# Patient Record
Sex: FEMALE | Race: OTHER | URBAN - METROPOLITAN AREA
[De-identification: names, ages, dates, MRNs, and addresses within clinical notes are randomized per-mention and may not be internally consistent; named-entity substitution may affect disease eponyms.]

---

## 2020-07-15 ENCOUNTER — APPOINTMENT (RX ONLY)
Dept: URBAN - METROPOLITAN AREA CLINIC 22 | Facility: CLINIC | Age: 45
Setting detail: DERMATOLOGY
End: 2020-07-15

## 2020-07-15 DIAGNOSIS — Z71.89 OTHER SPECIFIED COUNSELING: ICD-10-CM

## 2020-07-15 DIAGNOSIS — D22 MELANOCYTIC NEVI: ICD-10-CM

## 2020-07-15 DIAGNOSIS — D17 BENIGN LIPOMATOUS NEOPLASM: ICD-10-CM

## 2020-07-15 PROBLEM — D17.23 BENIGN LIPOMATOUS NEOPLASM OF SKIN AND SUBCUTANEOUS TISSUE OF RIGHT LEG: Status: ACTIVE | Noted: 2020-07-15

## 2020-07-15 PROBLEM — D17.24 BENIGN LIPOMATOUS NEOPLASM OF SKIN AND SUBCUTANEOUS TISSUE OF LEFT LEG: Status: ACTIVE | Noted: 2020-07-15

## 2020-07-15 PROBLEM — D22.39 MELANOCYTIC NEVI OF OTHER PARTS OF FACE: Status: ACTIVE | Noted: 2020-07-15

## 2020-07-15 PROBLEM — D17.22 BENIGN LIPOMATOUS NEOPLASM OF SKIN AND SUBCUTANEOUS TISSUE OF LEFT ARM: Status: ACTIVE | Noted: 2020-07-15

## 2020-07-15 PROBLEM — D17.21 BENIGN LIPOMATOUS NEOPLASM OF SKIN AND SUBCUTANEOUS TISSUE OF RIGHT ARM: Status: ACTIVE | Noted: 2020-07-15

## 2020-07-15 PROCEDURE — ? REFERRAL CORRESPONDENCE

## 2020-07-15 PROCEDURE — 99202 OFFICE O/P NEW SF 15 MIN: CPT

## 2020-07-15 PROCEDURE — ? COUNSELING

## 2020-07-15 ASSESSMENT — LOCATION SIMPLE DESCRIPTION DERM
LOCATION SIMPLE: LEFT FOREARM
LOCATION SIMPLE: LEFT CHEEK
LOCATION SIMPLE: RIGHT FOREARM
LOCATION SIMPLE: RIGHT PRETIBIAL REGION
LOCATION SIMPLE: LEFT PRETIBIAL REGION

## 2020-07-15 ASSESSMENT — LOCATION DETAILED DESCRIPTION DERM
LOCATION DETAILED: RIGHT VENTRAL DISTAL FOREARM
LOCATION DETAILED: LEFT VENTRAL DISTAL FOREARM
LOCATION DETAILED: RIGHT PROXIMAL PRETIBIAL REGION
LOCATION DETAILED: LEFT PROXIMAL PRETIBIAL REGION
LOCATION DETAILED: LEFT INFERIOR MEDIAL MALAR CHEEK

## 2020-07-15 ASSESSMENT — LOCATION ZONE DERM
LOCATION ZONE: ARM
LOCATION ZONE: FACE
LOCATION ZONE: LEG

## 2021-05-06 ENCOUNTER — HOSPITAL ENCOUNTER (EMERGENCY)
Dept: HOSPITAL 8 - ED | Age: 46
Discharge: HOME | End: 2021-05-06
Payer: COMMERCIAL

## 2021-05-06 VITALS — WEIGHT: 184.31 LBS | BODY MASS INDEX: 27.93 KG/M2 | HEIGHT: 68 IN

## 2021-05-06 VITALS — SYSTOLIC BLOOD PRESSURE: 136 MMHG | DIASTOLIC BLOOD PRESSURE: 79 MMHG

## 2021-05-06 DIAGNOSIS — R10.84: Primary | ICD-10-CM

## 2021-05-06 DIAGNOSIS — R11.0: ICD-10-CM

## 2021-05-06 LAB
<PLATELET ESTIMATE>: ADEQUATE
<PLT MORPHOLOGY>: (no result)
ALBUMIN SERPL-MCNC: 3.9 G/DL (ref 3.4–5)
ALP SERPL-CCNC: 51 U/L (ref 45–117)
ALT SERPL-CCNC: 32 U/L (ref 12–78)
ANION GAP SERPL CALC-SCNC: 8 MMOL/L (ref 5–15)
ANISOCYTOSIS BLD QL SMEAR: (no result)
BASOPHILS # BLD AUTO: 0 X10^3/UL (ref 0–0.1)
BASOPHILS NFR BLD AUTO: 0 % (ref 0–1)
BILIRUB SERPL-MCNC: 0.8 MG/DL (ref 0.2–1)
CALCIUM SERPL-MCNC: 9.3 MG/DL (ref 8.5–10.1)
CHLORIDE SERPL-SCNC: 104 MMOL/L (ref 98–107)
CREAT SERPL-MCNC: 0.62 MG/DL (ref 0.55–1.02)
EOSINOPHIL # BLD AUTO: 0.1 X10^3/UL (ref 0–0.4)
EOSINOPHIL NFR BLD AUTO: 1 % (ref 1–7)
ERYTHROCYTE [DISTWIDTH] IN BLOOD BY AUTOMATED COUNT: 17.2 % (ref 9.6–15.2)
HYPOCHROMIA BLD QL SMEAR: (no result)
LYMPHOCYTES # BLD AUTO: 1.6 X10^3/UL (ref 1–3.4)
LYMPHOCYTES NFR BLD AUTO: 15 % (ref 22–44)
MCH RBC QN AUTO: 20.4 PG (ref 27–34.8)
MCHC RBC AUTO-ENTMCNC: 30.6 G/DL (ref 32.4–35.8)
MD: (no result)
MICROCYTES BLD QL SMEAR: (no result)
MICROSCOPIC: (no result)
MONOCYTES # BLD AUTO: 0.5 X10^3/UL (ref 0.2–0.8)
MONOCYTES NFR BLD AUTO: 4 % (ref 2–9)
NEUTROPHILS # BLD AUTO: 8.8 X10^3/UL (ref 1.8–6.8)
NEUTROPHILS NFR BLD AUTO: 80 % (ref 42–75)
OVALOCYTES BLD QL SMEAR: (no result)
PLATELET # BLD AUTO: 162 X10^3/UL (ref 130–400)
PMV BLD AUTO: 7.3 FL (ref 7.4–10.4)
POLYCHROMASIA BLD QL SMEAR: (no result)
PROT SERPL-MCNC: 8.1 G/DL (ref 6.4–8.2)
RBC # BLD AUTO: 4.69 X10^6/UL (ref 3.82–5.3)

## 2021-05-06 PROCEDURE — 96374 THER/PROPH/DIAG INJ IV PUSH: CPT

## 2021-05-06 PROCEDURE — 83690 ASSAY OF LIPASE: CPT

## 2021-05-06 PROCEDURE — 80053 COMPREHEN METABOLIC PANEL: CPT

## 2021-05-06 PROCEDURE — 96375 TX/PRO/DX INJ NEW DRUG ADDON: CPT

## 2021-05-06 PROCEDURE — 36415 COLL VENOUS BLD VENIPUNCTURE: CPT

## 2021-05-06 PROCEDURE — 74177 CT ABD & PELVIS W/CONTRAST: CPT

## 2021-05-06 PROCEDURE — 84703 CHORIONIC GONADOTROPIN ASSAY: CPT

## 2021-05-06 PROCEDURE — 99285 EMERGENCY DEPT VISIT HI MDM: CPT

## 2021-05-06 PROCEDURE — 85025 COMPLETE CBC W/AUTO DIFF WBC: CPT

## 2021-05-06 PROCEDURE — 81003 URINALYSIS AUTO W/O SCOPE: CPT

## 2021-05-06 PROCEDURE — 96376 TX/PRO/DX INJ SAME DRUG ADON: CPT

## 2021-05-06 RX ADMIN — MORPHINE SULFATE PRN MG: 4 INJECTION INTRAVENOUS at 18:37

## 2021-05-06 RX ADMIN — MORPHINE SULFATE PRN MG: 4 INJECTION INTRAVENOUS at 16:45

## 2021-05-06 NOTE — NUR
PT SEEN AT , HAD CT SCHEDULED OUTPT AND WAS WAITING IN RADIOLOGY WHEN PAIN 
BECAME UNBEARABLE AND DECIDED TO CHECK INTO ER.  PT CRYING IN PAIN.  PAIN ONSET 
MONDAY MORNING AND WORSENING TODAY.  PAIN PERIUMBILICAL, CRAMPING AND SHARP. NO 
N/V/D OR OTHER SX.  SCAR TISSUE AROUND UMBILICUS, NOT DIFFERENT PER PT BUT 
PAINFUL WITH PALPATION.  PT LYING IN POSITION OF COMFORT, FLAT.  IV PLACED, 
LABS DRAWN WITH START.

BP CUFF, PULSE OX IN PLACE.  CALL LIGHT WITHIN REACH, WARM BLANKET PROVIDED.

## 2021-06-12 ENCOUNTER — HOSPITAL ENCOUNTER (EMERGENCY)
Dept: HOSPITAL 8 - ED | Age: 46
Discharge: HOME | End: 2021-06-12
Payer: COMMERCIAL

## 2021-06-12 VITALS — HEIGHT: 69 IN | WEIGHT: 182.1 LBS | BODY MASS INDEX: 26.97 KG/M2

## 2021-06-12 VITALS — SYSTOLIC BLOOD PRESSURE: 133 MMHG | DIASTOLIC BLOOD PRESSURE: 80 MMHG

## 2021-06-12 DIAGNOSIS — R94.31: ICD-10-CM

## 2021-06-12 DIAGNOSIS — R10.13: Primary | ICD-10-CM

## 2021-06-12 DIAGNOSIS — Z90.89: ICD-10-CM

## 2021-06-12 LAB
<PLATELET ESTIMATE>: (no result)
<PLT MORPHOLOGY>: (no result)
ALBUMIN SERPL-MCNC: 3.1 G/DL (ref 3.4–5)
ALP SERPL-CCNC: 72 U/L (ref 45–117)
ALT SERPL-CCNC: 46 U/L (ref 12–78)
ANION GAP SERPL CALC-SCNC: 8 MMOL/L (ref 5–15)
ANISOCYTOSIS BLD QL SMEAR: (no result)
BAND#(MANUAL): 0.08 X10^3/UL
BASOPHILS NFR BLD MANUAL: 1 % (ref 0–1)
BASOS#(MANUAL): 0.08 X10^3/UL (ref 0–0.1)
BILIRUB SERPL-MCNC: 1.1 MG/DL (ref 0.2–1)
BURR CELLS BLD QL SMEAR: (no result)
CALCIUM SERPL-MCNC: 8.8 MG/DL (ref 8.5–10.1)
CHLORIDE SERPL-SCNC: 101 MMOL/L (ref 98–107)
CREAT SERPL-MCNC: 0.75 MG/DL (ref 0.55–1.02)
EOS#(MANUAL): 0.25 X10^3/UL (ref 0–0.4)
EOS% (MANUAL): 3 % (ref 1–7)
ERYTHROCYTE [DISTWIDTH] IN BLOOD BY AUTOMATED COUNT: 17.3 % (ref 9.6–15.2)
HYPOCHROMIA BLD QL SMEAR: (no result)
LYMPH#(MANUAL): 1.25 X10^3/UL (ref 1–3.4)
LYMPHS% (MANUAL): 15 % (ref 22–44)
MCH RBC QN AUTO: 20 PG (ref 27–34.8)
MCHC RBC AUTO-ENTMCNC: 30.9 G/DL (ref 32.4–35.8)
MICROCYTES BLD QL SMEAR: (no result)
MONOS#(MANUAL): 0.25 X10^3/UL (ref 0.3–2.7)
MONOS% (MANUAL): 3 % (ref 2–9)
NEUTS BAND NFR BLD: 1 % (ref 0–7)
OVALOCYTES BLD QL SMEAR: (no result)
PLATELET # BLD AUTO: 111 X10^3/UL (ref 130–400)
PMV BLD AUTO: 8.3 FL (ref 7.4–10.4)
POLYCHROMASIA BLD QL SMEAR: (no result)
PROT SERPL-MCNC: 7.9 G/DL (ref 6.4–8.2)
RBC # BLD AUTO: 4.35 X10^6/UL (ref 3.82–5.3)
SEG#(MANUAL): 6.39 X10^3/UL (ref 1.8–6.8)
SEGS% (MANUAL): 77 % (ref 42–75)

## 2021-06-12 PROCEDURE — 84703 CHORIONIC GONADOTROPIN ASSAY: CPT

## 2021-06-12 PROCEDURE — 99285 EMERGENCY DEPT VISIT HI MDM: CPT

## 2021-06-12 PROCEDURE — 74018 RADEX ABDOMEN 1 VIEW: CPT

## 2021-06-12 PROCEDURE — 36415 COLL VENOUS BLD VENIPUNCTURE: CPT

## 2021-06-12 PROCEDURE — 80053 COMPREHEN METABOLIC PANEL: CPT

## 2021-06-12 PROCEDURE — 76700 US EXAM ABDOM COMPLETE: CPT

## 2021-06-12 PROCEDURE — 83690 ASSAY OF LIPASE: CPT

## 2021-06-12 PROCEDURE — 93005 ELECTROCARDIOGRAM TRACING: CPT

## 2021-06-12 PROCEDURE — 85025 COMPLETE CBC W/AUTO DIFF WBC: CPT

## 2021-06-12 NOTE — NUR
Pt found back in room without acute changes noted per pt. Orders reviewed. 
Still waiting for [facility] records to arrive for PA/MD review.

## 2021-06-12 NOTE — NUR
Report received from merritt Muller RN and care assumed. Pt currently out of 
dept to radiology. Mother remains at bedside waiting for pt return.

## 2021-06-12 NOTE — NUR
12 Lead EKG completed with not ST changes present and SR noted. Brought to MD 
for review and signature and placed on pt chart with PA.

## 2021-06-14 ENCOUNTER — APPOINTMENT (OUTPATIENT)
Dept: RADIOLOGY | Facility: MEDICAL CENTER | Age: 46
DRG: 814 | End: 2021-06-14
Attending: EMERGENCY MEDICINE
Payer: COMMERCIAL

## 2021-06-14 ENCOUNTER — HOSPITAL ENCOUNTER (INPATIENT)
Facility: MEDICAL CENTER | Age: 46
LOS: 16 days | DRG: 814 | End: 2021-07-02
Attending: EMERGENCY MEDICINE | Admitting: STUDENT IN AN ORGANIZED HEALTH CARE EDUCATION/TRAINING PROGRAM
Payer: COMMERCIAL

## 2021-06-14 ENCOUNTER — APPOINTMENT (OUTPATIENT)
Dept: RADIOLOGY | Facility: MEDICAL CENTER | Age: 46
DRG: 814 | End: 2021-06-14
Attending: STUDENT IN AN ORGANIZED HEALTH CARE EDUCATION/TRAINING PROGRAM
Payer: COMMERCIAL

## 2021-06-14 DIAGNOSIS — R10.9 ABDOMINAL PAIN, UNSPECIFIED ABDOMINAL LOCATION: ICD-10-CM

## 2021-06-14 DIAGNOSIS — D68.61 ANTIPHOSPHOLIPID ANTIBODY SYNDROME (HCC): ICD-10-CM

## 2021-06-14 DIAGNOSIS — E87.1 HYPONATREMIA: ICD-10-CM

## 2021-06-14 DIAGNOSIS — D68.61 CATASTROPHIC ANTIPHOSPHOLIPID SYNDROME (HCC): ICD-10-CM

## 2021-06-14 DIAGNOSIS — N12 PYELONEPHRITIS: ICD-10-CM

## 2021-06-14 PROBLEM — N88.9 LESION OF CERVIX: Status: ACTIVE | Noted: 2021-06-14

## 2021-06-14 PROBLEM — N28.9 KIDNEY LESION, NATIVE, RIGHT: Status: ACTIVE | Noted: 2021-06-14

## 2021-06-14 PROBLEM — A04.8 H. PYLORI INFECTION: Status: ACTIVE | Noted: 2021-06-14

## 2021-06-14 PROBLEM — D72.829 LEUKOCYTOSIS: Status: ACTIVE | Noted: 2021-06-14

## 2021-06-14 LAB
ALBUMIN SERPL BCP-MCNC: 4 G/DL (ref 3.2–4.9)
ALBUMIN/GLOB SERPL: 0.9 G/DL
ALP SERPL-CCNC: 73 U/L (ref 30–99)
ALT SERPL-CCNC: 33 U/L (ref 2–50)
AMORPH CRY #/AREA URNS HPF: PRESENT /HPF
ANION GAP SERPL CALC-SCNC: 11 MMOL/L (ref 7–16)
ANISOCYTOSIS BLD QL SMEAR: ABNORMAL
APPEARANCE UR: ABNORMAL
AST SERPL-CCNC: 27 U/L (ref 12–45)
B-HCG SERPL-ACNC: <1 MIU/ML (ref 0–5)
BACTERIA #/AREA URNS HPF: NEGATIVE /HPF
BASOPHILS # BLD AUTO: 0.3 % (ref 0–1.8)
BASOPHILS # BLD: 0.04 K/UL (ref 0–0.12)
BILIRUB SERPL-MCNC: 1.6 MG/DL (ref 0.1–1.5)
BILIRUB UR QL STRIP.AUTO: ABNORMAL
BUN SERPL-MCNC: 9 MG/DL (ref 8–22)
CALCIUM SERPL-MCNC: 9.4 MG/DL (ref 8.5–10.5)
CHLORIDE SERPL-SCNC: 89 MMOL/L (ref 96–112)
CO2 SERPL-SCNC: 24 MMOL/L (ref 20–33)
COLOR UR: ABNORMAL
COMMENT 1642: NORMAL
CREAT SERPL-MCNC: 0.59 MG/DL (ref 0.5–1.4)
EOSINOPHIL # BLD AUTO: 0.03 K/UL (ref 0–0.51)
EOSINOPHIL NFR BLD: 0.2 % (ref 0–6.9)
EPI CELLS #/AREA URNS HPF: NORMAL /HPF
ERYTHROCYTE [DISTWIDTH] IN BLOOD BY AUTOMATED COUNT: 40.6 FL (ref 35.9–50)
GLOBULIN SER CALC-MCNC: 4.5 G/DL (ref 1.9–3.5)
GLUCOSE SERPL-MCNC: 111 MG/DL (ref 65–99)
GLUCOSE UR STRIP.AUTO-MCNC: NEGATIVE MG/DL
HCT VFR BLD AUTO: 30.7 % (ref 37–47)
HGB BLD-MCNC: 9 G/DL (ref 12–16)
HYALINE CASTS #/AREA URNS LPF: NORMAL /LPF
HYPOCHROMIA BLD QL SMEAR: ABNORMAL
IMM GRANULOCYTES # BLD AUTO: 0.11 K/UL (ref 0–0.11)
IMM GRANULOCYTES NFR BLD AUTO: 0.8 % (ref 0–0.9)
KETONES UR STRIP.AUTO-MCNC: ABNORMAL MG/DL
LACTATE BLD-SCNC: 1.3 MMOL/L (ref 0.5–2)
LEUKOCYTE ESTERASE UR QL STRIP.AUTO: NEGATIVE
LIPASE SERPL-CCNC: 13 U/L (ref 11–82)
LYMPHOCYTES # BLD AUTO: 1.07 K/UL (ref 1–4.8)
LYMPHOCYTES NFR BLD: 7.8 % (ref 22–41)
MCH RBC QN AUTO: 20.1 PG (ref 27–33)
MCHC RBC AUTO-ENTMCNC: 29.3 G/DL (ref 33.6–35)
MCV RBC AUTO: 68.5 FL (ref 81.4–97.8)
MICRO URNS: ABNORMAL
MICROCYTES BLD QL SMEAR: ABNORMAL
MONOCYTES # BLD AUTO: 0.93 K/UL (ref 0–0.85)
MONOCYTES NFR BLD AUTO: 6.8 % (ref 0–13.4)
MORPHOLOGY BLD-IMP: NORMAL
NEUTROPHILS # BLD AUTO: 11.52 K/UL (ref 2–7.15)
NEUTROPHILS NFR BLD: 84.1 % (ref 44–72)
NITRITE UR QL STRIP.AUTO: POSITIVE
NRBC # BLD AUTO: 0 K/UL
NRBC BLD-RTO: 0 /100 WBC
OVALOCYTES BLD QL SMEAR: NORMAL
PH UR STRIP.AUTO: 5.5 [PH] (ref 5–8)
PLATELET # BLD AUTO: 153 K/UL (ref 164–446)
PLATELET BLD QL SMEAR: NORMAL
PMV BLD AUTO: 9.7 FL (ref 9–12.9)
POLYCHROMASIA BLD QL SMEAR: NORMAL
POTASSIUM SERPL-SCNC: 3.9 MMOL/L (ref 3.6–5.5)
PROT SERPL-MCNC: 8.5 G/DL (ref 6–8.2)
PROT UR QL STRIP: 300 MG/DL
RBC # BLD AUTO: 4.48 M/UL (ref 4.2–5.4)
RBC # URNS HPF: NORMAL /HPF
RBC BLD AUTO: PRESENT
RBC UR QL AUTO: ABNORMAL
SODIUM SERPL-SCNC: 124 MMOL/L (ref 135–145)
SP GR UR STRIP.AUTO: 1.03
UROBILINOGEN UR STRIP.AUTO-MCNC: 0.2 MG/DL
WBC # BLD AUTO: 13.7 K/UL (ref 4.8–10.8)
WBC #/AREA URNS HPF: NORMAL /HPF

## 2021-06-14 PROCEDURE — 700117 HCHG RX CONTRAST REV CODE 255: Performed by: EMERGENCY MEDICINE

## 2021-06-14 PROCEDURE — 700105 HCHG RX REV CODE 258: Performed by: STUDENT IN AN ORGANIZED HEALTH CARE EDUCATION/TRAINING PROGRAM

## 2021-06-14 PROCEDURE — 96375 TX/PRO/DX INJ NEW DRUG ADDON: CPT

## 2021-06-14 PROCEDURE — 83605 ASSAY OF LACTIC ACID: CPT

## 2021-06-14 PROCEDURE — 700105 HCHG RX REV CODE 258: Performed by: EMERGENCY MEDICINE

## 2021-06-14 PROCEDURE — 700102 HCHG RX REV CODE 250 W/ 637 OVERRIDE(OP): Performed by: STUDENT IN AN ORGANIZED HEALTH CARE EDUCATION/TRAINING PROGRAM

## 2021-06-14 PROCEDURE — 76705 ECHO EXAM OF ABDOMEN: CPT

## 2021-06-14 PROCEDURE — 700111 HCHG RX REV CODE 636 W/ 250 OVERRIDE (IP): Performed by: STUDENT IN AN ORGANIZED HEALTH CARE EDUCATION/TRAINING PROGRAM

## 2021-06-14 PROCEDURE — 74177 CT ABD & PELVIS W/CONTRAST: CPT

## 2021-06-14 PROCEDURE — G0378 HOSPITAL OBSERVATION PER HR: HCPCS

## 2021-06-14 PROCEDURE — 84702 CHORIONIC GONADOTROPIN TEST: CPT

## 2021-06-14 PROCEDURE — 99285 EMERGENCY DEPT VISIT HI MDM: CPT

## 2021-06-14 PROCEDURE — 87040 BLOOD CULTURE FOR BACTERIA: CPT

## 2021-06-14 PROCEDURE — 36415 COLL VENOUS BLD VENIPUNCTURE: CPT

## 2021-06-14 PROCEDURE — 76856 US EXAM PELVIC COMPLETE: CPT

## 2021-06-14 PROCEDURE — A9270 NON-COVERED ITEM OR SERVICE: HCPCS | Performed by: STUDENT IN AN ORGANIZED HEALTH CARE EDUCATION/TRAINING PROGRAM

## 2021-06-14 PROCEDURE — 81001 URINALYSIS AUTO W/SCOPE: CPT

## 2021-06-14 PROCEDURE — 80053 COMPREHEN METABOLIC PANEL: CPT

## 2021-06-14 PROCEDURE — U0003 INFECTIOUS AGENT DETECTION BY NUCLEIC ACID (DNA OR RNA); SEVERE ACUTE RESPIRATORY SYNDROME CORONAVIRUS 2 (SARS-COV-2) (CORONAVIRUS DISEASE [COVID-19]), AMPLIFIED PROBE TECHNIQUE, MAKING USE OF HIGH THROUGHPUT TECHNOLOGIES AS DESCRIBED BY CMS-2020-01-R: HCPCS

## 2021-06-14 PROCEDURE — 700111 HCHG RX REV CODE 636 W/ 250 OVERRIDE (IP): Performed by: EMERGENCY MEDICINE

## 2021-06-14 PROCEDURE — 99220 PR INITIAL OBSERVATION CARE,LEVL III: CPT | Performed by: STUDENT IN AN ORGANIZED HEALTH CARE EDUCATION/TRAINING PROGRAM

## 2021-06-14 PROCEDURE — 83690 ASSAY OF LIPASE: CPT

## 2021-06-14 PROCEDURE — U0005 INFEC AGEN DETEC AMPLI PROBE: HCPCS

## 2021-06-14 PROCEDURE — 85025 COMPLETE CBC W/AUTO DIFF WBC: CPT

## 2021-06-14 PROCEDURE — 87086 URINE CULTURE/COLONY COUNT: CPT

## 2021-06-14 PROCEDURE — C9113 INJ PANTOPRAZOLE SODIUM, VIA: HCPCS | Performed by: EMERGENCY MEDICINE

## 2021-06-14 PROCEDURE — 96376 TX/PRO/DX INJ SAME DRUG ADON: CPT

## 2021-06-14 PROCEDURE — 96374 THER/PROPH/DIAG INJ IV PUSH: CPT

## 2021-06-14 RX ORDER — ACETAMINOPHEN 325 MG/1
650 TABLET ORAL EVERY 6 HOURS PRN
Status: DISCONTINUED | OUTPATIENT
Start: 2021-06-14 | End: 2021-06-24

## 2021-06-14 RX ORDER — OMEPRAZOLE 20 MG/1
20 CAPSULE, DELAYED RELEASE ORAL DAILY
Status: ON HOLD | COMMUNITY
End: 2021-07-02

## 2021-06-14 RX ORDER — MORPHINE SULFATE 4 MG/ML
4 INJECTION, SOLUTION INTRAMUSCULAR; INTRAVENOUS ONCE
Status: COMPLETED | OUTPATIENT
Start: 2021-06-14 | End: 2021-06-14

## 2021-06-14 RX ORDER — AMOXICILLIN 250 MG
2 CAPSULE ORAL 2 TIMES DAILY
Status: DISCONTINUED | OUTPATIENT
Start: 2021-06-14 | End: 2021-07-02 | Stop reason: HOSPADM

## 2021-06-14 RX ORDER — PROMETHAZINE HYDROCHLORIDE 25 MG/1
12.5-25 TABLET ORAL EVERY 4 HOURS PRN
Status: DISCONTINUED | OUTPATIENT
Start: 2021-06-14 | End: 2021-07-02 | Stop reason: HOSPADM

## 2021-06-14 RX ORDER — SODIUM CHLORIDE 9 MG/ML
1000 INJECTION, SOLUTION INTRAVENOUS ONCE
Status: COMPLETED | OUTPATIENT
Start: 2021-06-14 | End: 2021-06-14

## 2021-06-14 RX ORDER — AMOXICILLIN 500 MG/1
500 CAPSULE ORAL 2 TIMES DAILY
Status: ON HOLD | COMMUNITY
End: 2021-07-02

## 2021-06-14 RX ORDER — CLONIDINE HYDROCHLORIDE 0.1 MG/1
0.1 TABLET ORAL EVERY 6 HOURS PRN
Status: DISCONTINUED | OUTPATIENT
Start: 2021-06-14 | End: 2021-07-02 | Stop reason: HOSPADM

## 2021-06-14 RX ORDER — MORPHINE SULFATE 4 MG/ML
2 INJECTION, SOLUTION INTRAMUSCULAR; INTRAVENOUS
Status: DISCONTINUED | OUTPATIENT
Start: 2021-06-14 | End: 2021-06-15

## 2021-06-14 RX ORDER — CLINDAMYCIN HYDROCHLORIDE 150 MG/1
150 CAPSULE ORAL 2 TIMES DAILY
Status: ON HOLD | COMMUNITY
End: 2021-07-02

## 2021-06-14 RX ORDER — ONDANSETRON 2 MG/ML
4 INJECTION INTRAMUSCULAR; INTRAVENOUS ONCE
Status: COMPLETED | OUTPATIENT
Start: 2021-06-14 | End: 2021-06-14

## 2021-06-14 RX ORDER — POLYETHYLENE GLYCOL 3350 17 G/17G
1 POWDER, FOR SOLUTION ORAL
Status: DISCONTINUED | OUTPATIENT
Start: 2021-06-14 | End: 2021-07-02 | Stop reason: HOSPADM

## 2021-06-14 RX ORDER — ONDANSETRON 2 MG/ML
4 INJECTION INTRAMUSCULAR; INTRAVENOUS EVERY 4 HOURS PRN
Status: DISCONTINUED | OUTPATIENT
Start: 2021-06-14 | End: 2021-07-02 | Stop reason: HOSPADM

## 2021-06-14 RX ORDER — ENALAPRILAT 1.25 MG/ML
1.25 INJECTION INTRAVENOUS EVERY 6 HOURS PRN
Status: DISCONTINUED | OUTPATIENT
Start: 2021-06-14 | End: 2021-07-02 | Stop reason: HOSPADM

## 2021-06-14 RX ORDER — ONDANSETRON 4 MG/1
4 TABLET, ORALLY DISINTEGRATING ORAL EVERY 4 HOURS PRN
Status: DISCONTINUED | OUTPATIENT
Start: 2021-06-14 | End: 2021-07-02 | Stop reason: HOSPADM

## 2021-06-14 RX ORDER — BISACODYL 10 MG
10 SUPPOSITORY, RECTAL RECTAL
Status: DISCONTINUED | OUTPATIENT
Start: 2021-06-14 | End: 2021-07-02 | Stop reason: HOSPADM

## 2021-06-14 RX ORDER — PROCHLORPERAZINE EDISYLATE 5 MG/ML
5-10 INJECTION INTRAMUSCULAR; INTRAVENOUS EVERY 4 HOURS PRN
Status: DISCONTINUED | OUTPATIENT
Start: 2021-06-14 | End: 2021-07-02 | Stop reason: HOSPADM

## 2021-06-14 RX ORDER — PROMETHAZINE HYDROCHLORIDE 25 MG/1
12.5-25 SUPPOSITORY RECTAL EVERY 4 HOURS PRN
Status: DISCONTINUED | OUTPATIENT
Start: 2021-06-14 | End: 2021-07-02 | Stop reason: HOSPADM

## 2021-06-14 RX ORDER — OMEPRAZOLE 20 MG/1
20 CAPSULE, DELAYED RELEASE ORAL DAILY
Status: DISCONTINUED | OUTPATIENT
Start: 2021-06-15 | End: 2021-06-15

## 2021-06-14 RX ORDER — PANTOPRAZOLE SODIUM 40 MG/10ML
40 INJECTION, POWDER, LYOPHILIZED, FOR SOLUTION INTRAVENOUS ONCE
Status: COMPLETED | OUTPATIENT
Start: 2021-06-14 | End: 2021-06-14

## 2021-06-14 RX ORDER — SUCRALFATE 1 G/1
1 TABLET ORAL
Status: DISCONTINUED | OUTPATIENT
Start: 2021-06-14 | End: 2021-06-18

## 2021-06-14 RX ORDER — SUCRALFATE 1 G/1
1 TABLET ORAL
Status: ON HOLD | COMMUNITY
End: 2021-07-02

## 2021-06-14 RX ORDER — SODIUM CHLORIDE 9 MG/ML
INJECTION, SOLUTION INTRAVENOUS CONTINUOUS
Status: DISCONTINUED | OUTPATIENT
Start: 2021-06-14 | End: 2021-06-18

## 2021-06-14 RX ORDER — LABETALOL HYDROCHLORIDE 5 MG/ML
10 INJECTION, SOLUTION INTRAVENOUS EVERY 4 HOURS PRN
Status: DISCONTINUED | OUTPATIENT
Start: 2021-06-14 | End: 2021-07-02 | Stop reason: HOSPADM

## 2021-06-14 RX ADMIN — SODIUM CHLORIDE: 9 INJECTION, SOLUTION INTRAVENOUS at 23:56

## 2021-06-14 RX ADMIN — PANTOPRAZOLE SODIUM 40 MG: 40 INJECTION, POWDER, LYOPHILIZED, FOR SOLUTION INTRAVENOUS at 14:58

## 2021-06-14 RX ADMIN — MORPHINE SULFATE 4 MG: 4 INJECTION INTRAVENOUS at 17:15

## 2021-06-14 RX ADMIN — MORPHINE SULFATE 4 MG: 4 INJECTION INTRAVENOUS at 19:31

## 2021-06-14 RX ADMIN — MORPHINE SULFATE 4 MG: 4 INJECTION INTRAVENOUS at 14:01

## 2021-06-14 RX ADMIN — CEFTRIAXONE SODIUM 2 G: 10 INJECTION, POWDER, FOR SOLUTION INTRAVENOUS at 14:18

## 2021-06-14 RX ADMIN — SUCRALFATE 1 G: 1 TABLET ORAL at 23:55

## 2021-06-14 RX ADMIN — IOHEXOL 100 ML: 350 INJECTION, SOLUTION INTRAVENOUS at 16:15

## 2021-06-14 RX ADMIN — MORPHINE SULFATE 2 MG: 4 INJECTION INTRAVENOUS at 23:55

## 2021-06-14 RX ADMIN — ONDANSETRON 4 MG: 2 INJECTION INTRAMUSCULAR; INTRAVENOUS at 22:16

## 2021-06-14 RX ADMIN — ONDANSETRON 4 MG: 2 INJECTION INTRAMUSCULAR; INTRAVENOUS at 14:01

## 2021-06-14 RX ADMIN — SODIUM CHLORIDE 1000 ML: 9 INJECTION, SOLUTION INTRAVENOUS at 16:15

## 2021-06-14 ASSESSMENT — LIFESTYLE VARIABLES
DOES PATIENT WANT TO STOP DRINKING: NO
DO YOU DRINK ALCOHOL: NO

## 2021-06-14 ASSESSMENT — ENCOUNTER SYMPTOMS
VOMITING: 0
CONSTIPATION: 1
BLOOD IN STOOL: 0
NAUSEA: 1
ABDOMINAL PAIN: 1

## 2021-06-14 ASSESSMENT — FIBROSIS 4 INDEX: FIB4 SCORE: 1.38

## 2021-06-14 ASSESSMENT — PAIN DESCRIPTION - PAIN TYPE: TYPE: ACUTE PAIN

## 2021-06-14 NOTE — ED TRIAGE NOTES
.  Chief Complaint   Patient presents with   • Abdominal Pain     Hx of H. pylori   • Flank Pain     left sided, since thursday      Pt ambulate to triage with above complaints. Pt reports she stopped taking medication for H. Pylori Friday due to increasing pain. Pt notes she has not urinated or defecated in 2-3 days, Pt family notes Pt appears pale. Pt presents very uncomfortable in triage, restless.    Charge RN notified of Pt condition.

## 2021-06-14 NOTE — ED PROVIDER NOTES
ED Provider Note    Scribed for Sharmin Paul M.D. by Amanuel Pratt. 6/14/2021  1:25 PM    Primary care provider: None noted  Means of arrival: Walk In  History obtained from: Patient  History limited by: None    CHIEF COMPLAINT  Chief Complaint   Patient presents with   • Abdominal Pain     Hx of H. pylori   • Flank Pain     left sided, since thursday       [facility]  Jaclyn Olvera is a 46 y.o. female with a recent diagnosis of of H. Pylori who presents to the ED for evaluation of left sided abdominal pain onset 4 days. She also complains of associated left sided flank pain (4 days). She has not had a bowel movement or urinated for 4 days, but she has been able to keep fluids down. She was tested for H. Pylori at [facility] because she was having epigastric pain and acid reflux 3 weeks ago. She notes she was having increased pain during H. Pylori treatment, so she stopped her treatment 4 days ago.  Initially patient said she has not urinated in 4 days, but then she says she can get little bits of urine out.  She is able to drink small sips of fluid otherwise has not been eating or drinking much.  She denies any vomiting, melena, fever, cough, yellowing of her skin, or hemoptysis.  No dysuria.  No hematuria.  No diarrhea.  She feels like her abdomen is distended.  The patient is followed by [facility]. She has tried diet modifications but it has not alleviated her symptoms. She denies any history of Diabetes mellitus or pancreatitis.     I verified that the patient was wearing a mask and I was wearing appropriate PPE every time I entered the room. The patient's mask was on the patient at all times during my encounter except for a brief view of the oropharynx.    REVIEW OF SYSTEMS  Pertinent positives include abdominal pain, flank pain, decreased urination and bowel movements.. Pertinent negatives include no vomiting, melena, fever, cough, yellowing of her skin, or hemoptysis.    See HPI for further details. All  other systems are negative.    PAST MEDICAL HISTORY  Past Medical History:   Diagnosis Date   • PE (pulmonary embolism)        FAMILY HISTORY  History reviewed. No pertinent family history.    SOCIAL HISTORY  Social History     Socioeconomic History   • Marital status:      Spouse name: Not on file   • Number of children: Not on file   • Years of education: Not on file   • Highest education level: Not on file   Occupational History   • Not on file   Tobacco Use   • Smoking status: Current Every Day Smoker   • Tobacco comment: 1 pk per week   Substance and Sexual Activity   • Alcohol use: Yes     Comment: occasionally   • Drug use: Yes     Types: Inhaled     Comment: marijuana   • Sexual activity: Not on file   Other Topics Concern   • Not on file   Social History Narrative   • Not on file     Social Determinants of Health     Financial Resource Strain:    • Difficulty of Paying Living Expenses:    Food Insecurity:    • Worried About Running Out of Food in the Last Year:    • Ran Out of Food in the Last Year:    Transportation Needs:    • Lack of Transportation (Medical):    • Lack of Transportation (Non-Medical):    Physical Activity:    • Days of Exercise per Week:    • Minutes of Exercise per Session:    Stress:    • Feeling of Stress :    Social Connections:    • Frequency of Communication with Friends and Family:    • Frequency of Social Gatherings with Friends and Family:    • Attends [employer] Services:    • Active Member of Clubs or Organizations:    • Attends Club or Organization Meetings:    • Marital Status:    Intimate Partner Violence:    • Fear of Current or Ex-Partner:    • Emotionally Abused:    • Physically Abused:    • Sexually Abused:        SURGICAL HISTORY  History reviewed. No pertinent surgical history.    CURRENT MEDICATIONS  Current Outpatient Medications   Medication Instructions   • amoxicillin (AMOXIL) 500 mg, Oral, 3 TIMES DAILY   • furosemide (LASIX) 20 mg, Oral, DAILY   •  "hydrocodone-acetaminophen (VICODIN) 5-500 MG TABS 1-2 Tablets, Oral, EVERY 4 HOURS PRN   • IBUPROFEN by Does not apply route. Prn last taken 1400 today   • omeprazole (PRILOSEC) 20 mg, Oral, DAILY   • potassium chloride SA (K-DUR) 20 MEQ TBCR 20 mEq, Oral, DAILY   • sucralfate (CARAFATE) 1 g, Oral, 4 TIMES DAILY - BEFORE MEALS , NIGHTLY       ALLERGIES  No Known Allergies    PHYSICAL EXAM  VITAL SIGNS: /84   Pulse 85   Temp 36.3 °C (97.3 °F) (Temporal)   Resp (!) 47   Ht 1.753 m (5' 9\")   Wt 81.2 kg (179 lb 0.2 oz)   SpO2 95%   BMI 26.44 kg/m²      Constitutional: Well developed, well nourished; Moderate distress; Ill appearing.  HENT: Normocephalic, atraumatic; Bilateral external ears normal; oropharyngeal examination deferred due to COVID-19 outbreak and lack of oropharyngeal complaint.  Eyes: PERRL, EOMI, Conjunctiva normal. No discharge.   Neck:  Supple, nontender midline; No stridor; No nuchal rigidity.   Lymphatic: No cervical lymphadenopathy noted.   Cardiovascular: Regular rate and rhythm without murmurs, rubs, or gallop.   Thorax & Lungs: No respiratory distress, breath sounds clear to auscultation bilaterally without wheezing, rales or rhonchi. Nontender chest wall. No crepitus or subcutaneous air  Abdomen: Tender diffusely with percussion maximally in the right upper quadrant and mid epigastrium. Tender to palpation in the upper abdomen mostly in the right upper quadrant and mid epigastrium. Soft, bowel sounds normal. No obvious masses; No pulsatile masses; no rebound, guarding, or peritoneal signs.   Pelvic exam: I was unable to visualize the cervix.  Cervix can be barely palpated with the tip of my finger.  It is extremely posterior.  Unable to visualize whether or not there are any masses.  Skin: slightly jaundiced skin color; warm and dry without rash or petechia.  Multiple nodules diffusely over skin.  Patient says she has had these nodules for many years.  Back: Nontender, No CVA " tenderness.   Extremities: Distal radial, dorsalis pedis, posterior tibial pulses are equal bilaterally; No edema; Nontender calves or saphenous, No cyanosis, No clubbing.   Musculoskeletal: Good range of motion in all major joints. No tenderness to palpation or major deformities noted.   Neurologic: Alert & oriented x 4, clear speech    LABS/RADIOLOGY/PROCEDURES  Results for orders placed or performed during the hospital encounter of 06/14/21   CBC WITH DIFFERENTIAL   Result Value Ref Range    WBC 13.7 (H) 4.8 - 10.8 K/uL    RBC 4.48 4.20 - 5.40 M/uL    Hemoglobin 9.0 (L) 12.0 - 16.0 g/dL    Hematocrit 30.7 (L) 37.0 - 47.0 %    MCV 68.5 (L) 81.4 - 97.8 fL    MCH 20.1 (L) 27.0 - 33.0 pg    MCHC 29.3 (L) 33.6 - 35.0 g/dL    RDW 40.6 35.9 - 50.0 fL    Platelet Count 153 (L) 164 - 446 K/uL    MPV 9.7 9.0 - 12.9 fL    Neutrophils-Polys 84.10 (H) 44.00 - 72.00 %    Lymphocytes 7.80 (L) 22.00 - 41.00 %    Monocytes 6.80 0.00 - 13.40 %    Eosinophils 0.20 0.00 - 6.90 %    Basophils 0.30 0.00 - 1.80 %    Immature Granulocytes 0.80 0.00 - 0.90 %    Nucleated RBC 0.00 /100 WBC    Neutrophils (Absolute) 11.52 (H) 2.00 - 7.15 K/uL    Lymphs (Absolute) 1.07 1.00 - 4.80 K/uL    Monos (Absolute) 0.93 (H) 0.00 - 0.85 K/uL    Eos (Absolute) 0.03 0.00 - 0.51 K/uL    Baso (Absolute) 0.04 0.00 - 0.12 K/uL    Immature Granulocytes (abs) 0.11 0.00 - 0.11 K/uL    NRBC (Absolute) 0.00 K/uL    Hypochromia 1+     Anisocytosis 1+     Microcytosis 3+ (A)    COMP METABOLIC PANEL   Result Value Ref Range    Sodium 124 (L) 135 - 145 mmol/L    Potassium 3.9 3.6 - 5.5 mmol/L    Chloride 89 (L) 96 - 112 mmol/L    Co2 24 20 - 33 mmol/L    Anion Gap 11.0 7.0 - 16.0    Glucose 111 (H) 65 - 99 mg/dL    Bun 9 8 - 22 mg/dL    Creatinine 0.59 0.50 - 1.40 mg/dL    Calcium 9.4 8.5 - 10.5 mg/dL    AST(SGOT) 27 12 - 45 U/L    ALT(SGPT) 33 2 - 50 U/L    Alkaline Phosphatase 73 30 - 99 U/L    Total Bilirubin 1.6 (H) 0.1 - 1.5 mg/dL    Albumin 4.0 3.2 - 4.9 g/dL     Total Protein 8.5 (H) 6.0 - 8.2 g/dL    Globulin 4.5 (H) 1.9 - 3.5 g/dL    A-G Ratio 0.9 g/dL   LIPASE   Result Value Ref Range    Lipase 13 11 - 82 U/L   URINALYSIS    Specimen: Urine, Clean Catch   Result Value Ref Range    Color Orange (A)     Character Turbid (A)     Specific Gravity 1.028 <1.035    Ph 5.5 5.0 - 8.0    Glucose Negative Negative mg/dL    Ketones Trace (A) Negative mg/dL    Protein 300 (A) Negative mg/dL    Bilirubin Moderate (A) Negative    Urobilinogen, Urine 0.2 Negative    Nitrite Positive (A) Negative    Leukocyte Esterase Negative Negative    Occult Blood Small (A) Negative    Micro Urine Req Microscopic    ESTIMATED GFR   Result Value Ref Range    GFR If African American >60 >60 mL/min/1.73 m 2    GFR If Non African American >60 >60 mL/min/1.73 m 2   URINE MICROSCOPIC (W/UA)   Result Value Ref Range    WBC 0-2 /hpf    RBC 0-2 /hpf    Bacteria Negative None /hpf    Epithelial Cells Rare /hpf    Amorphous Crystal Present /hpf    Hyaline Cast 0-2 /lpf   PERIPHERAL SMEAR REVIEW   Result Value Ref Range    Peripheral Smear Review see below    PLATELET ESTIMATE   Result Value Ref Range    Plt Estimation Decreased    MORPHOLOGY   Result Value Ref Range    RBC Morphology Present     Polychromia 2+     Ovalocytes 1+    DIFFERENTIAL COMMENT   Result Value Ref Range    Comments-Diff see below    LACTIC ACID   Result Value Ref Range    Lactic Acid 1.3 0.5 - 2.0 mmol/L   BETA-HCG QUANTITATIVE SERUM   Result Value Ref Range    Bhcg <1.0 0.0 - 5.0 mIU/mL   SARS-CoV-2 PCR (24 hour In-House): Collect NP swab in [facility]    Specimen: Nasopharyngeal; Respirate   Result Value Ref Range    SARS-CoV-2 Source NP Swab      All labs reviewed by me.    US-RUQ   Final Result      1.  No evidence of gallstones or biliary ductal dilatation.   2.  1.9 cm cystic lesion in the right kidney. Mural nodularity is not excluded and further evaluation with renal protocol MRI is recommended.         CT-ABDOMEN-PELVIS WITH   Final  Result      1.  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   2.  Hypodensity in the cervix could be artifactual or could indicate underlying cervical mass. Suggest correlation with physical exam and gynecological history.   3.  19 mm hypodense RIGHT renal lesion, likely but not definitely a cyst. Suggest further assessment with ultrasound when clinically appropriate.               US-PELVIC TRANSVAGINAL ONLY    (Results Pending)     The radiologist's interpretations of all radiological studies have been reviewed by me.          COURSE & MEDICAL DECISION MAKING  Pertinent Labs & Imaging studies reviewed. (See chart for details)    1:25 PM - Patient seen and examined at bedside. Discussed plan of care. Patient agrees to the plan of care. The patient will be medicated with Morphine 4 mg injection, Zofran 4 mg injection, Protonix 40 mg injection, and Rocephin 2 g syringe. Ordered for labs and imaging to evaluate her symptoms.       1900:   Discussed with Dr. Mo and she will kindly evaluate patient for hospitalization.      Patient presents to the ER complaining of left-sided abdominal pain and left-sided flank pain which began 4 days ago.  She is currently being treated for H. pylori, although she stopped taking her medications 4 days ago when her pain got worse.  She has been experiencing midepigastric abdominal pain for over 3 weeks.  She was diagnosed with H. pylori via blood test at [facility].  She was placed on the triple therapy.  She says that the medications seem to make her worse.  The pain then moved from the midepigastrium into the left upper quadrant and then down into the left abdomen.  Also moved in the left flank.  She has had decreased appetite.  She can take small bits of fluid, but is otherwise not eating.  She says she was not urinating or defecating in the last 4 days, although she admits she will get  out a little bit of urine.  No dysuria.  No hematuria.  No cloudy or foul-smelling urine.  However, urine here today reveals protein and signs of infection.  She was treated with 2 g of Rocephin.  Blood culture and urine culture pending.  Lactic acid levels normal.  She has not had a fever.  She is quite tender throughout the abdomen with percussion and palpation.  CT scan was ordered as I was concerned about perforated ulcer, pancreatitis, obstruction, perforation, or any other dangerous intra-abdominal pathology.  CT scan is negative for any acute pathology.  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  Additionally, there is a questionable cervical mass.  I did a pelvic examination but was unable to visualize the cervix.  I can barely feel the cervix.  It is extremely posterior.  I spoke with the hospitalist about this.  She will likely order a pelvic ultrasound as an inpatient.  Additionally, patient underwent right upper quadrant ultrasound.  Right upper quadrant ultrasound is negative for any gallbladder pathology.  She was found to have a renal mass/cyst.  This will need to be further evaluated with MRI scan.  Patient has multiple nodules all over her body which are subcutaneous.  She says she has had these for many years.  Perhaps she has some sort of undiagnosed neurofibromatosis or some other sort of nodular systemic disease to explain all the incidental findings on the CT scan.  Nonetheless, patient will need to be hospitalized.  She is dehydrated.  Her sodium is low at 124.  She is still having quite a bit of pain.  She will need to be treated with IV antibiotics for what I think is a pyelonephritis.  Urine culture is pending.  I spoke with Dr. Mo, hospitalist on-call, and she will kindly evaluate the patient hospitalization.    FINAL IMPRESSION  1. Pyelonephritis Acute   2. Abdominal pain, unspecified abdominal location Acute   3. Hyponatremia Acute        This dictation  has been created using voice recognition software. The accuracy of the dictation is limited by the abilities of the software. I expect there may be some errors of grammar and possibly content. I made every attempt to manually correct the errors within my dictation. However, errors related to voice recognition software may still exist and should be interpreted within the appropriate context.     IAmanuel (Aiden), am scribing for, and in the presence of, Sharmin Paul M.D..    Electronically signed by: Amanuel Pratt (Aiden), 6/14/2021    Sharmin RAMIREZ M.D. personally performed the services described in this documentation, as scribed by Amanuel Pratt in my presence, and it is both accurate and complete. C.    The note accurately reflects work and decisions made by me.  Sharmin Paul M.D.  6/14/2021  7:10 PM

## 2021-06-14 NOTE — ED NOTES
Pt ambs to restroom for sample, urine red and cloudy will send to lab.  Pt appears uncomfortable.  Changing into gown

## 2021-06-15 PROBLEM — D21.9 FIBROIDS: Status: ACTIVE | Noted: 2021-06-15

## 2021-06-15 LAB
ANION GAP SERPL CALC-SCNC: 13 MMOL/L (ref 7–16)
BASOPHILS # BLD AUTO: 0.3 % (ref 0–1.8)
BASOPHILS # BLD: 0.05 K/UL (ref 0–0.12)
BUN SERPL-MCNC: 7 MG/DL (ref 8–22)
CALCIUM SERPL-MCNC: 8.5 MG/DL (ref 8.5–10.5)
CHLORIDE SERPL-SCNC: 93 MMOL/L (ref 96–112)
CO2 SERPL-SCNC: 24 MMOL/L (ref 20–33)
CREAT SERPL-MCNC: 0.62 MG/DL (ref 0.5–1.4)
EOSINOPHIL # BLD AUTO: 0 K/UL (ref 0–0.51)
EOSINOPHIL NFR BLD: 0 % (ref 0–6.9)
ERYTHROCYTE [DISTWIDTH] IN BLOOD BY AUTOMATED COUNT: 41.4 FL (ref 35.9–50)
GLUCOSE SERPL-MCNC: 116 MG/DL (ref 65–99)
HCT VFR BLD AUTO: 29.6 % (ref 37–47)
HGB BLD-MCNC: 8.5 G/DL (ref 12–16)
IMM GRANULOCYTES # BLD AUTO: 0.12 K/UL (ref 0–0.11)
IMM GRANULOCYTES NFR BLD AUTO: 0.8 % (ref 0–0.9)
LYMPHOCYTES # BLD AUTO: 0.81 K/UL (ref 1–4.8)
LYMPHOCYTES NFR BLD: 5.4 % (ref 22–41)
MCH RBC QN AUTO: 20 PG (ref 27–33)
MCHC RBC AUTO-ENTMCNC: 28.7 G/DL (ref 33.6–35)
MCV RBC AUTO: 69.6 FL (ref 81.4–97.8)
MONOCYTES # BLD AUTO: 0.98 K/UL (ref 0–0.85)
MONOCYTES NFR BLD AUTO: 6.5 % (ref 0–13.4)
NEUTROPHILS # BLD AUTO: 13.09 K/UL (ref 2–7.15)
NEUTROPHILS NFR BLD: 87 % (ref 44–72)
NRBC # BLD AUTO: 0 K/UL
NRBC BLD-RTO: 0 /100 WBC
PLATELET # BLD AUTO: 148 K/UL (ref 164–446)
PMV BLD AUTO: 10.4 FL (ref 9–12.9)
POTASSIUM SERPL-SCNC: 4.1 MMOL/L (ref 3.6–5.5)
RBC # BLD AUTO: 4.25 M/UL (ref 4.2–5.4)
SARS-COV-2 RNA RESP QL NAA+PROBE: NOTDETECTED
SODIUM SERPL-SCNC: 130 MMOL/L (ref 135–145)
SPECIMEN SOURCE: NORMAL
WBC # BLD AUTO: 15.1 K/UL (ref 4.8–10.8)

## 2021-06-15 PROCEDURE — 700101 HCHG RX REV CODE 250: Performed by: STUDENT IN AN ORGANIZED HEALTH CARE EDUCATION/TRAINING PROGRAM

## 2021-06-15 PROCEDURE — C9113 INJ PANTOPRAZOLE SODIUM, VIA: HCPCS | Performed by: STUDENT IN AN ORGANIZED HEALTH CARE EDUCATION/TRAINING PROGRAM

## 2021-06-15 PROCEDURE — 36415 COLL VENOUS BLD VENIPUNCTURE: CPT

## 2021-06-15 PROCEDURE — 96375 TX/PRO/DX INJ NEW DRUG ADDON: CPT

## 2021-06-15 PROCEDURE — 700102 HCHG RX REV CODE 250 W/ 637 OVERRIDE(OP): Performed by: STUDENT IN AN ORGANIZED HEALTH CARE EDUCATION/TRAINING PROGRAM

## 2021-06-15 PROCEDURE — 96366 THER/PROPH/DIAG IV INF ADDON: CPT

## 2021-06-15 PROCEDURE — 96367 TX/PROPH/DG ADDL SEQ IV INF: CPT

## 2021-06-15 PROCEDURE — 96376 TX/PRO/DX INJ SAME DRUG ADON: CPT

## 2021-06-15 PROCEDURE — 700111 HCHG RX REV CODE 636 W/ 250 OVERRIDE (IP): Performed by: STUDENT IN AN ORGANIZED HEALTH CARE EDUCATION/TRAINING PROGRAM

## 2021-06-15 PROCEDURE — A9270 NON-COVERED ITEM OR SERVICE: HCPCS | Performed by: STUDENT IN AN ORGANIZED HEALTH CARE EDUCATION/TRAINING PROGRAM

## 2021-06-15 PROCEDURE — 80048 BASIC METABOLIC PNL TOTAL CA: CPT

## 2021-06-15 PROCEDURE — 700105 HCHG RX REV CODE 258: Performed by: STUDENT IN AN ORGANIZED HEALTH CARE EDUCATION/TRAINING PROGRAM

## 2021-06-15 PROCEDURE — 99225 PR SUBSEQUENT OBSERVATION CARE,LEVEL II: CPT | Performed by: STUDENT IN AN ORGANIZED HEALTH CARE EDUCATION/TRAINING PROGRAM

## 2021-06-15 PROCEDURE — 96365 THER/PROPH/DIAG IV INF INIT: CPT

## 2021-06-15 PROCEDURE — 85025 COMPLETE CBC W/AUTO DIFF WBC: CPT

## 2021-06-15 PROCEDURE — G0378 HOSPITAL OBSERVATION PER HR: HCPCS

## 2021-06-15 RX ORDER — MORPHINE SULFATE 4 MG/ML
1 INJECTION, SOLUTION INTRAMUSCULAR; INTRAVENOUS
Status: DISCONTINUED | OUTPATIENT
Start: 2021-06-15 | End: 2021-06-18

## 2021-06-15 RX ORDER — PANTOPRAZOLE SODIUM 40 MG/10ML
40 INJECTION, POWDER, LYOPHILIZED, FOR SOLUTION INTRAVENOUS 2 TIMES DAILY
Status: DISCONTINUED | OUTPATIENT
Start: 2021-06-15 | End: 2021-06-18

## 2021-06-15 RX ORDER — CLINDAMYCIN PHOSPHATE 300 MG/50ML
300 INJECTION, SOLUTION INTRAVENOUS DAILY
Status: DISCONTINUED | OUTPATIENT
Start: 2021-06-15 | End: 2021-06-15

## 2021-06-15 RX ORDER — AZITHROMYCIN 500 MG/5ML
500 INJECTION, POWDER, LYOPHILIZED, FOR SOLUTION INTRAVENOUS EVERY 24 HOURS
Status: DISCONTINUED | OUTPATIENT
Start: 2021-06-15 | End: 2021-06-18

## 2021-06-15 RX ADMIN — MORPHINE SULFATE 2 MG: 4 INJECTION INTRAVENOUS at 08:16

## 2021-06-15 RX ADMIN — MORPHINE SULFATE 1 MG: 4 INJECTION INTRAVENOUS at 16:54

## 2021-06-15 RX ADMIN — MORPHINE SULFATE 2 MG: 4 INJECTION INTRAVENOUS at 12:00

## 2021-06-15 RX ADMIN — ONDANSETRON 4 MG: 2 INJECTION INTRAMUSCULAR; INTRAVENOUS at 16:53

## 2021-06-15 RX ADMIN — ONDANSETRON 4 MG: 2 INJECTION INTRAMUSCULAR; INTRAVENOUS at 11:24

## 2021-06-15 RX ADMIN — PANTOPRAZOLE SODIUM 40 MG: 40 INJECTION, POWDER, FOR SOLUTION INTRAVENOUS at 20:08

## 2021-06-15 RX ADMIN — METRONIDAZOLE 500 MG: 500 INJECTION, SOLUTION INTRAVENOUS at 20:11

## 2021-06-15 RX ADMIN — BISACODYL 10 MG: 10 SUPPOSITORY RECTAL at 12:01

## 2021-06-15 RX ADMIN — AZITHROMYCIN MONOHYDRATE 500 MG: 500 INJECTION, POWDER, LYOPHILIZED, FOR SOLUTION INTRAVENOUS at 12:58

## 2021-06-15 RX ADMIN — MORPHINE SULFATE 2 MG: 4 INJECTION INTRAVENOUS at 05:00

## 2021-06-15 RX ADMIN — PANTOPRAZOLE SODIUM 40 MG: 40 INJECTION, POWDER, FOR SOLUTION INTRAVENOUS at 11:24

## 2021-06-15 RX ADMIN — METRONIDAZOLE 500 MG: 500 INJECTION, SOLUTION INTRAVENOUS at 16:36

## 2021-06-15 ASSESSMENT — COGNITIVE AND FUNCTIONAL STATUS - GENERAL
DAILY ACTIVITIY SCORE: 24
SUGGESTED CMS G CODE MODIFIER DAILY ACTIVITY: CH
SUGGESTED CMS G CODE MODIFIER MOBILITY: CH
MOBILITY SCORE: 24
SUGGESTED CMS G CODE MODIFIER DAILY ACTIVITY: CH
DAILY ACTIVITIY SCORE: 24

## 2021-06-15 ASSESSMENT — LIFESTYLE VARIABLES
EVER FELT BAD OR GUILTY ABOUT YOUR DRINKING: NO
ALCOHOL_USE: NO
EVER HAD A DRINK FIRST THING IN THE MORNING TO STEADY YOUR NERVES TO GET RID OF A HANGOVER: NO
TOTAL SCORE: 0
HOW MANY TIMES IN THE PAST YEAR HAVE YOU HAD 5 OR MORE DRINKS IN A DAY: 0
CONSUMPTION TOTAL: NEGATIVE
ON A TYPICAL DAY WHEN YOU DRINK ALCOHOL HOW MANY DRINKS DO YOU HAVE: 0
TOTAL SCORE: 0
HAVE YOU EVER FELT YOU SHOULD CUT DOWN ON YOUR DRINKING: NO
TOTAL SCORE: 0
AVERAGE NUMBER OF DAYS PER WEEK YOU HAVE A DRINK CONTAINING ALCOHOL: 0
HAVE PEOPLE ANNOYED YOU BY CRITICIZING YOUR DRINKING: NO

## 2021-06-15 ASSESSMENT — ENCOUNTER SYMPTOMS
CONSTITUTIONAL NEGATIVE: 1
NAUSEA: 1
CARDIOVASCULAR NEGATIVE: 1
ABDOMINAL PAIN: 1
RESPIRATORY NEGATIVE: 1
FLANK PAIN: 1
NEUROLOGICAL NEGATIVE: 1

## 2021-06-15 ASSESSMENT — PAIN DESCRIPTION - PAIN TYPE
TYPE: ACUTE PAIN

## 2021-06-15 ASSESSMENT — PATIENT HEALTH QUESTIONNAIRE - PHQ9
1. LITTLE INTEREST OR PLEASURE IN DOING THINGS: NOT AT ALL
SUM OF ALL RESPONSES TO PHQ9 QUESTIONS 1 AND 2: 0
2. FEELING DOWN, DEPRESSED, IRRITABLE, OR HOPELESS: NOT AT ALL

## 2021-06-15 NOTE — ED NOTES
Med rec complete per pt. Per pt, was started on amoxicillin and clinda approximately 2 weeks ago- pt stopped taking bot on 6/10/21.

## 2021-06-15 NOTE — ASSESSMENT & PLAN NOTE
Noted on CT abd and RUQ u/s  Renal protocol MRI is advised to further evaluation, which could be done as outpatient.

## 2021-06-15 NOTE — PROGRESS NOTES
Received report from CONCEPCION Vera and assumed care of pt. Pt A&OX4. Pt on RA.  Pt sleeping in bed at this time, no signs of distress. Pt stating she will not like anything PO at this time as it exacerbates her abdominal pain. POC discussed. Bed locked and in lowest position. Bed alarm on, call light within reach & hourly rounding in place

## 2021-06-15 NOTE — ED NOTES
Patient trialed off oxygen. Patient noted to have oxygen saturation 85% on room air. Patient placed on 2 L nasal cannula. Oxygen saturation 96% at this time.   SURGEON:  Umair Roper MD    ASSISTANT:  Lb MEYER    PRE-OPERATIVE DIAGNOSIS:  Right distal radius fracture    POST-OPERATIVE DIAGNOSIS:  Right distal radius fracture    PROCEDURE:  Open reduction internal fixation of right distal radius fracture with a 3 hole volar locked plate, [employer]    ANESTHESIA:  Gen. COMPLICATIONS:  None    ESTIMATED BLOOD LOSS:  5 ml    SPECIMENS SENT:  None    INDICATIONS:      The patient is a 67-year-old female who fell sustaining a right displaced distal radius fracture. She presented late. She's now about 3 weeks off since the fracture occurred. There was severe apex volar angulation about 30Â°. Options for treatment were discussed with the patient including the possibility of volar plating. Details of how the procedure is done along with risks and benefits were discussed and consent was obtained. PROCEDURE:    The patient was seen in the preoperative hold area and the surgical site was marked. The consent was signed. All questions were answered. The patient was then brought to the operating room and placed on the table in the supine position. The patient was given a general anesthetic. Prophylactic dose of IV antibiotics was administered. SCDs (sequential compression devices) were placed. The splint was then removed and a tourniquet was placed high on the arm and then the arm was prepped  from the elbow to the fingertips and draped in the usual sterile fashion. A timeout was called. Incision was marked on the skin at the radial border of the FCR (flexor carpi radialis) tendon for a length of about 7-8 cm starting at the volar wrist crease and extending proximally. The arm was elevated and the tourniquet was inflated. The incision was then carried into the subcutaneous tissue just through the skin and dissection was carried out with 3.5 magnification loupes.  The dissection just radial to the FCR and ulnar to the neurovascular bundle was performed and the fascia was incised. We then deepened the dissection down to the pronator quadratus which was then elevated off the volar surface of the distal radius starting at its radial edge and the fracture was identified. We used self-retaining and hand-held retractors by the assistant to for proper visualization during the procedure. The fracture edges were cleared of soft tissue in order to identify fracture configuration and number fragments. The fracture was a be able to be manipulated by the surgeon to improve position. The distal fragments moved as a unit. Fracture was intra-articular seen on x-rays but was primarily 2 main fragments when it came to the reduction. The fracture was manually reduced by the surgeon and checked with C-arm. We then chose a 3 hole plate which was a locking type plate and this was placed on the volar surface of the radius at the proper position. While holding the fracture reduced and the plate in the proper position a screw was placed in nonlocking fashion through the slotted hole in the plate to secure the plate to the bone in order to check the position of the plate. We used fluoroscopy to check the position of the plate and once in the proper position, locking screws placed in the shaft of the plate. The distal fragments were then reduced by manipulation dorsally as well as direct manipulation of the fragment with instruments. We needed to put a nonlocking screw to draw the plate down onto the bone and improve the reduction. The rest of the screws were placed in locking fashion. This nonlocking screw was changed out once all the other screws were in for another locking screw. Once this was properly positioned and well maintaining the reduction against the plate distally, 2 screws were placed in the distal portion of the plate in locking fashion and then the position of the plate reduction and screws were checked with C-arm.  After confirming good position of the plate and screws as well as the fracture,the additional holes of the plate were filled with locking screws distally as well as the remaining hole in the shaft of the plate was filled with another locking screw. We then checked the overall alignment and reduction of the fracture with C-arm and the wrist was taken through a range of motion with full unrestricted range of motion. Final fluoroscopic views were obtained. The wound was then irrigated and the tourniquet was released and hemostasis obtained. Attempt was made to repair the pronator quadratus if it would allow sutures to bring the muscle back over the bone with Vicryl. The subcutaneous tissue was closed with fine undyed Vicryl suture and the skin was closed with a running barbed suture with Steri-Strips. A sterile dressing was then applied with a volar splint and the patient was awakened and transferred to recovery having tolerated the procedure well and there were no complications. Given the number of tasks that had to be performed at once including proper positioning, retraction, suctioning, holding the fracture reduced, holding the hardware in place, drilling and placing the screws, the use of an assistant is mandatory or the procedure could not be performed.

## 2021-06-15 NOTE — ASSESSMENT & PLAN NOTE
Reported recently diagnosed with H.Pylori infection and was undergoing treatments, however due to increased abdominal pain, she stopped treatments 4 days ago  Treatment continued inpatient with IV antibiotics.    S/p EGD by GI with no significant findings.  Biopsy collected.  Daily PPI.  IV antibiotics discontinued

## 2021-06-15 NOTE — PROGRESS NOTES
[facility] Medicine Daily Progress Note    Date of Service  6/15/2021    Chief Complaint  46 y.o. female admitted 6/14/2021 with diffuse abdominal pain.    Interval Problem Update  Patient seen and examined at bedside this morning.  No acute events overnight.     Patient with complains of significant nausea abdominal discomfort this morning.  Unable to tolerate any p.o. intake.   Recently diagnosed with H. pylori infection, will start patient on IV antibiotics and PPI.  Continue with pain medications and slowly advance diet as she tolerates.    Consultants/Specialty  none    Code Status  Full Code    Disposition  none    Review of Systems  Review of Systems   Constitutional: Negative.    Respiratory: Negative.    Cardiovascular: Negative.    Gastrointestinal: Positive for abdominal pain and nausea.   Genitourinary: Positive for flank pain.   Neurological: Negative.    All other systems reviewed and are negative.       Physical Exam  Temp:  [36.3 °C (97.3 °F)-36.9 °C (98.4 °F)] 36.3 °C (97.3 °F)  Pulse:  [89-98] 89  Resp:  [16-46] 18  BP: (136-175)/(73-90) 143/80  SpO2:  [85 %-99 %] 97 %    Physical Exam  Constitutional:       Appearance: Normal appearance.   HENT:      Head: Normocephalic.      Nose: Nose normal.   Eyes:      Conjunctiva/sclera: Conjunctivae normal.   Cardiovascular:      Rate and Rhythm: Normal rate and regular rhythm.   Pulmonary:      Effort: Pulmonary effort is normal.      Breath sounds: Normal breath sounds.   Abdominal:      General: Bowel sounds are normal.      Comments: Diffuse abdominal pain mostly localized to the epigastric and right lower quadrant.   Skin:     General: Skin is warm.   Neurological:      General: No focal deficit present.      Mental Status: She is alert.   Psychiatric:         Mood and Affect: Mood normal.         Fluids    Intake/Output Summary (Last 24 hours) at 6/15/2021 1405  Last data filed at 6/15/2021 0815  Gross per 24 hour   Intake 831.67 ml   Output --   Net  831.67 ml       Laboratory  Recent Labs     06/14/21  1225 06/15/21  0437   WBC 13.7* 15.1*   RBC 4.48 4.25   HEMOGLOBIN 9.0* 8.5*   HEMATOCRIT 30.7* 29.6*   MCV 68.5* 69.6*   MCH 20.1* 20.0*   MCHC 29.3* 28.7*   RDW 40.6 41.4   PLATELETCT 153* 148*   MPV 9.7 10.4     Recent Labs     06/14/21  1225 06/15/21  0437   SODIUM 124* 130*   POTASSIUM 3.9 4.1   CHLORIDE 89* 93*   CO2 24 24   GLUCOSE 111* 116*   BUN 9 7*   CREATININE 0.59 0.62   CALCIUM 9.4 8.5                   Imaging  US-PELVIC COMPLETE (TRANSABDOMINAL/TRANSVAGINAL) (COMBO)   Final Result         1.  Heterogeneous appearance of the uterus with large heterogeneous uterine mass, appearance most compatible with uterine fibroid.   2.  Heterogeneous lesion in the left ovary, could represent hemorrhagic cyst or endometrioma, otherwise indeterminate, recommend follow-up sonogram in 6 weeks for repeat characterization and evaluation of stability.   3.  Thickened endometrial stripe, likely proliferative changes, consider endometrial pathology with additional follow-up as clinically appropriate.   4.  Nabothian cyst      US-RUQ   Final Result      1.  No evidence of gallstones or biliary ductal dilatation.   2.  1.9 cm cystic lesion in the right kidney. Mural nodularity is not excluded and further evaluation with renal protocol MRI is recommended.         CT-ABDOMEN-PELVIS WITH   Final Result      1.  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   2.  Hypodensity in the cervix could be artifactual or could indicate underlying cervical mass. Suggest correlation with physical exam and gynecological history.   3.  19 mm hypodense RIGHT renal lesion, likely but not definitely a cyst. Suggest further assessment with ultrasound when clinically appropriate.                    Assessment/Plan  * AP (abdominal pain)  Assessment & Plan  Unclear etiology, ?dyspepsia vs. Gastroenteritis ?  Vs.Constipations induced  CT abd personally reviewed: changes in the retroperitoneal fat suspicious for retroperitoneal fibrosis, less likely lymphoma or metastatic disease. Hypodensity in the cervix could be artifactual or could indicate underlying cervical mass. 19 mm hypodense RIGHT renal lesion, likely but not definitely a cyst.   Lipase and lactic acid normal  IVF  Clear liquid and advance diet as tolerate  Bowel regimens  Carafate and PPI    H. pylori infection  Assessment & Plan  Reported recently diagnosed with H.Pylori infection and was undergoing treatments, however due to increased abdominal pain, she stopped treatments 4 days ago  Treatment continued inpatient with IV antibiotics.  Need outpatient GI/PCP follow up to make sure H.Pylori has been successfully treated in one month    Fibroids  Assessment & Plan  Transvaginal ultrasound-  IMPRESSION:     1.  Heterogeneous appearance of the uterus with large heterogeneous uterine mass, appearance most compatible with uterine fibroid.  2.  Heterogeneous lesion in the left ovary, could represent hemorrhagic cyst or endometrioma, otherwise indeterminate, recommend follow-up sonogram in 6 weeks for repeat characterization and evaluation of stability.  3.  Thickened endometrial stripe, likely proliferative changes, consider endometrial pathology with additional follow-up as clinically appropriate.  4.  Nabothian cyst    We will follow-up outpatient with gynecology.    Leukocytosis  Assessment & Plan  Reactive vs. Infectious  UA 0-2 wbc, neg leukocytes esterase and pos Nitrate, however she denies dysuria  Check blood culture, urine culture  Will hold on abx at this point and continue monitoring clinically    Lesion of cervix  Assessment & Plan  See on abd CT: Hypodensity in the cervix could be artifactual or could indicate underlying cervical mass  Cervix was unable to be visualized by pelvic exam at ED  Transvagainal ultrasound ordered    Kidney lesion, native,  right  Assessment & Plan  Noted on CT abd and RUQ u/s  Renal protocol MRI is advised to further evaluation, which could be done as outpatient.    Hyponatremia  Assessment & Plan  Likely sec to hypovolemic hyponatremia  IVF  Cont to monitor       VTE prophylaxis: lovenox

## 2021-06-15 NOTE — ED NOTES
Report received from Ashley JAVIER. Assumed care of patient. Pt assessed, AAO x 4 . Patient's concerns addressed. Patient aware of POC. Fall precautions in place.  Pt repositioned and comfortable.  This RN masked and in appropriate PPE during encounter. Medicated patient per MAR for 10/10 abdominal pain. Patient denies further needs. Call light within reach.

## 2021-06-15 NOTE — PROGRESS NOTES
4 Eyes Skin Assessment Completed by CONCEPCION Gardner and CONCEPCION Aldana.    Head WDL  Ears WDL  Nose WDL  Mouth WDL  Neck WDL  Breast/Chest WDL  Shoulder Blades WDL  Spine WDL  (R) Arm/Elbow/Hand Bumps and scattered bruising  (L) Arm/Elbow/Hand Bumps and scattered bruising  Abdomen WDL  Groin WDL  Scrotum/Coccyx/Buttocks WDL  (R) Leg Scattered bruising  (L) Leg Scattered bruising  (R) Heel/Foot/Toe WDL  (L) Heel/Foot/Toe WDL    Devices In Places PIV      Interventions In Place Pt turns self and makes significant changes in position    Possible Skin Injury N/A    Pictures Uploaded Into Epic N/A  Wound Consult Placed No  RN Wound Prevention Protocol Ordered No

## 2021-06-15 NOTE — ASSESSMENT & PLAN NOTE
Noted on repeat CT scan on 6/18/2021.  Also with findings of bilateral adrenal hemorrhage.  Ideally, should be anticoagulated but compounded by adrenal bleed.  General surgery has been consulted for further evaluation.  Continue with pain medications for abdominal pain.  Slowly advance diet.  2D echo with no finding of SVC thrombus is noted.     Will need RADHIKA once stable

## 2021-06-15 NOTE — H&P
Hospital Medicine History & Physical Note    Date of Service  6/14/2021    Primary Care Physician  Pcp Pt States None    Consultants  none    Code Status  Full Code    Chief Complaint  Chief Complaint   Patient presents with   • Abdominal Pain     Hx of H. pylori   • Flank Pain     left sided, since thursday       History of Presenting Illness  46 y.o. female with past medical history of H.pylori infection, who presented 6/14/2021 with left sided abdominal pain onset 4 days. She was recently diagnosed with H.Pylori infection at [facility] and she has been taking triple therapy for this, however she notes she was having increased abdominal pain during the treatment, so she stopped the treatments 4 days ago. She reports she has very minimal oral intake due to abdominal pain. Endorses nausea and constipations for 4 days, but denies vomiting, fever, chills, chest pain, sob, melena, hematochezia.     Here labs remarkable for Na 124, Cl 89, wbc 13.7. US RUQ shows No evidence of gallstones or biliary ductal dilatation. 1.9 cm cystic lesion in the right kidney. Mural nodularity is not excluded and further evaluation with renal protocol MRI is recommended.     CT abd shows changes in the retroperitoneal fat suspicious for retroperitoneal fibrosis, less likely lymphoma or metastatic disease. Hypodensity in the cervix could be artifactual or could indicate underlying cervical mass. Suggest correlation with physical exam and gynecological history. 19 mm hypodense RIGHT renal lesion, likely but not definitely a cyst.   Therefore, patient is admitted for further evaluations and managements.    Review of Systems  Review of Systems   Gastrointestinal: Positive for abdominal pain, constipation and nausea. Negative for blood in stool, melena and vomiting.   Genitourinary: Negative for dysuria, hematuria and urgency.   All other systems reviewed and are negative.      Past Medical History   has a past medical  history of PE (pulmonary embolism).    Surgical History   has no past surgical history on file.     Family History  Family history reviewed and not pertinent    Social History   reports that she has been smoking. She does not have any smokeless tobacco history on file. She reports current alcohol use. She reports current drug use. Drug: Inhaled.    Allergies  No Known Allergies    Medications  Prior to Admission Medications   Prescriptions Last Dose Informant Patient Reported? Taking?   amoxicillin (AMOXIL) 500 MG Cap stopped at 6/10/21  Yes Yes   Sig: Take 500 mg by mouth 2 times a day.   clindamycin (CLEOCIN) 150 MG Cap stopped at 6/10/21  Yes Yes   Sig: Take 150 mg by mouth 2 times a day.   furosemide (LASIX) 20 MG TABS Not Taking at Unknown time  No No   Sig: Take 1 Tab by mouth every day.   Patient not taking: Reported on 6/14/2021   hydrocodone-acetaminophen (VICODIN) 5-500 MG TABS Not Taking at Unknown time  No No   Sig: Take 1-2 Tabs by mouth every four hours as needed for 20 doses.   Patient not taking: Reported on 6/14/2021   omeprazole (PRILOSEC) 20 MG delayed-release capsule 6/10/2021 at Unknown time  Yes Yes   Sig: Take 20 mg by mouth every day.   potassium chloride SA (K-DUR) 20 MEQ TBCR Not Taking at Unknown time  No No   Sig: Take 1 Tab by mouth every day.   Patient not taking: Reported on 6/14/2021   sucralfate (CARAFATE) 1 GM Tab 6/10/2021 at Unknown time  Yes Yes   Sig: Take 1 g by mouth 4 Times a Day,Before Meals and at Bedtime.      Facility-Administered Medications: None       Physical Exam  Temp:  [36.3 °C (97.3 °F)] 36.3 °C (97.3 °F)  Pulse:  [] 91  Resp:  [18-47] 20  BP: (136-169)/() 153/73  SpO2:  [94 %-99 %] 98 %    Physical Exam  Vitals and nursing note reviewed.   Constitutional:       General: She is not in acute distress.     Appearance: Normal appearance. She is ill-appearing.   HENT:      Head: Normocephalic and atraumatic.      Nose: Nose normal.      Mouth/Throat:       Mouth: Mucous membranes are dry.      Pharynx: Oropharynx is clear.   Eyes:      Extraocular Movements: Extraocular movements intact.      Conjunctiva/sclera: Conjunctivae normal.      Pupils: Pupils are equal, round, and reactive to light.   Cardiovascular:      Rate and Rhythm: Normal rate and regular rhythm.      Pulses: Normal pulses.      Heart sounds: Normal heart sounds.   Pulmonary:      Effort: Pulmonary effort is normal. No respiratory distress.      Breath sounds: Normal breath sounds. No wheezing.   Abdominal:      General: Abdomen is flat. Bowel sounds are normal.      Palpations: Abdomen is soft.      Tenderness: There is abdominal tenderness (diffuse).      Comments: Old surgical scar noted on the middle of abdomen area   Genitourinary:     Comments: cervical was unable to be visible seen by pelvic exam   Musculoskeletal:         General: No swelling or tenderness. Normal range of motion.      Cervical back: Normal range of motion and neck supple.   Skin:     General: Skin is warm and dry.      Capillary Refill: Capillary refill takes 2 to 3 seconds.      Comments: Multiple subcutaneous lumps noted on bilateral extremities   Neurological:      General: No focal deficit present.      Mental Status: She is alert and oriented to person, place, and time. Mental status is at baseline.   Psychiatric:         Mood and Affect: Mood normal.         Behavior: Behavior normal.         Laboratory:  Recent Labs     06/14/21  1225   WBC 13.7*   RBC 4.48   HEMOGLOBIN 9.0*   HEMATOCRIT 30.7*   MCV 68.5*   MCH 20.1*   MCHC 29.3*   RDW 40.6   PLATELETCT 153*   MPV 9.7     Recent Labs     06/14/21  1225   SODIUM 124*   POTASSIUM 3.9   CHLORIDE 89*   CO2 24   GLUCOSE 111*   BUN 9   CREATININE 0.59   CALCIUM 9.4     Recent Labs     06/14/21  1225   ALTSGPT 33   ASTSGOT 27   ALKPHOSPHAT 73   TBILIRUBIN 1.6*   LIPASE 13   GLUCOSE 111*         No results for input(s): NTPROBNP in the last 72 hours.      No results for  input(s): TROPONINT in the last 72 hours.    Imaging:  US-RUQ   Final Result      1.  No evidence of gallstones or biliary ductal dilatation.   2.  1.9 cm cystic lesion in the right kidney. Mural nodularity is not excluded and further evaluation with renal protocol MRI is recommended.         CT-ABDOMEN-PELVIS WITH   Final Result      1.  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   2.  Hypodensity in the cervix could be artifactual or could indicate underlying cervical mass. Suggest correlation with physical exam and gynecological history.   3.  19 mm hypodense RIGHT renal lesion, likely but not definitely a cyst. Suggest further assessment with ultrasound when clinically appropriate.                     Assessment/Plan:  I anticipate this patient is appropriate for observation status at this time.    * AP (abdominal pain)  Assessment & Plan  Unclear etiology, ?dyspepsia vs. Gastroenteritis ? Vs. Constipations induced  CT abd personally reviewed: changes in the retroperitoneal fat suspicious for retroperitoneal fibrosis, less likely lymphoma or metastatic disease. Hypodensity in the cervix could be artifactual or could indicate underlying cervical mass. 19 mm hypodense RIGHT renal lesion, likely but not definitely a cyst.   Lipase and lactic acid normal  IVF  Clear liquid and advance diet as tolerate  Bowel regimens  Carafate and PPI      Hyponatremia  Assessment & Plan  Likely sec to hypovolemic hyponatremia  IVF  Cont to monitor    H. pylori infection  Assessment & Plan  Reported recently diagnosed with H.Pylori infection and was undergoing treatments, however due to increased abdominal pain, she stopped treatments 4 days ago  Will hold treatments at this point due to abdominal pain  Need outpatient GI/PCP follow up to make sure H.Pylori has been successfully treated in one month    Leukocytosis  Assessment & Plan  Reactive vs.  Infectious  UA 0-2 wbc, neg leukocytes esterase and pos Nitrate, however she denies dysuria  Check blood culture, urine culture  Will hold on abx at this point and continue monitoring clinically    Lesion of cervix  Assessment & Plan  See on abd CT: Hypodensity in the cervix could be artifactual or could indicate underlying cervical mass  Cervix was unable to be visualized by pelvic exam at ED  Transvagainal ultrasound ordered    Kidney lesion, native, right  Assessment & Plan  Noted on CT abd and RUQ u/s  Renal protocol MRI is advised to further evaluation, which could be done as outpatient.

## 2021-06-15 NOTE — CARE PLAN
The patient is Stable - Low risk of patient condition declining or worsening    Shift Goals  Clinical Goals: Pts pain will remain managed     Progress made toward(s) clinical / shift goals: Pt rounded on hourly.  Pain medication administered in a timely manner.     Patient is not progressing towards the following goals:  Problem: Pain - Standard  Goal: Alleviation of pain or a reduction in pain to the patient’s comfort goal  6/15/2021 0234 by Kendra Whitley, R.N.  Outcome: Not Progressing  Pt c/o persistent abdominal pain with ingestion.  Medicated per MAR however pt requesting more pain medication prior to next available dose.       Problem: Gastrointestinal Irritability  Goal: Nausea and vomiting will be absent or improve  Outcome: Not Progressing   Pt gets pain with any PO intake.  Pt now states she will not likely anything PO.

## 2021-06-15 NOTE — ASSESSMENT & PLAN NOTE
Transvaginal ultrasound-  IMPRESSION:     1.  Heterogeneous appearance of the uterus with large heterogeneous uterine mass, appearance most compatible with uterine fibroid.  2.  Heterogeneous lesion in the left ovary, could represent hemorrhagic cyst or endometrioma, otherwise indeterminate, recommend follow-up sonogram in 6 weeks for repeat characterization and evaluation of stability.  3.  Thickened endometrial stripe, likely proliferative changes, consider endometrial pathology with additional follow-up as clinically appropriate.  4.  Nabothian cyst    will follow-up outpatient with gynecology.

## 2021-06-15 NOTE — PROGRESS NOTES
Assumed care of patient this shift. Patient is alert and oriented x 4.  Able to make her needs known. Complained of severe 9/10 pain this shift, medicated with PRN; see MAR and notified MD.  Up independently in room and to bathroom. Fall prevention tactics in place, bed locked and in lowest position. Plan of care discussed with patient and  at bedside. Call light is within reach.

## 2021-06-15 NOTE — ASSESSMENT & PLAN NOTE
See on abd CT: Hypodensity in the cervix could be artifactual or could indicate underlying cervical mass  Cervix was unable to be visualized by pelvic exam at ED  Transvagainal ultrasound:  Large uterine fibroid noted.  Outpatient follow-up recommended.

## 2021-06-16 ENCOUNTER — APPOINTMENT (OUTPATIENT)
Dept: RADIOLOGY | Facility: MEDICAL CENTER | Age: 46
DRG: 814 | End: 2021-06-16
Attending: STUDENT IN AN ORGANIZED HEALTH CARE EDUCATION/TRAINING PROGRAM
Payer: COMMERCIAL

## 2021-06-16 LAB
ALBUMIN SERPL BCP-MCNC: 3.2 G/DL (ref 3.2–4.9)
ALBUMIN/GLOB SERPL: 0.8 G/DL
ALP SERPL-CCNC: 68 U/L (ref 30–99)
ALT SERPL-CCNC: 29 U/L (ref 2–50)
ANION GAP SERPL CALC-SCNC: 10 MMOL/L (ref 7–16)
AST SERPL-CCNC: 21 U/L (ref 12–45)
BACTERIA UR CULT: NORMAL
BILIRUB SERPL-MCNC: 2 MG/DL (ref 0.1–1.5)
BUN SERPL-MCNC: 8 MG/DL (ref 8–22)
CALCIUM SERPL-MCNC: 8.7 MG/DL (ref 8.5–10.5)
CHLORIDE SERPL-SCNC: 92 MMOL/L (ref 96–112)
CO2 SERPL-SCNC: 23 MMOL/L (ref 20–33)
CREAT SERPL-MCNC: 0.56 MG/DL (ref 0.5–1.4)
ERYTHROCYTE [DISTWIDTH] IN BLOOD BY AUTOMATED COUNT: 42.1 FL (ref 35.9–50)
GLOBULIN SER CALC-MCNC: 4.1 G/DL (ref 1.9–3.5)
GLUCOSE SERPL-MCNC: 117 MG/DL (ref 65–99)
HCT VFR BLD AUTO: 27.8 % (ref 37–47)
HGB BLD-MCNC: 7.7 G/DL (ref 12–16)
MAGNESIUM SERPL-MCNC: 2.2 MG/DL (ref 1.5–2.5)
MCH RBC QN AUTO: 19.3 PG (ref 27–33)
MCHC RBC AUTO-ENTMCNC: 27.7 G/DL (ref 33.6–35)
MCV RBC AUTO: 69.7 FL (ref 81.4–97.8)
MORPHOLOGY BLD-IMP: NORMAL
PLATELET # BLD AUTO: 170 K/UL (ref 164–446)
PMV BLD AUTO: 10.4 FL (ref 9–12.9)
POTASSIUM SERPL-SCNC: 3.8 MMOL/L (ref 3.6–5.5)
PROT SERPL-MCNC: 7.3 G/DL (ref 6–8.2)
RBC # BLD AUTO: 3.99 M/UL (ref 4.2–5.4)
SIGNIFICANT IND 70042: NORMAL
SITE SITE: NORMAL
SODIUM SERPL-SCNC: 125 MMOL/L (ref 135–145)
SOURCE SOURCE: NORMAL
WBC # BLD AUTO: 13.2 K/UL (ref 4.8–10.8)

## 2021-06-16 PROCEDURE — 96376 TX/PRO/DX INJ SAME DRUG ADON: CPT

## 2021-06-16 PROCEDURE — 700102 HCHG RX REV CODE 250 W/ 637 OVERRIDE(OP): Performed by: STUDENT IN AN ORGANIZED HEALTH CARE EDUCATION/TRAINING PROGRAM

## 2021-06-16 PROCEDURE — 700111 HCHG RX REV CODE 636 W/ 250 OVERRIDE (IP): Performed by: STUDENT IN AN ORGANIZED HEALTH CARE EDUCATION/TRAINING PROGRAM

## 2021-06-16 PROCEDURE — 74018 RADEX ABDOMEN 1 VIEW: CPT

## 2021-06-16 PROCEDURE — 700105 HCHG RX REV CODE 258: Performed by: STUDENT IN AN ORGANIZED HEALTH CARE EDUCATION/TRAINING PROGRAM

## 2021-06-16 PROCEDURE — 700101 HCHG RX REV CODE 250: Performed by: STUDENT IN AN ORGANIZED HEALTH CARE EDUCATION/TRAINING PROGRAM

## 2021-06-16 PROCEDURE — 36415 COLL VENOUS BLD VENIPUNCTURE: CPT

## 2021-06-16 PROCEDURE — 83735 ASSAY OF MAGNESIUM: CPT

## 2021-06-16 PROCEDURE — A9270 NON-COVERED ITEM OR SERVICE: HCPCS | Performed by: STUDENT IN AN ORGANIZED HEALTH CARE EDUCATION/TRAINING PROGRAM

## 2021-06-16 PROCEDURE — 770006 HCHG ROOM/CARE - MED/SURG/GYN SEMI*

## 2021-06-16 PROCEDURE — 700101 HCHG RX REV CODE 250: Performed by: INTERNAL MEDICINE

## 2021-06-16 PROCEDURE — 96372 THER/PROPH/DIAG INJ SC/IM: CPT

## 2021-06-16 PROCEDURE — C9113 INJ PANTOPRAZOLE SODIUM, VIA: HCPCS | Performed by: STUDENT IN AN ORGANIZED HEALTH CARE EDUCATION/TRAINING PROGRAM

## 2021-06-16 PROCEDURE — 85027 COMPLETE CBC AUTOMATED: CPT

## 2021-06-16 PROCEDURE — 96366 THER/PROPH/DIAG IV INF ADDON: CPT

## 2021-06-16 PROCEDURE — 80053 COMPREHEN METABOLIC PANEL: CPT

## 2021-06-16 PROCEDURE — 99232 SBSQ HOSP IP/OBS MODERATE 35: CPT | Performed by: STUDENT IN AN ORGANIZED HEALTH CARE EDUCATION/TRAINING PROGRAM

## 2021-06-16 RX ORDER — LIDOCAINE 50 MG/G
1 PATCH TOPICAL EVERY 24 HOURS
Status: DISCONTINUED | OUTPATIENT
Start: 2021-06-16 | End: 2021-07-02 | Stop reason: HOSPADM

## 2021-06-16 RX ADMIN — PANTOPRAZOLE SODIUM 40 MG: 40 INJECTION, POWDER, FOR SOLUTION INTRAVENOUS at 17:37

## 2021-06-16 RX ADMIN — MORPHINE SULFATE 1 MG: 4 INJECTION INTRAVENOUS at 01:23

## 2021-06-16 RX ADMIN — PANTOPRAZOLE SODIUM 40 MG: 40 INJECTION, POWDER, FOR SOLUTION INTRAVENOUS at 05:18

## 2021-06-16 RX ADMIN — METRONIDAZOLE 500 MG: 500 INJECTION, SOLUTION INTRAVENOUS at 13:55

## 2021-06-16 RX ADMIN — AZITHROMYCIN MONOHYDRATE 500 MG: 500 INJECTION, POWDER, LYOPHILIZED, FOR SOLUTION INTRAVENOUS at 05:24

## 2021-06-16 RX ADMIN — SODIUM CHLORIDE: 9 INJECTION, SOLUTION INTRAVENOUS at 05:22

## 2021-06-16 RX ADMIN — LIDOCAINE 1 PATCH: 50 PATCH TOPICAL at 21:59

## 2021-06-16 RX ADMIN — METRONIDAZOLE 500 MG: 500 INJECTION, SOLUTION INTRAVENOUS at 07:01

## 2021-06-16 RX ADMIN — ONDANSETRON 4 MG: 2 INJECTION INTRAMUSCULAR; INTRAVENOUS at 13:55

## 2021-06-16 RX ADMIN — MORPHINE SULFATE 1 MG: 4 INJECTION INTRAVENOUS at 21:14

## 2021-06-16 RX ADMIN — METRONIDAZOLE 500 MG: 500 INJECTION, SOLUTION INTRAVENOUS at 21:14

## 2021-06-16 RX ADMIN — ENOXAPARIN SODIUM 40 MG: 40 INJECTION SUBCUTANEOUS at 05:30

## 2021-06-16 RX ADMIN — MORPHINE SULFATE 1 MG: 4 INJECTION INTRAVENOUS at 09:07

## 2021-06-16 RX ADMIN — SUCRALFATE 1 G: 1 TABLET ORAL at 10:32

## 2021-06-16 RX ADMIN — MORPHINE SULFATE 1 MG: 4 INJECTION INTRAVENOUS at 04:42

## 2021-06-16 RX ADMIN — MORPHINE SULFATE 1 MG: 4 INJECTION INTRAVENOUS at 12:25

## 2021-06-16 ASSESSMENT — PAIN DESCRIPTION - PAIN TYPE
TYPE: ACUTE PAIN

## 2021-06-16 ASSESSMENT — ENCOUNTER SYMPTOMS
CARDIOVASCULAR NEGATIVE: 1
NEUROLOGICAL NEGATIVE: 1
RESPIRATORY NEGATIVE: 1
FLANK PAIN: 1
CONSTITUTIONAL NEGATIVE: 1
NAUSEA: 1
ABDOMINAL PAIN: 1

## 2021-06-16 NOTE — CARE PLAN
The patient is Stable - Low risk of patient condition declining or worsening    Shift Goals  Clinical Goals: Patient will have decrease in pain     Progress made toward(s) clinical / shift goals:   Patient complaining of severe abdominal pain this shift. Medicated with PRN pain medication as ordered per MAR and offered non-pharmacological pain interventions including massage, heat and cold application. Patient noted some relief from pain with interventions.        Problem: Pain - Standard  Goal: Alleviation of pain or a reduction in pain to the patient’s comfort goal  Outcome: Progressing     Problem: Knowledge Deficit - Standard  Goal: Patient and family/care givers will demonstrate understanding of plan of care, disease process/condition, diagnostic tests and medications  Outcome: Progressing     Problem: Gastrointestinal Irritability  Goal: Nausea and vomiting will be absent or improve  Outcome: Progressing

## 2021-06-16 NOTE — CARE PLAN
The patient is Stable - Low risk of patient condition declining or worsening    Shift Goals  Clinical Goals: Pt abdominal pain will be relived with nonpharamcologic methods     Progress made toward(s) clinical / shift goals:  Heat pack applied.  Pt educated on repositioning to expel gas.      Patient is not progressing towards the following goals:    Problem: Gastrointestinal Irritability  Goal: Nausea and vomiting will be absent or improve  Outcome: Not Progressing   Pt still unable to tolerate PO intake. Medicated per MAR PRN.    Problem: Bowel Elimination  Goal: Establish and maintain regular bowel function  Outcome: Not Progressing   Pt unable to have bowel movement since PTA.  Pt educated narcotics associated with constipation.

## 2021-06-16 NOTE — PROGRESS NOTES
[facility] Medicine Daily Progress Note    Date of Service  6/16/2021    Chief Complaint  46 y.o. female admitted 6/14/2021 with diffuse abdominal pain.    Interval Problem Update  Patient seen and examined at bedside this morning.  No acute events overnight.     Patient still complains of diffuse abdominal pain but tolerating clear liquid diet on examination this morning.  States her last bowel movement was about 5 days ago. Complains of feeling of bloating and cramping abdominal pain. KUB ordered this morning.  Continue IV antibiotics for recent H. pylori infection.     Consultants/Specialty  none    Code Status  Full Code    Disposition  none    Review of Systems  Review of Systems   Constitutional: Negative.    Respiratory: Negative.    Cardiovascular: Negative.    Gastrointestinal: Positive for abdominal pain and nausea.   Genitourinary: Positive for flank pain.   Neurological: Negative.    All other systems reviewed and are negative.       Physical Exam  Temp:  [36.2 °C (97.2 °F)-36.9 °C (98.5 °F)] 36.7 °C (98.1 °F)  Pulse:  [85-99] 99  Resp:  [16-20] 18  BP: (143-150)/(86-91) 150/91  SpO2:  [93 %-96 %] 93 %    Physical Exam  Constitutional:       Appearance: Normal appearance.   HENT:      Head: Normocephalic.      Nose: Nose normal.   Eyes:      Conjunctiva/sclera: Conjunctivae normal.   Cardiovascular:      Rate and Rhythm: Normal rate and regular rhythm.   Pulmonary:      Effort: Pulmonary effort is normal.      Breath sounds: Normal breath sounds.   Abdominal:      General: Bowel sounds are normal.      Comments: Diffuse abdominal pain mostly localized to the epigastric and right lower quadrant.   Skin:     General: Skin is warm.   Neurological:      General: No focal deficit present.      Mental Status: She is alert.   Psychiatric:         Mood and Affect: Mood normal.         Fluids    Intake/Output Summary (Last 24 hours) at 6/16/2021 1248  Last data filed at 6/15/2021 2028  Gross per 24 hour   Intake  1225.92 ml   Output --   Net 1225.92 ml       Laboratory  Recent Labs     06/14/21  1225 06/15/21  0437 06/16/21  0659   WBC 13.7* 15.1* 13.2*   RBC 4.48 4.25 3.99*   HEMOGLOBIN 9.0* 8.5* 7.7*   HEMATOCRIT 30.7* 29.6* 27.8*   MCV 68.5* 69.6* 69.7*   MCH 20.1* 20.0* 19.3*   MCHC 29.3* 28.7* 27.7*   RDW 40.6 41.4 42.1   PLATELETCT 153* 148* 170   MPV 9.7 10.4 10.4     Recent Labs     06/14/21  1225 06/15/21  0437 06/16/21  0659   SODIUM 124* 130* 125*   POTASSIUM 3.9 4.1 3.8   CHLORIDE 89* 93* 92*   CO2 24 24 23   GLUCOSE 111* 116* 117*   BUN 9 7* 8   CREATININE 0.59 0.62 0.56   CALCIUM 9.4 8.5 8.7                   Imaging  US-PELVIC COMPLETE (TRANSABDOMINAL/TRANSVAGINAL) (COMBO)   Final Result         1.  Heterogeneous appearance of the uterus with large heterogeneous uterine mass, appearance most compatible with uterine fibroid.   2.  Heterogeneous lesion in the left ovary, could represent hemorrhagic cyst or endometrioma, otherwise indeterminate, recommend follow-up sonogram in 6 weeks for repeat characterization and evaluation of stability.   3.  Thickened endometrial stripe, likely proliferative changes, consider endometrial pathology with additional follow-up as clinically appropriate.   4.  Nabothian cyst      US-RUQ   Final Result      1.  No evidence of gallstones or biliary ductal dilatation.   2.  1.9 cm cystic lesion in the right kidney. Mural nodularity is not excluded and further evaluation with renal protocol MRI is recommended.         CT-ABDOMEN-PELVIS WITH   Final Result      1.  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   2.  Hypodensity in the cervix could be artifactual or could indicate underlying cervical mass. Suggest correlation with physical exam and gynecological history.   3.  19 mm hypodense RIGHT renal lesion, likely but not definitely a cyst. Suggest further assessment with ultrasound when clinically  appropriate.               [id number] VIEW    (Results Pending)        Assessment/Plan  * AP (abdominal pain)  Assessment & Plan  Unclear etiology, ?dyspepsia vs. Gastroenteritis ? Vs.Constipations induced  CT abd personally reviewed: changes in the retroperitoneal fat suspicious for retroperitoneal fibrosis, less likely lymphoma or metastatic disease. Hypodensity in the cervix could be artifactual or could indicate underlying cervical mass. 19 mm hypodense RIGHT renal lesion, likely but not definitely a cyst.   Lipase and lactic acid normal  IVF  Clear liquid and advance diet as tolerate  Bowel regimens  Carafate and PPI    H. pylori infection  Assessment & Plan  Reported recently diagnosed with H.Pylori infection and was undergoing treatments, however due to increased abdominal pain, she stopped treatments 4 days ago  Treatment continued inpatient with IV antibiotics.  Need outpatient GI/PCP follow up to make sure H.Pylori has been successfully treated in one month    Fibroids  Assessment & Plan  Transvaginal ultrasound-  IMPRESSION:     1.  Heterogeneous appearance of the uterus with large heterogeneous uterine mass, appearance most compatible with uterine fibroid.  2.  Heterogeneous lesion in the left ovary, could represent hemorrhagic cyst or endometrioma, otherwise indeterminate, recommend follow-up sonogram in 6 weeks for repeat characterization and evaluation of stability.  3.  Thickened endometrial stripe, likely proliferative changes, consider endometrial pathology with additional follow-up as clinically appropriate.  4.  Nabothian cyst    We will follow-up outpatient with gynecology.    Leukocytosis  Assessment & Plan  Reactive vs. Infectious  UA 0-2 wbc, neg leukocytes esterase and pos Nitrate, however she denies dysuria  Monitor CBC.    Improving on IV antibiotics.    Lesion of cervix  Assessment & Plan  See on abd CT: Hypodensity in the cervix could be artifactual or could indicate underlying  cervical mass  Cervix was unable to be visualized by pelvic exam at ED  Transvagainal ultrasound ordered    Kidney lesion, native, right  Assessment & Plan  Noted on CT abd and RUQ u/s  Renal protocol MRI is advised to further evaluation, which could be done as outpatient.    Hyponatremia  Assessment & Plan  Likely sec to hypovolemic hyponatremia  IVF  Cont to monitor       VTE prophylaxis: lovenox

## 2021-06-16 NOTE — PROGRESS NOTES
Assessment/description of ears? Intact   Which preventative measures are in place for the ears?  N/A    Assessment/description of elbows? Intact  Which preventative measures are in place for the elbows?  N/A    Assessment/description of sacrum? Intact  Which preventative measures are in place for the sacrum?  N/A    Assessment/description of heels? Intact   Which preventative measures are in place for the heels?  N/A    Which devices are in place? PIV   Description of skin under devices: Intact   Which preventative measures are in place under devices?  Dressing changes as needed     Other: Scattered bruising to BLE.   Patient is able to turn and reposition independently in bed, encouraged weight shifting.

## 2021-06-16 NOTE — PROGRESS NOTES
Received report from CONCEPCION Martin and assumed care of pt. Pt A&OX4. Pt on RA.  Pt c/o gas pains in her abdomen however declines pain meds at this time. Pt stating she will not like anything PO at this time as it exacerbates her abdominal pain however pt was able to eat soup today. POC discussed. Bed locked and in lowest position. Bed alarm on, call light within reach & hourly rounding in place  Assessment/description of ears? Intact, pink, blanching   Which preventative measures are in place for the ears? N/A     Assessment/description of elbows? Intact, pink, blanching   Which preventative measures are in place for the elbows? Pt makes significant and frequent changes in position     Assessment/description of sacrum? Intact, pink, blanching   Which preventative measures are in place for the sacrum? Pt makes significant and frequent changes in position     Assessment/description of heels? Intact, pink, blanching  Which preventative measures are in place for the heels? Pt makes significant and frequent changes in position     Which devices are in place? PIV   Description of skin under devices: Intact   Which preventative measures are in place under devices?  Dressing changes as needed      Other: Scattered bruising to BLE, bumps noted on BUE.   Pt makes significant and frequent changes in position, ambulates ad rubén

## 2021-06-16 NOTE — PROGRESS NOTES
Assumed care of patient this shift. Patient is alert and oriented x 4.  Able to make her needs known. Complained of severe 10/10 pain to abdomen this shift, medicated with PRN; see MAR and notified MD.  Up independently in room and to bathroom. Fall prevention tactics in place, bed locked and in lowest position. Plan of care discussed with patient and family at bedside. Call light is within reach.

## 2021-06-16 NOTE — CARE PLAN
The patient is Stable - Low risk of patient condition declining or worsening    Shift Goals  Clinical Goals: Patient's pain will be controlled     Progress made toward(s) clinical / shift goals: Patient complained of abdomen pain.  Medicated with PRN IV pain medication per MAR. Patient noted minimal relief with interventions.     Problem: Pain - Standard  Goal: Alleviation of pain or a reduction in pain to the patient’s comfort goal  Outcome: Progressing     Problem: Knowledge Deficit - Standard  Goal: Patient and family/care givers will demonstrate understanding of plan of care, disease process/condition, diagnostic tests and medications  Outcome: Progressing     Problem: Gastrointestinal Irritability  Goal: Nausea and vomiting will be absent or improve  Outcome: Progressing

## 2021-06-17 ENCOUNTER — ANESTHESIA (OUTPATIENT)
Dept: SURGERY | Facility: MEDICAL CENTER | Age: 46
DRG: 814 | End: 2021-06-17
Payer: COMMERCIAL

## 2021-06-17 ENCOUNTER — ANESTHESIA EVENT (OUTPATIENT)
Dept: SURGERY | Facility: MEDICAL CENTER | Age: 46
DRG: 814 | End: 2021-06-17
Payer: COMMERCIAL

## 2021-06-17 PROBLEM — E80.6 HYPERBILIRUBINEMIA: Status: ACTIVE | Noted: 2021-06-17

## 2021-06-17 LAB
ALBUMIN SERPL BCP-MCNC: 2.9 G/DL (ref 3.2–4.9)
ALBUMIN/GLOB SERPL: 0.7 G/DL
ALP SERPL-CCNC: 100 U/L (ref 30–99)
ALT SERPL-CCNC: 30 U/L (ref 2–50)
ANION GAP SERPL CALC-SCNC: 13 MMOL/L (ref 7–16)
AST SERPL-CCNC: 39 U/L (ref 12–45)
BILIRUB SERPL-MCNC: 3.2 MG/DL (ref 0.1–1.5)
BUN SERPL-MCNC: 7 MG/DL (ref 8–22)
CALCIUM SERPL-MCNC: 8.6 MG/DL (ref 8.5–10.5)
CHLORIDE SERPL-SCNC: 92 MMOL/L (ref 96–112)
CO2 SERPL-SCNC: 20 MMOL/L (ref 20–33)
CREAT SERPL-MCNC: 0.5 MG/DL (ref 0.5–1.4)
ERYTHROCYTE [DISTWIDTH] IN BLOOD BY AUTOMATED COUNT: 40.9 FL (ref 35.9–50)
GLOBULIN SER CALC-MCNC: 4.2 G/DL (ref 1.9–3.5)
GLUCOSE SERPL-MCNC: 112 MG/DL (ref 65–99)
HCG SERPL QL: NEGATIVE
HCT VFR BLD AUTO: 27.6 % (ref 37–47)
HGB BLD-MCNC: 7.8 G/DL (ref 12–16)
LACTATE BLD-SCNC: 1.2 MMOL/L (ref 0.5–2)
LIPASE SERPL-CCNC: 17 U/L (ref 11–82)
MCH RBC QN AUTO: 19.3 PG (ref 27–33)
MCHC RBC AUTO-ENTMCNC: 28.3 G/DL (ref 33.6–35)
MCV RBC AUTO: 68.1 FL (ref 81.4–97.8)
PATHOLOGY CONSULT NOTE: NORMAL
PLATELET # BLD AUTO: 173 K/UL (ref 164–446)
PMV BLD AUTO: 10.2 FL (ref 9–12.9)
POTASSIUM SERPL-SCNC: 3.7 MMOL/L (ref 3.6–5.5)
PROT SERPL-MCNC: 7.1 G/DL (ref 6–8.2)
RBC # BLD AUTO: 4.05 M/UL (ref 4.2–5.4)
SODIUM SERPL-SCNC: 125 MMOL/L (ref 135–145)
WBC # BLD AUTO: 15 K/UL (ref 4.8–10.8)

## 2021-06-17 PROCEDURE — A9270 NON-COVERED ITEM OR SERVICE: HCPCS | Performed by: STUDENT IN AN ORGANIZED HEALTH CARE EDUCATION/TRAINING PROGRAM

## 2021-06-17 PROCEDURE — 160002 HCHG RECOVERY MINUTES (STAT): Performed by: INTERNAL MEDICINE

## 2021-06-17 PROCEDURE — 700111 HCHG RX REV CODE 636 W/ 250 OVERRIDE (IP): Performed by: ANESTHESIOLOGY

## 2021-06-17 PROCEDURE — 700101 HCHG RX REV CODE 250: Performed by: INTERNAL MEDICINE

## 2021-06-17 PROCEDURE — 80053 COMPREHEN METABOLIC PANEL: CPT

## 2021-06-17 PROCEDURE — 99232 SBSQ HOSP IP/OBS MODERATE 35: CPT | Performed by: STUDENT IN AN ORGANIZED HEALTH CARE EDUCATION/TRAINING PROGRAM

## 2021-06-17 PROCEDURE — C9113 INJ PANTOPRAZOLE SODIUM, VIA: HCPCS | Performed by: STUDENT IN AN ORGANIZED HEALTH CARE EDUCATION/TRAINING PROGRAM

## 2021-06-17 PROCEDURE — 88312 SPECIAL STAINS GROUP 1: CPT

## 2021-06-17 PROCEDURE — 88305 TISSUE EXAM BY PATHOLOGIST: CPT

## 2021-06-17 PROCEDURE — 160009 HCHG ANES TIME/MIN: Performed by: INTERNAL MEDICINE

## 2021-06-17 PROCEDURE — 36415 COLL VENOUS BLD VENIPUNCTURE: CPT

## 2021-06-17 PROCEDURE — 83690 ASSAY OF LIPASE: CPT

## 2021-06-17 PROCEDURE — 0DB98ZX EXCISION OF DUODENUM, VIA NATURAL OR ARTIFICIAL OPENING ENDOSCOPIC, DIAGNOSTIC: ICD-10-PCS | Performed by: INTERNAL MEDICINE

## 2021-06-17 PROCEDURE — 160203 HCHG ENDO MINUTES - 1ST 30 MINS LEVEL 4: Performed by: INTERNAL MEDICINE

## 2021-06-17 PROCEDURE — 700111 HCHG RX REV CODE 636 W/ 250 OVERRIDE (IP): Performed by: STUDENT IN AN ORGANIZED HEALTH CARE EDUCATION/TRAINING PROGRAM

## 2021-06-17 PROCEDURE — 0DB78ZX EXCISION OF STOMACH, PYLORUS, VIA NATURAL OR ARTIFICIAL OPENING ENDOSCOPIC, DIAGNOSTIC: ICD-10-PCS | Performed by: INTERNAL MEDICINE

## 2021-06-17 PROCEDURE — 84703 CHORIONIC GONADOTROPIN ASSAY: CPT

## 2021-06-17 PROCEDURE — 160035 HCHG PACU - 1ST 60 MINS PHASE I: Performed by: INTERNAL MEDICINE

## 2021-06-17 PROCEDURE — 700102 HCHG RX REV CODE 250 W/ 637 OVERRIDE(OP): Performed by: STUDENT IN AN ORGANIZED HEALTH CARE EDUCATION/TRAINING PROGRAM

## 2021-06-17 PROCEDURE — 160048 HCHG OR STATISTICAL LEVEL 1-5: Performed by: INTERNAL MEDICINE

## 2021-06-17 PROCEDURE — 700105 HCHG RX REV CODE 258: Performed by: STUDENT IN AN ORGANIZED HEALTH CARE EDUCATION/TRAINING PROGRAM

## 2021-06-17 PROCEDURE — 700105 HCHG RX REV CODE 258: Performed by: ANESTHESIOLOGY

## 2021-06-17 PROCEDURE — 85027 COMPLETE CBC AUTOMATED: CPT

## 2021-06-17 PROCEDURE — 83605 ASSAY OF LACTIC ACID: CPT

## 2021-06-17 PROCEDURE — 700101 HCHG RX REV CODE 250: Performed by: STUDENT IN AN ORGANIZED HEALTH CARE EDUCATION/TRAINING PROGRAM

## 2021-06-17 PROCEDURE — 770006 HCHG ROOM/CARE - MED/SURG/GYN SEMI*

## 2021-06-17 PROCEDURE — 160036 HCHG PACU - EA ADDL 30 MINS PHASE I: Performed by: INTERNAL MEDICINE

## 2021-06-17 RX ORDER — SODIUM CHLORIDE, SODIUM LACTATE, POTASSIUM CHLORIDE, CALCIUM CHLORIDE 600; 310; 30; 20 MG/100ML; MG/100ML; MG/100ML; MG/100ML
INJECTION, SOLUTION INTRAVENOUS
Status: DISCONTINUED | OUTPATIENT
Start: 2021-06-17 | End: 2021-06-17 | Stop reason: SURG

## 2021-06-17 RX ORDER — ONDANSETRON 2 MG/ML
4 INJECTION INTRAMUSCULAR; INTRAVENOUS
Status: DISCONTINUED | OUTPATIENT
Start: 2021-06-17 | End: 2021-06-17 | Stop reason: HOSPADM

## 2021-06-17 RX ORDER — SODIUM CHLORIDE, SODIUM LACTATE, POTASSIUM CHLORIDE, CALCIUM CHLORIDE 600; 310; 30; 20 MG/100ML; MG/100ML; MG/100ML; MG/100ML
INJECTION, SOLUTION INTRAVENOUS CONTINUOUS
Status: DISCONTINUED | OUTPATIENT
Start: 2021-06-17 | End: 2021-06-17 | Stop reason: HOSPADM

## 2021-06-17 RX ADMIN — LIDOCAINE 1 PATCH: 50 PATCH TOPICAL at 23:43

## 2021-06-17 RX ADMIN — METRONIDAZOLE 500 MG: 500 INJECTION, SOLUTION INTRAVENOUS at 22:30

## 2021-06-17 RX ADMIN — METRONIDAZOLE 500 MG: 500 INJECTION, SOLUTION INTRAVENOUS at 15:58

## 2021-06-17 RX ADMIN — ENOXAPARIN SODIUM 40 MG: 40 INJECTION SUBCUTANEOUS at 05:02

## 2021-06-17 RX ADMIN — FENTANYL CITRATE 50 MCG: 50 INJECTION, SOLUTION INTRAMUSCULAR; INTRAVENOUS at 14:14

## 2021-06-17 RX ADMIN — FENTANYL CITRATE 50 MCG: 50 INJECTION, SOLUTION INTRAMUSCULAR; INTRAVENOUS at 14:08

## 2021-06-17 RX ADMIN — MORPHINE SULFATE 1 MG: 4 INJECTION INTRAVENOUS at 10:34

## 2021-06-17 RX ADMIN — PANTOPRAZOLE SODIUM 40 MG: 40 INJECTION, POWDER, FOR SOLUTION INTRAVENOUS at 18:17

## 2021-06-17 RX ADMIN — MIDAZOLAM 1 MG: 1 INJECTION INTRAMUSCULAR; INTRAVENOUS at 14:08

## 2021-06-17 RX ADMIN — METRONIDAZOLE 500 MG: 500 INJECTION, SOLUTION INTRAVENOUS at 04:57

## 2021-06-17 RX ADMIN — AZITHROMYCIN MONOHYDRATE 500 MG: 500 INJECTION, POWDER, LYOPHILIZED, FOR SOLUTION INTRAVENOUS at 06:22

## 2021-06-17 RX ADMIN — PANTOPRAZOLE SODIUM 40 MG: 40 INJECTION, POWDER, FOR SOLUTION INTRAVENOUS at 05:02

## 2021-06-17 RX ADMIN — SODIUM CHLORIDE: 9 INJECTION, SOLUTION INTRAVENOUS at 16:04

## 2021-06-17 RX ADMIN — MORPHINE SULFATE 1 MG: 4 INJECTION INTRAVENOUS at 21:39

## 2021-06-17 RX ADMIN — SODIUM CHLORIDE, POTASSIUM CHLORIDE, SODIUM LACTATE AND CALCIUM CHLORIDE: 600; 310; 30; 20 INJECTION, SOLUTION INTRAVENOUS at 14:03

## 2021-06-17 RX ADMIN — MORPHINE SULFATE 1 MG: 4 INJECTION INTRAVENOUS at 15:58

## 2021-06-17 ASSESSMENT — ENCOUNTER SYMPTOMS
NEUROLOGICAL NEGATIVE: 1
CONSTITUTIONAL NEGATIVE: 1
BLURRED VISION: 0
PALPITATIONS: 0
NAUSEA: 1
COUGH: 0
SORE THROAT: 0
RESPIRATORY NEGATIVE: 1
ABDOMINAL PAIN: 1
HEADACHES: 0
DOUBLE VISION: 0
WEAKNESS: 1
WEIGHT LOSS: 1
FLANK PAIN: 1
SHORTNESS OF BREATH: 0
CARDIOVASCULAR NEGATIVE: 1
CONSTIPATION: 1
MUSCULOSKELETAL NEGATIVE: 1
BLOOD IN STOOL: 0
DIZZINESS: 0
PSYCHIATRIC NEGATIVE: 1

## 2021-06-17 ASSESSMENT — PAIN DESCRIPTION - PAIN TYPE
TYPE: ACUTE PAIN
TYPE: SURGICAL PAIN
TYPE: ACUTE PAIN
TYPE: SURGICAL PAIN
TYPE: SURGICAL PAIN

## 2021-06-17 ASSESSMENT — PAIN SCALES - GENERAL: PAIN_LEVEL: 0

## 2021-06-17 NOTE — PROGRESS NOTES
Received report from CONCEPCION Martin and assumed care of pt. Pt A&OX4. Pt on RA.  Pt complains of pain pain, medicated per MAR. Pt stating she will not like anything PO at this time as it exacerbates her abdominal pain however pt was able to eat soup today. POC discussed. Pt states she is unable to get uncomfortable.  Bed locked and in lowest position. Bed alarm on, call light within reach & hourly rounding in place  Assessment/description of ears? Intact, pink, blanching   Which preventative measures are in place for the ears? N/A     Assessment/description of elbows? Intact, pink, blanching   Which preventative measures are in place for the elbows? Pt makes significant and frequent changes in position     Assessment/description of sacrum? Intact, pink, blanching   Which preventative measures are in place for the sacrum? Pt makes significant and frequent changes in position     Assessment/description of heels? Intact, pink, blanching  Which preventative measures are in place for the heels? Pt makes significant and frequent changes in position     Which devices are in place? PIV   Description of skin under devices: Intact   Which preventative measures are in place under devices?  Dressing changes as needed      Other: Scattered bruising to BLE, bumps noted on BUE.   Pt makes significant and frequent changes in position, ambulates ad rubén

## 2021-06-17 NOTE — ANESTHESIA POSTPROCEDURE EVALUATION
Patient: Jaclyn Olvera    Procedure Summary     Date: 06/17/21 Room / Location: [facility] ROOM 26 / SURGERY SAME DAY [facility]    Anesthesia Start: 1403 Anesthesia Stop: 1423    Procedures:       GASTROSCOPY (N/A Esophagus)      GASTROSCOPY, WITH BIOPSY (N/A Esophagus) Diagnosis: (ABDOMINAL PAIN)    Surgeons: Elfego Lopez M.D. Responsible Provider: Juvenal Elmore M.D.    Anesthesia Type: general ASA Status: 3          Final Anesthesia Type: general  Last vitals  BP   Blood Pressure: 159/94    Temp   (!) 38.5 °C (101.3 °F)    Pulse   (!) 104   Resp   17    SpO2   91 %      Anesthesia Post Evaluation    Patient location during evaluation: PACU  Patient participation: complete - patient participated  Level of consciousness: awake and alert  Pain score: 0    Airway patency: patent  Anesthetic complications: no  Cardiovascular status: hemodynamically stable  Respiratory status: acceptable  Hydration status: euvolemic    PONV: none          No complications documented.

## 2021-06-17 NOTE — PROGRESS NOTES
Assessment/description of ears? Intact/blanching  Which preventative measures are in place for the ears? n/a    Assessment/description of elbows? Intact/blanching  Which preventative measures are in place for the elbows? Encourage repositioning, pillows for support/positioning    Assessment/description of sacrum? Intact/blanching  Which preventative measures are in place for the sacrum? Encourage repositioning, pillows for support/positioning    Assessment/description of heels? Intact/blanching  Which preventative measures are in place for the heels? Encourage repositioning, pillows for support/positioning    Which devices are in place? PIV  Description of skin under devices: CDI   Which preventative measures are in place under devices? Dressing changes per protocol/PRN

## 2021-06-17 NOTE — ANESTHESIA TIME REPORT
Anesthesia Start and Stop Event Times     Date Time Event    6/17/2021 1400 Ready for Procedure     1403 Anesthesia Start     1423 Anesthesia Stop        Responsible Staff  06/17/21    Name Role Begin End    Juvenal Elmore M.D. Anesth 1403 1423        Preop Diagnosis (Free Text):  Pre-op Diagnosis     abdominal pain,hx H Pylori        Preop Diagnosis (Codes):    Post op Diagnosis  Abdominal pain      Premium Reason  Non-Premium    Comments:

## 2021-06-17 NOTE — PROGRESS NOTES
[facility] Medicine Daily Progress Note    Date of Service  6/17/2021    Chief Complaint  46 y.o. female admitted 6/14/2021 with diffuse abdominal pain.    Interval Problem Update  Patient seen and examined at bedside this morning.  No acute events overnight.     No improvement in abdominal pain/discomfort.   GI consulted this morning with plans for an EGD later today.   Continue IV antibiotics for recent H. pylori infection.     Consultants/Specialty  GI    Code Status  Full Code    Disposition  none    Review of Systems  Review of Systems   Constitutional: Negative.    Respiratory: Negative.    Cardiovascular: Negative.    Gastrointestinal: Positive for abdominal pain and nausea.   Genitourinary: Positive for flank pain.   Neurological: Negative.    All other systems reviewed and are negative.       Physical Exam  Temp:  [36.9 °C (98.4 °F)-37.1 °C (98.8 °F)] 36.9 °C (98.4 °F)  Pulse:  [] 107  Resp:  [16-18] 17  BP: (137-169)/(87-95) 137/89  SpO2:  [91 %-96 %] 96 %    Physical Exam  Constitutional:       Appearance: Normal appearance.   HENT:      Head: Normocephalic.      Nose: Nose normal.   Eyes:      Conjunctiva/sclera: Conjunctivae normal.   Cardiovascular:      Rate and Rhythm: Normal rate and regular rhythm.   Pulmonary:      Effort: Pulmonary effort is normal.      Breath sounds: Normal breath sounds.   Abdominal:      General: Bowel sounds are normal.      Comments: Diffuse abdominal pain mostly localized to the epigastric and right lower quadrant.   Skin:     General: Skin is warm.   Neurological:      General: No focal deficit present.      Mental Status: She is alert.   Psychiatric:         Mood and Affect: Mood normal.         Fluids    Intake/Output Summary (Last 24 hours) at 6/17/2021 1141  Last data filed at 6/17/2021 1000  Gross per 24 hour   Intake 240 ml   Output --   Net 240 ml       Laboratory  Recent Labs     06/15/21  0437 06/16/21  0659 06/17/21  0343   WBC 15.1* 13.2* 15.0*   RBC 4.25  3.99* 4.05*   HEMOGLOBIN 8.5* 7.7* 7.8*   HEMATOCRIT 29.6* 27.8* 27.6*   MCV 69.6* 69.7* 68.1*   MCH 20.0* 19.3* 19.3*   MCHC 28.7* 27.7* 28.3*   RDW 41.4 42.1 40.9   PLATELETCT 148* 170 173   MPV 10.4 10.4 10.2     Recent Labs     06/15/21  0437 06/16/21  0659 06/17/21  0343   SODIUM 130* 125* 125*   POTASSIUM 4.1 3.8 3.7   CHLORIDE 93* 92* 92*   CO2 24 23 20   GLUCOSE 116* 117* 112*   BUN 7* 8 7*   CREATININE 0.62 0.56 0.50   CALCIUM 8.5 8.7 8.6                   Imaging  [id number] VIEW   Final Result         No specific finding to suggest small bowel obstruction.      US-PELVIC COMPLETE (TRANSABDOMINAL/TRANSVAGINAL) (COMBO)   Final Result         1.  Heterogeneous appearance of the uterus with large heterogeneous uterine mass, appearance most compatible with uterine fibroid.   2.  Heterogeneous lesion in the left ovary, could represent hemorrhagic cyst or endometrioma, otherwise indeterminate, recommend follow-up sonogram in 6 weeks for repeat characterization and evaluation of stability.   3.  Thickened endometrial stripe, likely proliferative changes, consider endometrial pathology with additional follow-up as clinically appropriate.   4.  Nabothian cyst      US-RUQ   Final Result      1.  No evidence of gallstones or biliary ductal dilatation.   2.  1.9 cm cystic lesion in the right kidney. Mural nodularity is not excluded and further evaluation with renal protocol MRI is recommended.         CT-ABDOMEN-PELVIS WITH   Final Result      1.  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   2.  Hypodensity in the cervix could be artifactual or could indicate underlying cervical mass. Suggest correlation with physical exam and gynecological history.   3.  19 mm hypodense RIGHT renal lesion, likely but not definitely a cyst. Suggest further assessment with ultrasound when clinically appropriate.                    Assessment/Plan  * AP  (abdominal pain)  Assessment & Plan  Unclear etiology, ?dyspepsia vs. Gastroenteritis ? Vs.Constipations induced  CT abd personally reviewed: changes in the retroperitoneal fat suspicious for retroperitoneal fibrosis, less likely lymphoma or metastatic disease. Hypodensity in the cervix could be artifactual or could indicate underlying cervical mass. 19 mm hypodense RIGHT renal lesion, likely but not definitely a cyst.   Lipase and lactic acid normal  IVF  Clear liquid and advance diet as tolerate  Bowel regimens  Carafate and PPI    H. pylori infection  Assessment & Plan  Reported recently diagnosed with H.Pylori infection and was undergoing treatments, however due to increased abdominal pain, she stopped treatments 4 days ago  Treatment continued inpatient with IV antibiotics.    GI consulted with plans for EGD.  Pain control.  IV PPI.      Hyperbilirubinemia  Assessment & Plan  Bilirubin with elevation to 3.2.  Unclear etiology, could be secondary to dehydration.  We will trend CMP daily.  GI following for plans for EGD this admission.    Fibroids  Assessment & Plan  Transvaginal ultrasound-  IMPRESSION:     1.  Heterogeneous appearance of the uterus with large heterogeneous uterine mass, appearance most compatible with uterine fibroid.  2.  Heterogeneous lesion in the left ovary, could represent hemorrhagic cyst or endometrioma, otherwise indeterminate, recommend follow-up sonogram in 6 weeks for repeat characterization and evaluation of stability.  3.  Thickened endometrial stripe, likely proliferative changes, consider endometrial pathology with additional follow-up as clinically appropriate.  4.  Nabothian cyst    We will follow-up outpatient with gynecology.    Leukocytosis  Assessment & Plan  Reactive vs. Infectious  UA 0-2 wbc, neg leukocytes esterase and pos Nitrate, however she denies dysuria  Monitor CBC.    Improving on IV antibiotics.    Lesion of cervix  Assessment & Plan  See on abd CT: Hypodensity  in the cervix could be artifactual or could indicate underlying cervical mass  Cervix was unable to be visualized by pelvic exam at ED  Transvagainal ultrasound ordered    Kidney lesion, native, right  Assessment & Plan  Noted on CT abd and RUQ u/s  Renal protocol MRI is advised to further evaluation, which could be done as outpatient.    Hyponatremia  Assessment & Plan  Likely sec to hypovolemic hyponatremia  IVF  Cont to monitor       VTE prophylaxis: lovenox

## 2021-06-17 NOTE — PROCEDURES
DATE OF PROCEDURE:  06/17/2021     PROCEDURE PERFORMED:  Esophagogastroduodenoscopy with biopsy.     INSTRUMENT UTILIZED:  Olympus flexible forward-viewing gastroscope.     INDICATIONS:  The patient with abdominal discomfort and history of H. pylori   per report.     CONSENT:  Full RBA discussion held prior to the procedure and signed witnessed   consent form placed on the chart.     SEDATION/ANESTHESIA:  Provided by Juvenal Elmore MD     TOLERANCE:  Excellent.     PATHOLOGY SPECIMENS:  A.  Duodenum.  B.  Gastric.     PROCEDURAL DETAIL:  After adequate sedation, the Olympus flexible   forward-viewing gastroscope was advanced per the oral route into the   esophagus.  The esophageal mucosa was carefully inspected and was unremarkable   with the gastroesophageal junction located at 39 cm from the incisors.  The   instrument was passed into the stomach where air was insufflated and the   gastric mucosa inspected including a retroflexed view of the gastric cardia.    The gastric mucosa appeared entirely normal.  The instrument was passed   through the patent pyloric channel into the duodenum.  The first and second   portions of the duodenum were unremarkable.  Biopsies were obtained from the   1st and 2nd portions of the duodenum to evaluate for duodenitis or celiac   sprue.  The instrument was pulled back into the stomach.  Biopsies were taken   from the antrum and fundus to rule out Helicobacter pylori in this patient   with a reported history of Helicobacter pylori infection.     No complications.     IMPRESSIONS:  1.  Abdominal pain -- no source found today.  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  I would wonder if this   might be responsible for her abdominal and flank pain.  2.  Helicobacter pylori infection, per patient -- biopsies obtained for   histological analysis today.     PLAN AND RECOMMENDATIONS:  1.  Observe for any adverse events from this procedure.  2.   Await pathology results.  3.  Acid suppressive therapy with histamine receptor blocker or proton pump   inhibitor.        ______________________________  MD JEAN SHI/CATHRYN    DD:  06/17/2021 14:23  DT:  06/17/2021 14:59    Job#:  [id number]    CC:Piotr Gunter DO(User)  Juvenal Elmore MD(User)

## 2021-06-17 NOTE — OR NURSING
1446 - assumed care from CONCEPCION Sanford. Pt awake, denies pain or nausea. On 2L O2 via [place].    1500 - telephone report to CONCEPCION Guzman. Pt ready to return to inpatient room. Transport requested.    1527 - pt off the floor to return to room with transport [facility]. 1L O2 via [facility] with full O2 tank on [facility]. Chart taken. Danny Audrey given telephone updates.

## 2021-06-17 NOTE — PROGRESS NOTES
Pt axo x4 at time of assessment and denied any pain.  Pt on RA no s/s of distress.  Pt receiving NS @ 83ml/hr and is tolerating well.  Pt refusing any PO medications but is compliant with IV fluids and IV antibiotics.Pt fatigued and requesting to rest.  Frequent rounding in place.

## 2021-06-17 NOTE — CONSULTS
"Gastroenterology Consult Note:    MARILEE Ramos  Date & Time note created:    6/17/2021   10:27 AM     Referring MD:  Dr. Piotr Gunter    Patient ID:  Name:             Jaclyn Rhodes     YOB: 1975  Age:                 46 y.o.  female   MRN:               [id number]                                                             Reason for Consult:      1. Abdominal pain  2. Hx H. pylori    History of Present Illness:    This is a 45 yo female recently diagnosed with H. Pylori infection, who was admitted to the hospital 6/14/2021 with abdominal pain x4 days.  Apparently, she has been experiencing abdominal pain since April 2021.  Pain exacerbated after eating-but to some degree the pain is \"always there.\"  She was seen at [facility] a couple months ago, diagnosed with H. pylori on a blood test.  She was started on antibiotics which she could not take due to abdominal pain.  She stopped taking the antibiotics 4 days prior to admission.  Nausea without vomiting.  No bowel movement for several days however, patient has not been eating.  Denies fevers, chills, bloody diarrhea, melena.    Labs: WBC 15.0.  Hemoglobin 7.8.( Baseline: 9.0 on 6/14/21). Hematocrit 27.6.  Platelet count 173.  Sodium 125.  Potassium 3.7.  Glucose 112.  BUN 7.  Creatinine 0.5.  Total bilirubin 3.2. (Baseline: 1.6 on 6/14/2021).  AST 39.  ALT 30.  Alkaline phosphatase 100.  Lipase 17.      CT scan abdomen/pelvis 6/14/ 21:  Changes in the retroperitoneal fat suspicious for retroperitoneal fibrosis, less likely lymphoma or metastatic disease.  2.  Hypodensity in the cervix could be artifactual or could indicate underlying cervical mass. Suggest correlation with physical exam and gynecological history.  3.  19 mm hypodense RIGHT renal lesion, likely but not definitely a cyst. Suggest further assessment with ultrasound when clinically appropriate.      Abdominal ultrasound:    1.  No evidence " of gallstones or biliary ductal dilatation.  2.  1.9 cm cystic lesion in the right kidney. Mural nodularity is not excluded and further evaluation with renal protocol MRI is recommended.       Review of Systems:      Review of Systems   Constitutional: Positive for malaise/fatigue and weight loss.   HENT: Negative for nosebleeds and sore throat.    Eyes: Negative for blurred vision and double vision.   Respiratory: Negative for cough and shortness of breath.    Cardiovascular: Negative for chest pain and palpitations.   Gastrointestinal: Positive for abdominal pain, constipation and nausea. Negative for blood in stool and melena.   Genitourinary: Negative.    Musculoskeletal: Negative.    Skin: Negative for itching and rash.   Neurological: Positive for weakness. Negative for dizziness and headaches.   Psychiatric/Behavioral: Negative.              Physical Exam:  Vitals/ General Appearance:   Weight/BMI: Body mass index is 26.24 kg/m².    Vitals:    06/16/21 2034 06/16/21 2109 06/17/21 0336 06/17/21 0730   BP: 151/92  159/95 137/89   Pulse: 100  (!) 102 (!) 107   Resp: 16 18 18 16   Temp: 36.9 °C (98.5 °F)  37.1 °C (98.8 °F) 36.9 °C (98.4 °F)   TempSrc: Temporal  Temporal Temporal   SpO2: 91%  92% 96%   Weight:       Height:         Oxygen Therapy:  Pulse Oximetry: 96 %, O2 (LPM): 0, O2 Delivery Device: None - Room Air    Physical Exam  Constitutional:       Appearance: She is ill-appearing.   HENT:      Head: Normocephalic and atraumatic.      Nose: Nose normal.      Mouth/Throat:      Mouth: Mucous membranes are moist.   Eyes:      Extraocular Movements: Extraocular movements intact.      Pupils: Pupils are equal, round, and reactive to light.   Cardiovascular:      Rate and Rhythm: Normal rate and regular rhythm.   Pulmonary:      Effort: Pulmonary effort is normal.      Breath sounds: Normal breath sounds.   Abdominal:      General: Abdomen is flat. Bowel sounds are normal.      Palpations: Abdomen is soft.       Tenderness: There is abdominal tenderness.   Musculoskeletal:         General: Normal range of motion.      Cervical back: Normal range of motion and neck supple.   Skin:     General: Skin is warm and dry.   Neurological:      General: No focal deficit present.      Mental Status: She is alert and oriented to person, place, and time.         Past Medical History:   Past Medical History:   Diagnosis Date   • PE (pulmonary embolism)        Past Surgical History:  History reviewed. No pertinent surgical history.    Hospital Medications:    Current Facility-Administered Medications:   •  lidocaine (LIDODERM) 5 % 1 Patch, 1 Patch, Transdermal, Q24HR, Gareth Castro M.D., 1 Patch at 06/16/21 2159  •  pantoprazole (PROTONIX) injection 40 mg, 40 mg, Intravenous, BID, Fortune Aig-Ojeanor, D.O., 40 mg at 06/17/21 0502  •  metroNIDAZOLE (FLAGYL) IVPB 500 mg, 500 mg, Intravenous, Q8HRS, Fortune Aig-Ojeanor, D.O., Stopped at 06/17/21 0557  •  azithromycin (ZITHROMAX) 500 mg in D5W 250 mL IVPB premix, 500 mg, Intravenous, Q24HRS, Fortune Aig-Ojeanor, D.O., Stopped at 06/17/21 0722  •  morphine (pf) 4 mg/mL injection 1 mg, 1 mg, Intravenous, Q3HRS PRN, Fortune Aig-Ojeanor, D.O., 1 mg at 06/16/21 2114  •  sucralfate (CARAFATE) tablet 1 g, 1 g, Oral, 4X/DAY ACHSJamar M.D., 1 g at 06/16/21 1032  •  senna-docusate (PERICOLACE or SENOKOT S) 8.6-50 MG per tablet 2 tablet, 2 tablet, Oral, BID **AND** polyethylene glycol/lytes (MIRALAX) PACKET 1 Packet, 1 Packet, Oral, QDAY PRN **AND** magnesium hydroxide (MILK OF MAGNESIA) suspension 30 mL, 30 mL, Oral, QDAY PRN **AND** bisacodyl (DULCOLAX) suppository 10 mg, 10 mg, Rectal, QDAY PRN, Jamar Mo M.D., 10 mg at 06/15/21 1201  •  NS infusion, , Intravenous, Continuous, Piotr Gunter D.O., Last Rate: 83 mL/hr at 06/16/21 0522, New Bag at 06/16/21 0522  •  acetaminophen (Tylenol) tablet 650 mg, 650 mg, Oral, Q6HRS PRN, Jamar Mo M.D.  •  cloNIDine (CATAPRES) tablet  0.1 mg, 0.1 mg, Oral, Q6HRS PRN, Jamar Mo M.D.  •  enalaprilat (VASOTEC) injection 1.25 mg, 1.25 mg, Intravenous, Q6HRS PRN, Jamar Mo M.D.  •  labetalol (NORMODYNE/TRANDATE) injection 10 mg, 10 mg, Intravenous, Q4HRS PRN, Jamar Mo M.D.  •  ondansetron (ZOFRAN) syringe/vial injection 4 mg, 4 mg, Intravenous, Q4HRS PRN, Jamar Mo M.D., 4 mg at 06/16/21 1355  •  ondansetron (ZOFRAN ODT) dispertab 4 mg, 4 mg, Oral, Q4HRS PRN, Jamar Mo M.D.  •  promethazine (PHENERGAN) tablet 12.5-25 mg, 12.5-25 mg, Oral, Q4HRS PRN, Jamar Mo M.D.  •  promethazine (PHENERGAN) suppository 12.5-25 mg, 12.5-25 mg, Rectal, Q4HRS PRN, Jamar Mo M.D.  •  prochlorperazine (COMPAZINE) injection 5-10 mg, 5-10 mg, Intravenous, Q4HRS PRN, Jamar Mo M.D.    Current Outpatient Medications:  Current Facility-Administered Medications   Medication Dose Route Frequency Provider Last Rate Last Admin   • lidocaine (LIDODERM) 5 % 1 Patch  1 Patch Transdermal Q24HR Gareth Castro M.D.   1 Patch at 06/16/21 2159   • pantoprazole (PROTONIX) injection 40 mg  40 mg Intravenous BID Fortune Hanhg-Tovaanor, D.O.   40 mg at 06/17/21 0502   • metroNIDAZOLE (FLAGYL) IVPB 500 mg  500 mg Intravenous Q8HRS Fortune Aig-Ojeanor, D.O.   Stopped at 06/17/21 0557   • azithromycin (ZITHROMAX) 500 mg in D5W 250 mL IVPB premix  500 mg Intravenous Q24HRS Fortune Aig-Ojeanor, D.O.   Stopped at 06/17/21 0722   • morphine (pf) 4 mg/mL injection 1 mg  1 mg Intravenous Q3HRS PRN Piotr Gunter D.O.   1 mg at 06/16/21 2114   • sucralfate (CARAFATE) tablet 1 g  1 g Oral 4X/DAY SHANTEL Mo M.D.   1 g at 06/16/21 1032   • senna-docusate (PERICOLACE or SENOKOT S) 8.6-50 MG per tablet 2 tablet  2 tablet Oral BID Jamar Mo M.D.        And   • polyethylene glycol/lytes (MIRALAX) PACKET 1 Packet  1 Packet Oral QDAY PRCORNELIA Mo M.D.        And   • magnesium hydroxide (MILK OF MAGNESIA) suspension 30 mL  30 mL Oral QDAY PRN Jamar Mo,  M.D.        And   • bisacodyl (DULCOLAX) suppository 10 mg  10 mg Rectal QDAY CRISTAL Mo M.D.   10 mg at 06/15/21 1201   • NS infusion   Intravenous Continuous Fortune HanhgALEXA Hodges.O. 83 mL/hr at 06/16/21 0522 New Bag at 06/16/21 0522   • acetaminophen (Tylenol) tablet 650 mg  650 mg Oral Q6HRS CRISTAL Mo M.D.       • cloNIDine (CATAPRES) tablet 0.1 mg  0.1 mg Oral Q6HRS CRISTAL Mo M.D.       • enalaprilat (VASOTEC) injection 1.25 mg  1.25 mg Intravenous Q6HRS CRISTAL Mo M.D.       • labetalol (NORMODYNE/TRANDATE) injection 10 mg  10 mg Intravenous Q4HRS CRISTAL Mo M.D.       • ondansetron (ZOFRAN) syringe/vial injection 4 mg  4 mg Intravenous Q4HRS CRISTAL Mo M.D.   4 mg at 06/16/21 1355   • ondansetron (ZOFRAN ODT) dispertab 4 mg  4 mg Oral Q4HRS CRISTAL Mo M.D.       • promethazine (PHENERGAN) tablet 12.5-25 mg  12.5-25 mg Oral Q4HRS CRISTAL Mo M.D.       • promethazine (PHENERGAN) suppository 12.5-25 mg  12.5-25 mg Rectal Q4HRS CRISTAL Mo M.D.       • prochlorperazine (COMPAZINE) injection 5-10 mg  5-10 mg Intravenous Q4HRS CRISTAL Mo M.D.           Medication Allergy:  No Known Allergies    Family History:  History reviewed. No pertinent family history.    Social History:  Social History     Socioeconomic History   • Marital status:      Spouse name: Not on file   • Number of children: Not on file   • Years of education: Not on file   • Highest education level: Not on file   Occupational History   • Not on file   Tobacco Use   • Smoking status: Current Every Day Smoker   • Tobacco comment: 1 pk per week   Substance and Sexual Activity   • Alcohol use: Yes     Comment: occasionally   • Drug use: Yes     Types: Inhaled     Comment: marijuana   • Sexual activity: Not on file   Other Topics Concern   • Not on file   Social History Narrative   • Not on file     Social Determinants of Health     Financial Resource Strain:    • Difficulty  of Paying Living Expenses:    Food Insecurity:    • Worried About Running Out of Food in the Last Year:    • Ran Out of Food in the Last Year:    Transportation Needs:    • Lack of Transportation (Medical):    • Lack of Transportation (Non-Medical):    Physical Activity:    • Days of Exercise per Week:    • Minutes of Exercise per Session:    Stress:    • Feeling of Stress :    Social Connections:    • Frequency of Communication with Friends and Family:    • Frequency of Social Gatherings with Friends and Family:    • Attends [employer] Services:    • Active Member of Clubs or Organizations:    • Attends Club or Organization Meetings:    • Marital Status:    Intimate Partner Violence:    • Fear of Current or Ex-Partner:    • Emotionally Abused:    • Physically Abused:    • Sexually Abused:          MDM (Data Review):     Records reviewed and summarized in current documentation    Lab Data Review:  Recent Results (from the past 24 hour(s))   Comp Metabolic Panel    Collection Time: 06/17/21  3:43 AM   Result Value Ref Range    Sodium 125 (L) 135 - 145 mmol/L    Potassium 3.7 3.6 - 5.5 mmol/L    Chloride 92 (L) 96 - 112 mmol/L    Co2 20 20 - 33 mmol/L    Anion Gap 13.0 7.0 - 16.0    Glucose 112 (H) 65 - 99 mg/dL    Bun 7 (L) 8 - 22 mg/dL    Creatinine 0.50 0.50 - 1.40 mg/dL    Calcium 8.6 8.5 - 10.5 mg/dL    AST(SGOT) 39 12 - 45 U/L    ALT(SGPT) 30 2 - 50 U/L    Alkaline Phosphatase 100 (H) 30 - 99 U/L    Total Bilirubin 3.2 (H) 0.1 - 1.5 mg/dL    Albumin 2.9 (L) 3.2 - 4.9 g/dL    Total Protein 7.1 6.0 - 8.2 g/dL    Globulin 4.2 (H) 1.9 - 3.5 g/dL    A-G Ratio 0.7 g/dL   LIPASE    Collection Time: 06/17/21  3:43 AM   Result Value Ref Range    Lipase 17 11 - 82 U/L   CBC WITHOUT DIFFERENTIAL    Collection Time: 06/17/21  3:43 AM   Result Value Ref Range    WBC 15.0 (H) 4.8 - 10.8 K/uL    RBC 4.05 (L) 4.20 - 5.40 M/uL    Hemoglobin 7.8 (L) 12.0 - 16.0 g/dL    Hematocrit 27.6 (L) 37.0 - 47.0 %    MCV 68.1 (L) 81.4 - 97.8  fL    MCH 19.3 (L) 27.0 - 33.0 pg    MCHC 28.3 (L) 33.6 - 35.0 g/dL    RDW 40.9 35.9 - 50.0 fL    Platelet Count 173 164 - 446 K/uL    MPV 10.2 9.0 - 12.9 fL   LACTIC ACID    Collection Time: 06/17/21  3:43 AM   Result Value Ref Range    Lactic Acid 1.2 0.5 - 2.0 mmol/L   ESTIMATED GFR    Collection Time: 06/17/21  3:43 AM   Result Value Ref Range    GFR If African American >60 >60 mL/min/1.73 m 2    GFR If Non African American >60 >60 mL/min/1.73 m 2       Imaging/Procedures Review:      [id number] VIEW   Final Result         No specific finding to suggest small bowel obstruction.      US-PELVIC COMPLETE (TRANSABDOMINAL/TRANSVAGINAL) (COMBO)   Final Result         1.  Heterogeneous appearance of the uterus with large heterogeneous uterine mass, appearance most compatible with uterine fibroid.   2.  Heterogeneous lesion in the left ovary, could represent hemorrhagic cyst or endometrioma, otherwise indeterminate, recommend follow-up sonogram in 6 weeks for repeat characterization and evaluation of stability.   3.  Thickened endometrial stripe, likely proliferative changes, consider endometrial pathology with additional follow-up as clinically appropriate.   4.  Nabothian cyst      US-RUQ   Final Result      1.  No evidence of gallstones or biliary ductal dilatation.   2.  1.9 cm cystic lesion in the right kidney. Mural nodularity is not excluded and further evaluation with renal protocol MRI is recommended.         CT-ABDOMEN-PELVIS WITH   Final Result      1.  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   2.  Hypodensity in the cervix could be artifactual or could indicate underlying cervical mass. Suggest correlation with physical exam and gynecological history.   3.  19 mm hypodense RIGHT renal lesion, likely but not definitely a cyst. Suggest further assessment with ultrasound when clinically appropriate.                    MDM  "(Assessment and Plan):     Patient Active Problem List    Diagnosis Date Noted   • Fibroids 06/15/2021   • AP (abdominal pain) 06/14/2021   • H. pylori infection 06/14/2021   • Hyponatremia 06/14/2021   • Kidney lesion, native, right 06/14/2021   • Lesion of cervix 06/14/2021   • Leukocytosis 06/14/2021     This is a 46-year-old female, who began to experience severe postprandial abdominal pain (moves around) starting in April 2021.  At first, pain with coming go however, over the past month, complains of severe pain throughout the day.  Recently diagnosed with H. pylori of the blood test.  She was started on antibiotics however, unable to complete the antibiotics due to severe abdominal pain.  Anorexia with weight loss.  Complaints of constipation however, did have a moderate size nonbloody stool this morning.  No fevers, chills, vomiting, hematochezia, melena.  Current hemoglobin 7.8.( Baseline: 9.0 on 6/14/21).  Sodium: 125.  LFTs-total bilirubin 3.2. (Baseline: 1.6 on 6/14/2021).  AST 39.  ALT 30.  Alkaline phosphatase 100.  Lipase 17.    ASSESSMENT:  1. Abdominal pain: Usually occurs after eating.  Complaints that the abdominal pain \"moves around.\"  Describes it as a knife, stabbing sensation.  2.  Anorexia-patient has not been wanting to eat due to abdominal pain  3.  H. pylori-recently tested positive on blood test.  Unable to complete antibiotics due to abdominal pain  4.  Anemia-hemoglobin trending down.  5.  Elevated total bilirubin-current bilirubin 3.2.  Alkaline phosphatase 100.  On admission-LFTs were normal.     PLAN:  1.  Risk versus benefits of EGD was discussed with patient.  Risk include heart and lung event secondary to anesthesia.  Other risk include perforation, bleeding, infection.  Questions were answered.  2.  Continue Protonix 40 mg IV twice daily  3.  Continue sucralfate 1 g tablet 4 times daily  4.  Recommend MiraLAX 1 packet daily as needed for constipation  5.  Fractionate Total " bilirubin..if LFT's continue to rise recommend MRCP  6. NPO now    Thank your for the opportunity to assist in the care of your patient.  Please call for any questions or concerns.    JOLIE Ramos.

## 2021-06-17 NOTE — OR NURSING
1421- patient arrived from OR.  Patient verified, attached to monitor and alarms verified.  Lethargic but oriented.  Declines pain, n/v.      1430- Dr Briseno bedside.  Updated patientt of following information- 1) no significant findings/reasons why current issues going on.  2) biopsies sent to pathology.  3) may resume previous diet.   Patient nodding yes to understanding.  No other needs.     1446- patient declines n/v, pain.  Declines water.  Handoff report given to CONCEPCION Real

## 2021-06-17 NOTE — CARE PLAN
The patient is Stable - Low risk of patient condition declining or worsening    Shift Goals  Clinical Goals: Pt will be able to tolerate PO meds    Progress made toward(s) clinical / shift goals:  Medicated prior to admin with pain and nausea meds PRN. Pt continues to refuse PO sucralafate but does consume clear liquids     Patient is not progressing towards the following goals:  Problem: Bowel Elimination  Goal: Establish and maintain regular bowel function  Outcome: Not Progressing  Per pt only small BM today

## 2021-06-17 NOTE — ASSESSMENT & PLAN NOTE
Unclear etiology, could be from hemolysis from antiphospholipid syndrome  We will trend CMP daily.  MRCP showed no biliary obstruction  Surgery signed off

## 2021-06-17 NOTE — OR SURGEON
Immediate Post OP Note    PreOp Diagnosis: abdominal pain, H pylori       PostOp Diagnosis: abominal pain, H pylori       Procedure(s):  GASTROSCOPY - Wound Class: Clean Contaminated  GASTROSCOPY, WITH BIOPSY - Wound Class: Clean Contaminated    Surgeon(s):  Elfego Lopez M.D.    Anesthesiologist/Type of Anesthesia:  Anesthesiologist: Juvenal Elmore M.D./General    Surgical Staff:  Endoscopy Technician: Pool Mcpherson; Danna Polanco  Endoscopy Nurse: DANNY Mancilla    Specimens removed if any:  ID Type Source Tests Collected by Time Destination   A : BIOPSY Tissue Duodenum PATHOLOGY SPECIMEN Elfego Lopez M.D. 6/17/2021  2:13 PM    B : BIOPSY R/O H PYLORI Tissue Gastric PATHOLOGY SPECIMEN Elfego Lopez M.D. 6/17/2021  2:13 PM        Estimated Blood Loss: < 5 cc    Findings: essentially normal examination of the upper GI tract.  Biopsies obtained from duodenum to rule out Celiac Sprue/duodenitis, and gastric mucosa to rule out active H pylori.    Complications: no immediate.         6/17/2021 2:16 PM Elfego Lopez M.D.

## 2021-06-17 NOTE — ANESTHESIA PREPROCEDURE EVALUATION
Relevant Problems      (positive) Kidney lesion, native, right      Other   (positive) AP (abdominal pain)   hyponatremia  anemia    Physical Exam    Airway   Mallampati: II  TM distance: >3 FB  Neck ROM: full       Cardiovascular - normal exam  Rhythm: regular  Rate: normal  (-) murmur     Dental - normal exam           Pulmonary - normal exam  Breath sounds clear to auscultation     Abdominal    Neurological - normal exam                 Anesthesia Plan    ASA 3 (anemia, hyponatremia)   ASA physical status 3 criteria: other (comment)    Plan - general       Airway plan will be natural airway          Induction: intravenous      Pertinent diagnostic labs and testing reviewed    Informed Consent:    Anesthetic plan and risks discussed with patient.

## 2021-06-18 ENCOUNTER — HOSPITAL ENCOUNTER (OUTPATIENT)
Dept: RADIOLOGY | Facility: MEDICAL CENTER | Age: 46
End: 2021-06-18
Attending: STUDENT IN AN ORGANIZED HEALTH CARE EDUCATION/TRAINING PROGRAM
Payer: COMMERCIAL

## 2021-06-18 PROBLEM — R59.0 RETROPERITONEAL LYMPHADENOPATHY: Status: ACTIVE | Noted: 2021-06-18

## 2021-06-18 PROBLEM — E27.49: Status: ACTIVE | Noted: 2021-06-18

## 2021-06-18 PROBLEM — I82.210 THROMBOSIS OF SUPERIOR VENA CAVA (HCC): Status: ACTIVE | Noted: 2021-06-14

## 2021-06-18 PROBLEM — N93.9 VAGINAL BLEEDING: Status: ACTIVE | Noted: 2021-06-18

## 2021-06-18 LAB
ALBUMIN SERPL BCP-MCNC: 2.6 G/DL (ref 3.2–4.9)
ALBUMIN/GLOB SERPL: 0.7 G/DL
ALP SERPL-CCNC: 100 U/L (ref 30–99)
ALT SERPL-CCNC: 42 U/L (ref 2–50)
ANION GAP SERPL CALC-SCNC: 10 MMOL/L (ref 7–16)
AST SERPL-CCNC: 65 U/L (ref 12–45)
BILIRUB SERPL-MCNC: 2.8 MG/DL (ref 0.1–1.5)
BUN SERPL-MCNC: 9 MG/DL (ref 8–22)
CALCIUM SERPL-MCNC: 8.4 MG/DL (ref 8.5–10.5)
CHLORIDE SERPL-SCNC: 93 MMOL/L (ref 96–112)
CO2 SERPL-SCNC: 21 MMOL/L (ref 20–33)
CREAT SERPL-MCNC: 0.46 MG/DL (ref 0.5–1.4)
CRP SERPL HS-MCNC: 27.58 MG/DL (ref 0–0.75)
ERYTHROCYTE [DISTWIDTH] IN BLOOD BY AUTOMATED COUNT: 43.2 FL (ref 35.9–50)
ERYTHROCYTE [SEDIMENTATION RATE] IN BLOOD BY WESTERGREN METHOD: >140 MM/HOUR (ref 0–25)
FIBRINOGEN PPP-MCNC: 754 MG/DL (ref 215–460)
GLOBULIN SER CALC-MCNC: 4 G/DL (ref 1.9–3.5)
GLUCOSE SERPL-MCNC: 89 MG/DL (ref 65–99)
HCT VFR BLD AUTO: 27 % (ref 37–47)
HGB BLD-MCNC: 7.5 G/DL (ref 12–16)
MAGNESIUM SERPL-MCNC: 2.1 MG/DL (ref 1.5–2.5)
MCH RBC QN AUTO: 19.4 PG (ref 27–33)
MCHC RBC AUTO-ENTMCNC: 27.8 G/DL (ref 33.6–35)
MCV RBC AUTO: 69.8 FL (ref 81.4–97.8)
PLATELET # BLD AUTO: 151 K/UL (ref 164–446)
PMV BLD AUTO: 11.2 FL (ref 9–12.9)
POTASSIUM SERPL-SCNC: 3.6 MMOL/L (ref 3.6–5.5)
PROT SERPL-MCNC: 6.6 G/DL (ref 6–8.2)
RBC # BLD AUTO: 3.87 M/UL (ref 4.2–5.4)
SODIUM SERPL-SCNC: 124 MMOL/L (ref 135–145)
WBC # BLD AUTO: 12.7 K/UL (ref 4.8–10.8)

## 2021-06-18 PROCEDURE — 86140 C-REACTIVE PROTEIN: CPT

## 2021-06-18 PROCEDURE — 700101 HCHG RX REV CODE 250: Performed by: STUDENT IN AN ORGANIZED HEALTH CARE EDUCATION/TRAINING PROGRAM

## 2021-06-18 PROCEDURE — 700105 HCHG RX REV CODE 258: Performed by: STUDENT IN AN ORGANIZED HEALTH CARE EDUCATION/TRAINING PROGRAM

## 2021-06-18 PROCEDURE — 80053 COMPREHEN METABOLIC PANEL: CPT

## 2021-06-18 PROCEDURE — 74177 CT ABD & PELVIS W/CONTRAST: CPT

## 2021-06-18 PROCEDURE — C9113 INJ PANTOPRAZOLE SODIUM, VIA: HCPCS | Performed by: STUDENT IN AN ORGANIZED HEALTH CARE EDUCATION/TRAINING PROGRAM

## 2021-06-18 PROCEDURE — 85384 FIBRINOGEN ACTIVITY: CPT

## 2021-06-18 PROCEDURE — 770006 HCHG ROOM/CARE - MED/SURG/GYN SEMI*

## 2021-06-18 PROCEDURE — 99233 SBSQ HOSP IP/OBS HIGH 50: CPT | Performed by: STUDENT IN AN ORGANIZED HEALTH CARE EDUCATION/TRAINING PROGRAM

## 2021-06-18 PROCEDURE — 700101 HCHG RX REV CODE 250: Performed by: INTERNAL MEDICINE

## 2021-06-18 PROCEDURE — 700117 HCHG RX CONTRAST REV CODE 255: Performed by: STUDENT IN AN ORGANIZED HEALTH CARE EDUCATION/TRAINING PROGRAM

## 2021-06-18 PROCEDURE — 85652 RBC SED RATE AUTOMATED: CPT

## 2021-06-18 PROCEDURE — A9270 NON-COVERED ITEM OR SERVICE: HCPCS | Performed by: STUDENT IN AN ORGANIZED HEALTH CARE EDUCATION/TRAINING PROGRAM

## 2021-06-18 PROCEDURE — 85027 COMPLETE CBC AUTOMATED: CPT

## 2021-06-18 PROCEDURE — 700111 HCHG RX REV CODE 636 W/ 250 OVERRIDE (IP): Performed by: STUDENT IN AN ORGANIZED HEALTH CARE EDUCATION/TRAINING PROGRAM

## 2021-06-18 PROCEDURE — 36415 COLL VENOUS BLD VENIPUNCTURE: CPT

## 2021-06-18 PROCEDURE — 83735 ASSAY OF MAGNESIUM: CPT

## 2021-06-18 PROCEDURE — 99233 SBSQ HOSP IP/OBS HIGH 50: CPT | Performed by: SURGERY

## 2021-06-18 PROCEDURE — 700102 HCHG RX REV CODE 250 W/ 637 OVERRIDE(OP): Performed by: STUDENT IN AN ORGANIZED HEALTH CARE EDUCATION/TRAINING PROGRAM

## 2021-06-18 RX ORDER — SODIUM CHLORIDE, SODIUM LACTATE, POTASSIUM CHLORIDE, CALCIUM CHLORIDE 600; 310; 30; 20 MG/100ML; MG/100ML; MG/100ML; MG/100ML
INJECTION, SOLUTION INTRAVENOUS CONTINUOUS
Status: DISCONTINUED | OUTPATIENT
Start: 2021-06-18 | End: 2021-06-20

## 2021-06-18 RX ORDER — HYDROCODONE BITARTRATE AND ACETAMINOPHEN 5; 325 MG/1; MG/1
1 TABLET ORAL EVERY 6 HOURS PRN
Status: DISCONTINUED | OUTPATIENT
Start: 2021-06-18 | End: 2021-06-22

## 2021-06-18 RX ORDER — OMEPRAZOLE 20 MG/1
20 CAPSULE, DELAYED RELEASE ORAL DAILY
Status: DISCONTINUED | OUTPATIENT
Start: 2021-06-19 | End: 2021-06-22

## 2021-06-18 RX ORDER — HYDROCODONE BITARTRATE AND ACETAMINOPHEN 5; 325 MG/1; MG/1
1-2 TABLET ORAL EVERY 6 HOURS PRN
Status: DISCONTINUED | OUTPATIENT
Start: 2021-06-18 | End: 2021-06-18

## 2021-06-18 RX ORDER — MORPHINE SULFATE 4 MG/ML
1 INJECTION, SOLUTION INTRAMUSCULAR; INTRAVENOUS
Status: DISCONTINUED | OUTPATIENT
Start: 2021-06-18 | End: 2021-06-19

## 2021-06-18 RX ORDER — OMEPRAZOLE 20 MG/1
20 CAPSULE, DELAYED RELEASE ORAL DAILY
Status: DISCONTINUED | OUTPATIENT
Start: 2021-06-18 | End: 2021-06-18

## 2021-06-18 RX ADMIN — SODIUM CHLORIDE: 9 INJECTION, SOLUTION INTRAVENOUS at 11:23

## 2021-06-18 RX ADMIN — SODIUM CHLORIDE, POTASSIUM CHLORIDE, SODIUM LACTATE AND CALCIUM CHLORIDE: 600; 310; 30; 20 INJECTION, SOLUTION INTRAVENOUS at 12:49

## 2021-06-18 RX ADMIN — LIDOCAINE 1 PATCH: 50 PATCH TOPICAL at 21:09

## 2021-06-18 RX ADMIN — HYDROCODONE BITARTRATE AND ACETAMINOPHEN 1 TABLET: 5; 325 TABLET ORAL at 19:33

## 2021-06-18 RX ADMIN — METRONIDAZOLE 500 MG: 500 INJECTION, SOLUTION INTRAVENOUS at 06:19

## 2021-06-18 RX ADMIN — MORPHINE SULFATE 1 MG: 4 INJECTION INTRAVENOUS at 22:04

## 2021-06-18 RX ADMIN — MORPHINE SULFATE 1 MG: 4 INJECTION INTRAVENOUS at 11:19

## 2021-06-18 RX ADMIN — IOHEXOL 100 ML: 350 INJECTION, SOLUTION INTRAVENOUS at 08:58

## 2021-06-18 RX ADMIN — AZITHROMYCIN MONOHYDRATE 500 MG: 500 INJECTION, POWDER, LYOPHILIZED, FOR SOLUTION INTRAVENOUS at 04:53

## 2021-06-18 RX ADMIN — PANTOPRAZOLE SODIUM 40 MG: 40 INJECTION, POWDER, FOR SOLUTION INTRAVENOUS at 04:55

## 2021-06-18 ASSESSMENT — ENCOUNTER SYMPTOMS
NAUSEA: 1
PALPITATIONS: 0
MUSCULOSKELETAL NEGATIVE: 1
RESPIRATORY NEGATIVE: 1
BLURRED VISION: 0
WEAKNESS: 1
CARDIOVASCULAR NEGATIVE: 1
DOUBLE VISION: 0
COUGH: 0
PSYCHIATRIC NEGATIVE: 1
DIZZINESS: 0
SHORTNESS OF BREATH: 0
WEIGHT LOSS: 1
CONSTIPATION: 1
HEADACHES: 0
BLOOD IN STOOL: 0
ABDOMINAL PAIN: 1
CONSTITUTIONAL NEGATIVE: 1
NEUROLOGICAL NEGATIVE: 1
FLANK PAIN: 1
SORE THROAT: 0

## 2021-06-18 ASSESSMENT — COGNITIVE AND FUNCTIONAL STATUS - GENERAL
MOBILITY SCORE: 18
WALKING IN HOSPITAL ROOM: A LITTLE
HELP NEEDED FOR BATHING: A LITTLE
TOILETING: A LITTLE
CLIMB 3 TO 5 STEPS WITH RAILING: A LITTLE
DRESSING REGULAR UPPER BODY CLOTHING: A LITTLE
TURNING FROM BACK TO SIDE WHILE IN FLAT BAD: A LITTLE
DAILY ACTIVITIY SCORE: 20
SUGGESTED CMS G CODE MODIFIER DAILY ACTIVITY: CJ
DRESSING REGULAR LOWER BODY CLOTHING: A LITTLE
SUGGESTED CMS G CODE MODIFIER MOBILITY: CK
MOVING FROM LYING ON BACK TO SITTING ON SIDE OF FLAT BED: A LITTLE
STANDING UP FROM CHAIR USING ARMS: A LITTLE
MOVING TO AND FROM BED TO CHAIR: A LITTLE

## 2021-06-18 ASSESSMENT — PAIN DESCRIPTION - PAIN TYPE
TYPE: ACUTE PAIN

## 2021-06-18 NOTE — ASSESSMENT & PLAN NOTE
Unclear if thrombus present  Patient does have prior history of DVT in 2010  ECHO without apparent thrombus  No surgical intervention necessary at this point.

## 2021-06-18 NOTE — PROGRESS NOTES
Pt. Received in bed asleep but wakes up on verbal command. Orientedx4. Pt. Is s/p Gastroscopy today. Educated about fall risks and prec. Tolerated clear liquid diet. PIV changed for today. Pt. Still with complaints of abdominal pain, medicated per MAR. Call light in reach. Bed frame alarm on. Needs attended.      1 RN Skin Assessment completed.   Patient's risk of skin breakdown: Minimal    Devices in place and skin assessed under:   [] Pulse Ox   [] Villegas  []SCD's   [] Cortrak  []Central Line   [x] PIV  []BMS    [] Condom Cath         []Oxymask     [] Bipap    [x] Nasal Canula     [] N/A   [] Other(specify):     Right Ear  [x] WDL                or           [] Skin issue present (please provide assessment/description below)    Left Ear  [x] WDL                or           [] Skin issue present (please provide assessment/description below)    Right Elbow:  [x] WDL               or           [] Skin issue present (please provide assessment/description below)    Left Elbow:   [x] WDL                or           [] Skin issue present (please provide assessment/description below)    Coccyx/Buttocks:  [x] WDL                or           [] Skin issue present (please provide assessment/description below)    Right Heel/Foot/Toes:  [x] WDL                or           [] Skin issue present (please provide assessment/description below)    Left Heel/Foot/Toes:   [x] WDL              or           [] Skin issue present (please provide assessment/description below)      **Describe any other skin issues in areas not already listed from above list:   [x] N/A    Interventions In Place: N/A  Routine skin check on PIV site.    **If any new or digressing skin issues are found, fill out the selection below.    Possible Skin Injury No  Pictures Uploaded Into Epic: N/A  Wound Consult Placed: N/A  RN Wound Prevention Protocol Ordered: No    What new preventative interventions did you add? N/A

## 2021-06-18 NOTE — ASSESSMENT & PLAN NOTE
Noted on repeat CT scan on 6/18/2021 and MRCP  Also with findings of SVC thrombosis.  General surgery, signed off  Closely monitor blood count.  Close monitor vital signs and any other complications including adrenal insufficiency or crisis.

## 2021-06-18 NOTE — CARE PLAN
The patient is Stable - Low risk of patient condition declining or worsening    Shift Goals  Clinical Goals: Pt. pain will be controlled    Progress made toward(s) clinical / shift goals:  Pt. Was able to sleep last night comfortably. No complaints of pain throughout the night.     Patient is not progressing towards the following goals:

## 2021-06-18 NOTE — CARE PLAN
The patient is Stable - Low risk of patient condition declining or worsening    Shift Goals  Clinical Goals: patient will have adequate pain management     Progress made toward(s) clinical / shift goals: patient endorsing severe abdominal pain, finding relief with reposition and heat. Patient re-educated on use of medications prn, declined need for them at this time.     Patient is not progressing towards the following goals:

## 2021-06-18 NOTE — PROGRESS NOTES
Hospital Medicine Daily Progress Note    Date of Service  6/18/2021    Chief Complaint  46 y.o. female admitted 6/14/2021 with diffuse abdominal pain.    Interval Problem Update  Patient seen and examined at bedside this morning.  No acute events overnight.     S/p EGD yesterday with no significant findings. Biopsy collected.  IV antibiotics and PPI discontinued.  Repeat CT A/P this morning with significant findings including new SVC thrombosis, bilateral adrenal hemorrhage and retroperitoneal fat stranding/lymphadenopathy.   Spoke with vascular surgeon-, recommended a 2D echo to further evaluate SVC thrombosis, also recommended a consult to general surgery for a bilateral adrenal bleeding.  General surgeon-, has been consulted.  At this point, her prognosis remains very guarded, ideally, SVC thrombosis should be treated with anticoagulation but in setting of bilateral adrenal hemorrhage, this becomes a bit more complicated. We will follow up with general surgery for any other recommendations.  She also had some complaints of vaginal bleeding this morning, though very minimal, states she is currently not menstruating.  We will closely monitor vital signs for now and especially watch out for any symptoms of adrenal insufficiency or crisis.    Consultants/Specialty  GI    Code Status  Full Code    Disposition  none    Review of Systems  Review of Systems   Constitutional: Negative.    Respiratory: Negative.    Cardiovascular: Negative.    Gastrointestinal: Positive for abdominal pain and nausea.   Genitourinary: Positive for flank pain.   Neurological: Negative.    All other systems reviewed and are negative.       Physical Exam  Temp:  [36.8 °C (98.3 °F)-38.5 °C (101.3 °F)] 37.6 °C (99.7 °F)  Pulse:  [101-110] 108  Resp:  [14-20] 16  BP: (117-159)/(80-99) 117/82  SpO2:  [91 %-97 %] 93 %    Physical Exam  Constitutional:       Appearance: Normal appearance.   HENT:      Head: Normocephalic.       Nose: Nose normal.   Eyes:      Conjunctiva/sclera: Conjunctivae normal.   Cardiovascular:      Rate and Rhythm: Normal rate and regular rhythm.   Pulmonary:      Effort: Pulmonary effort is normal.      Breath sounds: Normal breath sounds.   Abdominal:      General: Bowel sounds are normal.      Comments: Diffuse abdominal pain mostly localized to the epigastric and right lower quadrant.   Skin:     General: Skin is warm.   Neurological:      General: No focal deficit present.      Mental Status: She is alert.   Psychiatric:         Mood and Affect: Mood normal.         Fluids    Intake/Output Summary (Last 24 hours) at 6/18/2021 1342  Last data filed at 6/17/2021 1423  Gross per 24 hour   Intake 300 ml   Output 1 ml   Net 299 ml       Laboratory  Recent Labs     06/16/21  0659 06/17/21  0343 06/18/21  0731   WBC 13.2* 15.0* 12.7*   RBC 3.99* 4.05* 3.87*   HEMOGLOBIN 7.7* 7.8* 7.5*   HEMATOCRIT 27.8* 27.6* 27.0*   MCV 69.7* 68.1* 69.8*   MCH 19.3* 19.3* 19.4*   MCHC 27.7* 28.3* 27.8*   RDW 42.1 40.9 43.2   PLATELETCT 170 173 151*   MPV 10.4 10.2 11.2     Recent Labs     06/16/21  0659 06/17/21  0343 06/18/21  0731   SODIUM 125* 125* 124*   POTASSIUM 3.8 3.7 3.6   CHLORIDE 92* 92* 93*   CO2 23 20 21   GLUCOSE 117* 112* 89   BUN 8 7* 9   CREATININE 0.56 0.50 0.46*   CALCIUM 8.7 8.6 8.4*                   Imaging  CT-ABDOMEN-PELVIS WITH   Final Result      1.  New right adrenal mass likely represents hemorrhage. Thickening of the left adrenal gland is concerning for developing hemorrhage as well.      2.  Low attenuation filling defect in the superior vena cava is consistent with thrombus. Echocardiogram may be helpful for further evaluation.      3.  Prominent retroperitoneal lymph nodes with nonspecific retroperitoneal inflammatory stranding as reported on the prior CT.      4.  Small bilateral pleural effusions, right greater than left. Adjacent airspace disease likely atelectasis.            Comment: Results  discussed with Dr. Gunter at 9:32 AM      [id number] VIEW   Final Result         No specific finding to suggest small bowel obstruction.      US-PELVIC COMPLETE (TRANSABDOMINAL/TRANSVAGINAL) (COMBO)   Final Result         1.  Heterogeneous appearance of the uterus with large heterogeneous uterine mass, appearance most compatible with uterine fibroid.   2.  Heterogeneous lesion in the left ovary, could represent hemorrhagic cyst or endometrioma, otherwise indeterminate, recommend follow-up sonogram in 6 weeks for repeat characterization and evaluation of stability.   3.  Thickened endometrial stripe, likely proliferative changes, consider endometrial pathology with additional follow-up as clinically appropriate.   4.  Nabothian cyst      US-RUQ   Final Result      1.  No evidence of gallstones or biliary ductal dilatation.   2.  1.9 cm cystic lesion in the right kidney. Mural nodularity is not excluded and further evaluation with renal protocol MRI is recommended.         CT-ABDOMEN-PELVIS WITH   Final Result      1.  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   2.  Hypodensity in the cervix could be artifactual or could indicate underlying cervical mass. Suggest correlation with physical exam and gynecological history.   3.  19 mm hypodense RIGHT renal lesion, likely but not definitely a cyst. Suggest further assessment with ultrasound when clinically appropriate.               EC-ECHOCARDIOGRAM COMPLETE W/O CONT    (Results Pending)        Assessment/Plan  * Thrombosis of superior vena cava (HCC)  Assessment & Plan  Noted on repeat CT scan on 6/18/2021.  Also with findings of bilateral adrenal hemorrhage.  Ideally, should be anticoagulated but compounded by adrenal bleed.  General surgery has been consulted for further evaluation.  Continue with pain medications for abdominal pain.  Slowly advance diet.  2D echo ordered for further  evaluation.    Retroperitoneal lymphadenopathy  Assessment & Plan  Noted on repeat CT 6/18/2021.  Also with findings of bilateral adrenal hemorrhage and SVC thrombosis.      Hemorrhage of both adrenal glands (HCC)  Assessment & Plan  Noted on repeat CT scan on 6/18/2021.  Also with findings of SVC thrombosis.  General surgery consulted for further evaluation.  Closely monitor blood count.  Close monitor vital signs and any other complications including adrenal insufficiency or crisis.    Hyperbilirubinemia  Assessment & Plan  Unclear etiology, could be secondary to dehydration.  We will trend CMP daily.  If it continues to rise, will further evaluate with an MRCP.    Fibroids  Assessment & Plan  Transvaginal ultrasound-  IMPRESSION:     1.  Heterogeneous appearance of the uterus with large heterogeneous uterine mass, appearance most compatible with uterine fibroid.  2.  Heterogeneous lesion in the left ovary, could represent hemorrhagic cyst or endometrioma, otherwise indeterminate, recommend follow-up sonogram in 6 weeks for repeat characterization and evaluation of stability.  3.  Thickened endometrial stripe, likely proliferative changes, consider endometrial pathology with additional follow-up as clinically appropriate.  4.  Nabothian cyst    We will follow-up outpatient with gynecology.    Leukocytosis  Assessment & Plan  Reactive vs. Infectious  UA 0-2 wbc, neg leukocytes esterase and pos Nitrate, however she denies dysuria  Monitor CBC.    Lesion of cervix  Assessment & Plan  See on abd CT: Hypodensity in the cervix could be artifactual or could indicate underlying cervical mass  Cervix was unable to be visualized by pelvic exam at ED  Transvagainal ultrasound:  Large uterine fibroid noted.  Outpatient follow-up recommended.    Kidney lesion, native, right  Assessment & Plan  Noted on CT abd and RUQ u/s  Renal protocol MRI is advised to further evaluation, which could be done as  outpatient.    Hyponatremia  Assessment & Plan  Likely sec to hypovolemic hyponatremia  IVF  Cont to monitor    Vaginal bleeding  Assessment & Plan  Minimum.  We will closely monitor.  Denies being in her menstrual cycle.    H. pylori infection  Assessment & Plan  Reported recently diagnosed with H.Pylori infection and was undergoing treatments, however due to increased abdominal pain, she stopped treatments 4 days ago  Treatment continued inpatient with IV antibiotics.    S/p EGD by GI with no significant findings.  Biopsy collected.  Daily PPI.  IV antibiotics discontinued         VTE prophylaxis: lovenox

## 2021-06-18 NOTE — ASSESSMENT & PLAN NOTE
Should be self limited, no clear extravasation in CT  OK for DVT prophylaxis if clinically neessary  [facility]

## 2021-06-18 NOTE — PROGRESS NOTES
Patient received at start of shift. patient AxOx4. Patient tachycardic, Aig-Ojeanor DO notified and no intervention at this time. All other vss. Patient with complaints of 6-8/10 pain to abdomen, refusing medication at this time, pain relieved with heat and repositioning. Fall and safety precautions in place, patient out of bed one assist with steady gait.     Patient Na 124,  Hanhg-Tovaanor DO aware, no intervention.     Patient with vaginal bleeding, Aig-Tovaanor DO aware. DO to monitor, no interventions at this time.    Frequent rounding in place, all needs met at this time, no s/s of discomfort or distress. Supportive family at bedside. Will continue to monitor

## 2021-06-18 NOTE — ASSESSMENT & PLAN NOTE
Noted on repeat CT 6/18/2021.  Also with findings of bilateral adrenal hemorrhage and SVC thrombosis.

## 2021-06-18 NOTE — PROGRESS NOTES
"Gastroenterology Consult Note:    MARILEE Ramos  Date & Time note created:    6/18/2021   11:11 AM     Referring MD:  Dr. Piotr Gunetr    Patient ID:  Name:             Jaclyn Rhodes     YOB: 1975  Age:                 46 y.o.  female   MRN:               [id number]                                                             Reason for Consult:      1. Abdominal pain  2. Hx H. pylori    History of Present Illness:    This is a 45 yo female recently diagnosed with H. Pylori infection, who was admitted to the hospital 6/14/2021 with abdominal pain x4 days.  Apparently, she has been experiencing abdominal pain since April 2021.  Pain exacerbated after eating-but to some degree the pain is \"always there.\"  She was seen at [facility] a couple months ago, diagnosed with H. pylori on a blood test.  She was started on antibiotics which she could not take due to abdominal pain.  She stopped taking the antibiotics 4 days prior to admission.  Nausea without vomiting.  No bowel movement for several days however, patient has not been eating.  Denies fevers, chills, bloody diarrhea, melena.    Labs: WBC 15.0.  Hemoglobin 7.8.( Baseline: 9.0 on 6/14/21). Hematocrit 27.6.  Platelet count 173.  Sodium 125.  Potassium 3.7.  Glucose 112.  BUN 7.  Creatinine 0.5.  Total bilirubin 3.2. (Baseline: 1.6 on 6/14/2021).  AST 39.  ALT 30.  Alkaline phosphatase 100.  Lipase 17.      CT scan abdomen/pelvis 6/14/ 21:  Changes in the retroperitoneal fat suspicious for retroperitoneal fibrosis, less likely lymphoma or metastatic disease.  2.  Hypodensity in the cervix could be artifactual or could indicate underlying cervical mass. Suggest correlation with physical exam and gynecological history.  3.  19 mm hypodense RIGHT renal lesion, likely but not definitely a cyst. Suggest further assessment with ultrasound when clinically appropriate.      Abdominal ultrasound:    1.  No evidence " of gallstones or biliary ductal dilatation.  2.  1.9 cm cystic lesion in the right kidney. Mural nodularity is not excluded and further evaluation with renal protocol MRI is recommended.        Interval history:     6/18/21: Continues to complain of generalized abdominal pain. Appetite poor. EGD performed by Dr. Lopez 6/17/21: unremarkable. Biopsies pending.     CT scan abdomen/pelvis:   1. New right adrenal mass likely represents hemorrhage. Thickening of the left adrenal gland is concerning for developing hemorrhage as well.     2.  Low attenuation filling defect in the superior vena cava is consistent with thrombus. Echocardiogram may be helpful for further evaluation.     3.  Prominent retroperitoneal lymph nodes with nonspecific retroperitoneal inflammatory stranding as reported on the prior CT.         4.  Small bilateral pleural effusions, right greater than left. Adjacent airspace     disease likely atelectasis.    Review of Systems:      Review of Systems   Constitutional: Positive for malaise/fatigue and weight loss.   HENT: Negative for nosebleeds and sore throat.    Eyes: Negative for blurred vision and double vision.   Respiratory: Negative for cough and shortness of breath.    Cardiovascular: Negative for chest pain and palpitations.   Gastrointestinal: Positive for abdominal pain, constipation and nausea. Negative for blood in stool and melena.   Genitourinary: Negative.    Musculoskeletal: Negative.    Skin: Negative for itching and rash.   Neurological: Positive for weakness. Negative for dizziness and headaches.   Psychiatric/Behavioral: Negative.              Physical Exam:  Vitals/ General Appearance:   Weight/BMI: Body mass index is 26.24 kg/m².    Vitals:    06/17/21 2139 06/17/21 2339 06/18/21 0225 06/18/21 0808   BP:   139/82 117/82   Pulse:   (!) 110 (!) 108   Resp: 18 17 18 16   Temp:   37.2 °C (99 °F) 37.6 °C (99.7 °F)   TempSrc:   Temporal Temporal   SpO2:   93%    Weight:        Height:         Oxygen Therapy:  Pulse Oximetry: 93 %, O2 (LPM): 1, O2 Delivery Device: None - Room Air    Physical Exam  Constitutional:       Appearance: She is ill-appearing.   HENT:      Head: Normocephalic and atraumatic.      Nose: Nose normal.      Mouth/Throat:      Mouth: Mucous membranes are moist.   Eyes:      Extraocular Movements: Extraocular movements intact.      Pupils: Pupils are equal, round, and reactive to light.   Cardiovascular:      Rate and Rhythm: Normal rate and regular rhythm.   Pulmonary:      Effort: Pulmonary effort is normal.      Breath sounds: Normal breath sounds.   Abdominal:      General: Abdomen is flat. Bowel sounds are normal.      Palpations: Abdomen is soft.      Tenderness: There is abdominal tenderness.   Musculoskeletal:         General: Normal range of motion.      Cervical back: Normal range of motion and neck supple.   Skin:     General: Skin is warm and dry.   Neurological:      General: No focal deficit present.      Mental Status: She is alert and oriented to person, place, and time.         Past Medical History:   Past Medical History:   Diagnosis Date   • PE (pulmonary embolism)        Past Surgical History:  Past Surgical History:   Procedure Laterality Date   • PB UPPER GI ENDOSCOPY,DIAGNOSIS N/A 6/17/2021    Procedure: GASTROSCOPY;  Surgeon: Elfego Lopez M.D.;  Location: SURGERY SAME DAY [facility];  Service: Gastroenterology   • PB UPPER GI ENDOSCOPY,BIOPSY N/A 6/17/2021    Procedure: GASTROSCOPY, WITH BIOPSY;  Surgeon: Elfego Lopez M.D.;  Location: SURGERY SAME DAY [facility];  Service: Gastroenterology       [facility] Medications:    Current Facility-Administered Medications:   •  lidocaine (LIDODERM) 5 % 1 Patch, 1 Patch, Transdermal, Q24HR, Gareth Castro M.D., 1 Patch at 06/17/21 7222  •  pantoprazole (PROTONIX) injection 40 mg, 40 mg, Intravenous, BID, Fortbrenda Gunter D.OKacie, 40 mg at 06/18/21 2667  •  morphine (pf) 4 mg/mL injection 1 mg,  1 mg, Intravenous, Q3HRS PRN, Piotr Gunter, D.O., 1 mg at 06/17/21 2139  •  sucralfate (CARAFATE) tablet 1 g, 1 g, Oral, 4X/DAY ACHS, Jamar Mo M.D., 1 g at 06/16/21 1032  •  senna-docusate (PERICOLACE or SENOKOT S) 8.6-50 MG per tablet 2 tablet, 2 tablet, Oral, BID **AND** polyethylene glycol/lytes (MIRALAX) PACKET 1 Packet, 1 Packet, Oral, QDAY PRN **AND** magnesium hydroxide (MILK OF MAGNESIA) suspension 30 mL, 30 mL, Oral, QDAY PRN **AND** bisacodyl (DULCOLAX) suppository 10 mg, 10 mg, Rectal, QDAY PRN, Jamar Mo M.D., 10 mg at 06/15/21 1201  •  NS infusion, , Intravenous, Continuous, Piotr Durham-Tom, D.O., Last Rate: 83 mL/hr at 06/17/21 1604, New Bag at 06/17/21 1604  •  acetaminophen (Tylenol) tablet 650 mg, 650 mg, Oral, Q6HRS PRN, Jamar Mo M.D.  •  cloNIDine (CATAPRES) tablet 0.1 mg, 0.1 mg, Oral, Q6HRS PRN, Jamar Mo M.D.  •  enalaprilat (VASOTEC) injection 1.25 mg, 1.25 mg, Intravenous, Q6HRS PRN, Jamar Mo M.D.  •  labetalol (NORMODYNE/TRANDATE) injection 10 mg, 10 mg, Intravenous, Q4HRS PRN, Jamar Mo M.D.  •  ondansetron (ZOFRAN) syringe/vial injection 4 mg, 4 mg, Intravenous, Q4HRS PRN, Jamar Mo M.D., 4 mg at 06/16/21 1355  •  ondansetron (ZOFRAN ODT) dispertab 4 mg, 4 mg, Oral, Q4HRS PRN, Jamar Mo M.D.  •  promethazine (PHENERGAN) tablet 12.5-25 mg, 12.5-25 mg, Oral, Q4HRS PRN, Jamar Mo M.D.  •  promethazine (PHENERGAN) suppository 12.5-25 mg, 12.5-25 mg, Rectal, Q4HRS PRN, Jamar Mo M.D.  •  prochlorperazine (COMPAZINE) injection 5-10 mg, 5-10 mg, Intravenous, Q4HRS PRN, Jamar Mo M.D.    Current Outpatient Medications:  Current Facility-Administered Medications   Medication Dose Route Frequency Provider Last Rate Last Admin   • lidocaine (LIDODERM) 5 % 1 Patch  1 Patch Transdermal Q24HR Gareth Castro M.D.   1 Patch at 06/17/21 3474   • pantoprazole (PROTONIX) injection 40 mg  40 mg Intravenous BID Piotr Gunter D.O.   40 mg at  06/18/21 0455   • morphine (pf) 4 mg/mL injection 1 mg  1 mg Intravenous Q3HRS PRN Piotr Gunter, D.O.   1 mg at 06/17/21 2139   • sucralfate (CARAFATE) tablet 1 g  1 g Oral 4X/DAY ACHS Jamar Mo M.D.   1 g at 06/16/21 1032   • senna-docusate (PERICOLACE or SENOKOT S) 8.6-50 MG per tablet 2 tablet  2 tablet Oral BID Jamar Mo M.D.        And   • polyethylene glycol/lytes (MIRALAX) PACKET 1 Packet  1 Packet Oral QDAY PRN Jamar Mo M.D.        And   • magnesium hydroxide (MILK OF MAGNESIA) suspension 30 mL  30 mL Oral QDAY PRCORNELIA Mo M.D.        And   • bisacodyl (DULCOLAX) suppository 10 mg  10 mg Rectal QDAY PRCORNELIA Mo M.D.   10 mg at 06/15/21 1201   • NS infusion   Intravenous Continuous Piotr Durham-Tom, D.O. 83 mL/hr at 06/17/21 1604 New Bag at 06/17/21 1604   • acetaminophen (Tylenol) tablet 650 mg  650 mg Oral Q6HRS PRCORNELIA Mo M.D.       • cloNIDine (CATAPRES) tablet 0.1 mg  0.1 mg Oral Q6HRS PRCORNELIA Mo M.D.       • enalaprilat (VASOTEC) injection 1.25 mg  1.25 mg Intravenous Q6HRS CRISTAL Mo M.D.       • labetalol (NORMODYNE/TRANDATE) injection 10 mg  10 mg Intravenous Q4HRS PRCORNELIA Mo M.D.       • ondansetron (ZOFRAN) syringe/vial injection 4 mg  4 mg Intravenous Q4HRS CRISTAL Mo M.D.   4 mg at 06/16/21 1355   • ondansetron (ZOFRAN ODT) dispertab 4 mg  4 mg Oral Q4HRS PRCORNELIA Mo M.D.       • promethazine (PHENERGAN) tablet 12.5-25 mg  12.5-25 mg Oral Q4HRS PRCORNELIA Mo M.D.       • promethazine (PHENERGAN) suppository 12.5-25 mg  12.5-25 mg Rectal Q4HRS CRISTAL Mo M.D.       • prochlorperazine (COMPAZINE) injection 5-10 mg  5-10 mg Intravenous Q4HRS CRISTAL Mo M.D.           Medication Allergy:  No Known Allergies    Family History:  History reviewed. No pertinent family history.    Social History:  Social History     Socioeconomic History   • Marital status:      Spouse name: Not on file   • Number of  children: Not on file   • Years of education: Not on file   • Highest education level: Not on file   Occupational History   • Not on file   Tobacco Use   • Smoking status: Current Every Day Smoker   • Tobacco comment: 1 pk per week   Substance and Sexual Activity   • Alcohol use: Yes     Comment: occasionally   • Drug use: Yes     Types: Inhaled     Comment: marijuana   • Sexual activity: Not on file   Other Topics Concern   • Not on file   Social History Narrative   • Not on file     Social Determinants of Health     Financial Resource Strain:    • Difficulty of Paying Living Expenses:    Food Insecurity:    • Worried About Running Out of Food in the Last Year:    • Ran Out of Food in the Last Year:    Transportation Needs:    • Lack of Transportation (Medical):    • Lack of Transportation (Non-Medical):    Physical Activity:    • Days of Exercise per Week:    • Minutes of Exercise per Session:    Stress:    • Feeling of Stress :    Social Connections:    • Frequency of Communication with Friends and Family:    • Frequency of Social Gatherings with Friends and Family:    • Attends [employer] Services:    • Active Member of Clubs or Organizations:    • Attends Club or Organization Meetings:    • Marital Status:    Intimate Partner Violence:    • Fear of Current or Ex-Partner:    • Emotionally Abused:    • Physically Abused:    • Sexually Abused:          MDM (Data Review):     Records reviewed and summarized in current documentation    Lab Data Review:  Recent Results (from the past 24 hour(s))   HCG QUAL SERUM    Collection Time: 06/17/21 12:31 PM   Result Value Ref Range    Beta-Hcg Qualitative Serum Negative Negative   Histology Request    Collection Time: 06/17/21  4:09 PM   Result Value Ref Range    Pathology Request Sent to Histo    Comp Metabolic Panel    Collection Time: 06/18/21  7:31 AM   Result Value Ref Range    Total Bilirubin 2.8 (H) 0.1 - 1.5 mg/dL    Sodium 124 (L) 135 - 145 mmol/L    Potassium 3.6  3.6 - 5.5 mmol/L    Chloride 93 (L) 96 - 112 mmol/L    Co2 21 20 - 33 mmol/L    Anion Gap 10.0 7.0 - 16.0    Glucose 89 65 - 99 mg/dL    Bun 9 8 - 22 mg/dL    Creatinine 0.46 (L) 0.50 - 1.40 mg/dL    Calcium 8.4 (L) 8.5 - 10.5 mg/dL    AST(SGOT) 65 (H) 12 - 45 U/L    ALT(SGPT) 42 2 - 50 U/L    Alkaline Phosphatase 100 (H) 30 - 99 U/L    Albumin 2.6 (L) 3.2 - 4.9 g/dL    Total Protein 6.6 6.0 - 8.2 g/dL    Globulin 4.0 (H) 1.9 - 3.5 g/dL    A-G Ratio 0.7 g/dL   CBC WITHOUT DIFFERENTIAL    Collection Time: 06/18/21  7:31 AM   Result Value Ref Range    WBC 12.7 (H) 4.8 - 10.8 K/uL    RBC 3.87 (L) 4.20 - 5.40 M/uL    Hemoglobin 7.5 (L) 12.0 - 16.0 g/dL    Hematocrit 27.0 (L) 37.0 - 47.0 %    MCV 69.8 (L) 81.4 - 97.8 fL    MCH 19.4 (L) 27.0 - 33.0 pg    MCHC 27.8 (L) 33.6 - 35.0 g/dL    RDW 43.2 35.9 - 50.0 fL    Platelet Count 151 (L) 164 - 446 K/uL    MPV 11.2 9.0 - 12.9 fL   MAGNESIUM    Collection Time: 06/18/21  7:31 AM   Result Value Ref Range    Magnesium 2.1 1.5 - 2.5 mg/dL   CRP QUANTITIVE (NON-CARDIAC)    Collection Time: 06/18/21  7:31 AM   Result Value Ref Range    Stat C-Reactive Protein 27.58 (H) 0.00 - 0.75 mg/dL   ESTIMATED GFR    Collection Time: 06/18/21  7:31 AM   Result Value Ref Range    GFR If African American >60 >60 mL/min/1.73 m 2    GFR If Non African American >60 >60 mL/min/1.73 m 2       Imaging/Procedures Review:      CT-ABDOMEN-PELVIS WITH   Final Result      1.  New right adrenal mass likely represents hemorrhage. Thickening of the left adrenal gland is concerning for developing hemorrhage as well.      2.  Low attenuation filling defect in the superior vena cava is consistent with thrombus. Echocardiogram may be helpful for further evaluation.      3.  Prominent retroperitoneal lymph nodes with nonspecific retroperitoneal inflammatory stranding as reported on the prior CT.      4.  Small bilateral pleural effusions, right greater than left. Adjacent airspace disease likely atelectasis.             Comment: Results discussed with Dr. Gunter at 9:32 AM      [id number] VIEW   Final Result         No specific finding to suggest small bowel obstruction.      US-PELVIC COMPLETE (TRANSABDOMINAL/TRANSVAGINAL) (COMBO)   Final Result         1.  Heterogeneous appearance of the uterus with large heterogeneous uterine mass, appearance most compatible with uterine fibroid.   2.  Heterogeneous lesion in the left ovary, could represent hemorrhagic cyst or endometrioma, otherwise indeterminate, recommend follow-up sonogram in 6 weeks for repeat characterization and evaluation of stability.   3.  Thickened endometrial stripe, likely proliferative changes, consider endometrial pathology with additional follow-up as clinically appropriate.   4.  Nabothian cyst      US-RUQ   Final Result      1.  No evidence of gallstones or biliary ductal dilatation.   2.  1.9 cm cystic lesion in the right kidney. Mural nodularity is not excluded and further evaluation with renal protocol MRI is recommended.         CT-ABDOMEN-PELVIS WITH   Final Result      1.  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   2.  Hypodensity in the cervix could be artifactual or could indicate underlying cervical mass. Suggest correlation with physical exam and gynecological history.   3.  19 mm hypodense RIGHT renal lesion, likely but not definitely a cyst. Suggest further assessment with ultrasound when clinically appropriate.               EC-ECHOCARDIOGRAM COMPLETE W/O CONT    (Results Pending)        MDM (Assessment and Plan):     Patient Active Problem List    Diagnosis Date Noted   • Hyperbilirubinemia 06/17/2021   • Fibroids 06/15/2021   • AP (abdominal pain) 06/14/2021   • H. pylori infection 06/14/2021   • Hyponatremia 06/14/2021   • Kidney lesion, native, right 06/14/2021   • Lesion of cervix 06/14/2021   • Leukocytosis 06/14/2021     This is a 46-year-old  "female, who began to experience severe postprandial abdominal pain (moves around) starting in April 2021.  At first, pain with coming go however, over the past month, complains of severe pain throughout the day.  Recently diagnosed with H. pylori of the blood test.  She was started on antibiotics however, unable to complete the antibiotics due to severe abdominal pain.  Anorexia with weight loss.  Complaints of constipation however, did have a moderate size nonbloody stool this morning.  No fevers, chills, vomiting, hematochezia, melena.  Current hemoglobin 7.8.( Baseline: 9.0 on 6/14/21).  Sodium: 125.  LFTs-total bilirubin 3.2. (Baseline: 1.6 on 6/14/2021).  AST 39.  ALT 30.  Alkaline phosphatase 100.  Lipase 17.    ASSESSMENT:  1. Abdominal pain: Usually occurs after eating.  Complaints that the abdominal pain \"moves around.\"  Describes it as a knife, stabbing sensation.  2.  Anorexia-patient has not been wanting to eat due to abdominal pain  3.  H. pylori-recently tested positive on blood test.  Unable to complete antibiotics due to abdominal pain  4.  Anemia-hemoglobin trending down.  5.  Elevated total bilirubin-current bilirubin 3.2.  Alkaline phosphatase 100.  On admission-LFTs were normal.     PLAN:  1.  EGD unremarkable. Biopsies pending.  2.  Stop Protonix 40 mg IV twice daily  3.  Stop sucralfate 1 g tablet 4 times daily  4.  Start Omeprazole 20mg 1 p.o. daily  5.  Advance diet as tolerated    GI WILL SIGN OFF PLEASE CALL IF ANY QUESTIONS OR CONCERNS    Thank your for the opportunity to assist in the care of your patient.  Please call for any questions or concerns.    JOLIE Ramos.        "

## 2021-06-18 NOTE — CONSULTS
DATE OF CONSULTATION:  6/18/2021     REFERRING PHYSICIAN:   CORTNEY Saldaña     CONSULTING PHYSICIAN:  Mackenzie Cavanaugh M.D.     REASON FOR CONSULTATION:  Adrenal hemorrhage/svc thrombus/abdominal pain.   I have been asked by  to see the patient in surgical consultation for evaluation of abdominal pain, adrenal hemorrhage and SVC thrombus.    HISTORY OF PRESENT ILLNESS: The patient is a 46 year-old  woman who is admitted to the hospital with a several- month history of generalized and diffuse mild to moderate abdominal pain. The pain is associated with nausea and malaise. The patient denies any recent or intercurrent illness. 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 The patient denies any previous surgery for obstructive symptoms..     PAST MEDICAL HISTORY:  has a past medical history of PE (pulmonary embolism).    PAST SURGICAL HISTORY:  has a past surgical history that includes upper gi endoscopy,diagnosis (N/A, 6/17/2021) and upper gi endoscopy,biopsy (N/A, 6/17/2021).     ALLERGIES: No Known Allergies     CURRENT MEDICATIONS:   Home Medications     Reviewed by Benson Gongora (Pharmacy Tech) on 06/14/21 at 1922  Med List Status: Complete   Medication Last Dose Status   amoxicillin (AMOXIL) 500 MG Cap stopped Active   clindamycin (CLEOCIN) 150 MG Cap stopped Active   furosemide (LASIX) 20 MG TABS Not Taking Active   hydrocodone-acetaminophen (VICODIN) 5-500 MG TABS Not Taking Active   omeprazole (PRILOSEC) 20 MG delayed-release capsule 6/10/2021 Active   potassium chloride SA (K-DUR) 20 MEQ TBCR Not Taking Active   sucralfate (CARAFATE) 1 GM Tab 6/10/2021 Active                FAMILY HISTORY: History reviewed. No pertinent family history.    SOCIAL HISTORY:   Social History     Tobacco Use   • Smoking status: Current Every Day Smoker   • Tobacco comment: 1 pk per week   Substance and Sexual Activity   • Alcohol use: Yes     Comment: occasionally   • Drug use: Yes  "    Types: Inhaled     Comment: marijuana   • Sexual activity: Not on file       REVIEW OF SYSTEMS: Comprehensive review of systems is negative with the exception of the aforementioned HPI, PMH, and PSH bullets in accordance with CMS guidelines.     PHYSICAL EXAMINATION:   General Appearance: The patient is a pleasant  woman in no critical distress.  VITAL SIGNS: /82   Pulse (!) 108   Temp 37.6 °C (99.7 °F) (Temporal)   Resp 16   Ht 1.753 m (5' 9\")   Wt 80.6 kg (177 lb 11.1 oz)   SpO2 93%   HEAD AND NECK: Demonstrates symmetric, reactive pupils. Extraocular muscles   are intact. Nares and oropharynx are clear.   NECK: Supple. No adenopathy.  CHEST:    Inspection: Unlabored respirations, no intercostal retractions, paradoxical motion, or accessory muscle use.   Palpation:  The chest is nontender.    Auscultation: normal.  CARDIOVASCULAR:   Inspection: The skin is warm.  Auscultation: Regular rate and rhythm.   Peripheral Pulses: Normal.    ABDOMEN:   Inspection: Abdominal inspection reveals prior surgical scarring across abdomen.   Palpation: Palpation is remarkable for mild tenderness in the generalized region. No abdominal wall hernias.  EXTREMITIES:   Examination of the upper and lower extremities demonstrates No cyanosis, edema, or clubbing of the nails.  NEUROLOGIC:   Neurologic examination reveals no focal deficits noted, mental status intact.  PSYCHIATRIC:   The patient does not appear depressed or anxious, short and long term memory appears intact.    LABORATORY VALUES:   Recent Labs     06/16/21  0659 06/17/21  0343 06/18/21  0731   WBC 13.2* 15.0* 12.7*   RBC 3.99* 4.05* 3.87*   HEMOGLOBIN 7.7* 7.8* 7.5*   HEMATOCRIT 27.8* 27.6* 27.0*   MCV 69.7* 68.1* 69.8*   MCH 19.3* 19.3* 19.4*   MCHC 27.7* 28.3* 27.8*   RDW 42.1 40.9 43.2   PLATELETCT 170 173 151*   MPV 10.4 10.2 11.2     Recent Labs     06/16/21  0659 06/17/21  0343 06/18/21  0731   SODIUM 125* 125* 124*   POTASSIUM 3.8 3.7 3.6   CHLORIDE " 92* 92* 93*   CO2 23 20 21   GLUCOSE 117* 112* 89   BUN 8 7* 9   CREATININE 0.56 0.50 0.46*   CALCIUM 8.7 8.6 8.4*     Recent Labs     06/16/21  0659 06/17/21  0343 06/18/21  0731   ASTSGOT 21 39 65*   ALTSGPT 29 30 42   TBILIRUBIN 2.0* 3.2* 2.8*   ALKPHOSPHAT 68 100* 100*   GLOBULIN 4.1* 4.2* 4.0*            IMAGING:   CT-ABDOMEN-PELVIS WITH   Final Result      1.  New right adrenal mass likely represents hemorrhage. Thickening of the left adrenal gland is concerning for developing hemorrhage as well.      2.  Low attenuation filling defect in the superior vena cava is consistent with thrombus. Echocardiogram may be helpful for further evaluation.      3.  Prominent retroperitoneal lymph nodes with nonspecific retroperitoneal inflammatory stranding as reported on the prior CT.      4.  Small bilateral pleural effusions, right greater than left. Adjacent airspace disease likely atelectasis.            Comment: Results discussed with Dr. Gunter at 9:32 AM      [id number] VIEW   Final Result         No specific finding to suggest small bowel obstruction.      US-PELVIC COMPLETE (TRANSABDOMINAL/TRANSVAGINAL) (COMBO)   Final Result         1.  Heterogeneous appearance of the uterus with large heterogeneous uterine mass, appearance most compatible with uterine fibroid.   2.  Heterogeneous lesion in the left ovary, could represent hemorrhagic cyst or endometrioma, otherwise indeterminate, recommend follow-up sonogram in 6 weeks for repeat characterization and evaluation of stability.   3.  Thickened endometrial stripe, likely proliferative changes, consider endometrial pathology with additional follow-up as clinically appropriate.   4.  Nabothian cyst      US-RUQ   Final Result      1.  No evidence of gallstones or biliary ductal dilatation.   2.  1.9 cm cystic lesion in the right kidney. Mural nodularity is not excluded and further evaluation with renal protocol MRI is recommended.         CT-ABDOMEN-PELVIS WITH    Final Result      1.  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   2.  Hypodensity in the cervix could be artifactual or could indicate underlying cervical mass. Suggest correlation with physical exam and gynecological history.   3.  19 mm hypodense RIGHT renal lesion, likely but not definitely a cyst. Suggest further assessment with ultrasound when clinically appropriate.               EC-ECHOCARDIOGRAM COMPLETE W/O CONT    (Results Pending)       IMPRESSION AND PLAN:  * Thrombosis of superior vena cava (HCC)  Assessment & Plan  Unclear if thrombus present, echo pending for further evaluation.  Patient does have prior history of DVT as well as vague abdominal pain. Would hold anticoagulation for now with question of adrenal hemorrhage.  No surgical intervention necessary at this point.         Retroperitoneal lymphadenopathy  Assessment & Plan  Unknown source      Hemorrhage of both adrenal glands (HCC)  Assessment & Plan  Hold anticoagulation and trend Hgb.  Should be self limited, no clear extrav        ACS NSQIP Surgical Risk Calculator       ____________________________________     Mackenzie Cavanaugh M.D.    DD: 6/18/2021  11:33 AM

## 2021-06-18 NOTE — CARE PLAN
The patient is Stable - Low risk of patient condition declining or worsening    Shift Goals  Clinical Goals: pt will not experience pain with antibiotics    Progress made toward(s) clinical / shift goals:  pt given PRN pain meds with antibiotics, tolerated well

## 2021-06-18 NOTE — PROGRESS NOTES
Assessment/description of ears? Pink and blanching, intact  Which preventative measures are in place for the ears? n/a    Assessment/description of elbows? Pink and blanching, intact  Which preventative measures are in place for the elbows? N/a. Patient changes position frequently and independently, refusing mepilex      Assessment/description of sacrum? Pink and blanching, intact  Which preventative measures are in place for the sacrum? N/a. Patient changes position frequently and independently, refusing mepilex      Assessment/description of heels? Pink and blanching, intact. Dry.   Which preventative measures are in place for the heels? N/a. Patient changes position frequently and independently, refusing mepilex      Which devices are in place? PIV  Description of skin under devices: pink and blanching, intact  Which preventative measures are in place under devices? n/a    Other: patient changing positions in bed independently

## 2021-06-19 PROBLEM — D64.9 NORMOCYTIC ANEMIA: Status: ACTIVE | Noted: 2021-06-19

## 2021-06-19 PROBLEM — D69.6 THROMBOCYTOPENIA (HCC): Status: ACTIVE | Noted: 2021-06-19

## 2021-06-19 PROBLEM — D50.9 MICROCYTIC ANEMIA: Status: ACTIVE | Noted: 2021-06-19

## 2021-06-19 LAB
ABO + RH BLD: NORMAL
ABO GROUP BLD: NORMAL
ALBUMIN SERPL BCP-MCNC: 2.6 G/DL (ref 3.2–4.9)
ALBUMIN/GLOB SERPL: 0.7 G/DL
ALP SERPL-CCNC: 100 U/L (ref 30–99)
ALT SERPL-CCNC: 39 U/L (ref 2–50)
ANION GAP SERPL CALC-SCNC: 10 MMOL/L (ref 7–16)
AST SERPL-CCNC: 53 U/L (ref 12–45)
BACTERIA BLD CULT: NORMAL
BACTERIA BLD CULT: NORMAL
BARCODED ABORH UBTYP: 6200
BARCODED PRD CODE UBPRD: NORMAL
BARCODED UNIT NUM UBUNT: NORMAL
BILIRUB SERPL-MCNC: 1.1 MG/DL (ref 0.1–1.5)
BLD GP AB SCN SERPL QL: NORMAL
BUN SERPL-MCNC: 10 MG/DL (ref 8–22)
CALCIUM SERPL-MCNC: 8.5 MG/DL (ref 8.5–10.5)
CHLORIDE SERPL-SCNC: 98 MMOL/L (ref 96–112)
CO2 SERPL-SCNC: 25 MMOL/L (ref 20–33)
COMPONENT R 8504R: NORMAL
CREAT SERPL-MCNC: 0.58 MG/DL (ref 0.5–1.4)
ERYTHROCYTE [DISTWIDTH] IN BLOOD BY AUTOMATED COUNT: 44.4 FL (ref 35.9–50)
GLOBULIN SER CALC-MCNC: 3.7 G/DL (ref 1.9–3.5)
GLUCOSE SERPL-MCNC: 98 MG/DL (ref 65–99)
HCT VFR BLD AUTO: 23.7 % (ref 37–47)
HGB BLD-MCNC: 6.6 G/DL (ref 12–16)
INR PPP: 1.33 (ref 0.87–1.13)
MAGNESIUM SERPL-MCNC: 2.2 MG/DL (ref 1.5–2.5)
MCH RBC QN AUTO: 19.5 PG (ref 27–33)
MCHC RBC AUTO-ENTMCNC: 27.8 G/DL (ref 33.6–35)
MCV RBC AUTO: 70.1 FL (ref 81.4–97.8)
PLATELET # BLD AUTO: 154 K/UL (ref 164–446)
PMV BLD AUTO: 10.8 FL (ref 9–12.9)
POTASSIUM SERPL-SCNC: 3.4 MMOL/L (ref 3.6–5.5)
PRODUCT TYPE UPROD: NORMAL
PROT SERPL-MCNC: 6.3 G/DL (ref 6–8.2)
PROTHROMBIN TIME: 16.1 SEC (ref 12–14.6)
RBC # BLD AUTO: 3.38 M/UL (ref 4.2–5.4)
RH BLD: NORMAL
SIGNIFICANT IND 70042: NORMAL
SIGNIFICANT IND 70042: NORMAL
SITE SITE: NORMAL
SITE SITE: NORMAL
SODIUM SERPL-SCNC: 133 MMOL/L (ref 135–145)
SOURCE SOURCE: NORMAL
SOURCE SOURCE: NORMAL
T4 FREE SERPL-MCNC: 1.25 NG/DL (ref 0.93–1.7)
TSH SERPL DL<=0.005 MIU/L-ACNC: 8.53 UIU/ML (ref 0.38–5.33)
UNIT STATUS USTAT: NORMAL
WBC # BLD AUTO: 8.8 K/UL (ref 4.8–10.8)

## 2021-06-19 PROCEDURE — 83735 ASSAY OF MAGNESIUM: CPT

## 2021-06-19 PROCEDURE — 700102 HCHG RX REV CODE 250 W/ 637 OVERRIDE(OP): Performed by: NURSE PRACTITIONER

## 2021-06-19 PROCEDURE — 770006 HCHG ROOM/CARE - MED/SURG/GYN SEMI*

## 2021-06-19 PROCEDURE — 85027 COMPLETE CBC AUTOMATED: CPT

## 2021-06-19 PROCEDURE — 36415 COLL VENOUS BLD VENIPUNCTURE: CPT

## 2021-06-19 PROCEDURE — 84443 ASSAY THYROID STIM HORMONE: CPT

## 2021-06-19 PROCEDURE — 86850 RBC ANTIBODY SCREEN: CPT

## 2021-06-19 PROCEDURE — 700102 HCHG RX REV CODE 250 W/ 637 OVERRIDE(OP): Performed by: STUDENT IN AN ORGANIZED HEALTH CARE EDUCATION/TRAINING PROGRAM

## 2021-06-19 PROCEDURE — 84439 ASSAY OF FREE THYROXINE: CPT

## 2021-06-19 PROCEDURE — 80053 COMPREHEN METABOLIC PANEL: CPT

## 2021-06-19 PROCEDURE — 36430 TRANSFUSION BLD/BLD COMPNT: CPT

## 2021-06-19 PROCEDURE — 86923 COMPATIBILITY TEST ELECTRIC: CPT

## 2021-06-19 PROCEDURE — 700105 HCHG RX REV CODE 258: Performed by: STUDENT IN AN ORGANIZED HEALTH CARE EDUCATION/TRAINING PROGRAM

## 2021-06-19 PROCEDURE — P9016 RBC LEUKOCYTES REDUCED: HCPCS

## 2021-06-19 PROCEDURE — A9270 NON-COVERED ITEM OR SERVICE: HCPCS | Performed by: NURSE PRACTITIONER

## 2021-06-19 PROCEDURE — A9270 NON-COVERED ITEM OR SERVICE: HCPCS | Performed by: STUDENT IN AN ORGANIZED HEALTH CARE EDUCATION/TRAINING PROGRAM

## 2021-06-19 PROCEDURE — 700111 HCHG RX REV CODE 636 W/ 250 OVERRIDE (IP): Performed by: STUDENT IN AN ORGANIZED HEALTH CARE EDUCATION/TRAINING PROGRAM

## 2021-06-19 PROCEDURE — 86901 BLOOD TYPING SEROLOGIC RH(D): CPT

## 2021-06-19 PROCEDURE — 85610 PROTHROMBIN TIME: CPT

## 2021-06-19 PROCEDURE — 99233 SBSQ HOSP IP/OBS HIGH 50: CPT | Performed by: STUDENT IN AN ORGANIZED HEALTH CARE EDUCATION/TRAINING PROGRAM

## 2021-06-19 PROCEDURE — 86900 BLOOD TYPING SEROLOGIC ABO: CPT

## 2021-06-19 PROCEDURE — 30233N1 TRANSFUSION OF NONAUTOLOGOUS RED BLOOD CELLS INTO PERIPHERAL VEIN, PERCUTANEOUS APPROACH: ICD-10-PCS | Performed by: STUDENT IN AN ORGANIZED HEALTH CARE EDUCATION/TRAINING PROGRAM

## 2021-06-19 RX ORDER — OXYCODONE HYDROCHLORIDE 5 MG/1
5 TABLET ORAL EVERY 4 HOURS PRN
Status: DISCONTINUED | OUTPATIENT
Start: 2021-06-19 | End: 2021-06-22

## 2021-06-19 RX ORDER — SODIUM CHLORIDE 9 MG/ML
INJECTION, SOLUTION INTRAVENOUS CONTINUOUS
Status: ACTIVE | OUTPATIENT
Start: 2021-06-19 | End: 2021-06-19

## 2021-06-19 RX ORDER — POTASSIUM CHLORIDE 20 MEQ/1
40 TABLET, EXTENDED RELEASE ORAL ONCE
Status: COMPLETED | OUTPATIENT
Start: 2021-06-19 | End: 2021-06-19

## 2021-06-19 RX ADMIN — POTASSIUM CHLORIDE 40 MEQ: 1500 TABLET, EXTENDED RELEASE ORAL at 08:11

## 2021-06-19 RX ADMIN — SODIUM CHLORIDE, POTASSIUM CHLORIDE, SODIUM LACTATE AND CALCIUM CHLORIDE: 600; 310; 30; 20 INJECTION, SOLUTION INTRAVENOUS at 16:49

## 2021-06-19 RX ADMIN — OMEPRAZOLE 20 MG: 20 CAPSULE, DELAYED RELEASE ORAL at 05:25

## 2021-06-19 RX ADMIN — HYDROCODONE BITARTRATE AND ACETAMINOPHEN 1 TABLET: 5; 325 TABLET ORAL at 18:52

## 2021-06-19 RX ADMIN — HYDROCODONE BITARTRATE AND ACETAMINOPHEN 1 TABLET: 5; 325 TABLET ORAL at 03:34

## 2021-06-19 RX ADMIN — MORPHINE SULFATE 1 MG: 4 INJECTION INTRAVENOUS at 01:00

## 2021-06-19 RX ADMIN — MORPHINE SULFATE 1 MG: 4 INJECTION INTRAVENOUS at 16:55

## 2021-06-19 RX ADMIN — HYDROCODONE BITARTRATE AND ACETAMINOPHEN 1 TABLET: 5; 325 TABLET ORAL at 10:06

## 2021-06-19 RX ADMIN — SODIUM CHLORIDE: 9 INJECTION, SOLUTION INTRAVENOUS at 10:45

## 2021-06-19 RX ADMIN — SODIUM CHLORIDE, POTASSIUM CHLORIDE, SODIUM LACTATE AND CALCIUM CHLORIDE: 600; 310; 30; 20 INJECTION, SOLUTION INTRAVENOUS at 05:25

## 2021-06-19 ASSESSMENT — PAIN DESCRIPTION - PAIN TYPE
TYPE: ACUTE PAIN

## 2021-06-19 ASSESSMENT — ENCOUNTER SYMPTOMS
ABDOMINAL PAIN: 1
CONSTITUTIONAL NEGATIVE: 1
FLANK PAIN: 1
NAUSEA: 1
NEUROLOGICAL NEGATIVE: 1
CARDIOVASCULAR NEGATIVE: 1
RESPIRATORY NEGATIVE: 1

## 2021-06-19 NOTE — PROGRESS NOTES
"    DATE: 6/19/2021    Hospital Day 5 consult for abdominal pain and adrenal hemorrhage.    Interval Events:  Abdomen feels better this morning, still feels generally bloated.  Passing flatus and having BMs. Hgb down to 6.6.     PHYSICAL EXAMINATION:  Constitutional:     Vital Signs: /85   Pulse 91   Temp 36.3 °C (97.4 °F) (Temporal)   Resp 16   Ht 1.753 m (5' 9\")   Wt 80.6 kg (177 lb 11.1 oz)   SpO2 95%    General Appearance: The patient is a pleasant  woman in no critical distress.  HEENT:    The pupils are equal, round, and reactive to light bilaterally. The extraocular muscles are intact bilaterally. The nares and oropharynx are clear. The cervical spine is supple and non tender.  Respiratory:   Inspection: Unlabored respirations, no intercostal retractions, paradoxical motion, or accessory muscle use.   Palpation:  The chest is nontender.    Auscultation: normal.  Cardiovascular:   Inspection: The skin is warm and dry.  Auscultation: Regular rate and rhythm.   Peripheral Pulses: Normal.   Abdomen:   Inspection: Abdominal inspection reveals no abrasions, contusions, lacerations or penetrating wounds.   Palpation: Palpation is remarkable for no significant tenderness, guarding, or peritoneal findings.  Musculoskeletal:   Examination of the upper and lower extremities reveals normal extremities.  Skin:    Examination of the skin reveals no significant abrasions, contusion, lacerations, or other soft tissue injury.  Neurologic:    Neurologic examination reveals no focal deficits noted, mental status intact.  Psychiatric:   The patient does not appear depressed or anxious.    Laboratory Values:   Recent Labs     06/17/21  0343 06/18/21  0731 06/19/21  0557   WBC 15.0* 12.7* 8.8   RBC 4.05* 3.87* 3.38*   HEMOGLOBIN 7.8* 7.5* 6.6*   HEMATOCRIT 27.6* 27.0* 23.7*   MCV 68.1* 69.8* 70.1*   MCH 19.3* 19.4* 19.5*   MCHC 28.3* 27.8* 27.8*   RDW 40.9 43.2 44.4   PLATELETCT 173 151* 154*   MPV 10.2 11.2 10.8 "     Recent Labs     06/17/21  0343 06/18/21  0731 06/19/21  0557   SODIUM 125* 124* 133*   POTASSIUM 3.7 3.6 3.4*   CHLORIDE 92* 93* 98   CO2 20 21 25   GLUCOSE 112* 89 98   BUN 7* 9 10   CREATININE 0.50 0.46* 0.58   CALCIUM 8.6 8.4* 8.5     Recent Labs     06/17/21  0343 06/18/21  0731 06/19/21  0557   ASTSGOT 39 65* 53*   ALTSGPT 30 42 39   TBILIRUBIN 3.2* 2.8* 1.1   ALKPHOSPHAT 100* 100* 100*   GLOBULIN 4.2* 4.0* 3.7*   INR  --   --  1.33*     Recent Labs     06/19/21  0557   INR 1.33*        Imaging:   CT-ABDOMEN-PELVIS WITH   Final Result      1.  New right adrenal mass likely represents hemorrhage. Thickening of the left adrenal gland is concerning for developing hemorrhage as well.      2.  Low attenuation filling defect in the superior vena cava is consistent with thrombus. Echocardiogram may be helpful for further evaluation.      3.  Prominent retroperitoneal lymph nodes with nonspecific retroperitoneal inflammatory stranding as reported on the prior CT.      4.  Small bilateral pleural effusions, right greater than left. Adjacent airspace disease likely atelectasis.            Comment: Results discussed with Dr. Gunter at 9:32 AM      [id number] VIEW   Final Result         No specific finding to suggest small bowel obstruction.      US-PELVIC COMPLETE (TRANSABDOMINAL/TRANSVAGINAL) (COMBO)   Final Result         1.  Heterogeneous appearance of the uterus with large heterogeneous uterine mass, appearance most compatible with uterine fibroid.   2.  Heterogeneous lesion in the left ovary, could represent hemorrhagic cyst or endometrioma, otherwise indeterminate, recommend follow-up sonogram in 6 weeks for repeat characterization and evaluation of stability.   3.  Thickened endometrial stripe, likely proliferative changes, consider endometrial pathology with additional follow-up as clinically appropriate.   4.  Nabothian cyst      US-RUQ   Final Result      1.  No evidence of gallstones or biliary  ductal dilatation.   2.  1.9 cm cystic lesion in the right kidney. Mural nodularity is not excluded and further evaluation with renal protocol MRI is recommended.         CT-ABDOMEN-PELVIS WITH   Final Result      1.  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   2.  Hypodensity in the cervix could be artifactual or could indicate underlying cervical mass. Suggest correlation with physical exam and gynecological history.   3.  19 mm hypodense RIGHT renal lesion, likely but not definitely a cyst. Suggest further assessment with ultrasound when clinically appropriate.               EC-ECHOCARDIOGRAM COMPLETE W/O CONT    (Results Pending)       ASSESSMENT AND PLAN:     * Thrombosis of superior vena cava (HCC)  Assessment & Plan  Unclear if thrombus present, echo pending for further evaluation.  Patient does have prior history of DVT as well as vague abdominal pain.   Hold anticoagulation for now with question of adrenal hemorrhage and drop in Hgb.  No surgical intervention necessary at this point.         Retroperitoneal lymphadenopathy  Assessment & Plan  Unknown source      Hemorrhage of both adrenal glands (HCC)  Assessment & Plan  Hold anticoagulation and trend Hgb.  Should be self limited, no clear extrav  6/19- Hgb is 6.6, rec transfusion one unit.  Cont to hold anticoag      DISPOSITION: Continue nonoperative management and close clinical observation.       ____________________________________     Mackenzie Cavanaugh M.D.    DD: 6/19/2021  8:47 AM

## 2021-06-19 NOTE — PROGRESS NOTES
Ears: intact  Which preventative measures are in place for the ears?  n/a    Elbows: intact  Which preventative measures are in place for the elbows?  n/a    Sacrum: intact  Which preventative measures are in place for the sacrum?  n/a    Heels: intact  Which preventative measures are in place for the heels?  n/a    Which devices are in place? PIV  Description of skin under devices: intact  Which preventative measures are in place under devices? n/a  Other:

## 2021-06-19 NOTE — CARE PLAN
The patient is Stable - Low risk of patient condition declining or worsening    Shift Goals comfort  Clinical Goals: Pt will be able to rest and sleep comfortably    Progress made toward(s) clinical / shift goals:      Patient is not progressing towards the following goals:

## 2021-06-19 NOTE — PROGRESS NOTES
[facility] Medicine Daily Progress Note    Date of Service  6/19/2021    Chief Complaint  46 y.o. female admitted 6/14/2021 with diffuse abdominal pain.    Interval Problem Update  Patient seen and examined at bedside this morning.  No acute events overnight.     Patient with significant improvement in abdominal pain thus far.  Tolerating liquid diet.  We will try and advance today.  Hemoglobin down to 6.6 this morning, 1 unit transfused.  2D echo still pending to rule out SVC thrombosis.    Consultants/Specialty  GI    Code Status  Full Code    Disposition  none    Review of Systems  Review of Systems   Constitutional: Negative.    Respiratory: Negative.    Cardiovascular: Negative.    Gastrointestinal: Positive for abdominal pain and nausea.   Genitourinary: Positive for flank pain.   Neurological: Negative.    All other systems reviewed and are negative.       Physical Exam  Temp:  [36.3 °C (97.4 °F)-37.6 °C (99.7 °F)] 36.3 °C (97.4 °F)  Pulse:  [] 91  Resp:  [15-19] 16  BP: ()/(59-85) 101/85  SpO2:  [90 %-95 %] 95 %    Physical Exam  Constitutional:       Appearance: Normal appearance.   HENT:      Head: Normocephalic.      Nose: Nose normal.   Eyes:      Conjunctiva/sclera: Conjunctivae normal.   Cardiovascular:      Rate and Rhythm: Normal rate and regular rhythm.   Pulmonary:      Effort: Pulmonary effort is normal.      Breath sounds: Normal breath sounds.   Abdominal:      General: Bowel sounds are normal.      Comments: Diffuse abdominal pain mostly localized to the epigastric and right lower quadrant.   Skin:     General: Skin is warm.   Neurological:      General: No focal deficit present.      Mental Status: She is alert.   Psychiatric:         Mood and Affect: Mood normal.         Fluids    Intake/Output Summary (Last 24 hours) at 6/19/2021 1021  Last data filed at 6/19/2021 1016  Gross per 24 hour   Intake 300 ml   Output --   Net 300 ml       Laboratory  Recent Labs     06/17/21  0567  06/18/21  0731 06/19/21  0557   WBC 15.0* 12.7* 8.8   RBC 4.05* 3.87* 3.38*   HEMOGLOBIN 7.8* 7.5* 6.6*   HEMATOCRIT 27.6* 27.0* 23.7*   MCV 68.1* 69.8* 70.1*   MCH 19.3* 19.4* 19.5*   MCHC 28.3* 27.8* 27.8*   RDW 40.9 43.2 44.4   PLATELETCT 173 151* 154*   MPV 10.2 11.2 10.8     Recent Labs     06/17/21  0343 06/18/21  0731 06/19/21  0557   SODIUM 125* 124* 133*   POTASSIUM 3.7 3.6 3.4*   CHLORIDE 92* 93* 98   CO2 20 21 25   GLUCOSE 112* 89 98   BUN 7* 9 10   CREATININE 0.50 0.46* 0.58   CALCIUM 8.6 8.4* 8.5     Recent Labs     06/19/21  0557   INR 1.33*               Imaging  CT-ABDOMEN-PELVIS WITH   Final Result      1.  New right adrenal mass likely represents hemorrhage. Thickening of the left adrenal gland is concerning for developing hemorrhage as well.      2.  Low attenuation filling defect in the superior vena cava is consistent with thrombus. Echocardiogram may be helpful for further evaluation.      3.  Prominent retroperitoneal lymph nodes with nonspecific retroperitoneal inflammatory stranding as reported on the prior CT.      4.  Small bilateral pleural effusions, right greater than left. Adjacent airspace disease likely atelectasis.            Comment: Results discussed with Dr. Gunter at 9:32 AM      [id number] VIEW   Final Result         No specific finding to suggest small bowel obstruction.      US-PELVIC COMPLETE (TRANSABDOMINAL/TRANSVAGINAL) (COMBO)   Final Result         1.  Heterogeneous appearance of the uterus with large heterogeneous uterine mass, appearance most compatible with uterine fibroid.   2.  Heterogeneous lesion in the left ovary, could represent hemorrhagic cyst or endometrioma, otherwise indeterminate, recommend follow-up sonogram in 6 weeks for repeat characterization and evaluation of stability.   3.  Thickened endometrial stripe, likely proliferative changes, consider endometrial pathology with additional follow-up as clinically appropriate.   4.  Nabothian cyst       US-RUQ   Final Result      1.  No evidence of gallstones or biliary ductal dilatation.   2.  1.9 cm cystic lesion in the right kidney. Mural nodularity is not excluded and further evaluation with renal protocol MRI is recommended.         CT-ABDOMEN-PELVIS WITH   Final Result      1.  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   2.  Hypodensity in the cervix could be artifactual or could indicate underlying cervical mass. Suggest correlation with physical exam and gynecological history.   3.  19 mm hypodense RIGHT renal lesion, likely but not definitely a cyst. Suggest further assessment with ultrasound when clinically appropriate.               EC-ECHOCARDIOGRAM COMPLETE W/O CONT    (Results Pending)        Assessment/Plan  * Thrombosis of superior vena cava (HCC)  Assessment & Plan  Noted on repeat CT scan on 6/18/2021.  Also with findings of bilateral adrenal hemorrhage.  Ideally, should be anticoagulated but compounded by adrenal bleed.  General surgery has been consulted for further evaluation.  Continue with pain medications for abdominal pain.  Slowly advance diet.  2D echo ordered for further evaluation.    Retroperitoneal lymphadenopathy  Assessment & Plan  Noted on repeat CT 6/18/2021.  Also with findings of bilateral adrenal hemorrhage and SVC thrombosis.      Hemorrhage of both adrenal glands (HCC)  Assessment & Plan  Noted on repeat CT scan on 6/18/2021.  Also with findings of SVC thrombosis.  General surgery consulted for further evaluation.  Closely monitor blood count.  Close monitor vital signs and any other complications including adrenal insufficiency or crisis.    Thrombocytopenia (HCC)  Assessment & Plan  We will closely monitor platelet counts for now.    Microcytic anemia  Assessment & Plan  We will check iron levels.  Closely monitor and transfuse when less than 7.    Hyperbilirubinemia  Assessment & Plan  Unclear  etiology, could be secondary to dehydration.  We will trend CMP daily.  If it continues to rise, will further evaluate with an MRCP.    Fibroids  Assessment & Plan  Transvaginal ultrasound-  IMPRESSION:     1.  Heterogeneous appearance of the uterus with large heterogeneous uterine mass, appearance most compatible with uterine fibroid.  2.  Heterogeneous lesion in the left ovary, could represent hemorrhagic cyst or endometrioma, otherwise indeterminate, recommend follow-up sonogram in 6 weeks for repeat characterization and evaluation of stability.  3.  Thickened endometrial stripe, likely proliferative changes, consider endometrial pathology with additional follow-up as clinically appropriate.  4.  Nabothian cyst    We will follow-up outpatient with gynecology.    Leukocytosis  Assessment & Plan  Reactive vs. Infectious  UA 0-2 wbc, neg leukocytes esterase and pos Nitrate, however she denies dysuria  Monitor CBC.    Lesion of cervix  Assessment & Plan  See on abd CT: Hypodensity in the cervix could be artifactual or could indicate underlying cervical mass  Cervix was unable to be visualized by pelvic exam at ED  Transvagainal ultrasound:  Large uterine fibroid noted.  Outpatient follow-up recommended.    Kidney lesion, native, right  Assessment & Plan  Noted on CT abd and RUQ u/s  Renal protocol MRI is advised to further evaluation, which could be done as outpatient.    Hyponatremia  Assessment & Plan  Likely sec to hypovolemic hyponatremia  IVF  Cont to monitor    Vaginal bleeding  Assessment & Plan  Minimum.  We will closely monitor.  menstrual cycle??    H. pylori infection  Assessment & Plan  Reported recently diagnosed with H.Pylori infection and was undergoing treatments, however due to increased abdominal pain, she stopped treatments 4 days ago  Treatment continued inpatient with IV antibiotics.    S/p EGD by GI with no significant findings.  Biopsy collected.  Daily PPI.  IV antibiotics discontinued          VTE prophylaxis: lovenox

## 2021-06-19 NOTE — CARE PLAN
The patient is Stable - Low risk of patient condition declining or worsening    Shift Goals  Clinical Goals: patient will have adequate pain control    Progress made toward(s) clinical / shift goals: patient with good relief on prescribed oral regimen      Patient is not progressing towards the following goals:

## 2021-06-19 NOTE — ASSESSMENT & PLAN NOTE
Iron levels 32, but with normal ferritin.  Closely monitor and transfuse when less than 7  Continues to have mild vaginal and rectal bleeding  S/p EGD, continue protonix

## 2021-06-19 NOTE — CONSULTS
VASCULAR SURGERY CONSULTATION              Seen and examined.  Please see dictated note, #[id number].          Recommend:  1) Transthoracic echo to further evaluate questionable SVC thrombosis.  If no clear answer, then RADHIKA.  2) General surgery consultation for chronic abdominal pain and adrenal mass / hemorrhage.    If SVC thrombosis is confirmed, will need to be anticoagulated once cleared by General surgery.    Discussed with Dr. Gunter.    Will sign off.  Please call for questions / concerns.

## 2021-06-19 NOTE — ASSESSMENT & PLAN NOTE
Decreased to 60  Patient positive for antiphospholipid syndrome  Heme/onc consulted  -Continue Solu-Medrol 1 g every 12 hours

## 2021-06-19 NOTE — PROGRESS NOTES
Assessment/description of ears? Pink and blanching, intact  Which preventative measures are in place for the ears? Patient encouraged to remove glasses when not in use     Assessment/description of elbows? Pink and blanching, intact  Which preventative measures are in place for the elbows? Patient moving freely inbed, pillows used for support    Assessment/description of sacrum? Pink and blanching, intact  Which preventative measures are in place for the sacrum? Patient freely moving in bed, patient t/p independently refusing staff assistance    Assessment/description of heels? Pink and blanching, intact, dry  Which preventative measures are in place for the heels? mepilex applied for protection, heels floated on pillow prn per patient, patient moving freely in bed     Which devices are in place? PIV, glasses  Description of skin under devices: Pink and blanching, intact  Which preventative measures are in place under devices? Patient encouraged to remove glasses when not in use      Other: patient with skin intact, out of bed frequently

## 2021-06-19 NOTE — PROGRESS NOTES
Patient received at start of shift. Patient AxOx4, vss. Patient with complaints of 6/10 pain, relieved with oral prescribed regimen. Fall and safety precautions in place, patient ambulating steadily with stand by assist. Frequent rounding in place, all needs met at this time, no s/s of discomfort or distress.     Patient with active vaginal bleeding, DO Aig-Ojeanor DO aware and following/.     hgb 6.6 this morning. Aig-Ojeanor DO notified orders placed for blood transfusion. This RN read  Patient informed consent and answered all questions. Patient agreeable and signed consent, placed in chart. Transfusion started with charge RN Chica present. Consent observed by both RNs, 2 person sign off complete. Infusion started per policy, patient denying any/all transfusion reaction. Close monitoring in place, this RN at bedside per policy. Will continue to monitor

## 2021-06-19 NOTE — CONSULTS
"  VASCULAR SURGERY CONSULTATION        DATE OF SERVICE:  06/18/2021     REFERRING PHYSICIAN:  Piotr Gunter DO     REASON FOR CONSULTATION:  Superior mesenteric vein thrombosis.     HISTORY OF PRESENT ILLNESS:  This is a 46-year-old female who was admitted   with long history of generalized and diffuse abdominal pain.  She had a   CT scan done today, which was read as questionable superior vena cava   thrombosis and a right adrenal mass/hemorrhage.  I was consulted by Dr. Gunter for \"superior mesenteric vein thrombosis\".     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  The patient also had a recent endoscopy, which   was unremarkable.     ALLERGIES:  NKDA.     CURRENT MEDICATIONS:  Reviewed.     SOCIAL HISTORY:  Significant for tobacco use.  The patient denies alcohol   abuse.  She smokes weed occasionally.     FAMILY HISTORY:  Negative for GI malignancy.     REVIEW OF SYSTEMS:  Significant for abdominal pain.     PHYSICAL EXAMINATION:  VITAL SIGNS:  Afebrile, vital signs stable.  GENERAL:  The patient is a well-developed, well-nourished female, sleeping,   but easily arousable.  HEENT: No scleral edema or facial edema.  LUNGS:  Clear to auscultation.  CARDIOVASCULAR:  Regular rate and rhythm.  ABDOMEN:  Soft, nondistended.  Mild tenderness.  There is a well-healed scar.  UPPER EXTREMITIES:  No edema.  LOWER EXTREMITIES:  No cyanosis, clubbing, or edema.  NEUROLOGIC:  Nonfocal.     LABORATORY DATA:  Reviewed.     DIAGNOSTIC DATA:  CT images were also reviewed.     ASSESSMENT:  1.  Questionable superior vena cava thrombosis.  2.  Right adrenal mass/hemorrhage.  3.  Chronic diffuse abdominal pain.     PLAN:  At this point, I would recommend a transthoracic echocardiogram be   obtained to further evaluate the questionable superior vena cava thrombosis.    If the transthoracic echo does not yield a clear answer, then the patient   would need to " undergo a transesophageal echocardiogram.  I also recommend   general surgery be consulted for chronic abdominal pain and adrenal   mass/hemorrhage.  If the superior vena cava thrombosis is confirmed, the   patient would need to be anticoagulated once she is cleared by general surgery   service.     The case was discussed with the hospitalist.  I informed the hospitalist that   there is no superior mesenteric vein thrombosis and the questionable   thrombosis is in the superior vena cava.  Recommendations were also relayed to   the hospitalist as well.     Vascular surgery will sign off.  Please call for questions or concerns.     Thank you for the consultation.        ______________________________  MD LETA Navarrete/LUKE/CATIA    DD:  06/18/2021 17:17  DT:  06/18/2021 18:16    Job#:  [id number]

## 2021-06-20 ENCOUNTER — APPOINTMENT (OUTPATIENT)
Dept: CARDIOLOGY | Facility: MEDICAL CENTER | Age: 46
DRG: 814 | End: 2021-06-20
Attending: STUDENT IN AN ORGANIZED HEALTH CARE EDUCATION/TRAINING PROGRAM
Payer: COMMERCIAL

## 2021-06-20 LAB
ALBUMIN SERPL BCP-MCNC: 2.7 G/DL (ref 3.2–4.9)
ALBUMIN/GLOB SERPL: 0.7 G/DL
ALP SERPL-CCNC: 113 U/L (ref 30–99)
ALT SERPL-CCNC: 34 U/L (ref 2–50)
ANION GAP SERPL CALC-SCNC: 9 MMOL/L (ref 7–16)
AST SERPL-CCNC: 33 U/L (ref 12–45)
BILIRUB SERPL-MCNC: 0.9 MG/DL (ref 0.1–1.5)
BUN SERPL-MCNC: 8 MG/DL (ref 8–22)
CALCIUM SERPL-MCNC: 8.8 MG/DL (ref 8.5–10.5)
CHLORIDE SERPL-SCNC: 99 MMOL/L (ref 96–112)
CO2 SERPL-SCNC: 25 MMOL/L (ref 20–33)
CREAT SERPL-MCNC: 0.6 MG/DL (ref 0.5–1.4)
ERYTHROCYTE [DISTWIDTH] IN BLOOD BY AUTOMATED COUNT: 48.7 FL (ref 35.9–50)
FERRITIN SERPL-MCNC: 153 NG/ML (ref 10–291)
GLOBULIN SER CALC-MCNC: 4 G/DL (ref 1.9–3.5)
GLUCOSE SERPL-MCNC: 93 MG/DL (ref 65–99)
HCT VFR BLD AUTO: 27.2 % (ref 37–47)
HGB BLD-MCNC: 7.8 G/DL (ref 12–16)
IRON SATN MFR SERPL: 16 % (ref 15–55)
IRON SERPL-MCNC: 32 UG/DL (ref 40–170)
LV EJECT FRACT  99904: 65
LV EJECT FRACT MOD 2C 99903: 53.53
LV EJECT FRACT MOD 4C 99902: 64.48
LV EJECT FRACT MOD BP 99901: 59.89
MAGNESIUM SERPL-MCNC: 2 MG/DL (ref 1.5–2.5)
MCH RBC QN AUTO: 20.9 PG (ref 27–33)
MCHC RBC AUTO-ENTMCNC: 28.7 G/DL (ref 33.6–35)
MCV RBC AUTO: 72.9 FL (ref 81.4–97.8)
PLATELET # BLD AUTO: 159 K/UL (ref 164–446)
PMV BLD AUTO: 11.4 FL (ref 9–12.9)
POTASSIUM SERPL-SCNC: 3.7 MMOL/L (ref 3.6–5.5)
PROT SERPL-MCNC: 6.7 G/DL (ref 6–8.2)
RBC # BLD AUTO: 3.73 M/UL (ref 4.2–5.4)
RHEUMATOID FACT SER IA-ACNC: <10 IU/ML (ref 0–14)
SODIUM SERPL-SCNC: 133 MMOL/L (ref 135–145)
TIBC SERPL-MCNC: 194 UG/DL (ref 250–450)
UIBC SERPL-MCNC: 162 UG/DL (ref 110–370)
WBC # BLD AUTO: 7.5 K/UL (ref 4.8–10.8)

## 2021-06-20 PROCEDURE — 85027 COMPLETE CBC AUTOMATED: CPT

## 2021-06-20 PROCEDURE — 99232 SBSQ HOSP IP/OBS MODERATE 35: CPT | Performed by: STUDENT IN AN ORGANIZED HEALTH CARE EDUCATION/TRAINING PROGRAM

## 2021-06-20 PROCEDURE — 85732 THROMBOPLASTIN TIME PARTIAL: CPT

## 2021-06-20 PROCEDURE — 36415 COLL VENOUS BLD VENIPUNCTURE: CPT

## 2021-06-20 PROCEDURE — 86200 CCP ANTIBODY: CPT

## 2021-06-20 PROCEDURE — 93306 TTE W/DOPPLER COMPLETE: CPT | Mod: 26 | Performed by: INTERNAL MEDICINE

## 2021-06-20 PROCEDURE — 93306 TTE W/DOPPLER COMPLETE: CPT

## 2021-06-20 PROCEDURE — 85730 THROMBOPLASTIN TIME PARTIAL: CPT

## 2021-06-20 PROCEDURE — A9270 NON-COVERED ITEM OR SERVICE: HCPCS | Performed by: STUDENT IN AN ORGANIZED HEALTH CARE EDUCATION/TRAINING PROGRAM

## 2021-06-20 PROCEDURE — 83540 ASSAY OF IRON: CPT

## 2021-06-20 PROCEDURE — 770006 HCHG ROOM/CARE - MED/SURG/GYN SEMI*

## 2021-06-20 PROCEDURE — 85670 THROMBIN TIME PLASMA: CPT

## 2021-06-20 PROCEDURE — 83735 ASSAY OF MAGNESIUM: CPT

## 2021-06-20 PROCEDURE — 700102 HCHG RX REV CODE 250 W/ 637 OVERRIDE(OP): Performed by: STUDENT IN AN ORGANIZED HEALTH CARE EDUCATION/TRAINING PROGRAM

## 2021-06-20 PROCEDURE — 700102 HCHG RX REV CODE 250 W/ 637 OVERRIDE(OP): Performed by: NURSE PRACTITIONER

## 2021-06-20 PROCEDURE — 85520 HEPARIN ASSAY: CPT

## 2021-06-20 PROCEDURE — 700101 HCHG RX REV CODE 250: Performed by: INTERNAL MEDICINE

## 2021-06-20 PROCEDURE — 85613 RUSSELL VIPER VENOM DILUTED: CPT

## 2021-06-20 PROCEDURE — 86235 NUCLEAR ANTIGEN ANTIBODY: CPT | Mod: 91

## 2021-06-20 PROCEDURE — 83550 IRON BINDING TEST: CPT

## 2021-06-20 PROCEDURE — 85610 PROTHROMBIN TIME: CPT

## 2021-06-20 PROCEDURE — 82728 ASSAY OF FERRITIN: CPT

## 2021-06-20 PROCEDURE — 86038 ANTINUCLEAR ANTIBODIES: CPT

## 2021-06-20 PROCEDURE — 86147 CARDIOLIPIN ANTIBODY EA IG: CPT | Mod: 91

## 2021-06-20 PROCEDURE — 700111 HCHG RX REV CODE 636 W/ 250 OVERRIDE (IP): Performed by: STUDENT IN AN ORGANIZED HEALTH CARE EDUCATION/TRAINING PROGRAM

## 2021-06-20 PROCEDURE — 86431 RHEUMATOID FACTOR QUANT: CPT

## 2021-06-20 PROCEDURE — A9270 NON-COVERED ITEM OR SERVICE: HCPCS | Performed by: NURSE PRACTITIONER

## 2021-06-20 PROCEDURE — 86215 DEOXYRIBONUCLEASE ANTIBODY: CPT

## 2021-06-20 PROCEDURE — 80053 COMPREHEN METABOLIC PANEL: CPT

## 2021-06-20 RX ORDER — MORPHINE SULFATE 4 MG/ML
1 INJECTION, SOLUTION INTRAMUSCULAR; INTRAVENOUS EVERY 4 HOURS PRN
Status: DISCONTINUED | OUTPATIENT
Start: 2021-06-20 | End: 2021-06-21

## 2021-06-20 RX ADMIN — OXYCODONE 5 MG: 5 TABLET ORAL at 08:24

## 2021-06-20 RX ADMIN — LIDOCAINE 1 PATCH: 50 PATCH TOPICAL at 21:35

## 2021-06-20 RX ADMIN — HYDROCODONE BITARTRATE AND ACETAMINOPHEN 1 TABLET: 5; 325 TABLET ORAL at 01:03

## 2021-06-20 RX ADMIN — MORPHINE SULFATE 1 MG: 4 INJECTION INTRAVENOUS at 17:47

## 2021-06-20 RX ADMIN — OXYCODONE 5 MG: 5 TABLET ORAL at 15:21

## 2021-06-20 RX ADMIN — HYDROCODONE BITARTRATE AND ACETAMINOPHEN 1 TABLET: 5; 325 TABLET ORAL at 19:44

## 2021-06-20 RX ADMIN — ACETAMINOPHEN 650 MG: 325 TABLET, FILM COATED ORAL at 10:07

## 2021-06-20 RX ADMIN — OMEPRAZOLE 20 MG: 20 CAPSULE, DELAYED RELEASE ORAL at 04:12

## 2021-06-20 RX ADMIN — MORPHINE SULFATE 1 MG: 4 INJECTION INTRAVENOUS at 21:55

## 2021-06-20 RX ADMIN — HYDROCODONE BITARTRATE AND ACETAMINOPHEN 1 TABLET: 5; 325 TABLET ORAL at 12:57

## 2021-06-20 ASSESSMENT — ENCOUNTER SYMPTOMS
FLANK PAIN: 1
NAUSEA: 1
CONSTITUTIONAL NEGATIVE: 1
NEUROLOGICAL NEGATIVE: 1
CARDIOVASCULAR NEGATIVE: 1
RESPIRATORY NEGATIVE: 1
ABDOMINAL PAIN: 1

## 2021-06-20 ASSESSMENT — PAIN DESCRIPTION - PAIN TYPE
TYPE: ACUTE PAIN

## 2021-06-20 NOTE — PROGRESS NOTES
Assessment/description of ears? Intact   Which preventative measures are in place for the ears?  N/A     Assessment/description of elbows? Intact  Which preventative measures are in place for the elbows?  N/A     Assessment/description of sacrum? Intact  Which preventative measures are in place for the sacrum?  Patient is able to turn and reposition independently in bed, encouraged weight shifting.      Assessment/description of heels? Intact   Which preventative measures are in place for the heels?  N/A     Which devices are in place? PIV   Description of skin under devices: Intact   Which preventative measures are in place under devices?  Dressing changes     Other: Scattered bruising to BLE.

## 2021-06-20 NOTE — PROGRESS NOTES
Pt is resting in bed. C/o abdominal pain, medicated per MAR. Denied n/v. SCD's on. Call light and belongings within reach. Pt calls appropriately with call light, ambulates steady and independently.

## 2021-06-20 NOTE — PROGRESS NOTES
Patient with complaints of 8/10 abdominal pain, medicated per DO order and sleeping upon reassessment. This RN rounded on patient who was complaining of 5/10 pain to mid chest. Patient denied SOB/ difficulty breathing. Vsleandro Gunter DO notified, no cardiac intervention at this time, EKG to be competed if pain persists. Per DO, okay for this RN to give hydrocodone per order.

## 2021-06-20 NOTE — PROGRESS NOTES
Assumed care of patient this shift. Patient is alert and oriented x 4.  Able to make her needs known. Complained of pain this shift, medicated with PRN; see MAR.   Up independently in room and to bathroom and ambulated in hallways.  Fall prevention tactics in place, bed locked and in lowest position. Plan of care discussed with patient and family at bedside. Call light is within reach.

## 2021-06-20 NOTE — PROGRESS NOTES
2D echo pending.  No other changes.  Appropriate response to transfusion.  Awaiting echo, trending Hgb.   Mackenzie Cavanaugh M.D.

## 2021-06-20 NOTE — CARE PLAN
The patient is Watcher - Medium risk of patient condition declining or worsening    Shift Goals  Clinical Goals: Pain will be managed    Progress made toward(s) clinical / shift goals:  PRN pain assessments. Medicating as needed with pain medications, pt states relief with Norco.     Patient is not progressing towards the following goals:

## 2021-06-20 NOTE — CARE PLAN
The patient is Stable - Low risk of patient condition declining or worsening    Shift Goals  Clinical Goals: Patient's pain will be controlled     Progress made toward(s) clinical / shift goals: Patient complained of pain this shift. Medicated with PRN pain medications per MAR and educated patient on non-pharmacological interventions. Patient noted some relief with interventions.     Problem: Pain - Standard  Goal: Alleviation of pain or a reduction in pain to the patient’s comfort goal  Outcome: Progressing     Problem: Knowledge Deficit - Standard  Goal: Patient and family/care givers will demonstrate understanding of plan of care, disease process/condition, diagnostic tests and medications  Outcome: Progressing     Problem: Gastrointestinal Irritability  Goal: Nausea and vomiting will be absent or improve  Outcome: Progressing     Problem: Bowel Elimination  Goal: Establish and maintain regular bowel function  Outcome: Progressing

## 2021-06-20 NOTE — PROGRESS NOTES
[facility] Medicine Daily Progress Note    Date of Service  6/20/2021    Chief Complaint  46 y.o. female admitted 6/14/2021 with diffuse abdominal pain.    Interval Problem Update  Patient seen and examined at bedside this morning.  No acute events overnight.     Hemoglobin up to 7.8 after 1 unit of blood transfusion.  Vaginal bleeding improving.  Patient still with complaint of diffuse abdominal pain, able to tolerate p.o. diet thus far.  2D echo still pending.  Autoimmune labs also pending.    Consultants/Specialty  GI    Code Status  Full Code    Disposition  none    Review of Systems  Review of Systems   Constitutional: Negative.    Respiratory: Negative.    Cardiovascular: Negative.    Gastrointestinal: Positive for abdominal pain and nausea.   Genitourinary: Positive for flank pain.   Neurological: Negative.    All other systems reviewed and are negative.       Physical Exam  Temp:  [36.2 °C (97.1 °F)-36.9 °C (98.5 °F)] 36.3 °C (97.4 °F)  Pulse:  [87-99] 87  Resp:  [16-18] 18  BP: (105-147)/(71-93) 133/90  SpO2:  [92 %-95 %] 95 %    Physical Exam  Constitutional:       Appearance: Normal appearance.   HENT:      Head: Normocephalic.      Nose: Nose normal.   Eyes:      Conjunctiva/sclera: Conjunctivae normal.   Cardiovascular:      Rate and Rhythm: Normal rate and regular rhythm.   Pulmonary:      Effort: Pulmonary effort is normal.      Breath sounds: Normal breath sounds.   Abdominal:      General: Bowel sounds are normal.      Comments: Diffuse abdominal pain mostly localized to the epigastric and right lower quadrant.   Skin:     General: Skin is warm.   Neurological:      General: No focal deficit present.      Mental Status: She is alert.   Psychiatric:         Mood and Affect: Mood normal.         Fluids    Intake/Output Summary (Last 24 hours) at 6/20/2021 0928  Last data filed at 6/19/2021 1353  Gross per 24 hour   Intake 693.33 ml   Output --   Net 693.33 ml       Laboratory  Recent Labs      06/18/21  0731 06/19/21  0557 06/20/21  0634   WBC 12.7* 8.8 7.5   RBC 3.87* 3.38* 3.73*   HEMOGLOBIN 7.5* 6.6* 7.8*   HEMATOCRIT 27.0* 23.7* 27.2*   MCV 69.8* 70.1* 72.9*   MCH 19.4* 19.5* 20.9*   MCHC 27.8* 27.8* 28.7*   RDW 43.2 44.4 48.7   PLATELETCT 151* 154* 159*   MPV 11.2 10.8 11.4     Recent Labs     06/18/21  0731 06/19/21  0557 06/20/21  0634   SODIUM 124* 133* 133*   POTASSIUM 3.6 3.4* 3.7   CHLORIDE 93* 98 99   CO2 21 25 25   GLUCOSE 89 98 93   BUN 9 10 8   CREATININE 0.46* 0.58 0.60   CALCIUM 8.4* 8.5 8.8     Recent Labs     06/19/21  0557   INR 1.33*               Imaging  CT-ABDOMEN-PELVIS WITH   Final Result      1.  New right adrenal mass likely represents hemorrhage. Thickening of the left adrenal gland is concerning for developing hemorrhage as well.      2.  Low attenuation filling defect in the superior vena cava is consistent with thrombus. Echocardiogram may be helpful for further evaluation.      3.  Prominent retroperitoneal lymph nodes with nonspecific retroperitoneal inflammatory stranding as reported on the prior CT.      4.  Small bilateral pleural effusions, right greater than left. Adjacent airspace disease likely atelectasis.            Comment: Results discussed with Dr. Gunter at 9:32 AM      [id number] VIEW   Final Result         No specific finding to suggest small bowel obstruction.      US-PELVIC COMPLETE (TRANSABDOMINAL/TRANSVAGINAL) (COMBO)   Final Result         1.  Heterogeneous appearance of the uterus with large heterogeneous uterine mass, appearance most compatible with uterine fibroid.   2.  Heterogeneous lesion in the left ovary, could represent hemorrhagic cyst or endometrioma, otherwise indeterminate, recommend follow-up sonogram in 6 weeks for repeat characterization and evaluation of stability.   3.  Thickened endometrial stripe, likely proliferative changes, consider endometrial pathology with additional follow-up as clinically appropriate.   4.  Nabothian  cyst      US-RUQ   Final Result      1.  No evidence of gallstones or biliary ductal dilatation.   2.  1.9 cm cystic lesion in the right kidney. Mural nodularity is not excluded and further evaluation with renal protocol MRI is recommended.         CT-ABDOMEN-PELVIS WITH   Final Result      1.  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   2.  Hypodensity in the cervix could be artifactual or could indicate underlying cervical mass. Suggest correlation with physical exam and gynecological history.   3.  19 mm hypodense RIGHT renal lesion, likely but not definitely a cyst. Suggest further assessment with ultrasound when clinically appropriate.               EC-ECHOCARDIOGRAM COMPLETE W/O CONT    (Results Pending)        Assessment/Plan  * Thrombosis of superior vena cava (HCC)  Assessment & Plan  Noted on repeat CT scan on 6/18/2021.  Also with findings of bilateral adrenal hemorrhage.  Ideally, should be anticoagulated but compounded by adrenal bleed.  General surgery has been consulted for further evaluation.  Continue with pain medications for abdominal pain.  Slowly advance diet.  2D echo ordered for further evaluation.    Retroperitoneal lymphadenopathy  Assessment & Plan  Noted on repeat CT 6/18/2021.  Also with findings of bilateral adrenal hemorrhage and SVC thrombosis.      Hemorrhage of both adrenal glands (HCC)  Assessment & Plan  Noted on repeat CT scan on 6/18/2021.  Also with findings of SVC thrombosis.  General surgery consulted for further evaluation.  Closely monitor blood count.  Close monitor vital signs and any other complications including adrenal insufficiency or crisis.    Thrombocytopenia (HCC)  Assessment & Plan  We will closely monitor platelet counts for now.    Microcytic anemia  Assessment & Plan  Iron levels 32, but with normal ferritin.  Closely monitor and transfuse when less than  7.    Hyperbilirubinemia  Assessment & Plan  Unclear etiology, could be secondary to dehydration.  We will trend CMP daily.  If it continues to rise, will further evaluate with an MRCP.    Fibroids  Assessment & Plan  Transvaginal ultrasound-  IMPRESSION:     1.  Heterogeneous appearance of the uterus with large heterogeneous uterine mass, appearance most compatible with uterine fibroid.  2.  Heterogeneous lesion in the left ovary, could represent hemorrhagic cyst or endometrioma, otherwise indeterminate, recommend follow-up sonogram in 6 weeks for repeat characterization and evaluation of stability.  3.  Thickened endometrial stripe, likely proliferative changes, consider endometrial pathology with additional follow-up as clinically appropriate.  4.  Nabothian cyst    We will follow-up outpatient with gynecology.    Leukocytosis  Assessment & Plan  Reactive vs. Infectious  UA 0-2 wbc, neg leukocytes esterase and pos Nitrate, however she denies dysuria  Monitor CBC.    Lesion of cervix  Assessment & Plan  See on abd CT: Hypodensity in the cervix could be artifactual or could indicate underlying cervical mass  Cervix was unable to be visualized by pelvic exam at ED  Transvagainal ultrasound:  Large uterine fibroid noted.  Outpatient follow-up recommended.    Kidney lesion, native, right  Assessment & Plan  Noted on CT abd and RUQ u/s  Renal protocol MRI is advised to further evaluation, which could be done as outpatient.    Hyponatremia  Assessment & Plan  Likely sec to hypovolemic hyponatremia  IVF  Cont to monitor    Resolved.  IV fluids discontinued.    Vaginal bleeding  Assessment & Plan  Minimum.  We will closely monitor.  menstrual cycle??    Improving    H. pylori infection  Assessment & Plan  Reported recently diagnosed with H.Pylori infection and was undergoing treatments, however due to increased abdominal pain, she stopped treatments 4 days ago  Treatment continued inpatient with IV antibiotics.    S/p EGD  by GI with no significant findings.  Biopsy collected.  Daily PPI.  IV antibiotics discontinued       VTE prophylaxis: SCDs

## 2021-06-21 ENCOUNTER — APPOINTMENT (OUTPATIENT)
Dept: RADIOLOGY | Facility: MEDICAL CENTER | Age: 46
DRG: 814 | End: 2021-06-21
Attending: STUDENT IN AN ORGANIZED HEALTH CARE EDUCATION/TRAINING PROGRAM
Payer: COMMERCIAL

## 2021-06-21 PROBLEM — R79.82 ELEVATED C-REACTIVE PROTEIN (CRP): Status: ACTIVE | Noted: 2021-06-21

## 2021-06-21 PROBLEM — A41.9 SEPSIS (HCC): Status: ACTIVE | Noted: 2021-06-21

## 2021-06-21 LAB
ANION GAP SERPL CALC-SCNC: 11 MMOL/L (ref 7–16)
BUN SERPL-MCNC: 7 MG/DL (ref 8–22)
CALCIUM SERPL-MCNC: 9.2 MG/DL (ref 8.5–10.5)
CHLORIDE SERPL-SCNC: 94 MMOL/L (ref 96–112)
CO2 SERPL-SCNC: 23 MMOL/L (ref 20–33)
CREAT SERPL-MCNC: 0.47 MG/DL (ref 0.5–1.4)
ERYTHROCYTE [DISTWIDTH] IN BLOOD BY AUTOMATED COUNT: 49.5 FL (ref 35.9–50)
GLUCOSE SERPL-MCNC: 103 MG/DL (ref 65–99)
HAV IGM SERPL QL IA: NORMAL
HBV CORE IGM SER QL: NORMAL
HBV SURFACE AG SER QL: NORMAL
HCT VFR BLD AUTO: 33 % (ref 37–47)
HCV AB SER QL: NORMAL
HGB BLD-MCNC: 9.8 G/DL (ref 12–16)
LACTATE BLD-SCNC: 1.1 MMOL/L (ref 0.5–2)
MCH RBC QN AUTO: 21.3 PG (ref 27–33)
MCHC RBC AUTO-ENTMCNC: 29.7 G/DL (ref 33.6–35)
MCV RBC AUTO: 71.7 FL (ref 81.4–97.8)
PLATELET # BLD AUTO: 135 K/UL (ref 164–446)
POTASSIUM SERPL-SCNC: 4.2 MMOL/L (ref 3.6–5.5)
RBC # BLD AUTO: 4.6 M/UL (ref 4.2–5.4)
SODIUM SERPL-SCNC: 128 MMOL/L (ref 135–145)
WBC # BLD AUTO: 14.1 K/UL (ref 4.8–10.8)

## 2021-06-21 PROCEDURE — 80048 BASIC METABOLIC PNL TOTAL CA: CPT

## 2021-06-21 PROCEDURE — 770006 HCHG ROOM/CARE - MED/SURG/GYN SEMI*

## 2021-06-21 PROCEDURE — 700111 HCHG RX REV CODE 636 W/ 250 OVERRIDE (IP): Performed by: STUDENT IN AN ORGANIZED HEALTH CARE EDUCATION/TRAINING PROGRAM

## 2021-06-21 PROCEDURE — 700105 HCHG RX REV CODE 258: Performed by: STUDENT IN AN ORGANIZED HEALTH CARE EDUCATION/TRAINING PROGRAM

## 2021-06-21 PROCEDURE — 80074 ACUTE HEPATITIS PANEL: CPT

## 2021-06-21 PROCEDURE — 99233 SBSQ HOSP IP/OBS HIGH 50: CPT | Performed by: STUDENT IN AN ORGANIZED HEALTH CARE EDUCATION/TRAINING PROGRAM

## 2021-06-21 PROCEDURE — A9270 NON-COVERED ITEM OR SERVICE: HCPCS | Performed by: NURSE PRACTITIONER

## 2021-06-21 PROCEDURE — 86255 FLUORESCENT ANTIBODY SCREEN: CPT

## 2021-06-21 PROCEDURE — 85027 COMPLETE CBC AUTOMATED: CPT

## 2021-06-21 PROCEDURE — 700102 HCHG RX REV CODE 250 W/ 637 OVERRIDE(OP): Performed by: STUDENT IN AN ORGANIZED HEALTH CARE EDUCATION/TRAINING PROGRAM

## 2021-06-21 PROCEDURE — 83605 ASSAY OF LACTIC ACID: CPT

## 2021-06-21 PROCEDURE — 36415 COLL VENOUS BLD VENIPUNCTURE: CPT

## 2021-06-21 PROCEDURE — A9270 NON-COVERED ITEM OR SERVICE: HCPCS | Performed by: STUDENT IN AN ORGANIZED HEALTH CARE EDUCATION/TRAINING PROGRAM

## 2021-06-21 PROCEDURE — 87389 HIV-1 AG W/HIV-1&-2 AB AG IA: CPT

## 2021-06-21 PROCEDURE — 71045 X-RAY EXAM CHEST 1 VIEW: CPT

## 2021-06-21 PROCEDURE — 700102 HCHG RX REV CODE 250 W/ 637 OVERRIDE(OP): Performed by: NURSE PRACTITIONER

## 2021-06-21 PROCEDURE — 87040 BLOOD CULTURE FOR BACTERIA: CPT

## 2021-06-21 PROCEDURE — 700111 HCHG RX REV CODE 636 W/ 250 OVERRIDE (IP)

## 2021-06-21 RX ORDER — SODIUM CHLORIDE, SODIUM LACTATE, POTASSIUM CHLORIDE, AND CALCIUM CHLORIDE .6; .31; .03; .02 G/100ML; G/100ML; G/100ML; G/100ML
30 INJECTION, SOLUTION INTRAVENOUS ONCE
Status: COMPLETED | OUTPATIENT
Start: 2021-06-21 | End: 2021-06-21

## 2021-06-21 RX ORDER — SODIUM CHLORIDE, SODIUM LACTATE, POTASSIUM CHLORIDE, CALCIUM CHLORIDE 600; 310; 30; 20 MG/100ML; MG/100ML; MG/100ML; MG/100ML
INJECTION, SOLUTION INTRAVENOUS CONTINUOUS
Status: ACTIVE | OUTPATIENT
Start: 2021-06-21 | End: 2021-06-22

## 2021-06-21 RX ORDER — METRONIDAZOLE 500 MG/1
500 TABLET ORAL EVERY 8 HOURS
Status: DISCONTINUED | OUTPATIENT
Start: 2021-06-21 | End: 2021-06-26

## 2021-06-21 RX ORDER — HYDROMORPHONE HYDROCHLORIDE 1 MG/ML
1 INJECTION, SOLUTION INTRAMUSCULAR; INTRAVENOUS; SUBCUTANEOUS EVERY 4 HOURS PRN
Status: DISCONTINUED | OUTPATIENT
Start: 2021-06-21 | End: 2021-07-01

## 2021-06-21 RX ADMIN — CEFTRIAXONE 2 G: 10 INJECTION, POWDER, FOR SOLUTION INTRAVENOUS at 16:31

## 2021-06-21 RX ADMIN — METRONIDAZOLE 500 MG: 500 TABLET ORAL at 16:31

## 2021-06-21 RX ADMIN — HYDROCODONE BITARTRATE AND ACETAMINOPHEN 1 TABLET: 5; 325 TABLET ORAL at 16:39

## 2021-06-21 RX ADMIN — HYDROCODONE BITARTRATE AND ACETAMINOPHEN 1 TABLET: 5; 325 TABLET ORAL at 02:21

## 2021-06-21 RX ADMIN — SODIUM CHLORIDE, POTASSIUM CHLORIDE, SODIUM LACTATE AND CALCIUM CHLORIDE: 600; 310; 30; 20 INJECTION, SOLUTION INTRAVENOUS at 16:27

## 2021-06-21 RX ADMIN — SODIUM CHLORIDE, POTASSIUM CHLORIDE, SODIUM LACTATE AND CALCIUM CHLORIDE 1000 ML: 600; 310; 30; 20 INJECTION, SOLUTION INTRAVENOUS at 12:32

## 2021-06-21 RX ADMIN — HYDROMORPHONE HYDROCHLORIDE 1 MG: 1 INJECTION, SOLUTION INTRAMUSCULAR; INTRAVENOUS; SUBCUTANEOUS at 09:56

## 2021-06-21 RX ADMIN — OXYCODONE 5 MG: 5 TABLET ORAL at 00:18

## 2021-06-21 RX ADMIN — METRONIDAZOLE 500 MG: 500 TABLET ORAL at 20:46

## 2021-06-21 RX ADMIN — SODIUM CHLORIDE, POTASSIUM CHLORIDE, SODIUM LACTATE AND CALCIUM CHLORIDE: 600; 310; 30; 20 INJECTION, SOLUTION INTRAVENOUS at 10:03

## 2021-06-21 RX ADMIN — OMEPRAZOLE 20 MG: 20 CAPSULE, DELAYED RELEASE ORAL at 04:21

## 2021-06-21 RX ADMIN — OXYCODONE 5 MG: 5 TABLET ORAL at 12:39

## 2021-06-21 RX ADMIN — HYDROMORPHONE HYDROCHLORIDE 1 MG: 1 INJECTION, SOLUTION INTRAMUSCULAR; INTRAVENOUS; SUBCUTANEOUS at 18:45

## 2021-06-21 RX ADMIN — MORPHINE SULFATE 1 MG: 4 INJECTION INTRAVENOUS at 04:21

## 2021-06-21 RX ADMIN — HYDROMORPHONE HYDROCHLORIDE 1 MG: 1 INJECTION, SOLUTION INTRAMUSCULAR; INTRAVENOUS; SUBCUTANEOUS at 14:19

## 2021-06-21 RX ADMIN — ACETAMINOPHEN 650 MG: 325 TABLET, FILM COATED ORAL at 13:16

## 2021-06-21 RX ADMIN — HYDROMORPHONE HYDROCHLORIDE 1 MG: 1 INJECTION, SOLUTION INTRAMUSCULAR; INTRAVENOUS; SUBCUTANEOUS at 22:47

## 2021-06-21 ASSESSMENT — PAIN DESCRIPTION - PAIN TYPE
TYPE: ACUTE PAIN

## 2021-06-21 ASSESSMENT — ENCOUNTER SYMPTOMS
RESPIRATORY NEGATIVE: 1
CONSTITUTIONAL NEGATIVE: 1
NAUSEA: 1
NEUROLOGICAL NEGATIVE: 1
ABDOMINAL PAIN: 1
FLANK PAIN: 1
CARDIOVASCULAR NEGATIVE: 1

## 2021-06-21 NOTE — ASSESSMENT & PLAN NOTE
Elevated inflammatory markers including sed rate and CRP.  Positive antiphospholipid antibody  CUONG negative

## 2021-06-21 NOTE — PROGRESS NOTES
[facility] Medicine Daily Progress Note    Date of Service  6/21/2021    Chief Complaint  46 y.o. female admitted 6/14/2021 with diffuse abdominal pain.    Interval Problem Update  Patient seen and examined at bedside this morning.  No acute events overnight.     Patient continues to complain of significant abdominal pain. Unable to tolerate p.o. diet yesterday.  IV fluids restarted.  Vital signs late this morning concerning for sepsis. Sepsis protocol initiated.    We will closely monitor for now as patient has a high risk for deterioration including ICU admission.    Consultants/Specialty  GI  General surgery    Code Status  Full Code    Disposition  none    Review of Systems  Review of Systems   Constitutional: Negative.    Respiratory: Negative.    Cardiovascular: Negative.    Gastrointestinal: Positive for abdominal pain and nausea.   Genitourinary: Positive for flank pain.   Neurological: Negative.    All other systems reviewed and are negative.       Physical Exam  Temp:  [36.1 °C (97 °F)-38.4 °C (101.1 °F)] 37.3 °C (99.2 °F)  Pulse:  [] 110  Resp:  [15-20] 16  BP: (137-172)/() 172/108  SpO2:  [91 %-100 %] 95 %    Physical Exam  Constitutional:       Appearance: Normal appearance.   HENT:      Head: Normocephalic.      Nose: Nose normal.   Eyes:      Conjunctiva/sclera: Conjunctivae normal.   Cardiovascular:      Rate and Rhythm: Normal rate and regular rhythm.   Pulmonary:      Effort: Pulmonary effort is normal.      Breath sounds: Normal breath sounds.   Abdominal:      General: Bowel sounds are normal.      Comments: Diffuse abdominal pain mostly localized to the epigastric and right lower quadrant.   Skin:     General: Skin is warm.   Neurological:      General: No focal deficit present.      Mental Status: She is alert.   Psychiatric:         Mood and Affect: Mood normal.         Fluids    Intake/Output Summary (Last 24 hours) at 6/21/2021 1306  Last data filed at 6/20/2021 2130  Gross per 24  hour   Intake --   Output 1 ml   Net -1 ml       Laboratory  Recent Labs     06/19/21  0557 06/20/21  0634 06/21/21  0711   WBC 8.8 7.5 14.1*   RBC 3.38* 3.73* 4.60   HEMOGLOBIN 6.6* 7.8* 9.8*   HEMATOCRIT 23.7* 27.2* 33.0*   MCV 70.1* 72.9* 71.7*   MCH 19.5* 20.9* 21.3*   MCHC 27.8* 28.7* 29.7*   RDW 44.4 48.7 49.5   PLATELETCT 154* 159* 135*   MPV 10.8 11.4  --      Recent Labs     06/19/21  0557 06/20/21  0634 06/21/21  0711   SODIUM 133* 133* 128*   POTASSIUM 3.4* 3.7 4.2   CHLORIDE 98 99 94*   CO2 25 25 23   GLUCOSE 98 93 103*   BUN 10 8 7*   CREATININE 0.58 0.60 0.47*   CALCIUM 8.5 8.8 9.2     Recent Labs     06/19/21  0557   INR 1.33*               Imaging  DX-CHEST-LIMITED (1 VIEW)   Final Result         No acute cardiopulmonary abnormalities are identified.      EC-ECHOCARDIOGRAM COMPLETE W/O CONT   Final Result      CT-ABDOMEN-PELVIS WITH   Final Result      1.  New right adrenal mass likely represents hemorrhage. Thickening of the left adrenal gland is concerning for developing hemorrhage as well.      2.  Low attenuation filling defect in the superior vena cava is consistent with thrombus. Echocardiogram may be helpful for further evaluation.      3.  Prominent retroperitoneal lymph nodes with nonspecific retroperitoneal inflammatory stranding as reported on the prior CT.      4.  Small bilateral pleural effusions, right greater than left. Adjacent airspace disease likely atelectasis.            Comment: Results discussed with Dr. Gunter at 9:32 AM      [id number] VIEW   Final Result         No specific finding to suggest small bowel obstruction.      US-PELVIC COMPLETE (TRANSABDOMINAL/TRANSVAGINAL) (COMBO)   Final Result         1.  Heterogeneous appearance of the uterus with large heterogeneous uterine mass, appearance most compatible with uterine fibroid.   2.  Heterogeneous lesion in the left ovary, could represent hemorrhagic cyst or endometrioma, otherwise indeterminate, recommend follow-up  sonogram in 6 weeks for repeat characterization and evaluation of stability.   3.  Thickened endometrial stripe, likely proliferative changes, consider endometrial pathology with additional follow-up as clinically appropriate.   4.  Nabothian cyst      US-RUQ   Final Result      1.  No evidence of gallstones or biliary ductal dilatation.   2.  1.9 cm cystic lesion in the right kidney. Mural nodularity is not excluded and further evaluation with renal protocol MRI is recommended.         CT-ABDOMEN-PELVIS WITH   Final Result      1.  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   2.  Hypodensity in the cervix could be artifactual or could indicate underlying cervical mass. Suggest correlation with physical exam and gynecological history.   3.  19 mm hypodense RIGHT renal lesion, likely but not definitely a cyst. Suggest further assessment with ultrasound when clinically appropriate.                    Assessment/Plan  * Thrombosis of superior vena cava (HCC)  Assessment & Plan  Noted on repeat CT scan on 6/18/2021.  Also with findings of bilateral adrenal hemorrhage.  Ideally, should be anticoagulated but compounded by adrenal bleed.  General surgery has been consulted for further evaluation.  Continue with pain medications for abdominal pain.  Slowly advance diet.  2D echo with no finding of SVC thrombus is noted. May need repeat imaging or RADHIKA.    Sepsis (HCC)  Assessment & Plan  This is Sepsis Not present on admission  SIRS criteria identified on my evaluation include: Fever, with temperature greater than 101 deg F, Tachycardia, with heart rate greater than 90 BPM and Leukocytosis, with WBC greater than 12,000  Source is likely intra-abdominal  Sepsis protocol initiated  Fluid resuscitation ordered per protocol  IV antibiotics as appropriate for source of sepsis  While organ dysfunction may be noted elsewhere in this problem list or in the  chart, degree of organ dysfunction does not meet CMS criteria for severe sepsis    Sepsis protocol initiated.  30 cc/kg of IV fluids administered.  IV antibiotics-Rocephin and Flagyl started.  Blood cultures and lactic acid ordered.  Closely monitor vital signs.    This patient is critically ill with a life threatening illness as noted above requiring my direct presence, involvement and maximal preparedness. I have personally spend 20 minutes providing critical care to this patient. Time spend on critical care excludes time spend on procedures.     Retroperitoneal lymphadenopathy  Assessment & Plan  Noted on repeat CT 6/18/2021.  Also with findings of bilateral adrenal hemorrhage and SVC thrombosis.    Hemorrhage of both adrenal glands (HCC)  Assessment & Plan  Noted on repeat CT scan on 6/18/2021.  Also with findings of SVC thrombosis.  General surgery consulted for further evaluation.  Closely monitor blood count.  Close monitor vital signs and any other complications including adrenal insufficiency or crisis.    Elevated C-reactive protein (CRP)  Assessment & Plan  Elevated inflammatory markers including sed rate and CRP.  Autoimmune and vasculitic work-up ordered.    Thrombocytopenia (HCC)  Assessment & Plan  We will closely monitor platelet counts for now.    Microcytic anemia  Assessment & Plan  Iron levels 32, but with normal ferritin.  Closely monitor and transfuse when less than 7.    Hyperbilirubinemia  Assessment & Plan  Unclear etiology, could be secondary to dehydration.  We will trend CMP daily.  If it continues to rise, will further evaluate with an MRCP.    Fibroids  Assessment & Plan  Transvaginal ultrasound-  IMPRESSION:     1.  Heterogeneous appearance of the uterus with large heterogeneous uterine mass, appearance most compatible with uterine fibroid.  2.  Heterogeneous lesion in the left ovary, could represent hemorrhagic cyst or endometrioma, otherwise indeterminate, recommend follow-up  sonogram in 6 weeks for repeat characterization and evaluation of stability.  3.  Thickened endometrial stripe, likely proliferative changes, consider endometrial pathology with additional follow-up as clinically appropriate.  4.  Nabothian cyst    We will follow-up outpatient with gynecology.    Leukocytosis  Assessment & Plan  Reactive vs. Infectious  UA 0-2 wbc, neg leukocytes esterase and pos Nitrate, however she denies dysuria  Monitor CBC.    Lesion of cervix  Assessment & Plan  See on abd CT: Hypodensity in the cervix could be artifactual or could indicate underlying cervical mass  Cervix was unable to be visualized by pelvic exam at ED  Transvagainal ultrasound:  Large uterine fibroid noted.  Outpatient follow-up recommended.    Kidney lesion, native, right  Assessment & Plan  Noted on CT abd and RUQ u/s  Renal protocol MRI is advised to further evaluation, which could be done as outpatient.    Hyponatremia  Assessment & Plan  Likely sec to hypovolemic hyponatremia  IVF  Cont to monitor.    Vaginal bleeding  Assessment & Plan  Minimum.  We will closely monitor.  menstrual cycle??    Improving    H. pylori infection  Assessment & Plan  Reported recently diagnosed with H.Pylori infection and was undergoing treatments, however due to increased abdominal pain, she stopped treatments 4 days ago  Treatment continued inpatient with IV antibiotics.    S/p EGD by GI with no significant findings.  Biopsy collected.  Daily PPI.  IV antibiotics discontinued       VTE prophylaxis: SCDs

## 2021-06-21 NOTE — CARE PLAN
The patient is Watcher - Medium risk of patient condition declining or worsening    Shift Goals  Clinical Goals: Pain will be managed    Progress made toward(s) clinical / shift goals: PRN pain assessments. Medicating as needed with PRN medications.    Patient is not progressing towards the following goals:

## 2021-06-21 NOTE — PROGRESS NOTES
Assumed pt care at shift change.  Pt alert/oriented 4, resting in bed.  Pt is on RA, with no signs of distress or discomfort.  Discussed POC with pt; answered questions.   Safety precautions in place, bed locked and in lowest position, call light and personal belongings within reach.  No further needs at this time.

## 2021-06-21 NOTE — PROGRESS NOTES
"    DATE: 6/21/2021    HD 8: Adrenal Hemorrhage, possible SVC thrombus    Interval Events:  Having more intense abdominal pain a few hours ago when I examined her  Fever of 102 and WBC up to 15, but lactic acid normal at 1.1  Hemoglobin stable  Tachycardic.  Sepsis work up underway    Re-examined just now and pain/exam significantly better after receiving pain medication  Discussed with Dr. Saldaña  She has had multiple ultrasounds and CTs since admission.    Uncertain source of ongoing abdominal pain  Consider repeating the CT if she continues to worsen clinically  No clear indication for surgery at this time    PHYSICAL EXAMINATION:  Constitutional:     Vital Signs: BP (!) 170/110   Pulse (!) 110   Temp 37.6 °C (99.7 °F) (Oral)   Resp (!) 24   Ht 1.753 m (5' 9\")   Wt 80.6 kg (177 lb 11.1 oz)   SpO2 95%   GENERAL:  No acute distress  HEENT: Pupils equal, round and reactive to light bilaterally.  Sclera are clear bilaterally.  No otorrhea.  No rhinorrhea.  Mucus membranes are moist.  CHEST:  Lungs are clear to auscultation bilaterally  CARDIOVASCULAR:  Regular rate and rhythm  ABDOMEN: Soft, generalized tenderness, more so along the right side?, moderately distended and tympanitic  GENITOURINARY:  No bowens in place, voiding without issues  EXTREMITIES:  Good range of motion through out  NEUROLOGIC:  Alert and oriented.  Cranial Nerves II through XII are grossly intact, no focal deficits appreciated  PSYCHIATRIC:  Normal affect and mood appropriate for age and condition  SKIN:  Warm and well perfused, no rashes    Laboratory Values:   Recent Labs     06/19/21  0557 06/20/21  0634 06/21/21  0711   WBC 8.8 7.5 14.1*   RBC 3.38* 3.73* 4.60   HEMOGLOBIN 6.6* 7.8* 9.8*   HEMATOCRIT 23.7* 27.2* 33.0*   MCV 70.1* 72.9* 71.7*   MCH 19.5* 20.9* 21.3*   MCHC 27.8* 28.7* 29.7*   RDW 44.4 48.7 49.5   PLATELETCT 154* 159* 135*   MPV 10.8 11.4  --      Recent Labs     06/19/21  0557 06/20/21  0634 06/21/21  0711   SODIUM " 133* 133* 128*   POTASSIUM 3.4* 3.7 4.2   CHLORIDE 98 99 94*   CO2 25 25 23   GLUCOSE 98 93 103*   BUN 10 8 7*   CREATININE 0.58 0.60 0.47*   CALCIUM 8.5 8.8 9.2     Recent Labs     06/19/21  0557 06/20/21  0634   ASTSGOT 53* 33   ALTSGPT 39 34   TBILIRUBIN 1.1 0.9   ALKPHOSPHAT 100* 113*   GLOBULIN 3.7* 4.0*   INR 1.33*  --      Recent Labs     06/19/21  0557   INR 1.33*        Imaging:   DX-CHEST-LIMITED (1 VIEW)   Final Result         No acute cardiopulmonary abnormalities are identified.      EC-ECHOCARDIOGRAM COMPLETE W/O CONT   Final Result      CT-ABDOMEN-PELVIS WITH   Final Result      1.  New right adrenal mass likely represents hemorrhage. Thickening of the left adrenal gland is concerning for developing hemorrhage as well.      2.  Low attenuation filling defect in the superior vena cava is consistent with thrombus. Echocardiogram may be helpful for further evaluation.      3.  Prominent retroperitoneal lymph nodes with nonspecific retroperitoneal inflammatory stranding as reported on the prior CT.      4.  Small bilateral pleural effusions, right greater than left. Adjacent airspace disease likely atelectasis.            Comment: Results discussed with Dr. Gunter at 9:32 AM      [id number] VIEW   Final Result         No specific finding to suggest small bowel obstruction.      US-PELVIC COMPLETE (TRANSABDOMINAL/TRANSVAGINAL) (COMBO)   Final Result         1.  Heterogeneous appearance of the uterus with large heterogeneous uterine mass, appearance most compatible with uterine fibroid.   2.  Heterogeneous lesion in the left ovary, could represent hemorrhagic cyst or endometrioma, otherwise indeterminate, recommend follow-up sonogram in 6 weeks for repeat characterization and evaluation of stability.   3.  Thickened endometrial stripe, likely proliferative changes, consider endometrial pathology with additional follow-up as clinically appropriate.   4.  Nabothian cyst      US-RUQ   Final Result       1.  No evidence of gallstones or biliary ductal dilatation.   2.  1.9 cm cystic lesion in the right kidney. Mural nodularity is not excluded and further evaluation with renal protocol MRI is recommended.         CT-ABDOMEN-PELVIS WITH   Final Result      1.  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   2.  Hypodensity in the cervix could be artifactual or could indicate underlying cervical mass. Suggest correlation with physical exam and gynecological history.   3.  19 mm hypodense RIGHT renal lesion, likely but not definitely a cyst. Suggest further assessment with ultrasound when clinically appropriate.                   ASSESSMENT AND PLAN:     * Thrombosis of superior vena cava (HCC)  Assessment & Plan  Unclear if thrombus present  Patient does have prior history of DVT in 2010  ECHO without apparent thrombus  No surgical intervention necessary at this point.         Hemorrhage of both adrenal glands (HCC)  Assessment & Plan  Hold anticoagulation and trend Hgb.  Should be self limited, no clear extravasation in CT  [facility]    Retroperitoneal lymphadenopathy  Assessment & Plan  Unknown source        Garrett An MD, FACS

## 2021-06-21 NOTE — PROGRESS NOTES
Late Entry     1735:  Entered patient room to find patient sitting on edge of bed crying due to pain. Notified MD regarding pain management as patient has had everything PRN available for pain and does not have anything currently available.     1745:   New orders received for PRN Morphine for breakthrough pain.

## 2021-06-21 NOTE — ASSESSMENT & PLAN NOTE
This is Sepsis Not present on admission  SIRS criteria identified on my evaluation include: Fever, with temperature greater than 101 deg F, Tachycardia, with heart rate greater than 90 BPM and Leukocytosis, with WBC greater than 12,000  Source is likely intra-abdominal  Sepsis protocol initiated  Fluid resuscitation ordered per protocol  IV antibiotics as appropriate for source of sepsis  While organ dysfunction may be noted elsewhere in this problem list or in the chart, degree of organ dysfunction does not meet CMS criteria for severe sepsis    Sepsis protocol initiated.  30 cc/kg of IV fluids administered.  Continue cefepime, Flagyl, and vancomycin  Blood cultures negative to date  UA negative  MRSA nares pending  Closely monitor vital signs.

## 2021-06-21 NOTE — PROGRESS NOTES
"Pt is resting in bed. Lethargic. C/o abdominal and back pain, medicating as needed per MAR. Per pt, \"I was doing fine then the pain came on around 1800\". Denied n/v. VSS. Call light and belongings within reach. Pt calls appropriately with call light, standby assist to bedside commode.  "

## 2021-06-22 ENCOUNTER — APPOINTMENT (OUTPATIENT)
Dept: RADIOLOGY | Facility: MEDICAL CENTER | Age: 46
DRG: 814 | End: 2021-06-22
Attending: INTERNAL MEDICINE
Payer: COMMERCIAL

## 2021-06-22 PROBLEM — R79.89 ELEVATED LIVER FUNCTION TESTS: Status: ACTIVE | Noted: 2021-06-22

## 2021-06-22 LAB
ALBUMIN SERPL BCP-MCNC: 2.5 G/DL (ref 3.2–4.9)
ALBUMIN/GLOB SERPL: 0.6 G/DL
ALP SERPL-CCNC: 187 U/L (ref 30–99)
ALT SERPL-CCNC: 76 U/L (ref 2–50)
ANION GAP SERPL CALC-SCNC: 11 MMOL/L (ref 7–16)
ANISOCYTOSIS BLD QL SMEAR: ABNORMAL
APPEARANCE UR: CLEAR
APTT 1H NP PPP: 57.3 SEC (ref 24.7–36)
APTT 1H P INC POOL PPP: 29.5 SEC (ref 24.7–36)
APTT 1H P INC PPP: 89.5 SEC (ref 24.7–36)
APTT HEX PL PPP: POSITIVE S
APTT IMM NP PPP: 60.2 SEC (ref 24.7–36)
APTT POOL PPP: 33.5 SEC (ref 24.7–36)
APTT PPP: 89.4 SEC (ref 24.7–36)
APTT PPP: 89.4 SEC (ref 24.7–36)
AST SERPL-CCNC: 131 U/L (ref 12–45)
BACTERIA #/AREA URNS HPF: NEGATIVE /HPF
BASOPHILS # BLD AUTO: 0.2 % (ref 0–1.8)
BASOPHILS # BLD AUTO: 0.4 % (ref 0–1.8)
BASOPHILS # BLD: 0.03 K/UL (ref 0–0.12)
BASOPHILS # BLD: 0.09 K/UL (ref 0–0.12)
BILIRUB CONJ SERPL-MCNC: 3.4 MG/DL (ref 0.1–0.5)
BILIRUB SERPL-MCNC: 7.1 MG/DL (ref 0.1–1.5)
BILIRUB UR QL STRIP.AUTO: ABNORMAL
BUN SERPL-MCNC: 6 MG/DL (ref 8–22)
CALCIUM SERPL-MCNC: 8.9 MG/DL (ref 8.5–10.5)
CARDIOLIPIN IGA SER IA-ACNC: 21 APL (ref 0–11)
CARDIOLIPIN IGG SER IA-ACNC: 146 GPL (ref 0–14)
CARDIOLIPIN IGM SER IA-ACNC: 11 MPL (ref 0–12)
CCP IGG SERPL-ACNC: 8 UNITS (ref 0–19)
CFT BLD TEG: 10 MIN (ref 5–10)
CHLORIDE SERPL-SCNC: 91 MMOL/L (ref 96–112)
CLOT ANGLE BLD TEG: 59.7 DEGREES (ref 53–72)
CLOT LYSIS 30M P MA LENFR BLD TEG: 0 % (ref 0–8)
CO2 SERPL-SCNC: 23 MMOL/L (ref 20–33)
COLOR UR: ABNORMAL
CORTIS SERPL-MCNC: 17.3 UG/DL (ref 0–23)
CREAT SERPL-MCNC: 0.44 MG/DL (ref 0.5–1.4)
CT.EXTRINSIC BLD ROTEM: 2.1 MIN (ref 1–3)
DAT C3D-SP REAG RBC QL: NORMAL
DAT IGG-SP REAG RBC QL: NORMAL
DRVVT MIX 37 DRVMX37: 70.2 SEC (ref 28–48)
DRVVT MIX IMMEDIATE DRVMXI: 73.7 SEC (ref 28–48)
EOSINOPHIL # BLD AUTO: 0.08 K/UL (ref 0–0.51)
EOSINOPHIL # BLD AUTO: 0.17 K/UL (ref 0–0.51)
EOSINOPHIL NFR BLD: 0.4 % (ref 0–6.9)
EOSINOPHIL NFR BLD: 0.9 % (ref 0–6.9)
EPI CELLS #/AREA URNS HPF: NEGATIVE /HPF
ERYTHROCYTE [DISTWIDTH] IN BLOOD BY AUTOMATED COUNT: 51.8 FL (ref 35.9–50)
ERYTHROCYTE [DISTWIDTH] IN BLOOD BY AUTOMATED COUNT: 54.7 FL (ref 35.9–50)
ERYTHROCYTE [DISTWIDTH] IN BLOOD BY AUTOMATED COUNT: 55.2 FL (ref 35.9–50)
FIBRINOGEN PPP-MCNC: 777 MG/DL (ref 215–460)
FOLATE SERPL-MCNC: 7.8 NG/ML
GLOBULIN SER CALC-MCNC: 4 G/DL (ref 1.9–3.5)
GLUCOSE SERPL-MCNC: 100 MG/DL (ref 65–99)
GLUCOSE UR STRIP.AUTO-MCNC: NEGATIVE MG/DL
HCT VFR BLD AUTO: 27.2 % (ref 37–47)
HCT VFR BLD AUTO: 27.3 % (ref 37–47)
HCT VFR BLD AUTO: 29.4 % (ref 37–47)
HGB BLD-MCNC: 7.8 G/DL (ref 12–16)
HGB BLD-MCNC: 8.2 G/DL (ref 12–16)
HGB BLD-MCNC: 8.4 G/DL (ref 12–16)
HIV 1+2 AB+HIV1 P24 AG SERPL QL IA: NORMAL
HYALINE CASTS #/AREA URNS LPF: ABNORMAL /LPF
IMM GRANULOCYTES # BLD AUTO: 0.28 K/UL (ref 0–0.11)
IMM GRANULOCYTES # BLD AUTO: 0.33 K/UL (ref 0–0.11)
IMM GRANULOCYTES NFR BLD AUTO: 1.3 % (ref 0–0.9)
IMM GRANULOCYTES NFR BLD AUTO: 1.8 % (ref 0–0.9)
INR PPP: 1.11 (ref 0.87–1.13)
INR PPP: 1.63 (ref 0.87–1.13)
KETONES UR STRIP.AUTO-MCNC: NEGATIVE MG/DL
LA PPP-IMP: POSITIVE
LA PPP-IMP: PRESENT
LDH SERPL L TO P-CCNC: 439 U/L (ref 107–266)
LEUKOCYTE ESTERASE UR QL STRIP.AUTO: NEGATIVE
LG PLATELETS BLD QL SMEAR: NORMAL
LYMPHOCYTES # BLD AUTO: 1.12 K/UL (ref 1–4.8)
LYMPHOCYTES # BLD AUTO: 1.31 K/UL (ref 1–4.8)
LYMPHOCYTES NFR BLD: 5.1 % (ref 22–41)
LYMPHOCYTES NFR BLD: 6.9 % (ref 22–41)
MAGNESIUM SERPL-MCNC: 1.7 MG/DL (ref 1.5–2.5)
MCF BLD TEG: 64.7 MM (ref 50–70)
MCH RBC QN AUTO: 20.8 PG (ref 27–33)
MCH RBC QN AUTO: 20.9 PG (ref 27–33)
MCH RBC QN AUTO: 21.4 PG (ref 27–33)
MCHC RBC AUTO-ENTMCNC: 28.6 G/DL (ref 33.6–35)
MCHC RBC AUTO-ENTMCNC: 28.7 G/DL (ref 33.6–35)
MCHC RBC AUTO-ENTMCNC: 30 G/DL (ref 33.6–35)
MCV RBC AUTO: 71.1 FL (ref 81.4–97.8)
MCV RBC AUTO: 72.7 FL (ref 81.4–97.8)
MCV RBC AUTO: 73 FL (ref 81.4–97.8)
MICRO URNS: ABNORMAL
MONOCYTES # BLD AUTO: 0.97 K/UL (ref 0–0.85)
MONOCYTES # BLD AUTO: 1.14 K/UL (ref 0–0.85)
MONOCYTES NFR BLD AUTO: 5.1 % (ref 0–13.4)
MONOCYTES NFR BLD AUTO: 5.2 % (ref 0–13.4)
MORPHOLOGY BLD-IMP: NORMAL
NEUTROPHILS # BLD AUTO: 16.04 K/UL (ref 2–7.15)
NEUTROPHILS NFR BLD: 85.1 % (ref 44–72)
NEUTROPHILS NFR BLD: 87.6 % (ref 44–72)
NITRITE UR QL STRIP.AUTO: NEGATIVE
NRBC # BLD AUTO: 0 K/UL
NRBC # BLD AUTO: 0.02 K/UL
NRBC BLD-RTO: 0 /100 WBC
NRBC BLD-RTO: 0.1 /100 WBC
NUCLEAR IGG SER QL IA: NORMAL
PH UR STRIP.AUTO: 6 [PH] (ref 5–8)
PLATELET # BLD AUTO: 64 K/UL (ref 164–446)
PLATELET # BLD AUTO: 64 K/UL (ref 164–446)
PLATELET # BLD AUTO: 87 K/UL (ref 164–446)
PLATELET BLD QL SMEAR: NORMAL
POTASSIUM SERPL-SCNC: 4.4 MMOL/L (ref 3.6–5.5)
PROT SERPL-MCNC: 6.5 G/DL (ref 6–8.2)
PROT UR QL STRIP: NEGATIVE MG/DL
PROTHROMBIN TIME: 14 SEC (ref 12–14.6)
PROTHROMBIN TIME: 18.9 SEC (ref 12–14.6)
RBC # BLD AUTO: 3.74 M/UL (ref 4.2–5.4)
RBC # BLD AUTO: 3.84 M/UL (ref 4.2–5.4)
RBC # BLD AUTO: 4.03 M/UL (ref 4.2–5.4)
RBC # URNS HPF: ABNORMAL /HPF
RBC BLD AUTO: PRESENT
RBC UR QL AUTO: ABNORMAL
SCREEN DRVVT: 107.2 SEC (ref 28–48)
SODIUM SERPL-SCNC: 125 MMOL/L (ref 135–145)
SP GR UR STRIP.AUTO: 1.01
STREP DNASE B TITR SER: <86 U/ML (ref 0–260)
TEG ALGORITHM TGALG: NORMAL
THROMBIN TIME: 16.7 SEC
UFH PPP CHRO-ACNC: <0.1 U/ML
UROBILINOGEN UR STRIP.AUTO-MCNC: 0.2 MG/DL
VIT B12 SERPL-MCNC: 863 PG/ML (ref 211–911)
WBC # BLD AUTO: 18.9 K/UL (ref 4.8–10.8)
WBC # BLD AUTO: 19.7 K/UL (ref 4.8–10.8)
WBC # BLD AUTO: 21.8 K/UL (ref 4.8–10.8)
WBC #/AREA URNS HPF: ABNORMAL /HPF

## 2021-06-22 PROCEDURE — 81001 URINALYSIS AUTO W/SCOPE: CPT

## 2021-06-22 PROCEDURE — 85347 COAGULATION TIME ACTIVATED: CPT

## 2021-06-22 PROCEDURE — 700105 HCHG RX REV CODE 258: Performed by: INTERNAL MEDICINE

## 2021-06-22 PROCEDURE — 76775 US EXAM ABDO BACK WALL LIM: CPT

## 2021-06-22 PROCEDURE — 86146 BETA-2 GLYCOPROTEIN ANTIBODY: CPT

## 2021-06-22 PROCEDURE — 770006 HCHG ROOM/CARE - MED/SURG/GYN SEMI*

## 2021-06-22 PROCEDURE — 36415 COLL VENOUS BLD VENIPUNCTURE: CPT

## 2021-06-22 PROCEDURE — 83615 LACTATE (LD) (LDH) ENZYME: CPT

## 2021-06-22 PROCEDURE — A9270 NON-COVERED ITEM OR SERVICE: HCPCS | Performed by: STUDENT IN AN ORGANIZED HEALTH CARE EDUCATION/TRAINING PROGRAM

## 2021-06-22 PROCEDURE — 700111 HCHG RX REV CODE 636 W/ 250 OVERRIDE (IP): Performed by: STUDENT IN AN ORGANIZED HEALTH CARE EDUCATION/TRAINING PROGRAM

## 2021-06-22 PROCEDURE — 700111 HCHG RX REV CODE 636 W/ 250 OVERRIDE (IP): Performed by: INTERNAL MEDICINE

## 2021-06-22 PROCEDURE — 82607 VITAMIN B-12: CPT

## 2021-06-22 PROCEDURE — A9270 NON-COVERED ITEM OR SERVICE: HCPCS | Performed by: NURSE PRACTITIONER

## 2021-06-22 PROCEDURE — 86022 PLATELET ANTIBODIES: CPT

## 2021-06-22 PROCEDURE — 700102 HCHG RX REV CODE 250 W/ 637 OVERRIDE(OP): Performed by: INTERNAL MEDICINE

## 2021-06-22 PROCEDURE — 80500 HCHG CLINICAL PATH CONSULT-LIMITED: CPT

## 2021-06-22 PROCEDURE — 85025 COMPLETE CBC W/AUTO DIFF WBC: CPT

## 2021-06-22 PROCEDURE — 85384 FIBRINOGEN ACTIVITY: CPT

## 2021-06-22 PROCEDURE — 82248 BILIRUBIN DIRECT: CPT

## 2021-06-22 PROCEDURE — 700102 HCHG RX REV CODE 250 W/ 637 OVERRIDE(OP): Performed by: STUDENT IN AN ORGANIZED HEALTH CARE EDUCATION/TRAINING PROGRAM

## 2021-06-22 PROCEDURE — 83735 ASSAY OF MAGNESIUM: CPT

## 2021-06-22 PROCEDURE — A9270 NON-COVERED ITEM OR SERVICE: HCPCS | Performed by: INTERNAL MEDICINE

## 2021-06-22 PROCEDURE — 74181 MRI ABDOMEN W/O CONTRAST: CPT

## 2021-06-22 PROCEDURE — 82232 ASSAY OF BETA-2 PROTEIN: CPT

## 2021-06-22 PROCEDURE — 99233 SBSQ HOSP IP/OBS HIGH 50: CPT | Performed by: INTERNAL MEDICINE

## 2021-06-22 PROCEDURE — 700102 HCHG RX REV CODE 250 W/ 637 OVERRIDE(OP): Performed by: NURSE PRACTITIONER

## 2021-06-22 PROCEDURE — 700101 HCHG RX REV CODE 250: Performed by: INTERNAL MEDICINE

## 2021-06-22 PROCEDURE — 86880 COOMBS TEST DIRECT: CPT

## 2021-06-22 PROCEDURE — 700101 HCHG RX REV CODE 250: Performed by: STUDENT IN AN ORGANIZED HEALTH CARE EDUCATION/TRAINING PROGRAM

## 2021-06-22 PROCEDURE — 85576 BLOOD PLATELET AGGREGATION: CPT

## 2021-06-22 PROCEDURE — 700105 HCHG RX REV CODE 258: Performed by: STUDENT IN AN ORGANIZED HEALTH CARE EDUCATION/TRAINING PROGRAM

## 2021-06-22 PROCEDURE — 80053 COMPREHEN METABOLIC PANEL: CPT

## 2021-06-22 PROCEDURE — 82746 ASSAY OF FOLIC ACID SERUM: CPT

## 2021-06-22 PROCEDURE — 83010 ASSAY OF HAPTOGLOBIN QUANT: CPT

## 2021-06-22 PROCEDURE — 85027 COMPLETE CBC AUTOMATED: CPT

## 2021-06-22 PROCEDURE — 85610 PROTHROMBIN TIME: CPT

## 2021-06-22 PROCEDURE — 82533 TOTAL CORTISOL: CPT

## 2021-06-22 RX ORDER — METHYLPREDNISOLONE SODIUM SUCCINATE 125 MG/2ML
125 INJECTION, POWDER, LYOPHILIZED, FOR SOLUTION INTRAMUSCULAR; INTRAVENOUS EVERY 8 HOURS
Status: DISCONTINUED | OUTPATIENT
Start: 2021-06-22 | End: 2021-06-22

## 2021-06-22 RX ORDER — OXYCODONE HYDROCHLORIDE 5 MG/1
5 TABLET ORAL EVERY 4 HOURS PRN
Status: DISCONTINUED | OUTPATIENT
Start: 2021-06-22 | End: 2021-07-02 | Stop reason: HOSPADM

## 2021-06-22 RX ORDER — OXYCODONE HYDROCHLORIDE 10 MG/1
10 TABLET ORAL EVERY 4 HOURS PRN
Status: DISCONTINUED | OUTPATIENT
Start: 2021-06-22 | End: 2021-07-01

## 2021-06-22 RX ORDER — SODIUM CHLORIDE 9 MG/ML
INJECTION, SOLUTION INTRAVENOUS CONTINUOUS
Status: DISCONTINUED | OUTPATIENT
Start: 2021-06-22 | End: 2021-06-26

## 2021-06-22 RX ORDER — BACLOFEN 10 MG/1
10 TABLET ORAL 3 TIMES DAILY PRN
Status: DISCONTINUED | OUTPATIENT
Start: 2021-06-22 | End: 2021-07-02 | Stop reason: HOSPADM

## 2021-06-22 RX ORDER — OMEPRAZOLE 20 MG/1
20 CAPSULE, DELAYED RELEASE ORAL 2 TIMES DAILY
Status: DISCONTINUED | OUTPATIENT
Start: 2021-06-22 | End: 2021-06-28

## 2021-06-22 RX ADMIN — METRONIDAZOLE 500 MG: 500 TABLET ORAL at 22:37

## 2021-06-22 RX ADMIN — IMMUNE GLOBULIN INFUSION (HUMAN) 65 G: 100 INJECTION, SOLUTION INTRAVENOUS; SUBCUTANEOUS at 20:33

## 2021-06-22 RX ADMIN — BACLOFEN 10 MG: 10 TABLET ORAL at 12:39

## 2021-06-22 RX ADMIN — CEFTRIAXONE 2 G: 10 INJECTION, POWDER, FOR SOLUTION INTRAVENOUS at 04:55

## 2021-06-22 RX ADMIN — HYDROMORPHONE HYDROCHLORIDE 1 MG: 1 INJECTION, SOLUTION INTRAMUSCULAR; INTRAVENOUS; SUBCUTANEOUS at 14:27

## 2021-06-22 RX ADMIN — HYDROMORPHONE HYDROCHLORIDE 1 MG: 1 INJECTION, SOLUTION INTRAMUSCULAR; INTRAVENOUS; SUBCUTANEOUS at 09:06

## 2021-06-22 RX ADMIN — LIDOCAINE 1 PATCH: 50 PATCH TOPICAL at 22:37

## 2021-06-22 RX ADMIN — HYDROMORPHONE HYDROCHLORIDE 1 MG: 1 INJECTION, SOLUTION INTRAMUSCULAR; INTRAVENOUS; SUBCUTANEOUS at 18:38

## 2021-06-22 RX ADMIN — OMEPRAZOLE 20 MG: 20 CAPSULE, DELAYED RELEASE ORAL at 04:55

## 2021-06-22 RX ADMIN — OXYCODONE HYDROCHLORIDE 10 MG: 10 TABLET ORAL at 20:10

## 2021-06-22 RX ADMIN — HYDROCODONE BITARTRATE AND ACETAMINOPHEN 1 TABLET: 5; 325 TABLET ORAL at 08:37

## 2021-06-22 RX ADMIN — HYDROMORPHONE HYDROCHLORIDE 1 MG: 1 INJECTION, SOLUTION INTRAMUSCULAR; INTRAVENOUS; SUBCUTANEOUS at 04:54

## 2021-06-22 RX ADMIN — METRONIDAZOLE 500 MG: 500 TABLET ORAL at 14:27

## 2021-06-22 RX ADMIN — CEFEPIME 2 G: 2 INJECTION, POWDER, FOR SOLUTION INTRAVENOUS at 17:13

## 2021-06-22 RX ADMIN — SODIUM CHLORIDE, POTASSIUM CHLORIDE, SODIUM LACTATE AND CALCIUM CHLORIDE: 600; 310; 30; 20 INJECTION, SOLUTION INTRAVENOUS at 04:41

## 2021-06-22 RX ADMIN — OMEPRAZOLE 20 MG: 20 CAPSULE, DELAYED RELEASE ORAL at 17:11

## 2021-06-22 RX ADMIN — HYDROMORPHONE HYDROCHLORIDE 1 MG: 1 INJECTION, SOLUTION INTRAMUSCULAR; INTRAVENOUS; SUBCUTANEOUS at 22:34

## 2021-06-22 RX ADMIN — SODIUM CHLORIDE: 9 INJECTION, SOLUTION INTRAVENOUS at 16:54

## 2021-06-22 RX ADMIN — OXYCODONE 5 MG: 5 TABLET ORAL at 12:39

## 2021-06-22 RX ADMIN — OXYCODONE 5 MG: 5 TABLET ORAL at 17:10

## 2021-06-22 RX ADMIN — ACETAMINOPHEN 650 MG: 325 TABLET, FILM COATED ORAL at 14:55

## 2021-06-22 RX ADMIN — METRONIDAZOLE 500 MG: 500 TABLET ORAL at 04:55

## 2021-06-22 RX ADMIN — HYDROCODONE BITARTRATE AND ACETAMINOPHEN 1 TABLET: 5; 325 TABLET ORAL at 02:13

## 2021-06-22 ASSESSMENT — PAIN DESCRIPTION - PAIN TYPE
TYPE: ACUTE PAIN

## 2021-06-22 ASSESSMENT — ENCOUNTER SYMPTOMS
CARDIOVASCULAR NEGATIVE: 1
MYALGIAS: 1
ABDOMINAL PAIN: 1
CHILLS: 0
HEADACHES: 0
NAUSEA: 1
FEVER: 0
RESPIRATORY NEGATIVE: 1
NEUROLOGICAL NEGATIVE: 1
FLANK PAIN: 1
DIZZINESS: 0
SHORTNESS OF BREATH: 0

## 2021-06-22 NOTE — ASSESSMENT & PLAN NOTE
Admit ultrasound and serial CT without apparent gallbladder or biliary pathology  Ongoing abdominal pain  6/21 Fevers and leukocytosis.  Hepatitis panel negative.  6/22 Sharp increase in ALP and Tbili.  Rec MRCP  [facility]

## 2021-06-22 NOTE — CARE PLAN
The patient is Watcher - Medium risk of patient condition declining or worsening    Shift Goals  Clinical Goals: Pain management  Patient Goals: Pain management    Progress made toward(s) clinical / shift goals:  Patient assessed for pain regularly and medicated per MAR accordingly. Patient is resting comfortably in bed at this time.     Patient is not progressing towards the following goals: N/A.    Problem: Pain - Standard  Goal: Alleviation of pain or a reduction in pain to the patient’s comfort goal  Outcome: Not Progressing  Flowsheets (Taken 6/21/2021 4713)  Pain Rating Scale (NPRS): 10  Note: Patient assessed for pain regularly and medicated PRN per MAR. Alternating Dilaudid Q4 and Norco Q6 to manage patient's pain.      Problem: Respiratory  Goal: Patient will achieve/maintain optimum respiratory ventilation and gas exchange  Outcome: Progressing  Flowsheets (Taken 6/21/2021 1828 by Myah Thomas, C.N.A.)  O2 Delivery Device: Silicone Nasal Cannula  Note: Patient is currently on 1 liter of oxygen and is satting in mid to high 90s. Patient's breathing is within normal limits. Patient does have some dyspnea upon exertion but is able to easily recover.

## 2021-06-22 NOTE — PROGRESS NOTES
Received bedside report and accepted care of patient. Patient currently resting in bed in no visible or stated signs of distress. Bed alarm in place, controls on and bed in locked and lowest position. Call light and personal possessions within reach. Patient educated about use of call light and verbalized understanding.     Assessment/description of ears?  Bilateral pink, intact, and blanching  Which preventative measures are in place for the ears? Silicone oxygen tubing with grey padding    Assessment/description of elbows?  Bilateral pink, intact, and blanching  Which preventative measures are in place for the elbows? Pillows for support/repositioning, pt turns independent, pressure redistribution mattress    Assessment/description of sacrum? Intact, pink and blanching  Which preventative measures are in place for the sacrum? Pillows for support/repositioning, pt turns independent, pressure redistribution mattress      Assessment/description of heels?  Bilateral pink, intact, and blanching    Which preventative measures are in place for the heels? Pillows for support/repositioning, pt turns independent, pressure redistribution mattress      Which devices are in place? PIV, NC   Description of skin under devices: CDI  Which preventative measures are in place under devices?  Silicone oxygen tubing with grey padding    Other:  Scattered bruising bilateral upper extremities

## 2021-06-22 NOTE — PROGRESS NOTES
Received report from [facility] and assumed care of patient.   Pt is resting in bed, A&Ox4, on 1L oxygen via nasal cannula, VSS.   Assessment completed, POC discussed.   Reports pain in abdomen; no pain medication due at this time, pt agrees to rest.  Scheduled Flagyl given per MAR.   Continuous IVF LR running @ 83 ml/hr.   Bed is in lowest, locked position, call bell and belongings are in reach.   Hourly rounding and safety precautions in place.   No further needs at this time.

## 2021-06-22 NOTE — ASSESSMENT & PLAN NOTE
MRCP showed no biliary obstruction  Hepatitis panel negative  Possibly due to ischemic hepatopathy

## 2021-06-22 NOTE — PROGRESS NOTES
Assessment/description of ears? Intact, pink, blanching.   Which preventative measures are in place for the ears?   Grey foam padding on oxygen tubing.     Assessment/description of elbows? Intact, pink, blanching.   Which preventative measures are in place for the elbows?  Patient ambulates with assistance, patient turns self in bed, pillows for support.     Assessment/description of sacrum? Intact, pink, blanching.   Which preventative measures are in place for the sacrum?  Patient ambulates with assistance, patient turns self in bed, pillows for support.     Assessment/description of heels? Intact, pink, blanching.   Which preventative measures are in place for the heels?  Patient ambulates with assistance, patient turns self in bed, pillows for support.     Which devices are in place? Nasal cannula, PIV.   Description of skin under devices: Clean, dry, intact.   Which preventative measures are in place under devices?  Q shift assessment, PIV dressing changes per protocol and PRN, grey foam padding.

## 2021-06-22 NOTE — PROGRESS NOTES
"    DATE: 6/22/2021    HD 9: Adrenal Hemorrhage, possible SVC thrombus, Abdominal pain    Interval Events:  No fevers overnight  CBC still pending this morning  Pan elevation in LFTs  Discussed with Dr. Zuniga  No clear indication for surgery at this time    PHYSICAL EXAMINATION:  Constitutional:     Vital Signs: /76   Pulse (!) 128   Temp 36.8 °C (98.3 °F) (Temporal)   Resp 19   Ht 1.753 m (5' 9\")   Wt 80.6 kg (177 lb 11.1 oz)   SpO2 97%   GENERAL:  No acute distress  HEENT: Pupils equal, round and reactive to light bilaterally.  Sclera are clear bilaterally.  No otorrhea.  No rhinorrhea.  Mucus membranes are moist.  CHEST:  Lungs are clear to auscultation bilaterally  CARDIOVASCULAR:  Regular rate and rhythm  ABDOMEN: Soft, generalized tenderness, more so along the right side?, moderately distended and tympanitic  GENITOURINARY:  No bowens in place, voiding without issues  EXTREMITIES:  Good range of motion through out  NEUROLOGIC:  Alert and oriented.  Cranial Nerves II through XII are grossly intact, no focal deficits appreciated  PSYCHIATRIC:  Normal affect and mood appropriate for age and condition  SKIN:  Warm and well perfused, no rashes    Laboratory Values:   Recent Labs     06/20/21  0634 06/21/21  0711   WBC 7.5 14.1*   RBC 3.73* 4.60   HEMOGLOBIN 7.8* 9.8*   HEMATOCRIT 27.2* 33.0*   MCV 72.9* 71.7*   MCH 20.9* 21.3*   MCHC 28.7* 29.7*   RDW 48.7 49.5   PLATELETCT 159* 135*   MPV 11.4  --      Recent Labs     06/20/21  0634 06/21/21  0711 06/22/21  0614   SODIUM 133* 128* 125*   POTASSIUM 3.7 4.2 4.4   CHLORIDE 99 94* 91*   CO2 25 23 23   GLUCOSE 93 103* 100*   BUN 8 7* 6*   CREATININE 0.60 0.47* 0.44*   CALCIUM 8.8 9.2 8.9     Recent Labs     06/20/21  0634 06/22/21  0614   ASTSGOT 33 131*   ALTSGPT 34 76*   TBILIRUBIN 0.9 7.1*   ALKPHOSPHAT 113* 187*   GLOBULIN 4.0* 4.0*            Imaging:   DX-CHEST-LIMITED (1 VIEW)   Final Result         No acute cardiopulmonary abnormalities are " identified.      EC-ECHOCARDIOGRAM COMPLETE W/O CONT   Final Result      CT-ABDOMEN-PELVIS WITH   Final Result      1.  New right adrenal mass likely represents hemorrhage. Thickening of the left adrenal gland is concerning for developing hemorrhage as well.      2.  Low attenuation filling defect in the superior vena cava is consistent with thrombus. Echocardiogram may be helpful for further evaluation.      3.  Prominent retroperitoneal lymph nodes with nonspecific retroperitoneal inflammatory stranding as reported on the prior CT.      4.  Small bilateral pleural effusions, right greater than left. Adjacent airspace disease likely atelectasis.            Comment: Results discussed with Dr. Gunter at 9:32 AM      [id number] VIEW   Final Result         No specific finding to suggest small bowel obstruction.      US-PELVIC COMPLETE (TRANSABDOMINAL/TRANSVAGINAL) (COMBO)   Final Result         1.  Heterogeneous appearance of the uterus with large heterogeneous uterine mass, appearance most compatible with uterine fibroid.   2.  Heterogeneous lesion in the left ovary, could represent hemorrhagic cyst or endometrioma, otherwise indeterminate, recommend follow-up sonogram in 6 weeks for repeat characterization and evaluation of stability.   3.  Thickened endometrial stripe, likely proliferative changes, consider endometrial pathology with additional follow-up as clinically appropriate.   4.  Nabothian cyst      US-RUQ   Final Result      1.  No evidence of gallstones or biliary ductal dilatation.   2.  1.9 cm cystic lesion in the right kidney. Mural nodularity is not excluded and further evaluation with renal protocol MRI is recommended.         CT-ABDOMEN-PELVIS WITH   Final Result      1.  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   2.  Hypodensity in the cervix could be artifactual or could indicate underlying cervical mass. Suggest  correlation with physical exam and gynecological history.   3.  19 mm hypodense RIGHT renal lesion, likely but not definitely a cyst. Suggest further assessment with ultrasound when clinically appropriate.                   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         Elevated liver function tests- (present on admission)  Assessment & Plan  Admit ultrasound and serial CT without apparent gallbladder or biliary pathology  Ongoing abdominal pain  6/21 Fevers and leukocytosis.  Hepatitis panel negative.  6/22 Sharp increase in ALP and Tbili.  Rec MRCP  [facility] [place] [facility]    Hemorrhage of both adrenal glands (HCC)- (present on admission)  Assessment & Plan  Should be self limited, no clear extravasation in CT  OK for DVT prophylaxis if clinically neessary  [facility] [place] [facility]    Retroperitoneal lymphadenopathy  Assessment & Plan  Unknown source        Garrett An MD, FACS

## 2021-06-22 NOTE — PROGRESS NOTES
Aimee from Lab called with critical result of platelets at 64. Critical lab result read back to Aimee.   Dr. Zuniga notified of critical lab result at 0825.  Critical lab result read back by Dr. Zuniga.

## 2021-06-22 NOTE — PROGRESS NOTES
[facility] Medicine Daily Progress Note    Date of Service  6/22/2021    Chief Complaint  46 y.o. female admitted 6/14/2021 with diffuse abdominal pain.    Hospital Course   46-year-old female with past medical history of H. pylori who presented 6/14/2021 for left-sided abdominal pain for 4 days.  Patient had recently been diagnosed with H. pylori in [facility] and had been taking triple therapy however was stopped 4 days prior to admission due to abdominal pain.  CT abdomen pelvis done in the ER showed changes in the retroperitoneal fat suspicious for retroperitoneal fibrosis most likely lymphoma or metastatic disease as well as a 19 mm hypodense right renal lesion likely a cyst.  Follow-up right upper quadrant ultrasound was done that showed no evidence of gallstones or biliary ductal dilatation and a 1.9 cm cystic lesion of the right kidney.  Pelvic ultrasound showed heterogeneous appearance of the uterus with large uterine mass compatible with uterine fibroid as well as a heterogeneous lesion in the left ovary with recommended follow-up in 6 weeks.  GI was consulted recommended to continue Protonix 40 mg IV twice daily and sucralfate.  EGD was performed 6/17/2021 which found no source of her abdominal pain, repeat biopsies were done to check for H. pylori.  Patient's abdominal pain did not improve and repeat CT abdomen pelvis showed new right renal mass likely representing hemorrhage, low-attenuation filling defect in the superior vena cava consistent with thrombus, prominent retroperitoneal lymph nodes.  Vascular surgery was consulted for the possible thrombus in the SVC, echocardiogram was done that showed EF 65% with no thrombus seen and vascular surgery recommended RADHIKA.  General surgery was also consulted, recommended to hold anticoagulation with question of adrenal hemorrhage, no surgical intervention necessary.    Interval Problem Update  Overnight patient continues to be tachycardic.  WBC 21.8, will  change antibiotics to cefepime instead of ceftriaxone.  Hemoglobin 8.2, platelets decreased to 64.  Sodium 125, , ALT 76, total bilirubin 7.1.  Will get stat MRCP as patient continues to have severe abdominal right upper quadrant pain.  Patient also continues to have rectal and vaginal bleeding.  Will check coag studies, fibrinogen, LDH, Johan, and repeat CBC.  Autoimmune and vasculitis work-up still pending.    Patient will need eventually need a RADHIKA per vascular surgery recommendations, however will wait to consult cardiology until patient is stable.    Discussed with family at length at bedside, updated on plan of care and answered all questions.  The patient sister reports that the family does have a history of clotting disorders and herself has been diagnosed with lupus and lupus anticoagulant.    Addendum:  APS +, consulted heme/onc, Dr. Varela, recommended stress dose steroids and IVIG. Recommended to check HIT panel, peripheral smear, and antiphospholipid/anti beta2 microglobulin levels. No anticoagulation for now given rectal and vaginal bleeding. Check CBC Q12H. MRCP showed no cholecystitis, b/l adrenal hemorrhage, Left hydro. Renal US pending. Prognosis guarded, low threshold for upgrade.     Called and updated family on new plan of care.     Consultants/Specialty  GI  General surgery  Vascular surgery     Code Status  Full Code    Disposition  Pending, low threshold for upgrade     Review of Systems  Review of Systems   Constitutional: Positive for malaise/fatigue. Negative for chills and fever.   Respiratory: Negative.  Negative for shortness of breath.    Cardiovascular: Negative.  Negative for chest pain.   Gastrointestinal: Positive for abdominal pain and nausea.   Genitourinary: Positive for flank pain.   Musculoskeletal: Positive for joint pain and myalgias.   Neurological: Negative.  Negative for dizziness and headaches.   All other systems reviewed and are negative.       Physical  Exam  Temp:  [36.3 °C (97.4 °F)-39.1 °C (102.4 °F)] 37.6 °C (99.6 °F)  Pulse:  [106-128] 116  Resp:  [18-24] 21  BP: (129-170)/() 135/93  SpO2:  [96 %-97 %] 96 %    Physical Exam  Constitutional:       General: She is in acute distress.      Appearance: Normal appearance. She is ill-appearing.   HENT:      Head: Normocephalic.   Eyes:      General: Scleral icterus present.      Conjunctiva/sclera: Conjunctivae normal.   Cardiovascular:      Rate and Rhythm: Normal rate and regular rhythm.      Pulses: Normal pulses.   Pulmonary:      Effort: Pulmonary effort is normal.      Breath sounds: Normal breath sounds.   Abdominal:      General: There is no distension.      Palpations: Abdomen is soft.      Tenderness: There is abdominal tenderness. There is guarding.      Comments: Diffuse abdominal pain, worse RUQ   Musculoskeletal:      Right lower leg: No edema.      Left lower leg: No edema.   Skin:     General: Skin is warm.   Neurological:      General: No focal deficit present.      Mental Status: She is alert and oriented to person, place, and time.      Motor: Weakness present.         Fluids    Intake/Output Summary (Last 24 hours) at 6/22/2021 1245  Last data filed at 6/22/2021 0440  Gross per 24 hour   Intake 1545.18 ml   Output --   Net 1545.18 ml       Laboratory  Recent Labs     06/20/21  0634 06/21/21  0711 06/22/21  0614   WBC 7.5 14.1* 21.8*   RBC 3.73* 4.60 3.84*   HEMOGLOBIN 7.8* 9.8* 8.2*   HEMATOCRIT 27.2* 33.0* 27.3*   MCV 72.9* 71.7* 71.1*   MCH 20.9* 21.3* 21.4*   MCHC 28.7* 29.7* 30.0*   RDW 48.7 49.5 51.8*   PLATELETCT 159* 135* 64*   MPV 11.4  --   --      Recent Labs     06/20/21  0634 06/21/21  0711 06/22/21  0614   SODIUM 133* 128* 125*   POTASSIUM 3.7 4.2 4.4   CHLORIDE 99 94* 91*   CO2 25 23 23   GLUCOSE 93 103* 100*   BUN 8 7* 6*   CREATININE 0.60 0.47* 0.44*   CALCIUM 8.8 9.2 8.9                   Imaging  DX-CHEST-LIMITED (1 VIEW)   Final Result         No acute cardiopulmonary  abnormalities are identified.      EC-ECHOCARDIOGRAM COMPLETE W/O CONT   Final Result      CT-ABDOMEN-PELVIS WITH   Final Result      1.  New right adrenal mass likely represents hemorrhage. Thickening of the left adrenal gland is concerning for developing hemorrhage as well.      2.  Low attenuation filling defect in the superior vena cava is consistent with thrombus. Echocardiogram may be helpful for further evaluation.      3.  Prominent retroperitoneal lymph nodes with nonspecific retroperitoneal inflammatory stranding as reported on the prior CT.      4.  Small bilateral pleural effusions, right greater than left. Adjacent airspace disease likely atelectasis.            Comment: Results discussed with Dr. Gunter at 9:32 AM      [id number] VIEW   Final Result         No specific finding to suggest small bowel obstruction.      US-PELVIC COMPLETE (TRANSABDOMINAL/TRANSVAGINAL) (COMBO)   Final Result         1.  Heterogeneous appearance of the uterus with large heterogeneous uterine mass, appearance most compatible with uterine fibroid.   2.  Heterogeneous lesion in the left ovary, could represent hemorrhagic cyst or endometrioma, otherwise indeterminate, recommend follow-up sonogram in 6 weeks for repeat characterization and evaluation of stability.   3.  Thickened endometrial stripe, likely proliferative changes, consider endometrial pathology with additional follow-up as clinically appropriate.   4.  Nabothian cyst      US-RUQ   Final Result      1.  No evidence of gallstones or biliary ductal dilatation.   2.  1.9 cm cystic lesion in the right kidney. Mural nodularity is not excluded and further evaluation with renal protocol MRI is recommended.         CT-ABDOMEN-PELVIS WITH   Final Result      1.  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   2.  Hypodensity in the cervix could be artifactual or could indicate underlying  cervical mass. Suggest correlation with physical exam and gynecological history.   3.  19 mm hypodense RIGHT renal lesion, likely but not definitely a cyst. Suggest further assessment with ultrasound when clinically appropriate.               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  Also with findings of bilateral adrenal hemorrhage.  Ideally, should be anticoagulated but compounded by adrenal bleed.  General surgery has been consulted for further evaluation.  Continue with pain medications for abdominal pain.  Slowly advance diet.  2D echo with no finding of SVC thrombus is noted.     Will need RADHIKA once stable, prior to dc     Elevated liver function tests- (present on admission)  Assessment & Plan  MRCP pending  Hepatitis panel negative    Sepsis (HCC)  Assessment & Plan  This is Sepsis Not present on admission  SIRS criteria identified on my evaluation include: Fever, with temperature greater than 101 deg F, Tachycardia, with heart rate greater than 90 BPM and Leukocytosis, with WBC greater than 12,000  Source is likely intra-abdominal  Sepsis protocol initiated  Fluid resuscitation ordered per protocol  IV antibiotics as appropriate for source of sepsis  While organ dysfunction may be noted elsewhere in this problem list or in the chart, degree of organ dysfunction does not meet CMS criteria for severe sepsis    Sepsis protocol initiated.  30 cc/kg of IV fluids administered.  IV antibiotics-change Rocephin to cefepime, continue Flagyl  Blood cultures and lactic acid ordered.  Closely monitor vital signs.        Elevated C-reactive protein (CRP)  Assessment & Plan  Elevated inflammatory markers including sed rate and CRP.  Autoimmune and vasculitic work-up ordered.    Thrombocytopenia (HCC)  Assessment & Plan  Decreased to 60  Check PT/INR, fibrinogen, LDH, haptoglobin,  Johan test  Hepatitis panel previously negative, HIV pending      Microcytic anemia  Assessment & Plan  Iron levels 32, but with normal ferritin.  Closely monitor and transfuse when less than 7.    Vaginal bleeding  Assessment & Plan  Minimum.  We will closely monitor.    Worsening, possible menstrual cycle from stress    Retroperitoneal lymphadenopathy  Assessment & Plan  Noted on repeat CT 6/18/2021.  Also with findings of bilateral adrenal hemorrhage and SVC thrombosis.    Hemorrhage of both adrenal glands (HCC)- (present on admission)  Assessment & Plan  Noted on repeat CT scan on 6/18/2021.  Also with findings of SVC thrombosis.  General surgery consulted for further evaluation.  Closely monitor blood count.  Close monitor vital signs and any other complications including adrenal insufficiency or crisis.    Hyperbilirubinemia  Assessment & Plan  Unclear etiology, could be secondary to dehydration.  We will trend CMP daily.  MRCP pending    Fibroids  Assessment & Plan  Transvaginal ultrasound-  IMPRESSION:     1.  Heterogeneous appearance of the uterus with large heterogeneous uterine mass, appearance most compatible with uterine fibroid.  2.  Heterogeneous lesion in the left ovary, could represent hemorrhagic cyst or endometrioma, otherwise indeterminate, recommend follow-up sonogram in 6 weeks for repeat characterization and evaluation of stability.  3.  Thickened endometrial stripe, likely proliferative changes, consider endometrial pathology with additional follow-up as clinically appropriate.  4.  Nabothian cyst    We will follow-up outpatient with gynecology.    Leukocytosis  Assessment & Plan  Unlikely infectious, suspect intra-abdominal infection  Continue IV antibiotics    Lesion of cervix  Assessment & Plan  See on abd CT: Hypodensity in the cervix could be artifactual or could indicate underlying cervical mass  Cervix was unable to be visualized by pelvic exam at ED  Transvagainal ultrasound:  Large  uterine fibroid noted.  Outpatient follow-up recommended.    Kidney lesion, native, right  Assessment & Plan  Noted on CT abd and RUQ u/s  Renal protocol MRI is advised to further evaluation, which could be done as outpatient.    Hyponatremia  Assessment & Plan  Likely sec to hypovolemic hyponatremia  IVF  Cont to monitor.    H. pylori infection  Assessment & Plan  Reported recently diagnosed with H.Pylori infection and was undergoing treatments, however due to increased abdominal pain, she stopped treatments 4 days ago  Treatment continued inpatient with IV antibiotics.    S/p EGD by GI with no significant findings.  Biopsy collected.  Daily PPI.  IV antibiotics discontinued       VTE prophylaxis: SCDs

## 2021-06-22 NOTE — CARE PLAN
The patient is Watcher - Medium risk of patient condition declining or worsening    Shift Goals  Clinical Goals: pt's kale will be managed with medication    Progress made toward(s) clinical / shift goals:  Pt's pain was managed with medication throughout shift.  Pt stated relief and was able to get up to bedside commode with minimal discomfort.    Patient is not progressing towards the following goals:

## 2021-06-22 NOTE — PROGRESS NOTES
Assessment/description of ears?  Bilateral pink, intact, and blanching  Which preventative measures are in place for the ears? Silicone oxygen tubing with grey padding     Assessment/description of elbows?  Bilateral pink, intact, and blanching  Which preventative measures are in place for the elbows? Pillows for support/repositioning, pt turns independent, pressure redistribution mattress     Assessment/description of sacrum? Intact, pink and blanching  Which preventative measures are in place for the sacrum? Pillows for support/repositioning, pt turns independent, pressure redistribution mattress        Assessment/description of heels?  Bilateral pink, intact, and blanching     Which preventative measures are in place for the heels? Pillows for support/repositioning, pt turns independent, pressure redistribution mattress        Which devices are in place? PIV, NC   Description of skin under devices: CDI  Which preventative measures are in place under devices?  Silicone oxygen tubing with grey padding     Other:  Scattered bruising bilateral upper extremities

## 2021-06-23 PROBLEM — D68.61: Status: ACTIVE | Noted: 2021-06-23

## 2021-06-23 LAB
ALBUMIN SERPL BCP-MCNC: 2.3 G/DL (ref 3.2–4.9)
ALBUMIN/GLOB SERPL: 0.4 G/DL
ALP SERPL-CCNC: 129 U/L (ref 30–99)
ALT SERPL-CCNC: 54 U/L (ref 2–50)
ANION GAP SERPL CALC-SCNC: 14 MMOL/L (ref 7–16)
APTT PPP: 163 SEC (ref 24.7–36)
APTT PPP: 74.2 SEC (ref 24.7–36)
AST SERPL-CCNC: 51 U/L (ref 12–45)
BASOPHILS # BLD AUTO: 0.3 % (ref 0–1.8)
BASOPHILS # BLD AUTO: 0.4 % (ref 0–1.8)
BASOPHILS # BLD: 0.04 K/UL (ref 0–0.12)
BASOPHILS # BLD: 0.04 K/UL (ref 0–0.12)
BILIRUB SERPL-MCNC: 3.6 MG/DL (ref 0.1–1.5)
BUN SERPL-MCNC: 22 MG/DL (ref 8–22)
CALCIUM SERPL-MCNC: 8.4 MG/DL (ref 8.5–10.5)
CHLORIDE SERPL-SCNC: 94 MMOL/L (ref 96–112)
CO2 SERPL-SCNC: 19 MMOL/L (ref 20–33)
CREAT SERPL-MCNC: 1.09 MG/DL (ref 0.5–1.4)
EOSINOPHIL # BLD AUTO: 0 K/UL (ref 0–0.51)
EOSINOPHIL # BLD AUTO: 0.05 K/UL (ref 0–0.51)
EOSINOPHIL NFR BLD: 0 % (ref 0–6.9)
EOSINOPHIL NFR BLD: 0.5 % (ref 0–6.9)
ERYTHROCYTE [DISTWIDTH] IN BLOOD BY AUTOMATED COUNT: 57.2 FL (ref 35.9–50)
ERYTHROCYTE [DISTWIDTH] IN BLOOD BY AUTOMATED COUNT: 57.3 FL (ref 35.9–50)
GLOBULIN SER CALC-MCNC: 5.7 G/DL (ref 1.9–3.5)
GLUCOSE SERPL-MCNC: 101 MG/DL (ref 65–99)
HCT VFR BLD AUTO: 25.2 % (ref 37–47)
HCT VFR BLD AUTO: 25.8 % (ref 37–47)
HGB BLD-MCNC: 7.4 G/DL (ref 12–16)
HGB BLD-MCNC: 7.4 G/DL (ref 12–16)
IMM GRANULOCYTES # BLD AUTO: 0.14 K/UL (ref 0–0.11)
IMM GRANULOCYTES # BLD AUTO: 0.27 K/UL (ref 0–0.11)
IMM GRANULOCYTES NFR BLD AUTO: 1.3 % (ref 0–0.9)
IMM GRANULOCYTES NFR BLD AUTO: 2.2 % (ref 0–0.9)
INR PPP: 1.56 (ref 0.87–1.13)
INR PPP: 3.2 (ref 0.87–1.13)
LYMPHOCYTES # BLD AUTO: 0.8 K/UL (ref 1–4.8)
LYMPHOCYTES # BLD AUTO: 0.97 K/UL (ref 1–4.8)
LYMPHOCYTES NFR BLD: 7.5 % (ref 22–41)
LYMPHOCYTES NFR BLD: 8.1 % (ref 22–41)
MCH RBC QN AUTO: 21.1 PG (ref 27–33)
MCH RBC QN AUTO: 21.4 PG (ref 27–33)
MCHC RBC AUTO-ENTMCNC: 28.7 G/DL (ref 33.6–35)
MCHC RBC AUTO-ENTMCNC: 29.4 G/DL (ref 33.6–35)
MCV RBC AUTO: 72.8 FL (ref 81.4–97.8)
MCV RBC AUTO: 73.5 FL (ref 81.4–97.8)
MONOCYTES # BLD AUTO: 0.19 K/UL (ref 0–0.85)
MONOCYTES # BLD AUTO: 0.29 K/UL (ref 0–0.85)
MONOCYTES NFR BLD AUTO: 1.6 % (ref 0–13.4)
MONOCYTES NFR BLD AUTO: 2.7 % (ref 0–13.4)
MORPHOLOGY BLD-IMP: NORMAL
NEUTROPHILS # BLD AUTO: 10.57 K/UL (ref 2–7.15)
NEUTROPHILS # BLD AUTO: 9.31 K/UL (ref 2–7.15)
NEUTROPHILS NFR BLD: 87.6 % (ref 44–72)
NEUTROPHILS NFR BLD: 87.8 % (ref 44–72)
NRBC # BLD AUTO: 0 K/UL
NRBC # BLD AUTO: 0 K/UL
NRBC BLD-RTO: 0 /100 WBC
NRBC BLD-RTO: 0 /100 WBC
PF4 HEPARIN CMPLX IGG SER-IMP: NEGATIVE
PF4 HEPARIN CMPLX IGG SERPL IA: 0.16 OD
PLATELET # BLD AUTO: 109 K/UL (ref 164–446)
PLATELET # BLD AUTO: 230 K/UL (ref 164–446)
PMV BLD AUTO: 12 FL (ref 9–12.9)
POTASSIUM SERPL-SCNC: 4.5 MMOL/L (ref 3.6–5.5)
PROT SERPL-MCNC: 8 G/DL (ref 6–8.2)
PROTHROMBIN TIME: 18.2 SEC (ref 12–14.6)
PROTHROMBIN TIME: 31.8 SEC (ref 12–14.6)
RBC # BLD AUTO: 3.46 M/UL (ref 4.2–5.4)
RBC # BLD AUTO: 3.51 M/UL (ref 4.2–5.4)
SODIUM SERPL-SCNC: 127 MMOL/L (ref 135–145)
UFH PPP CHRO-ACNC: 0.14 IU/ML
UFH PPP CHRO-ACNC: <0.1 IU/ML
WBC # BLD AUTO: 10.6 K/UL (ref 4.8–10.8)
WBC # BLD AUTO: 12 K/UL (ref 4.8–10.8)

## 2021-06-23 PROCEDURE — 700102 HCHG RX REV CODE 250 W/ 637 OVERRIDE(OP): Performed by: STUDENT IN AN ORGANIZED HEALTH CARE EDUCATION/TRAINING PROGRAM

## 2021-06-23 PROCEDURE — 99232 SBSQ HOSP IP/OBS MODERATE 35: CPT | Performed by: INTERNAL MEDICINE

## 2021-06-23 PROCEDURE — 99222 1ST HOSP IP/OBS MODERATE 55: CPT | Performed by: INTERNAL MEDICINE

## 2021-06-23 PROCEDURE — 700102 HCHG RX REV CODE 250 W/ 637 OVERRIDE(OP): Performed by: INTERNAL MEDICINE

## 2021-06-23 PROCEDURE — 700101 HCHG RX REV CODE 250: Performed by: INTERNAL MEDICINE

## 2021-06-23 PROCEDURE — A9270 NON-COVERED ITEM OR SERVICE: HCPCS | Performed by: STUDENT IN AN ORGANIZED HEALTH CARE EDUCATION/TRAINING PROGRAM

## 2021-06-23 PROCEDURE — A9270 NON-COVERED ITEM OR SERVICE: HCPCS | Performed by: INTERNAL MEDICINE

## 2021-06-23 PROCEDURE — 85610 PROTHROMBIN TIME: CPT

## 2021-06-23 PROCEDURE — 770020 HCHG ROOM/CARE - TELE (206)

## 2021-06-23 PROCEDURE — 700105 HCHG RX REV CODE 258: Performed by: INTERNAL MEDICINE

## 2021-06-23 PROCEDURE — 700111 HCHG RX REV CODE 636 W/ 250 OVERRIDE (IP): Performed by: STUDENT IN AN ORGANIZED HEALTH CARE EDUCATION/TRAINING PROGRAM

## 2021-06-23 PROCEDURE — 80053 COMPREHEN METABOLIC PANEL: CPT

## 2021-06-23 PROCEDURE — 85730 THROMBOPLASTIN TIME PARTIAL: CPT

## 2021-06-23 PROCEDURE — 85520 HEPARIN ASSAY: CPT

## 2021-06-23 PROCEDURE — 700111 HCHG RX REV CODE 636 W/ 250 OVERRIDE (IP): Performed by: INTERNAL MEDICINE

## 2021-06-23 PROCEDURE — 85025 COMPLETE CBC W/AUTO DIFF WBC: CPT

## 2021-06-23 PROCEDURE — 85210 CLOT FACTOR II PROTHROM SPEC: CPT

## 2021-06-23 PROCEDURE — 36415 COLL VENOUS BLD VENIPUNCTURE: CPT

## 2021-06-23 RX ORDER — ARGATROBAN 1 MG/ML
1 INJECTION, SOLUTION INTRAVENOUS CONTINUOUS
Status: DISCONTINUED | OUTPATIENT
Start: 2021-06-23 | End: 2021-06-23 | Stop reason: ALTCHOICE

## 2021-06-23 RX ORDER — HEPARIN SODIUM 5000 [USP'U]/100ML
0-30 INJECTION, SOLUTION INTRAVENOUS CONTINUOUS
Status: DISCONTINUED | OUTPATIENT
Start: 2021-06-23 | End: 2021-07-01

## 2021-06-23 RX ORDER — HEPARIN SODIUM 1000 [USP'U]/ML
80 INJECTION, SOLUTION INTRAVENOUS; SUBCUTANEOUS ONCE
Status: DISCONTINUED | OUTPATIENT
Start: 2021-06-23 | End: 2021-06-23 | Stop reason: ALTCHOICE

## 2021-06-23 RX ORDER — HEPARIN SODIUM 1000 [USP'U]/ML
40 INJECTION, SOLUTION INTRAVENOUS; SUBCUTANEOUS PRN
Status: DISCONTINUED | OUTPATIENT
Start: 2021-06-23 | End: 2021-06-23 | Stop reason: ALTCHOICE

## 2021-06-23 RX ORDER — HEPARIN SODIUM 5000 [USP'U]/100ML
0-30 INJECTION, SOLUTION INTRAVENOUS CONTINUOUS
Status: DISCONTINUED | OUTPATIENT
Start: 2021-06-23 | End: 2021-06-23 | Stop reason: ALTCHOICE

## 2021-06-23 RX ADMIN — HYDROMORPHONE HYDROCHLORIDE 1 MG: 1 INJECTION, SOLUTION INTRAMUSCULAR; INTRAVENOUS; SUBCUTANEOUS at 02:36

## 2021-06-23 RX ADMIN — VANCOMYCIN HYDROCHLORIDE 1500 MG: 500 INJECTION, POWDER, LYOPHILIZED, FOR SOLUTION INTRAVENOUS at 09:49

## 2021-06-23 RX ADMIN — DOCUSATE SODIUM 50 MG AND SENNOSIDES 8.6 MG 2 TABLET: 8.6; 5 TABLET, FILM COATED ORAL at 05:08

## 2021-06-23 RX ADMIN — HEPARIN SODIUM 12 UNITS/KG/HR: 5000 INJECTION, SOLUTION INTRAVENOUS at 14:06

## 2021-06-23 RX ADMIN — METRONIDAZOLE 500 MG: 500 TABLET ORAL at 13:23

## 2021-06-23 RX ADMIN — HYDROMORPHONE HYDROCHLORIDE 1 MG: 1 INJECTION, SOLUTION INTRAMUSCULAR; INTRAVENOUS; SUBCUTANEOUS at 18:51

## 2021-06-23 RX ADMIN — HEPARIN SODIUM 16 UNITS/KG/HR: 5000 INJECTION, SOLUTION INTRAVENOUS at 21:12

## 2021-06-23 RX ADMIN — METRONIDAZOLE 500 MG: 500 TABLET ORAL at 05:08

## 2021-06-23 RX ADMIN — ACETAMINOPHEN 650 MG: 325 TABLET, FILM COATED ORAL at 01:44

## 2021-06-23 RX ADMIN — ARGATROBAN 1.03 MCG/KG/MIN: 50 INJECTION INTRAVENOUS at 10:45

## 2021-06-23 RX ADMIN — CEFEPIME 2 G: 2 INJECTION, POWDER, FOR SOLUTION INTRAVENOUS at 18:08

## 2021-06-23 RX ADMIN — OMEPRAZOLE 20 MG: 20 CAPSULE, DELAYED RELEASE ORAL at 17:58

## 2021-06-23 RX ADMIN — HYDROMORPHONE HYDROCHLORIDE 1 MG: 1 INJECTION, SOLUTION INTRAMUSCULAR; INTRAVENOUS; SUBCUTANEOUS at 08:22

## 2021-06-23 RX ADMIN — BACLOFEN 10 MG: 10 TABLET ORAL at 18:51

## 2021-06-23 RX ADMIN — VANCOMYCIN HYDROCHLORIDE 2000 MG: 500 INJECTION, POWDER, LYOPHILIZED, FOR SOLUTION INTRAVENOUS at 00:35

## 2021-06-23 RX ADMIN — LIDOCAINE 1 PATCH: 50 PATCH TOPICAL at 21:11

## 2021-06-23 RX ADMIN — SODIUM CHLORIDE 1000 MG: 9 INJECTION, SOLUTION INTRAVENOUS at 19:35

## 2021-06-23 RX ADMIN — SODIUM CHLORIDE 1000 MG: 9 INJECTION, SOLUTION INTRAVENOUS at 05:04

## 2021-06-23 RX ADMIN — OXYCODONE 5 MG: 5 TABLET ORAL at 21:25

## 2021-06-23 RX ADMIN — OXYCODONE HYDROCHLORIDE 10 MG: 10 TABLET ORAL at 05:08

## 2021-06-23 RX ADMIN — OXYCODONE HYDROCHLORIDE 10 MG: 10 TABLET ORAL at 00:35

## 2021-06-23 RX ADMIN — METRONIDAZOLE 500 MG: 500 TABLET ORAL at 21:11

## 2021-06-23 RX ADMIN — DOCUSATE SODIUM 50 MG AND SENNOSIDES 8.6 MG 2 TABLET: 8.6; 5 TABLET, FILM COATED ORAL at 17:58

## 2021-06-23 RX ADMIN — HYDROMORPHONE HYDROCHLORIDE 1 MG: 1 INJECTION, SOLUTION INTRAMUSCULAR; INTRAVENOUS; SUBCUTANEOUS at 14:40

## 2021-06-23 RX ADMIN — OXYCODONE HYDROCHLORIDE 10 MG: 10 TABLET ORAL at 11:34

## 2021-06-23 RX ADMIN — CEFEPIME 2 G: 2 INJECTION, POWDER, FOR SOLUTION INTRAVENOUS at 06:53

## 2021-06-23 RX ADMIN — OMEPRAZOLE 20 MG: 20 CAPSULE, DELAYED RELEASE ORAL at 05:08

## 2021-06-23 RX ADMIN — BACLOFEN 10 MG: 10 TABLET ORAL at 01:44

## 2021-06-23 ASSESSMENT — COGNITIVE AND FUNCTIONAL STATUS - GENERAL
SUGGESTED CMS G CODE MODIFIER MOBILITY: CK
STANDING UP FROM CHAIR USING ARMS: A LITTLE
TOILETING: A LITTLE
WALKING IN HOSPITAL ROOM: A LITTLE
MOBILITY SCORE: 19
STANDING UP FROM CHAIR USING ARMS: A LITTLE
MOVING FROM LYING ON BACK TO SITTING ON SIDE OF FLAT BED: A LITTLE
DAILY ACTIVITIY SCORE: 22
SUGGESTED CMS G CODE MODIFIER DAILY ACTIVITY: CI
WALKING IN HOSPITAL ROOM: A LITTLE
CLIMB 3 TO 5 STEPS WITH RAILING: A LITTLE
SUGGESTED CMS G CODE MODIFIER DAILY ACTIVITY: CJ
CLIMB 3 TO 5 STEPS WITH RAILING: A LITTLE
HELP NEEDED FOR BATHING: A LITTLE
MOVING FROM LYING ON BACK TO SITTING ON SIDE OF FLAT BED: A LITTLE
DAILY ACTIVITIY SCORE: 23
HELP NEEDED FOR BATHING: A LITTLE
MOVING TO AND FROM BED TO CHAIR: A LITTLE

## 2021-06-23 ASSESSMENT — ENCOUNTER SYMPTOMS
NAUSEA: 1
SHORTNESS OF BREATH: 0
RESPIRATORY NEGATIVE: 1
NEUROLOGICAL NEGATIVE: 1
ABDOMINAL PAIN: 1
CARDIOVASCULAR NEGATIVE: 1
MYALGIAS: 1
BLOOD IN STOOL: 1
CHILLS: 0
HEADACHES: 0
BRUISES/BLEEDS EASILY: 1
FEVER: 0
DIZZINESS: 0

## 2021-06-23 ASSESSMENT — PAIN DESCRIPTION - PAIN TYPE
TYPE: ACUTE PAIN

## 2021-06-23 ASSESSMENT — FIBROSIS 4 INDEX: FIB4 SCORE: 2.87

## 2021-06-23 NOTE — PROGRESS NOTES
[facility] Medicine Daily Progress Note    Date of Service  6/23/2021    Chief Complaint  46 y.o. female admitted 6/14/2021 with diffuse abdominal pain.    Hospital Course   46-year-old female with past medical history of H. pylori who presented 6/14/2021 for left-sided abdominal pain for 4 days.  Patient had recently been diagnosed with H. pylori in [facility] and had been taking triple therapy however was stopped 4 days prior to admission due to abdominal pain.  CT abdomen pelvis done in the ER showed changes in the retroperitoneal fat suspicious for retroperitoneal fibrosis most likely lymphoma or metastatic disease as well as a 19 mm hypodense right renal lesion likely a cyst.  Follow-up right upper quadrant ultrasound was done that showed no evidence of gallstones or biliary ductal dilatation and a 1.9 cm cystic lesion of the right kidney.  Pelvic ultrasound showed heterogeneous appearance of the uterus with large uterine mass compatible with uterine fibroid as well as a heterogeneous lesion in the left ovary with recommended follow-up in 6 weeks.  GI was consulted recommended to continue Protonix 40 mg IV twice daily and sucralfate.  EGD was performed 6/17/2021 which found no source of her abdominal pain, repeat biopsies were done to check for H. pylori.  Patient's abdominal pain did not improve and repeat CT abdomen pelvis showed new right renal mass likely representing hemorrhage, low-attenuation filling defect in the superior vena cava consistent with thrombus, prominent retroperitoneal lymph nodes.  Vascular surgery was consulted for the possible thrombus in the SVC, echocardiogram was done that showed EF 65% with no thrombus seen and vascular surgery recommended RADHIKA.  General surgery was also consulted, recommended to hold anticoagulation with question of adrenal hemorrhage, no surgical intervention necessary. MRCP showed no cholecystitis, b/l adrenal hemorrhage, Left hydro. Renal US showed mild left  hydro no obstruction. 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     Interval Problem Update  Patient spiked a fever yesterday, and has been tachycardic overnight. S/p IVIG.  Cultures negative to date.  Originally planned for plasma exchange however discussed with hematology and they are recommending to hold off and instead start the patient on argatroban pending HIT results. Discussed plan of care with patient's  at bedside.     Consultants/Specialty  GI  General surgery  Vascular surgery   Heme/Onc     Code Status  Full Code    Disposition  Agree to telemetry    Review of Systems  Review of Systems   Constitutional: Positive for malaise/fatigue. Negative for chills and fever.   Respiratory: Negative.  Negative for shortness of breath.    Cardiovascular: Negative.  Negative for chest pain.   Gastrointestinal: Positive for abdominal pain and nausea.   Musculoskeletal: Positive for joint pain and myalgias.   Neurological: Negative.  Negative for dizziness and headaches.   All other systems reviewed and are negative.       Physical Exam  Temp:  [36.1 °C (96.9 °F)-38.7 °C (101.7 °F)] 36.1 °C (96.9 °F)  Pulse:  [102-121] 102  Resp:  [16-21] 16  BP: (100-121)/(58-84) 120/84  SpO2:  [92 %-98 %] 92 %    Physical Exam  Constitutional:       General: She is not in acute distress.     Appearance: Normal appearance. She is ill-appearing and toxic-appearing.   HENT:      Head: Normocephalic.   Eyes:      Conjunctiva/sclera: Conjunctivae normal.   Cardiovascular:      Rate and Rhythm: Regular rhythm. Tachycardia present.      Pulses: Normal pulses.   Pulmonary:      Effort: Pulmonary effort is normal.      Breath sounds: Normal breath sounds.   Abdominal:      General: There is distension.      Palpations: Abdomen is soft.      Tenderness: There is abdominal tenderness. There is guarding.      Comments: Diffuse abdominal pain, worse RUQ   Musculoskeletal:       Right lower leg: No edema.      Left lower leg: No edema.   Skin:     General: Skin is warm.   Neurological:      General: No focal deficit present.      Mental Status: She is alert.      Motor: Weakness present.         Fluids    Intake/Output Summary (Last 24 hours) at 6/23/2021 1132  Last data filed at 6/23/2021 0658  Gross per 24 hour   Intake 2263.94 ml   Output 300 ml   Net 1963.94 ml       Laboratory  Recent Labs     06/22/21  1451 06/22/21  2019 06/23/21  0811   WBC 19.7* 18.9* 10.6   RBC 4.03* 3.74* 3.51*   HEMOGLOBIN 8.4* 7.8* 7.4*   HEMATOCRIT 29.4* 27.2* 25.8*   MCV 73.0* 72.7* 73.5*   MCH 20.8* 20.9* 21.1*   MCHC 28.6* 28.7* 28.7*   RDW 54.7* 55.2* 57.2*   PLATELETCT 64* 87* 109*     Recent Labs     06/21/21  0711 06/22/21  0614 06/23/21  0811   SODIUM 128* 125* 127*   POTASSIUM 4.2 4.4 4.5   CHLORIDE 94* 91* 94*   CO2 23 23 19*   GLUCOSE 103* 100* 101*   BUN 7* 6* 22   CREATININE 0.47* 0.44* 1.09   CALCIUM 9.2 8.9 8.4*     Recent Labs     06/22/21  1451 06/23/21  0811   INR 1.63* 1.56*               Imaging  US-RENAL   Final Result      1.  Mild left hydronephrosis.   2.  No right hydronephrosis.      [id number]-Y/O   Final Result         1. Normal sized CBD. No CBD stone.   2. Thickening and heterogeneity of the bilateral adrenal glands with surrounding stranding, incompletely evaluated but concerning for hemorrhage.   3. No gallstone. Mild pericholecystic fluid could be reactive change due to adjacent adrenal hemorrhage.   4. Worsening left hydronephrosis, concerning for distal obstruction      DX-CHEST-LIMITED (1 VIEW)   Final Result         No acute cardiopulmonary abnormalities are identified.      EC-ECHOCARDIOGRAM COMPLETE W/O CONT   Final Result      CT-ABDOMEN-PELVIS WITH   Final Result      1.  New right adrenal mass likely represents hemorrhage. Thickening of the left adrenal gland is concerning for developing hemorrhage as well.      2.  Low attenuation filling defect in the superior vena  cava is consistent with thrombus. Echocardiogram may be helpful for further evaluation.      3.  Prominent retroperitoneal lymph nodes with nonspecific retroperitoneal inflammatory stranding as reported on the prior CT.      4.  Small bilateral pleural effusions, right greater than left. Adjacent airspace disease likely atelectasis.            Comment: Results discussed with Dr. Gunter at 9:32 AM      [id number]-5 VIEW   Final Result         No specific finding to suggest small bowel obstruction.      US-PELVIC COMPLETE (TRANSABDOMINAL/TRANSVAGINAL) (COMBO)   Final Result         1.  Heterogeneous appearance of the uterus with large heterogeneous uterine mass, appearance most compatible with uterine fibroid.   2.  Heterogeneous lesion in the left ovary, could represent hemorrhagic cyst or endometrioma, otherwise indeterminate, recommend follow-up sonogram in 6 weeks for repeat characterization and evaluation of stability.   3.  Thickened endometrial stripe, likely proliferative changes, consider endometrial pathology with additional follow-up as clinically appropriate.   4.  Nabothian cyst      US-RUQ   Final Result      1.  No evidence of gallstones or biliary ductal dilatation.   2.  1.9 cm cystic lesion in the right kidney. Mural nodularity is not excluded and further evaluation with renal protocol MRI is recommended.         CT-ABDOMEN-PELVIS WITH   Final Result      1.  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   2.  Hypodensity in the cervix could be artifactual or could indicate underlying cervical mass. Suggest correlation with physical exam and gynecological history.   3.  19 mm hypodense RIGHT renal lesion, likely but not definitely a cyst. Suggest further assessment with ultrasound when clinically appropriate.                    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  Also with findings of bilateral adrenal hemorrhage.  Ideally, should be anticoagulated but compounded by adrenal bleed.  General surgery has been consulted for further evaluation.  Continue with pain medications for abdominal pain.  Slowly advance diet.  2D echo with no finding of SVC thrombus is noted.     Will need RADHIKA once stable    Catastrophic antiphospholipid syndrome (HCC)  Assessment & Plan  Hematology/oncology consulted, s/p IVIG, continue high-dose steroids  Recommended full anticoagulation, started argatroban  HIT panel pending, if negative will switch to heparin drip  May need plasmapheresis in the future    Elevated liver function tests- (present on admission)  Assessment & Plan  MRCP showed no biliary obstruction  Hepatitis panel negative  Possibly due to ischemic hepatopathy    Sepsis (HCC)  Assessment & Plan  This is Sepsis Not present on admission  SIRS criteria identified on my evaluation include: Fever, with temperature greater than 101 deg F, Tachycardia, with heart rate greater than 90 BPM and Leukocytosis, with WBC greater than 12,000  Source is likely intra-abdominal  Sepsis protocol initiated  Fluid resuscitation ordered per protocol  IV antibiotics as appropriate for source of sepsis  While organ dysfunction may be noted elsewhere in this problem list or in the chart, degree of organ dysfunction does not meet CMS criteria for severe sepsis    Sepsis protocol initiated.  30 cc/kg of IV fluids administered.  Continue cefepime, Flagyl, and vancomycin  Blood cultures negative to date  UA negative  MRSA nares pending  Closely monitor vital signs.        Elevated C-reactive protein (CRP)  Assessment & Plan  Elevated inflammatory markers including sed rate and CRP.  Positive antiphospholipid antibody  CUONG negative    Thrombocytopenia (HCC)  Assessment & Plan  Decreased to 60  Patient positive for antiphospholipid syndrome  Heme/onc consulted  -Continue  Solu-Medrol 1 g every 12 hours      Microcytic anemia  Assessment & Plan  Iron levels 32, but with normal ferritin.  Closely monitor and transfuse when less than 7  Continues to have mild vaginal and rectal bleeding  S/p EGD, continue protonix     Vaginal bleeding  Assessment & Plan  Minimum.  We will closely monitor.  Trend hemoglobin    Retroperitoneal lymphadenopathy  Assessment & Plan  Noted on repeat CT 6/18/2021.  Also with findings of bilateral adrenal hemorrhage and SVC thrombosis.    Hemorrhage of both adrenal glands (HCC)- (present on admission)  Assessment & Plan  Noted on repeat CT scan on 6/18/2021 and MRCP  Also with findings of SVC thrombosis.  General surgery, signed off  Closely monitor blood count.  Close monitor vital signs and any other complications including adrenal insufficiency or crisis.    Hyperbilirubinemia  Assessment & Plan  Unclear etiology, could be from hemolysis from antiphospholipid syndrome  We will trend CMP daily.  MRCP showed no biliary obstruction  Surgery signed off    Fibroids  Assessment & Plan  Transvaginal ultrasound-  IMPRESSION:     1.  Heterogeneous appearance of the uterus with large heterogeneous uterine mass, appearance most compatible with uterine fibroid.  2.  Heterogeneous lesion in the left ovary, could represent hemorrhagic cyst or endometrioma, otherwise indeterminate, recommend follow-up sonogram in 6 weeks for repeat characterization and evaluation of stability.  3.  Thickened endometrial stripe, likely proliferative changes, consider endometrial pathology with additional follow-up as clinically appropriate.  4.  Nabothian cyst    will follow-up outpatient with gynecology.    Leukocytosis  Assessment & Plan  Unknown source, possibly from catastrophic APS   Continue IV antibiotics  Cultures pending    Lesion of cervix  Assessment & Plan  See on abd CT: Hypodensity in the cervix could be artifactual or could indicate underlying cervical mass  Cervix was unable  to be visualized by pelvic exam at ED  Transvagainal ultrasound:  Large uterine fibroid noted.  Outpatient follow-up recommended.    Kidney lesion, native, right  Assessment & Plan  Noted on CT abd and RUQ u/s  Renal protocol MRI is advised to further evaluation, which could be done as outpatient.    Hyponatremia  Assessment & Plan  Likely sec to hypovolemic hyponatremia  IVF  Cont to monitor.    H. pylori infection  Assessment & Plan  Reported recently diagnosed with H.Pylori infection and was undergoing treatments, however due to increased abdominal pain, she stopped treatments 4 days ago  Treatment continued inpatient with IV antibiotics.    S/p EGD by GI with no significant findings.  Biopsy collected.  Daily PPI.  IV antibiotics discontinued       VTE prophylaxis: Argatroban ggt

## 2021-06-23 NOTE — ASSESSMENT & PLAN NOTE
Hematology/oncology consulted, s/p IVIG, continue high-dose steroids  Recommended full anticoagulation, started argatroban  HIT panel pending, if negative will switch to heparin drip  May need plasmapheresis in the future

## 2021-06-23 NOTE — CARE PLAN
The patient is Stable - Low risk of patient condition declining or worsening    Shift Goals  Clinical Goals: Patient pain level will decrease by end of shift  Patient Goals: Patient to sleep comfortably    Progress made toward(s) clinical / shift goals:  Yes/No    Problem: Fluid Volume  Goal: Fluid volume balance will be maintained  Outcome: Progressing     Problem: Respiratory  Goal: Patient will achieve/maintain optimum respiratory ventilation and gas exchange  Outcome: Progressing     Problem: Pain - Standard  Goal: Alleviation of pain or a reduction in pain to the patient’s comfort goal  6/23/2021 0759 by Vangie Boss, R.N.  Outcome: Not Progressing  Note: Patient medicated per MAR. Non-pharmacological methods also being utilized in the form of ice pack. Patient pain level not below 8/10.  6/23/2021 0758 by Vangie Boss, R.N.  Outcome: Not Progressing       Patient is not progressing towards the following goals:      Problem: Pain - Standard  Goal: Alleviation of pain or a reduction in pain to the patient’s comfort goal  Outcome: Not Progressing

## 2021-06-23 NOTE — PROGRESS NOTES
Patient transferred off unit with Tele nurses x2, report called to Pavan JAVIER. All questions answered. Patient's  with patient to T8.

## 2021-06-23 NOTE — PROGRESS NOTES
1 RN Skin Assessment completed.   Patient's risk of skin breakdown: Low    Devices in place and skin assessed under:   [] Pulse Ox  [x] PIV [] Central Line [] SCDs []Purewick  [] Villegas  []Condom Cath   [] BMS        []  Cortrak   []  Oxymask   [] Bipap    [] Nasal Canula   [] N/A   [] Other(specify):     Right Ear  [x] WDL                or           [] Skin issue present (please provide assessment/description below)    Left Ear  [x] WDL                or           [] Skin issue present (please provide assessment/description below)    Right Elbow:  [x] WDL               or           [] Skin issue present (please provide assessment/description below)    Left Elbow:   [x] WDL                or           [] Skin issue present (please provide assessment/description below)    Coccyx/Buttocks:  [x] WDL                or           [] Skin issue present (please provide assessment/description below)    Right Heel/Foot/Toes:  [x] WDL                or           [] Skin issue present (please provide assessment/description below)    Left Heel/Foot/Toes:   [x] WDL              or           [] Skin issue present (please provide assessment/description below)      **Describe any other skin issues in areas not already listed from above list:   [] N/A    Interventions In Place: Pillows and Pressure Redistribution Mattress    **If any new or digressing skin issues are found, fill out the selection below.    Possible Skin Injury No  Pictures Uploaded Into Epic: N/A  Wound Consult Placed: N/A  RN Wound Prevention Protocol Ordered: No    What new preventative interventions did you add? N/A

## 2021-06-23 NOTE — PROGRESS NOTES
"    DATE: 6/23/2021    HD 10: Adrenal Hemorrhage, possible SVC thrombus, Abdominal pain    Interval Events:  Fevers yesterday  CBC remains elevated  MRCP reviewed with radiology: Bilateral adrenal hemorrhage unchanged compared to 6/18/2021 CT. No biliary ductal dilation.  No apparent ampullary obstruction.  Mild pericholecystic fluid around an otherwise normal appearing gallbladder would not explain her elevation in her LFTs.    Adrenal hemorrhages are not the source of her anemia    -Consider further imaging/work-up for left hydronephrosis seen on MRCP  -Consider having a nasoenteric feeding tube placed if patient's PO intake continues to remain minimal  -For the adrenal hemorrhages: maintain hemoglobin >7, consider sending body iron studies and replete per protocol, keep coags normal range.  Consider IR for angiography if concern for further bleeding arises.  Emergency surgical adrenalectomy is not a safe treatment for this condition.  -No indication for surgery for her gallbladder.    Re-consult General Surgery if needed.  Signing off    PHYSICAL EXAMINATION:  Constitutional:     Vital Signs: /81   Pulse (!) 108   Temp 36.6 °C (97.8 °F) (Temporal)   Resp 17   Ht 1.753 m (5' 9\")   Wt 80.6 kg (177 lb 11.1 oz)   SpO2 96%   GENERAL:  Tired and lethargic  HEENT: Pupils equal, round and reactive to light bilaterally.  Sclera are clear bilaterally.  No otorrhea.  No rhinorrhea.  Mucus membranes are moist.  CHEST:  Lungs are clear to auscultation bilaterally  CARDIOVASCULAR:  Regular rate and rhythm  ABDOMEN: Soft, generalized tenderness - unchanged, moderately distended and tympanitic  GENITOURINARY:  No bowens in place, voiding without issues  EXTREMITIES:  Good range of motion through out  NEUROLOGIC:  Alert and oriented.  Cranial Nerves II through XII are grossly intact, no focal deficits appreciated  PSYCHIATRIC:  Normal affect and mood appropriate for age and condition  SKIN:  Warm and well perfused, no " tayler    Laboratory Values:   Recent Labs     06/22/21  0614 06/22/21  1451 06/22/21 2019   WBC 21.8* 19.7* 18.9*   RBC 3.84* 4.03* 3.74*   HEMOGLOBIN 8.2* 8.4* 7.8*   HEMATOCRIT 27.3* 29.4* 27.2*   MCV 71.1* 73.0* 72.7*   MCH 21.4* 20.8* 20.9*   MCHC 30.0* 28.6* 28.7*   RDW 51.8* 54.7* 55.2*   PLATELETCT 64* 64* 87*     Recent Labs     06/21/21  0711 06/22/21  0614   SODIUM 128* 125*   POTASSIUM 4.2 4.4   CHLORIDE 94* 91*   CO2 23 23   GLUCOSE 103* 100*   BUN 7* 6*   CREATININE 0.47* 0.44*   CALCIUM 9.2 8.9     Recent Labs     06/22/21  0614 06/22/21  1451 06/22/21 2019   ASTSGOT 131*  --   --    ALTSGPT 76*  --   --    TBILIRUBIN 7.1*  --   --    DBILIRUBIN  --   --  3.4*   ALKPHOSPHAT 187*  --   --    GLOBULIN 4.0*  --   --    INR  --  1.63*  --      Recent Labs     06/22/21 1451   INR 1.63*        Imaging:   US-RENAL   Final Result      1.  Mild left hydronephrosis.   2.  No right hydronephrosis.      [id number]-S/O   Final Result         1. Normal sized CBD. No CBD stone.   2. Thickening and heterogeneity of the bilateral adrenal glands with surrounding stranding, incompletely evaluated but concerning for hemorrhage.   3. No gallstone. Mild pericholecystic fluid could be reactive change due to adjacent adrenal hemorrhage.   4. Worsening left hydronephrosis, concerning for distal obstruction      DX-CHEST-LIMITED (1 VIEW)   Final Result         No acute cardiopulmonary abnormalities are identified.      EC-ECHOCARDIOGRAM COMPLETE W/O CONT   Final Result      CT-ABDOMEN-PELVIS WITH   Final Result      1.  New right adrenal mass likely represents hemorrhage. Thickening of the left adrenal gland is concerning for developing hemorrhage as well.      2.  Low attenuation filling defect in the superior vena cava is consistent with thrombus. Echocardiogram may be helpful for further evaluation.      3.  Prominent retroperitoneal lymph nodes with nonspecific retroperitoneal inflammatory stranding as reported on the  prior CT.      4.  Small bilateral pleural effusions, right greater than left. Adjacent airspace disease likely atelectasis.            Comment: Results discussed with Dr. Gunter at 9:32 AM      [id number] VIEW   Final Result         No specific finding to suggest small bowel obstruction.      US-PELVIC COMPLETE (TRANSABDOMINAL/TRANSVAGINAL) (COMBO)   Final Result         1.  Heterogeneous appearance of the uterus with large heterogeneous uterine mass, appearance most compatible with uterine fibroid.   2.  Heterogeneous lesion in the left ovary, could represent hemorrhagic cyst or endometrioma, otherwise indeterminate, recommend follow-up sonogram in 6 weeks for repeat characterization and evaluation of stability.   3.  Thickened endometrial stripe, likely proliferative changes, consider endometrial pathology with additional follow-up as clinically appropriate.   4.  Nabothian cyst      US-RUQ   Final Result      1.  No evidence of gallstones or biliary ductal dilatation.   2.  1.9 cm cystic lesion in the right kidney. Mural nodularity is not excluded and further evaluation with renal protocol MRI is recommended.         CT-ABDOMEN-PELVIS WITH   Final Result      1.  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   2.  Hypodensity in the cervix could be artifactual or could indicate underlying cervical mass. Suggest correlation with physical exam and gynecological history.   3.  19 mm hypodense RIGHT renal lesion, likely but not definitely a cyst. Suggest further assessment with ultrasound when clinically appropriate.                   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         Elevated liver function tests-  (present on admission)  Assessment & Plan  Admit ultrasound and serial CT without apparent gallbladder or biliary pathology  Ongoing abdominal pain  6/21 Fevers and leukocytosis.  Hepatitis panel negative.  6/22 Sharp increase in ALP and Tbili.  Rec MRCP  [facility]    Hemorrhage of both adrenal glands (HCC)- (present on admission)  Assessment & Plan  Should be self limited, no clear extravasation in CT  OK for DVT prophylaxis if clinically neessary  [facility]    Retroperitoneal lymphadenopathy  Assessment & Plan  Unknown source        Garrett An MD, FACS

## 2021-06-23 NOTE — PROGRESS NOTES
Pharmacy Vancomycin Kinetics Note for 6/22/2021     46 y.o. female on Vancomycin day # 1     Vancomycin Indication (AUC Dosing): Severe or complicated infection    Provider specified end date: 06/24/21    Active Antibiotics (From admission, onward)    Ordered     Ordering Provider       Tue Jun 22, 2021  7:37 PM    06/22/21 1937  vancomycin (VANCOCIN) 2,000 mg in  mL IVPB  (vancomycin (VANCOCIN) IV (LD + Maintenance))  ONCE      Salina Zuniga D.O.       Tue Jun 22, 2021  6:53 PM    06/22/21 1853  MD Alert...Vancomycin per Pharmacy  PHARMACY TO DOSE     Question:  Indication(s) for vancomycin?  Answer:  Unknown source of infection    Salina Zuniga D.O.       Tue Jun 22, 2021 12:15 PM    06/22/21 1215  cefepime (Maxipime) 2 g in sterile water 20 mL IV syringe  EVERY 12 HOURS      Salina Zuniga D.O.       Mon Jun 21, 2021  4:23 PM    06/21/21 1623  metroNIDAZOLE (FLAGYL) tablet 500 mg  EVERY 8 HOURS     Note to Pharmacy: Per P&T IV to PO Protocol    Piotr Gunter D.O.          Dosing Weight: 80.6 kg (177 lb 11.1 oz)      Admission History: Patient admitted 6/14 for history of abdominal pain, found to have b/l adrenal mass with hemorrage, SVC thrombus, uterine fibroids and kidney lesion. Sepsis criteria including fever, with temperature greater than 101 deg F, tachycardia, and leukocytosis. Source is likely intra-abdominal.       Allergies:     Patient has no known allergies.     Pertinent cultures to date: Blood cultures 6/21: pending    Results     Procedure Component Value Units Date/Time    URINALYSIS [[id number]]  (Abnormal) Collected: 06/22/21 1810    Order Status: Completed Specimen: Urine, Clean Catch Updated: 06/22/21 1900     Color DK Yellow     Character Clear     Specific Gravity 1.007     Ph 6.0     Glucose Negative mg/dL      Ketones Negative mg/dL      Protein Negative mg/dL      Bilirubin Moderate     Urobilinogen, Urine 0.2     Nitrite Negative     Leukocyte Esterase Negative      "Occult Blood Large     Micro Urine Req Microscopic    Narrative:      Collected By:[id number] LIZET MCDONOUGH    MRSA By PCR (Amp) [[id number]]     Order Status: No result Specimen: Respirate from Nares     BLOOD CULTURE [[id number]] Collected: 06/21/21 1221    Order Status: Completed Specimen: Blood from Peripheral Updated: 06/22/21 0916     Significant Indicator NEG     Source BLD     Site PERIPHERAL     Culture Result No Growth  Note: Blood cultures are incubated for 5 days and  are monitored continuously.Positive blood cultures  are called to the RN and reported as soon as  they are identified.      Narrative:      Per Hospital Policy: Only change Specimen Src: to \"Line\" if  specified by physician order.  Right Hand    BLOOD CULTURE [[id number]] Collected: 06/21/21 1213    Order Status: Completed Specimen: Blood from Peripheral Updated: 06/22/21 0916     Significant Indicator NEG     Source BLD     Site PERIPHERAL     Culture Result No Growth  Note: Blood cultures are incubated for 5 days and  are monitored continuously.Positive blood cultures  are called to the RN and reported as soon as  they are identified.      Narrative:      Per Hospital Policy: Only change Specimen Src: to \"Line\" if  specified by physician order.  Right AC    BLOOD CULTURE [[id number]] Collected: 06/14/21 1650    Order Status: Completed Specimen: Blood from Peripheral Updated: 06/19/21 1900     Significant Indicator NEG     Source BLD     Site PERIPHERAL     Culture Result No growth after 5 days of incubation.    Narrative:      1 of 2 for Blood Culture x 2 sites order. Per Hospital  Policy: Only change Specimen Src: to \"Line\" if specified by  physician order.  No site indicated    BLOOD CULTURE [[id number]] Collected: 06/14/21 1650    Order Status: Completed Specimen: Blood from Peripheral Updated: 06/19/21 1900     Significant Indicator NEG     Source BLD     Site PERIPHERAL     Culture Result No growth after 5 days of incubation.    Narrative:  " "    2 of 2 blood culture x2  Sites order. Per Hospital Policy:  Only change Specimen Src: to \"Line\" if specified by physician  order.  No site indicated    URINE CULTURE-EXISTING-LESS THAN 48 HOURS [[id number]] Collected: 06/14/21 1258    Order Status: Completed Specimen: Urine, Clean Catch Updated: 06/16/21 0814     Significant Indicator NEG     Source UR     Site URINE, CLEAN CATCH     Culture Result Usual skin daniel 10-50,000 cfu/mL    Narrative:      Indication for culture:->Unexplained new onset of Flank pain  and/or Costovertebral angle tenderness  Indication for culture:->Unexplained new onset of Flank pain    SARS-CoV-2 PCR (24 hour In-House): Collect NP swab in [facility] [[id number]] Collected: 06/14/21 1842    Order Status: Completed Specimen: Respirate from Nasopharyngeal Updated: 06/15/21 1204     SARS-CoV-2 Source NP Swab     SARS-CoV-2 by PCR NotDetected     Comment: PATIENTS: Important information regarding your results and instructions can  be found at [email or web address]   \"After your  Covid-19 Test\"    [facility] providers: PLEASE REFER TO DE-ESCALATION AND RETESTING PROTOCOL  on [email or web address]    **The TaqPath COVID-19 SARS-CoV-2 RT-PCR test has been made available for  use under the Emergency Use Authorization (EUA) only.         Narrative:      Have you been in close contact with a person who is suspected  or known to be positive for COVID-19 within the last 30 days  (e.g. last seen that person < 30 days ago)->Unknown    URINALYSIS [[id number]]  (Abnormal) Collected: 06/14/21 1258    Order Status: Completed Specimen: Urine, Clean Catch Updated: 06/14/21 1345     Color Orange     Character Turbid     Specific Gravity 1.028     Ph 5.5     Glucose Negative mg/dL      Ketones Trace mg/dL      Protein 300 mg/dL      Bilirubin Moderate     Urobilinogen, Urine 0.2     Nitrite Positive     Leukocyte Esterase Negative     Occult Blood Small     Micro Urine Req Microscopic          Labs: " "    Estimated Creatinine Clearance: 183.5 mL/min (A) (by C-G formula based on SCr of 0.44 mg/dL (L)).  Recent Labs     21  0634 21  0711 21  0614 21  1451   WBC 7.5 14.1* 21.8* 19.7*   NEUTSPOLYS  --   --  87.60*  --      Recent Labs     21  0634 21  0711 21  0614   BUN 8 7* 6*   CREATININE 0.60 0.47* 0.44*   ALBUMIN 2.7*  --  2.5*       Intake/Output Summary (Last 24 hours) at 2021  Last data filed at 2021 1900  Gross per 24 hour   Intake 2639.12 ml   Output --   Net 2639.12 ml      /83   Pulse (!) 119   Temp 37.3 °C (99.2 °F) (Temporal)   Resp 20   Ht 1.753 m (5' 9\")   Wt 80.6 kg (177 lb 11.1 oz)   SpO2 96%  Temp (24hrs), Av.3 °C (99.2 °F), Min:36.3 °C (97.4 °F), Max:38.7 °C (101.7 °F)      List concerns for Vancomycin clearance:     Abnormal LFTs;Other (possible left renal obstruction)    Pharmacokinetics:    AUC kinetics:   Ke (hr ^-1): 0.1111 hr^-1  Half life: 6.24 hr  Clearance: 5.821  Estimated TDD: 2910.5  Estimated Dose: 994  Estimated interval: 8.2    A/P:     -  Vancomycin dose: 1500 mg q12h    -  Predicted vancomycin AUC from initial AUC test calculator: 515 mg·hr/L    -  Comments: Empiric vancomycin therapy initiated for complicated infection with unknown source. Blood cultures from  pending. Trough to be ordered prior to 4th maintenance dose. Pharmacy to continue to follow.    Renae Jin, PharmD  "

## 2021-06-23 NOTE — CARE PLAN
The patient is Stable - Low risk of patient condition declining or worsening    Shift Goals  Clinical Goals: manage pts pain  Patient Goals: Pain management    Progress made toward(s) clinical / shift goals:  pts pain has been managed with medication    Patient is not progressing towards the following goals:

## 2021-06-23 NOTE — CARE PLAN
Problem: Nutritional:  Goal: Achieve adequate nutritional intake  Description: Diet will advance beyond NPO / clear liquids and patient will consume >50% of meals.  Outcome: Not Met   See dietary note. RD following.

## 2021-06-23 NOTE — CONSULTS
Nephrology Consultation    Date of Service  6/23/2021    Referring Physician  Salina Zuniga D.O.    Consulting Physician  Mateo Sutherland M.D.    Reason for Consultation  Possible need for plasmapheresis    History of Presenting Illness  46 y.o. female with a history of pulmonary embolism who presented 6/14/2021 with abdominal pain.    Patient originally presented on 6/14/2021 with abdominal pain.  Hospital course has been complicated by SVC thrombus, as well as rectal bleeding and vaginal bleeding.  Patient's labs have been notable for positive antiphospholipid antibody.  There is concern for catastrophic antiphospholipid antibody syndrome.    On my evaluation, the patient complains of abdominal pain, says she cannot get comfortable.  Abdominal pain is located in the right upper quadrant.  Abdominal pain is relieved minimally by pain medicine.  Patient does also suffer from nosebleeds, vaginal bleeding, and rectal bleeding.  Patient denies chest pain, shortness of breath.  Patient is amenable to plasmapheresis if needed.        Review of Systems  Review of Systems   Constitutional: Negative for fever.   Respiratory: Negative for shortness of breath.    Cardiovascular: Negative for chest pain.   Gastrointestinal: Positive for abdominal pain and blood in stool.   Endo/Heme/Allergies: Bruises/bleeds easily.   All other systems reviewed and are negative.      Past Medical History  Past Medical History:   Diagnosis Date   • PE (pulmonary embolism)        Surgical History  Past Surgical History:   Procedure Laterality Date   • PB UPPER GI ENDOSCOPY,DIAGNOSIS N/A 6/17/2021    Procedure: GASTROSCOPY;  Surgeon: Elfego Lopez M.D.;  Location: SURGERY SAME DAY [facility];  Service: Gastroenterology   • PB UPPER GI ENDOSCOPY,BIOPSY N/A 6/17/2021    Procedure: GASTROSCOPY, WITH BIOPSY;  Surgeon: Elfego Lopez M.D.;  Location: SURGERY SAME DAY [facility];  Service: Gastroenterology       Family History  History reviewed. No  pertinent family history.    Social History  Social History     Socioeconomic History   • Marital status:      Spouse name: Not on file   • Number of children: Not on file   • Years of education: Not on file   • Highest education level: Not on file   Occupational History   • Not on file   Tobacco Use   • Smoking status: Current Every Day Smoker   • Tobacco comment: 1 pk per week   Substance and Sexual Activity   • Alcohol use: Yes     Comment: occasionally   • Drug use: Yes     Types: Inhaled     Comment: marijuana   • Sexual activity: Not on file   Other Topics Concern   • Not on file   Social History Narrative   • Not on file     Social Determinants of Health     Financial Resource Strain:    • Difficulty of Paying Living Expenses:    Food Insecurity:    • Worried About Running Out of Food in the Last Year:    • Ran Out of Food in the Last Year:    Transportation Needs:    • Lack of Transportation (Medical):    • Lack of Transportation (Non-Medical):    Physical Activity:    • Days of Exercise per Week:    • Minutes of Exercise per Session:    Stress:    • Feeling of Stress :    Social Connections:    • Frequency of Communication with Friends and Family:    • Frequency of Social Gatherings with Friends and Family:    • Attends Spiritism Services:    • Active Member of Clubs or Organizations:    • Attends Club or Organization Meetings:    • Marital Status:    Intimate Partner Violence:    • Fear of Current or Ex-Partner:    • Emotionally Abused:    • Physically Abused:    • Sexually Abused:        Medications  Current Facility-Administered Medications   Medication Dose Route Frequency Provider Last Rate Last Admin   • heparin infusion 25,000 units in 500 mL 0.45% NACL  0-30 Units/kg/hr Intravenous Continuous Da Varela M.D. 19.3 mL/hr at 06/23/21 1406 12 Units/kg/hr at 06/23/21 1406   • baclofen (LIORESAL) tablet 10 mg  10 mg Oral TID PRN Salina Zuniga D.O.   10 mg at 06/23/21 0144   • oxyCODONE  immediate-release (ROXICODONE) tablet 5 mg  5 mg Oral Q4HRS PRN Salina Zuniga D.O.   5 mg at 06/22/21 1710   • oxyCODONE immediate release (ROXICODONE) tablet 10 mg  10 mg Oral Q4HRS PRN Salina Zuniga D.O.   10 mg at 06/23/21 1134   • omeprazole (PRILOSEC) capsule 20 mg  20 mg Oral BID Salina Zuniga D.O.   20 mg at 06/23/21 0508   • cefepime (Maxipime) 2 g in sterile water 20 mL IV syringe  2 g Intravenous Q12HRS ALEXA Humphries.O.   Stopped at 06/23/21 0658   • NS infusion   Intravenous Continuous ALEXA Humphries.NATACHA.   Paused at 06/22/21 1840   • MD Alert...Vancomycin per Pharmacy   Other PHARMACY TO DOSE ALEXA Humphries.O.       • methylPREDNISolone sod succ (SOLU-MEDROL) 1,000 mg in  mL IVPB  1 g Intravenous Q12HRS ALEXA Humphries.O.   Stopped at 06/23/21 0604   • vancomycin (VANCOCIN) 1,500 mg in  mL IVPB  1,500 mg Intravenous Q12HR ALEXA Humphries.O.   Stopped at 06/23/21 1149   • HYDROmorphone (Dilaudid) injection 1 mg  1 mg Intravenous Q4HRS PRN Piotr Gunter, D.O.   1 mg at 06/23/21 1440   • metroNIDAZOLE (FLAGYL) tablet 500 mg  500 mg Oral Q8HRS Fortbrenda Durham-NATACHAjeanor, D.O.   500 mg at 06/23/21 1323   • lidocaine (LIDODERM) 5 % 1 Patch  1 Patch Transdermal Q24HR Graeth Castro M.D.   1 Patch at 06/22/21 2237   • senna-docusate (PERICOLACE or SENOKOT S) 8.6-50 MG per tablet 2 tablet  2 tablet Oral BID Jamar Mo M.D.   2 tablet at 06/23/21 0508    And   • polyethylene glycol/lytes (MIRALAX) PACKET 1 Packet  1 Packet Oral QDAY PRN Jamar Mo M.D.        And   • magnesium hydroxide (MILK OF MAGNESIA) suspension 30 mL  30 mL Oral QDAY PRCORNELIA Mo M.D.        And   • bisacodyl (DULCOLAX) suppository 10 mg  10 mg Rectal QDAY PRCORNELIA Mo M.D.   10 mg at 06/15/21 1201   • acetaminophen (Tylenol) tablet 650 mg  650 mg Oral Q6HRS PRCORNELIA Mo M.D.   650 mg at 06/23/21 0144   • cloNIDine (CATAPRES) tablet 0.1 mg  0.1 mg Oral Q6HRS PRCORNELIA Mo M.D.        • enalaprilat (VASOTEC) injection 1.25 mg  1.25 mg Intravenous Q6HRS PRCORNELIA Mo M.D.       • labetalol (NORMODYNE/TRANDATE) injection 10 mg  10 mg Intravenous Q4HRS PRCORNELIA Mo M.D.       • ondansetron (ZOFRAN) syringe/vial injection 4 mg  4 mg Intravenous Q4HRS CRISTAL Mo M.D.   4 mg at 06/16/21 1355   • ondansetron (ZOFRAN ODT) dispertab 4 mg  4 mg Oral Q4HRS PRCORNELIA Mo M.D.       • promethazine (PHENERGAN) tablet 12.5-25 mg  12.5-25 mg Oral Q4HRS CRISTAL Mo M.D.       • promethazine (PHENERGAN) suppository 12.5-25 mg  12.5-25 mg Rectal Q4HRS CRISTAL Mo M.D.       • prochlorperazine (COMPAZINE) injection 5-10 mg  5-10 mg Intravenous Q4HRS CRISTAL Mo M.D.           Allergies  No Active Allergies    Physical Exam  Temp:  [36.1 °C (96.9 °F)-37.3 °C (99.2 °F)] 36.4 °C (97.5 °F)  Pulse:  [] 94  Resp:  [12-20] 12  BP: (100-137)/(58-88) 137/88  SpO2:  [92 %-98 %] 96 %    Physical Exam  Constitutional:       General: She is not in acute distress.     Appearance: She is well-developed.   HENT:      Head: Normocephalic and atraumatic.      Mouth/Throat:      Mouth: Mucous membranes are moist.      Pharynx: Oropharynx is clear. No oropharyngeal exudate.   Eyes:      General: No scleral icterus.     Extraocular Movements: Extraocular movements intact.   Neck:      Trachea: No tracheal deviation.   Cardiovascular:      Rate and Rhythm: Normal rate and regular rhythm.      Heart sounds: Normal heart sounds. No murmur heard.     Pulmonary:      Effort: Pulmonary effort is normal.      Breath sounds: No stridor. No rales.   Abdominal:      Palpations: Abdomen is soft.      Tenderness: There is no abdominal tenderness.   Musculoskeletal:         General: Normal range of motion.      Cervical back: Normal range of motion. No rigidity.      Right lower leg: No edema.      Left lower leg: No edema.   Skin:     General: Skin is warm and dry.   Neurological:      General: No  focal deficit present.      Mental Status: She is alert and oriented to person, place, and time.   Psychiatric:         Mood and Affect: Mood normal.         Behavior: Behavior normal.         Fluids      Laboratory  Labs reviewed, pertinent labs below.  Recent Labs     06/22/21  1451 06/22/21  2019 06/23/21  0811   WBC 19.7* 18.9* 10.6   RBC 4.03* 3.74* 3.51*   HEMOGLOBIN 8.4* 7.8* 7.4*   HEMATOCRIT 29.4* 27.2* 25.8*   MCV 73.0* 72.7* 73.5*   MCH 20.8* 20.9* 21.1*   MCHC 28.6* 28.7* 28.7*   RDW 54.7* 55.2* 57.2*   PLATELETCT 64* 87* 109*     Recent Labs     06/21/21  0711 06/22/21  0614 06/23/21  0811   SODIUM 128* 125* 127*   POTASSIUM 4.2 4.4 4.5   CHLORIDE 94* 91* 94*   CO2 23 23 19*   GLUCOSE 103* 100* 101*   BUN 7* 6* 22   CREATININE 0.47* 0.44* 1.09   CALCIUM 9.2 8.9 8.4*     Recent Labs     06/22/21  1451 06/23/21  0811 06/23/21  1257   APTT  --  74.2* 163.0*   INR 1.63* 1.56*  --             URINALYSIS:  Lab Results   Component Value Date/Time    COLORURINE DK Yellow 06/22/2021 1810    CLARITY Clear 06/22/2021 1810    SPECGRAVITY 1.007 06/22/2021 1810    PHURINE 6.0 06/22/2021 1810    KETONES Negative 06/22/2021 1810    PROTEINURIN Negative 06/22/2021 1810    BILIRUBINUR Moderate (A) 06/22/2021 1810    UROBILU 0.2 06/22/2021 1810    NITRITE Negative 06/22/2021 1810    LEUKESTERAS Negative 06/22/2021 1810    OCCULTBLOOD Large (A) 06/22/2021 1810     UPC  No results found for: TOTPROTUR No results found for: CREATININEU    Imaging interpreted by radiologist. Imaging reports reviewed with pertinent findings below  US-RENAL   Final Result      1.  Mild left hydronephrosis.   2.  No right hydronephrosis.      [id number]/O   Final Result         1. Normal sized CBD. No CBD stone.   2. Thickening and heterogeneity of the bilateral adrenal glands with surrounding stranding, incompletely evaluated but concerning for hemorrhage.   3. No gallstone. Mild pericholecystic fluid could be reactive change due to adjacent  adrenal hemorrhage.   4. Worsening left hydronephrosis, concerning for distal obstruction      DX-CHEST-LIMITED (1 VIEW)   Final Result         No acute cardiopulmonary abnormalities are identified.      EC-ECHOCARDIOGRAM COMPLETE W/O CONT   Final Result      CT-ABDOMEN-PELVIS WITH   Final Result      1.  New right adrenal mass likely represents hemorrhage. Thickening of the left adrenal gland is concerning for developing hemorrhage as well.      2.  Low attenuation filling defect in the superior vena cava is consistent with thrombus. Echocardiogram may be helpful for further evaluation.      3.  Prominent retroperitoneal lymph nodes with nonspecific retroperitoneal inflammatory stranding as reported on the prior CT.      4.  Small bilateral pleural effusions, right greater than left. Adjacent airspace disease likely atelectasis.            Comment: Results discussed with Dr. Gunter at 9:32 AM      [id number] VIEW   Final Result         No specific finding to suggest small bowel obstruction.      US-PELVIC COMPLETE (TRANSABDOMINAL/TRANSVAGINAL) (COMBO)   Final Result         1.  Heterogeneous appearance of the uterus with large heterogeneous uterine mass, appearance most compatible with uterine fibroid.   2.  Heterogeneous lesion in the left ovary, could represent hemorrhagic cyst or endometrioma, otherwise indeterminate, recommend follow-up sonogram in 6 weeks for repeat characterization and evaluation of stability.   3.  Thickened endometrial stripe, likely proliferative changes, consider endometrial pathology with additional follow-up as clinically appropriate.   4.  Nabothian cyst      US-RUQ   Final Result      1.  No evidence of gallstones or biliary ductal dilatation.   2.  1.9 cm cystic lesion in the right kidney. Mural nodularity is not excluded and further evaluation with renal protocol MRI is recommended.         CT-ABDOMEN-PELVIS WITH   Final Result      1.  Changes in the retroperitoneal fat  suspicious for retroperitoneal fibrosis, less likely lymphoma or metastatic disease.   2.  Hypodensity in the cervix could be artifactual or could indicate underlying cervical mass. Suggest correlation with physical exam and gynecological history.   3.  19 mm hypodense RIGHT renal lesion, likely but not definitely a cyst. Suggest further assessment with ultrasound when clinically appropriate.                     Assessment/Plan  46 y.o. 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    1.  Concern for catastrophic antiphospholipid antibody syndrome.  Plasmapheresis could be considered.  Will defer timing of plasmapheresis to hematology oncology.  If plasmapheresis is done, will use FFP one-to-one as replacement solution given bleeding concerns.    2.  Left renal hydronephrosis, mild on kidney ultrasound 6/22/2021.  Recommend monitor renal ultrasound again in 48 hours.    Nephrology will follow peripherally.  Please call back if the patient will need to start on plasmapheresis.      Mateo Sutherland MD  Nephrology

## 2021-06-23 NOTE — CONSULTS
Consults   Addendum-I discussed her case unofficially with Dr. Brownlee from [place].  She agreed with plans for steroids and IVIG.  Strongly recommend anticoagulation however, will be difficult to do plasma exchange along with continuous anticoagulation.  Her T bilirubin has improved compared to yesterday, will hold off on plans for plasma exchange today and depending on her clinical course will consider this down the line.    Since her HIT results are pending, we will try argatroban with dose adjustment for liver dysfunction and PT monitoring.  Once HIT screen comes back negative she can be switched to either heparin gtt. or Lovenox with factor X monitoring    Consult Note: Oncology    Date of consultation: 6/23/2021 7:53 AM    Referring provider: Dr Zuniga    Reason for consultation: Possible catastrophic antiphospholipid antibody syndrome    History of presenting illness:      46 y.o. year old female with prior history of pulmonary embolism who presented with worsening abdominal pain.  CT scan abdomen/pelvis 6/14/ 21 initially read as retroperitoneal fatty changes suspicious for fibrosis.  Ultrasound of the abdomen and pelvis showed possible cystic lesion of the right kidney and fibroid.  GI start the patient on Protonix.  EGD did not reveal any source of abdominal pain.  Follow-up CT scan showed    1.  New right adrenal mass likely represents hemorrhage. Thickening of the left adrenal gland is concerning for developing hemorrhage as well.     2.  Low attenuation filling defect in the superior vena cava is consistent with thrombus. Echocardiogram may be helpful for further evaluation.     3.  Prominent retroperitoneal lymph nodes with nonspecific retroperitoneal inflammatory stranding as reported on the prior CT. evaluated by vascular surgery.  Echocardiogram showed ejection fraction of 65% with no thrombus.  A RADHIKA was tentatively planned.  She was not on anticoagulation due to adrenal hemorrhage.    Over the past  2 days she has developed significant worsening of her LFTs along with worsening thrombocytopenia.  She underwent extensive work-up which showed antiphospholipid antibody syndrome with positive lupus anticoagulant. Drvvt/StaClot +   Anticardiolipin IgG titer was elevated at 146 GPL.  She had baseline PTT elevation at around 89.  INR from yesterday was 1.63.  TEG testing was negative.  She has also developed some mucocutaneous bleeding including oral and rectal mucosal bleeding.  Total bilirubin was 7.1 from 6/22/2021.  Direct bilirubin of 3.4.  LDH was 139.  MRI of the abdomen from 6/22/2021 did not reveal any biliary obstruction.  There was some worsening of her hydronephrosis.  She was started on high-dose steroids and IVIG yesterday.  Currently not on anticoagulation.  She apparently was on Lovenox?  For few days initially.    PAST MEDICAL HISTORY:  has a past medical history of PE (pulmonary embolism).     PAST SURGICAL HISTORY:  has a past surgical history that includes upper gi endoscopy,diagnosis (N/A, 6/17/2021) and upper gi endoscopy,biopsy (N/A, 6/17/2021).     No documented family history of any hypercoagulable conditions.  Personal social history she is not a smoker no significant alcohol use.    Allergies:  Heparin    Medications:    Current Facility-Administered Medications   Medication Dose Route Frequency Provider Last Rate Last Admin   • baclofen (LIORESAL) tablet 10 mg  10 mg Oral TID PRN Salina Zuniga D.O.   10 mg at 06/23/21 0144   • oxyCODONE immediate-release (ROXICODONE) tablet 5 mg  5 mg Oral Q4HRS PRN Salina Zuniga D.O.   5 mg at 06/22/21 1710   • oxyCODONE immediate release (ROXICODONE) tablet 10 mg  10 mg Oral Q4HRS PRN Salina Zuniga D.O.   10 mg at 06/23/21 0508   • omeprazole (PRILOSEC) capsule 20 mg  20 mg Oral BID Salina Zuniga D.O.   20 mg at 06/23/21 0508   • cefepime (Maxipime) 2 g in sterile water 20 mL IV syringe  2 g Intravenous Q12HRS ALEXA Humphries.O.   Stopped  at 06/23/21 0658   • NS infusion   Intravenous Continuous Salina Zuniga D.O.   Paused at 06/22/21 1840   • Pharmacy Consult: HIT Monitoring   Other PRN Salina Zuniga D.O.       • MD Alert...Vancomycin per Pharmacy   Other PHARMACY TO DOSE Salina Zuniga D.O.       • methylPREDNISolone sod succ (SOLU-MEDROL) 1,000 mg in  mL IVPB  1 g Intravenous Q12HRS Salina Zuniga D.O.   Stopped at 06/23/21 0604   • vancomycin (VANCOCIN) 1,500 mg in  mL IVPB  1,500 mg Intravenous Q12HR Salina Zuniga D.O.       • HYDROmorphone (Dilaudid) injection 1 mg  1 mg Intravenous Q4HRS PRN Piotr Gunter D.O.   1 mg at 06/23/21 0236   • metroNIDAZOLE (FLAGYL) tablet 500 mg  500 mg Oral Q8HRS Piotr Gunter D.O.   500 mg at 06/23/21 0508   • lidocaine (LIDODERM) 5 % 1 Patch  1 Patch Transdermal Q24HR Gareth Castro M.D.   1 Patch at 06/22/21 2237   • senna-docusate (PERICOLACE or SENOKOT S) 8.6-50 MG per tablet 2 tablet  2 tablet Oral BID Jamar Mo M.D.   2 tablet at 06/23/21 0508    And   • polyethylene glycol/lytes (MIRALAX) PACKET 1 Packet  1 Packet Oral QDAY PRCORNELIA Mo M.D.        And   • magnesium hydroxide (MILK OF MAGNESIA) suspension 30 mL  30 mL Oral QDAY PRN Jamar Mo M.D.        And   • bisacodyl (DULCOLAX) suppository 10 mg  10 mg Rectal QDAY PRCORNELIA Mo M.D.   10 mg at 06/15/21 1201   • acetaminophen (Tylenol) tablet 650 mg  650 mg Oral Q6HRS PRN Jamar Mo M.D.   650 mg at 06/23/21 0144   • cloNIDine (CATAPRES) tablet 0.1 mg  0.1 mg Oral Q6HRS PRN Jamar Mo M.D.       • enalaprilat (VASOTEC) injection 1.25 mg  1.25 mg Intravenous Q6HRS PRCORNELIA Mo M.D.       • labetalol (NORMODYNE/TRANDATE) injection 10 mg  10 mg Intravenous Q4HRS PRCORNELIA Mo M.D.       • ondansetron (ZOFRAN) syringe/vial injection 4 mg  4 mg Intravenous Q4HRS PRN Jamar Mo M.D.   4 mg at 06/16/21 1355   • ondansetron (ZOFRAN ODT) dispertab 4 mg  4 mg Oral Q4HRS PRN Jamar Mo  "M.D.       • promethazine (PHENERGAN) tablet 12.5-25 mg  12.5-25 mg Oral Q4HRS PRN Jamar Mo M.D.       • promethazine (PHENERGAN) suppository 12.5-25 mg  12.5-25 mg Rectal Q4HRS PRN Jamar Mo M.D.       • prochlorperazine (COMPAZINE) injection 5-10 mg  5-10 mg Intravenous Q4HRS PRN Jamar Mo M.D.           Review of Systems:      Constitutional: No fever, chills, weight loss ,+ malaise/fatigue.    HEENT: No new auditory or visual complaints. No sore throat and neck pain.     Respiratory:No new cough, sputum production, shortness of breath and wheezing.    Cardiovascular: No new chest pain, palpitations, orthopnea and leg swelling.    Gastrointestinal: No heartburn, nausea, vomiting , + abdominal pain, hematochezia or melena     Genitourinary: Negative for dysuria, hematuria    Musculoskeletal: No new arthralgias , +myalgias   Skin: Negative for rash and itching.    Neurological: Negative for focal weakness or headaches.    Endo/Heme/Allergies: No abnormal bleed/bruise.    Psychiatric/Behavioral: No new depression/anxiety.    Physical Exam:  Vitals:   /58   Pulse (!) 119   Temp 36.8 °C (98.2 °F) (Temporal)   Resp 18   Ht 1.753 m (5' 9\")   Wt 80.6 kg (177 lb 11.1 oz)   SpO2 96%   BMI 26.24 kg/m²     General: Not in acute distress, alert and oriented x 3  HEENT: No pallor,  + scleral icterus. Oropharynx clear.   Neck: Supple, no palpable masses.  Lymph nodes: No palpable cervical, supraclavicular, axillary or inguinal lymphadenopathy.    CVS: regular rate and rhythm, no rubs or gallops  RESP: Clear to auscultate bilaterally, no wheezing or crackles.   ABD: generalized tenderness  EXT: No edema or cyanosis  CNS: Alert and oriented x3, No focal deficits.  Skin- No rash      Labs:   Recent Labs     06/22/21  0614 06/22/21  1451 06/22/21  2019   RBC 3.84* 4.03* 3.74*   HEMOGLOBIN 8.2* 8.4* 7.8*   HEMATOCRIT 27.3* 29.4* 27.2*   PLATELETCT 64* 64* 87*   PROTHROMBTM  --  18.9*  --    INR  --  1.63*  " --      Lab Results   Component Value Date/Time    SODIUM 125 (L) 06/22/2021 06:14 AM    POTASSIUM 4.4 06/22/2021 06:14 AM    CHLORIDE 91 (L) 06/22/2021 06:14 AM    CO2 23 06/22/2021 06:14 AM    GLUCOSE 100 (H) 06/22/2021 06:14 AM    BUN 6 (L) 06/22/2021 06:14 AM    CREATININE 0.44 (L) 06/22/2021 06:14 AM        Assessment and Plan:    Possible catastrophic antiphospholipid antibody syndrome.  She does have prior history of PE.  Presented with vague abdominal pain and appears to have developed internal hemorrhoids.  She was tested positive for lupus anticoagulant.  She has elevated baseline PTT and and if phospholipid antibody titer.  Initial CT abdomen also mention possible SVC thrombus.    Over the past few days she has also developed worsening jaundice, thrombocytopenia and LFT abnormalities.  MRI shows no biliary obstruction.  No DIC.  HIT pending however appears to be less likely.  Will review peripheral smear to rule out any TTP.  CUONG testing Johan testing unremarkable.  No splenomegaly.  She does have some retroperitoneal fibrosis type finding with hydronephrosis which may be related to bleeding.     She has also developed a generalized mucocutaneous bleeding.  Overall current picture is very concerning for catastrophic antiphospholipid antibody syndrome with associated generalized bleeding which is difficult to manage.  She has already been started on high-dose steroids and IVIG.   Given her obvious clinical course would recommend plasmapheresis.  Recommend continuing high-dose steroids for 2 to 3 days along with IVIG 1 more dose.  Need to monitor CBC LFTs.    Overall guarded prognosis high risk of both bleeding and clotting.  Ideally this patient's need to be on anticoagulation however in view of the generalized mucocutaneous bleeding anticoagulation can be difficult.  If HIT panel negative, would recommend unfractionated heparin with Xa monitoring to prevent further underlying clotting which appears to be  triggering the bleeding through microthrombosis, platelet consumption etc.  We will also check prothrombin level.  I will reach out to [facility] for any additional recommendation.  Informed the patient's  prognosis is very guarded due to the above factors.  She agreed and verbalized her agreement and understanding with the current plan.  I answered all questions and concerns she has at this time.              Thank you for allowing me to participate in her care.    Please note that this dictation was created using voice recognition software. I have made every reasonable attempt to correct obvious errors, but I expect that there are errors of grammar and possibly content that I did not discover before finalizing the note.      SIGNATURES:  Da Varela M.D.    CC:  Pcp Pt States None  No ref. provider found

## 2021-06-23 NOTE — PROGRESS NOTES
Received report from med/surg RN. Pt was a higher level of care. Pt is lethargic/sedated and slow to answer questions and has complaints of 9/10 abdominal pain/tender to the touch. Pt is slightly tachycardic in the low 100's on telemetry monitoring, on 1L of O2 at 97%. BP is holding in the 120's. New IV started in the left forearm upon arrival to the floor. Called the doctor about the heparin drip as the patient is allergic to heparin/as well as canceled IR dialysis placement. Currently being worked up for HIT. Floor pharmacist to follow up with oncologist regarding order. POC discussed with patient and family and they verbalized understanding. Call light and belongings within reach. Bed is locked and in lowest position.

## 2021-06-23 NOTE — DIETARY
"Nutrition services: Day 7 of admit.  Jaclyn Olvera is a 46 y.o. female with admitting DX of Abdominal pain, Catastrophic antiphospholipid syndrome.   LOS X 7 nutrition screen concerning for minimal PO documentation on Regular diet and current NPO status with transfer to higher level of care.    Last meal recorded in ADLs 6/20 - PO had been variable (0-100%) per prior documentation. Per MD note review, pt has been having worsening abdominal pain and documented to be unable to tolerate PO intake starting 6/20. Pt transferred to [facility] this AM for higher level of care d/t concerns for possible catastrophic antiphospholipid antibody syndrome - made NPO.     Assessment:  Height: 175.3 cm (5' 9\")  Weight: 80.6 kg (177 lb 11.1 oz)  Body mass index is 26.24 kg/m²., BMI classification: overweight  Diet/Intake: NPO    Evaluation:   1. Admitted for abdominal pain - workup has shown antiphospholipid antibody syndrome with positive lupus anticoagulant  2. Per RN note pt lethargic/sedated - per RD judgement dietary/wt hx interview not appropriate at this time, unable to obtain PO/wt hx  3. Meds: cefepime, vancomycin, solu-medrol, flagyl, prilosec, senokot, dilaudid PRN, roxicodone PRN, argatroban infusion  4. Labs: Na 127, LFTs elevated (downward trend from yesterday)    Malnutrition Risk: Unable to fully assess at this time - but suspect inadequate nutrition x 3 days per review of chart.     Recommendations/Plan:  1. Advance diet beyond NPO/clear liquids as medically feasible  2. RD to follow up for diet / wt hx as appropriate   3. Monitor weight  4. RD will follow for nutrition plan of care    RD to follow             "

## 2021-06-24 ENCOUNTER — APPOINTMENT (OUTPATIENT)
Dept: RADIOLOGY | Facility: MEDICAL CENTER | Age: 46
DRG: 814 | End: 2021-06-24
Attending: STUDENT IN AN ORGANIZED HEALTH CARE EDUCATION/TRAINING PROGRAM
Payer: COMMERCIAL

## 2021-06-24 LAB
ABO GROUP BLD: NORMAL
ALBUMIN SERPL BCP-MCNC: 2.5 G/DL (ref 3.2–4.9)
ALBUMIN/GLOB SERPL: 0.5 G/DL
ALP SERPL-CCNC: 109 U/L (ref 30–99)
ALT SERPL-CCNC: 42 U/L (ref 2–50)
ANCA IGG TITR SER IF: NORMAL {TITER}
ANION GAP SERPL CALC-SCNC: 12 MMOL/L (ref 7–16)
APPEARANCE UR: ABNORMAL
AST SERPL-CCNC: 31 U/L (ref 12–45)
B2 GLYCOPROT1 IGG SERPL IA-ACNC: 71 SGU (ref 0–20)
B2 GLYCOPROT1 IGM SERPL IA-ACNC: 3 SMU (ref 0–20)
B2 MICROGLOB SERPL-MCNC: 2.9 MG/L (ref 1.1–2.4)
BACTERIA #/AREA URNS HPF: NEGATIVE /HPF
BARCODED ABORH UBTYP: 6200
BARCODED PRD CODE UBPRD: NORMAL
BARCODED UNIT NUM UBUNT: NORMAL
BASOPHILS # BLD AUTO: 0.1 % (ref 0–1.8)
BASOPHILS # BLD AUTO: 0.1 % (ref 0–1.8)
BASOPHILS # BLD: 0.01 K/UL (ref 0–0.12)
BASOPHILS # BLD: 0.01 K/UL (ref 0–0.12)
BILIRUB CONJ SERPL-MCNC: 1 MG/DL (ref 0.1–0.5)
BILIRUB INDIRECT SERPL-MCNC: 0.5 MG/DL (ref 0–1)
BILIRUB SERPL-MCNC: 1.5 MG/DL (ref 0.1–1.5)
BILIRUB UR QL STRIP.AUTO: NEGATIVE
BLD GP AB SCN SERPL QL: NORMAL
BUN SERPL-MCNC: 41 MG/DL (ref 8–22)
CALCIUM SERPL-MCNC: 7.9 MG/DL (ref 8.5–10.5)
CHLORIDE SERPL-SCNC: 98 MMOL/L (ref 96–112)
CO2 SERPL-SCNC: 19 MMOL/L (ref 20–33)
COLOR UR: YELLOW
COMPONENT R 8504R: NORMAL
CREAT SERPL-MCNC: 2.1 MG/DL (ref 0.5–1.4)
CREAT UR-MCNC: 30.69 MG/DL
CREAT UR-MCNC: 31.76 MG/DL
ENA SM IGG SER-ACNC: 6 AU/ML (ref 0–40)
ENA SS-B IGG SER IA-ACNC: 0 AU/ML (ref 0–40)
EOSINOPHIL # BLD AUTO: 0 K/UL (ref 0–0.51)
EOSINOPHIL # BLD AUTO: 0 K/UL (ref 0–0.51)
EOSINOPHIL NFR BLD: 0 % (ref 0–6.9)
EOSINOPHIL NFR BLD: 0 % (ref 0–6.9)
EPI CELLS #/AREA URNS HPF: ABNORMAL /HPF
ERYTHROCYTE [DISTWIDTH] IN BLOOD BY AUTOMATED COUNT: 58.7 FL (ref 35.9–50)
ERYTHROCYTE [DISTWIDTH] IN BLOOD BY AUTOMATED COUNT: 65.6 FL (ref 35.9–50)
GLOBULIN SER CALC-MCNC: 5.2 G/DL (ref 1.9–3.5)
GLUCOSE SERPL-MCNC: 146 MG/DL (ref 65–99)
GLUCOSE UR STRIP.AUTO-MCNC: NEGATIVE MG/DL
HCT VFR BLD AUTO: 24.6 % (ref 37–47)
HCT VFR BLD AUTO: 28.1 % (ref 37–47)
HGB BLD-MCNC: 6.8 G/DL (ref 12–16)
HGB BLD-MCNC: 8 G/DL (ref 12–16)
HYALINE CASTS #/AREA URNS LPF: ABNORMAL /LPF
IMM GRANULOCYTES # BLD AUTO: 0.15 K/UL (ref 0–0.11)
IMM GRANULOCYTES # BLD AUTO: 0.19 K/UL (ref 0–0.11)
IMM GRANULOCYTES NFR BLD AUTO: 1.7 % (ref 0–0.9)
IMM GRANULOCYTES NFR BLD AUTO: 2.4 % (ref 0–0.9)
INR PPP: 2.21 (ref 0.87–1.13)
KETONES UR STRIP.AUTO-MCNC: NEGATIVE MG/DL
LDH SERPL L TO P-CCNC: 400 U/L (ref 107–266)
LEUKOCYTE ESTERASE UR QL STRIP.AUTO: NEGATIVE
LYMPHOCYTES # BLD AUTO: 0.68 K/UL (ref 1–4.8)
LYMPHOCYTES # BLD AUTO: 0.68 K/UL (ref 1–4.8)
LYMPHOCYTES NFR BLD: 7.9 % (ref 22–41)
LYMPHOCYTES NFR BLD: 8.5 % (ref 22–41)
MCH RBC QN AUTO: 20.5 PG (ref 27–33)
MCH RBC QN AUTO: 21.8 PG (ref 27–33)
MCHC RBC AUTO-ENTMCNC: 27.6 G/DL (ref 33.6–35)
MCHC RBC AUTO-ENTMCNC: 28.5 G/DL (ref 33.6–35)
MCV RBC AUTO: 74.1 FL (ref 81.4–97.8)
MCV RBC AUTO: 76.6 FL (ref 81.4–97.8)
MICRO URNS: ABNORMAL
MICROALBUMIN UR-MCNC: 1.2 MG/DL
MICROALBUMIN/CREAT UR: 39 MG/G (ref 0–30)
MONOCYTES # BLD AUTO: 0.21 K/UL (ref 0–0.85)
MONOCYTES # BLD AUTO: 0.47 K/UL (ref 0–0.85)
MONOCYTES NFR BLD AUTO: 2.4 % (ref 0–13.4)
MONOCYTES NFR BLD AUTO: 5.9 % (ref 0–13.4)
NEUTROPHILS # BLD AUTO: 6.64 K/UL (ref 2–7.15)
NEUTROPHILS # BLD AUTO: 7.59 K/UL (ref 2–7.15)
NEUTROPHILS NFR BLD: 83.1 % (ref 44–72)
NEUTROPHILS NFR BLD: 87.9 % (ref 44–72)
NITRITE UR QL STRIP.AUTO: NEGATIVE
NRBC # BLD AUTO: 0.02 K/UL
NRBC # BLD AUTO: 0.02 K/UL
NRBC BLD-RTO: 0.2 /100 WBC
NRBC BLD-RTO: 0.3 /100 WBC
PATH REV: NORMAL
PATH REV: NORMAL
PH UR STRIP.AUTO: 5 [PH] (ref 5–8)
PLATELET # BLD AUTO: 264 K/UL (ref 164–446)
PLATELET # BLD AUTO: 280 K/UL (ref 164–446)
PMV BLD AUTO: 11.3 FL (ref 9–12.9)
PMV BLD AUTO: 11.9 FL (ref 9–12.9)
POTASSIUM SERPL-SCNC: 4.6 MMOL/L (ref 3.6–5.5)
PROCALCITONIN SERPL-MCNC: 7.87 NG/ML
PRODUCT TYPE UPROD: NORMAL
PROT SERPL-MCNC: 7.7 G/DL (ref 6–8.2)
PROT UR QL STRIP: NEGATIVE MG/DL
PROT UR-MCNC: 6 MG/DL (ref 0–15)
PROT/CREAT UR: 189 MG/G (ref 10–107)
PROTHROMBIN TIME: 23.8 SEC (ref 12–14.6)
RBC # BLD AUTO: 3.32 M/UL (ref 4.2–5.4)
RBC # BLD AUTO: 3.67 M/UL (ref 4.2–5.4)
RBC # URNS HPF: ABNORMAL /HPF
RBC UR QL AUTO: ABNORMAL
RH BLD: NORMAL
SODIUM SERPL-SCNC: 129 MMOL/L (ref 135–145)
SP GR UR STRIP.AUTO: 1.01
SSA52 R0ENA AB IGG Q0420: 3 AU/ML (ref 0–40)
SSA60 R0ENA AB IGG Q0419: 1 AU/ML (ref 0–40)
U1 SNRNP IGG SER QL: NORMAL
UFH PPP CHRO-ACNC: 0.26 IU/ML
UFH PPP CHRO-ACNC: 0.63 IU/ML
UFH PPP CHRO-ACNC: 0.66 IU/ML
UNIT STATUS USTAT: NORMAL
UROBILINOGEN UR STRIP.AUTO-MCNC: 0.2 MG/DL
WBC # BLD AUTO: 8 K/UL (ref 4.8–10.8)
WBC # BLD AUTO: 8.6 K/UL (ref 4.8–10.8)
WBC #/AREA URNS HPF: ABNORMAL /HPF

## 2021-06-24 PROCEDURE — 36415 COLL VENOUS BLD VENIPUNCTURE: CPT

## 2021-06-24 PROCEDURE — 51798 US URINE CAPACITY MEASURE: CPT

## 2021-06-24 PROCEDURE — 700111 HCHG RX REV CODE 636 W/ 250 OVERRIDE (IP): Performed by: INTERNAL MEDICINE

## 2021-06-24 PROCEDURE — 82248 BILIRUBIN DIRECT: CPT

## 2021-06-24 PROCEDURE — A9270 NON-COVERED ITEM OR SERVICE: HCPCS | Performed by: STUDENT IN AN ORGANIZED HEALTH CARE EDUCATION/TRAINING PROGRAM

## 2021-06-24 PROCEDURE — 86923 COMPATIBILITY TEST ELECTRIC: CPT

## 2021-06-24 PROCEDURE — P9016 RBC LEUKOCYTES REDUCED: HCPCS

## 2021-06-24 PROCEDURE — 82043 UR ALBUMIN QUANTITATIVE: CPT

## 2021-06-24 PROCEDURE — 86901 BLOOD TYPING SEROLOGIC RH(D): CPT

## 2021-06-24 PROCEDURE — 74176 CT ABD & PELVIS W/O CONTRAST: CPT

## 2021-06-24 PROCEDURE — 700101 HCHG RX REV CODE 250: Performed by: STUDENT IN AN ORGANIZED HEALTH CARE EDUCATION/TRAINING PROGRAM

## 2021-06-24 PROCEDURE — 700101 HCHG RX REV CODE 250: Performed by: INTERNAL MEDICINE

## 2021-06-24 PROCEDURE — 700105 HCHG RX REV CODE 258: Performed by: INTERNAL MEDICINE

## 2021-06-24 PROCEDURE — 700102 HCHG RX REV CODE 250 W/ 637 OVERRIDE(OP): Performed by: STUDENT IN AN ORGANIZED HEALTH CARE EDUCATION/TRAINING PROGRAM

## 2021-06-24 PROCEDURE — 94640 AIRWAY INHALATION TREATMENT: CPT

## 2021-06-24 PROCEDURE — 83615 LACTATE (LD) (LDH) ENZYME: CPT

## 2021-06-24 PROCEDURE — 99233 SBSQ HOSP IP/OBS HIGH 50: CPT | Performed by: INTERNAL MEDICINE

## 2021-06-24 PROCEDURE — 80500 HCHG CLINICAL PATH CONSULT-LIMITED: CPT

## 2021-06-24 PROCEDURE — 85610 PROTHROMBIN TIME: CPT

## 2021-06-24 PROCEDURE — 700111 HCHG RX REV CODE 636 W/ 250 OVERRIDE (IP): Performed by: STUDENT IN AN ORGANIZED HEALTH CARE EDUCATION/TRAINING PROGRAM

## 2021-06-24 PROCEDURE — A9270 NON-COVERED ITEM OR SERVICE: HCPCS | Performed by: INTERNAL MEDICINE

## 2021-06-24 PROCEDURE — 770020 HCHG ROOM/CARE - TELE (206)

## 2021-06-24 PROCEDURE — 81001 URINALYSIS AUTO W/SCOPE: CPT

## 2021-06-24 PROCEDURE — 83010 ASSAY OF HAPTOGLOBIN QUANT: CPT

## 2021-06-24 PROCEDURE — 84156 ASSAY OF PROTEIN URINE: CPT

## 2021-06-24 PROCEDURE — 36430 TRANSFUSION BLD/BLD COMPNT: CPT

## 2021-06-24 PROCEDURE — 84145 PROCALCITONIN (PCT): CPT

## 2021-06-24 PROCEDURE — 86900 BLOOD TYPING SEROLOGIC ABO: CPT

## 2021-06-24 PROCEDURE — 85520 HEPARIN ASSAY: CPT

## 2021-06-24 PROCEDURE — 86850 RBC ANTIBODY SCREEN: CPT

## 2021-06-24 PROCEDURE — 85025 COMPLETE CBC W/AUTO DIFF WBC: CPT

## 2021-06-24 PROCEDURE — 82570 ASSAY OF URINE CREATININE: CPT

## 2021-06-24 PROCEDURE — 700102 HCHG RX REV CODE 250 W/ 637 OVERRIDE(OP): Performed by: INTERNAL MEDICINE

## 2021-06-24 PROCEDURE — 80053 COMPREHEN METABOLIC PANEL: CPT

## 2021-06-24 RX ORDER — IPRATROPIUM BROMIDE AND ALBUTEROL SULFATE 2.5; .5 MG/3ML; MG/3ML
3 SOLUTION RESPIRATORY (INHALATION)
Status: DISCONTINUED | OUTPATIENT
Start: 2021-06-24 | End: 2021-07-02 | Stop reason: HOSPADM

## 2021-06-24 RX ORDER — ACETAMINOPHEN 500 MG
1000 TABLET ORAL EVERY 6 HOURS
Status: DISCONTINUED | OUTPATIENT
Start: 2021-06-24 | End: 2021-07-01

## 2021-06-24 RX ORDER — ACETAMINOPHEN 500 MG
TABLET ORAL
Status: COMPLETED
Start: 2021-06-24 | End: 2021-06-25

## 2021-06-24 RX ADMIN — ACETAMINOPHEN 1000 MG: 500 TABLET ORAL at 12:15

## 2021-06-24 RX ADMIN — ACETAMINOPHEN 1000 MG: 500 TABLET ORAL at 04:22

## 2021-06-24 RX ADMIN — OMEPRAZOLE 20 MG: 20 CAPSULE, DELAYED RELEASE ORAL at 04:22

## 2021-06-24 RX ADMIN — IPRATROPIUM BROMIDE AND ALBUTEROL SULFATE 3 ML: .5; 2.5 SOLUTION RESPIRATORY (INHALATION) at 05:07

## 2021-06-24 RX ADMIN — OXYCODONE HYDROCHLORIDE 10 MG: 10 TABLET ORAL at 22:50

## 2021-06-24 RX ADMIN — BACLOFEN 10 MG: 10 TABLET ORAL at 04:37

## 2021-06-24 RX ADMIN — CEFEPIME 2 G: 2 INJECTION, POWDER, FOR SOLUTION INTRAVENOUS at 04:20

## 2021-06-24 RX ADMIN — ACETAMINOPHEN 1000 MG: 500 TABLET ORAL at 17:41

## 2021-06-24 RX ADMIN — HYDROMORPHONE HYDROCHLORIDE 1 MG: 1 INJECTION, SOLUTION INTRAMUSCULAR; INTRAVENOUS; SUBCUTANEOUS at 01:58

## 2021-06-24 RX ADMIN — PREDNISONE 80 MG: 20 TABLET ORAL at 09:00

## 2021-06-24 RX ADMIN — LIDOCAINE 1 PATCH: 50 PATCH TOPICAL at 20:46

## 2021-06-24 RX ADMIN — SODIUM CHLORIDE: 9 INJECTION, SOLUTION INTRAVENOUS at 04:20

## 2021-06-24 RX ADMIN — OXYCODONE HYDROCHLORIDE 10 MG: 10 TABLET ORAL at 14:33

## 2021-06-24 RX ADMIN — SODIUM CHLORIDE 1000 MG: 9 INJECTION, SOLUTION INTRAVENOUS at 04:22

## 2021-06-24 RX ADMIN — METRONIDAZOLE 500 MG: 500 TABLET ORAL at 20:48

## 2021-06-24 RX ADMIN — BACLOFEN 10 MG: 10 TABLET ORAL at 20:48

## 2021-06-24 RX ADMIN — METRONIDAZOLE 500 MG: 500 TABLET ORAL at 12:16

## 2021-06-24 RX ADMIN — OMEPRAZOLE 20 MG: 20 CAPSULE, DELAYED RELEASE ORAL at 17:41

## 2021-06-24 RX ADMIN — HEPARIN SODIUM 18 UNITS/KG/HR: 5000 INJECTION, SOLUTION INTRAVENOUS at 10:26

## 2021-06-24 RX ADMIN — METRONIDAZOLE 500 MG: 500 TABLET ORAL at 04:21

## 2021-06-24 RX ADMIN — CEFEPIME 2 G: 2 INJECTION, POWDER, FOR SOLUTION INTRAVENOUS at 17:41

## 2021-06-24 RX ADMIN — SODIUM CHLORIDE: 9 INJECTION, SOLUTION INTRAVENOUS at 17:42

## 2021-06-24 RX ADMIN — DOCUSATE SODIUM 50 MG AND SENNOSIDES 8.6 MG 2 TABLET: 8.6; 5 TABLET, FILM COATED ORAL at 17:41

## 2021-06-24 ASSESSMENT — ENCOUNTER SYMPTOMS
FEVER: 0
ABDOMINAL PAIN: 1
SHORTNESS OF BREATH: 0

## 2021-06-24 ASSESSMENT — PAIN DESCRIPTION - PAIN TYPE
TYPE: ACUTE PAIN

## 2021-06-24 ASSESSMENT — FIBROSIS 4 INDEX: FIB4 SCORE: 0.77

## 2021-06-24 NOTE — PROGRESS NOTES
"[facility] [place] [facility] PROGRESS NOTE     Attending:   Yanna Ortiz MD     Resident:   Jaylen Bland MD    PATIENT:   Jaclyn Olvera; [id number]; 1975    ID:   46 y.o. female admitted on 6/14 for left-sided abdominal pain x4 days.  She was originally being managed by the hospitalist team and was transferred to telemetry for a higher level of care.  She has had a complex medical course with multiple imaging studies and multiple consultants including: gastroenterology, vascular surgery, general surgery, hematology/oncology, and nephrology.  She is currently being managed for assumed catastrophic antiphospholipid syndrome and is being followed actively by hematology/oncology and nephrology.      SUBJECTIVE:   No acute events overnight, patient states that she is still experiencing abdominal pain, but it is not any worse than it was yesterday and well controlled with pain medications.  She denies any vaginal/rectal bleeding.  She denies any headaches, numbness/tingling, weakness, fevers, chills, or any other concerning symptoms.  Of note when the patient looks into the light she sees what she describes as \"a whitaker spiderweb\".     OBJECTIVE:  Vitals:    06/24/21 0608 06/24/21 0751 06/24/21 1303 06/24/21 1441   BP:  108/69 112/78 130/94   Pulse:  79 76 81   Resp: (!) 9 (!) 10 16 14   Temp:  36.3 °C (97.3 °F) 36.6 °C (97.8 °F) 36.3 °C (97.4 °F)   TempSrc:  Temporal Temporal Temporal   SpO2:  97% 97% 99%   Weight:       Height:           Intake/Output Summary (Last 24 hours) at 6/24/2021 1507  Last data filed at 6/24/2021 0900  Gross per 24 hour   Intake --   Output 900 ml   Net -900 ml       PHYSICAL EXAM:  General: No acute distress, afebrile, resting comfortably, conversational   HEENT: NC/AT. EOMI.   Cardiovascular: RRR without murmurs, rubs, heaves. Normal capillary refill   Respiratory: CTAB, no tachypnea or retractions   Abdomen: normal bowel sounds, soft, diffusely TTP, nondistended, no masses, no organomegaly, " no guarding, rebound, rigidity   EXT:  DOMINGUEZ, no edema, scattered ecchymosis on her arms and legs   Skin: No erythema/lesions   Neuro: Non-focal, alert and orientated to person, place, and time    LABS:  Recent Labs     06/22/21  0614 06/22/21  1451 06/23/21  0811 06/23/21 2008 06/24/21  1005   WBC 21.8*   < > 10.6 12.0* 8.6   RBC 3.84*   < > 3.51* 3.46* 3.32*   HEMOGLOBIN 8.2*   < > 7.4* 7.4* 6.8*   HEMATOCRIT 27.3*   < > 25.8* 25.2* 24.6*   MCV 71.1*   < > 73.5* 72.8* 74.1*   MCH 21.4*   < > 21.1* 21.4* 20.5*   RDW 51.8*   < > 57.2* 57.3* 58.7*   PLATELETCT 64*   < > 109* 230 264   MPV  --   --   --  12.0 11.9   NEUTSPOLYS 87.60*   < > 87.60* 87.80* 87.90*   LYMPHOCYTES 5.10*   < > 7.50* 8.10* 7.90*   MONOCYTES 5.20   < > 2.70 1.60 2.40   EOSINOPHILS 0.40   < > 0.50 0.00 0.00   BASOPHILS 0.40   < > 0.40 0.30 0.10   RBCMORPHOLO Present  --   --   --   --     < > = values in this interval not displayed.     Recent Labs     06/22/21  0614 06/23/21  0811 06/24/21  0314   SODIUM 125* 127* 129*   POTASSIUM 4.4 4.5 4.6   CHLORIDE 91* 94* 98   CO2 23 19* 19*   BUN 6* 22 41*   CREATININE 0.44* 1.09 2.10*   CALCIUM 8.9 8.4* 7.9*   MAGNESIUM 1.7  --   --    ALBUMIN 2.5* 2.3* 2.5*     Estimated GFR/CRCL = Estimated Creatinine Clearance: 38.6 mL/min (A) (by C-G formula based on SCr of 2.1 mg/dL (H)).  Recent Labs     06/22/21 0614 06/23/21 0811 06/24/21 0314   GLUCOSE 100* 101* 146*     Recent Labs     06/22/21 0614 06/22/21  1451 06/22/21  2019 06/23/21 0811 06/23/21  1257 06/24/21 0314   ASTSGOT 131*  --   --  51*  --  31   ALTSGPT 76*  --   --  54*  --  42   TBILIRUBIN 7.1*  --   --  3.6*  --  1.5   IBILIRUBIN  --   --   --   --   --  0.5   DBILIRUBIN  --   --  3.4*  --   --  1.0*   ALKPHOSPHAT 187*  --   --  129*  --  109*   GLOBULIN 4.0*  --   --  5.7*  --  5.2*   INR  --    < >  --  1.56* 3.20* 2.21*    < > = values in this interval not displayed.             Recent Labs     06/22/21 1451 06/22/21  1451  06/23/21  0811 06/23/21  1257 06/24/21  0314   INR 1.63*   < > 1.56* 3.20* 2.21*   APTT  --   --  74.2* 163.0*  --    FIBRINOGEN 777*  --   --   --   --     < > = values in this interval not displayed.         IMAGING:  CT-ABDOMEN-PELVIS W/O   Final Result      1.  Stranding surrounding the adrenal glands is improved.   2.  Dense right adrenal mass likely representing adrenal hemorrhage is mildly decreased in size compared to prior.   3.  Mild left hydronephrosis.   4.  Multiple mildly enlarged retroperitoneal lymph nodes are not significantly changed.   5.  Density within the gallbladder may represent sludge/vicarious excretion of contrast.   6.  Trace right pleural fluid and bibasilar atelectasis.      US-RENAL   Final Result      1.  Mild left hydronephrosis.   2.  No right hydronephrosis.      [id number]/O   Final Result         1. Normal sized CBD. No CBD stone.   2. Thickening and heterogeneity of the bilateral adrenal glands with surrounding stranding, incompletely evaluated but concerning for hemorrhage.   3. No gallstone. Mild pericholecystic fluid could be reactive change due to adjacent adrenal hemorrhage.   4. Worsening left hydronephrosis, concerning for distal obstruction      DX-CHEST-LIMITED (1 VIEW)   Final Result         No acute cardiopulmonary abnormalities are identified.      EC-ECHOCARDIOGRAM COMPLETE W/O CONT   Final Result      CT-ABDOMEN-PELVIS WITH   Final Result      1.  New right adrenal mass likely represents hemorrhage. Thickening of the left adrenal gland is concerning for developing hemorrhage as well.      2.  Low attenuation filling defect in the superior vena cava is consistent with thrombus. Echocardiogram may be helpful for further evaluation.      3.  Prominent retroperitoneal lymph nodes with nonspecific retroperitoneal inflammatory stranding as reported on the prior CT.      4.  Small bilateral pleural effusions, right greater than left. Adjacent airspace disease likely  atelectasis.            Comment: Results discussed with Dr. Gunter at 9:32 AM      [id number] VIEW   Final Result         No specific finding to suggest small bowel obstruction.      US-PELVIC COMPLETE (TRANSABDOMINAL/TRANSVAGINAL) (COMBO)   Final Result         1.  Heterogeneous appearance of the uterus with large heterogeneous uterine mass, appearance most compatible with uterine fibroid.   2.  Heterogeneous lesion in the left ovary, could represent hemorrhagic cyst or endometrioma, otherwise indeterminate, recommend follow-up sonogram in 6 weeks for repeat characterization and evaluation of stability.   3.  Thickened endometrial stripe, likely proliferative changes, consider endometrial pathology with additional follow-up as clinically appropriate.   4.  Nabothian cyst      US-RUQ   Final Result      1.  No evidence of gallstones or biliary ductal dilatation.   2.  1.9 cm cystic lesion in the right kidney. Mural nodularity is not excluded and further evaluation with renal protocol MRI is recommended.         CT-ABDOMEN-PELVIS WITH   Final Result      1.  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   2.  Hypodensity in the cervix could be artifactual or could indicate underlying cervical mass. Suggest correlation with physical exam and gynecological history.   3.  19 mm hypodense RIGHT renal lesion, likely but not definitely a cyst. Suggest further assessment with ultrasound when clinically appropriate.                   MEDS:  Current Facility-Administered Medications   Medication Last Admin   • acetaminophen (TYLENOL) tablet 1,000 mg 1,000 mg at 06/24/21 1215   • ACETAMINOPHEN 500 MG PO TABS     • ipratropium-albuterol (DUONEB) nebulizer solution 3 mL at 06/24/21 0507   • predniSONE (DELTASONE) tablet 80 mg 80 mg at 06/24/21 0900   • heparin infusion 25,000 units in 500 mL 0.45% NACL 18 Units/kg/hr at 06/24/21 1115   • baclofen  (LIORESAL) tablet 10 mg 10 mg at 06/24/21 0437   • oxyCODONE immediate-release (ROXICODONE) tablet 5 mg 5 mg at 06/23/21 2125   • oxyCODONE immediate release (ROXICODONE) tablet 10 mg 10 mg at 06/24/21 1433   • omeprazole (PRILOSEC) capsule 20 mg 20 mg at 06/24/21 0422   • cefepime (Maxipime) 2 g in sterile water 20 mL IV syringe Stopped at 06/24/21 0425   • NS infusion New Bag at 06/24/21 0420   • HYDROmorphone (Dilaudid) injection 1 mg 1 mg at 06/24/21 0158   • metroNIDAZOLE (FLAGYL) tablet 500 mg 500 mg at 06/24/21 1216   • lidocaine (LIDODERM) 5 % 1 Patch 1 Patch at 06/23/21 2111   • senna-docusate (PERICOLACE or SENOKOT S) 8.6-50 MG per tablet 2 tablet 2 tablet at 06/23/21 1758    And   • polyethylene glycol/lytes (MIRALAX) PACKET 1 Packet      And   • magnesium hydroxide (MILK OF MAGNESIA) suspension 30 mL      And   • bisacodyl (DULCOLAX) suppository 10 mg 10 mg at 06/15/21 1201   • cloNIDine (CATAPRES) tablet 0.1 mg     • enalaprilat (VASOTEC) injection 1.25 mg     • labetalol (NORMODYNE/TRANDATE) injection 10 mg     • ondansetron (ZOFRAN) syringe/vial injection 4 mg 4 mg at 06/16/21 1355   • ondansetron (ZOFRAN ODT) dispertab 4 mg     • promethazine (PHENERGAN) tablet 12.5-25 mg     • promethazine (PHENERGAN) suppository 12.5-25 mg     • prochlorperazine (COMPAZINE) injection 5-10 mg         ASSESSMENT/PLAN:  46 y.o. female admitted on 6/14 for left-sided abdominal pain x4 days.  She was originally being managed by the hospitalist team and was transferred to telemetry for a higher level of care.  She has had a complex medical course with multiple imaging studies and multiple consultants including: gastroenterology, vascular surgery, general surgery, hematology/oncology, and nephrology.  She is currently being managed for assumed catastrophic antiphospholipid syndrome and is being followed actively by hematology/oncology and nephrology.    #Catastrophic antiphospholipid syndrome  -Hematology/oncology  actively following  -Patient started on IVIG on 6/22, no additional doses required at this time  -Patient started on Solu-Medrol on 6/22 for 2 days  -Patient transitioned to prednisone 80 mg daily on 6/24 with a taper  -HIT panel negative and patient was started on a heparin drip on 6/23  -DIC/HIT/TTP all ruled out per heme/onc   Plan:  -Heme/onc actively following and recs appreciated  -Hold plasmapheresis  -Closely monitor for DVT/arterial thrombotic events  -Continue prednisone taper  -Continue heparin drip    #Thrombus of SVC  -Discovered on CT abdomen/pelvis on 6/18  -Vascular surgery was consulted and recommended TTE  -TTE completed on 6/20 and showed an ejection fraction of 65% but could not visualize thrombus  Plan:  -Patient needs RADHIKA to appropriately visualize the thrombus  -Continue heparin drip  -Follow-up with hematology/oncology to see if TTE is appropriate given patient's improvement in LFTs and platelets    #VIDAL  -BUN/creatinine uptrending this morning with BUN of 41 and Cr of 2.10  -Hydronephrosis was also noted on recent CT scan  Plan:  -Follow-up with nephrology and any recs appreciated  -Hold plasmapheresis for now    #Elevated LFTs  -Patient's LFTs are downtrending on most recent repeat  -MRCP completed on 6/22 showed no biliary obstruction  -Hepatitis panel negative  -Possibly due to ischemic hepatopathy  Plan:  -Continue trending on repeat CMPs     #Intra-abdominal infection  -Patient had 3/4 SIRS criteria including fever, tachycardia, leukocytosis  -Patient was started on cefepime and Flagyl on 6/22  Plan:  -Continue cefepime and Flagyl  -Continue to monitor patient's vitals closely    #Microcytic anemia  -Likely multifactorial in etiology  -Iron studies show iron levels of 32 but normal ferritin  -Johan test was negative  -Elevated LDH which could be related to antiphospholipid syndrome versus active hemolysis  -Repeat CT abdomen/pelvis without contrast on 6/24 showed improvement of the  hemorrhage located in both adrenal glands  Plan:  -Continue steroids  -Continue to trend CBCs  -Transfuse when hemoglobin less than 7    #Thrombocytopenia  #Vaginal bleeding  #Rectal bleeding  #Hemorrhage of both adrenal glands  -All likely secondary to catastrophic antiphospholipid syndrome  -Hematology/oncology actively following  Plan:  -Closely monitor for bleeding  -Continue to trend CBCs    #Retroperitoneal lymphadenopathy  -Noted on CT abdomen/pelvis on 6/18  -Stable on CT abdomen/pelvis on 6/24  -Unclear etiology at this point    #Fibroids  -Found on pelvic ultrasound completed on 6/14  -Follow-up outpatient with gynecology    #Ovarian lesion  -Found in the left ovary on pelvic ultrasound completed 6/14  -Recommended follow-up in 6 weeks with repeat ultrasound  -Follow-up with outpatient gynecology    #Lesion of cervix  -Picked up incidentally on abdominal CT which stated that this could be artifactual or indicate an underlying cervical mass  -Cervix was unable to be visualized by pelvic exam in the emergency department  -Outpatient follow-up with gynecology as recommended    #Right renal lesion  -Renal protocol MRI is advised to evaluate further  -Can be completed as outpatient    #H.pylori infection  -Recently diagnosed at [facility]  -Patient started on triple therapy but did not complete secondary to abdominal pain  -EGD completed 6/17 took biopsies to see if infection have resolved   Plan:  -Follow-up on biopsies  -Continue daily PPI    Lines: PIV  Abx: cefepime and flagyl   DVT prophylaxis: heparin   Code Status: Full     Jaylen Bland MD   PGY-1 Family Medicine Resident   [facility]

## 2021-06-24 NOTE — CARE PLAN
Problem: Knowledge Deficit - Standard  Goal: Patient and family/care givers will demonstrate understanding of plan of care, disease process/condition, diagnostic tests and medications  Outcome: Progressing     Problem: Respiratory  Goal: Patient will achieve/maintain optimum respiratory ventilation and gas exchange  Outcome: Progressing     Problem: Fall Risk  Goal: Patient will remain free from falls  Outcome: Progressing     The patient is Watcher - Medium risk of patient condition declining or worsening    Shift Goals  Clinical Goals: Maintain respirations and pain control  Patient Goals: Rest  Family Goals: N/A    Progress made toward(s) clinical / shift goals:  Fall precautions in place. Bed in lowest position. Non-skid socks in place. Personal belongings within reach. Mobility sign on door. Call light within reach. Pt educated regarding fall prevention and verbalized understanding. Patient educated on pain management and the effects of opiates on respiratory drive. Patient verbalized understanding. MD on board and will look for other options to control pain.     Patient is not progressing towards the following goals:    Problem: Pain - Standard  Goal: Alleviation of pain or a reduction in pain to the patient’s comfort goal  Outcome: Not Progressing

## 2021-06-24 NOTE — PROGRESS NOTES
Nephrology Daily Progress Note    Date of Service  6/24/2021    Chief Complaint  46 y.o. 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    Interval Problem Update  6/24 -no urine output documented in the last 24 hours, but patient says she is urinating.  Repeat CT imaging shows bladder distention and mild left hydronephrosis.  Patient complains of ongoing right upper quadrant pain.    Review of Systems  Review of Systems   Constitutional: Negative for fever.   Respiratory: Negative for shortness of breath.    Cardiovascular: Negative for chest pain.   Gastrointestinal: Positive for abdominal pain.   All other systems reviewed and are negative.       Physical Exam  Temp:  [36.1 °C (97 °F)-36.6 °C (97.8 °F)] 36.1 °C (97 °F)  Pulse:  [] 87  Resp:  [9-16] 11  BP: (108-137)/(68-94) 113/68  SpO2:  [95 %-99 %] 95 %    Physical Exam  Constitutional:       General: She is not in acute distress.     Appearance: She is well-developed.   HENT:      Head: Normocephalic and atraumatic.      Mouth/Throat:      Mouth: Mucous membranes are moist.      Pharynx: Oropharynx is clear. No oropharyngeal exudate.   Eyes:      General: No scleral icterus.     Extraocular Movements: Extraocular movements intact.   Neck:      Trachea: No tracheal deviation.   Cardiovascular:      Rate and Rhythm: Normal rate and regular rhythm.      Heart sounds: Normal heart sounds. No murmur heard.     Pulmonary:      Effort: Pulmonary effort is normal.      Breath sounds: No stridor. No rales.   Abdominal:      Palpations: Abdomen is soft.      Tenderness: There is abdominal tenderness (ruq).   Musculoskeletal:         General: Normal range of motion.      Cervical back: Normal range of motion. No rigidity.      Right lower  leg: No edema.      Left lower leg: No edema.   Skin:     General: Skin is warm and dry.   Neurological:      General: No focal deficit present.      Mental Status: She is alert and oriented to person, place, and time.   Psychiatric:         Mood and Affect: Mood normal.         Behavior: Behavior normal.         Fluids    Intake/Output Summary (Last 24 hours) at 6/24/2021 1610  Last data filed at 6/24/2021 0900  Gross per 24 hour   Intake --   Output 900 ml   Net -900 ml       Laboratory  Labs reviewed, pertinent labs below.  Recent Labs     06/23/21  0811 06/23/21 2008 06/24/21  1005   WBC 10.6 12.0* 8.6   RBC 3.51* 3.46* 3.32*   HEMOGLOBIN 7.4* 7.4* 6.8*   HEMATOCRIT 25.8* 25.2* 24.6*   MCV 73.5* 72.8* 74.1*   MCH 21.1* 21.4* 20.5*   MCHC 28.7* 29.4* 27.6*   RDW 57.2* 57.3* 58.7*   PLATELETCT 109* 230 264   MPV  --  12.0 11.9     Recent Labs     06/22/21  0614 06/23/21  0811 06/24/21  0314   SODIUM 125* 127* 129*   POTASSIUM 4.4 4.5 4.6   CHLORIDE 91* 94* 98   CO2 23 19* 19*   GLUCOSE 100* 101* 146*   BUN 6* 22 41*   CREATININE 0.44* 1.09 2.10*   CALCIUM 8.9 8.4* 7.9*     Recent Labs     06/23/21  0811 06/23/21  1257 06/24/21  0314   APTT 74.2* 163.0*  --    INR 1.56* 3.20* 2.21*           URINALYSIS:  Lab Results   Component Value Date/Time    COLORURINE DK Yellow 06/22/2021 1810    CLARITY Clear 06/22/2021 1810    SPECGRAVITY 1.007 06/22/2021 1810    PHURINE 6.0 06/22/2021 1810    KETONES Negative 06/22/2021 1810    PROTEINURIN Negative 06/22/2021 1810    BILIRUBINUR Moderate (A) 06/22/2021 1810    UROBILU 0.2 06/22/2021 1810    NITRITE Negative 06/22/2021 1810    LEUKESTERAS Negative 06/22/2021 1810    OCCULTBLOOD Large (A) 06/22/2021 1810     UPC  No results found for: TOTPROTUR No results found for: CREATININEU      Imaging interpreted by radiologist. Imaging reports reviewed with pertinent findings below  CT-ABDOMEN-PELVIS W/O   Final Result      1.  Stranding surrounding the adrenal glands is improved.    2.  Dense right adrenal mass likely representing adrenal hemorrhage is mildly decreased in size compared to prior.   3.  Mild left hydronephrosis.   4.  Multiple mildly enlarged retroperitoneal lymph nodes are not significantly changed.   5.  Density within the gallbladder may represent sludge/vicarious excretion of contrast.   6.  Trace right pleural fluid and bibasilar atelectasis.      US-RENAL   Final Result      1.  Mild left hydronephrosis.   2.  No right hydronephrosis.      PK-[id number]-T/O   Final Result         1. Normal sized CBD. No CBD stone.   2. Thickening and heterogeneity of the bilateral adrenal glands with surrounding stranding, incompletely evaluated but concerning for hemorrhage.   3. No gallstone. Mild pericholecystic fluid could be reactive change due to adjacent adrenal hemorrhage.   4. Worsening left hydronephrosis, concerning for distal obstruction      DX-CHEST-LIMITED (1 VIEW)   Final Result         No acute cardiopulmonary abnormalities are identified.      EC-ECHOCARDIOGRAM COMPLETE W/O CONT   Final Result      CT-ABDOMEN-PELVIS WITH   Final Result      1.  New right adrenal mass likely represents hemorrhage. Thickening of the left adrenal gland is concerning for developing hemorrhage as well.      2.  Low attenuation filling defect in the superior vena cava is consistent with thrombus. Echocardiogram may be helpful for further evaluation.      3.  Prominent retroperitoneal lymph nodes with nonspecific retroperitoneal inflammatory stranding as reported on the prior CT.      4.  Small bilateral pleural effusions, right greater than left. Adjacent airspace disease likely atelectasis.            Comment: Results discussed with Dr. Gunter at 9:32 AM      [id number] VIEW   Final Result         No specific finding to suggest small bowel obstruction.      US-PELVIC COMPLETE (TRANSABDOMINAL/TRANSVAGINAL) (COMBO)   Final Result         1.  Heterogeneous appearance of the uterus with large  heterogeneous uterine mass, appearance most compatible with uterine fibroid.   2.  Heterogeneous lesion in the left ovary, could represent hemorrhagic cyst or endometrioma, otherwise indeterminate, recommend follow-up sonogram in 6 weeks for repeat characterization and evaluation of stability.   3.  Thickened endometrial stripe, likely proliferative changes, consider endometrial pathology with additional follow-up as clinically appropriate.   4.  Nabothian cyst      US-RUQ   Final Result      1.  No evidence of gallstones or biliary ductal dilatation.   2.  1.9 cm cystic lesion in the right kidney. Mural nodularity is not excluded and further evaluation with renal protocol MRI is recommended.         CT-ABDOMEN-PELVIS WITH   Final Result      1.  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   2.  Hypodensity in the cervix could be artifactual or could indicate underlying cervical mass. Suggest correlation with physical exam and gynecological history.   3.  19 mm hypodense RIGHT renal lesion, likely but not definitely a cyst. Suggest further assessment with ultrasound when clinically appropriate.                     Current Facility-Administered Medications   Medication Dose Route Frequency Provider Last Rate Last Admin   • acetaminophen (TYLENOL) tablet 1,000 mg  1,000 mg Oral Q6HRS Dominguez Reilly M.D.   1,000 mg at 06/24/21 1215   • ACETAMINOPHEN 500 MG PO TABS            • ipratropium-albuterol (DUONEB) nebulizer solution  3 mL Nebulization Q4H PRN (RT) Dominguez Reilly M.D.   3 mL at 06/24/21 0507   • predniSONE (DELTASONE) tablet 80 mg  80 mg Oral DAILY Da Varela M.D.   80 mg at 06/24/21 0900   • heparin infusion 25,000 units in 500 mL 0.45% NACL  0-30 Units/kg/hr Intravenous Continuous Da Varela M.D. 29 mL/hr at 06/24/21 1115 18 Units/kg/hr at 06/24/21 1115   • baclofen (LIORESAL) tablet 10 mg  10 mg Oral TID PRN Salina Zuniga,  D.O.   10 mg at 06/24/21 0437   • oxyCODONE immediate-release (ROXICODONE) tablet 5 mg  5 mg Oral Q4HRS PRN Salina Zuniga D.O.   5 mg at 06/23/21 2125   • oxyCODONE immediate release (ROXICODONE) tablet 10 mg  10 mg Oral Q4HRS PRN Salina Zuniga D.O.   10 mg at 06/24/21 1433   • omeprazole (PRILOSEC) capsule 20 mg  20 mg Oral BID Salina Zuniga D.O.   20 mg at 06/24/21 0422   • cefepime (Maxipime) 2 g in sterile water 20 mL IV syringe  2 g Intravenous Q12HRS ALEXA Humphries.O.   Stopped at 06/24/21 0425   • NS infusion   Intravenous Continuous ALEXA Humphries.O. 100 mL/hr at 06/24/21 0420 New Bag at 06/24/21 0420   • HYDROmorphone (Dilaudid) injection 1 mg  1 mg Intravenous Q4HRS PRN Piotr Gunter, D.O.   1 mg at 06/24/21 0158   • metroNIDAZOLE (FLAGYL) tablet 500 mg  500 mg Oral Q8HRS Piotr Gunter, D.O.   500 mg at 06/24/21 1216   • lidocaine (LIDODERM) 5 % 1 Patch  1 Patch Transdermal Q24HR Gareth Castro M.D.   1 Patch at 06/23/21 2111   • senna-docusate (PERICOLACE or SENOKOT S) 8.6-50 MG per tablet 2 tablet  2 tablet Oral BID Jamar Mo M.D.   2 tablet at 06/23/21 1758    And   • polyethylene glycol/lytes (MIRALAX) PACKET 1 Packet  1 Packet Oral QDAY PRN Jamar Mo M.D.        And   • magnesium hydroxide (MILK OF MAGNESIA) suspension 30 mL  30 mL Oral QDAY PRN Jamar Mo M.D.        And   • bisacodyl (DULCOLAX) suppository 10 mg  10 mg Rectal QDAY PRN Jamar Mo M.D.   10 mg at 06/15/21 1201   • cloNIDine (CATAPRES) tablet 0.1 mg  0.1 mg Oral Q6HRS PRN Jamar Mo M.D.       • enalaprilat (VASOTEC) injection 1.25 mg  1.25 mg Intravenous Q6HRS PRCORNELIA Mo M.D.       • labetalol (NORMODYNE/TRANDATE) injection 10 mg  10 mg Intravenous Q4HRS PRCORNELIA Mo M.D.       • ondansetron (ZOFRAN) syringe/vial injection 4 mg  4 mg Intravenous Q4HRS PRCORNELIA Mo M.D.   4 mg at 06/16/21 1355   • ondansetron (ZOFRAN ODT) dispertab 4 mg  4 mg Oral Q4HRS PRCORNELIA Mo  M.D.       • promethazine (PHENERGAN) tablet 12.5-25 mg  12.5-25 mg Oral Q4HRS PRN Jamar Mo M.D.       • promethazine (PHENERGAN) suppository 12.5-25 mg  12.5-25 mg Rectal Q4HRS PRN Jamar Mo M.D.       • prochlorperazine (COMPAZINE) injection 5-10 mg  5-10 mg Intravenous Q4HRS PRN Jamar Mo M.D.             Assessment/Plan  46 y.o. 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     1.  Acute kidney injury, nonoliguric, but with urinary retention.  Etiology of VIDAL unclear.  Patient had iodinated contrast on 6/14 and 6/18/2021.  There could be some intrinsic kidney damage with concern for antiphospholipid antibody syndrome, but patient is on anticoagulation, and we are unable to do kidney biopsy.  Recommend repeat urinalysis, urine protein creatinine ratio.  Recommend Villegas catheter placement.  No acute need for dialysis.  Check labs daily.    2.  Concern for catastrophic antiphospholipid antibody syndrome.  Currently managed with steroids and IVIG per hematology oncology.  Please alert nephrology if plasmapheresis is needed.  If plasmapheresis is done, would consider one-to-one repletion with FFP for plasma.     3.  Left renal hydronephrosis, mild on kidney ultrasound 6/22/2021 and again on CT scan 6/24/2021.  Accompanied by bladder distention.  Recommend Villegas catheter placement.    4.  Acute urinary retention, unclear etiology.  Recommend Villegas catheter placement.    5.  Microcytic anemia, worsening.  Concern for antiphospholipid antibody syndrome with intravascular clotting and hemolysis.  Defer further management to primary team and hematology oncology.  Check CBC daily.    6.  Hyponatremia, persistent.  Limit hypotonic fluids.  Check labs daily.    Mateo Sutherland MD  Nephrology

## 2021-06-24 NOTE — PROGRESS NOTES
Oncology/Hematology Progress Note               Author: Da Varela M.D. Date & Time created: 6/24/2021  11:11 AM    CC -catastrophic antiphospholipid antibody syndrome  Interval History:  LFTs improving with steroids and IVIG.  HIT screen negative.  Started on unfractionated heparin after discussion with Cristiano.  Continues to have flank pain.  More awake and alert.      Review of Systems:  ROS Constitutional: No fever, chills, weight loss ,+ malaise/fatigue.    HEENT: No new auditory or visual complaints. No sore throat and neck pain.     Respiratory:No new cough, sputum production, shortness of breath and wheezing.    Cardiovascular: No new chest pain, palpitations, orthopnea and leg swelling.    Gastrointestinal: No heartburn, nausea, vomiting , + abdominal pain, hematochezia or melena     Genitourinary: Negative for dysuria, hematuria    Musculoskeletal: No new arthralgias , +myalgias   Skin: Negative for rash and itching.      Physical Exam:  Physical Exam General: Not in acute distress, alert and oriented x 3  HEENT: No pallor,  + scleral icterus, improving. Oropharynx clear.   Neck: Supple, no palpable masses.  Lymph nodes: No palpable cervical, supraclavicular, axillary or inguinal lymphadenopathy.    CVS: regular rate and rhythm, no rubs or gallops  RESP: Clear to auscultate bilaterally, no wheezing or crackles.   ABD: generalized tenderness  EXT: No edema or cyanosis  CNS: Alert and oriented x3, No focal deficits.  Skin- No rash, fading petechiae ecchymosis    Labs:          Recent Labs     06/22/21 0614 06/23/21  0811 06/24/21  0314   SODIUM 125* 127* 129*   POTASSIUM 4.4 4.5 4.6   CHLORIDE 91* 94* 98   CO2 23 19* 19*   BUN 6* 22 41*   CREATININE 0.44* 1.09 2.10*   MAGNESIUM 1.7  --   --    CALCIUM 8.9 8.4* 7.9*     Recent Labs     06/22/21 0614 06/22/21 2019 06/23/21  0811 06/24/21  0314   ALTSGPT 76*  --  54* 42   ASTSGOT 131*  --  51* 31   ALKPHOSPHAT 187*  --  129* 109*   TBILIRUBIN 7.1*  --   3.6* 1.5   DBILIRUBIN  --  3.4*  --  1.0*   GLUCOSE 100*  --  101* 146*     Recent Labs     21  0821  1005   RBC 3.51*  --  3.46*  --  3.32*   HEMOGLOBIN 7.4*  --  7.4*  --  6.8*   HEMATOCRIT 25.8*  --  25.2*  --  24.6*   PLATELETCT 109*  --  230  --  264   PROTHROMBTM 18.2* 31.8*  --  23.8*  --    APTT 74.2* 163.0*  --   --   --    INR 1.56* 3.20*  --  2.21*  --      Recent Labs     21  1451 21  0821  1005   WBC 21.8*   < > 10.6 12.0*  --  8.6   NEUTSPOLYS 87.60*   < > 87.60* 87.80*  --  87.90*   LYMPHOCYTES 5.10*   < > 7.50* 8.10*  --  7.90*   MONOCYTES 5.20   < > 2.70 1.60  --  2.40   EOSINOPHILS 0.40   < > 0.50 0.00  --  0.00   BASOPHILS 0.40   < > 0.40 0.30  --  0.10   ASTSGOT 131*  --  51*  --  31  --    ALTSGPT 76*  --  54*  --  42  --    ALKPHOSPHAT 187*  --  129*  --  109*  --    TBILIRUBIN 7.1*  --  3.6*  --  1.5  --     < > = values in this interval not displayed.     Recent Labs     21   SODIUM 125* 127* 129*   POTASSIUM 4.4 4.5 4.6   CHLORIDE 91* 94* 98   CO2 23 19* 19*   GLUCOSE 100* 101* 146*   BUN 6* 22 41*   CREATININE 0.44* 1.09 2.10*   CALCIUM 8.9 8.4* 7.9*     Hemodynamics:  Temp (24hrs), Av.3 °C (97.4 °F), Min:36.3 °C (97.3 °F), Max:36.4 °C (97.6 °F)  Temperature: 36.3 °C (97.3 °F)  Pulse  Av.1  Min: 76  Max: 128   Blood Pressure: 108/69     Respiratory:    Respiration: (!) 10, Pulse Oximetry: 97 %     Given By:: Mouthpiece, Work Of Breathing / Effort:  (Bradypnea)  RUL Breath Sounds: Rhonchi, RML Breath Sounds: Clear, RLL Breath Sounds: Diminished, BLAYNE Breath Sounds: Rhonchi, LLL Breath Sounds: Diminished  Fluids:    Intake/Output Summary (Last 24 hours) at 2021 1111  Last data filed at 2021 0900  Gross per 24 hour   Intake --   Output 900 ml   Net -900 ml     Weight: 81.2 kg (179 lb 0.2  oz)  GI/Nutrition:  Orders Placed This Encounter   Procedures   • Diet Order Diet: Regular     Standing Status:   Standing     Number of Occurrences:   1     Order Specific Question:   Diet:     Answer:   Regular [1]     Medical Decision Making, by Problem:  Active Hospital Problems    Diagnosis    • *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 pylori infection [A04.8]    • Hyponatremia [E87.1]    • Kidney lesion, native, right [N28.9]    • Lesion of cervix [N88.9]    • Leukocytosis [D72.829]        Plan:  Catastrophic antiphospholipid antibody syndrome-triple positive APLA with presumed bilateral adrenal hemorrhage secondary to micro thrombus along with liver involvement, thrombocytopenia.    -Complicated case due to bilateral adrenal hemorrhage presumed to be secondary to micro thrombus.  There was question of SVC thrombus in one of the scan which is not well visible in the echo.  Started on anticoagulation yesterday.  Fortunately her platelets have improved significantly after starting high-dose steroids and IVIG.  Her LFTs have also improved significantly with total bilirubin improving to 1 from prior value of 7 ,2 days ago.  We will hold off on any plasma exchange given her clinical improvement.  Her prognosis is still guarded due to the competing risk of clotting and bleeding.  Some of her mental status changes could be related to her APL late this appears to be improving.  Also monitor for any DVT/arterial  thrombotic events like stroke heart attack etc.  We will stop high-dose  steroids and switch her to prednisone 80 mg daily with gradual taper.    -VIDAL-her creatinine is trending up.  There was possibility of hydronephrosis.  Nephrology following.  Recommend CT without contrast    -Anemia-multifactorial.  Risk for further adrenal hemorrhage secondary to anticoagulation.  Recommend CT of the abdomen without contrast.  Johan test was negative.  She does have elevated LDH which could be related to her APLA/LFT issues and active hemolysis.  Regardless she is on steroids.    -Risk of adrenal insufficiency due to bilateral adrenal hemorrhage.  Cortisol level okay continue steroids.    High complexity.  Long discussion today with patient and her mother and various providers.    -          Quality-Core Measures

## 2021-06-24 NOTE — DISCHARGE PLANNING
Anticipated Discharge Disposition: TBD    Action: This is this RN CM's first day with patient on assignment. Patient discussed in IDT rounds. She is not medically cleared and hematology and oncology following. Discharge needs unknown at this time.    Barriers to Discharge: medically complex    Plan: Case coordination to f/u with pt, family and treatment team for discharge planning support

## 2021-06-24 NOTE — CARE PLAN
Shift Goals  Clinical Goals: Patient pain level will decrease by end of shift  Patient Goals: Patient to sleep comfortably      Problem: Pain - Standard  Goal: Alleviation of pain or a reduction in pain to the patient’s comfort goal  Outcome: Progressing  Note: Patient is experiencing severe pain in the right abd/back. Patient receiving oxycodone as well as dilaudid for breakthrough pain. Patient states 4/10 is goal which she Is currently at. Will continue to monitor.      Problem: Respiratory  Goal: Patient will achieve/maintain optimum respiratory ventilation and gas exchange  Outcome: Progressing  Note: Patient gets very SOB with exertion. Patient on O2 now sating in the 90s.

## 2021-06-24 NOTE — PROGRESS NOTES
4 Eyes Skin Assessment Completed by CONCEPCION Hughes and Florence RN.    Head WDL  Ears WDL  Nose WDL  Mouth WDL  Neck WDL  Breast/Chest WDL  Shoulder Blades WDL  Spine WDL  (R) Arm/Elbow/Hand WDL  (L) Arm/Elbow/Hand WDL  Abdomen WDL  Groin WDL  Scrotum/Coccyx/Buttocks WDL  (R) Leg WDL  (L) Leg WDL  (R) Heel/Foot/Toe WDL  (L) Heel/Foot/Toe WDL          Devices In Places Tele Box and Pulse Ox      Interventions In Place NC W/Ear Foams and Pillows    Possible Skin Injury No    Pictures Uploaded Into Epic N/A  Wound Consult Placed N/A  RN Wound Prevention Protocol Ordered No

## 2021-06-24 NOTE — PROGRESS NOTES
Bedside report received. Bed locked and in low position, call light within reach. Hourly rounding in place. No further needs at this time. Mother at bedside.

## 2021-06-25 ENCOUNTER — APPOINTMENT (OUTPATIENT)
Dept: RADIOLOGY | Facility: MEDICAL CENTER | Age: 46
DRG: 814 | End: 2021-06-25
Attending: STUDENT IN AN ORGANIZED HEALTH CARE EDUCATION/TRAINING PROGRAM
Payer: COMMERCIAL

## 2021-06-25 LAB
ALBUMIN SERPL BCP-MCNC: 2.7 G/DL (ref 3.2–4.9)
ALBUMIN/GLOB SERPL: 0.6 G/DL
ALP SERPL-CCNC: 106 U/L (ref 30–99)
ALT SERPL-CCNC: 36 U/L (ref 2–50)
ANION GAP SERPL CALC-SCNC: 13 MMOL/L (ref 7–16)
ANISOCYTOSIS BLD QL SMEAR: ABNORMAL
ANISOCYTOSIS BLD QL SMEAR: ABNORMAL
AST SERPL-CCNC: 27 U/L (ref 12–45)
BASOPHILS # BLD AUTO: 0 % (ref 0–1.8)
BASOPHILS # BLD AUTO: 0 % (ref 0–1.8)
BASOPHILS # BLD: 0 K/UL (ref 0–0.12)
BASOPHILS # BLD: 0 K/UL (ref 0–0.12)
BILIRUB CONJ SERPL-MCNC: 0.9 MG/DL (ref 0.1–0.5)
BILIRUB INDIRECT SERPL-MCNC: 0.5 MG/DL (ref 0–1)
BILIRUB SERPL-MCNC: 1.4 MG/DL (ref 0.1–1.5)
BUN SERPL-MCNC: 49 MG/DL (ref 8–22)
BURR CELLS BLD QL SMEAR: NORMAL
CALCIUM SERPL-MCNC: 7.9 MG/DL (ref 8.5–10.5)
CHLORIDE SERPL-SCNC: 104 MMOL/L (ref 96–112)
CO2 SERPL-SCNC: 18 MMOL/L (ref 20–33)
COMMENT 1642: NORMAL
COMMENT 1642: NORMAL
CREAT SERPL-MCNC: 2.4 MG/DL (ref 0.5–1.4)
EOSINOPHIL # BLD AUTO: 0 K/UL (ref 0–0.51)
EOSINOPHIL # BLD AUTO: 0 K/UL (ref 0–0.51)
EOSINOPHIL NFR BLD: 0 % (ref 0–6.9)
EOSINOPHIL NFR BLD: 0 % (ref 0–6.9)
ERYTHROCYTE [DISTWIDTH] IN BLOOD BY AUTOMATED COUNT: 61.7 FL (ref 35.9–50)
ERYTHROCYTE [DISTWIDTH] IN BLOOD BY AUTOMATED COUNT: 62.9 FL (ref 35.9–50)
ERYTHROCYTE [DISTWIDTH] IN BLOOD BY AUTOMATED COUNT: 63.1 FL (ref 35.9–50)
GLOBULIN SER CALC-MCNC: 4.7 G/DL (ref 1.9–3.5)
GLUCOSE SERPL-MCNC: 135 MG/DL (ref 65–99)
HCT VFR BLD AUTO: 25.2 % (ref 37–47)
HCT VFR BLD AUTO: 26.8 % (ref 37–47)
HCT VFR BLD AUTO: 28.3 % (ref 37–47)
HEMOCCULT STL QL: NEGATIVE
HGB BLD-MCNC: 7.3 G/DL (ref 12–16)
HGB BLD-MCNC: 7.7 G/DL (ref 12–16)
HGB BLD-MCNC: 8.1 G/DL (ref 12–16)
HGB RETIC QN AUTO: 24.2 PG/CELL (ref 29–35)
HYPOCHROMIA BLD QL SMEAR: ABNORMAL
HYPOCHROMIA BLD QL SMEAR: ABNORMAL
IMM GRANULOCYTES # BLD AUTO: 0.11 K/UL (ref 0–0.11)
IMM GRANULOCYTES # BLD AUTO: 0.12 K/UL (ref 0–0.11)
IMM GRANULOCYTES NFR BLD AUTO: 1.9 % (ref 0–0.9)
IMM GRANULOCYTES NFR BLD AUTO: 2.2 % (ref 0–0.9)
IMM RETICS NFR: 40.5 % (ref 9.3–17.4)
INR PPP: 1.73 (ref 0.87–1.13)
IRON SATN MFR SERPL: 16 % (ref 15–55)
IRON SERPL-MCNC: 29 UG/DL (ref 40–170)
LG PLATELETS BLD QL SMEAR: NORMAL
LYMPHOCYTES # BLD AUTO: 0.66 K/UL (ref 1–4.8)
LYMPHOCYTES # BLD AUTO: 0.66 K/UL (ref 1–4.8)
LYMPHOCYTES NFR BLD: 11.5 % (ref 22–41)
LYMPHOCYTES NFR BLD: 11.9 % (ref 22–41)
MCH RBC QN AUTO: 21.9 PG (ref 27–33)
MCH RBC QN AUTO: 22 PG (ref 27–33)
MCH RBC QN AUTO: 22.1 PG (ref 27–33)
MCHC RBC AUTO-ENTMCNC: 28.6 G/DL (ref 33.6–35)
MCHC RBC AUTO-ENTMCNC: 28.7 G/DL (ref 33.6–35)
MCHC RBC AUTO-ENTMCNC: 29 G/DL (ref 33.6–35)
MCV RBC AUTO: 76.4 FL (ref 81.4–97.8)
MCV RBC AUTO: 76.4 FL (ref 81.4–97.8)
MCV RBC AUTO: 76.9 FL (ref 81.4–97.8)
MICROCYTES BLD QL SMEAR: ABNORMAL
MICROCYTES BLD QL SMEAR: ABNORMAL
MONOCYTES # BLD AUTO: 0.38 K/UL (ref 0–0.85)
MONOCYTES # BLD AUTO: 0.38 K/UL (ref 0–0.85)
MONOCYTES NFR BLD AUTO: 6.6 % (ref 0–13.4)
MONOCYTES NFR BLD AUTO: 6.8 % (ref 0–13.4)
MORPHOLOGY BLD-IMP: NORMAL
MORPHOLOGY BLD-IMP: NORMAL
NEUTROPHILS # BLD AUTO: 4.39 K/UL (ref 2–7.15)
NEUTROPHILS # BLD AUTO: 4.59 K/UL (ref 2–7.15)
NEUTROPHILS NFR BLD: 79.1 % (ref 44–72)
NEUTROPHILS NFR BLD: 80 % (ref 44–72)
NRBC # BLD AUTO: 0.02 K/UL
NRBC # BLD AUTO: 0.03 K/UL
NRBC BLD-RTO: 0.4 /100 WBC
NRBC BLD-RTO: 0.5 /100 WBC
OVALOCYTES BLD QL SMEAR: NORMAL
PLATELET # BLD AUTO: 255 K/UL (ref 164–446)
PLATELET # BLD AUTO: 269 K/UL (ref 164–446)
PLATELET # BLD AUTO: 312 K/UL (ref 164–446)
PLATELET BLD QL SMEAR: NORMAL
PLATELET BLD QL SMEAR: NORMAL
PMV BLD AUTO: 10.9 FL (ref 9–12.9)
PMV BLD AUTO: 10.9 FL (ref 9–12.9)
PMV BLD AUTO: 11.3 FL (ref 9–12.9)
POIKILOCYTOSIS BLD QL SMEAR: NORMAL
POLYCHROMASIA BLD QL SMEAR: NORMAL
POTASSIUM SERPL-SCNC: 4.1 MMOL/L (ref 3.6–5.5)
PROT SERPL-MCNC: 7.4 G/DL (ref 6–8.2)
PROTHROMBIN TIME: 19.8 SEC (ref 12–14.6)
RBC # BLD AUTO: 3.3 M/UL (ref 4.2–5.4)
RBC # BLD AUTO: 3.51 M/UL (ref 4.2–5.4)
RBC # BLD AUTO: 3.68 M/UL (ref 4.2–5.4)
RBC BLD AUTO: PRESENT
RBC BLD AUTO: PRESENT
RETICS # AUTO: 0.11 M/UL (ref 0.04–0.06)
RETICS/RBC NFR: 3 % (ref 0.8–2.1)
SODIUM SERPL-SCNC: 135 MMOL/L (ref 135–145)
TARGETS BLD QL SMEAR: NORMAL
TIBC SERPL-MCNC: 187 UG/DL (ref 250–450)
TROPONIN T SERPL-MCNC: 29 NG/L (ref 6–19)
UFH PPP CHRO-ACNC: >1.1 IU/ML
UIBC SERPL-MCNC: 158 UG/DL (ref 110–370)
WBC # BLD AUTO: 5.6 K/UL (ref 4.8–10.8)
WBC # BLD AUTO: 5.7 K/UL (ref 4.8–10.8)
WBC # BLD AUTO: 6.5 K/UL (ref 4.8–10.8)

## 2021-06-25 PROCEDURE — 700111 HCHG RX REV CODE 636 W/ 250 OVERRIDE (IP): Performed by: INTERNAL MEDICINE

## 2021-06-25 PROCEDURE — 85027 COMPLETE CBC AUTOMATED: CPT

## 2021-06-25 PROCEDURE — 700101 HCHG RX REV CODE 250: Performed by: INTERNAL MEDICINE

## 2021-06-25 PROCEDURE — 85520 HEPARIN ASSAY: CPT

## 2021-06-25 PROCEDURE — 700102 HCHG RX REV CODE 250 W/ 637 OVERRIDE(OP)

## 2021-06-25 PROCEDURE — 83540 ASSAY OF IRON: CPT

## 2021-06-25 PROCEDURE — 99233 SBSQ HOSP IP/OBS HIGH 50: CPT | Performed by: INTERNAL MEDICINE

## 2021-06-25 PROCEDURE — 700102 HCHG RX REV CODE 250 W/ 637 OVERRIDE(OP): Performed by: STUDENT IN AN ORGANIZED HEALTH CARE EDUCATION/TRAINING PROGRAM

## 2021-06-25 PROCEDURE — A9270 NON-COVERED ITEM OR SERVICE: HCPCS | Performed by: INTERNAL MEDICINE

## 2021-06-25 PROCEDURE — 83550 IRON BINDING TEST: CPT

## 2021-06-25 PROCEDURE — 700102 HCHG RX REV CODE 250 W/ 637 OVERRIDE(OP): Performed by: INTERNAL MEDICINE

## 2021-06-25 PROCEDURE — 82272 OCCULT BLD FECES 1-3 TESTS: CPT

## 2021-06-25 PROCEDURE — 85046 RETICYTE/HGB CONCENTRATE: CPT

## 2021-06-25 PROCEDURE — 84484 ASSAY OF TROPONIN QUANT: CPT

## 2021-06-25 PROCEDURE — 71045 X-RAY EXAM CHEST 1 VIEW: CPT

## 2021-06-25 PROCEDURE — 770020 HCHG ROOM/CARE - TELE (206)

## 2021-06-25 PROCEDURE — 82248 BILIRUBIN DIRECT: CPT

## 2021-06-25 PROCEDURE — 85610 PROTHROMBIN TIME: CPT

## 2021-06-25 PROCEDURE — 70551 MRI BRAIN STEM W/O DYE: CPT

## 2021-06-25 PROCEDURE — 36415 COLL VENOUS BLD VENIPUNCTURE: CPT

## 2021-06-25 PROCEDURE — 85025 COMPLETE CBC W/AUTO DIFF WBC: CPT | Mod: 91

## 2021-06-25 PROCEDURE — A9270 NON-COVERED ITEM OR SERVICE: HCPCS

## 2021-06-25 PROCEDURE — 700105 HCHG RX REV CODE 258: Performed by: INTERNAL MEDICINE

## 2021-06-25 PROCEDURE — A9270 NON-COVERED ITEM OR SERVICE: HCPCS | Performed by: STUDENT IN AN ORGANIZED HEALTH CARE EDUCATION/TRAINING PROGRAM

## 2021-06-25 PROCEDURE — 80053 COMPREHEN METABOLIC PANEL: CPT

## 2021-06-25 RX ADMIN — OMEPRAZOLE 20 MG: 20 CAPSULE, DELAYED RELEASE ORAL at 05:48

## 2021-06-25 RX ADMIN — METRONIDAZOLE 500 MG: 500 TABLET ORAL at 12:15

## 2021-06-25 RX ADMIN — BACLOFEN 10 MG: 10 TABLET ORAL at 05:48

## 2021-06-25 RX ADMIN — PREDNISONE 80 MG: 20 TABLET ORAL at 05:49

## 2021-06-25 RX ADMIN — SODIUM CHLORIDE: 9 INJECTION, SOLUTION INTRAVENOUS at 03:21

## 2021-06-25 RX ADMIN — OMEPRAZOLE 20 MG: 20 CAPSULE, DELAYED RELEASE ORAL at 16:15

## 2021-06-25 RX ADMIN — LIDOCAINE 1 PATCH: 50 PATCH TOPICAL at 22:01

## 2021-06-25 RX ADMIN — OXYCODONE 5 MG: 5 TABLET ORAL at 02:55

## 2021-06-25 RX ADMIN — SODIUM CHLORIDE: 9 INJECTION, SOLUTION INTRAVENOUS at 16:15

## 2021-06-25 RX ADMIN — ACETAMINOPHEN 1000 MG: 500 TABLET ORAL at 05:49

## 2021-06-25 RX ADMIN — HEPARIN SODIUM 18 UNITS/KG/HR: 5000 INJECTION, SOLUTION INTRAVENOUS at 03:21

## 2021-06-25 RX ADMIN — CEFEPIME 2 G: 2 INJECTION, POWDER, FOR SOLUTION INTRAVENOUS at 05:48

## 2021-06-25 RX ADMIN — HEPARIN SODIUM 15.01 UNITS/KG/HR: 5000 INJECTION, SOLUTION INTRAVENOUS at 23:44

## 2021-06-25 RX ADMIN — METRONIDAZOLE 500 MG: 500 TABLET ORAL at 20:41

## 2021-06-25 RX ADMIN — METRONIDAZOLE 500 MG: 500 TABLET ORAL at 05:48

## 2021-06-25 RX ADMIN — ACETAMINOPHEN 1000 MG: 500 TABLET ORAL at 00:35

## 2021-06-25 RX ADMIN — OXYCODONE HYDROCHLORIDE 10 MG: 10 TABLET ORAL at 16:15

## 2021-06-25 RX ADMIN — ACETAMINOPHEN 1000 MG: 500 TABLET ORAL at 12:15

## 2021-06-25 RX ADMIN — CEFEPIME 2 G: 2 INJECTION, POWDER, FOR SOLUTION INTRAVENOUS at 16:15

## 2021-06-25 ASSESSMENT — PAIN DESCRIPTION - PAIN TYPE
TYPE: ACUTE PAIN

## 2021-06-25 ASSESSMENT — ENCOUNTER SYMPTOMS
FEVER: 0
SHORTNESS OF BREATH: 0
ABDOMINAL PAIN: 1

## 2021-06-25 ASSESSMENT — FIBROSIS 4 INDEX: FIB4 SCORE: 0.65

## 2021-06-25 NOTE — PROGRESS NOTES
"Patient appears to be hallucinating stating \"There are eyeballs everywhere watching me\". Patient also stating \"The devil is my friend and no one is going to take me away\". MD Santana made aware. No orders at this time. Frequent monitoring in place. VSS.   "

## 2021-06-25 NOTE — PROGRESS NOTES
Bedside report received. Bed locked and in low position, call light within reach. Hourly rounding in place. No further needs at this time. Family at bedside.

## 2021-06-25 NOTE — CARE PLAN
Shift Goals  Clinical Goals: Maintain respirations and pain control  Patient Goals: Rest  Family Goals: N/A    Problem: Pain - Standard  Goal: Alleviation of pain or a reduction in pain to the patient’s comfort goal  6/25/2021 0106 by Ellen Grewal R.N.  Outcome: Not Progressing  Note: Patient is constantly in severe pain. Due to respiratory rate, unable to give frequent opioids. Frequent monitoring. Medicating with tylenol and narcotics when vitals permit it.     Problem: Respiratory  Goal: Patient will achieve/maintain optimum respiratory ventilation and gas exchange  Outcome: Progressing  Note: Resp rate remains low. O2 stable in the high 90s wit 2L. Pulse ox in place.

## 2021-06-25 NOTE — PROGRESS NOTES
Oncology/Hematology Progress Note               Author: Da Varela M.D. Date & Time created: 6/25/2021  10:17 AM    CC -catastrophic antiphospholipid antibody syndrome  Interval History:  Feels the same.   CT abd pelvis - stable/ improved adrenal hge . Mild hydro  Mental status overall improved still have some fluctuations.  Reports nonspecific right lower chest pain.    Review of Systems:  ROS Constitutional: No fever, chills, weight loss ,+ malaise/fatigue.    HEENT: No new auditory or visual complaints. No sore throat and neck pain.     Respiratory:No new cough, sputum production, shortness of breath and wheezing.    Cardiovascular: No new chest pain, palpitations, orthopnea and leg swelling.    Gastrointestinal: No heartburn, nausea, vomiting , + abdominal pain, hematochezia or melena     Genitourinary: Negative for dysuria, hematuria    Musculoskeletal: No new arthralgias , +myalgias   Skin: Negative for rash and itching.      Physical Exam:  Physical Exam General: Not in acute distress, alert and oriented x 3  HEENT: No pallor,  + scleral icterus, improving. Oropharynx clear.   Neck: Supple, no palpable masses.  Lymph nodes: No palpable cervical, supraclavicular, axillary or inguinal lymphadenopathy.    CVS: regular rate and rhythm, no rubs or gallops  RESP: Clear to auscultate bilaterally, no wheezing or crackles.   ABD: generalized tenderness  EXT: No edema or cyanosis  CNS: Alert and oriented x3, No focal deficits.  Skin- No rash, fading petechiae ecchymosis    Labs:          Recent Labs     06/23/21  0811 06/24/21 0314 06/25/21  0056   SODIUM 127* 129* 135   POTASSIUM 4.5 4.6 4.1   CHLORIDE 94* 98 104   CO2 19* 19* 18*   BUN 22 41* 49*   CREATININE 1.09 2.10* 2.40*   CALCIUM 8.4* 7.9* 7.9*     Recent Labs     06/22/21 2019 06/23/21  0811 06/24/21  0314 06/25/21  0056   ALTSGPT  --  54* 42 36   ASTSGOT  --  51* 31 27   ALKPHOSPHAT  --  129* 109* 106*   TBILIRUBIN  --  3.6* 1.5 1.4   DBILIRUBIN 3.4*   --  1.0* 0.9*   GLUCOSE  --  101* 146* 135*     Recent Labs     21  0811 21  0811 21  1257 21  10021  1829 21  0056 21  0624   RBC 3.51*  --   --    < >  --    < > 3.67* 3.51* 3.30*   HEMOGLOBIN 7.4*  --   --    < >  --    < > 8.0* 7.7* 7.3*   HEMATOCRIT 25.8*  --   --    < >  --    < > 28.1* 26.8* 25.2*   PLATELETCT 109*  --   --    < >  --    < > 280 255 269   PROTHROMBTM 18.2*   < > 31.8*  --  23.8*  --   --  19.8*  --    APTT 74.2*  --  163.0*  --   --   --   --   --   --    INR 1.56*   < > 3.20*  --  2.21*  --   --  1.73*  --     < > = values in this interval not displayed.     Recent Labs     21  0811 21  1005 21  1005 21  1829 21  0056 21  0624   WBC 10.6   < >  --  8.6   < > 8.0 6.5 5.6   NEUTSPOLYS 87.60*   < >  --  87.90*  --  83.10*  --  79.10*   LYMPHOCYTES 7.50*   < >  --  7.90*  --  8.50*  --  11.90*   MONOCYTES 2.70   < >  --  2.40  --  5.90  --  6.80   EOSINOPHILS 0.50   < >  --  0.00  --  0.00  --  0.00   BASOPHILS 0.40   < >  --  0.10  --  0.10  --  0.00   ASTSGOT 51*  --  31  --   --   --  27  --    ALTSGPT 54*  --  42  --   --   --  36  --    ALKPHOSPHAT 129*  --  109*  --   --   --  106*  --    TBILIRUBIN 3.6*  --  1.5  --   --   --  1.4  --     < > = values in this interval not displayed.     Recent Labs     21  0811 21  0314 21  0056   SODIUM 127* 129* 135   POTASSIUM 4.5 4.6 4.1   CHLORIDE 94* 98 104   CO2 19* 19* 18*   GLUCOSE 101* 146* 135*   BUN 22 41* 49*   CREATININE 1.09 2.10* 2.40*   CALCIUM 8.4* 7.9* 7.9*     Hemodynamics:  Temp (24hrs), Av.3 °C (97.4 °F), Min:35.9 °C (96.7 °F), Max:36.6 °C (97.8 °F)  Temperature: 36.4 °C (97.5 °F)  Pulse  Av.3  Min: 66  Max: 128   Blood Pressure: 125/83     Respiratory:    Respiration: 14, Pulse Oximetry: 99 %        RUL Breath Sounds: Clear, RML Breath Sounds: Clear, RLL Breath Sounds:  Diminished, BLAYNE Breath Sounds: Clear, LLL Breath Sounds: Diminished  Fluids:    Intake/Output Summary (Last 24 hours) at 6/24/2021 1111  Last data filed at 6/24/2021 0900  Gross per 24 hour   Intake --   Output 900 ml   Net -900 ml     Weight: 82 kg (180 lb 12.4 oz)  GI/Nutrition:  Orders Placed This Encounter   Procedures   • Diet Order Diet: Regular     Standing Status:   Standing     Number of Occurrences:   1     Order Specific Question:   Diet:     Answer:   Regular [1]     Medical Decision Making, by Problem:  Active Hospital Problems    Diagnosis    • *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 pylori infection [A04.8]    • Hyponatremia [E87.1]    • Kidney lesion, native, right [N28.9]    • Lesion of cervix [N88.9]    • Leukocytosis [D72.829]        Plan:  Catastrophic antiphospholipid antibody syndrome- triple positive APLA with presumed bilateral adrenal hemorrhage secondary to micro thrombus along with liver involvement, thrombocytopenia.  No TTP ,DIC, TMA    -Complicated case due to bilateral adrenal hemorrhage presumed to be secondary to micro thrombus.  There was question of SVC thrombus in one of the scan which is not well visible in the echo  -  Platelets normalized with  starting high-dose steroids and IVIG.    - On prednisone 80mg, gradual taper planned   -monitor for any DVT/arterial thrombotic events like stroke heart attack etc.  Check troponin if there is any significant troponin leak consider antiplatelet therapy however this comes with increased risk of  bleeding.  -Nonspecific chest discomfort chest x-ray no acute finding.  There is theoretical risk for alveolar hemorrhage.  .- On Heparin gtt - if Hgb stable over the next few days, can switch to Lovenox with Xa monitoring and subsequent transition to Coumadin  - No indication for PLEX  - Need life long anticoagulation on Coumadin given triple positive APLA , per TRAPS data.    -VIDAL-her creatinine is trending up.    Nephrology following.  CT - mild hydro - Villegas recommended    -Anemia-multifactorial. Pre existing HEATHER . She had mucocutaneous/vaginal bleeding and adrenal , possible RP hemorrhage which appears to be stable/ improving.   - Smear- cw Iron deficiency .    Parenteral Iron if she has not received it.  Transfuse for HgB < 7  Or any bleed  Johan test was negative.  She does have elevated LDH which could be related to her APLA/LFT issues and active hemolysis.      Mental status changes-overall improved.  She can have microthrombi involving her brain.  MRI ordered.    -Risk of adrenal insufficiency due to bilateral adrenal hemorrhage.  Cortisol level okay continue steroids.    High complexity.  Long discussion today with patient and her mother and various providers.    -          Quality-Core Measures

## 2021-06-25 NOTE — CARE PLAN
"  Problem: Nutritional:  Goal: Achieve adequate nutritional intake  Description: Diet will advance beyond NPO / clear liquids and patient will consume >50% of meals.  Outcome: Progressing     Diet order advanced to regular on 6/23. PO 25-50% x 2 meals per flow sheet since 6/23.     This RD went to visit pt with mother at bedside. Pt reports she is \"not eating normal\" and \"doesn't feel like eating.\" Romanian toast consumed on breakfast tray except for crust. Pt stated she was saving her breakfast tray and will continue work on it. Mother reports pt only ate bites of lunch and dinner yesterday. Pt agreeable to [employer] yogurt smoothie BID. Pt was conversationally tangential during visit.     Per RN notes: Pt \"appeared to be hallucinating stating \"There are eyeballs everywhere watching me\". And \"The devil is my friend and no one is going to take me away.\"\" However, pt was noted as A&O x 4 this morning.     RN stated pt is emotionally labile. RN additionally reports that pt ate entirety of lunch and dinner tray late at night. Encouraged RN to document PO intake in ADL flow sheet for interdisciplinary communication across all shifts.     RD will continue to follow.  "

## 2021-06-25 NOTE — PROGRESS NOTES
Nephrology Daily Progress Note    Date of Service  6/25/2021    Chief Complaint  46 y.o. 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    Interval Problem Update  6/24 -no urine output documented in the last 24 hours, but patient says she is urinating.  Repeat CT imaging shows bladder distention and mild left hydronephrosis.  Patient complains of ongoing right upper quadrant pain.  6/25-urine output 3.2 L in the last 24 hours with Villegas catheter in place.  Complains of ongoing abdominal pain.  Denies chest pain, shortness of breath.    Review of Systems  Review of Systems   Constitutional: Negative for fever.   Respiratory: Negative for shortness of breath.    Cardiovascular: Negative for chest pain.   Gastrointestinal: Positive for abdominal pain.   All other systems reviewed and are negative.       Physical Exam  Temp:  [35.9 °C (96.7 °F)-36.6 °C (97.8 °F)] 36.1 °C (96.9 °F)  Pulse:  [59-78] 67  Resp:  [8-16] 14  BP: (125-136)/(79-90) 135/83  SpO2:  [96 %-100 %] 98 %    Physical Exam  Constitutional:       General: She is not in acute distress.     Appearance: She is well-developed.   HENT:      Head: Normocephalic and atraumatic.      Mouth/Throat:      Mouth: Mucous membranes are moist.      Pharynx: Oropharynx is clear. No oropharyngeal exudate.   Eyes:      General: No scleral icterus.     Extraocular Movements: Extraocular movements intact.   Neck:      Trachea: No tracheal deviation.   Cardiovascular:      Rate and Rhythm: Normal rate and regular rhythm.      Heart sounds: Normal heart sounds. No murmur heard.     Pulmonary:      Effort: Pulmonary effort is normal.      Breath sounds: No stridor. No rales.   Abdominal:      Palpations: Abdomen is soft.      Tenderness: There is no  abdominal tenderness.   Genitourinary:     Comments: Villegas catheter in place draining randolph-colored urine  Musculoskeletal:         General: Normal range of motion.      Cervical back: Normal range of motion. No rigidity.      Right lower leg: No edema.      Left lower leg: No edema.   Skin:     General: Skin is warm and dry.   Neurological:      General: No focal deficit present.      Mental Status: She is alert and oriented to person, place, and time.   Psychiatric:         Mood and Affect: Mood normal.         Behavior: Behavior normal.         Fluids    Intake/Output Summary (Last 24 hours) at 6/25/2021 1619  Last data filed at 6/25/2021 0600  Gross per 24 hour   Intake 950 ml   Output 2250 ml   Net -1300 ml       Laboratory  Labs reviewed, pertinent labs below.  Recent Labs     06/24/21  1829 06/25/21  0056 06/25/21  0624   WBC 8.0 6.5 5.6   RBC 3.67* 3.51* 3.30*   HEMOGLOBIN 8.0* 7.7* 7.3*   HEMATOCRIT 28.1* 26.8* 25.2*   MCV 76.6* 76.4* 76.4*   MCH 21.8* 21.9* 22.1*   MCHC 28.5* 28.7* 29.0*   RDW 65.6* 63.1* 61.7*   PLATELETCT 280 255 269   MPV 11.3 11.3 10.9     Recent Labs     06/23/21  0811 06/24/21  0314 06/25/21  0056   SODIUM 127* 129* 135   POTASSIUM 4.5 4.6 4.1   CHLORIDE 94* 98 104   CO2 19* 19* 18*   GLUCOSE 101* 146* 135*   BUN 22 41* 49*   CREATININE 1.09 2.10* 2.40*   CALCIUM 8.4* 7.9* 7.9*     Recent Labs     06/23/21  0811 06/23/21  0811 06/23/21  1257 06/24/21  0314 06/25/21  0056   APTT 74.2*  --  163.0*  --   --    INR 1.56*   < > 3.20* 2.21* 1.73*    < > = values in this interval not displayed.           URINALYSIS:  Lab Results   Component Value Date/Time    COLORURINE DK Yellow 06/22/2021 1810    CLARITY Clear 06/22/2021 1810    SPECGRAVITY 1.007 06/22/2021 1810    PHURINE 6.0 06/22/2021 1810    KETONES Negative 06/22/2021 1810    PROTEINURIN Negative 06/22/2021 1810    BILIRUBINUR Moderate (A) 06/22/2021 1810    UROBILU 0.2 06/22/2021 1810    NITRITE Negative 06/22/2021 1810     LEUKESTERAS Negative 06/22/2021 1810    OCCULTBLOOD Large (A) 06/22/2021 1810     UP  No results found for: TOTPROTUR No results found for: CREATININEU      Imaging interpreted by radiologist. Imaging reports reviewed with pertinent findings below  MR-BRAIN-W/O   Final Result      MRI of the brain without contrast within normal limits.      DX-CHEST-PORTABLE (1 VIEW)   Final Result      1.  Mild hypoinflation with minimal bibasilar atelectasis.   2.  No lobar pneumonia or pneumothorax.   3.  No gross mass.      CT-ABDOMEN-PELVIS W/O   Final Result      1.  Stranding surrounding the adrenal glands is improved.   2.  Dense right adrenal mass likely representing adrenal hemorrhage is mildly decreased in size compared to prior.   3.  Mild left hydronephrosis.   4.  Multiple mildly enlarged retroperitoneal lymph nodes are not significantly changed.   5.  Density within the gallbladder may represent sludge/vicarious excretion of contrast.   6.  Trace right pleural fluid and bibasilar atelectasis.      US-RENAL   Final Result      1.  Mild left hydronephrosis.   2.  No right hydronephrosis.      YX-SEMSUFC-F/O   Final Result         1. Normal sized CBD. No CBD stone.   2. Thickening and heterogeneity of the bilateral adrenal glands with surrounding stranding, incompletely evaluated but concerning for hemorrhage.   3. No gallstone. Mild pericholecystic fluid could be reactive change due to adjacent adrenal hemorrhage.   4. Worsening left hydronephrosis, concerning for distal obstruction      DX-CHEST-LIMITED (1 VIEW)   Final Result         No acute cardiopulmonary abnormalities are identified.      EC-ECHOCARDIOGRAM COMPLETE W/O CONT   Final Result      CT-ABDOMEN-PELVIS WITH   Final Result      1.  New right adrenal mass likely represents hemorrhage. Thickening of the left adrenal gland is concerning for developing hemorrhage as well.      2.  Low attenuation filling defect in the superior vena cava is consistent with  thrombus. Echocardiogram may be helpful for further evaluation.      3.  Prominent retroperitoneal lymph nodes with nonspecific retroperitoneal inflammatory stranding as reported on the prior CT.      4.  Small bilateral pleural effusions, right greater than left. Adjacent airspace disease likely atelectasis.            Comment: Results discussed with Dr. Gunter at 9:32 AM      DV-CMUTUGU-2 VIEW   Final Result         No specific finding to suggest small bowel obstruction.      US-PELVIC COMPLETE (TRANSABDOMINAL/TRANSVAGINAL) (COMBO)   Final Result         1.  Heterogeneous appearance of the uterus with large heterogeneous uterine mass, appearance most compatible with uterine fibroid.   2.  Heterogeneous lesion in the left ovary, could represent hemorrhagic cyst or endometrioma, otherwise indeterminate, recommend follow-up sonogram in 6 weeks for repeat characterization and evaluation of stability.   3.  Thickened endometrial stripe, likely proliferative changes, consider endometrial pathology with additional follow-up as clinically appropriate.   4.  Nabothian cyst      US-RUQ   Final Result      1.  No evidence of gallstones or biliary ductal dilatation.   2.  1.9 cm cystic lesion in the right kidney. Mural nodularity is not excluded and further evaluation with renal protocol MRI is recommended.         CT-ABDOMEN-PELVIS WITH   Final Result      1.  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   2.  Hypodensity in the cervix could be artifactual or could indicate underlying cervical mass. Suggest correlation with physical exam and gynecological history.   3.  19 mm hypodense RIGHT renal lesion, likely but not definitely a cyst. Suggest further assessment with ultrasound when clinically appropriate.                     Current Facility-Administered Medications   Medication Dose Route Frequency Provider Last Rate Last Admin   • MD  Alert...Total Body Iron Replacement per Pharmacy   Other PHARMACY TO DOSE Yuri Ivy M.D.       • acetaminophen (TYLENOL) tablet 1,000 mg  1,000 mg Oral Q6HRS Dominguez Reilly M.D.   1,000 mg at 06/25/21 1215   • ipratropium-albuterol (DUONEB) nebulizer solution  3 mL Nebulization Q4H PRN (RT) Dominguez Reilly M.D.   3 mL at 06/24/21 0507   • predniSONE (DELTASONE) tablet 80 mg  80 mg Oral DAILY Da Varela M.D.   80 mg at 06/25/21 0549   • heparin infusion 25,000 units in 500 mL 0.45% NACL  0-30 Units/kg/hr Intravenous Continuous Da Varela M.D. 29 mL/hr at 06/25/21 0654 18 Units/kg/hr at 06/25/21 0654   • baclofen (LIORESAL) tablet 10 mg  10 mg Oral TID PRN Salina Zuniga D.O.   10 mg at 06/25/21 0548   • oxyCODONE immediate-release (ROXICODONE) tablet 5 mg  5 mg Oral Q4HRS PRN Salina Zuniga, D.O.   5 mg at 06/25/21 0255   • oxyCODONE immediate release (ROXICODONE) tablet 10 mg  10 mg Oral Q4HRS PRN Salina Zuniga, D.O.   10 mg at 06/25/21 1615   • omeprazole (PRILOSEC) capsule 20 mg  20 mg Oral BID Salina Zuniga, D.O.   20 mg at 06/25/21 1615   • cefepime (Maxipime) 2 g in sterile water 20 mL IV syringe  2 g Intravenous Q12HRS Salina Zuniga D.O. 240 mL/hr at 06/25/21 1615 2 g at 06/25/21 1615   • NS infusion   Intravenous Continuous Salina Zuniga D.O. 100 mL/hr at 06/25/21 1615 New Bag at 06/25/21 1615   • HYDROmorphone (Dilaudid) injection 1 mg  1 mg Intravenous Q4HRS PRN Piotr Gunter D.O.   1 mg at 06/24/21 0158   • metroNIDAZOLE (FLAGYL) tablet 500 mg  500 mg Oral Q8HRS Piotr Gunter, D.O.   500 mg at 06/25/21 1215   • lidocaine (LIDODERM) 5 % 1 Patch  1 Patch Transdermal Q24HR Gareth Castro M.D.   1 Patch at 06/24/21 2046   • senna-docusate (PERICOLACE or SENOKOT S) 8.6-50 MG per tablet 2 tablet  2 tablet Oral BID Jamar Mo M.D.   2 tablet at 06/24/21 1741    And   • polyethylene glycol/lytes (MIRALAX) PACKET 1 Packet  1 Packet Oral QDAY PRN Jamar Mo M.D.         And   • magnesium hydroxide (MILK OF MAGNESIA) suspension 30 mL  30 mL Oral QDAY PRCORNELIA Mo M.D.        And   • bisacodyl (DULCOLAX) suppository 10 mg  10 mg Rectal QDAY PRCORNELIA Mo M.D.   10 mg at 06/15/21 1201   • cloNIDine (CATAPRES) tablet 0.1 mg  0.1 mg Oral Q6HRS PRCORNELIA Mo M.D.       • enalaprilat (VASOTEC) injection 1.25 mg  1.25 mg Intravenous Q6HRS CRISTAL Mo M.D.       • labetalol (NORMODYNE/TRANDATE) injection 10 mg  10 mg Intravenous Q4HRS PRCORNELIA Mo M.D.       • ondansetron (ZOFRAN) syringe/vial injection 4 mg  4 mg Intravenous Q4HRS CRISTAL Mo M.D.   4 mg at 06/16/21 1355   • ondansetron (ZOFRAN ODT) dispertab 4 mg  4 mg Oral Q4HRS CRISTAL Mo M.D.       • promethazine (PHENERGAN) tablet 12.5-25 mg  12.5-25 mg Oral Q4HRS CRISTAL Mo M.D.       • promethazine (PHENERGAN) suppository 12.5-25 mg  12.5-25 mg Rectal Q4HRS PRCORNELIA Mo M.D.       • prochlorperazine (COMPAZINE) injection 5-10 mg  5-10 mg Intravenous Q4HRS CRISTAL Mo M.D.             Assessment/Plan  46 y.o. 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     1.  Acute kidney injury, nonoliguric, worsening.  Etiology of VIDAL unclear. Patient had iodinated contrast on 6/14 and 6/18/2021.  There could be some intrinsic kidney damage with concern for antiphospholipid antibody syndrome, but patient is on anticoagulation, and we are unable to do kidney biopsy.  Urinalysis notable for microscopic hematuria, negative for significant proteinuria.  No acute need for dialysis.  Check labs daily.  Avoid nephrotoxins.    2.  Concern for catastrophic antiphospholipid antibody syndrome.  Currently stable and managed with steroids and IVIG per hematology oncology.   Please alert nephrology if plasmapheresis is needed.  We would plan one-to-one repletion of plasma with FFP.     3.  Left renal hydronephrosis, mild on kidney ultrasound 6/22/2021 and again on CT scan 6/24/2021.  Accompanied by bladder distention.  Stable.  Continue Villegas catheter, placed 6/24/2021.    4.  Acute urinary retention, unclear etiology.  Stable.  Continue Villegas catheter, placed 6/24/2021.    5.  Microcytic anemia, worsening.  Concern for antiphospholipid antibody syndrome with intravascular clotting and hemolysis.  Defer further management to primary team and hematology oncology.  Check CBC daily.    6.  Hyponatremia, improved.  Limit hypotonic fluids.  Check labs daily.    Mateo Sutherland MD  Nephrology

## 2021-06-25 NOTE — PROGRESS NOTES
[facility] [place] [facility] MEDICINE PROGRESS NOTE     Attending: Yanna Ortiz M.D.  Senior Resident: Yuri Ivy M.D. (PGY-2)  Andrew Resident: Jaylen Bland M.D. (PGY-1)  PATIENT: Jaclyn Olvera; [id number]; 1975    ID: 46 y.o. female admitted on 6/14 for left-sided abdominal pain x4 days.  She was originally being managed on the oncology floor but was transferred to telemetry for a higher level of monitoring.  She has had a complex medical course with multiple imaging studies and multiple consultants including: gastroenterology, vascular surgery, general surgery, hematology/oncology, and nephrology.  She is currently being managed for catastrophic antiphospholipid syndrome and is being followed actively by hematology/oncology and nephrology.    Overnight Events: Patient transfused 1U PRBCs for Hgb 6.8. Overnight she was having hallucinations stating she was seeing eyes, and talking about the devil.    Subjective: Today she is more sleepy than she was yesterday. She still has some pain in her abdomen but the pain seems to be worst in her back on the right side.     OBJECTIVE:  Temp:  [35.9 °C (96.7 °F)-36.6 °C (97.8 °F)] 36.5 °C (97.7 °F)  Pulse:  [66-87] 66  Resp:  [8-16] 11  BP: (108-135)/(68-94) 125/90  SpO2:  [95 %-99 %] 96 %    Intake/Output Summary (Last 24 hours) at 6/25/2021 0630  Last data filed at 6/25/2021 0600  Gross per 24 hour   Intake 950 ml   Output 3150 ml   Net -2200 ml       PE:  General: In some obvious discomfort, afebrile, conversational   HEENT: NC/AT. EOMI. Nasal Cannula in place  Cardiovascular: RRR without murmurs, rubs, heaves. Normal capillary refill   Respiratory: CTAB, no tachypnea or retractions   Abdomen: normal bowel sounds, soft, diffusely TTP, nondistended, no masses, no organomegaly, no guarding, rebound, rigidity   EXT:  DOMINGUEZ, mild edema, scattered ecchymosis on her arms and legs   Skin: No erythema/lesions   Neuro: Non-focal, alert and orientated    LABS:  Recent Labs      06/23/21 2008 06/23/21 2008 06/24/21  1005 06/24/21  1829 06/25/21  0056   WBC 12.0*   < > 8.6 8.0 6.5   RBC 3.46*   < > 3.32* 3.67* 3.51*   HEMOGLOBIN 7.4*   < > 6.8* 8.0* 7.7*   HEMATOCRIT 25.2*   < > 24.6* 28.1* 26.8*   MCV 72.8*   < > 74.1* 76.6* 76.4*   MCH 21.4*   < > 20.5* 21.8* 21.9*   RDW 57.3*   < > 58.7* 65.6* 63.1*   PLATELETCT 230   < > 264 280 255   MPV 12.0   < > 11.9 11.3 11.3   NEUTSPOLYS 87.80*  --  87.90* 83.10*  --    LYMPHOCYTES 8.10*  --  7.90* 8.50*  --    MONOCYTES 1.60  --  2.40 5.90  --    EOSINOPHILS 0.00  --  0.00 0.00  --    BASOPHILS 0.30  --  0.10 0.10  --     < > = values in this interval not displayed.     Recent Labs     06/23/21  0811 06/24/21 0314 06/25/21  0056   SODIUM 127* 129* 135   POTASSIUM 4.5 4.6 4.1   CHLORIDE 94* 98 104   CO2 19* 19* 18*   BUN 22 41* 49*   CREATININE 1.09 2.10* 2.40*   CALCIUM 8.4* 7.9* 7.9*   ALBUMIN 2.3* 2.5* 2.7*     Estimated GFR/CRCL = Estimated Creatinine Clearance: 33.9 mL/min (A) (by C-G formula based on SCr of 2.4 mg/dL (H)).  Recent Labs     06/23/21  0811 06/24/21 0314 06/25/21  0056   GLUCOSE 101* 146* 135*     Recent Labs     06/22/21  1451 06/22/21 2019 06/23/21  0811 06/23/21  0811 06/23/21  1257 06/24/21 0314 06/25/21  0056   ASTSGOT  --   --  51*  --   --  31 27   ALTSGPT  --   --  54*  --   --  42 36   TBILIRUBIN  --   --  3.6*  --   --  1.5 1.4   IBILIRUBIN  --   --   --   --   --  0.5 0.5   DBILIRUBIN  --  3.4*  --   --   --  1.0* 0.9*   ALKPHOSPHAT  --   --  129*  --   --  109* 106*   GLOBULIN  --   --  5.7*  --   --  5.2* 4.7*   INR   < >  --  1.56*   < > 3.20* 2.21* 1.73*    < > = values in this interval not displayed.             Recent Labs     06/22/21  1451 06/22/21  1451 06/23/21  0811 06/23/21  0811 06/23/21  1257 06/24/21  0314 06/25/21  0056   INR 1.63*   < > 1.56*   < > 3.20* 2.21* 1.73*   APTT  --   --  74.2*  --  163.0*  --   --    FIBRINOGEN 777*  --   --   --   --   --   --     < > = values in this interval not  displayed.       MICROBIOLOGY: No new results    IMAGING:   CT-ABDOMEN-PELVIS W/O   Final Result      1.  Stranding surrounding the adrenal glands is improved.   2.  Dense right adrenal mass likely representing adrenal hemorrhage is mildly decreased in size compared to prior.   3.  Mild left hydronephrosis.   4.  Multiple mildly enlarged retroperitoneal lymph nodes are not significantly changed.   5.  Density within the gallbladder may represent sludge/vicarious excretion of contrast.   6.  Trace right pleural fluid and bibasilar atelectasis.      US-RENAL   Final Result      1.  Mild left hydronephrosis.   2.  No right hydronephrosis.      [id number]-P/O   Final Result         1. Normal sized CBD. No CBD stone.   2. Thickening and heterogeneity of the bilateral adrenal glands with surrounding stranding, incompletely evaluated but concerning for hemorrhage.   3. No gallstone. Mild pericholecystic fluid could be reactive change due to adjacent adrenal hemorrhage.   4. Worsening left hydronephrosis, concerning for distal obstruction      DX-CHEST-LIMITED (1 VIEW)   Final Result         No acute cardiopulmonary abnormalities are identified.      EC-ECHOCARDIOGRAM COMPLETE W/O CONT   Final Result      CT-ABDOMEN-PELVIS WITH   Final Result      1.  New right adrenal mass likely represents hemorrhage. Thickening of the left adrenal gland is concerning for developing hemorrhage as well.      2.  Low attenuation filling defect in the superior vena cava is consistent with thrombus. Echocardiogram may be helpful for further evaluation.      3.  Prominent retroperitoneal lymph nodes with nonspecific retroperitoneal inflammatory stranding as reported on the prior CT.      4.  Small bilateral pleural effusions, right greater than left. Adjacent airspace disease likely atelectasis.            Comment: Results discussed with Dr. Gunter at 9:32 AM      [id number] VIEW   Final Result         No specific finding to suggest  small bowel obstruction.      US-PELVIC COMPLETE (TRANSABDOMINAL/TRANSVAGINAL) (COMBO)   Final Result         1.  Heterogeneous appearance of the uterus with large heterogeneous uterine mass, appearance most compatible with uterine fibroid.   2.  Heterogeneous lesion in the left ovary, could represent hemorrhagic cyst or endometrioma, otherwise indeterminate, recommend follow-up sonogram in 6 weeks for repeat characterization and evaluation of stability.   3.  Thickened endometrial stripe, likely proliferative changes, consider endometrial pathology with additional follow-up as clinically appropriate.   4.  Nabothian cyst      US-RUQ   Final Result      1.  No evidence of gallstones or biliary ductal dilatation.   2.  1.9 cm cystic lesion in the right kidney. Mural nodularity is not excluded and further evaluation with renal protocol MRI is recommended.         CT-ABDOMEN-PELVIS WITH   Final Result      1.  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   2.  Hypodensity in the cervix could be artifactual or could indicate underlying cervical mass. Suggest correlation with physical exam and gynecological history.   3.  19 mm hypodense RIGHT renal lesion, likely but not definitely a cyst. Suggest further assessment with ultrasound when clinically appropriate.                   MEDS:  Current Facility-Administered Medications   Medication Last Admin   • acetaminophen (TYLENOL) tablet 1,000 mg 1,000 mg at 06/25/21 0549   • ipratropium-albuterol (DUONEB) nebulizer solution 3 mL at 06/24/21 0507   • predniSONE (DELTASONE) tablet 80 mg 80 mg at 06/25/21 0549   • heparin infusion 25,000 units in 500 mL 0.45% NACL 18 Units/kg/hr at 06/25/21 0321   • baclofen (LIORESAL) tablet 10 mg 10 mg at 06/25/21 0548   • oxyCODONE immediate-release (ROXICODONE) tablet 5 mg 5 mg at 06/25/21 0255   • oxyCODONE immediate release (ROXICODONE) tablet 10 mg 10 mg at  06/24/21 2250   • omeprazole (PRILOSEC) capsule 20 mg 20 mg at 06/25/21 0548   • cefepime (Maxipime) 2 g in sterile water 20 mL IV syringe Stopped at 06/25/21 0553   • NS infusion New Bag at 06/25/21 0321   • HYDROmorphone (Dilaudid) injection 1 mg 1 mg at 06/24/21 0158   • metroNIDAZOLE (FLAGYL) tablet 500 mg 500 mg at 06/25/21 0548   • lidocaine (LIDODERM) 5 % 1 Patch 1 Patch at 06/24/21 2046   • senna-docusate (PERICOLACE or SENOKOT S) 8.6-50 MG per tablet 2 tablet 2 tablet at 06/24/21 1741    And   • polyethylene glycol/lytes (MIRALAX) PACKET 1 Packet      And   • magnesium hydroxide (MILK OF MAGNESIA) suspension 30 mL      And   • bisacodyl (DULCOLAX) suppository 10 mg 10 mg at 06/15/21 1201   • cloNIDine (CATAPRES) tablet 0.1 mg     • enalaprilat (VASOTEC) injection 1.25 mg     • labetalol (NORMODYNE/TRANDATE) injection 10 mg     • ondansetron (ZOFRAN) syringe/vial injection 4 mg 4 mg at 06/16/21 1355   • ondansetron (ZOFRAN ODT) dispertab 4 mg     • promethazine (PHENERGAN) tablet 12.5-25 mg     • promethazine (PHENERGAN) suppository 12.5-25 mg     • prochlorperazine (COMPAZINE) injection 5-10 mg         ASSESSMENT/PLAN:  46 y.o. female admitted on 6/14 for left-sided abdominal pain x4 days.  She was originally being managed on the oncology floor but was transferred to telemetry for a higher level of monitoring.  She has had a complex medical course with multiple imaging studies and multiple consultants including: gastroenterology, vascular surgery, general surgery, hematology/oncology, and nephrology.  She is currently being managed for catastrophic antiphospholipid syndrome and is being followed actively by hematology/oncology and nephrology.     #Catastrophic antiphospholipid syndrome  -Hematology/oncology actively following  -Patient started on IVIG on 6/22, no additional doses required at this time  -Patient started on Solu-Medrol on 6/22 for 2 days  -Patient transitioned to prednisone 80 mg daily on  6/24 with a taper  -HIT panel negative and patient was started on a heparin drip on 6/23  -DIC/HIT/TTP all ruled out per heme/onc   Plan:  -Will order troponin to look for troponin leak  -Will also get CXR to look for alveolar hemorrhage  -Heme/onc actively following and recs appreciated  -Hold plasmapheresis for now  -Closely monitor for DVT/arterial thrombotic events  -Continue prednisone taper  -Continue heparin drip    #Hallucinations  #Altered mental status  #Labile mood  -She has been very labile in her mood, and her alertness waxes and wanes a great amount  -Has also been having hallucinations  -Due to such a high risk of bleeding and clotting, there is concern for possible microthrombi in brain  Plan:  -MRI brain     #Thrombus of SVC  -Discovered on CT abdomen/pelvis on 6/18  -Vascular surgery was consulted and recommended TTE  -TTE completed on 6/20 and showed an ejection fraction of 65% but could not visualize thrombus  Plan:  -Patient needs RADHIKA to appropriately visualize the thrombus  -Continue heparin drip  -Follow-up with hematology/oncology to see if TTE is appropriate given patient's improvement in LFTs and platelets     #VIDAL, worsening  #Urinary retention  -BUN/creatinine uptrending   -this morning with BUN of 49 and Cr of 2.40  -eGFR 54-->25-->22  -Mild left hydronephrosis still seen on CT abd done 6/24  -Microalbumin-Cr ratio of 39  -Protein-Cr ratio of 189  -Bladder scan 6/24 showed >700mL  -Villegas catheter placed 6/24 per nephro recs  Plan:  -Follow-up with nephrology and any recs appreciated  -Hold plasmapheresis for now     #Elevated LFTs  -Patient's LFTs have continued to downtrend  -AST 27, ALT 36, AlkPh 106 today  -MRCP completed on 6/22 showed no biliary obstruction  -Hepatitis panel negative  -Possibly due to ischemic hepatopathy  Plan:  -Continue trending on repeat CMPs      #Intra-abdominal infection  -Patient had 3/4 SIRS criteria including fever, tachycardia, leukocytosis  -Patient was  started on cefepime and Flagyl on 6/22  Plan:  -Continue cefepime and Flagyl  -Continue to monitor patient's vitals closely     #Microcytic anemia  -Likely multifactorial in etiology  -Iron studies show iron levels of 32 but normal ferritin  -Johan test was negative  -Elevated LDH which could be related to antiphospholipid syndrome versus active hemolysis  -Repeat CT abdomen/pelvis without contrast on 6/24 showed improvement of the hemorrhage located in both adrenal glands  -Transfused 1 U PRBCs on 6/24 for Hgb 6.8  -Hgb 7.7 this morning  Plan:  -Iron transfusion to be given per hematology recs  -Continue steroids  -Continue to trend CBCs  -Transfuse when hemoglobin less than 7     #Thrombocytopenia  #Vaginal bleeding  #Rectal bleeding  #Hemorrhage of both adrenal glands  -All likely secondary to catastrophic antiphospholipid syndrome  -Hematology/oncology actively following  -Platelets up to 255 this morning  -CT abdomen 6/24 showed improvement in adrenal hemorrhage  Plan:  -Closely monitor for bleeding  -Continue to trend CBCs     #Retroperitoneal lymphadenopathy  -Noted on CT abdomen/pelvis on 6/18  -Stable on CT abdomen/pelvis on 6/24  -Unclear etiology at this point     #Fibroids  -Found on pelvic ultrasound completed on 6/14  -Follow-up outpatient with gynecology     #Ovarian lesion  -Found in the left ovary on pelvic ultrasound completed 6/14  -Recommended follow-up in 6 weeks with repeat ultrasound  -Follow-up with outpatient gynecology     #Lesion of cervix  -Picked up incidentally on abdominal CT which stated that this could be artifactual or indicate an underlying cervical mass  -Cervix was unable to be visualized by pelvic exam in the emergency department  -Outpatient follow-up with gynecology as recommended     #Right renal lesion  -Renal protocol MRI is advised to evaluate further  -Can be completed as outpatient     #H.pylori infection  -Recently diagnosed at [facility]  -Patient  started on triple therapy but did not complete secondary to abdominal pain  -EGD completed 6/17 took biopsies to see if infection have resolved   Plan:  -Follow-up on biopsies  -Continue daily PPI    Disposition: Inpatient for continued treatment of catastrophic antiphospholipid antibody syndrome     #Core Measures   VTE PPx: Heparin Drip  Abx: Cefepime and Flagyl  Lines/Tubes: PIV/Villegas  Fluids:NS @ 100/hr  Diet: Regular  Code Status: Full    This patient is a Telemetry Block (T834 - T838) patient.      Yuri Ivy M.D.   PGY-2  [facility]   [phone]

## 2021-06-25 NOTE — CARE PLAN
Problem: Pain - Standard  Goal: Alleviation of pain or a reduction in pain to the patient’s comfort goal  Outcome: Progressing     Problem: Bowel Elimination  Goal: Establish and maintain regular bowel function  Outcome: Progressing     Problem: Fall Risk  Goal: Patient will remain free from falls  Outcome: Progressing     The patient is Watcher - Medium risk of patient condition declining or worsening    Shift Goals  Clinical Goals: Pain control  Patient Goals: Pain/Rest  Family Goals: Pain    Progress made toward(s) clinical / shift goals:  Fall precautions in place. Bed in lowest position. Non-skid socks in place. Personal belongings within reach. Mobility sign on door. Bed-alarm on. Call light within reach. Pt educated regarding fall prevention and verbalized understanding. Patient educated on pain medications and reasoning for monitoring oxygen saturation and respirations when administering. Patient had bowel movement overnight.     Patient is not progressing towards the following goals:

## 2021-06-26 LAB
ALBUMIN SERPL BCP-MCNC: 2.5 G/DL (ref 3.2–4.9)
ALBUMIN/GLOB SERPL: 0.6 G/DL
ALP SERPL-CCNC: 90 U/L (ref 30–99)
ALT SERPL-CCNC: 32 U/L (ref 2–50)
ANION GAP SERPL CALC-SCNC: 11 MMOL/L (ref 7–16)
ANISOCYTOSIS BLD QL SMEAR: ABNORMAL
AST SERPL-CCNC: 29 U/L (ref 12–45)
BACTERIA BLD CULT: NORMAL
BACTERIA BLD CULT: NORMAL
BASOPHILS # BLD AUTO: 0.1 % (ref 0–1.8)
BASOPHILS # BLD AUTO: 0.2 % (ref 0–1.8)
BASOPHILS # BLD: 0.01 K/UL (ref 0–0.12)
BASOPHILS # BLD: 0.01 K/UL (ref 0–0.12)
BILIRUB CONJ SERPL-MCNC: 0.5 MG/DL (ref 0.1–0.5)
BILIRUB INDIRECT SERPL-MCNC: 0.4 MG/DL (ref 0–1)
BILIRUB SERPL-MCNC: 0.9 MG/DL (ref 0.1–1.5)
BUN SERPL-MCNC: 55 MG/DL (ref 8–22)
CALCIUM SERPL-MCNC: 7.8 MG/DL (ref 8.5–10.5)
CHLORIDE SERPL-SCNC: 111 MMOL/L (ref 96–112)
CO2 SERPL-SCNC: 17 MMOL/L (ref 20–33)
COMMENT 1642: NORMAL
CREAT SERPL-MCNC: 2.11 MG/DL (ref 0.5–1.4)
EOSINOPHIL # BLD AUTO: 0 K/UL (ref 0–0.51)
EOSINOPHIL # BLD AUTO: 0 K/UL (ref 0–0.51)
EOSINOPHIL NFR BLD: 0 % (ref 0–6.9)
EOSINOPHIL NFR BLD: 0 % (ref 0–6.9)
ERYTHROCYTE [DISTWIDTH] IN BLOOD BY AUTOMATED COUNT: 64.2 FL (ref 35.9–50)
ERYTHROCYTE [DISTWIDTH] IN BLOOD BY AUTOMATED COUNT: 66.9 FL (ref 35.9–50)
GLOBULIN SER CALC-MCNC: 4.1 G/DL (ref 1.9–3.5)
GLUCOSE SERPL-MCNC: 113 MG/DL (ref 65–99)
HAPTOGLOB SERPL-MCNC: 284 MG/DL (ref 30–200)
HCT VFR BLD AUTO: 26.9 % (ref 37–47)
HCT VFR BLD AUTO: 27.9 % (ref 37–47)
HGB BLD-MCNC: 7.9 G/DL (ref 12–16)
HGB BLD-MCNC: 7.9 G/DL (ref 12–16)
HYPOCHROMIA BLD QL SMEAR: ABNORMAL
IMM GRANULOCYTES # BLD AUTO: 0.1 K/UL (ref 0–0.11)
IMM GRANULOCYTES # BLD AUTO: 0.12 K/UL (ref 0–0.11)
IMM GRANULOCYTES NFR BLD AUTO: 1.4 % (ref 0–0.9)
IMM GRANULOCYTES NFR BLD AUTO: 1.8 % (ref 0–0.9)
INR PPP: 1.56 (ref 0.87–1.13)
LYMPHOCYTES # BLD AUTO: 0.86 K/UL (ref 1–4.8)
LYMPHOCYTES # BLD AUTO: 1.11 K/UL (ref 1–4.8)
LYMPHOCYTES NFR BLD: 11.9 % (ref 22–41)
LYMPHOCYTES NFR BLD: 16.9 % (ref 22–41)
MCH RBC QN AUTO: 21.8 PG (ref 27–33)
MCH RBC QN AUTO: 22.7 PG (ref 27–33)
MCHC RBC AUTO-ENTMCNC: 28.3 G/DL (ref 33.6–35)
MCHC RBC AUTO-ENTMCNC: 29.4 G/DL (ref 33.6–35)
MCV RBC AUTO: 77.1 FL (ref 81.4–97.8)
MCV RBC AUTO: 77.3 FL (ref 81.4–97.8)
MICROCYTES BLD QL SMEAR: ABNORMAL
MONOCYTES # BLD AUTO: 0.42 K/UL (ref 0–0.85)
MONOCYTES # BLD AUTO: 0.54 K/UL (ref 0–0.85)
MONOCYTES NFR BLD AUTO: 5.8 % (ref 0–13.4)
MONOCYTES NFR BLD AUTO: 8.2 % (ref 0–13.4)
MORPHOLOGY BLD-IMP: NORMAL
NEUTROPHILS # BLD AUTO: 4.78 K/UL (ref 2–7.15)
NEUTROPHILS # BLD AUTO: 5.83 K/UL (ref 2–7.15)
NEUTROPHILS NFR BLD: 72.9 % (ref 44–72)
NEUTROPHILS NFR BLD: 80.8 % (ref 44–72)
NRBC # BLD AUTO: 0 K/UL
NRBC # BLD AUTO: 0.02 K/UL
NRBC BLD-RTO: 0 /100 WBC
NRBC BLD-RTO: 0.3 /100 WBC
OVALOCYTES BLD QL SMEAR: NORMAL
PLATELET # BLD AUTO: 282 K/UL (ref 164–446)
PLATELET # BLD AUTO: 283 K/UL (ref 164–446)
PLATELET BLD QL SMEAR: NORMAL
PMV BLD AUTO: 10.3 FL (ref 9–12.9)
PMV BLD AUTO: 9.9 FL (ref 9–12.9)
POIKILOCYTOSIS BLD QL SMEAR: NORMAL
POTASSIUM SERPL-SCNC: 4 MMOL/L (ref 3.6–5.5)
PROT SERPL-MCNC: 6.6 G/DL (ref 6–8.2)
PROTHROMBIN TIME: 18.2 SEC (ref 12–14.6)
RBC # BLD AUTO: 3.48 M/UL (ref 4.2–5.4)
RBC # BLD AUTO: 3.62 M/UL (ref 4.2–5.4)
RBC BLD AUTO: PRESENT
SIGNIFICANT IND 70042: NORMAL
SIGNIFICANT IND 70042: NORMAL
SITE SITE: NORMAL
SITE SITE: NORMAL
SODIUM SERPL-SCNC: 139 MMOL/L (ref 135–145)
SOURCE SOURCE: NORMAL
SOURCE SOURCE: NORMAL
UFH PPP CHRO-ACNC: 0.51 IU/ML
UFH PPP CHRO-ACNC: 0.54 IU/ML
UFH PPP CHRO-ACNC: 0.73 IU/ML
UFH PPP CHRO-ACNC: 0.82 IU/ML
WBC # BLD AUTO: 6.6 K/UL (ref 4.8–10.8)
WBC # BLD AUTO: 7.2 K/UL (ref 4.8–10.8)

## 2021-06-26 PROCEDURE — 700111 HCHG RX REV CODE 636 W/ 250 OVERRIDE (IP): Performed by: INTERNAL MEDICINE

## 2021-06-26 PROCEDURE — 770020 HCHG ROOM/CARE - TELE (206)

## 2021-06-26 PROCEDURE — 700101 HCHG RX REV CODE 250: Performed by: INTERNAL MEDICINE

## 2021-06-26 PROCEDURE — 85520 HEPARIN ASSAY: CPT | Mod: 91

## 2021-06-26 PROCEDURE — 85610 PROTHROMBIN TIME: CPT

## 2021-06-26 PROCEDURE — 51798 US URINE CAPACITY MEASURE: CPT

## 2021-06-26 PROCEDURE — 85025 COMPLETE CBC W/AUTO DIFF WBC: CPT | Mod: 91

## 2021-06-26 PROCEDURE — 99233 SBSQ HOSP IP/OBS HIGH 50: CPT | Performed by: INTERNAL MEDICINE

## 2021-06-26 PROCEDURE — A9270 NON-COVERED ITEM OR SERVICE: HCPCS | Performed by: INTERNAL MEDICINE

## 2021-06-26 PROCEDURE — 36415 COLL VENOUS BLD VENIPUNCTURE: CPT

## 2021-06-26 PROCEDURE — 700111 HCHG RX REV CODE 636 W/ 250 OVERRIDE (IP): Performed by: STUDENT IN AN ORGANIZED HEALTH CARE EDUCATION/TRAINING PROGRAM

## 2021-06-26 PROCEDURE — A9270 NON-COVERED ITEM OR SERVICE: HCPCS | Performed by: STUDENT IN AN ORGANIZED HEALTH CARE EDUCATION/TRAINING PROGRAM

## 2021-06-26 PROCEDURE — 700105 HCHG RX REV CODE 258: Performed by: INTERNAL MEDICINE

## 2021-06-26 PROCEDURE — 80053 COMPREHEN METABOLIC PANEL: CPT

## 2021-06-26 PROCEDURE — 700102 HCHG RX REV CODE 250 W/ 637 OVERRIDE(OP): Performed by: STUDENT IN AN ORGANIZED HEALTH CARE EDUCATION/TRAINING PROGRAM

## 2021-06-26 PROCEDURE — 700105 HCHG RX REV CODE 258: Performed by: STUDENT IN AN ORGANIZED HEALTH CARE EDUCATION/TRAINING PROGRAM

## 2021-06-26 PROCEDURE — 82248 BILIRUBIN DIRECT: CPT

## 2021-06-26 PROCEDURE — 700102 HCHG RX REV CODE 250 W/ 637 OVERRIDE(OP): Performed by: INTERNAL MEDICINE

## 2021-06-26 RX ADMIN — HEPARIN SODIUM 13 UNITS/KG/HR: 5000 INJECTION, SOLUTION INTRAVENOUS at 01:56

## 2021-06-26 RX ADMIN — BACLOFEN 10 MG: 10 TABLET ORAL at 23:06

## 2021-06-26 RX ADMIN — SODIUM CHLORIDE: 9 INJECTION, SOLUTION INTRAVENOUS at 02:02

## 2021-06-26 RX ADMIN — SODIUM CHLORIDE 125 MG: 9 INJECTION, SOLUTION INTRAVENOUS at 16:58

## 2021-06-26 RX ADMIN — OMEPRAZOLE 20 MG: 20 CAPSULE, DELAYED RELEASE ORAL at 16:58

## 2021-06-26 RX ADMIN — CEFEPIME 2 G: 2 INJECTION, POWDER, FOR SOLUTION INTRAVENOUS at 06:12

## 2021-06-26 RX ADMIN — OMEPRAZOLE 20 MG: 20 CAPSULE, DELAYED RELEASE ORAL at 05:34

## 2021-06-26 RX ADMIN — METRONIDAZOLE 500 MG: 500 TABLET ORAL at 12:53

## 2021-06-26 RX ADMIN — BACLOFEN 10 MG: 10 TABLET ORAL at 12:53

## 2021-06-26 RX ADMIN — LIDOCAINE 1 PATCH: 50 PATCH TOPICAL at 23:01

## 2021-06-26 RX ADMIN — PREDNISONE 80 MG: 20 TABLET ORAL at 05:34

## 2021-06-26 RX ADMIN — METRONIDAZOLE 500 MG: 500 TABLET ORAL at 05:33

## 2021-06-26 ASSESSMENT — PAIN DESCRIPTION - PAIN TYPE: TYPE: ACUTE PAIN

## 2021-06-26 NOTE — CARE PLAN
The patient is Unstable - High likelihood or risk of patient condition declining or worsening    Shift Goals  Clinical Goals: monitor xa, monitor for bleeding  Patient Goals: sleep comfortably  Family Goals: Pain    Progress made toward(s) clinical / shift goals:    Patient assessed for signs of bleeding. Xa results monitored and heparin drip adjusted per protocol and order. Patient encouraged to communicate needs and pain level on 0-10 scale.    Patient is not progressing towards the following goals:      Problem: Pain - Standard  Goal: Alleviation of pain or a reduction in pain to the patient’s comfort goal  Outcome: Progressing     Problem: Knowledge Deficit - Standard  Goal: Patient and family/care givers will demonstrate understanding of plan of care, disease process/condition, diagnostic tests and medications  Outcome: Progressing     Problem: Gastrointestinal Irritability  Goal: Nausea and vomiting will be absent or improve  Outcome: Progressing     Problem: Bowel Elimination  Goal: Establish and maintain regular bowel function  Outcome: Progressing     Problem: Hemodynamics  Goal: Patient's hemodynamics, fluid balance and neurologic status will be stable or improve  Outcome: Progressing     Problem: Fluid Volume  Goal: Fluid volume balance will be maintained  Outcome: Progressing     Problem: Urinary - Renal Perfusion  Goal: Ability to achieve and maintain adequate renal perfusion and functioning will improve  Outcome: Progressing     Problem: Respiratory  Goal: Patient will achieve/maintain optimum respiratory ventilation and gas exchange  Outcome: Progressing     Problem: Mechanical Ventilation  Goal: Safe management of artificial airway and ventilation  Outcome: Progressing  Goal: Successful weaning off mechanical ventilator, spontaneously maintains adequate gas exchange  Outcome: Progressing  Goal: Patient will be able to express needs and understand communication  Outcome: Progressing     Problem:  Physical Regulation  Goal: Diagnostic test results will improve  Outcome: Progressing  Goal: Signs and symptoms of infection will decrease  Outcome: Progressing     Problem: Fall Risk  Goal: Patient will remain free from falls  Outcome: Progressing

## 2021-06-26 NOTE — CARE PLAN
Problem: Pain - Standard  Goal: Alleviation of pain or a reduction in pain to the patient’s comfort goal  Outcome: Progressing     Problem: Fluid Volume  Goal: Fluid volume balance will be maintained  Outcome: Progressing     Problem: Fall Risk  Goal: Patient will remain free from falls  Outcome: Progressing     The patient is Watcher - Medium risk of patient condition declining or worsening    Shift Goals  Clinical Goals: Neurovascular checks  Patient Goals: Rest/Pain control  Family Goals: Rest    Progress made toward(s) clinical / shift goals:  Patient educated on pain medications available and the side effects associated, including decreased respiratory rate. Fall precautions in place. Bed in lowest position. Non-skid socks in place. Personal belongings within reach. Mobility sign on door. Bed-alarm on. Call light within reach. Pt educated regarding fall prevention and verbalized understanding.

## 2021-06-26 NOTE — PROGRESS NOTES
Nephrology Daily Progress Note    Date of Service  6/26/2021    Chief Complaint  46 y.o. 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    Interval Problem Update  6/24 -no urine output documented in the last 24 hours, but patient says she is urinating.  Repeat CT imaging shows bladder distention and mild left hydronephrosis.  Patient complains of ongoing right upper quadrant pain.  6/25-urine output 3.2 L in the last 24 hours with Villegas catheter in place.  Complains of ongoing abdominal pain.  Denies chest pain, shortness of breath.  6/26 - UOP 1.4L in last 24 hours. Patient somnolent today, unable to obtain ROS.     Review of Systems  Review of Systems   Unable to perform ROS: Patient unresponsive        Physical Exam  Temp:  [35.8 °C (96.5 °F)-36.8 °C (98.2 °F)] 36.4 °C (97.5 °F)  Pulse:  [64-78] 78  Resp:  [11-16] 16  BP: (131-145)/(74-87) 139/78  SpO2:  [98 %-100 %] 98 %    Physical Exam  Constitutional:       General: She is not in acute distress.     Appearance: She is well-developed. She is ill-appearing.   HENT:      Head: Normocephalic and atraumatic.      Mouth/Throat:      Mouth: Mucous membranes are moist.      Pharynx: Oropharynx is clear. No oropharyngeal exudate.   Eyes:      General: No scleral icterus.        Right eye: No discharge.         Left eye: No discharge.   Neck:      Trachea: No tracheal deviation.   Cardiovascular:      Rate and Rhythm: Normal rate and regular rhythm.      Heart sounds: Normal heart sounds. No murmur heard.     Pulmonary:      Effort: Pulmonary effort is normal.      Breath sounds: No stridor. No rales.   Abdominal:      Palpations: Abdomen is soft.      Tenderness: There is no abdominal tenderness.   Musculoskeletal:         General: No  swelling.      Cervical back: Normal range of motion. No rigidity.      Right lower leg: Edema (trace) present.      Left lower leg: Edema (trace) present.   Skin:     General: Skin is warm and dry.   Neurological:      General: No focal deficit present.      Mental Status: She is lethargic.      Comments: Patient is somnolent, unable to obtain full neuro exam   Psychiatric:      Comments: Unable to obtain due to somnolence         Fluids    Intake/Output Summary (Last 24 hours) at 6/26/2021 1317  Last data filed at 6/26/2021 0600  Gross per 24 hour   Intake --   Output 1400 ml   Net -1400 ml       Laboratory  Labs reviewed, pertinent labs below.  Recent Labs     06/25/21  0624 06/25/21  1553 06/26/21  0819   WBC 5.6 5.7 7.2   RBC 3.30* 3.68* 3.48*   HEMOGLOBIN 7.3* 8.1* 7.9*   HEMATOCRIT 25.2* 28.3* 26.9*   MCV 76.4* 76.9* 77.3*   MCH 22.1* 22.0* 22.7*   MCHC 29.0* 28.6* 29.4*   RDW 61.7* 62.9* 64.2*   PLATELETCT 269 312 282   MPV 10.9 10.9 10.3     Recent Labs     06/24/21  0314 06/25/21  0056 06/26/21  0041   SODIUM 129* 135 139   POTASSIUM 4.6 4.1 4.0   CHLORIDE 98 104 111   CO2 19* 18* 17*   GLUCOSE 146* 135* 113*   BUN 41* 49* 55*   CREATININE 2.10* 2.40* 2.11*   CALCIUM 7.9* 7.9* 7.8*     Recent Labs     06/24/21  0314 06/25/21  0056 06/26/21  0041   INR 2.21* 1.73* 1.56*           URINALYSIS:  Lab Results   Component Value Date/Time    COLORURINE DK Yellow 06/22/2021 1810    CLARITY Clear 06/22/2021 1810    SPECGRAVITY 1.007 06/22/2021 1810    PHURINE 6.0 06/22/2021 1810    KETONES Negative 06/22/2021 1810    PROTEINURIN Negative 06/22/2021 1810    BILIRUBINUR Moderate (A) 06/22/2021 1810    UROBILU 0.2 06/22/2021 1810    NITRITE Negative 06/22/2021 1810    LEUKESTERAS Negative 06/22/2021 1810    OCCULTBLOOD Large (A) 06/22/2021 1810     UPC  No results found for: TOTPROTUR No results found for: CREATININEU      Imaging interpreted by radiologist. Imaging reports reviewed with pertinent findings  below  MR-BRAIN-W/O   Final Result      MRI of the brain without contrast within normal limits.      DX-CHEST-PORTABLE (1 VIEW)   Final Result      1.  Mild hypoinflation with minimal bibasilar atelectasis.   2.  No lobar pneumonia or pneumothorax.   3.  No gross mass.      CT-ABDOMEN-PELVIS W/O   Final Result      1.  Stranding surrounding the adrenal glands is improved.   2.  Dense right adrenal mass likely representing adrenal hemorrhage is mildly decreased in size compared to prior.   3.  Mild left hydronephrosis.   4.  Multiple mildly enlarged retroperitoneal lymph nodes are not significantly changed.   5.  Density within the gallbladder may represent sludge/vicarious excretion of contrast.   6.  Trace right pleural fluid and bibasilar atelectasis.      US-RENAL   Final Result      1.  Mild left hydronephrosis.   2.  No right hydronephrosis.      [id number]-Q/O   Final Result         1. Normal sized CBD. No CBD stone.   2. Thickening and heterogeneity of the bilateral adrenal glands with surrounding stranding, incompletely evaluated but concerning for hemorrhage.   3. No gallstone. Mild pericholecystic fluid could be reactive change due to adjacent adrenal hemorrhage.   4. Worsening left hydronephrosis, concerning for distal obstruction      DX-CHEST-LIMITED (1 VIEW)   Final Result         No acute cardiopulmonary abnormalities are identified.      EC-ECHOCARDIOGRAM COMPLETE W/O CONT   Final Result      CT-ABDOMEN-PELVIS WITH   Final Result      1.  New right adrenal mass likely represents hemorrhage. Thickening of the left adrenal gland is concerning for developing hemorrhage as well.      2.  Low attenuation filling defect in the superior vena cava is consistent with thrombus. Echocardiogram may be helpful for further evaluation.      3.  Prominent retroperitoneal lymph nodes with nonspecific retroperitoneal inflammatory stranding as reported on the prior CT.      4.  Small bilateral pleural effusions, right  greater than left. Adjacent airspace disease likely atelectasis.            Comment: Results discussed with Dr. Gunter at 9:32 AM      [id number] VIEW   Final Result         No specific finding to suggest small bowel obstruction.      US-PELVIC COMPLETE (TRANSABDOMINAL/TRANSVAGINAL) (COMBO)   Final Result         1.  Heterogeneous appearance of the uterus with large heterogeneous uterine mass, appearance most compatible with uterine fibroid.   2.  Heterogeneous lesion in the left ovary, could represent hemorrhagic cyst or endometrioma, otherwise indeterminate, recommend follow-up sonogram in 6 weeks for repeat characterization and evaluation of stability.   3.  Thickened endometrial stripe, likely proliferative changes, consider endometrial pathology with additional follow-up as clinically appropriate.   4.  Nabothian cyst      US-RUQ   Final Result      1.  No evidence of gallstones or biliary ductal dilatation.   2.  1.9 cm cystic lesion in the right kidney. Mural nodularity is not excluded and further evaluation with renal protocol MRI is recommended.         CT-ABDOMEN-PELVIS WITH   Final Result      1.  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   2.  Hypodensity in the cervix could be artifactual or could indicate underlying cervical mass. Suggest correlation with physical exam and gynecological history.   3.  19 mm hypodense RIGHT renal lesion, likely but not definitely a cyst. Suggest further assessment with ultrasound when clinically appropriate.                     Current Facility-Administered Medications   Medication Dose Route Frequency Provider Last Rate Last Admin   • [START ON 6/27/2021] predniSONE (DELTASONE) tablet 60 mg  60 mg Oral DAILY Da Varela M.D.       • acetaminophen (TYLENOL) tablet 1,000 mg  1,000 mg Oral Q6HRS Dominguez Reilly M.D.   1,000 mg at 06/25/21 1215   • ipratropium-albuterol (DUONEB)  nebulizer solution  3 mL Nebulization Q4H PRN (RT) Dominguez Reilly M.D.   3 mL at 06/24/21 0507   • heparin infusion 25,000 units in 500 mL 0.45% NACL  0-30 Units/kg/hr Intravenous Continuous Da Varela M.D. 17.7 mL/hr at 06/26/21 0928 11 Units/kg/hr at 06/26/21 0928   • baclofen (LIORESAL) tablet 10 mg  10 mg Oral TID PRN Salina Zuniga D.O.   10 mg at 06/26/21 1253   • oxyCODONE immediate-release (ROXICODONE) tablet 5 mg  5 mg Oral Q4HRS PRN Salina Zuniga D.O.   5 mg at 06/25/21 0255   • oxyCODONE immediate release (ROXICODONE) tablet 10 mg  10 mg Oral Q4HRS PRN Salina Zuniga D.O.   10 mg at 06/25/21 1615   • omeprazole (PRILOSEC) capsule 20 mg  20 mg Oral BID Salina Zuniga D.O.   20 mg at 06/26/21 0534   • cefepime (Maxipime) 2 g in sterile water 20 mL IV syringe  2 g Intravenous Q12HRS Salina Zuniga D.O.   Stopped at 06/26/21 0617   • HYDROmorphone (Dilaudid) injection 1 mg  1 mg Intravenous Q4HRS PRN Piotr Gunter, D.O.   1 mg at 06/24/21 0158   • metroNIDAZOLE (FLAGYL) tablet 500 mg  500 mg Oral Q8HRS Piotr Haileanor, D.O.   500 mg at 06/26/21 1253   • lidocaine (LIDODERM) 5 % 1 Patch  1 Patch Transdermal Q24HR Gareth Castro M.D.   1 Patch at 06/25/21 2201   • senna-docusate (PERICOLACE or SENOKOT S) 8.6-50 MG per tablet 2 tablet  2 tablet Oral BID Jamar Mo M.D.   2 tablet at 06/24/21 1741    And   • polyethylene glycol/lytes (MIRALAX) PACKET 1 Packet  1 Packet Oral QDAY PRN Jamar Mo M.D.        And   • magnesium hydroxide (MILK OF MAGNESIA) suspension 30 mL  30 mL Oral QDAY PRCORNELIA Mo M.D.        And   • bisacodyl (DULCOLAX) suppository 10 mg  10 mg Rectal QDAY PRCORNELIA Mo M.D.   10 mg at 06/15/21 1201   • cloNIDine (CATAPRES) tablet 0.1 mg  0.1 mg Oral Q6HRS PRCORNELIA Mo M.D.       • enalaprilat (VASOTEC) injection 1.25 mg  1.25 mg Intravenous Q6HRS PRCORNELIA Mo M.D.       • labetalol (NORMODYNE/TRANDATE) injection 10 mg  10 mg Intravenous Q4HRS  CRISTAL Mo M.D.       • ondansetron (ZOFRAN) syringe/vial injection 4 mg  4 mg Intravenous Q4HRS CRISTAL Mo M.D.   4 mg at 06/16/21 1355   • ondansetron (ZOFRAN ODT) dispertab 4 mg  4 mg Oral Q4HRS CRISTAL Mo M.D.       • promethazine (PHENERGAN) tablet 12.5-25 mg  12.5-25 mg Oral Q4HRS CRISTAL Mo M.D.       • promethazine (PHENERGAN) suppository 12.5-25 mg  12.5-25 mg Rectal Q4HRS CRISTAL Mo M.D.       • prochlorperazine (COMPAZINE) injection 5-10 mg  5-10 mg Intravenous Q4HRS CRISTAL Mo M.D.             Assessment/Plan  46 y.o. 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     1.  Acute kidney injury, nonoliguric, improving.  Etiology of VIDAL unclear, but improving with bowens so obstruction was likely playing a role. Patient had iodinated contrast on 6/14 and 6/18/2021.  There could be some intrinsic kidney damage with concern for antiphospholipid antibody syndrome, but patient is on anticoagulation, and we are unable to do kidney biopsy.  Urinalysis notable for microscopic hematuria, negative for significant proteinuria. No need for RRT. Check labs daily. Avoid nephrotoxins.     2.  Concern for catastrophic antiphospholipid antibody syndrome.  Currently stable and managed with steroids per hematology oncology.  Please alert nephrology if plasmapheresis is needed.  We would plan one-to-one repletion of plasma with FFP.     3.  Left renal hydronephrosis, mild on kidney ultrasound 6/22/2021 and again on CT scan 6/24/2021.  Accompanied by bladder distention.  Continue Bowens catheter, placed 6/24/2021. Recommend repeat ultrasound of kidney on 6/28/21 to ensure improved.     4.  Acute urinary retention, unclear etiology. Stable with bowens in place,  continue.     5.  Microcytic anemia, worsening.  Concern for antiphospholipid antibody syndrome with intravascular clotting and hemolysis. Defer further management to primary team and hem/onc. Check CBC daily.     6.  Hyponatremia, resolved. Check labs daily.     7. Metabolic acidosis, worsening. Unclear etiology. Check labs daily. No acute need for alkali supplementation, but if worsens, would start sodium bicarb 1300mg PO BID.     Mateo Sutherland MD  Nephrology

## 2021-06-26 NOTE — PROGRESS NOTES
Oncology/Hematology Progress Note               Author: Da Varela M.D. Date & Time created: 6/26/2021  9:38 AM    CC -catastrophic antiphospholipid antibody syndrome  Interval History:  Overall stable.  Hemoglobin stable.  MRI brain no acute finding.      Review of Systems:  ROS Constitutional: No fever, chills, weight loss ,+ malaise/fatigue.    HEENT: No new auditory or visual complaints. No sore throat and neck pain.     Respiratory:No new cough, sputum production, shortness of breath and wheezing.    Cardiovascular: No new chest pain, palpitations, orthopnea and leg swelling.    Gastrointestinal: No heartburn, nausea, vomiting , + abdominal pain, hematochezia or melena     Genitourinary: Negative for dysuria, hematuria    Musculoskeletal: No new arthralgias , +myalgias   Skin: Negative for rash and itching.      Physical Exam:  Physical Exam General: Not in acute distress, alert and oriented x 3  HEENT: No pallor,  + scleral icterus, improving. Oropharynx clear.   Neck: Supple, no palpable masses.  Lymph nodes: No palpable cervical, supraclavicular, axillary or inguinal lymphadenopathy.    CVS: regular rate and rhythm, no rubs or gallops  RESP: Clear to auscultate bilaterally, no wheezing or crackles.   ABD: generalized tenderness  EXT: No edema or cyanosis  CNS: Alert and oriented x3, No focal deficits.  Skin- No rash, fading petechiae ecchymosis    Labs:          Recent Labs     06/24/21 0314 06/25/21 0056 06/26/21  0041   SODIUM 129* 135 139   POTASSIUM 4.6 4.1 4.0   CHLORIDE 98 104 111   CO2 19* 18* 17*   BUN 41* 49* 55*   CREATININE 2.10* 2.40* 2.11*   CALCIUM 7.9* 7.9* 7.8*     Recent Labs     06/24/21 0314 06/25/21  0056 06/26/21  0041   ALTSGPT 42 36 32   ASTSGOT 31 27 29   ALKPHOSPHAT 109* 106* 90   TBILIRUBIN 1.5 1.4 0.9   DBILIRUBIN 1.0* 0.9* 0.5   GLUCOSE 146* 135* 113*     Recent Labs     06/23/21  1257 06/23/21  2008 06/24/21 0314 06/24/21  1005 06/25/21  0056 06/25/21  0056  21  0624 21  1553 21  00421  0819   RBC  --    < >  --    < > 3.51*   < > 3.30* 3.68*  --  3.48*   HEMOGLOBIN  --    < >  --    < > 7.7*   < > 7.3* 8.1*  --  7.9*   HEMATOCRIT  --    < >  --    < > 26.8*   < > 25.2* 28.3*  --  26.9*   PLATELETCT  --    < >  --    < > 255   < > 269 312  --  282   PROTHROMBTM 31.8*  --  23.8*  --  19.8*  --   --   --  18.2*  --    APTT 163.0*  --   --   --   --   --   --   --   --   --    INR 3.20*  --  2.21*  --  1.73*  --   --   --  1.56*  --    IRON  --   --   --   --   --   --   --  29*  --   --    TOTIRONBC  --   --   --   --   --   --   --  187*  --   --     < > = values in this interval not displayed.     Recent Labs     21  0314 21  1005 21  1829 21  0056 213 21  0819   WBC  --    < >   < > 6.5 5.6 5.7  --  7.2   NEUTSPOLYS  --    < >   < >  --  79.10* 80.00*  --  80.80*   LYMPHOCYTES  --    < >   < >  --  11.90* 11.50*  --  11.90*   MONOCYTES  --    < >   < >  --  6.80 6.60  --  5.80   EOSINOPHILS  --    < >   < >  --  0.00 0.00  --  0.00   BASOPHILS  --    < >   < >  --  0.00 0.00  --  0.10   ASTSGOT 31  --   --  27  --   --  29  --    ALTSGPT 42  --   --  36  --   --  32  --    ALKPHOSPHAT 109*  --   --  106*  --   --  90  --    TBILIRUBIN 1.5  --   --  1.4  --   --  0.9  --     < > = values in this interval not displayed.     Recent Labs     21  0314 21  0056 21  0041   SODIUM 129* 135 139   POTASSIUM 4.6 4.1 4.0   CHLORIDE 98 104 111   CO2 19* 18* 17*   GLUCOSE 146* 135* 113*   BUN 41* 49* 55*   CREATININE 2.10* 2.40* 2.11*   CALCIUM 7.9* 7.9* 7.8*     Hemodynamics:  Temp (24hrs), Av.2 °C (97.1 °F), Min:35.8 °C (96.5 °F), Max:36.8 °C (98.2 °F)  Temperature: 36.8 °C (98.2 °F)  Pulse  Av.2  Min: 59  Max: 128   Blood Pressure: 145/84     Respiratory:    Respiration: 14, Pulse Oximetry: 98 %     Work Of Breathing / Effort: Within Normal Limits  (Bradypnea)  RUL Breath Sounds: Clear, RML Breath Sounds: Clear, RLL Breath Sounds: Diminished, BLAYNE Breath Sounds: Clear, LLL Breath Sounds: Diminished  Fluids:    Intake/Output Summary (Last 24 hours) at 6/24/2021 1111  Last data filed at 6/24/2021 0900  Gross per 24 hour   Intake --   Output 900 ml   Net -900 ml     Weight: 96.7 kg (213 lb 3 oz)  GI/Nutrition:  Orders Placed This Encounter   Procedures   • Diet Order Diet: Regular     Standing Status:   Standing     Number of Occurrences:   1     Order Specific Question:   Diet:     Answer:   Regular [1]     Medical Decision Making, by Problem:  Active Hospital Problems    Diagnosis    • *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 pylori infection [A04.8]    • Hyponatremia [E87.1]    • Kidney lesion, native, right [N28.9]    • Lesion of cervix [N88.9]    • Leukocytosis [D72.829]        Plan:  Catastrophic antiphospholipid antibody syndrome- triple positive APLA with presumed bilateral adrenal hemorrhage secondary to micro thrombus along with liver involvement, thrombocytopenia.  No TTP ,DIC, TMA    -Complicated case due to bilateral adrenal hemorrhage presumed to be secondary to micro thrombus/thrombotic.  There was question of SVC thrombus in one of the scan which is not well visible in the echo  -  Platelets normalized with  starting high-dose steroids and IVIG.    - On prednisone 80mg, will lower to 60 mg with plans to gradually taper off over the next 2 weeks or so  -monitor for any DVT/arterial thrombotic events like stroke  heart attack etc.  -Only mild troponin leak no indication to start any antiplatelets at this time.  .- On Heparin gtt - if Hgb stable over the next few days, can switch to Lovenox with Xa monitoring and subsequent transition to Coumadin.  - No indication for PLEX  - Need life long anticoagulation on Coumadin given triple positive APLA , per TRAPS data.    -VIDAL-her creatinine is stable to improved nephrology following.  CT - mild hydro - Villegas recommended    -Anemia-multifactorial. Pre existing HEATHER . She had mucocutaneous/vaginal bleeding and adrenal , possible RP hemorrhage which appears to be stable/ improving.   - Smear- cw Iron deficiency .    Parenteral Iron if she has not received it.  Transfuse for HgB < 7  Or any bleed  Johan test was negative.  She does have elevated LDH which could be related to her APLA/LFT issues and active hemolysis.      Mental status changes-overall improved.  She can have microthrombi involving her brain.  MRI-no acute findings  -Risk of adrenal insufficiency due to bilateral adrenal hemorrhage.  Cortisol level okay continue steroids.    High complexity.      -          Quality-Core Measures

## 2021-06-26 NOTE — PROGRESS NOTES
[facility] MEDICINE PROGRESS NOTE     Attending:   Yanna Ortiz MD     Resident:   Jaylen Bland MD    PATIENT:   Jaclyn Olvera; [id number]; 1975    ID:   46 y.o. female admitted on 6/14 for left-sided abdominal pain x4 days.  She was originally being managed on the oncology floor but was transferred to telemetry for a higher level of monitoring.  She has had a complex medical course with multiple imaging studies and multiple consultants including: gastroenterology, vascular surgery, general surgery, hematology/oncology, and nephrology.  She is currently being managed for catastrophic antiphospholipid syndrome and is being followed actively by hematology/oncology and nephrology.    SUBJECTIVE:   No acute events overnight, patient denies any acute changes in her condition.  States that her abdominal pain has improved, but feels puffy in her legs and fingers.  Denies any hallucinations and is currently alert and oriented.  Denies any numbness and tingling anywhere, denies any weakness, states that she is able to ambulate her baseline.    OBJECTIVE:  Vitals:    06/25/21 1909 06/25/21 2335 06/26/21 0408 06/26/21 0806   BP: 142/87 138/74 131/80 145/84   Pulse: 65 64 71 68   Resp: 14 (!) 11 12 14   Temp: 35.8 °C (96.5 °F) 36.1 °C (97 °F) 36.2 °C (97.2 °F) 36.8 °C (98.2 °F)   TempSrc: Temporal Temporal Temporal Temporal   SpO2: 99% 100% 100% 98%   Weight:  96.7 kg (213 lb 3 oz)     Height:           Intake/Output Summary (Last 24 hours) at 6/26/2021 1046  Last data filed at 6/26/2021 0600  Gross per 24 hour   Intake --   Output 1400 ml   Net -1400 ml       PHYSICAL EXAM:  General: No acute distress, afebrile, resting comfortably, conversational   HEENT: NC/AT. EOMI. Nasal Cannula in place   Cardiovascular: RRR without murmurs, rubs, heaves. Normal capillary refill   Respiratory: CTAB, no tachypnea or retractions   Abdomen: normal bowel sounds, soft, diffusely TTP, nondistended, no masses, no organomegaly, no   EXT:   DOMINGUEZ, minor non pitting edema noted in the feet and fingers, scattered ecchymosis on her arms and legs   Skin: No erythema/lesions   Neuro: Non-focal, alert and orientated     LABS:  Recent Labs     06/25/21  0624 06/25/21  1553 06/26/21  0819   WBC 5.6 5.7 7.2   RBC 3.30* 3.68* 3.48*   HEMOGLOBIN 7.3* 8.1* 7.9*   HEMATOCRIT 25.2* 28.3* 26.9*   MCV 76.4* 76.9* 77.3*   MCH 22.1* 22.0* 22.7*   RDW 61.7* 62.9* 64.2*   PLATELETCT 269 312 282   MPV 10.9 10.9 10.3   NEUTSPOLYS 79.10* 80.00* 80.80*   LYMPHOCYTES 11.90* 11.50* 11.90*   MONOCYTES 6.80 6.60 5.80   EOSINOPHILS 0.00 0.00 0.00   BASOPHILS 0.00 0.00 0.10   RBCMORPHOLO Present Present  --      Recent Labs     06/24/21 0314 06/25/21 0056 06/26/21  0041   SODIUM 129* 135 139   POTASSIUM 4.6 4.1 4.0   CHLORIDE 98 104 111   CO2 19* 18* 17*   BUN 41* 49* 55*   CREATININE 2.10* 2.40* 2.11*   CALCIUM 7.9* 7.9* 7.8*   ALBUMIN 2.5* 2.7* 2.5*     Estimated GFR/CRCL = Estimated Creatinine Clearance: 41.7 mL/min (A) (by C-G formula based on SCr of 2.11 mg/dL (H)).  Recent Labs     06/24/21 0314 06/25/21 0056 06/26/21  0041   GLUCOSE 146* 135* 113*     Recent Labs     06/24/21 0314 06/25/21 0056 06/26/21  0041   ASTSGOT 31 27 29   ALTSGPT 42 36 32   TBILIRUBIN 1.5 1.4 0.9   IBILIRUBIN 0.5 0.5 0.4   DBILIRUBIN 1.0* 0.9* 0.5   ALKPHOSPHAT 109* 106* 90   GLOBULIN 5.2* 4.7* 4.1*   INR 2.21* 1.73* 1.56*             Recent Labs     06/23/21  1257 06/23/21  1257 06/24/21  0314 06/25/21  0056 06/26/21  0041   INR 3.20*   < > 2.21* 1.73* 1.56*   APTT 163.0*  --   --   --   --     < > = values in this interval not displayed.         IMAGING:  MR-BRAIN-W/O   Final Result      MRI of the brain without contrast within normal limits.      DX-CHEST-PORTABLE (1 VIEW)   Final Result      1.  Mild hypoinflation with minimal bibasilar atelectasis.   2.  No lobar pneumonia or pneumothorax.   3.  No gross mass.      CT-ABDOMEN-PELVIS W/O   Final Result      1.  Stranding surrounding the  adrenal glands is improved.   2.  Dense right adrenal mass likely representing adrenal hemorrhage is mildly decreased in size compared to prior.   3.  Mild left hydronephrosis.   4.  Multiple mildly enlarged retroperitoneal lymph nodes are not significantly changed.   5.  Density within the gallbladder may represent sludge/vicarious excretion of contrast.   6.  Trace right pleural fluid and bibasilar atelectasis.      US-RENAL   Final Result      1.  Mild left hydronephrosis.   2.  No right hydronephrosis.      [id number]-E/O   Final Result         1. Normal sized CBD. No CBD stone.   2. Thickening and heterogeneity of the bilateral adrenal glands with surrounding stranding, incompletely evaluated but concerning for hemorrhage.   3. No gallstone. Mild pericholecystic fluid could be reactive change due to adjacent adrenal hemorrhage.   4. Worsening left hydronephrosis, concerning for distal obstruction      DX-CHEST-LIMITED (1 VIEW)   Final Result         No acute cardiopulmonary abnormalities are identified.      EC-ECHOCARDIOGRAM COMPLETE W/O CONT   Final Result      CT-ABDOMEN-PELVIS WITH   Final Result      1.  New right adrenal mass likely represents hemorrhage. Thickening of the left adrenal gland is concerning for developing hemorrhage as well.      2.  Low attenuation filling defect in the superior vena cava is consistent with thrombus. Echocardiogram may be helpful for further evaluation.      3.  Prominent retroperitoneal lymph nodes with nonspecific retroperitoneal inflammatory stranding as reported on the prior CT.      4.  Small bilateral pleural effusions, right greater than left. Adjacent airspace disease likely atelectasis.            Comment: Results discussed with Dr. Gunter at 9:32 AM      [id number] VIEW   Final Result         No specific finding to suggest small bowel obstruction.      US-PELVIC COMPLETE (TRANSABDOMINAL/TRANSVAGINAL) (COMBO)   Final Result         1.  Heterogeneous  appearance of the uterus with large heterogeneous uterine mass, appearance most compatible with uterine fibroid.   2.  Heterogeneous lesion in the left ovary, could represent hemorrhagic cyst or endometrioma, otherwise indeterminate, recommend follow-up sonogram in 6 weeks for repeat characterization and evaluation of stability.   3.  Thickened endometrial stripe, likely proliferative changes, consider endometrial pathology with additional follow-up as clinically appropriate.   4.  Nabothian cyst      US-RUQ   Final Result      1.  No evidence of gallstones or biliary ductal dilatation.   2.  1.9 cm cystic lesion in the right kidney. Mural nodularity is not excluded and further evaluation with renal protocol MRI is recommended.         CT-ABDOMEN-PELVIS WITH   Final Result      1.  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   2.  Hypodensity in the cervix could be artifactual or could indicate underlying cervical mass. Suggest correlation with physical exam and gynecological history.   3.  19 mm hypodense RIGHT renal lesion, likely but not definitely a cyst. Suggest further assessment with ultrasound when clinically appropriate.                   MEDS:  Current Facility-Administered Medications   Medication Last Admin   • [START ON 6/27/2021] predniSONE (DELTASONE) tablet 60 mg     • acetaminophen (TYLENOL) tablet 1,000 mg 1,000 mg at 06/25/21 1215   • ipratropium-albuterol (DUONEB) nebulizer solution 3 mL at 06/24/21 0507   • heparin infusion 25,000 units in 500 mL 0.45% NACL 11 Units/kg/hr at 06/26/21 0928   • baclofen (LIORESAL) tablet 10 mg 10 mg at 06/25/21 0548   • oxyCODONE immediate-release (ROXICODONE) tablet 5 mg 5 mg at 06/25/21 0255   • oxyCODONE immediate release (ROXICODONE) tablet 10 mg 10 mg at 06/25/21 1615   • omeprazole (PRILOSEC) capsule 20 mg 20 mg at 06/26/21 0534   • cefepime (Maxipime) 2 g in sterile water 20 mL IV  syringe Stopped at 06/26/21 0617   • HYDROmorphone (Dilaudid) injection 1 mg 1 mg at 06/24/21 0158   • metroNIDAZOLE (FLAGYL) tablet 500 mg 500 mg at 06/26/21 0533   • lidocaine (LIDODERM) 5 % 1 Patch 1 Patch at 06/25/21 2201   • senna-docusate (PERICOLACE or SENOKOT S) 8.6-50 MG per tablet 2 tablet 2 tablet at 06/24/21 1741    And   • polyethylene glycol/lytes (MIRALAX) PACKET 1 Packet      And   • magnesium hydroxide (MILK OF MAGNESIA) suspension 30 mL      And   • bisacodyl (DULCOLAX) suppository 10 mg 10 mg at 06/15/21 1201   • cloNIDine (CATAPRES) tablet 0.1 mg     • enalaprilat (VASOTEC) injection 1.25 mg     • labetalol (NORMODYNE/TRANDATE) injection 10 mg     • ondansetron (ZOFRAN) syringe/vial injection 4 mg 4 mg at 06/16/21 1355   • ondansetron (ZOFRAN ODT) dispertab 4 mg     • promethazine (PHENERGAN) tablet 12.5-25 mg     • promethazine (PHENERGAN) suppository 12.5-25 mg     • prochlorperazine (COMPAZINE) injection 5-10 mg         ASSESSMENT/PLAN:  46 y.o. female admitted on 6/14 for left-sided abdominal pain x4 days.  She was originally being managed on the oncology floor but was transferred to telemetry for a higher level of monitoring.  She has had a complex medical course with multiple imaging studies and multiple consultants including: gastroenterology, vascular surgery, general surgery, hematology/oncology, and nephrology.  She is currently being managed for catastrophic antiphospholipid syndrome and is being followed actively by hematology/oncology and nephrology.     #Catastrophic antiphospholipid syndrome  -Hematology/oncology actively following  -Patient started on IVIG on 6/22, no additional doses required at this time  -Patient started on Solu-Medrol on 6/22 for 2 days  -Patient transitioned to prednisone 80 mg daily on 6/24 with a taper  -HIT panel negative and patient was started on a heparin drip on 6/23  -DIC/HIT/TTP all ruled out per heme/onc   -Trop leak minor on 6/25 - no indication  for antiplatelet   -CXR 6/25 showed no alvelolar hemorrhage   Plan:  -Heme/onc actively following and recs appreciated  -Hold plasmapheresis for now  -Closely monitor for DVT/arterial thrombotic events  -Continue prednisone taper - currently pred 80  -Continue heparin drip - plan to switch to lovenox then bridge coumadin (lifelong)      #Hallucinations  #Altered mental status  #Labile mood  -She has been very labile in her mood, and her alertness waxes and wanes a great amount  -Has also been having hallucinations  -Due to such a high risk of bleeding and clotting, there is concern for possible microthrombi in brain  -MRI brain 6/25 normal   -Mental status improved today   Plan:  -Continue to monitor, high risk for microthrombi      #Thrombus of SVC  -Discovered on CT abdomen/pelvis on 6/18  -Vascular surgery was consulted and recommended TTE  -TTE completed on 6/20 and showed an ejection fraction of 65% but could not visualize thrombus  Plan:  -Patient needs RADHIKA to appropriately visualize the thrombus  -Continue heparin drip  -Follow-up with hematology/oncology to see if TTE is appropriate given patient's improvement in LFTs and platelets     #VIDAL, worsening  #Urinary retention  -BUN/creatinine downtrending today   -eGFR 54-->25-->22-->31  -Mild left hydronephrosis still seen on CT abd done 6/24  -Microalbumin-Cr ratio of 39  -Protein-Cr ratio of 189  -Bladder scan 6/24 showed >700mL  -Villegas catheter placed 6/24 per nephro recs  Plan:  -Follow-up with nephrology and any recs appreciated  -Hold plasmapheresis for now     #Elevated LFTs  -Patient's LFTs have continued to downtrend  -AST 27, ALT 36, AlkPh 106 today  -MRCP completed on 6/22 showed no biliary obstruction  -Hepatitis panel negative  -Possibly due to ischemic hepatopathy  Plan:  -Continue trending on repeat CMPs      #Intra-abdominal infection  -Patient had 3/4 SIRS criteria including fever, tachycardia, leukocytosis  -Patient was started on cefepime and  Flagyl on 6/22  Plan:  -Continue cefepime and Flagyl  -Continue to monitor patient's vitals closely     #Microcytic anemia  -Likely multifactorial in etiology  -Iron studies show iron levels of 32 but normal ferritin  -Johan test was negative  -Elevated LDH which could be related to antiphospholipid syndrome versus active hemolysis  -Repeat CT abdomen/pelvis without contrast on 6/24 showed improvement of the hemorrhage located in both adrenal glands  -Transfused 1 U PRBCs on 6/24 for Hgb 6.8  -Repeat this AM 7.9   Plan:  -Iron transfusion to be given per hematology recs   -Continue steroids  -Continue to trend CBCs  -Transfuse when hemoglobin less than 7     #Thrombocytopenia  #Vaginal bleeding  #Rectal bleeding  #Hemorrhage of both adrenal glands  -All likely secondary to catastrophic antiphospholipid syndrome  -Hematology/oncology actively following  -Platelets up to 282 this morning  -CT abdomen 6/24 showed improvement in adrenal hemorrhage  Plan:  -Closely monitor for bleeding  -Continue to trend CBCs     #Retroperitoneal lymphadenopathy  -Noted on CT abdomen/pelvis on 6/18  -Stable on CT abdomen/pelvis on 6/24  -Unclear etiology at this point     #Fibroids  -Found on pelvic ultrasound completed on 6/14  -Follow-up outpatient with gynecology     #Ovarian lesion  -Found in the left ovary on pelvic ultrasound completed 6/14  -Recommended follow-up in 6 weeks with repeat ultrasound  -Follow-up with outpatient gynecology     #Lesion of cervix  -Picked up incidentally on abdominal CT which stated that this could be artifactual or indicate an underlying cervical mass  -Cervix was unable to be visualized by pelvic exam in the emergency department  -Outpatient follow-up with gynecology as recommended     #Right renal lesion  -Renal protocol MRI is advised to evaluate further  -Can be completed as outpatient     #H.pylori infection  -Recently diagnosed at [facility]  -Patient started on triple  therapy but did not complete secondary to abdominal pain  -EGD completed 6/17 took biopsies to see if infection have resolved   Plan:  -Follow-up on biopsies  -Continue daily PPI     Disposition: Inpatient for continued treatment of catastrophic antiphospholipid antibody syndrome     #Core Measures   VTE PPx: Heparin Drip  Abx: Cefepime and Flagyl  Lines/Tubes: PIV/Villegas  Fluids: None   Diet: Regular  Code Status: Full     This patient is a Telemetry Block (T834 - T838) patient.    Jaylen Bland MD   PGY-1 Family Medicine Resident   [facility]

## 2021-06-27 LAB
ALBUMIN SERPL BCP-MCNC: 2.4 G/DL (ref 3.2–4.9)
ALBUMIN/GLOB SERPL: 0.6 G/DL
ALP SERPL-CCNC: 87 U/L (ref 30–99)
ALT SERPL-CCNC: 47 U/L (ref 2–50)
ANION GAP SERPL CALC-SCNC: 9 MMOL/L (ref 7–16)
AST SERPL-CCNC: 82 U/L (ref 12–45)
BASOPHILS # BLD AUTO: 0.1 % (ref 0–1.8)
BASOPHILS # BLD AUTO: 0.1 % (ref 0–1.8)
BASOPHILS # BLD: 0.01 K/UL (ref 0–0.12)
BASOPHILS # BLD: 0.01 K/UL (ref 0–0.12)
BILIRUB CONJ SERPL-MCNC: 0.4 MG/DL (ref 0.1–0.5)
BILIRUB INDIRECT SERPL-MCNC: 0.5 MG/DL (ref 0–1)
BILIRUB SERPL-MCNC: 0.9 MG/DL (ref 0.1–1.5)
BUN SERPL-MCNC: 61 MG/DL (ref 8–22)
CALCIUM SERPL-MCNC: 8.3 MG/DL (ref 8.5–10.5)
CHLORIDE SERPL-SCNC: 110 MMOL/L (ref 96–112)
CO2 SERPL-SCNC: 18 MMOL/L (ref 20–33)
CREAT SERPL-MCNC: 2.15 MG/DL (ref 0.5–1.4)
EOSINOPHIL # BLD AUTO: 0.01 K/UL (ref 0–0.51)
EOSINOPHIL # BLD AUTO: 0.01 K/UL (ref 0–0.51)
EOSINOPHIL NFR BLD: 0.1 % (ref 0–6.9)
EOSINOPHIL NFR BLD: 0.1 % (ref 0–6.9)
ERYTHROCYTE [DISTWIDTH] IN BLOOD BY AUTOMATED COUNT: 65.5 FL (ref 35.9–50)
ERYTHROCYTE [DISTWIDTH] IN BLOOD BY AUTOMATED COUNT: 67.7 FL (ref 35.9–50)
GLOBULIN SER CALC-MCNC: 4.1 G/DL (ref 1.9–3.5)
GLUCOSE SERPL-MCNC: 94 MG/DL (ref 65–99)
HCT VFR BLD AUTO: 28.9 % (ref 37–47)
HCT VFR BLD AUTO: 29.3 % (ref 37–47)
HGB BLD-MCNC: 8.4 G/DL (ref 12–16)
HGB BLD-MCNC: 8.7 G/DL (ref 12–16)
IMM GRANULOCYTES # BLD AUTO: 0.11 K/UL (ref 0–0.11)
IMM GRANULOCYTES # BLD AUTO: 0.14 K/UL (ref 0–0.11)
IMM GRANULOCYTES NFR BLD AUTO: 1.3 % (ref 0–0.9)
IMM GRANULOCYTES NFR BLD AUTO: 1.3 % (ref 0–0.9)
INR PPP: 1.35 (ref 0.87–1.13)
LYMPHOCYTES # BLD AUTO: 0.98 K/UL (ref 1–4.8)
LYMPHOCYTES # BLD AUTO: 1.27 K/UL (ref 1–4.8)
LYMPHOCYTES NFR BLD: 14.9 % (ref 22–41)
LYMPHOCYTES NFR BLD: 9.1 % (ref 22–41)
MCH RBC QN AUTO: 21.9 PG (ref 27–33)
MCH RBC QN AUTO: 23.3 PG (ref 27–33)
MCHC RBC AUTO-ENTMCNC: 28.7 G/DL (ref 33.6–35)
MCHC RBC AUTO-ENTMCNC: 30.1 G/DL (ref 33.6–35)
MCV RBC AUTO: 76.5 FL (ref 81.4–97.8)
MCV RBC AUTO: 77.5 FL (ref 81.4–97.8)
MONOCYTES # BLD AUTO: 0.4 K/UL (ref 0–0.85)
MONOCYTES # BLD AUTO: 0.43 K/UL (ref 0–0.85)
MONOCYTES NFR BLD AUTO: 3.7 % (ref 0–13.4)
MONOCYTES NFR BLD AUTO: 5 % (ref 0–13.4)
NEUTROPHILS # BLD AUTO: 6.69 K/UL (ref 2–7.15)
NEUTROPHILS # BLD AUTO: 9.25 K/UL (ref 2–7.15)
NEUTROPHILS NFR BLD: 78.6 % (ref 44–72)
NEUTROPHILS NFR BLD: 85.7 % (ref 44–72)
NRBC # BLD AUTO: 0.02 K/UL
NRBC # BLD AUTO: 0.02 K/UL
NRBC BLD-RTO: 0.2 /100 WBC
NRBC BLD-RTO: 0.2 /100 WBC
PLATELET # BLD AUTO: 304 K/UL (ref 164–446)
PLATELET # BLD AUTO: 315 K/UL (ref 164–446)
PMV BLD AUTO: 10 FL (ref 9–12.9)
PMV BLD AUTO: 9.9 FL (ref 9–12.9)
POTASSIUM SERPL-SCNC: 3.6 MMOL/L (ref 3.6–5.5)
PROT SERPL-MCNC: 6.5 G/DL (ref 6–8.2)
PROTHROM ACT/NOR PPP: NORMAL % (ref 86–150)
PROTHROMBIN TIME: 16.3 SEC (ref 12–14.6)
RBC # BLD AUTO: 3.73 M/UL (ref 4.2–5.4)
RBC # BLD AUTO: 3.83 M/UL (ref 4.2–5.4)
SODIUM SERPL-SCNC: 137 MMOL/L (ref 135–145)
UFH PPP CHRO-ACNC: 0.57 IU/ML
WBC # BLD AUTO: 10.8 K/UL (ref 4.8–10.8)
WBC # BLD AUTO: 8.5 K/UL (ref 4.8–10.8)

## 2021-06-27 PROCEDURE — 99233 SBSQ HOSP IP/OBS HIGH 50: CPT | Performed by: INTERNAL MEDICINE

## 2021-06-27 PROCEDURE — 85025 COMPLETE CBC W/AUTO DIFF WBC: CPT | Mod: 91

## 2021-06-27 PROCEDURE — 700111 HCHG RX REV CODE 636 W/ 250 OVERRIDE (IP): Performed by: STUDENT IN AN ORGANIZED HEALTH CARE EDUCATION/TRAINING PROGRAM

## 2021-06-27 PROCEDURE — A9270 NON-COVERED ITEM OR SERVICE: HCPCS | Performed by: INTERNAL MEDICINE

## 2021-06-27 PROCEDURE — 85610 PROTHROMBIN TIME: CPT

## 2021-06-27 PROCEDURE — 51798 US URINE CAPACITY MEASURE: CPT

## 2021-06-27 PROCEDURE — 700102 HCHG RX REV CODE 250 W/ 637 OVERRIDE(OP): Performed by: INTERNAL MEDICINE

## 2021-06-27 PROCEDURE — 700105 HCHG RX REV CODE 258: Performed by: STUDENT IN AN ORGANIZED HEALTH CARE EDUCATION/TRAINING PROGRAM

## 2021-06-27 PROCEDURE — 85520 HEPARIN ASSAY: CPT

## 2021-06-27 PROCEDURE — 80053 COMPREHEN METABOLIC PANEL: CPT

## 2021-06-27 PROCEDURE — 770020 HCHG ROOM/CARE - TELE (206)

## 2021-06-27 PROCEDURE — 700111 HCHG RX REV CODE 636 W/ 250 OVERRIDE (IP): Performed by: INTERNAL MEDICINE

## 2021-06-27 PROCEDURE — 82248 BILIRUBIN DIRECT: CPT

## 2021-06-27 PROCEDURE — 700101 HCHG RX REV CODE 250: Performed by: INTERNAL MEDICINE

## 2021-06-27 PROCEDURE — 36415 COLL VENOUS BLD VENIPUNCTURE: CPT

## 2021-06-27 RX ADMIN — HEPARIN SODIUM 11 UNITS/KG/HR: 5000 INJECTION, SOLUTION INTRAVENOUS at 00:43

## 2021-06-27 RX ADMIN — OMEPRAZOLE 20 MG: 20 CAPSULE, DELAYED RELEASE ORAL at 17:27

## 2021-06-27 RX ADMIN — LIDOCAINE 1 PATCH: 50 PATCH TOPICAL at 21:35

## 2021-06-27 RX ADMIN — PREDNISONE 60 MG: 50 TABLET ORAL at 05:15

## 2021-06-27 RX ADMIN — OXYCODONE 5 MG: 5 TABLET ORAL at 08:07

## 2021-06-27 RX ADMIN — OMEPRAZOLE 20 MG: 20 CAPSULE, DELAYED RELEASE ORAL at 05:16

## 2021-06-27 RX ADMIN — SODIUM CHLORIDE 125 MG: 9 INJECTION, SOLUTION INTRAVENOUS at 17:27

## 2021-06-27 ASSESSMENT — ENCOUNTER SYMPTOMS
SHORTNESS OF BREATH: 0
FEVER: 0
ABDOMINAL PAIN: 1

## 2021-06-27 ASSESSMENT — PAIN DESCRIPTION - PAIN TYPE
TYPE: ACUTE PAIN

## 2021-06-27 ASSESSMENT — FIBROSIS 4 INDEX: FIB4 SCORE: 1.71

## 2021-06-27 NOTE — PROGRESS NOTES
Nephrology Daily Progress Note    Date of Service  6/27/2021    Chief Complaint  46 y.o. 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    Interval Problem Update  6/24 -no urine output documented in the last 24 hours, but patient says she is urinating.  Repeat CT imaging shows bladder distention and mild left hydronephrosis.  Patient complains of ongoing right upper quadrant pain.  6/25-urine output 3.2 L in the last 24 hours with Villegas catheter in place.  Complains of ongoing abdominal pain.  Denies chest pain, shortness of breath.  6/26 - UOP 1.4L in last 24 hours. Patient somnolent today, unable to obtain ROS.   6/27-urine output 725 mL in the last 24 hours.  Villegas catheter replaced, and patient feels more comfortable.  Complains of some abdominal pain this morning.    Review of Systems  Review of Systems   Constitutional: Negative for fever.   Respiratory: Negative for shortness of breath.    Cardiovascular: Negative for chest pain.   Gastrointestinal: Positive for abdominal pain.   All other systems reviewed and are negative.       Physical Exam  Temp:  [36.1 °C (97 °F)-36.7 °C (98.1 °F)] 36.3 °C (97.3 °F)  Pulse:  [69-80] 80  Resp:  [16-18] 18  BP: (136-139)/(73-83) 139/83  SpO2:  [94 %-98 %] 97 %    Physical Exam  Constitutional:       General: She is not in acute distress.     Appearance: She is well-developed. She is ill-appearing.   HENT:      Head: Normocephalic and atraumatic.      Mouth/Throat:      Mouth: Mucous membranes are moist.      Pharynx: Oropharynx is clear. No oropharyngeal exudate.   Eyes:      General: No scleral icterus.     Extraocular Movements: Extraocular movements intact.   Neck:      Trachea: No tracheal deviation.   Cardiovascular:      Rate and Rhythm:  Normal rate and regular rhythm.      Heart sounds: Normal heart sounds. No murmur heard.     Pulmonary:      Effort: Pulmonary effort is normal.      Breath sounds: No stridor. No rales.   Abdominal:      Palpations: Abdomen is soft.      Tenderness: There is abdominal tenderness.   Musculoskeletal:         General: Normal range of motion.      Cervical back: Normal range of motion. No rigidity.      Right lower leg: Edema (1+) present.      Left lower leg: Edema (1+) present.   Skin:     General: Skin is warm and dry.   Neurological:      General: No focal deficit present.      Mental Status: She is alert and oriented to person, place, and time.   Psychiatric:         Mood and Affect: Mood normal.         Behavior: Behavior normal.         Fluids    Intake/Output Summary (Last 24 hours) at 6/27/2021 1128  Last data filed at 6/27/2021 0800  Gross per 24 hour   Intake 240 ml   Output 725 ml   Net -485 ml       Laboratory  Labs reviewed, pertinent labs below.  Recent Labs     06/26/21  0819 06/26/21  2103 06/27/21  0836   WBC 7.2 6.6 10.8   RBC 3.48* 3.62* 3.83*   HEMOGLOBIN 7.9* 7.9* 8.4*   HEMATOCRIT 26.9* 27.9* 29.3*   MCV 77.3* 77.1* 76.5*   MCH 22.7* 21.8* 21.9*   MCHC 29.4* 28.3* 28.7*   RDW 64.2* 66.9* 65.5*   PLATELETCT 282 283 315   MPV 10.3 9.9 9.9     Recent Labs     06/25/21  0056 06/26/21  0041 06/27/21  0123   SODIUM 135 139 137   POTASSIUM 4.1 4.0 3.6   CHLORIDE 104 111 110   CO2 18* 17* 18*   GLUCOSE 135* 113* 94   BUN 49* 55* 61*   CREATININE 2.40* 2.11* 2.15*   CALCIUM 7.9* 7.8* 8.3*     Recent Labs     06/25/21  0056 06/26/21  0041 06/27/21  0123   INR 1.73* 1.56* 1.35*           URINALYSIS:  Lab Results   Component Value Date/Time    COLORURINE DK Yellow 06/22/2021 1810    CLARITY Clear 06/22/2021 1810    SPECGRAVITY 1.007 06/22/2021 1810    PHURINE 6.0 06/22/2021 1810    KETONES Negative 06/22/2021 1810    PROTEINURIN Negative 06/22/2021 1810    BILIRUBINUR Moderate (A) 06/22/2021 1810    UROBILU  0.2 06/22/2021 1810    NITRITE Negative 06/22/2021 1810    LEUKESTERAS Negative 06/22/2021 1810    OCCULTBLOOD Large (A) 06/22/2021 1810     UP  No results found for: TOTPROTUR No results found for: CREATININEU      Imaging interpreted by radiologist. Imaging reports reviewed with pertinent findings below  MR-BRAIN-W/O   Final Result      MRI of the brain without contrast within normal limits.      DX-CHEST-PORTABLE (1 VIEW)   Final Result      1.  Mild hypoinflation with minimal bibasilar atelectasis.   2.  No lobar pneumonia or pneumothorax.   3.  No gross mass.      CT-ABDOMEN-PELVIS W/O   Final Result      1.  Stranding surrounding the adrenal glands is improved.   2.  Dense right adrenal mass likely representing adrenal hemorrhage is mildly decreased in size compared to prior.   3.  Mild left hydronephrosis.   4.  Multiple mildly enlarged retroperitoneal lymph nodes are not significantly changed.   5.  Density within the gallbladder may represent sludge/vicarious excretion of contrast.   6.  Trace right pleural fluid and bibasilar atelectasis.      US-RENAL   Final Result      1.  Mild left hydronephrosis.   2.  No right hydronephrosis.      UM-EHPOOVN-D/O   Final Result         1. Normal sized CBD. No CBD stone.   2. Thickening and heterogeneity of the bilateral adrenal glands with surrounding stranding, incompletely evaluated but concerning for hemorrhage.   3. No gallstone. Mild pericholecystic fluid could be reactive change due to adjacent adrenal hemorrhage.   4. Worsening left hydronephrosis, concerning for distal obstruction      DX-CHEST-LIMITED (1 VIEW)   Final Result         No acute cardiopulmonary abnormalities are identified.      EC-ECHOCARDIOGRAM COMPLETE W/O CONT   Final Result      CT-ABDOMEN-PELVIS WITH   Final Result      1.  New right adrenal mass likely represents hemorrhage. Thickening of the left adrenal gland is concerning for developing hemorrhage as well.      2.  Low attenuation  filling defect in the superior vena cava is consistent with thrombus. Echocardiogram may be helpful for further evaluation.      3.  Prominent retroperitoneal lymph nodes with nonspecific retroperitoneal inflammatory stranding as reported on the prior CT.      4.  Small bilateral pleural effusions, right greater than left. Adjacent airspace disease likely atelectasis.            Comment: Results discussed with Dr. Gunter at 9:32 AM      SO-JTSHOOW-0 VIEW   Final Result         No specific finding to suggest small bowel obstruction.      US-PELVIC COMPLETE (TRANSABDOMINAL/TRANSVAGINAL) (COMBO)   Final Result         1.  Heterogeneous appearance of the uterus with large heterogeneous uterine mass, appearance most compatible with uterine fibroid.   2.  Heterogeneous lesion in the left ovary, could represent hemorrhagic cyst or endometrioma, otherwise indeterminate, recommend follow-up sonogram in 6 weeks for repeat characterization and evaluation of stability.   3.  Thickened endometrial stripe, likely proliferative changes, consider endometrial pathology with additional follow-up as clinically appropriate.   4.  Nabothian cyst      US-RUQ   Final Result      1.  No evidence of gallstones or biliary ductal dilatation.   2.  1.9 cm cystic lesion in the right kidney. Mural nodularity is not excluded and further evaluation with renal protocol MRI is recommended.         CT-ABDOMEN-PELVIS WITH   Final Result      1.  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   2.  Hypodensity in the cervix could be artifactual or could indicate underlying cervical mass. Suggest correlation with physical exam and gynecological history.   3.  19 mm hypodense RIGHT renal lesion, likely but not definitely a cyst. Suggest further assessment with ultrasound when clinically appropriate.                     Current Facility-Administered Medications   Medication Dose  Route Frequency Provider Last Rate Last Admin   • predniSONE (DELTASONE) tablet 60 mg  60 mg Oral DAILY Da Varela M.D.   60 mg at 06/27/21 0515   • ferric gluconate complex (FERRLECIT) 125 mg in  mL IVPB  125 mg Intravenous Q24HR Jaylen Bland M.D.        Followed by   • [START ON 6/28/2021] ferric gluconate complex (FERRLECIT) 250 mg in  mL IVPB  250 mg Intravenous Q24HR Jaylen Balnd M.D.       • acetaminophen (TYLENOL) tablet 1,000 mg  1,000 mg Oral Q6HRS Dominguez Reilly M.D.   1,000 mg at 06/25/21 1215   • ipratropium-albuterol (DUONEB) nebulizer solution  3 mL Nebulization Q4H PRN (RT) Dominguez Reilly M.D.   3 mL at 06/24/21 0507   • heparin infusion 25,000 units in 500 mL 0.45% NACL  0-30 Units/kg/hr Intravenous Continuous Da Varela M.D. 17.7 mL/hr at 06/27/21 0703 11 Units/kg/hr at 06/27/21 0703   • baclofen (LIORESAL) tablet 10 mg  10 mg Oral TID PRN Salina Zuniga, D.O.   10 mg at 06/26/21 2306   • oxyCODONE immediate-release (ROXICODONE) tablet 5 mg  5 mg Oral Q4HRS PRN Salina Zuniga, D.O.   5 mg at 06/27/21 0807   • oxyCODONE immediate release (ROXICODONE) tablet 10 mg  10 mg Oral Q4HRS PRN Salina Zuniga, D.O.   10 mg at 06/25/21 1615   • omeprazole (PRILOSEC) capsule 20 mg  20 mg Oral BID Salina Zuniga, D.O.   20 mg at 06/27/21 0516   • HYDROmorphone (Dilaudid) injection 1 mg  1 mg Intravenous Q4HRS PRN Piotr Gunter, D.O.   1 mg at 06/24/21 0158   • lidocaine (LIDODERM) 5 % 1 Patch  1 Patch Transdermal Q24HR Gareth Kuharevic, M.D.   1 Patch at 06/26/21 2301   • senna-docusate (PERICOLACE or SENOKOT S) 8.6-50 MG per tablet 2 tablet  2 tablet Oral BID Jamar Mo M.D.   2 tablet at 06/24/21 1741    And   • polyethylene glycol/lytes (MIRALAX) PACKET 1 Packet  1 Packet Oral QDAY PRCORNELIA Mo M.D.        And   • magnesium hydroxide (MILK OF MAGNESIA) suspension 30 mL  30 mL Oral QDAY PRCORNELIA Mo M.D.        And   • bisacodyl (DULCOLAX) suppository 10 mg   10 mg Rectal QDAY PRCORNELIA Mo M.D.   10 mg at 06/15/21 1201   • cloNIDine (CATAPRES) tablet 0.1 mg  0.1 mg Oral Q6HRS CRISTAL Mo M.D.       • enalaprilat (VASOTEC) injection 1.25 mg  1.25 mg Intravenous Q6HRS CRISTAL Mo M.D.       • labetalol (NORMODYNE/TRANDATE) injection 10 mg  10 mg Intravenous Q4HRS CRISTAL Mo M.D.       • ondansetron (ZOFRAN) syringe/vial injection 4 mg  4 mg Intravenous Q4HRS CRISTAL Mo M.D.   4 mg at 06/16/21 1355   • ondansetron (ZOFRAN ODT) dispertab 4 mg  4 mg Oral Q4HRS PRCORNELIA Mo M.D.       • promethazine (PHENERGAN) tablet 12.5-25 mg  12.5-25 mg Oral Q4HRS CRISTAL Mo M.D.       • promethazine (PHENERGAN) suppository 12.5-25 mg  12.5-25 mg Rectal Q4HRS CRISTAL Mo M.D.       • prochlorperazine (COMPAZINE) injection 5-10 mg  5-10 mg Intravenous Q4HRS CRISTAL Mo M.D.             Assessment/Plan  46 y.o. 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     1.  Acute kidney injury, nonoliguric, persistent.  Etiology of VIDAL unclear, but improving with bowens so obstruction was likely playing a role. Patient had iodinated contrast on 6/14 and 6/18/2021.  There could be some intrinsic kidney damage with concern for antiphospholipid antibody syndrome, but patient is on anticoagulation, and we are unable to do kidney biopsy.  Urinalysis notable for microscopic hematuria, negative for significant proteinuria.  No need for dialysis.  Check labs daily.  Avoid nephrotoxins.    2.  Concern for catastrophic antiphospholipid antibody syndrome.  Currently stable and managed with steroids per hematology oncology.  Please alert nephrology if plasmapheresis is needed.  We would plan one-to-one repletion of plasma with  FFP.     3.  Left renal hydronephrosis, mild on kidney ultrasound 6/22/2021 and again on CT scan 6/24/2021.  Accompanied by bladder distention.  Continue Villegas catheter, placed 6/24/2021.  Recommend repeat kidney ultrasound on 6/28/2021 to ensure improvement.      4.  Acute urinary retention, unclear etiology, stable with Villegas catheter in place.  Continue Villegas.    5.  Microcytic anemia, persistent.  Concern for antiphospholipid antibody syndrome with intravascular clotting and hemolysis.  Defer further management to primary team and hematology oncology.  Check CBC daily.    6. Metabolic acidosis, persistent, but mild.  No acute need for alkali supplementation.  If acidosis worsens, could start sodium bicarbonate 1300 mg p.o. twice daily.  Check labs daily.    Dr. Powell will assume consultative care tomorrow    Mateo Sutherland MD  Nephrology

## 2021-06-27 NOTE — PROGRESS NOTES
Oncology/Hematology Progress Note               Author: Da Varela M.D. Date & Time created: 6/27/2021  9:09 AM    CC -catastrophic antiphospholipid antibody syndrome  Interval History:  Overall stable.  Hemoglobin stable.  She had trouble sleeping mainly from her Villegas issues she had a new Villegas replacement.  Currently sleeping.  Mother at bedside.  Apparently she still has some black stools  Review of Systems:  ROS Asleep  Physical Exam:  Physical Exam General: Not in acute distress, alert and oriented x 3  HEENT: No pallor,  + scleral icterus, improving. Oropharynx clear.   Neck: Supple, no palpable masses.  Lymph nodes: No palpable cervical, supraclavicular, axillary or inguinal lymphadenopathy.    CVS: regular rate and rhythm, no rubs or gallops  RESP: Clear to auscultate bilaterally, no wheezing or crackles.   ABD: generalized tenderness  EXT: No edema or cyanosis  CNS: Mental status fluctuation  Skin- No rash, fading petechiae ecchymosis    Labs:          Recent Labs     06/25/21  0056 06/26/21  0041 06/27/21  0123   SODIUM 135 139 137   POTASSIUM 4.1 4.0 3.6   CHLORIDE 104 111 110   CO2 18* 17* 18*   BUN 49* 55* 61*   CREATININE 2.40* 2.11* 2.15*   CALCIUM 7.9* 7.8* 8.3*     Recent Labs     06/25/21  0056 06/26/21  0041 06/27/21  0123   ALTSGPT 36 32 47   ASTSGOT 27 29 82*   ALKPHOSPHAT 106* 90 87   TBILIRUBIN 1.4 0.9 0.9   DBILIRUBIN 0.9* 0.5 0.4   GLUCOSE 135* 113* 94     Recent Labs     06/25/21  0056 06/25/21  0624 06/25/21  1553 06/26/21  0041 06/26/21  0819 06/26/21  2103 06/27/21  0123   RBC 3.51*   < > 3.68*  --  3.48* 3.62*  --    HEMOGLOBIN 7.7*   < > 8.1*  --  7.9* 7.9*  --    HEMATOCRIT 26.8*   < > 28.3*  --  26.9* 27.9*  --    PLATELETCT 255   < > 312  --  282 283  --    PROTHROMBTM 19.8*  --   --  18.2*  --   --  16.3*   INR 1.73*  --   --  1.56*  --   --  1.35*   IRON  --   --  29*  --   --   --   --    TOTIRONBC  --   --  187*  --   --   --   --     < > = values in this interval not  displayed.     Recent Labs     21  0056 21  0624 21  1553 21  0041 21  0819 21  2103 21  0123   WBC 6.5   < > 5.7  --  7.2 6.6  --    NEUTSPOLYS  --    < > 80.00*  --  80.80* 72.90*  --    LYMPHOCYTES  --    < > 11.50*  --  11.90* 16.90*  --    MONOCYTES  --    < > 6.60  --  5.80 8.20  --    EOSINOPHILS  --    < > 0.00  --  0.00 0.00  --    BASOPHILS  --    < > 0.00  --  0.10 0.20  --    ASTSGOT 27  --   --  29  --   --  82*   ALTSGPT 36  --   --  32  --   --  47   ALKPHOSPHAT 106*  --   --  90  --   --  87   TBILIRUBIN 1.4  --   --  0.9  --   --  0.9    < > = values in this interval not displayed.     Recent Labs     21  0056 21  00421  0123   SODIUM 135 139 137   POTASSIUM 4.1 4.0 3.6   CHLORIDE 104 111 110   CO2 18* 17* 18*   GLUCOSE 135* 113* 94   BUN 49* 55* 61*   CREATININE 2.40* 2.11* 2.15*   CALCIUM 7.9* 7.8* 8.3*     Hemodynamics:  Temp (24hrs), Av.3 °C (97.4 °F), Min:36.1 °C (97 °F), Max:36.7 °C (98.1 °F)  Temperature: 36.3 °C (97.3 °F)  Pulse  Av.2  Min: 59  Max: 128   Blood Pressure: 139/83     Respiratory:    Respiration: 18, Pulse Oximetry: 97 %     Work Of Breathing / Effort: Within Normal Limits  RUL Breath Sounds: Clear, RML Breath Sounds: Clear, RLL Breath Sounds: Diminished, BLAYNE Breath Sounds: Clear, LLL Breath Sounds: Diminished  Fluids:    Intake/Output Summary (Last 24 hours) at 2021 1111  Last data filed at 2021 0900  Gross per 24 hour   Intake --   Output 900 ml   Net -900 ml        GI/Nutrition:  Orders Placed This Encounter   Procedures   • Diet Order Diet: Regular     Standing Status:   Standing     Number of Occurrences:   1     Order Specific Question:   Diet:     Answer:   Regular [1]     Medical Decision Making, by Problem:  Active Hospital Problems    Diagnosis    • *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 pylori infection [A04.8]    • Hyponatremia [E87.1]    • Kidney lesion, native, right [N28.9]    • Lesion of cervix [N88.9]    • Leukocytosis [D72.829]        Plan:  Catastrophic antiphospholipid antibody syndrome- triple positive APLA with presumed bilateral adrenal hemorrhage secondary to micro-thrombus/thrombotic storm along with liver involvement,?  Possible kidney involvement, thrombocytopenia.  No TTP ,DIC, TMA, HIT.  CUONG ANCA Hep/HIV negative    -Complicated case due to bilateral adrenal hemorrhage presumed to be secondary to micro thrombus/thrombotic storm.  There was question of SVC thrombus in one of the scan which is not well visible in the echo  -  Platelets normalized with  starting high-dose steroids and IVIG.    -Initially received high-dose Solu-Medrol, switched to prednisone 80 mg for 2 3 days currently on prednisone 60 mg with plans to gradually taper off over the next 2 weeks or so, monitor for thrombocytopenia during steroid taper  -Follow-up CT scan from 6/24/2021 improving to stable adrenal hemorrhage nonspecific retroperitoneal lymphadenopathy, possibly reactive  -monitor for any DVT/arterial thrombotic events like stroke heart attack etc.  -Only mild troponin leak no indication to start any antiplatelets at this time.  .- On Heparin gtt - if Hgb stable over the next few days, can switch to Lovenox with Xa monitoring and subsequent transition to Coumadin.  - No indication for PLEX  - Need life long anticoagulation on Coumadin given triple positive APLA , per TRAPS data  Aim for iNR 2.5-3.5 range as she has mild baseline PT elevation.    -VIDAL-her  creatinine is stable to improved nephrology following.?  Renal involvement by CAPS   CT - mild hydro -she has Villegas catheter creatinine stable to improved between 2-2.5 range.    -Anemia-multifactorial. Pre existing HEATHER . She had mucocutaneous/vaginal bleeding and adrenal , possible RP hemorrhage which appears to be stable/ improving.   - Smear- cw Iron deficiency .    Parenteral Iron.  Transfuse for HgB < 7  Or any bleed  Johan test was negative.  She does have elevated LDH which could be related to her APLA/LFT issues and active hemolysis.      Mental status changes-overall improved.  She can have microthrombi involving her brain.  MRI-no acute findings  -Risk of adrenal insufficiency due to bilateral adrenal hemorrhage.  Cortisol level okay, continue steroids.    High complexity.      -          Quality-Core Measures

## 2021-06-27 NOTE — PROGRESS NOTES
[facility] FAMILY MEDICINE PROGRESS NOTE     Attending:   Yanna Ortiz MD     Resident:   Jaylen Bland MD    PATIENT:   Jaclyn Olvera; [id number]; 1975    ID:   46 y.o. female admitted on 6/14 for left-sided abdominal pain x4 days.  She was originally being managed on the oncology floor but was transferred to telemetry for a higher level of monitoring.  She has had a complex medical course with multiple imaging studies and multiple consultants including: gastroenterology, vascular surgery, general surgery, hematology/oncology, and nephrology.  She is currently being managed for catastrophic antiphospholipid syndrome and is being followed actively by hematology/oncology and nephrology.    SUBJECTIVE:   No acute events overnight, patient states that her abdominal pain has improved. She is only feeling mild discomfort and has required minimal pain medication over the last 24 hours. She does complain of pain at the bowens site and the bowens was replaced with resolution of the pain. Denies any headaches, vision changes, numbness/tingling/weakness, chest pain, increasing SOB, nausea, vomiting, or diarrhea. Still states she is having dark stools, but denies any other source of bleeding.     OBJECTIVE:  Vitals:    06/26/21 1226 06/26/21 1614 06/26/21 1856 06/27/21 0354   BP: 139/78 137/73 136/76 139/82   Pulse: 78 69 73 77   Resp: 16 16 16 16   Temp: 36.4 °C (97.5 °F) 36.2 °C (97.2 °F) 36.7 °C (98.1 °F) 36.1 °C (97 °F)   TempSrc: Temporal Temporal Temporal Temporal   SpO2: 98% 98% 94% 95%   Weight:       Height:           Intake/Output Summary (Last 24 hours) at 6/27/2021 0705  Last data filed at 6/26/2021 1715  Gross per 24 hour   Intake --   Output 725 ml   Net -725 ml       PHYSICAL EXAM:  General: No acute distress, afebrile, resting comfortably, conversational   HEENT: NC/AT. EOMI.   Cardiovascular: RRR without murmurs, rubs, heaves. Normal capillary refill   Respiratory: CTAB, no tachypnea or retractions   Abdomen:  normal bowel sounds, soft, TTP in the RUQ, nondistended, no masses, no organomegaly   EXT:  DOMINGUEZ, no edema  Skin: No erythema/lesions   Neuro: Non-focal, alert and orientated, 5/5 power and normal sensation in all limbs.      LABS:  Recent Labs     06/25/21  0624 06/25/21  0624 06/25/21  1553 06/26/21  0819 06/26/21  2103   WBC 5.6   < > 5.7 7.2 6.6   RBC 3.30*   < > 3.68* 3.48* 3.62*   HEMOGLOBIN 7.3*   < > 8.1* 7.9* 7.9*   HEMATOCRIT 25.2*   < > 28.3* 26.9* 27.9*   MCV 76.4*   < > 76.9* 77.3* 77.1*   MCH 22.1*   < > 22.0* 22.7* 21.8*   RDW 61.7*   < > 62.9* 64.2* 66.9*   PLATELETCT 269   < > 312 282 283   MPV 10.9   < > 10.9 10.3 9.9   NEUTSPOLYS 79.10*   < > 80.00* 80.80* 72.90*   LYMPHOCYTES 11.90*   < > 11.50* 11.90* 16.90*   MONOCYTES 6.80   < > 6.60 5.80 8.20   EOSINOPHILS 0.00   < > 0.00 0.00 0.00   BASOPHILS 0.00   < > 0.00 0.10 0.20   RBCMORPHOLO Present  --  Present  --  Present    < > = values in this interval not displayed.     Recent Labs     06/25/21  0056 06/26/21  0041 06/27/21  0123   SODIUM 135 139 137   POTASSIUM 4.1 4.0 3.6   CHLORIDE 104 111 110   CO2 18* 17* 18*   BUN 49* 55* 61*   CREATININE 2.40* 2.11* 2.15*   CALCIUM 7.9* 7.8* 8.3*   ALBUMIN 2.7* 2.5* 2.4*     Estimated GFR/CRCL = Estimated Creatinine Clearance: 40.9 mL/min (A) (by C-G formula based on SCr of 2.15 mg/dL (H)).  Recent Labs     06/25/21  0056 06/26/21  0041 06/27/21  0123   GLUCOSE 135* 113* 94     Recent Labs     06/25/21  0056 06/26/21  0041 06/27/21  0123   ASTSGOT 27 29 82*   ALTSGPT 36 32 47   TBILIRUBIN 1.4 0.9 0.9   IBILIRUBIN 0.5 0.4 0.5   DBILIRUBIN 0.9* 0.5 0.4   ALKPHOSPHAT 106* 90 87   GLOBULIN 4.7* 4.1* 4.1*   INR 1.73* 1.56* 1.35*             Recent Labs     06/25/21  0056 06/26/21  0041 06/27/21  0123   INR 1.73* 1.56* 1.35*         IMAGING:  MR-BRAIN-W/O   Final Result      MRI of the brain without contrast within normal limits.      DX-CHEST-PORTABLE (1 VIEW)   Final Result      1.  Mild hypoinflation with  minimal bibasilar atelectasis.   2.  No lobar pneumonia or pneumothorax.   3.  No gross mass.      CT-ABDOMEN-PELVIS W/O   Final Result      1.  Stranding surrounding the adrenal glands is improved.   2.  Dense right adrenal mass likely representing adrenal hemorrhage is mildly decreased in size compared to prior.   3.  Mild left hydronephrosis.   4.  Multiple mildly enlarged retroperitoneal lymph nodes are not significantly changed.   5.  Density within the gallbladder may represent sludge/vicarious excretion of contrast.   6.  Trace right pleural fluid and bibasilar atelectasis.      US-RENAL   Final Result      1.  Mild left hydronephrosis.   2.  No right hydronephrosis.      [id number]   Final Result         1. Normal sized CBD. No CBD stone.   2. Thickening and heterogeneity of the bilateral adrenal glands with surrounding stranding, incompletely evaluated but concerning for hemorrhage.   3. No gallstone. Mild pericholecystic fluid could be reactive change due to adjacent adrenal hemorrhage.   4. Worsening left hydronephrosis, concerning for distal obstruction      DX-CHEST-LIMITED (1 VIEW)   Final Result         No acute cardiopulmonary abnormalities are identified.      EC-ECHOCARDIOGRAM COMPLETE W/O CONT   Final Result      CT-ABDOMEN-PELVIS WITH   Final Result      1.  New right adrenal mass likely represents hemorrhage. Thickening of the left adrenal gland is concerning for developing hemorrhage as well.      2.  Low attenuation filling defect in the superior vena cava is consistent with thrombus. Echocardiogram may be helpful for further evaluation.      3.  Prominent retroperitoneal lymph nodes with nonspecific retroperitoneal inflammatory stranding as reported on the prior CT.      4.  Small bilateral pleural effusions, right greater than left. Adjacent airspace disease likely atelectasis.            Comment: Results discussed with Dr. Gunter at 9:32 AM      [id number] VIEW   Final Result          No specific finding to suggest small bowel obstruction.      US-PELVIC COMPLETE (TRANSABDOMINAL/TRANSVAGINAL) (COMBO)   Final Result         1.  Heterogeneous appearance of the uterus with large heterogeneous uterine mass, appearance most compatible with uterine fibroid.   2.  Heterogeneous lesion in the left ovary, could represent hemorrhagic cyst or endometrioma, otherwise indeterminate, recommend follow-up sonogram in 6 weeks for repeat characterization and evaluation of stability.   3.  Thickened endometrial stripe, likely proliferative changes, consider endometrial pathology with additional follow-up as clinically appropriate.   4.  Nabothian cyst      US-RUQ   Final Result      1.  No evidence of gallstones or biliary ductal dilatation.   2.  1.9 cm cystic lesion in the right kidney. Mural nodularity is not excluded and further evaluation with renal protocol MRI is recommended.         CT-ABDOMEN-PELVIS WITH   Final Result      1.  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   2.  Hypodensity in the cervix could be artifactual or could indicate underlying cervical mass. Suggest correlation with physical exam and gynecological history.   3.  19 mm hypodense RIGHT renal lesion, likely but not definitely a cyst. Suggest further assessment with ultrasound when clinically appropriate.                   MEDS:  Current Facility-Administered Medications   Medication Last Admin   • predniSONE (DELTASONE) tablet 60 mg 60 mg at 06/27/21 0515   • ferric gluconate complex (FERRLECIT) 125 mg in  mL IVPB      Followed by   • [START ON 6/28/2021] ferric gluconate complex (FERRLECIT) 250 mg in  mL IVPB     • acetaminophen (TYLENOL) tablet 1,000 mg 1,000 mg at 06/25/21 1215   • ipratropium-albuterol (DUONEB) nebulizer solution 3 mL at 06/24/21 0507   • heparin infusion 25,000 units in 500 mL 0.45% NACL 11 Units/kg/hr at 06/27/21 0043    • baclofen (LIORESAL) tablet 10 mg 10 mg at 06/26/21 2306   • oxyCODONE immediate-release (ROXICODONE) tablet 5 mg 5 mg at 06/25/21 0255   • oxyCODONE immediate release (ROXICODONE) tablet 10 mg 10 mg at 06/25/21 1615   • omeprazole (PRILOSEC) capsule 20 mg 20 mg at 06/27/21 0516   • HYDROmorphone (Dilaudid) injection 1 mg 1 mg at 06/24/21 0158   • lidocaine (LIDODERM) 5 % 1 Patch 1 Patch at 06/26/21 2301   • senna-docusate (PERICOLACE or SENOKOT S) 8.6-50 MG per tablet 2 tablet 2 tablet at 06/24/21 1741    And   • polyethylene glycol/lytes (MIRALAX) PACKET 1 Packet      And   • magnesium hydroxide (MILK OF MAGNESIA) suspension 30 mL      And   • bisacodyl (DULCOLAX) suppository 10 mg 10 mg at 06/15/21 1201   • cloNIDine (CATAPRES) tablet 0.1 mg     • enalaprilat (VASOTEC) injection 1.25 mg     • labetalol (NORMODYNE/TRANDATE) injection 10 mg     • ondansetron (ZOFRAN) syringe/vial injection 4 mg 4 mg at 06/16/21 1355   • ondansetron (ZOFRAN ODT) dispertab 4 mg     • promethazine (PHENERGAN) tablet 12.5-25 mg     • promethazine (PHENERGAN) suppository 12.5-25 mg     • prochlorperazine (COMPAZINE) injection 5-10 mg         ASSESSMENT/PLAN:  46 y.o. female admitted on 6/14 for left-sided abdominal pain x4 days.  She was originally being managed on the oncology floor but was transferred to telemetry for a higher level of monitoring.  She has had a complex medical course with multiple imaging studies and multiple consultants including: gastroenterology, vascular surgery, general surgery, hematology/oncology, and nephrology.  She is currently being managed for catastrophic antiphospholipid syndrome and is being followed actively by hematology/oncology and nephrology.     #Catastrophic antiphospholipid syndrome  -Hematology/oncology actively following  -Patient received IVIG x 2 on 6/22  -Patient received Solu-Medrol 6/22 x 2 days  -Patient transitioned to prednisone 80 mg daily on 6/24 with a taper  -HIT panel negative  and patient was started on a heparin drip on 6/23  -DIC/HIT/TTP all ruled out per heme/onc   -Trop leak minor on 6/25 - no indication for antiplatelet   -CXR 6/25 showed no alvelolar hemorrhage   Plan:  -Heme/onc actively following and recs appreciated  -Hold plasmapheresis for now  -Closely monitor for DVT/arterial thrombotic events  -Continue prednisone taper - currently pred 60  -Continue heparin drip   -F/u with heme/onc to see when to switch to lovenox then bridge coumadin (lifelong)      #VIDAL, steady   #Urinary retention  -BUN/creatinine steady today   -eGFR 54-->25-->22-->31-->30   -Mild left hydronephrosis still seen on CT abd done 6/24  -Microalbumin-Cr ratio of 39  -Protein-Cr ratio of 189  -Bladder scan 6/24 showed >700mL  -Bowens catheter placed 6/24 per nephro recs  Plan:  -Nephrology following, recs appreciated  -Continue bowens   -Hold plasmapheresis for now  -Repeat renal U/S on 6/28 to ensure improvement  -Monitor metabolic acidosis, currently stable, add sodium bicarb if needed     #Microcytic anemia  -Likely multifactorial in etiology  -Iron studies show iron levels of 32 but normal ferritin  -Johan test was negative  -Elevated LDH which could be related to antiphospholipid syndrome versus active hemolysis  -Repeat CT abdomen/pelvis without contrast on 6/24 showed improvement of the hemorrhage located in both adrenal glands  -Transfused 1 U PRBCs on 6/24 for Hgb 6.8  -Repeat this AM steady at 7.9   -Iron transfusion given 6/26   Plan:  -Continue to trend CBCs  -Transfuse when hemoglobin less than 7     #Elevated LFTs  -Patient's LFTs are elevated today   -MRCP completed on 6/22 showed no biliary obstruction  -Hepatitis panel negative  -Possibly due to ischemic hepatopathy  Plan:  -Continue trending on repeat CMPs     #Thrombus of SVC  -Discovered on CT abdomen/pelvis on 6/18  -Vascular surgery consulted, recommended TTE completed on 6/20 and showed an ejection fraction of 65% but could not  visualize thrombus  Plan:  -Patient needs RADHIKA to appropriately visualize the thrombus  -Continue heparin drip  -Follow-up with hematology/oncology to see if TTE is appropriate given patient's improvement in LFTs and platelets     #Intra-abdominal infection  -Patient had 3/4 SIRS criteria including fever, tachycardia, leukocytosis  -Patient was started on cefepime and Flagyl on 6/22  -Patient continued to remain afebrile and pain improving   -Abx stopped on 6/26      #Thrombocytopenia  #Vaginal bleeding  #Rectal bleeding  #Hemorrhage of both adrenal glands  -All likely secondary to catastrophic antiphospholipid syndrome  -Hematology/oncology actively following  -Platelets up to 282 this morning  -CT abdomen 6/24 showed improvement in adrenal hemorrhage  Plan:  -Closely monitor for bleeding  -Continue to trend CBCs     #Retroperitoneal lymphadenopathy  -Noted on CT abdomen/pelvis on 6/18  -Stable on CT abdomen/pelvis on 6/24  -Unclear etiology at this point     #Fibroids  -Found on pelvic ultrasound completed on 6/14  -Follow-up outpatient with gynecology     #Ovarian lesion  -Found in the left ovary on pelvic ultrasound completed 6/14  -Recommended follow-up in 6 weeks with repeat ultrasound  -Follow-up with outpatient gynecology     #Lesion of cervix  -Picked up incidentally on abdominal CT which stated that this could be artifactual or indicate an underlying cervical mass  -Cervix was unable to be visualized by pelvic exam in the emergency department  -Outpatient follow-up with gynecology as recommended     #Right renal lesion  -Renal protocol MRI is advised to evaluate further  -Can be completed as outpatient     #H.pylori infection  -Recently diagnosed at [facility]  -Patient started on triple therapy but did not complete secondary to abdominal pain  -EGD completed 6/17 took biopsies to see if infection have resolved   Plan:  -Follow-up on biopsies  -Continue daily PPI     Disposition:  Inpatient for continued treatment of catastrophic antiphospholipid antibody syndrome     #Core Measures   VTE PPx: Heparin Drip  Abx: None   Lines/Tubes: PIV/Villegas  Fluids: None   Diet: Regular  Code Status: Full     This patient is a Telemetry Block (T834 - T838) patient.    Jyalen Bland MD   PGY-1 Family Medicine Resident   [facility]

## 2021-06-27 NOTE — PROGRESS NOTES
Assumed care at 0700. Bedside report received from [facility]. Patient's chart and MAR reviewed. Pt sleeping at this time. White board updated. Call light, phone and personal belongings within reach.

## 2021-06-27 NOTE — CARE PLAN
The patient is Watcher - Medium risk of patient condition declining or worsening    Shift Goals  Clinical Goals: monitor for bleeding  Patient Goals: sleep comfortably  Family Goals: Rest    Progress made toward(s) clinical / shift goals:      Vitals and heparin drip monitored per protocol. Patient engaged in care plan and encouraged to communicate needs and pain level on scale 0-10.    Patient is not progressing towards the following goals:      Problem: Pain - Standard  Goal: Alleviation of pain or a reduction in pain to the patient’s comfort goal  Outcome: Progressing     Problem: Knowledge Deficit - Standard  Goal: Patient and family/care givers will demonstrate understanding of plan of care, disease process/condition, diagnostic tests and medications  Outcome: Progressing     Problem: Gastrointestinal Irritability  Goal: Nausea and vomiting will be absent or improve  Outcome: Progressing     Problem: Bowel Elimination  Goal: Establish and maintain regular bowel function  Outcome: Progressing     Problem: Hemodynamics  Goal: Patient's hemodynamics, fluid balance and neurologic status will be stable or improve  Outcome: Progressing     Problem: Fluid Volume  Goal: Fluid volume balance will be maintained  Outcome: Progressing     Problem: Urinary - Renal Perfusion  Goal: Ability to achieve and maintain adequate renal perfusion and functioning will improve  Outcome: Progressing     Problem: Respiratory  Goal: Patient will achieve/maintain optimum respiratory ventilation and gas exchange  Outcome: Progressing     Problem: Mechanical Ventilation  Goal: Safe management of artificial airway and ventilation  Outcome: Progressing  Goal: Successful weaning off mechanical ventilator, spontaneously maintains adequate gas exchange  Outcome: Progressing  Goal: Patient will be able to express needs and understand communication  Outcome: Progressing     Problem: Physical Regulation  Goal: Diagnostic test results will  improve  Outcome: Progressing  Goal: Signs and symptoms of infection will decrease  Outcome: Progressing     Problem: Fall Risk  Goal: Patient will remain free from falls  Outcome: Progressing

## 2021-06-28 ENCOUNTER — APPOINTMENT (OUTPATIENT)
Dept: RADIOLOGY | Facility: MEDICAL CENTER | Age: 46
DRG: 814 | End: 2021-06-28
Attending: STUDENT IN AN ORGANIZED HEALTH CARE EDUCATION/TRAINING PROGRAM
Payer: COMMERCIAL

## 2021-06-28 LAB
ALBUMIN SERPL BCP-MCNC: 2.5 G/DL (ref 3.2–4.9)
ALBUMIN/GLOB SERPL: 0.6 G/DL
ALP SERPL-CCNC: 85 U/L (ref 30–99)
ALT SERPL-CCNC: 46 U/L (ref 2–50)
ANION GAP SERPL CALC-SCNC: 9 MMOL/L (ref 7–16)
AST SERPL-CCNC: 48 U/L (ref 12–45)
BASOPHILS # BLD AUTO: 0.1 % (ref 0–1.8)
BASOPHILS # BLD AUTO: 0.1 % (ref 0–1.8)
BASOPHILS # BLD: 0.01 K/UL (ref 0–0.12)
BASOPHILS # BLD: 0.01 K/UL (ref 0–0.12)
BILIRUB CONJ SERPL-MCNC: 0.4 MG/DL (ref 0.1–0.5)
BILIRUB INDIRECT SERPL-MCNC: 0.4 MG/DL (ref 0–1)
BILIRUB SERPL-MCNC: 0.8 MG/DL (ref 0.1–1.5)
BUN SERPL-MCNC: 52 MG/DL (ref 8–22)
CALCIUM SERPL-MCNC: 8.6 MG/DL (ref 8.5–10.5)
CHLORIDE SERPL-SCNC: 108 MMOL/L (ref 96–112)
CO2 SERPL-SCNC: 18 MMOL/L (ref 20–33)
CREAT SERPL-MCNC: 1.81 MG/DL (ref 0.5–1.4)
EOSINOPHIL # BLD AUTO: 0.02 K/UL (ref 0–0.51)
EOSINOPHIL # BLD AUTO: 0.05 K/UL (ref 0–0.51)
EOSINOPHIL NFR BLD: 0.2 % (ref 0–6.9)
EOSINOPHIL NFR BLD: 0.5 % (ref 0–6.9)
ERYTHROCYTE [DISTWIDTH] IN BLOOD BY AUTOMATED COUNT: 66.2 FL (ref 35.9–50)
ERYTHROCYTE [DISTWIDTH] IN BLOOD BY AUTOMATED COUNT: 66.6 FL (ref 35.9–50)
GLOBULIN SER CALC-MCNC: 4.1 G/DL (ref 1.9–3.5)
GLUCOSE SERPL-MCNC: 93 MG/DL (ref 65–99)
HCT VFR BLD AUTO: 28.6 % (ref 37–47)
HCT VFR BLD AUTO: 29 % (ref 37–47)
HGB BLD-MCNC: 8.2 G/DL (ref 12–16)
HGB BLD-MCNC: 8.3 G/DL (ref 12–16)
IMM GRANULOCYTES # BLD AUTO: 0.14 K/UL (ref 0–0.11)
IMM GRANULOCYTES # BLD AUTO: 0.16 K/UL (ref 0–0.11)
IMM GRANULOCYTES NFR BLD AUTO: 1.3 % (ref 0–0.9)
IMM GRANULOCYTES NFR BLD AUTO: 1.7 % (ref 0–0.9)
INR PPP: 1.22 (ref 0.87–1.13)
LYMPHOCYTES # BLD AUTO: 1 K/UL (ref 1–4.8)
LYMPHOCYTES # BLD AUTO: 1.6 K/UL (ref 1–4.8)
LYMPHOCYTES NFR BLD: 16.7 % (ref 22–41)
LYMPHOCYTES NFR BLD: 9.3 % (ref 22–41)
MCH RBC QN AUTO: 22.1 PG (ref 27–33)
MCH RBC QN AUTO: 22.2 PG (ref 27–33)
MCHC RBC AUTO-ENTMCNC: 28.6 G/DL (ref 33.6–35)
MCHC RBC AUTO-ENTMCNC: 28.7 G/DL (ref 33.6–35)
MCV RBC AUTO: 77.1 FL (ref 81.4–97.8)
MCV RBC AUTO: 77.5 FL (ref 81.4–97.8)
MONOCYTES # BLD AUTO: 0.41 K/UL (ref 0–0.85)
MONOCYTES # BLD AUTO: 0.57 K/UL (ref 0–0.85)
MONOCYTES NFR BLD AUTO: 3.8 % (ref 0–13.4)
MONOCYTES NFR BLD AUTO: 5.9 % (ref 0–13.4)
NEUTROPHILS # BLD AUTO: 7.24 K/UL (ref 2–7.15)
NEUTROPHILS # BLD AUTO: 9.14 K/UL (ref 2–7.15)
NEUTROPHILS NFR BLD: 75.4 % (ref 44–72)
NEUTROPHILS NFR BLD: 85 % (ref 44–72)
NRBC # BLD AUTO: 0 K/UL
NRBC # BLD AUTO: 0 K/UL
NRBC BLD-RTO: 0 /100 WBC
NRBC BLD-RTO: 0 /100 WBC
PLATELET # BLD AUTO: 318 K/UL (ref 164–446)
PLATELET # BLD AUTO: 333 K/UL (ref 164–446)
PMV BLD AUTO: 10.1 FL (ref 9–12.9)
PMV BLD AUTO: 9.8 FL (ref 9–12.9)
POTASSIUM SERPL-SCNC: 3.7 MMOL/L (ref 3.6–5.5)
PROT SERPL-MCNC: 6.6 G/DL (ref 6–8.2)
PROTHROMBIN TIME: 15.1 SEC (ref 12–14.6)
RBC # BLD AUTO: 3.69 M/UL (ref 4.2–5.4)
RBC # BLD AUTO: 3.76 M/UL (ref 4.2–5.4)
SODIUM SERPL-SCNC: 135 MMOL/L (ref 135–145)
UFH PPP CHRO-ACNC: 0.6 IU/ML
WBC # BLD AUTO: 10.8 K/UL (ref 4.8–10.8)
WBC # BLD AUTO: 9.6 K/UL (ref 4.8–10.8)

## 2021-06-28 PROCEDURE — A9270 NON-COVERED ITEM OR SERVICE: HCPCS | Performed by: INTERNAL MEDICINE

## 2021-06-28 PROCEDURE — 85025 COMPLETE CBC W/AUTO DIFF WBC: CPT

## 2021-06-28 PROCEDURE — 85520 HEPARIN ASSAY: CPT

## 2021-06-28 PROCEDURE — 700102 HCHG RX REV CODE 250 W/ 637 OVERRIDE(OP): Performed by: STUDENT IN AN ORGANIZED HEALTH CARE EDUCATION/TRAINING PROGRAM

## 2021-06-28 PROCEDURE — 80053 COMPREHEN METABOLIC PANEL: CPT

## 2021-06-28 PROCEDURE — 700111 HCHG RX REV CODE 636 W/ 250 OVERRIDE (IP): Performed by: STUDENT IN AN ORGANIZED HEALTH CARE EDUCATION/TRAINING PROGRAM

## 2021-06-28 PROCEDURE — 700105 HCHG RX REV CODE 258: Performed by: STUDENT IN AN ORGANIZED HEALTH CARE EDUCATION/TRAINING PROGRAM

## 2021-06-28 PROCEDURE — 85610 PROTHROMBIN TIME: CPT

## 2021-06-28 PROCEDURE — A9270 NON-COVERED ITEM OR SERVICE: HCPCS | Performed by: STUDENT IN AN ORGANIZED HEALTH CARE EDUCATION/TRAINING PROGRAM

## 2021-06-28 PROCEDURE — 700102 HCHG RX REV CODE 250 W/ 637 OVERRIDE(OP): Performed by: INTERNAL MEDICINE

## 2021-06-28 PROCEDURE — 76775 US EXAM ABDO BACK WALL LIM: CPT

## 2021-06-28 PROCEDURE — 82248 BILIRUBIN DIRECT: CPT

## 2021-06-28 PROCEDURE — C9113 INJ PANTOPRAZOLE SODIUM, VIA: HCPCS | Performed by: STUDENT IN AN ORGANIZED HEALTH CARE EDUCATION/TRAINING PROGRAM

## 2021-06-28 PROCEDURE — 36415 COLL VENOUS BLD VENIPUNCTURE: CPT

## 2021-06-28 PROCEDURE — 770020 HCHG ROOM/CARE - TELE (206)

## 2021-06-28 PROCEDURE — 700111 HCHG RX REV CODE 636 W/ 250 OVERRIDE (IP): Performed by: INTERNAL MEDICINE

## 2021-06-28 PROCEDURE — 99233 SBSQ HOSP IP/OBS HIGH 50: CPT | Performed by: INTERNAL MEDICINE

## 2021-06-28 RX ORDER — AMLODIPINE BESYLATE 5 MG/1
5 TABLET ORAL
Status: DISCONTINUED | OUTPATIENT
Start: 2021-06-28 | End: 2021-06-29

## 2021-06-28 RX ORDER — BENZONATATE 100 MG/1
100 CAPSULE ORAL 3 TIMES DAILY PRN
Status: CANCELLED | OUTPATIENT
Start: 2021-06-28

## 2021-06-28 RX ORDER — BENZONATATE 100 MG/1
100 CAPSULE ORAL 3 TIMES DAILY PRN
Status: DISCONTINUED | OUTPATIENT
Start: 2021-06-28 | End: 2021-07-02 | Stop reason: HOSPADM

## 2021-06-28 RX ORDER — PANTOPRAZOLE SODIUM 40 MG/10ML
40 INJECTION, POWDER, LYOPHILIZED, FOR SOLUTION INTRAVENOUS DAILY
Status: DISCONTINUED | OUTPATIENT
Start: 2021-06-28 | End: 2021-06-30

## 2021-06-28 RX ADMIN — BENZONATATE 100 MG: 100 CAPSULE ORAL at 22:38

## 2021-06-28 RX ADMIN — AMLODIPINE BESYLATE 5 MG: 5 TABLET ORAL at 14:47

## 2021-06-28 RX ADMIN — PANTOPRAZOLE SODIUM 40 MG: 40 INJECTION, POWDER, LYOPHILIZED, FOR SOLUTION INTRAVENOUS at 14:47

## 2021-06-28 RX ADMIN — NYSTATIN 500000 UNITS: 100000 SUSPENSION ORAL at 20:56

## 2021-06-28 RX ADMIN — HEPARIN SODIUM 11 UNITS/KG/HR: 5000 INJECTION, SOLUTION INTRAVENOUS at 03:29

## 2021-06-28 RX ADMIN — NYSTATIN 500000 UNITS: 100000 SUSPENSION ORAL at 17:56

## 2021-06-28 RX ADMIN — PREDNISONE 60 MG: 50 TABLET ORAL at 05:04

## 2021-06-28 RX ADMIN — NYSTATIN 500000 UNITS: 100000 SUSPENSION ORAL at 14:47

## 2021-06-28 RX ADMIN — SODIUM CHLORIDE 250 MG: 9 INJECTION, SOLUTION INTRAVENOUS at 17:57

## 2021-06-28 RX ADMIN — OMEPRAZOLE 20 MG: 20 CAPSULE, DELAYED RELEASE ORAL at 05:04

## 2021-06-28 ASSESSMENT — ENCOUNTER SYMPTOMS
PALPITATIONS: 0
CHILLS: 0
FEVER: 0
WHEEZING: 0
COUGH: 0
FLANK PAIN: 0
WEIGHT LOSS: 0
VOMITING: 0
NAUSEA: 0
ABDOMINAL PAIN: 1
EYES NEGATIVE: 1
HEMOPTYSIS: 0
SHORTNESS OF BREATH: 0
ORTHOPNEA: 0
SINUS PAIN: 0

## 2021-06-28 ASSESSMENT — PAIN DESCRIPTION - PAIN TYPE: TYPE: ACUTE PAIN

## 2021-06-28 NOTE — PROGRESS NOTES
Nephrology Daily Progress Note    Date of Service  6/28/2021    Chief Complaint  46 y.o. 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    Interval Problem Update  6/24 -no urine output documented in the last 24 hours, but patient says she is urinating.  Repeat CT imaging shows bladder distention and mild left hydronephrosis.  Patient complains of ongoing right upper quadrant pain.  6/25-urine output 3.2 L in the last 24 hours with Villegas catheter in place.  Complains of ongoing abdominal pain.  Denies chest pain, shortness of breath.  6/26 - UOP 1.4L in last 24 hours. Patient somnolent today, unable to obtain ROS.   6/27-urine output 725 mL in the last 24 hours.  Villegas catheter replaced, and patient feels more comfortable.  Complains of some abdominal pain this morning.  6/28 -doing well, no complaints except mild abdominal pain  Creat better  Review of Systems  Review of Systems   Constitutional: Negative for chills, fever, malaise/fatigue and weight loss.   HENT: Negative for congestion, hearing loss and sinus pain.    Eyes: Negative.    Respiratory: Negative for cough, hemoptysis, shortness of breath and wheezing.    Cardiovascular: Negative for chest pain, palpitations, orthopnea and leg swelling.   Gastrointestinal: Positive for abdominal pain. Negative for nausea and vomiting.   Genitourinary: Negative for dysuria and flank pain.        Villegas catheter   Skin: Negative.    All other systems reviewed and are negative.       Physical Exam  Temp:  [36.1 °C (97 °F)-37.2 °C (99 °F)] 36.8 °C (98.3 °F)  Pulse:  [65-78] 65  Resp:  [16] 16  BP: (139-153)/() 149/104  SpO2:  [95 %-99 %] 97 %    Physical Exam  Vitals and nursing note reviewed.   Constitutional:       General: She is not in  acute distress.     Appearance: Normal appearance. She is well-developed.   HENT:      Head: Normocephalic and atraumatic.      Nose: Nose normal.      Mouth/Throat:      Mouth: Mucous membranes are moist.      Pharynx: Oropharynx is clear.   Eyes:      Conjunctiva/sclera: Conjunctivae normal.      Pupils: Pupils are equal, round, and reactive to light.   Neck:      Thyroid: No thyromegaly.   Cardiovascular:      Rate and Rhythm: Normal rate and regular rhythm.      Pulses: Normal pulses.      Heart sounds: Normal heart sounds. No friction rub. No gallop.    Pulmonary:      Effort: Pulmonary effort is normal. No respiratory distress.      Breath sounds: Normal breath sounds. No wheezing or rales.   Abdominal:      General: Bowel sounds are normal. There is no distension.      Palpations: Abdomen is soft. There is no mass.      Tenderness: There is no abdominal tenderness. There is no guarding.   Musculoskeletal:      Cervical back: Normal range of motion and neck supple.      Comments: Trace pedal edema   Skin:     General: Skin is warm.      Coloration: Skin is not pale.      Findings: No erythema or rash.   Neurological:      General: No focal deficit present.      Mental Status: She is alert and oriented to person, place, and time.      Cranial Nerves: No cranial nerve deficit.   Psychiatric:         Mood and Affect: Mood normal.         Behavior: Behavior normal.         Thought Content: Thought content normal.         Judgment: Judgment normal.         Fluids    Intake/Output Summary (Last 24 hours) at 6/28/2021 1317  Last data filed at 6/28/2021 1000  Gross per 24 hour   Intake 590 ml   Output 900 ml   Net -310 ml       Laboratory  Labs reviewed, pertinent labs below.  Recent Labs     06/27/21  0836 06/27/21  2107 06/28/21  0826   WBC 10.8 8.5 10.8   RBC 3.83* 3.73* 3.76*   HEMOGLOBIN 8.4* 8.7* 8.3*   HEMATOCRIT 29.3* 28.9* 29.0*   MCV 76.5* 77.5* 77.1*   MCH 21.9* 23.3* 22.1*   MCHC 28.7* 30.1* 28.6*   RDW  65.5* 67.7* 66.6*   PLATELETCT 315 304 333   MPV 9.9 10.0 10.1     Recent Labs     06/26/21  0041 06/27/21  0123 06/28/21  0114   SODIUM 139 137 135   POTASSIUM 4.0 3.6 3.7   CHLORIDE 111 110 108   CO2 17* 18* 18*   GLUCOSE 113* 94 93   BUN 55* 61* 52*   CREATININE 2.11* 2.15* 1.81*   CALCIUM 7.8* 8.3* 8.6     Recent Labs     06/26/21  0041 06/27/21  0123 06/28/21  0114   INR 1.56* 1.35* 1.22*           URINALYSIS:  Lab Results   Component Value Date/Time    COLORURINE DK Yellow 06/22/2021 1810    CLARITY Clear 06/22/2021 1810    SPECGRAVITY 1.007 06/22/2021 1810    PHURINE 6.0 06/22/2021 1810    KETONES Negative 06/22/2021 1810    PROTEINURIN Negative 06/22/2021 1810    BILIRUBINUR Moderate (A) 06/22/2021 1810    UROBILU 0.2 06/22/2021 1810    NITRITE Negative 06/22/2021 1810    LEUKESTERAS Negative 06/22/2021 1810    OCCULTBLOOD Large (A) 06/22/2021 1810     UPC  No results found for: TOTPROTUR No results found for: CREATININEU      Imaging interpreted by radiologist. Imaging reports reviewed with pertinent findings below  US-RENAL   Final Result      Mild prominence of the left renal pelvis, less than prior. No right hydronephrosis.      The urinary bladder is only partially decompressed by Villegas catheter. Correlate clinically for position of the catheter.      MR-BRAIN-W/O   Final Result      MRI of the brain without contrast within normal limits.      DX-CHEST-PORTABLE (1 VIEW)   Final Result      1.  Mild hypoinflation with minimal bibasilar atelectasis.   2.  No lobar pneumonia or pneumothorax.   3.  No gross mass.      CT-ABDOMEN-PELVIS W/O   Final Result      1.  Stranding surrounding the adrenal glands is improved.   2.  Dense right adrenal mass likely representing adrenal hemorrhage is mildly decreased in size compared to prior.   3.  Mild left hydronephrosis.   4.  Multiple mildly enlarged retroperitoneal lymph nodes are not significantly changed.   5.  Density within the gallbladder may represent  sludge/vicarious excretion of contrast.   6.  Trace right pleural fluid and bibasilar atelectasis.      US-RENAL   Final Result      1.  Mild left hydronephrosis.   2.  No right hydronephrosis.      VE-IFODUQC-D/O   Final Result         1. Normal sized CBD. No CBD stone.   2. Thickening and heterogeneity of the bilateral adrenal glands with surrounding stranding, incompletely evaluated but concerning for hemorrhage.   3. No gallstone. Mild pericholecystic fluid could be reactive change due to adjacent adrenal hemorrhage.   4. Worsening left hydronephrosis, concerning for distal obstruction      DX-CHEST-LIMITED (1 VIEW)   Final Result         No acute cardiopulmonary abnormalities are identified.      EC-ECHOCARDIOGRAM COMPLETE W/O CONT   Final Result      CT-ABDOMEN-PELVIS WITH   Final Result      1.  New right adrenal mass likely represents hemorrhage. Thickening of the left adrenal gland is concerning for developing hemorrhage as well.      2.  Low attenuation filling defect in the superior vena cava is consistent with thrombus. Echocardiogram may be helpful for further evaluation.      3.  Prominent retroperitoneal lymph nodes with nonspecific retroperitoneal inflammatory stranding as reported on the prior CT.      4.  Small bilateral pleural effusions, right greater than left. Adjacent airspace disease likely atelectasis.            Comment: Results discussed with Dr. Gunter at 9:32 AM      SW-FSCXCEL-3 VIEW   Final Result         No specific finding to suggest small bowel obstruction.      US-PELVIC COMPLETE (TRANSABDOMINAL/TRANSVAGINAL) (COMBO)   Final Result         1.  Heterogeneous appearance of the uterus with large heterogeneous uterine mass, appearance most compatible with uterine fibroid.   2.  Heterogeneous lesion in the left ovary, could represent hemorrhagic cyst or endometrioma, otherwise indeterminate, recommend follow-up sonogram in 6 weeks for repeat characterization and evaluation of  stability.   3.  Thickened endometrial stripe, likely proliferative changes, consider endometrial pathology with additional follow-up as clinically appropriate.   4.  Nabothian cyst      US-RUQ   Final Result      1.  No evidence of gallstones or biliary ductal dilatation.   2.  1.9 cm cystic lesion in the right kidney. Mural nodularity is not excluded and further evaluation with renal protocol MRI is recommended.         CT-ABDOMEN-PELVIS WITH   Final Result      1.  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   2.  Hypodensity in the cervix could be artifactual or could indicate underlying cervical mass. Suggest correlation with physical exam and gynecological history.   3.  19 mm hypodense RIGHT renal lesion, likely but not definitely a cyst. Suggest further assessment with ultrasound when clinically appropriate.                     Current Facility-Administered Medications   Medication Dose Route Frequency Provider Last Rate Last Admin   • benzonatate (TESSALON) capsule 100 mg  100 mg Oral TID PRN Yuri Ivy M.D.       • nystatin (MYCOSTATIN) 166210 UNIT/ML suspension 500,000 Units  5 mL Swish & Swallow 4X/DAY Jaylen Bland M.D.       • pantoprazole (PROTONIX) injection 40 mg  40 mg Intravenous DAILY Jaylen Bland M.D.       • predniSONE (DELTASONE) tablet 60 mg  60 mg Oral DAILY Da Varela M.D.   60 mg at 06/28/21 0504   • ferric gluconate complex (FERRLECIT) 250 mg in  mL IVPB  250 mg Intravenous Q24HR Jaylen Bland M.D.       • acetaminophen (TYLENOL) tablet 1,000 mg  1,000 mg Oral Q6HRS Dominguez Reilly M.D.   1,000 mg at 06/25/21 1215   • ipratropium-albuterol (DUONEB) nebulizer solution  3 mL Nebulization Q4H PRN (RT) Dominguez Reilly M.D.   3 mL at 06/24/21 0507   • heparin infusion 25,000 units in 500 mL 0.45% NACL  0-30 Units/kg/hr Intravenous Continuous Da Varela M.D. 17.7 mL/hr at 06/28/21 0329 11  Units/kg/hr at 06/28/21 0329   • baclofen (LIORESAL) tablet 10 mg  10 mg Oral TID PRN Salina Zuniga, D.O.   10 mg at 06/26/21 2306   • oxyCODONE immediate-release (ROXICODONE) tablet 5 mg  5 mg Oral Q4HRS PRN Salina Zuniga, D.O.   5 mg at 06/27/21 0807   • oxyCODONE immediate release (ROXICODONE) tablet 10 mg  10 mg Oral Q4HRS PRN Salina Zuniga, D.O.   10 mg at 06/25/21 1615   • HYDROmorphone (Dilaudid) injection 1 mg  1 mg Intravenous Q4HRS PRN Piotr Gunter D.O.   1 mg at 06/24/21 0158   • lidocaine (LIDODERM) 5 % 1 Patch  1 Patch Transdermal Q24HR Gareth Castro M.D.   1 Patch at 06/27/21 2135   • senna-docusate (PERICOLACE or SENOKOT S) 8.6-50 MG per tablet 2 tablet  2 tablet Oral BID Jamar Mo M.D.   2 tablet at 06/24/21 1741    And   • polyethylene glycol/lytes (MIRALAX) PACKET 1 Packet  1 Packet Oral QDAY PRCORNELIA Mo M.D.        And   • magnesium hydroxide (MILK OF MAGNESIA) suspension 30 mL  30 mL Oral QDAY PRCORNELIA Mo M.D.        And   • bisacodyl (DULCOLAX) suppository 10 mg  10 mg Rectal QDAY PRCORNELIA Mo M.D.   10 mg at 06/15/21 1201   • cloNIDine (CATAPRES) tablet 0.1 mg  0.1 mg Oral Q6HRS PRCORNELIA Mo M.D.       • enalaprilat (VASOTEC) injection 1.25 mg  1.25 mg Intravenous Q6HRS PRCORNELIA Mo M.D.       • labetalol (NORMODYNE/TRANDATE) injection 10 mg  10 mg Intravenous Q4HRS PRCORNELIA Mo M.D.       • ondansetron (ZOFRAN) syringe/vial injection 4 mg  4 mg Intravenous Q4HRS PRCORNELIA Mo M.D.   4 mg at 06/16/21 1355   • ondansetron (ZOFRAN ODT) dispertab 4 mg  4 mg Oral Q4HRS CRISTAL Mo M.D.       • promethazine (PHENERGAN) tablet 12.5-25 mg  12.5-25 mg Oral Q4HRS CRISTAL Mo M.D.       • promethazine (PHENERGAN) suppository 12.5-25 mg  12.5-25 mg Rectal Q4HRS CRISTAL Mo M.D.       • prochlorperazine (COMPAZINE) injection 5-10 mg  5-10 mg Intravenous Q4HRS CRISTAL Mo M.D.             Assessment/Plan  46 y.o. 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     1.  Acute kidney injury, nonoliguric: could be secondary to urinary retention, NICKOLAS    2.  HTN: elevated BP: adding amlodipine     3.  Left renal hydronephrosis, mild on kidney ultrasound 6/22/2021 and again on CT scan 6/24/2021.  Accompanied by bladder distention.  Continue Villegas catheter, placed 6/24/2021.  Recommend repeat kidney ultrasound on 6/28/2021 to ensure improvement.      4.  Electrolytes: well controlled    5.  Microcytic anemia, persistent.  Concern for antiphospholipid antibody syndrome with intravascular clotting and hemolysis.  Defer further management to primary team and hematology oncology.  Check CBC daily.    6. Metabolic acidosis, persistent, but mild.  No acute need for alkali supplementation.  If acidosis worsens, could start sodium bicarbonate 1300 mg p.o. twice daily.  Check labs daily.      Recs; amlodipine 5 mg qd             Daily labs             Close BP monitoring             Avoid nephrotoxic agents             Will follow

## 2021-06-28 NOTE — PROGRESS NOTES
[facility] FAMILY MEDICINE PROGRESS NOTE     Attending:   Christi Yoder MD     Resident:   Jaylen Bland MD    PATIENT:   Jaclyn Olvera; [id number]; 1975    ID:   46 y.o. female admitted on 6/14 for left-sided abdominal pain x4 days.  She was originally being managed on the oncology floor but was transferred to telemetry for a higher level of monitoring.  She has had a complex medical course with multiple imaging studies and multiple consultants including: gastroenterology, vascular surgery, general surgery, hematology/oncology, and nephrology.  She is currently being managed for catastrophic antiphospholipid syndrome and is being followed actively by hematology/oncology and nephrology.     SUBJECTIVE:   No acute events overnight, patient states that she had a coughing fit overnight which kept her from sleeping.  She states that after eating she is starting to burp again and feels like her H. pylori infection.  Denies any other complaints.  Denies any bleeding, fevers, chills, shortness of breath, weakness/numbness, chest pain, or any other concerning symptoms.    OBJECTIVE:  Vitals:    06/27/21 1727 06/27/21 1959 06/27/21 2309 06/28/21 0503   BP: 153/96 149/89 152/82 139/80   Pulse: 69 73 78 78   Resp: 16 16 16 16   Temp: 36.4 °C (97.6 °F) 36.1 °C (97 °F) 36.2 °C (97.2 °F) 37.2 °C (99 °F)   TempSrc: Temporal Temporal Temporal Temporal   SpO2: 99% 97% 96% 95%   Weight:  96.6 kg (212 lb 15.4 oz)     Height:           Intake/Output Summary (Last 24 hours) at 6/28/2021 0802  Last data filed at 6/28/2021 0503  Gross per 24 hour   Intake 350 ml   Output 900 ml   Net -550 ml       PHYSICAL EXAM:  General: No acute distress, afebrile, resting comfortably, conversational   HEENT: NC/AT. EOMI.   Cardiovascular: RRR without murmurs, rubs, heaves. Normal capillary refill   Respiratory: CTAB, no tachypnea or retractions   Abdomen: normal bowel sounds, soft, diffuse TTP, nondistended, no masses, no organomegaly   EXT:  DOMINGUEZ,  no edema  Skin: No erythema/lesions   Neuro: Non-focal, alert and orientated     LABS:  Recent Labs     06/25/21  1553 06/26/21  0819 06/26/21  2103 06/27/21  0836 06/27/21  2107   WBC 5.7   < > 6.6 10.8 8.5   RBC 3.68*   < > 3.62* 3.83* 3.73*   HEMOGLOBIN 8.1*   < > 7.9* 8.4* 8.7*   HEMATOCRIT 28.3*   < > 27.9* 29.3* 28.9*   MCV 76.9*   < > 77.1* 76.5* 77.5*   MCH 22.0*   < > 21.8* 21.9* 23.3*   RDW 62.9*   < > 66.9* 65.5* 67.7*   PLATELETCT 312   < > 283 315 304   MPV 10.9   < > 9.9 9.9 10.0   NEUTSPOLYS 80.00*   < > 72.90* 85.70* 78.60*   LYMPHOCYTES 11.50*   < > 16.90* 9.10* 14.90*   MONOCYTES 6.60   < > 8.20 3.70 5.00   EOSINOPHILS 0.00   < > 0.00 0.10 0.10   BASOPHILS 0.00   < > 0.20 0.10 0.10   RBCMORPHOLO Present  --  Present  --   --     < > = values in this interval not displayed.     Recent Labs     06/26/21 0041 06/27/21 0123 06/28/21  0114   SODIUM 139 137 135   POTASSIUM 4.0 3.6 3.7   CHLORIDE 111 110 108   CO2 17* 18* 18*   BUN 55* 61* 52*   CREATININE 2.11* 2.15* 1.81*   CALCIUM 7.8* 8.3* 8.6   ALBUMIN 2.5* 2.4* 2.5*     Estimated GFR/CRCL = Estimated Creatinine Clearance: 48.6 mL/min (A) (by C-G formula based on SCr of 1.81 mg/dL (H)).  Recent Labs     06/26/21 0041 06/27/21 0123 06/28/21  0114   GLUCOSE 113* 94 93     Recent Labs     06/26/21 0041 06/27/21 0123 06/28/21  0114   ASTSGOT 29 82* 48*   ALTSGPT 32 47 46   TBILIRUBIN 0.9 0.9 0.8   IBILIRUBIN 0.4 0.5 0.4   DBILIRUBIN 0.5 0.4 0.4   ALKPHOSPHAT 90 87 85   GLOBULIN 4.1* 4.1* 4.1*   INR 1.56* 1.35* 1.22*             Recent Labs     06/26/21  0041 06/27/21  0123 06/28/21  0114   INR 1.56* 1.35* 1.22*         IMAGING:  US-RENAL   Final Result      Mild prominence of the left renal pelvis, less than prior. No right hydronephrosis.      The urinary bladder is only partially decompressed by Villegas catheter. Correlate clinically for position of the catheter.      MR-BRAIN-W/O   Final Result      MRI of the brain without contrast within normal  limits.      DX-CHEST-PORTABLE (1 VIEW)   Final Result      1.  Mild hypoinflation with minimal bibasilar atelectasis.   2.  No lobar pneumonia or pneumothorax.   3.  No gross mass.      CT-ABDOMEN-PELVIS W/O   Final Result      1.  Stranding surrounding the adrenal glands is improved.   2.  Dense right adrenal mass likely representing adrenal hemorrhage is mildly decreased in size compared to prior.   3.  Mild left hydronephrosis.   4.  Multiple mildly enlarged retroperitoneal lymph nodes are not significantly changed.   5.  Density within the gallbladder may represent sludge/vicarious excretion of contrast.   6.  Trace right pleural fluid and bibasilar atelectasis.      US-RENAL   Final Result      1.  Mild left hydronephrosis.   2.  No right hydronephrosis.      QE-RGQYORE-R/O   Final Result         1. Normal sized CBD. No CBD stone.   2. Thickening and heterogeneity of the bilateral adrenal glands with surrounding stranding, incompletely evaluated but concerning for hemorrhage.   3. No gallstone. Mild pericholecystic fluid could be reactive change due to adjacent adrenal hemorrhage.   4. Worsening left hydronephrosis, concerning for distal obstruction      DX-CHEST-LIMITED (1 VIEW)   Final Result         No acute cardiopulmonary abnormalities are identified.      EC-ECHOCARDIOGRAM COMPLETE W/O CONT   Final Result      CT-ABDOMEN-PELVIS WITH   Final Result      1.  New right adrenal mass likely represents hemorrhage. Thickening of the left adrenal gland is concerning for developing hemorrhage as well.      2.  Low attenuation filling defect in the superior vena cava is consistent with thrombus. Echocardiogram may be helpful for further evaluation.      3.  Prominent retroperitoneal lymph nodes with nonspecific retroperitoneal inflammatory stranding as reported on the prior CT.      4.  Small bilateral pleural effusions, right greater than left. Adjacent airspace disease likely atelectasis.            Comment:  Results discussed with Dr. Gunter at 9:32 AM      [id number]-9 VIEW   Final Result         No specific finding to suggest small bowel obstruction.      US-PELVIC COMPLETE (TRANSABDOMINAL/TRANSVAGINAL) (COMBO)   Final Result         1.  Heterogeneous appearance of the uterus with large heterogeneous uterine mass, appearance most compatible with uterine fibroid.   2.  Heterogeneous lesion in the left ovary, could represent hemorrhagic cyst or endometrioma, otherwise indeterminate, recommend follow-up sonogram in 6 weeks for repeat characterization and evaluation of stability.   3.  Thickened endometrial stripe, likely proliferative changes, consider endometrial pathology with additional follow-up as clinically appropriate.   4.  Nabothian cyst      US-RUQ   Final Result      1.  No evidence of gallstones or biliary ductal dilatation.   2.  1.9 cm cystic lesion in the right kidney. Mural nodularity is not excluded and further evaluation with renal protocol MRI is recommended.         CT-ABDOMEN-PELVIS WITH   Final Result      1.  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   2.  Hypodensity in the cervix could be artifactual or could indicate underlying cervical mass. Suggest correlation with physical exam and gynecological history.   3.  19 mm hypodense RIGHT renal lesion, likely but not definitely a cyst. Suggest further assessment with ultrasound when clinically appropriate.                   MEDS:  Current Facility-Administered Medications   Medication Last Admin   • predniSONE (DELTASONE) tablet 60 mg 60 mg at 06/28/21 0504   • ferric gluconate complex (FERRLECIT) 250 mg in  mL IVPB     • acetaminophen (TYLENOL) tablet 1,000 mg 1,000 mg at 06/25/21 1215   • ipratropium-albuterol (DUONEB) nebulizer solution 3 mL at 06/24/21 0507   • heparin infusion 25,000 units in 500 mL 0.45% NACL 11 Units/kg/hr at 06/28/21 0329   • baclofen  (LIORESAL) tablet 10 mg 10 mg at 06/26/21 2306   • oxyCODONE immediate-release (ROXICODONE) tablet 5 mg 5 mg at 06/27/21 0807   • oxyCODONE immediate release (ROXICODONE) tablet 10 mg 10 mg at 06/25/21 1615   • omeprazole (PRILOSEC) capsule 20 mg 20 mg at 06/28/21 0504   • HYDROmorphone (Dilaudid) injection 1 mg 1 mg at 06/24/21 0158   • lidocaine (LIDODERM) 5 % 1 Patch 1 Patch at 06/27/21 2135   • senna-docusate (PERICOLACE or SENOKOT S) 8.6-50 MG per tablet 2 tablet 2 tablet at 06/24/21 1741    And   • polyethylene glycol/lytes (MIRALAX) PACKET 1 Packet      And   • magnesium hydroxide (MILK OF MAGNESIA) suspension 30 mL      And   • bisacodyl (DULCOLAX) suppository 10 mg 10 mg at 06/15/21 1201   • cloNIDine (CATAPRES) tablet 0.1 mg     • enalaprilat (VASOTEC) injection 1.25 mg     • labetalol (NORMODYNE/TRANDATE) injection 10 mg     • ondansetron (ZOFRAN) syringe/vial injection 4 mg 4 mg at 06/16/21 1355   • ondansetron (ZOFRAN ODT) dispertab 4 mg     • promethazine (PHENERGAN) tablet 12.5-25 mg     • promethazine (PHENERGAN) suppository 12.5-25 mg     • prochlorperazine (COMPAZINE) injection 5-10 mg         ASSESSMENT/PLAN:  46 y.o. female admitted on 6/14 for left-sided abdominal pain x4 days.  She was originally being managed on the oncology floor but was transferred to telemetry for a higher level of monitoring.  She has had a complex medical course with multiple imaging studies and multiple consultants including: gastroenterology, vascular surgery, general surgery, hematology/oncology, and nephrology.  She is currently being managed for catastrophic antiphospholipid syndrome and is being followed actively by hematology/oncology and nephrology.     #Catastrophic antiphospholipid syndrome  -Hematology/oncology actively following  -Patient received IVIG x 2 on 6/22  -Patient received Solu-Medrol 6/22 x 2 days  -Patient transitioned to prednisone 80 mg daily on 6/24 with a taper  -HIT panel negative and patient  was started on a heparin drip on 6/23  -DIC/HIT/TTP all ruled out per heme/onc   -Trop leak minor on 6/25 - no indication for antiplatelet   -CXR 6/25 showed no alvelolar hemorrhage   Plan:  -Heme/onc actively following and recs appreciated  -Hold plasmapheresis for now  -Closely monitor for DVT/arterial thrombotic events  -Continue prednisone taper - currently pred 60  -Continue heparin drip   -F/u with heme/onc to see when to switch to lovenox then bridge coumadin (lifelong)     #Cough  #GERD  -Patient complaining of burping after meals and a cough   -Change omeprazole to protonix and follow for effect   -Continue to follow symptoms, if patient develops fever/productive cough consider different etiology  -Tessalon pearls for coughing      #Oral candidiasis  -Nystatin suspension      #VIDAL, steady   #Urinary retention  -BUN/creatinine steady today   -eGFR 54-->25-->22-->31-->30   -Mild left hydronephrosis still seen on CT abd done 6/24  -Microalbumin-Cr ratio of 39  -Protein-Cr ratio of 189  -Bladder scan 6/24 showed >700mL  -Bowens catheter placed 6/24 per nephro recs  Plan:  -Nephrology following, recs appreciated  -Continue bowens   -Hold plasmapheresis for now  -Repeat renal U/S on 6/28 to ensure improvement  -Monitor metabolic acidosis, currently stable, add sodium bicarb if needed      #Microcytic anemia  -Likely multifactorial in etiology  -Iron studies show iron levels of 32 but normal ferritin  -Johan test was negative  -Elevated LDH which could be related to antiphospholipid syndrome versus active hemolysis  -Repeat CT abdomen/pelvis without contrast on 6/24 showed improvement of the hemorrhage located in both adrenal glands  -Transfused 1 U PRBCs on 6/24 for Hgb 6.8  -Repeat this AM steady at 7.9   -Iron transfusion given 6/26   Plan:  -Continue to trend CBCs  -Transfuse when hemoglobin less than 7     #Elevated LFTs  -Patient's LFTs are elevated today   -MRCP completed on 6/22 showed no biliary  obstruction  -Hepatitis panel negative  -Possibly due to ischemic hepatopathy  Plan:  -Continue trending on repeat CMPs      #Thrombus of SVC  -Discovered on CT abdomen/pelvis on 6/18  -Vascular surgery consulted, recommended TTE completed on 6/20 and showed an ejection fraction of 65% but could not visualize thrombus  Plan:  -Patient needs RADHIKA to appropriately visualize the thrombus  -Continue heparin drip  -Follow-up with hematology/oncology to see if TTE is appropriate given patient's improvement in LFTs and platelets     #Intra-abdominal infection  -Patient had 3/4 SIRS criteria including fever, tachycardia, leukocytosis  -Patient was started on cefepime and Flagyl on 6/22  -Patient continued to remain afebrile and pain improving   -Abx stopped on 6/26      #Thrombocytopenia  #Vaginal bleeding  #Rectal bleeding  #Hemorrhage of both adrenal glands  -All likely secondary to catastrophic antiphospholipid syndrome  -Hematology/oncology actively following  -Platelets up to 282 this morning  -CT abdomen 6/24 showed improvement in adrenal hemorrhage  Plan:  -Closely monitor for bleeding  -Continue to trend CBCs     #Retroperitoneal lymphadenopathy  -Noted on CT abdomen/pelvis on 6/18  -Stable on CT abdomen/pelvis on 6/24  -Unclear etiology at this point     #Fibroids  -Found on pelvic ultrasound completed on 6/14  -Follow-up outpatient with gynecology     #Ovarian lesion  -Found in the left ovary on pelvic ultrasound completed 6/14  -Recommended follow-up in 6 weeks with repeat ultrasound  -Follow-up with outpatient gynecology     #Lesion of cervix  -Picked up incidentally on abdominal CT which stated that this could be artifactual or indicate an underlying cervical mass  -Cervix was unable to be visualized by pelvic exam in the emergency department  -Outpatient follow-up with gynecology as recommended     #Right renal lesion  -Renal protocol MRI is advised to evaluate further  -Can be completed as  outpatient     #H.pylori infection  -Recently diagnosed at [facility]  -Patient started on triple therapy but did not complete secondary to abdominal pain  -EGD completed 6/17 took biopsies to see if infection have resolved   Plan:  -Follow-up on biopsies  -Continue daily PPI     Disposition: Inpatient for continued treatment of catastrophic antiphospholipid antibody syndrome     #Core Measures   VTE PPx: Heparin Drip  Abx: None   Lines/Tubes: PIV/Villegas  Fluids: None   Diet: Regular  Code Status: Full     This patient is a Telemetry Block (T834 - T838) patient.    Jaylen Bland MD   PGY-1 Family Medicine Resident   [facility]

## 2021-06-28 NOTE — PROGRESS NOTES
Oncology/Hematology Progress Note               Author: Anjali Loomis M.D. Date & Time created: 6/28/2021  3:28 PM     CC: catastrophic antiphospholipid antibody syndrome    Interval History:  No acute events overnight. Pt feels so much better than when she was admitted. Her pain is improved. Her main concern is ongoing reflux symptoms and wether her H Pylori has been treated.    Review of Systems:  Review of Systems   Constitutional: Positive for malaise/fatigue.   All other systems reviewed and are negative.    Physical Exam:  Physical Exam  Constitutional:       General: She is not in acute distress.     Appearance: Normal appearance.   HENT:      Head: Normocephalic and atraumatic.   Eyes:      General: No scleral icterus.     Conjunctiva/sclera: Conjunctivae normal.   Cardiovascular:      Rate and Rhythm: Normal rate.   Pulmonary:      Effort: Pulmonary effort is normal. No respiratory distress.   Neurological:      Mental Status: She is alert and oriented to person, place, and time.   Psychiatric:         Mood and Affect: Mood normal.         Behavior: Behavior normal.         Thought Content: Thought content normal.         Judgment: Judgment normal.       Labs:          Recent Labs     06/26/21  0041 06/27/21  0123 06/28/21  0114   SODIUM 139 137 135   POTASSIUM 4.0 3.6 3.7   CHLORIDE 111 110 108   CO2 17* 18* 18*   BUN 55* 61* 52*   CREATININE 2.11* 2.15* 1.81*   CALCIUM 7.8* 8.3* 8.6     Recent Labs     06/26/21  0041 06/27/21  0123 06/28/21  0114   ALTSGPT 32 47 46   ASTSGOT 29 82* 48*   ALKPHOSPHAT 90 87 85   TBILIRUBIN 0.9 0.9 0.8   DBILIRUBIN 0.5 0.4 0.4   GLUCOSE 113* 94 93     Recent Labs     06/25/21  1553 06/26/21  0041 06/26/21  0819 06/26/21  2103 06/27/21  0123 06/27/21  0836 06/27/21  2107 06/28/21  0114 06/28/21  0826   RBC 3.68*  --    < >   < >  --  3.83* 3.73*  --  3.76*   HEMOGLOBIN 8.1*  --    < >   < >  --  8.4* 8.7*  --  8.3*   HEMATOCRIT 28.3*  --    < >   < >  --  29.3* 28.9*  --   29.0*   PLATELETCT 312  --    < >   < >  --  315 304  --  333   PROTHROMBTM  --  18.2*  --   --  16.3*  --   --  15.1*  --    INR  --  1.56*  --   --  1.35*  --   --  1.22*  --    IRON 29*  --   --   --   --   --   --   --   --    TOTIRONBC 187*  --   --   --   --   --   --   --   --     < > = values in this interval not displayed.     Recent Labs     21  00421  0819 2136 21  01121   WBC  --    < >   < >  --  10.8 8.5  --  10.8   NEUTSPOLYS  --    < >   < >  --  85.70* 78.60*  --  85.00*   LYMPHOCYTES  --    < >   < >  --  9.10* 14.90*  --  9.30*   MONOCYTES  --    < >   < >  --  3.70 5.00  --  3.80   EOSINOPHILS  --    < >   < >  --  0.10 0.10  --  0.50   BASOPHILS  --    < >   < >  --  0.10 0.10  --  0.10   ASTSGOT 29  --   --  82*  --   --  48*  --    ALTSGPT 32  --   --  47  --   --  46  --    ALKPHOSPHAT 90  --   --  87  --   --  85  --    TBILIRUBIN 0.9  --   --  0.9  --   --  0.8  --     < > = values in this interval not displayed.     Recent Labs     21  011   SODIUM 139 137 135   POTASSIUM 4.0 3.6 3.7   CHLORIDE 111 110 108   CO2 17* 18* 18*   GLUCOSE 113* 94 93   BUN 55* 61* 52*   CREATININE 2.11* 2.15* 1.81*   CALCIUM 7.8* 8.3* 8.6     Hemodynamics:  Temp (24hrs), Av.6 °C (97.8 °F), Min:36.1 °C (97 °F), Max:37.2 °C (99 °F)  Temperature: 36.8 °C (98.3 °F)  Pulse  Av.6  Min: 59  Max: 128   Blood Pressure: 149/104     Respiratory:    Respiration: 16, Pulse Oximetry: 97 %        RUL Breath Sounds: Clear, RML Breath Sounds: Diminished, RLL Breath Sounds: Diminished, BLAYNE Breath Sounds: Clear, LLL Breath Sounds: Diminished  Fluids:    Intake/Output Summary (Last 24 hours) at 2021 1111  Last data filed at 2021 0900  Gross per 24 hour   Intake --   Output 900 ml   Net -900 ml     Weight: 96.6 kg (212 lb 15.4 oz)  GI/Nutrition:  Orders Placed This Encounter   Procedures    • Diet Order Diet: Regular     Standing Status:   Standing     Number of Occurrences:   1     Order Specific Question:   Diet:     Answer:   Regular [1]     Medical Decision Making, by Problem:  Active Hospital Problems    Diagnosis    • *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 pylori infection [A04.8]    • Hyponatremia [E87.1]    • Kidney lesion, native, right [N28.9]    • Lesion of cervix [N88.9]    • Leukocytosis [D72.829]        Assessment and Plan:  # Catastrophic antiphospholipid antibody syndrome- triple positive APLA with presumed bilateral adrenal hemorrhage secondary to micro-thrombus/thrombotic storm along with liver involvement,?  possible kidney involvement, thrombocytopenia.  - No TTP ,DIC, TMA, HIT.  CUONG ANCA Hep/HIV negative    -Complicated case due to bilateral adrenal hemorrhage presumed to be secondary to micro thrombus/thrombotic storm    - Initially received high-dose Solu-Medrol, switched to prednisone 80 mg for 2 3 days currently on prednisone 60 mg with plans to gradually taper off over the next few weeks    -Follow-up CT scan from 6/24/2021 improving to stable adrenal hemorrhage nonspecific retroperitoneal lymphadenopathy, possibly reactive  - monitor for any DVT/arterial thrombotic events like stroke heart attack etc.  - Currently on Heparin gtt - if Hgb stable over tomorrow, can switch to Lovenox with Xa monitoring and subsequent transition to Coumadin.  - Need life long anticoagulation on Coumadin given triple positive APLA , per TRAPS data aim for  iNR 2.5-3.5 range as she has mild baseline PT elevation.    # Thrombocytopenia - platelets down to 64 on 6/22/21  - now normalized since 6/23/21 with IVIg and steroids  - monitor closely    # VIDAL - her creatinine is improving, down to 1.81 today  - Nephrology following    -Anemia-multifactorial. Pre existing HEATHER . She had mucocutaneous/vaginal bleeding and adrenal , possible RP hemorrhage which appears to be stable/ improving.   - Receiving IV iron  Transfuse for HgB < 7  Or any bleed  Johan test was negative.      -Risk of adrenal insufficiency due to bilateral adrenal hemorrhage.  Cortisol level okay, continue steroids with taper    High complexity, High Risk Patient  - Will continue to follow      Quality-Core Measures   Reviewed items::  Labs reviewed and Medications reviewed  Villegas catheter::  No Villegas  DVT prophylaxis pharmacological::  Heparin

## 2021-06-28 NOTE — CARE PLAN
The patient is Watcher - Medium risk of patient condition declining or worsening    Shift Goals  Clinical Goals: monitor for bleeding  Patient Goals: sleep comfortably  Family Goals: Rest    Progress made toward(s) clinical / shift goals:      Vital signs and heparin drip monitored throughout shift. Xa remains therapeutic. Patient engaged in care plan and encouraged to communicate needs.    Patient is not progressing towards the following goals:      Problem: Pain - Standard  Goal: Alleviation of pain or a reduction in pain to the patient’s comfort goal  Outcome: Progressing     Problem: Knowledge Deficit - Standard  Goal: Patient and family/care givers will demonstrate understanding of plan of care, disease process/condition, diagnostic tests and medications  Outcome: Progressing     Problem: Gastrointestinal Irritability  Goal: Nausea and vomiting will be absent or improve  Outcome: Progressing     Problem: Bowel Elimination  Goal: Establish and maintain regular bowel function  Outcome: Progressing     Problem: Hemodynamics  Goal: Patient's hemodynamics, fluid balance and neurologic status will be stable or improve  Outcome: Progressing     Problem: Fluid Volume  Goal: Fluid volume balance will be maintained  Outcome: Progressing     Problem: Urinary - Renal Perfusion  Goal: Ability to achieve and maintain adequate renal perfusion and functioning will improve  Outcome: Progressing     Problem: Respiratory  Goal: Patient will achieve/maintain optimum respiratory ventilation and gas exchange  Outcome: Progressing     Problem: Mechanical Ventilation  Goal: Safe management of artificial airway and ventilation  Outcome: Progressing  Goal: Successful weaning off mechanical ventilator, spontaneously maintains adequate gas exchange  Outcome: Progressing  Goal: Patient will be able to express needs and understand communication  Outcome: Progressing     Problem: Physical Regulation  Goal: Diagnostic test results will  improve  Outcome: Progressing  Goal: Signs and symptoms of infection will decrease  Outcome: Progressing     Problem: Fall Risk  Goal: Patient will remain free from falls  Outcome: Progressing

## 2021-06-28 NOTE — DISCHARGE PLANNING
Anticipated Discharge Disposition: Home    Action: pt pending medical clearance, no case management needs identified.    Barriers to Discharge: medical clearance    Plan: f/u with medical team and pt to discuss dc needs and barriers.

## 2021-06-28 NOTE — NON-PROVIDER
"  [facility] FAMILY MEDICINE PROGRESS NOTE     Attending: Dr. Christi Yoder M.D.  Senior Resident: Dr. Yuri Ivy M.D. (PGY-2)  Andrew Resident: Dr. Jaylen Bland M.D. (PGY-1)  PATIENT: Jaclyn Olvera; [id number]; 1975    ID:  46 y.o. female admitted on 6/14 for left-sided abdominal pain x4 days. She was originally being managed on the oncology floor but was transferred to telemetry for a higher level of monitoring.  She has had a complex medical course with multiple imaging studies and multiple consultants including: gastroenterology, vascular surgery, general surgery, hematology/oncology, and nephrology. She is currently being managed for catastrophic antiphospholipid syndrome and is being followed actively by hematology/oncology and nephrology.    SUBJECTIVE - -   No acute events overnight.  Pts mother is at bedside with pt.  Pt states that she feels better today compared to yesterday, reporting that she has \"more energy\". She says that her overall health has improved each day slightly since being in the hospital. Pt is ambulating to the bathroom, having bowel movements every 12 hours, urinating, and eating. Pt had a black colored stool yesterday, but today's stool was a dark brown. She denies any michael blood or hematochezia. This coloration has been consistent for her during hospitalization. She denies continued Villegas catheter pain. Pt indicates that after it was changed yesterday, 6/27/21 she had resolution of her symptoms. Pts only complaints at this time is that she has a sore throat and cough (productive of mucous) that started last night. She indicates that she didn't have these symptoms previously with her acid reflux. She has noticed increased belching, which is consistent with her acid reflux symptomology. She has also had 1 week of cold and heat intolerance, but denies fevers.     Monitor SR rate 70-81 with R PVC.    ROS:  Constitutional: Positive for chills. Negative for fever.   HENT: Positive " for sore throat.    Respiratory: Positive for cough, sputum production and shortness of breath. Negative for hemoptysis.    Cardiovascular: Negative for chest pain and palpitations.   Gastrointestinal: Negative for abdominal pain, constipation, diarrhea, nausea and vomiting.   Genitourinary: Negative for dysuria and hematuria.   Neurological: Negative for headaches.     OBJECTIVE - -   Temp:  [36.1 °C (97 °F)-37.2 °C (99 °F)] 37.2 °C (99 °F)  Pulse:  [68-78] 78  Resp:  [16] 16  BP: (138-153)/(80-96) 139/80  SpO2:  [95 %-99 %] 95 %    Intake/Output Summary (Last 24 hours) at 6/28/2021 0815  Last data filed at 6/28/2021 0503  Gross per 24 hour   Intake 350 ml   Output 900 ml   Net -550 ml     PE:  Vitals signs reviewed.     Constitutional:       General: Pt is awake and sitting in chair.      Appearance: Pt is not ill-appearing or toxic-appearing.   HEENT:      Head: Atraumatic.      Eyes: Wearing glasses. Lids are normal. Conjunctivae normal bilaterally.     Nose: No nasal deformity or septal deviation.      Mouth: Mucous membranes are dry. Lips are pink. White plaques present in buccal region.     Tongue: Tongue does not deviate from midline. White plaques present at base of tongue.     Pharynx: Uvula midline. No oropharyngeal exudate, posterior oropharyngeal erythema or uvula swelling. White plaques present in oropharynx.  Cardiovascular:      Rate and Rhythm: Normal rate and regular rhythm.      Heart sounds: S1 normal and S2 normal. No murmur.      1+ pitting edema to bilateral LE.   Pulmonary:      Effort: Pulmonary effort is diminished.     Breath sounds: Decreased breath sounds throughout, difficult to auscultate. No overt wheezing, rhonchi or rales.   Abdominal:      General: Bowel sounds are normal. Chronic surgical scar present.     Palpations: Abdomen is soft. No overt hepatosplenomegaly.     Tenderness: Mild right-sided abdominal tenderness. There is no guarding or rebound.   Musculoskeletal:      Normal  range of motion.      Tenderness to palpation of RLE during examination.  Skin:     General: Skin is warm and dry.     Capillary Refill: Capillary refill < 3 seconds.   Neurological:      General: No focal deficit present.      Mental Status: Pt is alert.   Psychiatric:         Behavior: Cooperative.     LABS:  Recent Labs     06/25/21  1553 06/26/21  0819 06/26/21  2103 06/27/21  0836 06/27/21  2107   WBC 5.7   < > 6.6 10.8 8.5   RBC 3.68*   < > 3.62* 3.83* 3.73*   HEMOGLOBIN 8.1*   < > 7.9* 8.4* 8.7*   HEMATOCRIT 28.3*   < > 27.9* 29.3* 28.9*   MCV 76.9*   < > 77.1* 76.5* 77.5*   MCH 22.0*   < > 21.8* 21.9* 23.3*   RDW 62.9*   < > 66.9* 65.5* 67.7*   PLATELETCT 312   < > 283 315 304   MPV 10.9   < > 9.9 9.9 10.0   NEUTSPOLYS 80.00*   < > 72.90* 85.70* 78.60*   LYMPHOCYTES 11.50*   < > 16.90* 9.10* 14.90*   MONOCYTES 6.60   < > 8.20 3.70 5.00   EOSINOPHILS 0.00   < > 0.00 0.10 0.10   BASOPHILS 0.00   < > 0.20 0.10 0.10   RBCMORPHOLO Present  --  Present  --   --     < > = values in this interval not displayed.     Recent Labs     06/26/21  0041 06/27/21  0123 06/28/21  0114   SODIUM 139 137 135   POTASSIUM 4.0 3.6 3.7   CHLORIDE 111 110 108   CO2 17* 18* 18*   BUN 55* 61* 52*   CREATININE 2.11* 2.15* 1.81*   CALCIUM 7.8* 8.3* 8.6   ALBUMIN 2.5* 2.4* 2.5*     Estimated GFR/CRCL = Estimated Creatinine Clearance: 48.6 mL/min (A) (by C-G formula based on SCr of 1.81 mg/dL (H)).  Recent Labs     06/26/21  0041 06/27/21  0123 06/28/21  0114   GLUCOSE 113* 94 93     Recent Labs     06/26/21  0041 06/27/21  0123 06/28/21  0114   ASTSGOT 29 82* 48*   ALTSGPT 32 47 46   TBILIRUBIN 0.9 0.9 0.8   IBILIRUBIN 0.4 0.5 0.4   DBILIRUBIN 0.5 0.4 0.4   ALKPHOSPHAT 90 87 85   GLOBULIN 4.1* 4.1* 4.1*   INR 1.56* 1.35* 1.22*     Recent Labs     06/26/21  0041 06/27/21  0123 06/28/21  0114   INR 1.56* 1.35* 1.22*     MICROBIOLOGY:  Peripheral Blood Culture: No growth  Urine Culture: No growth    IMAGING: Last imaging x 48 hours is Renal  US.  US-RENAL   Final Result      Mild prominence of the left renal pelvis, less than prior. No right hydronephrosis.      The urinary bladder is only partially decompressed by Villegas catheter. Correlate clinically for position of the catheter.      MR-BRAIN-W/O   Final Result      MRI of the brain without contrast within normal limits.      DX-CHEST-PORTABLE (1 VIEW)   Final Result      1.  Mild hypoinflation with minimal bibasilar atelectasis.   2.  No lobar pneumonia or pneumothorax.   3.  No gross mass.      CT-ABDOMEN-PELVIS W/O   Final Result      1.  Stranding surrounding the adrenal glands is improved.   2.  Dense right adrenal mass likely representing adrenal hemorrhage is mildly decreased in size compared to prior.   3.  Mild left hydronephrosis.   4.  Multiple mildly enlarged retroperitoneal lymph nodes are not significantly changed.   5.  Density within the gallbladder may represent sludge/vicarious excretion of contrast.   6.  Trace right pleural fluid and bibasilar atelectasis.      US-RENAL   Final Result      1.  Mild left hydronephrosis.   2.  No right hydronephrosis.      FH-TRLWTML-H/O   Final Result         1. Normal sized CBD. No CBD stone.   2. Thickening and heterogeneity of the bilateral adrenal glands with surrounding stranding, incompletely evaluated but concerning for hemorrhage.   3. No gallstone. Mild pericholecystic fluid could be reactive change due to adjacent adrenal hemorrhage.   4. Worsening left hydronephrosis, concerning for distal obstruction      DX-CHEST-LIMITED (1 VIEW)   Final Result         No acute cardiopulmonary abnormalities are identified.      EC-ECHOCARDIOGRAM COMPLETE W/O CONT   Final Result      CT-ABDOMEN-PELVIS WITH   Final Result      1.  New right adrenal mass likely represents hemorrhage. Thickening of the left adrenal gland is concerning for developing hemorrhage as well.      2.  Low attenuation filling defect in the superior vena cava is consistent with  thrombus. Echocardiogram may be helpful for further evaluation.      3.  Prominent retroperitoneal lymph nodes with nonspecific retroperitoneal inflammatory stranding as reported on the prior CT.      4.  Small bilateral pleural effusions, right greater than left. Adjacent airspace disease likely atelectasis.            Comment: Results discussed with Dr. Gunter at 9:32 AM      [id number] VIEW   Final Result         No specific finding to suggest small bowel obstruction.      US-PELVIC COMPLETE (TRANSABDOMINAL/TRANSVAGINAL) (COMBO)   Final Result         1.  Heterogeneous appearance of the uterus with large heterogeneous uterine mass, appearance most compatible with uterine fibroid.   2.  Heterogeneous lesion in the left ovary, could represent hemorrhagic cyst or endometrioma, otherwise indeterminate, recommend follow-up sonogram in 6 weeks for repeat characterization and evaluation of stability.   3.  Thickened endometrial stripe, likely proliferative changes, consider endometrial pathology with additional follow-up as clinically appropriate.   4.  Nabothian cyst      US-RUQ   Final Result      1.  No evidence of gallstones or biliary ductal dilatation.   2.  1.9 cm cystic lesion in the right kidney. Mural nodularity is not excluded and further evaluation with renal protocol MRI is recommended.         CT-ABDOMEN-PELVIS WITH   Final Result      1.  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   2.  Hypodensity in the cervix could be artifactual or could indicate underlying cervical mass. Suggest correlation with physical exam and gynecological history.   3.  19 mm hypodense RIGHT renal lesion, likely but not definitely a cyst. Suggest further assessment with ultrasound when clinically appropriate.                 MEDS:  Current Facility-Administered Medications   Medication Last Admin   • predniSONE (DELTASONE) tablet 60 mg 60 mg at  06/28/21 0504   • ferric gluconate complex (FERRLECIT) 250 mg in  mL IVPB     • acetaminophen (TYLENOL) tablet 1,000 mg 1,000 mg at 06/25/21 1215   • ipratropium-albuterol (DUONEB) nebulizer solution 3 mL at 06/24/21 0507   • heparin infusion 25,000 units in 500 mL 0.45% NACL 11 Units/kg/hr at 06/28/21 0329   • baclofen (LIORESAL) tablet 10 mg 10 mg at 06/26/21 2306   • oxyCODONE immediate-release (ROXICODONE) tablet 5 mg 5 mg at 06/27/21 0807   • oxyCODONE immediate release (ROXICODONE) tablet 10 mg 10 mg at 06/25/21 1615   • omeprazole (PRILOSEC) capsule 20 mg 20 mg at 06/28/21 0504   • HYDROmorphone (Dilaudid) injection 1 mg 1 mg at 06/24/21 0158   • lidocaine (LIDODERM) 5 % 1 Patch 1 Patch at 06/27/21 2135   • senna-docusate (PERICOLACE or SENOKOT S) 8.6-50 MG per tablet 2 tablet 2 tablet at 06/24/21 1741    And   • polyethylene glycol/lytes (MIRALAX) PACKET 1 Packet      And   • magnesium hydroxide (MILK OF MAGNESIA) suspension 30 mL      And   • bisacodyl (DULCOLAX) suppository 10 mg 10 mg at 06/15/21 1201   • cloNIDine (CATAPRES) tablet 0.1 mg     • enalaprilat (VASOTEC) injection 1.25 mg     • labetalol (NORMODYNE/TRANDATE) injection 10 mg     • ondansetron (ZOFRAN) syringe/vial injection 4 mg 4 mg at 06/16/21 1355   • ondansetron (ZOFRAN ODT) dispertab 4 mg     • promethazine (PHENERGAN) tablet 12.5-25 mg     • promethazine (PHENERGAN) suppository 12.5-25 mg     • prochlorperazine (COMPAZINE) injection 5-10 mg       ASSESSMENT/PLAN - -  Jaclyn Slater is a 46 y.o. female with PMH of PE who had complaint of abdominal pain and was admitted on 6/14/2021 for acute pyelonephritis and hyponatremia.    #Sore Throat  #Productive Cough  #Oral Candidiasis  ~Pt has new complaint of cough and sore throat since last night. Pt denies feeling like she is developing a viral illness. Pt denies these symptoms with previous episodes of gastric reflux.   ~White plaques throughout mouth cavity including tongue, cheeks, and  oropharynx.  ~No other signs of infection. Symptoms may be attributed to gastritis vs oral candidiasis vs URI.  ~Oral candidiasis most likely secondary to corticosteroid course.  Plan:  ~Notify nursing to give PRN Tessalon Perles.  ~Start Nystatin swish and swallow.  ~Monitor for change in symptoms.   ~Re-evaluate for infectious etiology if symptoms do not improve.    #Catastrophic Antiphospholipid Syndrome  ~Hematology/oncology actively following  ~Patient received IVIG x 2 on 6/22  ~Patient received Solu-Medrol 6/22 x 2 days  ~Patient transitioned to prednisone 80 mg daily on 6/24 with a taper  ~HIT panel negative and patient was started on a heparin drip on 6/23  ~DIC/HIT/TTP all ruled out per heme/onc   ~CXR 6/25 showed no alvelolar hemorrhage   Plan:  ~Heme/onc actively following and recs appreciated  ~Hold plasmapheresis for now  ~Closely monitor for DVT/arterial thrombotic events  ~Continue prednisone taper - currently prednisone 60  ~Continue heparin drip   ~Ask heme/onc to see if we can switch to lovenox then bridge coumadin (lifelong); INR goal of 2.5-3.5.     #VIDAL, Steady   #Urinary Retention vs Obstruction  ~BUN/creatinine steady today at 52/1.81  ~GFR today of 30  ~Bowens catheter placed 6/24 per nephro recs  ~Renal US on 6/28/21 shows improved prominence of the left renal pelvis  Plan:  ~Nephrology actively following and recs appreciated  ~Continue bowens   ~Hold plasmapheresis for now  ~Monitor metabolic acidosis, currently stable, add sodium bicarb if needed      #Microcytic Anemia, Steady  ~Likely multifactorial in etiology  ~Last iron study shows iron levels of 29 on 6/25/21 but normal ferritin of 153 on 6/20/21.  ~Transfused 1 U PRBCs on 6/24 for Hgb 6.8  ~Repeat Hgb this AM steady at 8.3  ~Iron transfusion given 6/26   Plan:  ~Continue to trend CBCs  ~Transfuse when hemoglobin less than 7     #Elevated LFTs, Improving  ~AST elevated at 48; however, improved from 82 on 6/27/21  ~MRCP completed on  6/22 showed no biliary obstruction  ~Hepatitis panel negative  ~Possibly due to ischemic hepatopathy  Plan:  ~Continue trending on repeat CMPs      #Thrombus of SVC  ~Discovered on CT abdomen/pelvis on 6/18  ~Vascular surgery consulted, recommended TTE completed on 6/20 and showed an ejection fraction of 65% but could not visualize thrombus  Plan:  ~Patient needs RADHIKA to appropriately visualize the thrombus  ~Continue heparin drip  ~Ask hematology/oncology to see if TTE is appropriate given patient's improvement in LFTs and platelets    #H. pylori infection  ~Diagnosis received in May of 2021.  ~Patient started on triple therapy but did not complete secondary to abdominal pain  ~EGD completed 6/17 took biopsies to see if infection have resolved; biopsy report on 6/18/21 had diagnosis of unremarkable duodenal mucosa and mild chronic inactive gastritis, negative for H. pylori.  ~Pt has noticed increased belching, her associated symptom from previous episodes of H. pylori.   Plan:  ~Switch Omeprazole to Protonix.  ~Monitor for improvement in symptoms.     Disposition: Inpatient for continued treatment of catastrophic antiphospholipid antibody syndrome     #Core Measures   VTE PPx: Heparin Drip  Abx: None   Lines/Tubes: PIV/Villegas  Fluids: None   Diet: Regular  Code Status: Full    [place], MS4  Family Medicine Sub-I

## 2021-06-29 LAB
ALBUMIN SERPL BCP-MCNC: 2.7 G/DL (ref 3.2–4.9)
ALBUMIN/GLOB SERPL: 0.7 G/DL
ALP SERPL-CCNC: 82 U/L (ref 30–99)
ALT SERPL-CCNC: 48 U/L (ref 2–50)
ANION GAP SERPL CALC-SCNC: 12 MMOL/L (ref 7–16)
ANISOCYTOSIS BLD QL SMEAR: ABNORMAL
AST SERPL-CCNC: 47 U/L (ref 12–45)
BASOPHILS # BLD AUTO: 0 % (ref 0–1.8)
BASOPHILS # BLD: 0 K/UL (ref 0–0.12)
BILIRUB SERPL-MCNC: 0.9 MG/DL (ref 0.1–1.5)
BUN SERPL-MCNC: 40 MG/DL (ref 8–22)
CALCIUM SERPL-MCNC: 8.5 MG/DL (ref 8.5–10.5)
CHLORIDE SERPL-SCNC: 109 MMOL/L (ref 96–112)
CO2 SERPL-SCNC: 19 MMOL/L (ref 20–33)
CREAT SERPL-MCNC: 1.56 MG/DL (ref 0.5–1.4)
EOSINOPHIL # BLD AUTO: 0.2 K/UL (ref 0–0.51)
EOSINOPHIL NFR BLD: 1.7 % (ref 0–6.9)
ERYTHROCYTE [DISTWIDTH] IN BLOOD BY AUTOMATED COUNT: 67.9 FL (ref 35.9–50)
GLOBULIN SER CALC-MCNC: 3.7 G/DL (ref 1.9–3.5)
GLUCOSE SERPL-MCNC: 84 MG/DL (ref 65–99)
HCT VFR BLD AUTO: 27 % (ref 37–47)
HGB BLD-MCNC: 7.9 G/DL (ref 12–16)
INR PPP: 1.24 (ref 0.87–1.13)
LYMPHOCYTES # BLD AUTO: 0.41 K/UL (ref 1–4.8)
LYMPHOCYTES NFR BLD: 3.5 % (ref 22–41)
MANUAL DIFF BLD: NORMAL
MCH RBC QN AUTO: 22.6 PG (ref 27–33)
MCHC RBC AUTO-ENTMCNC: 29.3 G/DL (ref 33.6–35)
MCV RBC AUTO: 77.4 FL (ref 81.4–97.8)
MICROCYTES BLD QL SMEAR: ABNORMAL
MONOCYTES # BLD AUTO: 0.11 K/UL (ref 0–0.85)
MONOCYTES NFR BLD AUTO: 0.9 % (ref 0–13.4)
MORPHOLOGY BLD-IMP: NORMAL
NEUTROPHILS # BLD AUTO: 11.08 K/UL (ref 2–7.15)
NEUTROPHILS NFR BLD: 93.9 % (ref 44–72)
NRBC # BLD AUTO: 0 K/UL
NRBC BLD-RTO: 0 /100 WBC
PLATELET # BLD AUTO: 305 K/UL (ref 164–446)
PLATELET BLD QL SMEAR: NORMAL
PMV BLD AUTO: 9.6 FL (ref 9–12.9)
POIKILOCYTOSIS BLD QL SMEAR: NORMAL
POTASSIUM SERPL-SCNC: 3.9 MMOL/L (ref 3.6–5.5)
PROT SERPL-MCNC: 6.4 G/DL (ref 6–8.2)
PROTHROMBIN TIME: 15.2 SEC (ref 12–14.6)
RBC # BLD AUTO: 3.49 M/UL (ref 4.2–5.4)
RBC BLD AUTO: PRESENT
SODIUM SERPL-SCNC: 140 MMOL/L (ref 135–145)
STOMATOCYTES BLD QL SMEAR: NORMAL
UFH PPP CHRO-ACNC: 0.5 IU/ML
WBC # BLD AUTO: 11.8 K/UL (ref 4.8–10.8)

## 2021-06-29 PROCEDURE — 700105 HCHG RX REV CODE 258: Performed by: STUDENT IN AN ORGANIZED HEALTH CARE EDUCATION/TRAINING PROGRAM

## 2021-06-29 PROCEDURE — 99232 SBSQ HOSP IP/OBS MODERATE 35: CPT | Performed by: INTERNAL MEDICINE

## 2021-06-29 PROCEDURE — 700102 HCHG RX REV CODE 250 W/ 637 OVERRIDE(OP): Performed by: STUDENT IN AN ORGANIZED HEALTH CARE EDUCATION/TRAINING PROGRAM

## 2021-06-29 PROCEDURE — 700111 HCHG RX REV CODE 636 W/ 250 OVERRIDE (IP): Performed by: INTERNAL MEDICINE

## 2021-06-29 PROCEDURE — 770020 HCHG ROOM/CARE - TELE (206)

## 2021-06-29 PROCEDURE — A9270 NON-COVERED ITEM OR SERVICE: HCPCS | Performed by: STUDENT IN AN ORGANIZED HEALTH CARE EDUCATION/TRAINING PROGRAM

## 2021-06-29 PROCEDURE — 85520 HEPARIN ASSAY: CPT

## 2021-06-29 PROCEDURE — C9113 INJ PANTOPRAZOLE SODIUM, VIA: HCPCS | Performed by: STUDENT IN AN ORGANIZED HEALTH CARE EDUCATION/TRAINING PROGRAM

## 2021-06-29 PROCEDURE — 85007 BL SMEAR W/DIFF WBC COUNT: CPT

## 2021-06-29 PROCEDURE — 700102 HCHG RX REV CODE 250 W/ 637 OVERRIDE(OP): Performed by: INTERNAL MEDICINE

## 2021-06-29 PROCEDURE — 85027 COMPLETE CBC AUTOMATED: CPT

## 2021-06-29 PROCEDURE — 80053 COMPREHEN METABOLIC PANEL: CPT

## 2021-06-29 PROCEDURE — 700111 HCHG RX REV CODE 636 W/ 250 OVERRIDE (IP): Performed by: STUDENT IN AN ORGANIZED HEALTH CARE EDUCATION/TRAINING PROGRAM

## 2021-06-29 PROCEDURE — A9270 NON-COVERED ITEM OR SERVICE: HCPCS | Performed by: INTERNAL MEDICINE

## 2021-06-29 PROCEDURE — 36415 COLL VENOUS BLD VENIPUNCTURE: CPT

## 2021-06-29 PROCEDURE — 85610 PROTHROMBIN TIME: CPT

## 2021-06-29 RX ORDER — AMLODIPINE BESYLATE 10 MG/1
10 TABLET ORAL
Status: DISCONTINUED | OUTPATIENT
Start: 2021-06-30 | End: 2021-07-02 | Stop reason: HOSPADM

## 2021-06-29 RX ADMIN — HEPARIN SODIUM 11 UNITS/KG/HR: 5000 INJECTION, SOLUTION INTRAVENOUS at 09:19

## 2021-06-29 RX ADMIN — ACETAMINOPHEN 1000 MG: 500 TABLET ORAL at 00:00

## 2021-06-29 RX ADMIN — SODIUM CHLORIDE 250 MG: 9 INJECTION, SOLUTION INTRAVENOUS at 16:59

## 2021-06-29 RX ADMIN — AMLODIPINE BESYLATE 5 MG: 5 TABLET ORAL at 05:55

## 2021-06-29 RX ADMIN — ACETAMINOPHEN 1000 MG: 500 TABLET ORAL at 05:54

## 2021-06-29 RX ADMIN — NYSTATIN 500000 UNITS: 100000 SUSPENSION ORAL at 05:55

## 2021-06-29 RX ADMIN — PREDNISONE 60 MG: 50 TABLET ORAL at 05:54

## 2021-06-29 RX ADMIN — NYSTATIN 500000 UNITS: 100000 SUSPENSION ORAL at 11:50

## 2021-06-29 RX ADMIN — ACETAMINOPHEN 1000 MG: 500 TABLET ORAL at 11:50

## 2021-06-29 RX ADMIN — PANTOPRAZOLE SODIUM 40 MG: 40 INJECTION, POWDER, LYOPHILIZED, FOR SOLUTION INTRAVENOUS at 05:55

## 2021-06-29 RX ADMIN — NYSTATIN 500000 UNITS: 100000 SUSPENSION ORAL at 16:58

## 2021-06-29 RX ADMIN — NYSTATIN 500000 UNITS: 100000 SUSPENSION ORAL at 21:30

## 2021-06-29 RX ADMIN — GUAIFENESIN 200 MG: 100 SOLUTION ORAL at 00:41

## 2021-06-29 ASSESSMENT — ENCOUNTER SYMPTOMS
PALPITATIONS: 0
VOMITING: 0
SHORTNESS OF BREATH: 0
SINUS PAIN: 0
FEVER: 0
DIARRHEA: 0
WHEEZING: 0
COUGH: 0
NAUSEA: 0
ORTHOPNEA: 0
FLANK PAIN: 0
WEIGHT LOSS: 0
CHILLS: 0
EYES NEGATIVE: 1
ABDOMINAL PAIN: 0
HEMOPTYSIS: 0

## 2021-06-29 ASSESSMENT — FIBROSIS 4 INDEX: FIB4 SCORE: 1

## 2021-06-29 ASSESSMENT — PAIN DESCRIPTION - PAIN TYPE: TYPE: ACUTE PAIN

## 2021-06-29 NOTE — DIETARY
Nutrition Services: Update   Day 13 of admit.  Jaclyn Olvera is a 46 y.o. female with admitting DX of Abdominal pain, Catastrophic antiphospholipid syndrome    Consult received for poor PO intake, RD has been following pt for adequate PO intake since admit.     Pt is currently on Regular diet. PO intake variable, usually <25% with a few meals >75%. Wt 97.2 kg kg via bed scale today, weight trending up (pt reports receiving a lot of IVF, weight gain fluid related). Pt appeared adequately nourished.    Visit with pt at bedside. Pt reports altered taste (everything is too salty). Pt does not like Chobani smoothies (discontinued). Pt eating chocolates/cookies brought from outside. Pt agreeable to trial Boost Plus (kalpana only), magic cup, granola bars, cold cereal. Supplements ordered.    Malnutrition Risk: increased risk due to variable/poor PO intake, does not meet criteria at this time.     Recommendations/Plan:  1. Regular diet, trial Boost Plus/magic cup, snacks ordered   2. Encourage intake of meals  3. Document intake of all meals as % taken in ADL's to provide interdisciplinary communication across all shifts.   4. Monitor weight.  5. Nutrition rep will continue to see patient for ongoing meal and snack preferences.    RD following

## 2021-06-29 NOTE — PROGRESS NOTES
[facility] [place]     Attending: Christi Yoder M.D.  Senior Resident: Yuri Ivy M.D. (PGY-2)  Andrew Resident: Jaylen Bland M.D. (PGY-1)  PATIENT: Jaclyn Olvera; [id number]; 1975    ID: 46 y.o. female admitted on 6/14 for left-sided abdominal pain x4 days.  She was originally being managed on the oncology floor but was transferred to telemetry for a higher level of monitoring.  She has had a complex medical course with multiple imaging studies and multiple consultants including: gastroenterology, vascular surgery, general surgery, hematology/oncology, and nephrology.  She is currently being managed for catastrophic antiphospholipid syndrome and is being followed actively by hematology/oncology and nephrology. Clinically has been improving over the last several days.     Overnight Events: No acute events overnight     Subjective: This morning she is feeling better again. She has minimal abdominal pain, denies nausea, vomiting, chest pain, or shortness of breath. She still feels like her concentration is off a little bit but otherwise does not have any complaints.     OBJECTIVE:  Temp:  [36.2 °C (97.1 °F)-37.7 °C (99.8 °F)] 36.2 °C (97.1 °F)  Pulse:  [62-85] 78  Resp:  [16-18] 18  BP: (134-157)/() 134/82  SpO2:  [94 %-98 %] 95 %    Intake/Output Summary (Last 24 hours) at 6/29/2021 0904  Last data filed at 6/29/2021 0600  Gross per 24 hour   Intake 240 ml   Output 1900 ml   Net -1660 ml       PE:  General: No acute distress, afebrile, resting comfortably, conversational   HEENT: NC/AT. EOMI.   Cardiovascular: RRR without murmurs, rubs, heaves. Normal capillary refill   Respiratory: CTAB, no tachypnea or retractions   Abdomen: normal bowel sounds, soft, mild TTP in RUQ and epigastric region nondistended, no masses, no organomegaly   EXT:  DOMINGUEZ, no edema  Skin: scattered echymoses on extremities  Neuro: Non-focal, alert and orientated     LABS:  Recent Labs     06/26/21  2103  06/27/21  0836 06/27/21 2107 06/28/21 0826 06/28/21 2101   WBC 6.6   < > 8.5 10.8 9.6   RBC 3.62*   < > 3.73* 3.76* 3.69*   HEMOGLOBIN 7.9*   < > 8.7* 8.3* 8.2*   HEMATOCRIT 27.9*   < > 28.9* 29.0* 28.6*   MCV 77.1*   < > 77.5* 77.1* 77.5*   MCH 21.8*   < > 23.3* 22.1* 22.2*   RDW 66.9*   < > 67.7* 66.6* 66.2*   PLATELETCT 283   < > 304 333 318   MPV 9.9   < > 10.0 10.1 9.8   NEUTSPOLYS 72.90*   < > 78.60* 85.00* 75.40*   LYMPHOCYTES 16.90*   < > 14.90* 9.30* 16.70*   MONOCYTES 8.20   < > 5.00 3.80 5.90   EOSINOPHILS 0.00   < > 0.10 0.50 0.20   BASOPHILS 0.20   < > 0.10 0.10 0.10   RBCMORPHOLO Present  --   --   --   --     < > = values in this interval not displayed.     Recent Labs     06/27/21 0123 06/28/21 0114 06/29/21 0312   SODIUM 137 135 140   POTASSIUM 3.6 3.7 3.9   CHLORIDE 110 108 109   CO2 18* 18* 19*   BUN 61* 52* 40*   CREATININE 2.15* 1.81* 1.56*   CALCIUM 8.3* 8.6 8.5   ALBUMIN 2.4* 2.5* 2.7*     Estimated GFR/CRCL = Estimated Creatinine Clearance: 56.5 mL/min (A) (by C-G formula based on SCr of 1.56 mg/dL (H)).  Recent Labs     06/27/21 0123 06/28/21 0114 06/29/21 0312   GLUCOSE 94 93 84     Recent Labs     06/27/21 0123 06/28/21 0114 06/29/21 0312   ASTSGOT 82* 48* 47*   ALTSGPT 47 46 48   TBILIRUBIN 0.9 0.8 0.9   IBILIRUBIN 0.5 0.4  --    DBILIRUBIN 0.4 0.4  --    ALKPHOSPHAT 87 85 82   GLOBULIN 4.1* 4.1* 3.7*   INR 1.35* 1.22* 1.24*             Recent Labs     06/27/21  0123 06/28/21  0114 06/29/21  0312   INR 1.35* 1.22* 1.24*       MICROBIOLOGY: No new results    IMAGING: No new results      MEDS:  Current Facility-Administered Medications   Medication Last Admin   • guaiFENesin (ROBITUSSIN) 100 MG/5ML solution 200 mg 200 mg at 06/29/21 0041   • benzonatate (TESSALON) capsule 100 mg 100 mg at 06/28/21 2238   • nystatin (MYCOSTATIN) 836665 UNIT/ML suspension 500,000 Units 500,000 Units at 06/29/21 0555   • pantoprazole (PROTONIX) injection 40 mg 40 mg at 06/29/21 0555   • amLODIPine  (NORVASC) tablet 5 mg 5 mg at 06/29/21 0555   • predniSONE (DELTASONE) tablet 60 mg 60 mg at 06/29/21 0554   • ferric gluconate complex (FERRLECIT) 250 mg in  mL IVPB Stopped at 06/28/21 1857   • acetaminophen (TYLENOL) tablet 1,000 mg 1,000 mg at 06/29/21 0554   • ipratropium-albuterol (DUONEB) nebulizer solution 3 mL at 06/24/21 0507   • heparin infusion 25,000 units in 500 mL 0.45% NACL 11 Units/kg/hr at 06/29/21 0733   • baclofen (LIORESAL) tablet 10 mg 10 mg at 06/26/21 2306   • oxyCODONE immediate-release (ROXICODONE) tablet 5 mg 5 mg at 06/27/21 0807   • oxyCODONE immediate release (ROXICODONE) tablet 10 mg 10 mg at 06/25/21 1615   • HYDROmorphone (Dilaudid) injection 1 mg 1 mg at 06/24/21 0158   • lidocaine (LIDODERM) 5 % 1 Patch 1 Patch at 06/27/21 2135   • senna-docusate (PERICOLACE or SENOKOT S) 8.6-50 MG per tablet 2 tablet 2 tablet at 06/24/21 1741    And   • polyethylene glycol/lytes (MIRALAX) PACKET 1 Packet      And   • magnesium hydroxide (MILK OF MAGNESIA) suspension 30 mL      And   • bisacodyl (DULCOLAX) suppository 10 mg 10 mg at 06/15/21 1201   • cloNIDine (CATAPRES) tablet 0.1 mg     • enalaprilat (VASOTEC) injection 1.25 mg     • labetalol (NORMODYNE/TRANDATE) injection 10 mg     • ondansetron (ZOFRAN) syringe/vial injection 4 mg 4 mg at 06/16/21 1355   • ondansetron (ZOFRAN ODT) dispertab 4 mg     • promethazine (PHENERGAN) tablet 12.5-25 mg     • promethazine (PHENERGAN) suppository 12.5-25 mg     • prochlorperazine (COMPAZINE) injection 5-10 mg         ASSESSMENT/PLAN:  46 y.o. female admitted on 6/14 for left-sided abdominal pain x4 days.  She was originally being managed on the oncology floor but was transferred to telemetry for a higher level of monitoring.  She has had a complex medical course with multiple imaging studies and multiple consultants including: gastroenterology, vascular surgery, general surgery, hematology/oncology, and nephrology.  She is currently being managed  for catastrophic antiphospholipid syndrome and is being followed actively by hematology/oncology and nephrology. Has been improving over the last several days.     #Catastrophic antiphospholipid syndrome  -Hematology/oncology actively following  -Patient received IVIG x 2 on 6/22  -Patient received Solu-Medrol 6/22 x 2 days  -Patient transitioned to prednisone 80 mg daily on 6/24 with a taper  -HIT panel negative and patient was started on a heparin drip on 6/23  -DIC/HIT/TTP all ruled out per heme/onc   -Trop leak minor on 6/25 - no indication for antiplatelet   -CXR 6/25 showed no alvelolar hemorrhage   Plan:  -Will likely switch from Heparin drip to Lovenox and start Warfarin. Heme/onc actively following and recs appreciated on when exactly to do this.  -Hold plasmapheresis for now  -Closely monitor for DVT/arterial thrombotic events  -Continue prednisone taper - currently pred 60     #Cough  #GERD  -Patient complaining of burping after meals and a cough   -Omeprazole changed to pantoprazole on 6/28  Plan:   -Pantoprazole  -Tessalon pearls and Robitussin as needed for coughing    -Continue to follow symptoms, if patient develops fever/productive cough consider different etiology     #Oral candidiasis  -Patient noted to have some thrush  -Started on nystatin suspension 6/28  -Improving now  Plan:  -Continue nystatin rinses until fully resolved       #VIDAL, steady   #Urinary retention  -BUN/creatinine steady today   -eGFR 54-->25-->22-->31-->30-->36   -Mild left hydronephrosis still seen on CT abd done 6/24  -Microalbumin-Cr ratio of 39 on 6/24  -Protein-Cr ratio of 189 on 6/24  -Bladder scan 6/24 showed >700mL  -Bowens catheter placed 6/24 per nephro recs  -Kidney function improving the last few days  -Repeat ultrasound on 6/28 showed some improvement in hydronephrosis  Plan:  -Nephrology following, recs appreciated  -Continue bowens   -Hold plasmapheresis for now  -Monitor metabolic acidosis, currently stable, add  sodium bicarb if needed      #Microcytic anemia  -Likely multifactorial in etiology  -Iron studies show iron levels of 32 but normal ferritin  -Johan test was negative  -Elevated LDH which could be related to antiphospholipid syndrome versus active hemolysis  -Repeat CT abdomen/pelvis without contrast on 6/24 showed improvement of the hemorrhage located in both adrenal glands  -Transfused 1 U PRBCs on 6/24 for Hgb 6.8  -Repeat this AM steady at 7.9   -IV iron started 6/28  Plan:  -Continue to trend CBCs  -3 doses total of IV iron  -Transfuse when hemoglobin less than 7     #Elevated LFTs  -Patient's LFTs are elevated  -MRCP completed on 6/22 showed no biliary obstruction  -Hepatitis panel negative  -Possibly due to ischemic hepatopathy  -LFTs have been slightly elevated but stable  Plan:  -Continue trending on repeat CMPs      #Thrombus of SVC  -Discovered on CT abdomen/pelvis on 6/18  -Vascular surgery consulted, recommended TTE completed on 6/20 and showed an ejection fraction of 65% but could not visualize thrombus  Plan:  -Patient needs RADHIKA to appropriately visualize the thrombus  -Will switch heparin drip to Lovenox when hematology decides  -Follow-up with hematology/oncology to see if TTE is appropriate given patient's improvement in LFTs and platelets     #Intra-abdominal infection, resolved  -Patient had 3/4 SIRS criteria including fever, tachycardia, leukocytosis  -Patient was started on cefepime and Flagyl on 6/22  -Patient continued to remain afebrile and pain improving   -Abx stopped on 6/26      #Thrombocytopenia  #Vaginal bleeding  #Rectal bleeding  #Hemorrhage of both adrenal glands  -All likely secondary to catastrophic antiphospholipid syndrome  -Hematology/oncology actively following  -Platelets have been much improved recently  -CT abdomen 6/24 showed improvement in adrenal hemorrhage  Plan:  -Closely monitor for bleeding  -Continue to trend CBCs     #Retroperitoneal lymphadenopathy  -Noted on CT  abdomen/pelvis on 6/18  -Stable on CT abdomen/pelvis on 6/24  -Unclear etiology at this point     #Fibroids  -Found on pelvic ultrasound completed on 6/14  -Follow-up outpatient with gynecology     #Ovarian lesion  -Found in the left ovary on pelvic ultrasound completed 6/14  -Recommended follow-up in 6 weeks with repeat ultrasound  -Follow-up with outpatient gynecology     #Lesion of cervix  -Picked up incidentally on abdominal CT which stated that this could be artifactual or indicate an underlying cervical mass  -Cervix was unable to be visualized by pelvic exam in the emergency department  -Outpatient follow-up with gynecology as recommended     #Right renal lesion  -Renal protocol MRI is advised to evaluate further  -Can be completed as outpatient     #H.pylori infection  -Recently diagnosed at [facility]  -Patient started on triple therapy but did not complete secondary to abdominal pain  -EGD completed 6/17 took biopsies to see if infection have resolved  -Biopsy showed no H. Pylori present  Plan:  -Continue daily PPI     Disposition: Inpatient for continued treatment of catastrophic antiphospholipid antibody syndrome     #Core Measures   VTE PPx: Heparin Drip  Abx: None   Lines/Tubes: PIV/Villegas  Fluids: None   Diet: Regular  Code Status: Full     This patient is a Telemetry Block (T834 - T838) patient.      Yuri Ivy M.D.   PGY-2  [facility]   [phone]

## 2021-06-29 NOTE — CARE PLAN
Problem: Knowledge Deficit - Standard  Goal: Patient and family/care givers will demonstrate understanding of plan of care, disease process/condition, diagnostic tests and medications  Outcome: Progressing  Note: Plan of care dicussed-pt verbalizes undertstanding     Shift Goals  Clinical Goals: monitor for bleeding  Patient Goals: sleep comfortably  Family Goals: Rest      Patient is not progressing towards the following goals:

## 2021-06-29 NOTE — CARE PLAN
The patient is Watcher - Medium risk of patient condition declining or worsening    Shift Goals  Clinical Goals: monitor for bleeding  Patient Goals: sleep comfortably  Family Goals: Rest      Problem: Knowledge Deficit - Standard  Goal: Patient and family/care givers will demonstrate understanding of plan of care, disease process/condition, diagnostic tests and medications  Outcome: Progressing  Note:   Patient will demonstrate understanding of care plan and disease process/condition, throughout shift.       Problem: Fall Risk  Goal: Patient will remain free from falls  Outcome: Progressing  Note: Patient has been assessed for fall risks during shift and fall precautions have been put in place to prevent a fall.

## 2021-06-29 NOTE — CARE PLAN
Problem: Nutritional:  Goal: Achieve adequate nutritional intake  Description: Diet will advance beyond NPO / clear liquids and patient will consume >50% of meals.  Outcome: Not Met     See RD note.

## 2021-06-29 NOTE — NON-PROVIDER
"  [facility] MEDICINE PROGRESS NOTE     Attending: Dr. Christi Yoder M.D.  Senior Resident: Dr. Yuri Ivy M.D. (PGY-2)  Andrew Resident: Dr. Jaylen Bland M.D. (PGY-1)  PATIENT: Jaclyn Olvera; [id number]; 1975    ID: 46 y.o. female admitted for on 6/14/2021 for acute pyelonephritis and hyponatremia.    SUBJECTIVE - -   O/N Events:  Nephrology and Heme/Onc followed with pt.  Monitor: SR 69-84 w/ R PVC (Coupling)    Per Pt:  Pt states that she feels better today compared to yesterday. She says that her sore throat and abdominal pain have improved. Pt indicates that she has decreased appetite and her belching has not improved. Pt was able to urinate without issue, but denies BM in last 24 hours. She was able to ambulate minimally yesterday without issue. Pts main complaint is her cough. She states that she has never had this cough previously and does not want to mask this symptom. She reported using the Tessalon Perles to sleep last night. She was able to get \"a lot of sleep\" last night. She denies any issues with using the Nystatin swish and swallow. She also believes that her swelling has improved in her feet.     Review of Systems   Constitutional: Negative for fever and diaphoresis.  HENT: Positive for sore throat.    Respiratory: Positive for cough. Negative for shortness of breath.    Cardiovascular: Negative for chest pain and palpitations.   Gastrointestinal: Negative for abdominal pain, constipation, diarrhea, nausea and vomiting.   Genitourinary: Negative for vaginal discharge/bleeding, dysuria and hematuria.   Endocrinological: Positive for heat and cold intolerance.   Neurological: Negative for tingling and headaches.     OBJECTIVE - -   Temp:  [36.2 °C (97.1 °F)-37.7 °C (99.8 °F)] 36.4 °C (97.6 °F)  Pulse:  [62-85] 74  Resp:  [16-18] 18  BP: (134-157)/() 150/95  SpO2:  [94 %-98 %] 97 %    Intake/Output Summary (Last 24 hours) at 6/29/2021 1221  Last data filed at 6/29/2021 " 0600  Gross per 24 hour   Intake --   Output 1900 ml   Net -1900 ml     PE:  Vitals signs reviewed.     Constitutional:       General: Pt is awake and laying in bed.     Appearance: Pt is not ill-appearing or toxic-appearing.   HEENT:      Head: Atraumatic.      Eyes: Lids are normal. Conjunctivae normal bilaterally.     Nose: No nasal deformity or septal deviation.      Mouth: Mucous membranes are dry. Lips are pink. White plaques present in buccal region.     Tongue: Tongue does not deviate from midline.     Pharynx: Uvula midline. No oropharyngeal exudate, posterior oropharyngeal erythema or uvula swelling.   Cardiovascular:      Rate and Rhythm: Normal rate and regular rhythm.      Heart sounds: S1 normal and S2 normal. No murmur.      No peripheral edema.  Pulmonary:      Effort: Pulmonary effort is diminished.     Breath sounds: Decreased breath sounds throughout, difficult to auscultate. No overt wheezing, rhonchi or rales.   Abdominal:      General: Bowel sounds are normal. Chronic surgical scar present, no signs of erythema, edema, warmth, or tenderness.     Palpations: Abdomen is soft. No overt hepatosplenomegaly.     Tenderness: Mild right-sided abdominal tenderness. There is no guarding or rebound.   Skin:     General: Skin is warm and dry.  Neurological:      General: No focal deficit present.      Mental Status: Pt is alert.   Psychiatric:         Behavior: Cooperative.     LABS:  Recent Labs     06/26/21  2103 06/27/21  0836 06/28/21  0826 06/28/21  2101 06/29/21  0756   WBC 6.6   < > 10.8 9.6 11.8*   RBC 3.62*   < > 3.76* 3.69* 3.49*   HEMOGLOBIN 7.9*   < > 8.3* 8.2* 7.9*   HEMATOCRIT 27.9*   < > 29.0* 28.6* 27.0*   MCV 77.1*   < > 77.1* 77.5* 77.4*   MCH 21.8*   < > 22.1* 22.2* 22.6*   RDW 66.9*   < > 66.6* 66.2* 67.9*   PLATELETCT 283   < > 333 318 305   MPV 9.9   < > 10.1 9.8 9.6   NEUTSPOLYS 72.90*   < > 85.00* 75.40* 93.90*   LYMPHOCYTES 16.90*   < > 9.30* 16.70* 3.50*   MONOCYTES 8.20   < >  3.80 5.90 0.90   EOSINOPHILS 0.00   < > 0.50 0.20 1.70   BASOPHILS 0.20   < > 0.10 0.10 0.00   RBCMORPHOLO Present  --   --   --  Present    < > = values in this interval not displayed.     Recent Labs     06/27/21 0123 06/28/21  0114 06/29/21 0312   SODIUM 137 135 140   POTASSIUM 3.6 3.7 3.9   CHLORIDE 110 108 109   CO2 18* 18* 19*   BUN 61* 52* 40*   CREATININE 2.15* 1.81* 1.56*   CALCIUM 8.3* 8.6 8.5   ALBUMIN 2.4* 2.5* 2.7*     Estimated GFR/CRCL = Estimated Creatinine Clearance: 56.5 mL/min (A) (by C-G formula based on SCr of 1.56 mg/dL (H)).  Recent Labs     06/27/21 0123 06/28/21 0114 06/29/21 0312   GLUCOSE 94 93 84     Recent Labs     06/27/21 0123 06/28/21 0114 06/29/21 0312   ASTSGOT 82* 48* 47*   ALTSGPT 47 46 48   TBILIRUBIN 0.9 0.8 0.9   IBILIRUBIN 0.5 0.4  --    DBILIRUBIN 0.4 0.4  --    ALKPHOSPHAT 87 85 82   GLOBULIN 4.1* 4.1* 3.7*   INR 1.35* 1.22* 1.24*             Recent Labs     06/27/21 0123 06/28/21 0114 06/29/21 0312   INR 1.35* 1.22* 1.24*     MICROBIOLOGY: N/A    IMAGING: No New Imaging in 24 Hours  US-RENAL   Final Result      Mild prominence of the left renal pelvis, less than prior. No right hydronephrosis.      The urinary bladder is only partially decompressed by Villegas catheter. Correlate clinically for position of the catheter.      MR-BRAIN-W/O   Final Result      MRI of the brain without contrast within normal limits.      DX-CHEST-PORTABLE (1 VIEW)   Final Result      1.  Mild hypoinflation with minimal bibasilar atelectasis.   2.  No lobar pneumonia or pneumothorax.   3.  No gross mass.      CT-ABDOMEN-PELVIS W/O   Final Result      1.  Stranding surrounding the adrenal glands is improved.   2.  Dense right adrenal mass likely representing adrenal hemorrhage is mildly decreased in size compared to prior.   3.  Mild left hydronephrosis.   4.  Multiple mildly enlarged retroperitoneal lymph nodes are not significantly changed.   5.  Density within the gallbladder may  represent sludge/vicarious excretion of contrast.   6.  Trace right pleural fluid and bibasilar atelectasis.      US-RENAL   Final Result      1.  Mild left hydronephrosis.   2.  No right hydronephrosis.      FO-STEEDYJ-Q/O   Final Result         1. Normal sized CBD. No CBD stone.   2. Thickening and heterogeneity of the bilateral adrenal glands with surrounding stranding, incompletely evaluated but concerning for hemorrhage.   3. No gallstone. Mild pericholecystic fluid could be reactive change due to adjacent adrenal hemorrhage.   4. Worsening left hydronephrosis, concerning for distal obstruction      DX-CHEST-LIMITED (1 VIEW)   Final Result         No acute cardiopulmonary abnormalities are identified.      EC-ECHOCARDIOGRAM COMPLETE W/O CONT   Final Result      CT-ABDOMEN-PELVIS WITH   Final Result      1.  New right adrenal mass likely represents hemorrhage. Thickening of the left adrenal gland is concerning for developing hemorrhage as well.      2.  Low attenuation filling defect in the superior vena cava is consistent with thrombus. Echocardiogram may be helpful for further evaluation.      3.  Prominent retroperitoneal lymph nodes with nonspecific retroperitoneal inflammatory stranding as reported on the prior CT.      4.  Small bilateral pleural effusions, right greater than left. Adjacent airspace disease likely atelectasis.            Comment: Results discussed with Dr. Gunter at 9:32 AM      [id number]-4 VIEW   Final Result         No specific finding to suggest small bowel obstruction.      US-PELVIC COMPLETE (TRANSABDOMINAL/TRANSVAGINAL) (COMBO)   Final Result         1.  Heterogeneous appearance of the uterus with large heterogeneous uterine mass, appearance most compatible with uterine fibroid.   2.  Heterogeneous lesion in the left ovary, could represent hemorrhagic cyst or endometrioma, otherwise indeterminate, recommend follow-up sonogram in 6 weeks for repeat characterization and  evaluation of stability.   3.  Thickened endometrial stripe, likely proliferative changes, consider endometrial pathology with additional follow-up as clinically appropriate.   4.  Nabothian cyst      US-RUQ   Final Result      1.  No evidence of gallstones or biliary ductal dilatation.   2.  1.9 cm cystic lesion in the right kidney. Mural nodularity is not excluded and further evaluation with renal protocol MRI is recommended.         CT-ABDOMEN-PELVIS WITH   Final Result      1.  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   2.  Hypodensity in the cervix could be artifactual or could indicate underlying cervical mass. Suggest correlation with physical exam and gynecological history.   3.  19 mm hypodense RIGHT renal lesion, likely but not definitely a cyst. Suggest further assessment with ultrasound when clinically appropriate.                 MEDS:  Current Facility-Administered Medications   Medication Last Admin   • guaiFENesin (ROBITUSSIN) 100 MG/5ML solution 200 mg 200 mg at 06/29/21 0041   • benzonatate (TESSALON) capsule 100 mg 100 mg at 06/28/21 2238   • nystatin (MYCOSTATIN) 808440 UNIT/ML suspension 500,000 Units 500,000 Units at 06/29/21 1150   • pantoprazole (PROTONIX) injection 40 mg 40 mg at 06/29/21 0555   • amLODIPine (NORVASC) tablet 5 mg 5 mg at 06/29/21 0555   • predniSONE (DELTASONE) tablet 60 mg 60 mg at 06/29/21 0554   • ferric gluconate complex (FERRLECIT) 250 mg in  mL IVPB Stopped at 06/28/21 1857   • acetaminophen (TYLENOL) tablet 1,000 mg 1,000 mg at 06/29/21 1150   • ipratropium-albuterol (DUONEB) nebulizer solution 3 mL at 06/24/21 0507   • heparin infusion 25,000 units in 500 mL 0.45% NACL 11 Units/kg/hr at 06/29/21 0919   • baclofen (LIORESAL) tablet 10 mg 10 mg at 06/26/21 2306   • oxyCODONE immediate-release (ROXICODONE) tablet 5 mg 5 mg at 06/27/21 0807   • oxyCODONE immediate release (ROXICODONE)  tablet 10 mg 10 mg at 06/25/21 1615   • HYDROmorphone (Dilaudid) injection 1 mg 1 mg at 06/24/21 0158   • lidocaine (LIDODERM) 5 % 1 Patch 1 Patch at 06/27/21 2135   • senna-docusate (PERICOLACE or SENOKOT S) 8.6-50 MG per tablet 2 tablet 2 tablet at 06/24/21 1741    And   • polyethylene glycol/lytes (MIRALAX) PACKET 1 Packet      And   • magnesium hydroxide (MILK OF MAGNESIA) suspension 30 mL      And   • bisacodyl (DULCOLAX) suppository 10 mg 10 mg at 06/15/21 1201   • cloNIDine (CATAPRES) tablet 0.1 mg     • enalaprilat (VASOTEC) injection 1.25 mg     • labetalol (NORMODYNE/TRANDATE) injection 10 mg     • ondansetron (ZOFRAN) syringe/vial injection 4 mg 4 mg at 06/16/21 1355   • ondansetron (ZOFRAN ODT) dispertab 4 mg     • promethazine (PHENERGAN) tablet 12.5-25 mg     • promethazine (PHENERGAN) suppository 12.5-25 mg     • prochlorperazine (COMPAZINE) injection 5-10 mg       ASSESSMENT/PLAN - -  Jaclyn Slater is a 46 y.o. female with PMH of PE who had complaint of abdominal pain and was admitted on 6/14/2021 for acute pyelonephritis and hyponatremia.     #Sore Throat, Unchanged  #Productive Cough, Unchanged  #Oral Candidiasis, Improving  ~Pt still has complaint of cough and sore throat.   ~White plaques throughout mouth cavity have shown improvement.  ~No other signs of infection. Symptoms may be attributed to gastritis vs oral candidiasis vs URI.  ~Oral candidiasis most likely secondary to corticosteroid course.  Plan:  ~Continue PRN Tessalon Perles.  ~Continue Nystatin swish and swallow.  ~Monitor for change in symptoms.   ~Re-evaluate for infectious etiology if symptoms do not improve.     #Catastrophic Antiphospholipid Syndrome  ~Pt denies changes in vision, numbness, acute neurological deficits, and CP.  ~Hgb/Hct today, 6/29/21, of 8.2/28.6. Similar to previous, 6/28/21, values of 8.7/28.9.  Plan:  ~Heme/Onc actively following and recs appreciated:   Prednisone 60mg to gradually taper off over next few  weeks   Monitor for any DVT/arterial thrombotic events   Switched to Lovenox w/ Xa monitoring and subsequent transition to Coumadin considering Hgb stable today, 6/29/21   Requires lifelong anticoagulation w/ Coumadin  ~Verify with Heme/Onc if they want to wait to start Coumadin     #VIDAL, Steady  #Urinary Retention vs Obstruction  ~BUN/creatinine improved today at 40/1.56  ~GFR improved today of 36  ~Villegas catheter in place  Plan:  ~Nephrology actively following and recs appreciated   Started Amlodipine 5mg qd   Daily CMP   Monitor BP   Avoid nephrotoxic agents and renally dose medications.   Monitor metabolic acidosis, currently stable, add sodium bicarb if needed      #Microcytic Anemia, Steady  ~Likely multifactorial in etiology  ~Repeat Hgb this AM steady at 7.9  Plan:  ~Continue to trend CBCs  ~Transfuse when hemoglobin less than 7     #Elevated LFTs, Improving  ~AST elevated at 47; however, improved from 48 yesterday, 6/28/21  ~Possibly due to ischemic hepatopathy  Plan:  ~Continue trending on repeat CMPs      #H. pylori infection  ~Diagnosis received in May of 2021.  ~Patient started on triple therapy but did not complete secondary to abdominal pain  ~EGD completed 6/17 took biopsies to see if infection have resolved; biopsy report on 6/18/21 had diagnosis of unremarkable duodenal mucosa and mild chronic inactive gastritis, negative for H. pylori.  ~Pt has noticed no change to belching, her associated symptom from previous episodes of H. pylori.   Plan:  ~Continue Protonix.  ~Monitor for improvement in symptoms.     Disposition: Inpatient for continued treatment of catastrophic antiphospholipid antibody syndrome     #Core Measures   VTE PPx: Lovenox  Abx: None   Lines/Tubes: PIV/Villegas  Fluids: PO   Diet: Regular  Code Status: Full    [place], MS4  Family Medicine Sub-I

## 2021-06-29 NOTE — PROGRESS NOTES
Bedside report received from [facility]. Call light and belongings within reach. Bed locked and in lowest position. Alarm and fall precautions in place.

## 2021-06-29 NOTE — PROGRESS NOTES
Nephrology Daily Progress Note    Date of Service  6/29/2021    Chief Complaint  46 y.o. 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    Interval Problem Update  6/24 -no urine output documented in the last 24 hours, but patient says she is urinating.  Repeat CT imaging shows bladder distention and mild left hydronephrosis.  Patient complains of ongoing right upper quadrant pain.  6/25-urine output 3.2 L in the last 24 hours with Villegas catheter in place.  Complains of ongoing abdominal pain.  Denies chest pain, shortness of breath.  6/26 - UOP 1.4L in last 24 hours. Patient somnolent today, unable to obtain ROS.   6/27-urine output 725 mL in the last 24 hours.  Villegas catheter replaced, and patient feels more comfortable.  Complains of some abdominal pain this morning.  6/28 -doing well, no complaints except mild abdominal pain  Creat better  6/29 feels better, improved abdominal pain  Creat improving  Review of Systems  Review of Systems   Constitutional: Negative for chills, fever, malaise/fatigue and weight loss.   HENT: Negative for congestion, hearing loss and sinus pain.    Eyes: Negative.    Respiratory: Negative for cough, hemoptysis, shortness of breath and wheezing.    Cardiovascular: Negative for chest pain, palpitations, orthopnea and leg swelling.   Gastrointestinal: Negative for abdominal pain, diarrhea, nausea and vomiting.   Genitourinary: Negative for dysuria, flank pain, frequency, hematuria and urgency.   Skin: Negative.    All other systems reviewed and are negative.       Physical Exam  Temp:  [36.2 °C (97.1 °F)-37.7 °C (99.8 °F)] 36.4 °C (97.6 °F)  Pulse:  [62-85] 74  Resp:  [16-18] 18  BP: (134-157)/() 150/95  SpO2:  [94 %-98 %] 97 %    Physical Exam  Vitals and  nursing note reviewed.   Constitutional:       General: She is not in acute distress.     Appearance: She is well-developed.   HENT:      Head: Normocephalic and atraumatic.      Nose: Nose normal.   Eyes:      Conjunctiva/sclera: Conjunctivae normal.      Pupils: Pupils are equal, round, and reactive to light.   Neck:      Thyroid: No thyromegaly.   Cardiovascular:      Rate and Rhythm: Normal rate and regular rhythm.      Pulses: Normal pulses.      Heart sounds: Normal heart sounds. No friction rub. No gallop.    Pulmonary:      Effort: Pulmonary effort is normal. No respiratory distress.      Breath sounds: Normal breath sounds. No wheezing or rales.   Abdominal:      General: Bowel sounds are normal. There is no distension.      Palpations: Abdomen is soft. There is no mass.      Tenderness: There is no abdominal tenderness. There is no guarding.   Musculoskeletal:      Cervical back: Normal range of motion and neck supple.      Comments: Trace pedal edema   Skin:     General: Skin is warm.      Coloration: Skin is not jaundiced.      Findings: No erythema or rash.   Neurological:      General: No focal deficit present.      Mental Status: She is alert and oriented to person, place, and time.      Cranial Nerves: No cranial nerve deficit.   Psychiatric:         Mood and Affect: Mood normal.         Behavior: Behavior normal.         Thought Content: Thought content normal.         Judgment: Judgment normal.         Fluids    Intake/Output Summary (Last 24 hours) at 6/29/2021 1225  Last data filed at 6/29/2021 0600  Gross per 24 hour   Intake --   Output 1900 ml   Net -1900 ml       Laboratory  Labs reviewed, pertinent labs below.  Recent Labs     06/28/21  0826 06/28/21  2101 06/29/21  0756   WBC 10.8 9.6 11.8*   RBC 3.76* 3.69* 3.49*   HEMOGLOBIN 8.3* 8.2* 7.9*   HEMATOCRIT 29.0* 28.6* 27.0*   MCV 77.1* 77.5* 77.4*   MCH 22.1* 22.2* 22.6*   MCHC 28.6* 28.7* 29.3*   RDW 66.6* 66.2* 67.9*   PLATELETCT 333 318  305   MPV 10.1 9.8 9.6     Recent Labs     06/27/21  0123 06/28/21  0114 06/29/21  0312   SODIUM 137 135 140   POTASSIUM 3.6 3.7 3.9   CHLORIDE 110 108 109   CO2 18* 18* 19*   GLUCOSE 94 93 84   BUN 61* 52* 40*   CREATININE 2.15* 1.81* 1.56*   CALCIUM 8.3* 8.6 8.5     Recent Labs     06/27/21  0123 06/28/21  0114 06/29/21  0312   INR 1.35* 1.22* 1.24*           URINALYSIS:  Lab Results   Component Value Date/Time    COLORURINE DK Yellow 06/22/2021 1810    CLARITY Clear 06/22/2021 1810    SPECGRAVITY 1.007 06/22/2021 1810    PHURINE 6.0 06/22/2021 1810    KETONES Negative 06/22/2021 1810    PROTEINURIN Negative 06/22/2021 1810    BILIRUBINUR Moderate (A) 06/22/2021 1810    UROBILU 0.2 06/22/2021 1810    NITRITE Negative 06/22/2021 1810    LEUKESTERAS Negative 06/22/2021 1810    OCCULTBLOOD Large (A) 06/22/2021 1810     UPC  No results found for: TOTPROTUR No results found for: CREATININEU      Imaging interpreted by radiologist. Imaging reports reviewed with pertinent findings below  US-RENAL   Final Result      Mild prominence of the left renal pelvis, less than prior. No right hydronephrosis.      The urinary bladder is only partially decompressed by Villegas catheter. Correlate clinically for position of the catheter.      MR-BRAIN-W/O   Final Result      MRI of the brain without contrast within normal limits.      DX-CHEST-PORTABLE (1 VIEW)   Final Result      1.  Mild hypoinflation with minimal bibasilar atelectasis.   2.  No lobar pneumonia or pneumothorax.   3.  No gross mass.      CT-ABDOMEN-PELVIS W/O   Final Result      1.  Stranding surrounding the adrenal glands is improved.   2.  Dense right adrenal mass likely representing adrenal hemorrhage is mildly decreased in size compared to prior.   3.  Mild left hydronephrosis.   4.  Multiple mildly enlarged retroperitoneal lymph nodes are not significantly changed.   5.  Density within the gallbladder may represent sludge/vicarious excretion of contrast.   6.   Trace right pleural fluid and bibasilar atelectasis.      US-RENAL   Final Result      1.  Mild left hydronephrosis.   2.  No right hydronephrosis.      IC-ECKLMLE-Y/O   Final Result         1. Normal sized CBD. No CBD stone.   2. Thickening and heterogeneity of the bilateral adrenal glands with surrounding stranding, incompletely evaluated but concerning for hemorrhage.   3. No gallstone. Mild pericholecystic fluid could be reactive change due to adjacent adrenal hemorrhage.   4. Worsening left hydronephrosis, concerning for distal obstruction      DX-CHEST-LIMITED (1 VIEW)   Final Result         No acute cardiopulmonary abnormalities are identified.      EC-ECHOCARDIOGRAM COMPLETE W/O CONT   Final Result      CT-ABDOMEN-PELVIS WITH   Final Result      1.  New right adrenal mass likely represents hemorrhage. Thickening of the left adrenal gland is concerning for developing hemorrhage as well.      2.  Low attenuation filling defect in the superior vena cava is consistent with thrombus. Echocardiogram may be helpful for further evaluation.      3.  Prominent retroperitoneal lymph nodes with nonspecific retroperitoneal inflammatory stranding as reported on the prior CT.      4.  Small bilateral pleural effusions, right greater than left. Adjacent airspace disease likely atelectasis.            Comment: Results discussed with Dr. Gunter at 9:32 AM      [id number] VIEW   Final Result         No specific finding to suggest small bowel obstruction.      US-PELVIC COMPLETE (TRANSABDOMINAL/TRANSVAGINAL) (COMBO)   Final Result         1.  Heterogeneous appearance of the uterus with large heterogeneous uterine mass, appearance most compatible with uterine fibroid.   2.  Heterogeneous lesion in the left ovary, could represent hemorrhagic cyst or endometrioma, otherwise indeterminate, recommend follow-up sonogram in 6 weeks for repeat characterization and evaluation of stability.   3.  Thickened endometrial stripe,  likely proliferative changes, consider endometrial pathology with additional follow-up as clinically appropriate.   4.  Nabothian cyst      US-RUQ   Final Result      1.  No evidence of gallstones or biliary ductal dilatation.   2.  1.9 cm cystic lesion in the right kidney. Mural nodularity is not excluded and further evaluation with renal protocol MRI is recommended.         CT-ABDOMEN-PELVIS WITH   Final Result      1.  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   2.  Hypodensity in the cervix could be artifactual or could indicate underlying cervical mass. Suggest correlation with physical exam and gynecological history.   3.  19 mm hypodense RIGHT renal lesion, likely but not definitely a cyst. Suggest further assessment with ultrasound when clinically appropriate.                     Current Facility-Administered Medications   Medication Dose Route Frequency Provider Last Rate Last Admin   • guaiFENesin (ROBITUSSIN) 100 MG/5ML solution 200 mg  10 mL Oral Q4HRS PRN Sylvia Mueller M.D.   200 mg at 06/29/21 0041   • benzonatate (TESSALON) capsule 100 mg  100 mg Oral TID PRN Yuri Ivy M.D.   100 mg at 06/28/21 2238   • nystatin (MYCOSTATIN) 254022 UNIT/ML suspension 500,000 Units  5 mL Swish & Swallow 4X/DAY Jaylen Bland M.D.   500,000 Units at 06/29/21 1150   • pantoprazole (PROTONIX) injection 40 mg  40 mg Intravenous DAILY Jaylen Bland M.D.   40 mg at 06/29/21 0555   • amLODIPine (NORVASC) tablet 5 mg  5 mg Oral Q DAY Victoria Powell M.D.   5 mg at 06/29/21 0555   • predniSONE (DELTASONE) tablet 60 mg  60 mg Oral DAILY Da Varela M.D.   60 mg at 06/29/21 0554   • ferric gluconate complex (FERRLECIT) 250 mg in  mL IVPB  250 mg Intravenous Q24HR Jaylen Bland M.D.   Stopped at 06/28/21 1857   • acetaminophen (TYLENOL) tablet 1,000 mg  1,000 mg Oral Q6HRS Dominguez Reilly M.D.   1,000 mg at 06/29/21 1150    • ipratropium-albuterol (DUONEB) nebulizer solution  3 mL Nebulization Q4H PRN (RT) Dominguez Reilly M.D.   3 mL at 06/24/21 0507   • heparin infusion 25,000 units in 500 mL 0.45% NACL  0-30 Units/kg/hr Intravenous Continuous Da Varela M.D. 17.7 mL/hr at 06/29/21 0919 11 Units/kg/hr at 06/29/21 0919   • baclofen (LIORESAL) tablet 10 mg  10 mg Oral TID PRN Salina Zuniga D.O.   10 mg at 06/26/21 2306   • oxyCODONE immediate-release (ROXICODONE) tablet 5 mg  5 mg Oral Q4HRS PRN Salina Zuniga D.O.   5 mg at 06/27/21 0807   • oxyCODONE immediate release (ROXICODONE) tablet 10 mg  10 mg Oral Q4HRS PRN Salina Zuniga, D.O.   10 mg at 06/25/21 1615   • HYDROmorphone (Dilaudid) injection 1 mg  1 mg Intravenous Q4HRS PRN Fortbrenda SchafergCarroll, D.O.   1 mg at 06/24/21 0158   • lidocaine (LIDODERM) 5 % 1 Patch  1 Patch Transdermal Q24HR Gareth Castro M.D.   1 Patch at 06/27/21 2135   • senna-docusate (PERICOLACE or SENOKOT S) 8.6-50 MG per tablet 2 tablet  2 tablet Oral BID Jamar Mo M.D.   2 tablet at 06/24/21 1741    And   • polyethylene glycol/lytes (MIRALAX) PACKET 1 Packet  1 Packet Oral QDAY PRCORNELIA Mo M.D.        And   • magnesium hydroxide (MILK OF MAGNESIA) suspension 30 mL  30 mL Oral QDAY PRN Jamar Mo M.D.        And   • bisacodyl (DULCOLAX) suppository 10 mg  10 mg Rectal QDAY PRN Jamar Mo M.D.   10 mg at 06/15/21 1201   • cloNIDine (CATAPRES) tablet 0.1 mg  0.1 mg Oral Q6HRS PRN Jamar Mo M.D.       • enalaprilat (VASOTEC) injection 1.25 mg  1.25 mg Intravenous Q6HRS PRN Jamar Mo M.D.       • labetalol (NORMODYNE/TRANDATE) injection 10 mg  10 mg Intravenous Q4HRS PRN Jamar Mo M.D.       • ondansetron (ZOFRAN) syringe/vial injection 4 mg  4 mg Intravenous Q4HRS PRN Jamar Mo M.D.   4 mg at 06/16/21 1355   • ondansetron (ZOFRAN ODT) dispertab 4 mg  4 mg Oral Q4HRS PRCORNELIA Mo M.D.       • promethazine (PHENERGAN) tablet 12.5-25 mg  12.5-25 mg Oral Q4HRS PRN  Jamar Mo M.D.       • promethazine (PHENERGAN) suppository 12.5-25 mg  12.5-25 mg Rectal Q4HRS PRN Jamar Mo M.D.       • prochlorperazine (COMPAZINE) injection 5-10 mg  5-10 mg Intravenous Q4HRS PRN Jamar Mo M.D.             Assessment/Plan  46 y.o. 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     1.  Acute kidney injury, nonoliguric: could be secondary to urinary retention, NICKOLAS       Improving -to monitor closely  2.  HTN: elevated BP: to increase amlodipine dose to 10 mg qd     3.  Left renal hydronephrosis, mild on kidney ultrasound 6/22/2021 and again on CT scan 6/24/2021.  Accompanied by bladder distention.  Continue Villegas catheter, placed 6/24/2021.  Recommend repeat kidney ultrasound on 6/28/2021 to ensure improvement.      4.  Electrolytes: well controlled    5.  Microcytic anemia, persistent.  Concern for antiphospholipid antibody syndrome with intravascular clotting and hemolysis.  Defer further management to primary team and hematology oncology.  Check CBC daily.    6. Metabolic acidosis: improving      Recs; amlodipine 10 mg qd             Daily labs             Close BP monitoring             Avoid nephrotoxic agents             Will follow

## 2021-06-30 LAB
ALBUMIN SERPL BCP-MCNC: 2.9 G/DL (ref 3.2–4.9)
ALBUMIN/GLOB SERPL: 0.9 G/DL
ALP SERPL-CCNC: 80 U/L (ref 30–99)
ALT SERPL-CCNC: 46 U/L (ref 2–50)
ANION GAP SERPL CALC-SCNC: 10 MMOL/L (ref 7–16)
AST SERPL-CCNC: 33 U/L (ref 12–45)
BASOPHILS # BLD AUTO: 0 % (ref 0–1.8)
BASOPHILS # BLD: 0 K/UL (ref 0–0.12)
BILIRUB SERPL-MCNC: 0.7 MG/DL (ref 0.1–1.5)
BUN SERPL-MCNC: 36 MG/DL (ref 8–22)
CALCIUM SERPL-MCNC: 8.6 MG/DL (ref 8.5–10.5)
CHLORIDE SERPL-SCNC: 106 MMOL/L (ref 96–112)
CO2 SERPL-SCNC: 22 MMOL/L (ref 20–33)
CREAT SERPL-MCNC: 1.6 MG/DL (ref 0.5–1.4)
EOSINOPHIL # BLD AUTO: 0.06 K/UL (ref 0–0.51)
EOSINOPHIL NFR BLD: 0.6 % (ref 0–6.9)
ERYTHROCYTE [DISTWIDTH] IN BLOOD BY AUTOMATED COUNT: 67.3 FL (ref 35.9–50)
GLOBULIN SER CALC-MCNC: 3.4 G/DL (ref 1.9–3.5)
GLUCOSE SERPL-MCNC: 89 MG/DL (ref 65–99)
HCT VFR BLD AUTO: 26.4 % (ref 37–47)
HGB BLD-MCNC: 7.8 G/DL (ref 12–16)
IMM GRANULOCYTES # BLD AUTO: 0.11 K/UL (ref 0–0.11)
IMM GRANULOCYTES NFR BLD AUTO: 1.2 % (ref 0–0.9)
INR PPP: 1.2 (ref 0.87–1.13)
LYMPHOCYTES # BLD AUTO: 1.65 K/UL (ref 1–4.8)
LYMPHOCYTES NFR BLD: 17.3 % (ref 22–41)
MCH RBC QN AUTO: 22.7 PG (ref 27–33)
MCHC RBC AUTO-ENTMCNC: 29.5 G/DL (ref 33.6–35)
MCV RBC AUTO: 77 FL (ref 81.4–97.8)
MONOCYTES # BLD AUTO: 0.63 K/UL (ref 0–0.85)
MONOCYTES NFR BLD AUTO: 6.6 % (ref 0–13.4)
NEUTROPHILS # BLD AUTO: 7.08 K/UL (ref 2–7.15)
NEUTROPHILS NFR BLD: 74.3 % (ref 44–72)
NRBC # BLD AUTO: 0 K/UL
NRBC BLD-RTO: 0 /100 WBC
PLATELET # BLD AUTO: 317 K/UL (ref 164–446)
PMV BLD AUTO: 10 FL (ref 9–12.9)
POTASSIUM SERPL-SCNC: 3.6 MMOL/L (ref 3.6–5.5)
PROT SERPL-MCNC: 6.3 G/DL (ref 6–8.2)
PROTHROMBIN TIME: 14.9 SEC (ref 12–14.6)
RBC # BLD AUTO: 3.43 M/UL (ref 4.2–5.4)
SODIUM SERPL-SCNC: 138 MMOL/L (ref 135–145)
WBC # BLD AUTO: 9.5 K/UL (ref 4.8–10.8)

## 2021-06-30 PROCEDURE — A9270 NON-COVERED ITEM OR SERVICE: HCPCS | Performed by: STUDENT IN AN ORGANIZED HEALTH CARE EDUCATION/TRAINING PROGRAM

## 2021-06-30 PROCEDURE — 80053 COMPREHEN METABOLIC PANEL: CPT

## 2021-06-30 PROCEDURE — 770020 HCHG ROOM/CARE - TELE (206)

## 2021-06-30 PROCEDURE — 700111 HCHG RX REV CODE 636 W/ 250 OVERRIDE (IP): Performed by: STUDENT IN AN ORGANIZED HEALTH CARE EDUCATION/TRAINING PROGRAM

## 2021-06-30 PROCEDURE — 99232 SBSQ HOSP IP/OBS MODERATE 35: CPT | Performed by: INTERNAL MEDICINE

## 2021-06-30 PROCEDURE — 700102 HCHG RX REV CODE 250 W/ 637 OVERRIDE(OP): Performed by: INTERNAL MEDICINE

## 2021-06-30 PROCEDURE — 700105 HCHG RX REV CODE 258: Performed by: STUDENT IN AN ORGANIZED HEALTH CARE EDUCATION/TRAINING PROGRAM

## 2021-06-30 PROCEDURE — 700102 HCHG RX REV CODE 250 W/ 637 OVERRIDE(OP): Performed by: STUDENT IN AN ORGANIZED HEALTH CARE EDUCATION/TRAINING PROGRAM

## 2021-06-30 PROCEDURE — A9270 NON-COVERED ITEM OR SERVICE: HCPCS | Performed by: INTERNAL MEDICINE

## 2021-06-30 PROCEDURE — C9113 INJ PANTOPRAZOLE SODIUM, VIA: HCPCS | Performed by: STUDENT IN AN ORGANIZED HEALTH CARE EDUCATION/TRAINING PROGRAM

## 2021-06-30 PROCEDURE — 85025 COMPLETE CBC W/AUTO DIFF WBC: CPT

## 2021-06-30 PROCEDURE — 85610 PROTHROMBIN TIME: CPT

## 2021-06-30 PROCEDURE — 36415 COLL VENOUS BLD VENIPUNCTURE: CPT

## 2021-06-30 PROCEDURE — 700111 HCHG RX REV CODE 636 W/ 250 OVERRIDE (IP): Performed by: INTERNAL MEDICINE

## 2021-06-30 RX ORDER — OMEPRAZOLE 20 MG/1
20 CAPSULE, DELAYED RELEASE ORAL DAILY
Status: DISCONTINUED | OUTPATIENT
Start: 2021-06-30 | End: 2021-07-02 | Stop reason: HOSPADM

## 2021-06-30 RX ADMIN — OMEPRAZOLE 20 MG: 20 CAPSULE, DELAYED RELEASE ORAL at 12:00

## 2021-06-30 RX ADMIN — SODIUM CHLORIDE 250 MG: 9 INJECTION, SOLUTION INTRAVENOUS at 17:09

## 2021-06-30 RX ADMIN — NYSTATIN 500000 UNITS: 100000 SUSPENSION ORAL at 17:09

## 2021-06-30 RX ADMIN — PANTOPRAZOLE SODIUM 40 MG: 40 INJECTION, POWDER, LYOPHILIZED, FOR SOLUTION INTRAVENOUS at 05:42

## 2021-06-30 RX ADMIN — HEPARIN SODIUM 11 UNITS/KG/HR: 5000 INJECTION, SOLUTION INTRAVENOUS at 17:10

## 2021-06-30 RX ADMIN — AMLODIPINE BESYLATE 10 MG: 10 TABLET ORAL at 05:42

## 2021-06-30 RX ADMIN — NYSTATIN 500000 UNITS: 100000 SUSPENSION ORAL at 05:47

## 2021-06-30 RX ADMIN — PREDNISONE 60 MG: 50 TABLET ORAL at 05:42

## 2021-06-30 RX ADMIN — NYSTATIN 500000 UNITS: 100000 SUSPENSION ORAL at 19:50

## 2021-06-30 RX ADMIN — NYSTATIN 500000 UNITS: 100000 SUSPENSION ORAL at 12:00

## 2021-06-30 ASSESSMENT — ENCOUNTER SYMPTOMS
WHEEZING: 0
VOMITING: 0
PALPITATIONS: 0
COUGH: 0
EYES NEGATIVE: 1
MYALGIAS: 0
NECK PAIN: 0
FLANK PAIN: 0
WEIGHT LOSS: 0
BACK PAIN: 0
DIARRHEA: 0
CHILLS: 0
NAUSEA: 0
FEVER: 0
SINUS PAIN: 0
HEMOPTYSIS: 0
SHORTNESS OF BREATH: 0
ABDOMINAL PAIN: 0
ORTHOPNEA: 0

## 2021-06-30 ASSESSMENT — FIBROSIS 4 INDEX: FIB4 SCORE: 0.69

## 2021-06-30 NOTE — PROGRESS NOTES
[facility] MEDICINE PROGRESS NOTE      Attending:   Michele Michele MD     Resident:   Jaylen Bland MD    PATIENT:   Jaclyn Olvera; [id number]; 1975    ID:   46 y.o. female admitted on 6/14 for left-sided abdominal pain x4 days.  She was originally being managed on the oncology floor but was transferred to telemetry for a higher level of monitoring.  She has had a complex medical course with multiple imaging studies and multiple consultants including: gastroenterology, vascular surgery, general surgery, hematology/oncology, and nephrology.  She is currently being managed for catastrophic antiphospholipid syndrome and is being followed actively by hematology/oncology and nephrology. Clinically has been improving over the last several days.     SUBJECTIVE:   No acute events overnight, patient denies any current complaints.  She states that her abdominal pain is resolved.  Denies any current chest pain, palpitations, headache, vision changes, numbness/weakness, or any other concerning symptoms.    OBJECTIVE:  Vitals:    06/29/21 2053 06/29/21 2346 06/30/21 0346 06/30/21 0745   BP: 127/84 145/80 133/73 136/82   Pulse: 98 82 79 89   Resp: 16 18 16 16   Temp: 36.9 °C (98.5 °F) 36.1 °C (97 °F) 36.7 °C (98.1 °F) 36.9 °C (98.5 °F)   TempSrc: Temporal Temporal Temporal Temporal   SpO2: 97% 95% 96% 95%   Weight:       Height:           Intake/Output Summary (Last 24 hours) at 6/30/2021 0755  Last data filed at 6/30/2021 0346  Gross per 24 hour   Intake --   Output 1750 ml   Net -1750 ml       PHYSICAL EXAM:  General: No acute distress, afebrile, resting comfortably, conversational   HEENT: NC/AT. EOMI.   Cardiovascular: RRR without murmurs, rubs, heaves. Normal capillary refill   Respiratory: CTAB, no tachypnea or retractions   Abdomen: normal bowel sounds, soft, nontender, nondistended, no masses, no organomegaly   EXT:  DOMINGUEZ, no edema  Skin: No erythema/lesions, scattered ecchymosis   Neuro: Non-focal, alert and  orientated     LABS:  Recent Labs     06/28/21  2101 06/29/21  0756 06/30/21 0222   WBC 9.6 11.8* 9.5   RBC 3.69* 3.49* 3.43*   HEMOGLOBIN 8.2* 7.9* 7.8*   HEMATOCRIT 28.6* 27.0* 26.4*   MCV 77.5* 77.4* 77.0*   MCH 22.2* 22.6* 22.7*   RDW 66.2* 67.9* 67.3*   PLATELETCT 318 305 317   MPV 9.8 9.6 10.0   NEUTSPOLYS 75.40* 93.90* 74.30*   LYMPHOCYTES 16.70* 3.50* 17.30*   MONOCYTES 5.90 0.90 6.60   EOSINOPHILS 0.20 1.70 0.60   BASOPHILS 0.10 0.00 0.00   RBCMORPHOLO  --  Present  --      Recent Labs     06/28/21 0114 06/29/21 0312 06/30/21 0222   SODIUM 135 140 138   POTASSIUM 3.7 3.9 3.6   CHLORIDE 108 109 106   CO2 18* 19* 22   BUN 52* 40* 36*   CREATININE 1.81* 1.56* 1.60*   CALCIUM 8.6 8.5 8.6   ALBUMIN 2.5* 2.7* 2.9*     Estimated GFR/CRCL = Estimated Creatinine Clearance: 55.1 mL/min (A) (by C-G formula based on SCr of 1.6 mg/dL (H)).  Recent Labs     06/28/21 0114 06/29/21 0312 06/30/21 0222   GLUCOSE 93 84 89     Recent Labs     06/28/21  0114 06/29/21 0312 06/30/21 0222   ASTSGOT 48* 47* 33   ALTSGPT 46 48 46   TBILIRUBIN 0.8 0.9 0.7   IBILIRUBIN 0.4  --   --    DBILIRUBIN 0.4  --   --    ALKPHOSPHAT 85 82 80   GLOBULIN 4.1* 3.7* 3.4   INR 1.22* 1.24* 1.20*             Recent Labs     06/28/21  0114 06/29/21 0312 06/30/21 0222   INR 1.22* 1.24* 1.20*         IMAGING:  US-RENAL   Final Result      Mild prominence of the left renal pelvis, less than prior. No right hydronephrosis.      The urinary bladder is only partially decompressed by Villegas catheter. Correlate clinically for position of the catheter.      MR-BRAIN-W/O   Final Result      MRI of the brain without contrast within normal limits.      DX-CHEST-PORTABLE (1 VIEW)   Final Result      1.  Mild hypoinflation with minimal bibasilar atelectasis.   2.  No lobar pneumonia or pneumothorax.   3.  No gross mass.      CT-ABDOMEN-PELVIS W/O   Final Result      1.  Stranding surrounding the adrenal glands is improved.   2.  Dense right adrenal mass  likely representing adrenal hemorrhage is mildly decreased in size compared to prior.   3.  Mild left hydronephrosis.   4.  Multiple mildly enlarged retroperitoneal lymph nodes are not significantly changed.   5.  Density within the gallbladder may represent sludge/vicarious excretion of contrast.   6.  Trace right pleural fluid and bibasilar atelectasis.      US-RENAL   Final Result      1.  Mild left hydronephrosis.   2.  No right hydronephrosis.      TA-BGATXNF-N/O   Final Result         1. Normal sized CBD. No CBD stone.   2. Thickening and heterogeneity of the bilateral adrenal glands with surrounding stranding, incompletely evaluated but concerning for hemorrhage.   3. No gallstone. Mild pericholecystic fluid could be reactive change due to adjacent adrenal hemorrhage.   4. Worsening left hydronephrosis, concerning for distal obstruction      DX-CHEST-LIMITED (1 VIEW)   Final Result         No acute cardiopulmonary abnormalities are identified.      EC-ECHOCARDIOGRAM COMPLETE W/O CONT   Final Result      CT-ABDOMEN-PELVIS WITH   Final Result      1.  New right adrenal mass likely represents hemorrhage. Thickening of the left adrenal gland is concerning for developing hemorrhage as well.      2.  Low attenuation filling defect in the superior vena cava is consistent with thrombus. Echocardiogram may be helpful for further evaluation.      3.  Prominent retroperitoneal lymph nodes with nonspecific retroperitoneal inflammatory stranding as reported on the prior CT.      4.  Small bilateral pleural effusions, right greater than left. Adjacent airspace disease likely atelectasis.            Comment: Results discussed with Dr. Gunter at 9:32 AM      EA-JMPIKML-7 VIEW   Final Result         No specific finding to suggest small bowel obstruction.      US-PELVIC COMPLETE (TRANSABDOMINAL/TRANSVAGINAL) (COMBO)   Final Result         1.  Heterogeneous appearance of the uterus with large heterogeneous uterine mass,  appearance most compatible with uterine fibroid.   2.  Heterogeneous lesion in the left ovary, could represent hemorrhagic cyst or endometrioma, otherwise indeterminate, recommend follow-up sonogram in 6 weeks for repeat characterization and evaluation of stability.   3.  Thickened endometrial stripe, likely proliferative changes, consider endometrial pathology with additional follow-up as clinically appropriate.   4.  Nabothian cyst      US-RUQ   Final Result      1.  No evidence of gallstones or biliary ductal dilatation.   2.  1.9 cm cystic lesion in the right kidney. Mural nodularity is not excluded and further evaluation with renal protocol MRI is recommended.         CT-ABDOMEN-PELVIS WITH   Final Result      1.  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   2.  Hypodensity in the cervix could be artifactual or could indicate underlying cervical mass. Suggest correlation with physical exam and gynecological history.   3.  19 mm hypodense RIGHT renal lesion, likely but not definitely a cyst. Suggest further assessment with ultrasound when clinically appropriate.                   MEDS:  Current Facility-Administered Medications   Medication Last Admin   • guaiFENesin (ROBITUSSIN) 100 MG/5ML solution 200 mg 200 mg at 06/29/21 0041   • amLODIPine (NORVASC) tablet 10 mg 10 mg at 06/30/21 0542   • benzonatate (TESSALON) capsule 100 mg 100 mg at 06/28/21 2238   • nystatin (MYCOSTATIN) 902189 UNIT/ML suspension 500,000 Units 500,000 Units at 06/30/21 0547   • pantoprazole (PROTONIX) injection 40 mg 40 mg at 06/30/21 0542   • predniSONE (DELTASONE) tablet 60 mg 60 mg at 06/30/21 0542   • ferric gluconate complex (FERRLECIT) 250 mg in  mL IVPB Stopped at 06/29/21 1759   • acetaminophen (TYLENOL) tablet 1,000 mg 1,000 mg at 06/29/21 1150   • ipratropium-albuterol (DUONEB) nebulizer solution 3 mL at 06/24/21 0507   • heparin infusion 25,000  units in 500 mL 0.45% NACL 11 Units/kg/hr at 06/29/21 1922   • baclofen (LIORESAL) tablet 10 mg 10 mg at 06/26/21 2306   • oxyCODONE immediate-release (ROXICODONE) tablet 5 mg 5 mg at 06/27/21 0807   • oxyCODONE immediate release (ROXICODONE) tablet 10 mg 10 mg at 06/25/21 1615   • HYDROmorphone (Dilaudid) injection 1 mg 1 mg at 06/24/21 0158   • lidocaine (LIDODERM) 5 % 1 Patch 1 Patch at 06/27/21 2135   • senna-docusate (PERICOLACE or SENOKOT S) 8.6-50 MG per tablet 2 tablet 2 tablet at 06/24/21 1741    And   • polyethylene glycol/lytes (MIRALAX) PACKET 1 Packet      And   • magnesium hydroxide (MILK OF MAGNESIA) suspension 30 mL      And   • bisacodyl (DULCOLAX) suppository 10 mg 10 mg at 06/15/21 1201   • cloNIDine (CATAPRES) tablet 0.1 mg     • enalaprilat (VASOTEC) injection 1.25 mg     • labetalol (NORMODYNE/TRANDATE) injection 10 mg     • ondansetron (ZOFRAN) syringe/vial injection 4 mg 4 mg at 06/16/21 1355   • ondansetron (ZOFRAN ODT) dispertab 4 mg     • promethazine (PHENERGAN) tablet 12.5-25 mg     • promethazine (PHENERGAN) suppository 12.5-25 mg     • prochlorperazine (COMPAZINE) injection 5-10 mg         ASSESSMENT/PLAN:  46 y.o. female admitted on 6/14 for left-sided abdominal pain x4 days.  She was originally being managed on the oncology floor but was transferred to telemetry for a higher level of monitoring.  She has had a complex medical course with multiple imaging studies and multiple consultants including: gastroenterology, vascular surgery, general surgery, hematology/oncology, and nephrology.  She is currently being managed for catastrophic antiphospholipid syndrome and is being followed actively by hematology/oncology and nephrology. Has been improving over the last several days.     #Catastrophic antiphospholipid syndrome  -Hematology/oncology actively following  -Patient received IVIG x 2 on 6/22  -Patient received Solu-Medrol 6/22 x 2 days  -Patient transitioned to prednisone 80 mg  daily on 6/24 with a taper  -HIT panel negative and patient was started on a heparin drip on 6/23  -DIC/HIT/TTP all ruled out per heme/onc   -Trop leak minor on 6/25 - no indication for antiplatelet   -CXR 6/25 showed no alvelolar hemorrhage   -Per hematology, Coumadin is recommended to source of anticoagulation as DOAC's have not been shown to be as effective in antiphospholipid syndrome.  Plan:  -Awaiting for improvement of renal function prior to switching from heparin to lovenox to bridge Coumadin  -Hold plasmapheresis for now  -Closely monitor for DVT/arterial thrombotic events  -Continue prednisone taper - currently pred 60  -Continue to follow with hematology, recs appreciated     #Cough  #GERD  -Patient complaining of burping after meals and a cough   Plan:   -Change pantoprazole to omeprazole  -Tessalon pearls and Robitussin as needed for coughing    -Continue to follow symptoms, if patient develops fever/productive cough consider different etiology     #Oral candidiasis  -Patient noted to have some thrush  -Started on nystatin suspension 6/28  -Improving now  Plan:  -Continue nystatin rinses until fully resolved       #VIDAL, steady   #Urinary retention  -BUN/creatinine steady today   -eGFR 25 --> 30 ---> 36 ---> 35   -Mild left hydronephrosis still seen on CT abd done 6/24  -Microalbumin-Cr ratio of 39 on 6/24  -Protein-Cr ratio of 189 on 6/24  -Bladder scan 6/24 showed >700mL  -Bowens catheter placed 6/24 per nephro recs  -Kidney function improving the last few days  -Repeat ultrasound on 6/28 showed some improvement in hydronephrosis  Plan:  -Nephrology following, recs appreciated  -Follow-up with nephrology to see how long bowens needs to be continued  -Hold plasmapheresis for now  -Monitor metabolic acidosis, currently stable, add sodium bicarb if needed      #Microcytic anemia  -Likely multifactorial in etiology  -Iron studies show iron levels of 32 but normal ferritin  -Johan test was  negative  -Elevated LDH which could be related to antiphospholipid syndrome versus active hemolysis  -Repeat CT abdomen/pelvis without contrast on 6/24 showed improvement of the hemorrhage located in both adrenal glands  -Transfused 1 U PRBCs on 6/24 for Hgb 6.8  -IV iron received 6/28  -Repeat this AM steady at 7.8  Plan:  -Continue to trend CBCs  -3 doses total of IV iron  -Transfuse when hemoglobin less than 7     #Elevated LFTs  -Patient's LFTs are elevated  -MRCP completed on 6/22 showed no biliary obstruction  -Hepatitis panel negative  -Possibly due to ischemic hepatopathy  -LFTs have been slightly elevated but stable  Plan:  -Continue trending on repeat CMPs      #Thrombus of SVC  -Discovered on CT abdomen/pelvis on 6/18  -Vascular surgery consulted, recommend anticoagulation if thrombus is confirmed  -Thrombus was supposed to be confirmed with RADHIKA when patient stabilized  Plan:  -Will switch heparin drip to Lovenox when hematology decides  -Follow-up with hematology/oncology to see if TTE is appropriate given patient's improvement in LFTs and platelets     #Intra-abdominal infection, resolved  -Patient had 3/4 SIRS criteria including fever, tachycardia, leukocytosis  -Patient was started on cefepime and Flagyl on 6/22  -Patient continued to remain afebrile and pain improving   -Abx stopped on 6/26      #Thrombocytopenia  #Vaginal bleeding  #Rectal bleeding  #Hemorrhage of both adrenal glands  -All likely secondary to catastrophic antiphospholipid syndrome  -Hematology/oncology actively following  -Platelets have been much improved recently  -CT abdomen 6/24 showed improvement in adrenal hemorrhage  Plan:  -Closely monitor for bleeding  -Continue to trend CBCs     #Retroperitoneal lymphadenopathy  -Noted on CT abdomen/pelvis on 6/18  -Stable on CT abdomen/pelvis on 6/24  -Unclear etiology at this point     #Fibroids  -Found on pelvic ultrasound completed on 6/14  -Follow-up outpatient with  gynecology     #Ovarian lesion  -Found in the left ovary on pelvic ultrasound completed 6/14  -Recommended follow-up in 6 weeks with repeat ultrasound  -Follow-up with outpatient gynecology     #Lesion of cervix  -Picked up incidentally on abdominal CT which stated that this could be artifactual or indicate an underlying cervical mass  -Cervix was unable to be visualized by pelvic exam in the emergency department  -Outpatient follow-up with gynecology as recommended     #Right renal lesion  -Renal protocol MRI is advised to evaluate further  -Can be completed as outpatient     #H.pylori infection  -Recently diagnosed at [facility]  -Patient started on triple therapy but did not complete secondary to abdominal pain  -EGD completed 6/17 took biopsies to see if infection have resolved  -Biopsy showed no H. Pylori present  Plan:  -Continue daily PPI     Disposition: Inpatient for continued treatment of catastrophic antiphospholipid antibody syndrome     #Core Measures   VTE PPx: Heparin Drip  Abx: None   Lines/Tubes: PIV/Villegas  Fluids: None   Diet: Regular  Code Status: Full     This patient is a Telemetry Block (T834 - T838) patient.    Jaylen Bland MD   PGY-1 Family Medicine Resident   [facility]

## 2021-06-30 NOTE — CARE PLAN
The patient is Watcher - Medium risk of patient condition declining or worsening    Shift Goals  Clinical Goals: Monitor AntiXa; DC Plan  Patient Goals: Rest  Family Goals: Talk to Doctor    Progress made toward(s) clinical / shift goals:    Problem: Pain - Standard  Goal: Alleviation of pain or a reduction in pain to the patient’s comfort goal  Outcome: Progressing     Problem: Knowledge Deficit - Standard  Goal: Patient and family/care givers will demonstrate understanding of plan of care, disease process/condition, diagnostic tests and medications  Outcome: Progressing     Problem: Gastrointestinal Irritability  Goal: Nausea and vomiting will be absent or improve  Outcome: Progressing     Problem: Bowel Elimination  Goal: Establish and maintain regular bowel function  Outcome: Progressing     Problem: Hemodynamics  Goal: Patient's hemodynamics, fluid balance and neurologic status will be stable or improve  Outcome: Progressing     Problem: Fluid Volume  Goal: Fluid volume balance will be maintained  Outcome: Progressing     Problem: Urinary - Renal Perfusion  Goal: Ability to achieve and maintain adequate renal perfusion and functioning will improve  Outcome: Progressing     Problem: Respiratory  Goal: Patient will achieve/maintain optimum respiratory ventilation and gas exchange  Outcome: Progressing     Problem: Mechanical Ventilation  Goal: Safe management of artificial airway and ventilation  Outcome: Progressing  Goal: Successful weaning off mechanical ventilator, spontaneously maintains adequate gas exchange  Outcome: Progressing  Goal: Patient will be able to express needs and understand communication  Outcome: Progressing     Problem: Physical Regulation  Goal: Diagnostic test results will improve  Outcome: Progressing  Goal: Signs and symptoms of infection will decrease  Outcome: Progressing     Problem: Fall Risk  Goal: Patient will remain free from falls  Outcome: Progressing       Patient is not  progressing towards the following goals:

## 2021-06-30 NOTE — CARE PLAN
The patient is Watcher - Medium risk of patient condition declining or worsening    Shift Goals  Clinical Goals: No acute bleeding  Patient Goals: Sleep comfortably  Family Goals: Rest    Progress made toward(s) clinical / shift goals:        Problem: Pain - Standard  Goal: Alleviation of pain or a reduction in pain to the patient’s comfort goal  Outcome: Progressing  Note: Denies pain tonight. Declines tylenol and lidocaine patch.      Problem: Gastrointestinal Irritability  Goal: Nausea and vomiting will be absent or improve  Outcome: Progressing  Note: Patient reports excellent appetite today.

## 2021-06-30 NOTE — PROGRESS NOTES
Oncology/Hematology Progress Note               Author: Anjali Loomis M.D. Date & Time created: 6/30/2021  4:05 PM     CC: catastrophic antiphospholipid antibody syndrome    Interval History:  No acute events overnight. She is feeling quite good currently. She is looking     Review of Systems:  Review of Systems   Constitutional: Positive for malaise/fatigue.   All other systems reviewed and are negative.    Physical Exam:  Physical Exam  Constitutional:       General: She is not in acute distress.     Appearance: Normal appearance.   HENT:      Head: Normocephalic and atraumatic.   Eyes:      General: No scleral icterus.     Conjunctiva/sclera: Conjunctivae normal.   Cardiovascular:      Rate and Rhythm: Normal rate.   Pulmonary:      Effort: Pulmonary effort is normal. No respiratory distress.   Neurological:      Mental Status: She is alert and oriented to person, place, and time.   Psychiatric:         Mood and Affect: Mood normal.         Behavior: Behavior normal.         Thought Content: Thought content normal.         Judgment: Judgment normal.       Labs:          Recent Labs     06/28/21  0114 06/29/21 0312 06/30/21 0222   SODIUM 135 140 138   POTASSIUM 3.7 3.9 3.6   CHLORIDE 108 109 106   CO2 18* 19* 22   BUN 52* 40* 36*   CREATININE 1.81* 1.56* 1.60*   CALCIUM 8.6 8.5 8.6     Recent Labs     06/28/21  0114 06/29/21 0312 06/30/21 0222   ALTSGPT 46 48 46   ASTSGOT 48* 47* 33   ALKPHOSPHAT 85 82 80   TBILIRUBIN 0.8 0.9 0.7   DBILIRUBIN 0.4  --   --    GLUCOSE 93 84 89     Recent Labs     06/28/21  0114 06/28/21  0826 06/28/21  2101 06/29/21 0312 06/29/21  0756 06/30/21 0222   RBC  --    < > 3.69*  --  3.49* 3.43*   HEMOGLOBIN  --    < > 8.2*  --  7.9* 7.8*   HEMATOCRIT  --    < > 28.6*  --  27.0* 26.4*   PLATELETCT  --    < > 318  --  305 317   PROTHROMBTM 15.1*  --   --  15.2*  --  14.9*   INR 1.22*  --   --  1.24*  --  1.20*    < > = values in this interval not displayed.     Recent Labs      21  0114 21  0826 21  21021  0756 21   WBC  --    < > 9.6  --  11.8* 9.5   NEUTSPOLYS  --    < > 75.40*  --  93.90* 74.30*   LYMPHOCYTES  --    < > 16.70*  --  3.50* 17.30*   MONOCYTES  --    < > 5.90  --  0.90 6.60   EOSINOPHILS  --    < > 0.20  --  1.70 0.60   BASOPHILS  --    < > 0.10  --  0.00 0.00   ASTSGOT 48*  --   --  47*  --  33   ALTSGPT 46  --   --  48  --  46   ALKPHOSPHAT 85  --   --  82  --  80   TBILIRUBIN 0.8  --   --  0.9  --  0.7    < > = values in this interval not displayed.     Recent Labs     21  01121   SODIUM 135 140 138   POTASSIUM 3.7 3.9 3.6   CHLORIDE 108 109 106   CO2 18* 19* 22   GLUCOSE 93 84 89   BUN 52* 40* 36*   CREATININE 1.81* 1.56* 1.60*   CALCIUM 8.6 8.5 8.6     Hemodynamics:  Temp (24hrs), Av.8 °C (98.3 °F), Min:36.1 °C (97 °F), Max:37.1 °C (98.8 °F)  Temperature: 37 °C (98.6 °F)  Pulse  Av.1  Min: 59  Max: 128   Blood Pressure: 150/98     Respiratory:    Respiration: 16, Pulse Oximetry: 97 %     Work Of Breathing / Effort: Within Normal Limits  RUL Breath Sounds: Clear, RML Breath Sounds: Diminished, RLL Breath Sounds: Diminished, BLAYNE Breath Sounds: Clear, LLL Breath Sounds: Diminished  Fluids:    Intake/Output Summary (Last 24 hours) at 2021 1111  Last data filed at 2021 0900  Gross per 24 hour   Intake --   Output 900 ml   Net -900 ml        GI/Nutrition:  Orders Placed This Encounter   Procedures   • Diet Order Diet: Regular     Standing Status:   Standing     Number of Occurrences:   1     Order Specific Question:   Diet:     Answer:   Regular [1]     Medical Decision Making, by Problem:  Active Hospital Problems    Diagnosis    • *Thrombosis of superior vena cava (HCC) [I82.210]    • Catastrophic antiphospholipid syndrome (HCC) [D68.61]    • Elevated liver function tests [R79.89]    • Elevated C-reactive protein (CRP) [R79.82]    • Sepsis (HCC) [A41.9]    •  Microcytic anemia [D50.9]    • Thrombocytopenia (HCC) [D69.6]    • Hemorrhage of both adrenal glands (HCC) [E27.49]    • Retroperitoneal lymphadenopathy [R59.0]    • Vaginal bleeding [N93.9]    • Hyperbilirubinemia [E80.6]    • Fibroids [D21.9]    • H. pylori infection [A04.8]    • Hyponatremia [E87.1]    • Kidney lesion, native, right [N28.9]    • Lesion of cervix [N88.9]    • Leukocytosis [D72.829]        Assessment and Plan:  # Catastrophic antiphospholipid antibody syndrome- triple positive APLA with presumed bilateral adrenal hemorrhage secondary to micro-thrombus/thrombotic storm along with liver involvement,?  possible kidney involvement, thrombocytopenia.  - No TTP ,DIC, TMA, HIT.  CUONG ANCA Hep/HIV negative    -Complicated case due to bilateral adrenal hemorrhage presumed to be secondary to micro thrombus/thrombotic storm, this has now stablizied    - Initially received high-dose Solu-Medrol, switched to prednisone 60 mg with plans to gradually taper off over the next few weeks. I would discharge her on prednisone 40 mg, then taper by 10 mg weekly, then 5 mg x 7 days, then stop.    -Follow-up CT scan from 6/24/2021 improving to stable adrenal hemorrhage nonspecific retroperitoneal lymphadenopathy, possibly reactive  - monitor for any DVT/arterial thrombotic events like stroke heart attack etc.  - Currently on Heparin gtt - if Hgb stable over tomorrow, can switch to Lovenox with XA monitoring and subsequent transition to Coumadin.  - Need life long anticoagulation on Coumadin given triple positive APLA , per TRAPS data aim for iNR 2.5-3.5 range as she has mild baseline PT elevation.    # Thrombocytopenia - platelets down to 64 on 6/22/21  - now normalized since 6/23/21 with IVIg and steroids  - monitor closely as prednisone is tapered, platelets today are 317    # VIDAL - her creatinine is improving, down to 1.6 today, per pharmacy creatinine clearance is above 50 and ok for lovenox tomorrow.  - Nephrology  following    -Anemia-multifactorial. Pre existing HEATHER . She had mucocutaneous/vaginal bleeding and adrenal , possible RP hemorrhage which appears to be stable/ improving.   - Receiving IV iron  Transfuse for HgB < 7  Or any bleed  Johan test was negative.      -Risk of adrenal insufficiency due to bilateral adrenal hemorrhage.  Cortisol level okay, continue steroids with taper    High complexity, High Risk Patient  - Will continue to follow, on discharge she will need follow up with Dr. Varela and Coumadin clinic.      Quality-Core Measures   Reviewed items::  Labs reviewed and Medications reviewed  Villegas catheter::  No Villegas  DVT prophylaxis pharmacological::  Heparin

## 2021-06-30 NOTE — PROGRESS NOTES
Nephrology Daily Progress Note    Date of Service  6/30/2021    Chief Complaint  46 y.o. 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    Interval Problem Update  6/24 -no urine output documented in the last 24 hours, but patient says she is urinating.  Repeat CT imaging shows bladder distention and mild left hydronephrosis.  Patient complains of ongoing right upper quadrant pain.  6/25-urine output 3.2 L in the last 24 hours with Villegas catheter in place.  Complains of ongoing abdominal pain.  Denies chest pain, shortness of breath.  6/26 - UOP 1.4L in last 24 hours. Patient somnolent today, unable to obtain ROS.   6/27-urine output 725 mL in the last 24 hours.  Villegas catheter replaced, and patient feels more comfortable.  Complains of some abdominal pain this morning.  6/28 -doing well, no complaints except mild abdominal pain  Creat better  6/29 feels better, improved abdominal pain  Creat improving  6/30 -doing well, no complaints  Creat improving  Review of Systems  Review of Systems   Constitutional: Negative for chills, fever, malaise/fatigue and weight loss.   HENT: Negative for congestion, hearing loss and sinus pain.    Eyes: Negative.    Respiratory: Negative for cough, hemoptysis, shortness of breath and wheezing.    Cardiovascular: Negative for chest pain, palpitations, orthopnea and leg swelling.   Gastrointestinal: Negative for abdominal pain, diarrhea, nausea and vomiting.   Genitourinary: Negative for dysuria, flank pain and hematuria.        Villegas catheter   Musculoskeletal: Negative for back pain, joint pain, myalgias and neck pain.   Skin: Negative.    All other systems reviewed and are negative.       Physical Exam  Temp:  [36.1 °C (97 °F)-37.1 °C (98.8 °F)] 37.1 °C (98.8  °F)  Pulse:  [79-98] 91  Resp:  [15-18] 15  BP: (127-150)/(73-97) 138/83  SpO2:  [95 %-98 %] 98 %    Physical Exam  Vitals and nursing note reviewed.   Constitutional:       General: She is not in acute distress.     Appearance: Normal appearance. She is well-developed.   HENT:      Head: Normocephalic and atraumatic.      Nose: Nose normal.      Mouth/Throat:      Mouth: Mucous membranes are moist.      Pharynx: Oropharynx is clear.   Eyes:      Conjunctiva/sclera: Conjunctivae normal.      Pupils: Pupils are equal, round, and reactive to light.   Neck:      Thyroid: No thyromegaly.   Cardiovascular:      Rate and Rhythm: Normal rate and regular rhythm.      Pulses: Normal pulses.      Heart sounds: Normal heart sounds. No friction rub. No gallop.    Pulmonary:      Effort: Pulmonary effort is normal. No respiratory distress.      Breath sounds: Normal breath sounds. No wheezing or rales.   Abdominal:      General: Bowel sounds are normal. There is no distension.      Palpations: Abdomen is soft. There is no mass.      Tenderness: There is no abdominal tenderness. There is no guarding.   Musculoskeletal:      Cervical back: Normal range of motion and neck supple.      Comments: Trace pedal edema   Skin:     General: Skin is warm.      Findings: No erythema or rash.   Neurological:      General: No focal deficit present.      Mental Status: She is alert and oriented to person, place, and time.   Psychiatric:         Mood and Affect: Mood normal.         Behavior: Behavior normal.         Thought Content: Thought content normal.         Judgment: Judgment normal.         Fluids    Intake/Output Summary (Last 24 hours) at 6/30/2021 1236  Last data filed at 6/30/2021 0346  Gross per 24 hour   Intake --   Output 1750 ml   Net -1750 ml       Laboratory  Labs reviewed, pertinent labs below.  Recent Labs     06/28/21  2101 06/29/21  0756 06/30/21  0222   WBC 9.6 11.8* 9.5   RBC 3.69* 3.49* 3.43*   HEMOGLOBIN 8.2* 7.9* 7.8*    HEMATOCRIT 28.6* 27.0* 26.4*   MCV 77.5* 77.4* 77.0*   MCH 22.2* 22.6* 22.7*   MCHC 28.7* 29.3* 29.5*   RDW 66.2* 67.9* 67.3*   PLATELETCT 318 305 317   MPV 9.8 9.6 10.0     Recent Labs     06/28/21  0114 06/29/21 0312 06/30/21 0222   SODIUM 135 140 138   POTASSIUM 3.7 3.9 3.6   CHLORIDE 108 109 106   CO2 18* 19* 22   GLUCOSE 93 84 89   BUN 52* 40* 36*   CREATININE 1.81* 1.56* 1.60*   CALCIUM 8.6 8.5 8.6     Recent Labs     06/28/21  0114 06/29/21 0312 06/30/21 0222   INR 1.22* 1.24* 1.20*           URINALYSIS:  Lab Results   Component Value Date/Time    COLORURINE DK Yellow 06/22/2021 1810    CLARITY Clear 06/22/2021 1810    SPECGRAVITY 1.007 06/22/2021 1810    PHURINE 6.0 06/22/2021 1810    KETONES Negative 06/22/2021 1810    PROTEINURIN Negative 06/22/2021 1810    BILIRUBINUR Moderate (A) 06/22/2021 1810    UROBILU 0.2 06/22/2021 1810    NITRITE Negative 06/22/2021 1810    LEUKESTERAS Negative 06/22/2021 1810    OCCULTBLOOD Large (A) 06/22/2021 1810     UPC  No results found for: TOTPROTUR No results found for: CREATININEU      Imaging interpreted by radiologist. Imaging reports reviewed with pertinent findings below  US-RENAL   Final Result      Mild prominence of the left renal pelvis, less than prior. No right hydronephrosis.      The urinary bladder is only partially decompressed by Villegas catheter. Correlate clinically for position of the catheter.      MR-BRAIN-W/O   Final Result      MRI of the brain without contrast within normal limits.      DX-CHEST-PORTABLE (1 VIEW)   Final Result      1.  Mild hypoinflation with minimal bibasilar atelectasis.   2.  No lobar pneumonia or pneumothorax.   3.  No gross mass.      CT-ABDOMEN-PELVIS W/O   Final Result      1.  Stranding surrounding the adrenal glands is improved.   2.  Dense right adrenal mass likely representing adrenal hemorrhage is mildly decreased in size compared to prior.   3.  Mild left hydronephrosis.   4.  Multiple mildly enlarged  retroperitoneal lymph nodes are not significantly changed.   5.  Density within the gallbladder may represent sludge/vicarious excretion of contrast.   6.  Trace right pleural fluid and bibasilar atelectasis.      US-RENAL   Final Result      1.  Mild left hydronephrosis.   2.  No right hydronephrosis.      AK-QOPCMJC-N/O   Final Result         1. Normal sized CBD. No CBD stone.   2. Thickening and heterogeneity of the bilateral adrenal glands with surrounding stranding, incompletely evaluated but concerning for hemorrhage.   3. No gallstone. Mild pericholecystic fluid could be reactive change due to adjacent adrenal hemorrhage.   4. Worsening left hydronephrosis, concerning for distal obstruction      DX-CHEST-LIMITED (1 VIEW)   Final Result         No acute cardiopulmonary abnormalities are identified.      EC-ECHOCARDIOGRAM COMPLETE W/O CONT   Final Result      CT-ABDOMEN-PELVIS WITH   Final Result      1.  New right adrenal mass likely represents hemorrhage. Thickening of the left adrenal gland is concerning for developing hemorrhage as well.      2.  Low attenuation filling defect in the superior vena cava is consistent with thrombus. Echocardiogram may be helpful for further evaluation.      3.  Prominent retroperitoneal lymph nodes with nonspecific retroperitoneal inflammatory stranding as reported on the prior CT.      4.  Small bilateral pleural effusions, right greater than left. Adjacent airspace disease likely atelectasis.            Comment: Results discussed with Dr. Gunter at 9:32 AM      [id number] VIEW   Final Result         No specific finding to suggest small bowel obstruction.      US-PELVIC COMPLETE (TRANSABDOMINAL/TRANSVAGINAL) (COMBO)   Final Result         1.  Heterogeneous appearance of the uterus with large heterogeneous uterine mass, appearance most compatible with uterine fibroid.   2.  Heterogeneous lesion in the left ovary, could represent hemorrhagic cyst or endometrioma,  otherwise indeterminate, recommend follow-up sonogram in 6 weeks for repeat characterization and evaluation of stability.   3.  Thickened endometrial stripe, likely proliferative changes, consider endometrial pathology with additional follow-up as clinically appropriate.   4.  Nabothian cyst      US-RUQ   Final Result      1.  No evidence of gallstones or biliary ductal dilatation.   2.  1.9 cm cystic lesion in the right kidney. Mural nodularity is not excluded and further evaluation with renal protocol MRI is recommended.         CT-ABDOMEN-PELVIS WITH   Final Result      1.  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   2.  Hypodensity in the cervix could be artifactual or could indicate underlying cervical mass. Suggest correlation with physical exam and gynecological history.   3.  19 mm hypodense RIGHT renal lesion, likely but not definitely a cyst. Suggest further assessment with ultrasound when clinically appropriate.                     Current Facility-Administered Medications   Medication Dose Route Frequency Provider Last Rate Last Admin   • omeprazole (PRILOSEC) capsule 20 mg  20 mg Oral DAILY Yuri Ivy M.D.   20 mg at 06/30/21 1200   • guaiFENesin (ROBITUSSIN) 100 MG/5ML solution 200 mg  10 mL Oral Q4HRS PRN Sylvia Mueller M.D.   200 mg at 06/29/21 0041   • amLODIPine (NORVASC) tablet 10 mg  10 mg Oral Q DAY Victoria Powell M.D.   10 mg at 06/30/21 0542   • benzonatate (TESSALON) capsule 100 mg  100 mg Oral TID PRN Yuri Ivy M.D.   100 mg at 06/28/21 2238   • nystatin (MYCOSTATIN) 148669 UNIT/ML suspension 500,000 Units  5 mL Swish & Swallow 4X/DAY Jaylen Bland M.D.   500,000 Units at 06/30/21 1200   • predniSONE (DELTASONE) tablet 60 mg  60 mg Oral DAILY Da Varela M.D.   60 mg at 06/30/21 0542   • ferric gluconate complex (FERRLECIT) 250 mg in  mL IVPB  250 mg Intravenous Q24HR Jaylen ORANTES  ANUP Bland   Stopped at 06/29/21 1759   • acetaminophen (TYLENOL) tablet 1,000 mg  1,000 mg Oral Q6HRS Dominguez Reilly M.D.   1,000 mg at 06/29/21 1150   • ipratropium-albuterol (DUONEB) nebulizer solution  3 mL Nebulization Q4H PRN (RT) Dominguez Reilly M.D.   3 mL at 06/24/21 0507   • heparin infusion 25,000 units in 500 mL 0.45% NACL  0-30 Units/kg/hr Intravenous Continuous Da Varela M.D. 17.7 mL/hr at 06/29/21 1922 11 Units/kg/hr at 06/29/21 1922   • baclofen (LIORESAL) tablet 10 mg  10 mg Oral TID PRN Salina Zuniga D.O.   10 mg at 06/26/21 2306   • oxyCODONE immediate-release (ROXICODONE) tablet 5 mg  5 mg Oral Q4HRS PRN Salina Zuniga, D.O.   5 mg at 06/27/21 0807   • oxyCODONE immediate release (ROXICODONE) tablet 10 mg  10 mg Oral Q4HRS PRN Salina Zuniga, D.O.   10 mg at 06/25/21 1615   • HYDROmorphone (Dilaudid) injection 1 mg  1 mg Intravenous Q4HRS PRN Piotr Gunter, D.O.   1 mg at 06/24/21 0158   • lidocaine (LIDODERM) 5 % 1 Patch  1 Patch Transdermal Q24HR Gareth Castro M.D.   1 Patch at 06/27/21 2135   • senna-docusate (PERICOLACE or SENOKOT S) 8.6-50 MG per tablet 2 tablet  2 tablet Oral BID Jamar Mo M.D.   2 tablet at 06/24/21 1741    And   • polyethylene glycol/lytes (MIRALAX) PACKET 1 Packet  1 Packet Oral QDAY PRN Jamar Mo M.D.        And   • magnesium hydroxide (MILK OF MAGNESIA) suspension 30 mL  30 mL Oral QDAY PRN Jamar Mo M.D.        And   • bisacodyl (DULCOLAX) suppository 10 mg  10 mg Rectal QDAY PRCORNELIA Mo M.D.   10 mg at 06/15/21 1201   • cloNIDine (CATAPRES) tablet 0.1 mg  0.1 mg Oral Q6HRS PRCORNELIA Mo M.D.       • enalaprilat (VASOTEC) injection 1.25 mg  1.25 mg Intravenous Q6HRS PRCORNELIA Mo M.D.       • labetalol (NORMODYNE/TRANDATE) injection 10 mg  10 mg Intravenous Q4HRS PRCORNELIA Mo M.D.       • ondansetron (ZOFRAN) syringe/vial injection 4 mg  4 mg Intravenous Q4HRS CRISTAL Mo M.D.   4 mg at 06/16/21 1355   •  ondansetron (ZOFRAN ODT) dispertab 4 mg  4 mg Oral Q4HRS PRN Jamar Mo M.D.       • promethazine (PHENERGAN) tablet 12.5-25 mg  12.5-25 mg Oral Q4HRS PRCORNELIA Mo M.D.       • promethazine (PHENERGAN) suppository 12.5-25 mg  12.5-25 mg Rectal Q4HRS PRCORNELIA Mo M.D.       • prochlorperazine (COMPAZINE) injection 5-10 mg  5-10 mg Intravenous Q4HRS PRCORNELIA Mo M.D.             Assessment/Plan  46 y.o. 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     1.  Acute kidney injury, nonoliguric: could be secondary to urinary retention, NICKOLAS       Improving -to monitor closely    2.  HTN: elevated BP under better control     3.  Left renal hydronephrosis, mild on kidney ultrasound 6/22/2021 and again on CT scan 6/24/2021.  Accompanied by bladder distention.  Continue Bowens catheter, placed 6/24/20, repeated kidney ultrasound on 6/28/2021 revealed improvement.      4.  Electrolytes: well controlled    5.  Microcytic anemia, persistent.  Concern for antiphospholipid antibody syndrome with intravascular clotting and hemolysis.  Defer further management to primary team and hematology oncology.  Check CBC daily.    6. Metabolic acidosis:corrected      Recs; d/c bowens catheter             Daily labs             Close BP monitoring             Avoid nephrotoxic agents             Will follow

## 2021-06-30 NOTE — PROGRESS NOTES
12hr cc complete    Monitor Summary:  Sinus rhythm 81-97 bpm  Ectopy: NA  0.14 / 0.08 / 0.38

## 2021-07-01 LAB
ANION GAP SERPL CALC-SCNC: 10 MMOL/L (ref 7–16)
BASOPHILS # BLD AUTO: 0.2 % (ref 0–1.8)
BASOPHILS # BLD: 0.02 K/UL (ref 0–0.12)
BUN SERPL-MCNC: 32 MG/DL (ref 8–22)
CALCIUM SERPL-MCNC: 9 MG/DL (ref 8.5–10.5)
CHLORIDE SERPL-SCNC: 107 MMOL/L (ref 96–112)
CO2 SERPL-SCNC: 22 MMOL/L (ref 20–33)
CREAT SERPL-MCNC: 1.36 MG/DL (ref 0.5–1.4)
EOSINOPHIL # BLD AUTO: 0.04 K/UL (ref 0–0.51)
EOSINOPHIL NFR BLD: 0.4 % (ref 0–6.9)
ERYTHROCYTE [DISTWIDTH] IN BLOOD BY AUTOMATED COUNT: 72.1 FL (ref 35.9–50)
GLUCOSE SERPL-MCNC: 112 MG/DL (ref 65–99)
HCT VFR BLD AUTO: 27 % (ref 37–47)
HGB BLD-MCNC: 8.1 G/DL (ref 12–16)
IMM GRANULOCYTES # BLD AUTO: 0.1 K/UL (ref 0–0.11)
IMM GRANULOCYTES NFR BLD AUTO: 1 % (ref 0–0.9)
INR PPP: 1.12 (ref 0.87–1.13)
LYMPHOCYTES # BLD AUTO: 1.78 K/UL (ref 1–4.8)
LYMPHOCYTES NFR BLD: 17.5 % (ref 22–41)
MCH RBC QN AUTO: 23.5 PG (ref 27–33)
MCHC RBC AUTO-ENTMCNC: 30 G/DL (ref 33.6–35)
MCV RBC AUTO: 78.3 FL (ref 81.4–97.8)
MONOCYTES # BLD AUTO: 0.77 K/UL (ref 0–0.85)
MONOCYTES NFR BLD AUTO: 7.5 % (ref 0–13.4)
NEUTROPHILS # BLD AUTO: 7.49 K/UL (ref 2–7.15)
NEUTROPHILS NFR BLD: 73.4 % (ref 44–72)
NRBC # BLD AUTO: 0 K/UL
NRBC BLD-RTO: 0 /100 WBC
PLATELET # BLD AUTO: 295 K/UL (ref 164–446)
PMV BLD AUTO: 9.8 FL (ref 9–12.9)
POTASSIUM SERPL-SCNC: 3.4 MMOL/L (ref 3.6–5.5)
PROTHROMBIN TIME: 14.1 SEC (ref 12–14.6)
RBC # BLD AUTO: 3.45 M/UL (ref 4.2–5.4)
SODIUM SERPL-SCNC: 139 MMOL/L (ref 135–145)
WBC # BLD AUTO: 10.2 K/UL (ref 4.8–10.8)

## 2021-07-01 PROCEDURE — 700102 HCHG RX REV CODE 250 W/ 637 OVERRIDE(OP): Performed by: STUDENT IN AN ORGANIZED HEALTH CARE EDUCATION/TRAINING PROGRAM

## 2021-07-01 PROCEDURE — 80048 BASIC METABOLIC PNL TOTAL CA: CPT

## 2021-07-01 PROCEDURE — 700111 HCHG RX REV CODE 636 W/ 250 OVERRIDE (IP): Performed by: STUDENT IN AN ORGANIZED HEALTH CARE EDUCATION/TRAINING PROGRAM

## 2021-07-01 PROCEDURE — A9270 NON-COVERED ITEM OR SERVICE: HCPCS | Performed by: STUDENT IN AN ORGANIZED HEALTH CARE EDUCATION/TRAINING PROGRAM

## 2021-07-01 PROCEDURE — A9270 NON-COVERED ITEM OR SERVICE: HCPCS | Performed by: INTERNAL MEDICINE

## 2021-07-01 PROCEDURE — 85025 COMPLETE CBC W/AUTO DIFF WBC: CPT

## 2021-07-01 PROCEDURE — 85610 PROTHROMBIN TIME: CPT

## 2021-07-01 PROCEDURE — 700111 HCHG RX REV CODE 636 W/ 250 OVERRIDE (IP): Performed by: INTERNAL MEDICINE

## 2021-07-01 PROCEDURE — 700102 HCHG RX REV CODE 250 W/ 637 OVERRIDE(OP): Performed by: INTERNAL MEDICINE

## 2021-07-01 PROCEDURE — 99232 SBSQ HOSP IP/OBS MODERATE 35: CPT | Performed by: INTERNAL MEDICINE

## 2021-07-01 PROCEDURE — 36415 COLL VENOUS BLD VENIPUNCTURE: CPT

## 2021-07-01 PROCEDURE — 770001 HCHG ROOM/CARE - MED/SURG/GYN PRIV*

## 2021-07-01 RX ORDER — ACETAMINOPHEN 500 MG
1000 TABLET ORAL EVERY 6 HOURS PRN
Status: DISCONTINUED | OUTPATIENT
Start: 2021-07-01 | End: 2021-07-02 | Stop reason: HOSPADM

## 2021-07-01 RX ORDER — POTASSIUM CHLORIDE 20 MEQ/1
20 TABLET, EXTENDED RELEASE ORAL 3 TIMES DAILY
Status: COMPLETED | OUTPATIENT
Start: 2021-07-01 | End: 2021-07-01

## 2021-07-01 RX ORDER — FERROUS SULFATE 325(65) MG
325 TABLET ORAL
Status: DISCONTINUED | OUTPATIENT
Start: 2021-07-02 | End: 2021-07-02 | Stop reason: HOSPADM

## 2021-07-01 RX ORDER — WARFARIN SODIUM 7.5 MG/1
7.5 TABLET ORAL DAILY
Status: DISCONTINUED | OUTPATIENT
Start: 2021-07-02 | End: 2021-07-02 | Stop reason: HOSPADM

## 2021-07-01 RX ORDER — WARFARIN SODIUM 10 MG/1
10 TABLET ORAL
Status: COMPLETED | OUTPATIENT
Start: 2021-07-01 | End: 2021-07-01

## 2021-07-01 RX ADMIN — GUAIFENESIN 200 MG: 100 SOLUTION ORAL at 02:21

## 2021-07-01 RX ADMIN — NYSTATIN 500000 UNITS: 100000 SUSPENSION ORAL at 16:42

## 2021-07-01 RX ADMIN — POTASSIUM CHLORIDE 20 MEQ: 1500 TABLET, EXTENDED RELEASE ORAL at 07:59

## 2021-07-01 RX ADMIN — AMLODIPINE BESYLATE 10 MG: 10 TABLET ORAL at 05:47

## 2021-07-01 RX ADMIN — OMEPRAZOLE 20 MG: 20 CAPSULE, DELAYED RELEASE ORAL at 05:47

## 2021-07-01 RX ADMIN — ENOXAPARIN SODIUM 80 MG: 80 INJECTION SUBCUTANEOUS at 07:58

## 2021-07-01 RX ADMIN — GUAIFENESIN 100 MG: 100 SOLUTION ORAL at 21:53

## 2021-07-01 RX ADMIN — NYSTATIN 500000 UNITS: 100000 SUSPENSION ORAL at 05:47

## 2021-07-01 RX ADMIN — POTASSIUM CHLORIDE 20 MEQ: 1500 TABLET, EXTENDED RELEASE ORAL at 12:40

## 2021-07-01 RX ADMIN — NYSTATIN 500000 UNITS: 100000 SUSPENSION ORAL at 12:40

## 2021-07-01 RX ADMIN — POTASSIUM CHLORIDE 20 MEQ: 1500 TABLET, EXTENDED RELEASE ORAL at 16:42

## 2021-07-01 RX ADMIN — WARFARIN SODIUM 10 MG: 10 TABLET ORAL at 16:42

## 2021-07-01 RX ADMIN — NYSTATIN 500000 UNITS: 100000 SUSPENSION ORAL at 20:03

## 2021-07-01 RX ADMIN — ENOXAPARIN SODIUM 80 MG: 80 INJECTION SUBCUTANEOUS at 16:42

## 2021-07-01 RX ADMIN — PREDNISONE 60 MG: 50 TABLET ORAL at 05:47

## 2021-07-01 ASSESSMENT — ENCOUNTER SYMPTOMS
VOMITING: 0
NAUSEA: 0
SHORTNESS OF BREATH: 0
EYES NEGATIVE: 1
ORTHOPNEA: 0
WHEEZING: 0
PALPITATIONS: 0
ABDOMINAL PAIN: 0
SINUS PAIN: 0
HEMOPTYSIS: 0
FEVER: 0
CHILLS: 0
WEIGHT LOSS: 0
FLANK PAIN: 0
COUGH: 0

## 2021-07-01 NOTE — PROGRESS NOTES
[facility] [place] [facility] PROGRESS NOTE     Attending: Tamie Rodriguez M.D.  Senior Resident: Yuri Ivy M.D. (PGY-3)  Andrew Resident: Suzanne Mei M.D. (PGY-1)  PATIENT: Jaclyn Olvera; [id number]; 1975    ID: 46 y.o. female admitted on 6/14 for left-sided abdominal pain x4 days.  She was originally being managed on the oncology floor but was transferred to telemetry for a higher level of monitoring.  She has had a complex medical course with multiple imaging studies and multiple consultants including: gastroenterology, vascular surgery, general surgery, hematology/oncology, and nephrology.  She is currently being managed for catastrophic antiphospholipid syndrome and is being followed actively by hematology/oncology and nephrology. Clinically has been improving over the last several days.     Overnight Events: No acute events overnight     Subjective: This morning she was feeling well. She denies any more pain in her abdomen and has been up walking around more. She denies any new bleeding, or other symptoms.     OBJECTIVE:  Temp:  [36.7 °C (98.1 °F)-37.2 °C (98.9 °F)] 36.7 °C (98.1 °F)  Pulse:  [81-90] 85  Resp:  [16] 16  BP: (140-150)/(84-98) 145/90  SpO2:  [94 %-97 %] 96 %    Intake/Output Summary (Last 24 hours) at 7/1/2021 1411  Last data filed at 7/1/2021 1004  Gross per 24 hour   Intake 100 ml   Output 2000 ml   Net -1900 ml       PE:  General: No acute distress, afebrile, resting comfortably, conversational   HEENT: NC/AT. EOMI.   Cardiovascular: RRR without murmurs, rubs, heaves. Normal capillary refill   Respiratory: CTAB, no tachypnea or retractions   Abdomen: normal bowel sounds, soft, nontender, nondistended, no masses, no organomegaly   EXT:  DOMINGUEZ, no edema  Skin: No erythema/lesions, scattered ecchymoses on arms   Neuro: Non-focal, alert and orientated     LABS:  Recent Labs     06/29/21  0756 06/30/21  0222 07/01/21  0058   WBC 11.8* 9.5 10.2   RBC 3.49* 3.43* 3.45*   HEMOGLOBIN 7.9* 7.8*  8.1*   HEMATOCRIT 27.0* 26.4* 27.0*   MCV 77.4* 77.0* 78.3*   MCH 22.6* 22.7* 23.5*   RDW 67.9* 67.3* 72.1*   PLATELETCT 305 317 295   MPV 9.6 10.0 9.8   NEUTSPOLYS 93.90* 74.30* 73.40*   LYMPHOCYTES 3.50* 17.30* 17.50*   MONOCYTES 0.90 6.60 7.50   EOSINOPHILS 1.70 0.60 0.40   BASOPHILS 0.00 0.00 0.20   RBCMORPHOLO Present  --   --      Recent Labs     06/29/21 0312 06/30/21 0222 07/01/21  0058   SODIUM 140 138 139   POTASSIUM 3.9 3.6 3.4*   CHLORIDE 109 106 107   CO2 19* 22 22   BUN 40* 36* 32*   CREATININE 1.56* 1.60* 1.36   CALCIUM 8.5 8.6 9.0   ALBUMIN 2.7* 2.9*  --      Estimated GFR/CRCL = Estimated Creatinine Clearance: 60.1 mL/min (by C-G formula based on SCr of 1.36 mg/dL).  Recent Labs     06/29/21 0312 06/30/21 0222 07/01/21  0058   GLUCOSE 84 89 112*     Recent Labs     06/29/21 0312 06/30/21 0222 07/01/21  0755   ASTSGOT 47* 33  --    ALTSGPT 48 46  --    TBILIRUBIN 0.9 0.7  --    ALKPHOSPHAT 82 80  --    GLOBULIN 3.7* 3.4  --    INR 1.24* 1.20* 1.12             Recent Labs     06/29/21 0312 06/30/21 0222 07/01/21  0755   INR 1.24* 1.20* 1.12       MICROBIOLOGY: No new results    IMAGING: No new results      MEDS:  Current Facility-Administered Medications   Medication Last Admin   • enoxaparin (LOVENOX) inj 80 mg 80 mg at 07/01/21 0758   • MD Alert...Warfarin per Pharmacy     • potassium chloride SA (Kdur) tablet 20 mEq 20 mEq at 07/01/21 1240   • acetaminophen (TYLENOL) tablet 1,000 mg     • omeprazole (PRILOSEC) capsule 20 mg 20 mg at 07/01/21 0547   • guaiFENesin (ROBITUSSIN) 100 MG/5ML solution 200 mg 200 mg at 07/01/21 0221   • amLODIPine (NORVASC) tablet 10 mg 10 mg at 07/01/21 0547   • benzonatate (TESSALON) capsule 100 mg 100 mg at 06/28/21 2238   • nystatin (MYCOSTATIN) 109648 UNIT/ML suspension 500,000 Units 500,000 Units at 07/01/21 1240   • predniSONE (DELTASONE) tablet 60 mg 60 mg at 07/01/21 0547   • ipratropium-albuterol (DUONEB) nebulizer solution 3 mL at 06/24/21 0507   •  baclofen (LIORESAL) tablet 10 mg 10 mg at 06/26/21 2306   • oxyCODONE immediate-release (ROXICODONE) tablet 5 mg 5 mg at 06/27/21 0807   • lidocaine (LIDODERM) 5 % 1 Patch 1 Patch at 06/27/21 2135   • senna-docusate (PERICOLACE or SENOKOT S) 8.6-50 MG per tablet 2 tablet 2 tablet at 06/24/21 1741    And   • polyethylene glycol/lytes (MIRALAX) PACKET 1 Packet      And   • magnesium hydroxide (MILK OF MAGNESIA) suspension 30 mL      And   • bisacodyl (DULCOLAX) suppository 10 mg 10 mg at 06/15/21 1201   • cloNIDine (CATAPRES) tablet 0.1 mg     • enalaprilat (VASOTEC) injection 1.25 mg     • labetalol (NORMODYNE/TRANDATE) injection 10 mg     • ondansetron (ZOFRAN) syringe/vial injection 4 mg 4 mg at 06/16/21 1355   • ondansetron (ZOFRAN ODT) dispertab 4 mg     • promethazine (PHENERGAN) tablet 12.5-25 mg     • promethazine (PHENERGAN) suppository 12.5-25 mg     • prochlorperazine (COMPAZINE) injection 5-10 mg         ASSESSMENT AND PLAN:  46 y.o. female admitted on 6/14 for left-sided abdominal pain x4 days.  She was originally being managed on the oncology floor but was transferred to telemetry for a higher level of monitoring.  She has had a complex medical course with multiple imaging studies and multiple consultants including: gastroenterology, vascular surgery, general surgery, hematology/oncology, and nephrology.  She is currently being managed for catastrophic antiphospholipid syndrome and is being followed actively by hematology/oncology and nephrology. Has been improving over the last several days.     #Catastrophic antiphospholipid syndrome  -Hematology/oncology actively following  -Patient received IVIG x 2 on 6/22  -Patient received Solu-Medrol 6/22 x 2 days  -Patient transitioned to prednisone 80 mg daily on 6/24 with a taper  -HIT panel negative and patient was started on a heparin drip on 6/23  -DIC/HIT/TTP all ruled out per heme/onc   -Trop leak minor on 6/25 - no indication for antiplatelet   -CXR 6/25  showed no alvelolar hemorrhage   -Per hematology, Coumadin is recommended to source of anticoagulation as DOAC's have not been shown to be as effective in antiphospholipid syndrome.  Plan:  -Switched heparin drip to Lovenox 1 mg/kg twice daily today  -Also started warfarin per pharmacy today  -We will continue to monitor factor Xa levels  -She will need close follow-up at Coumadin clinic after discharge  -Closely monitor for DVT/arterial thrombotic events  -Continue prednisone taper - currently pred 60.  At discharge will taper to prednisone 40 mg daily, decreasing by 10 mg each week, and 5 mg for her final week.  -Continue to follow with hematology, recs appreciated  -Will need appointment with Heme/Onc within 1-2 weeks after discharge  -Will also need close follow-up at Coumadin clinic     #VIDAL, steady   #Urinary retention   -Mild left hydronephrosis still seen on CT abd done 6/24  -Microalbumin-Cr ratio of 39 on 6/24  -Protein-Cr ratio of 189 on 6/24  -Bladder scan 6/24 showed >700mL  -Villegas catheter placed 6/24 per nephro recs  -Kidney function improving the last few days  -eGFR 25 --> 30 ---> 36 ---> 35 -->42  -Repeat ultrasound on 6/28 showed some improvement in hydronephrosis  Plan:  -Voiding trial today  -Nephrology following, recs appreciated  -Will need to follow up with nephrology within 1-2 weeks of discharge     #Cough  #GERD  -Patient still complaining of burping after meals and a cough, but improved today  Plan:   -Continue omeprazole 20 mg daily  -Tessalon pearls and Robitussin as needed for coughing    -Continue to follow symptoms, if patient develops fever/productive cough consider different etiology  -Should follow up with PCP/GI after discharge     #Oral candidiasis  -Patient noted to have some thrush  -Started on nystatin suspension 6/28  -Continues to improve  Plan:  -Continue nystatin rinses until fully resolved        #Microcytic anemia  -Likely multifactorial in etiology  -Iron studies show  iron levels of 32 but normal ferritin  -Johan test was negative  -Elevated LDH which could be related to antiphospholipid syndrome versus active hemolysis  -Repeat CT abdomen/pelvis without contrast on 6/24 showed improvement of the hemorrhage located in both adrenal glands  -Transfused 1 U PRBCs on 6/24 for Hgb 6.8  -Daily IV iron started 6/28  -Hgb has been stable ~8 last several days  Plan:  -Daily CBC  -3 doses total of IV iron  -Transfuse when hemoglobin less than 7     #Elevated LFTs, resolved  -Patient's LFTs are elevated on 6/22 at QSU970, ATLT 76, and Alk Phos 187  -MRCP completed on 6/22 showed no biliary obstruction  -Hepatitis panel negative  -Possibly due to ischemic hepatopathy  -AST, ALT, and Alk Phos on 6/30 were 33, 46, and 80 respectively  Plan:  -Repeat LFTs only if clinically suspected     #Thrombus of SVC  -Discovered on CT abdomen/pelvis on 6/18  -Vascular surgery consulted, recommended TTE and RADHIKA if necessary to confirm SVC thrombus  -RADHIKA never done but started on heparin anyway for antiphospholipid syndrome   -Spoke with vascular surgeon 6/30 who said no need for anymore imaging or follow-up with them  Plan:  -Switch heparin to Lovenox 1mg/kg BID today  -Also starting warfarin with dosing per pharmacy today     #Intra-abdominal infection, resolved  -Patient had 3/4 SIRS criteria including fever, tachycardia, leukocytosis  -Patient was started on cefepime and Flagyl on 6/22  -Patient continued to remain afebrile and pain improving   -Abx stopped on 6/26      #Thrombocytopenia  #Vaginal bleeding  #Rectal bleeding  #Hemorrhage of both adrenal glands  -All likely secondary to catastrophic antiphospholipid syndrome  -Hematology/oncology actively following  -Platelets have been much improved recently  -CT abdomen 6/24 showed improvement in adrenal hemorrhage  -Has not shown signs of bleeding recently  Plan:  -Closely monitor for bleeding  -Continue to trend CBCs     #Retroperitoneal  lymphadenopathy  -Noted on CT abdomen/pelvis on 6/18  -Stable on CT abdomen/pelvis on 6/24  -Unclear etiology at this point     #Fibroids  -Found on pelvic ultrasound completed on 6/14  -Follow-up outpatient with gynecology     #Ovarian lesion  -Found in the left ovary on pelvic ultrasound completed 6/14  -Recommended follow-up in 6 weeks with repeat ultrasound  -Follow-up with outpatient gynecology     #Lesion of cervix  -Picked up incidentally on abdominal CT which stated that this could be artifactual or indicate an underlying cervical mass  -Cervix was unable to be visualized by pelvic exam in the emergency department  -Outpatient follow-up with gynecology as recommended     #Right renal lesion  -Renal protocol MRI is advised to evaluate further  -Can be completed as outpatient     #H.pylori infection  -Recently diagnosed at [facility]  -Patient started on triple therapy but did not complete secondary to abdominal pain  -EGD completed 6/17 took biopsies to see if infection have resolved  -Biopsy showed no H. Pylori present  Plan:  -Continue daily omeprazole 20mg     Disposition: Inpatient for continued monitoring and anticoagulation for catastrophic antiphospholipid antibody syndrome     #Core Measures   VTE PPx: Lovenox 1mg/kg BID, and warfarin  Abx: None   Lines/Tubes: PIV/Villegas  Fluids: None   Diet: Regular  Code Status: Full     This patient is a Telemetry Block (T834 - T835) patient.      Yuri Ivy M.D.   PGY-3  [facility]   [phone]

## 2021-07-01 NOTE — CARE PLAN
The patient is Stable - Low risk of patient condition declining or worsening    Shift Goals  Clinical Goals: Monitor AntiXa; DC Plan  Patient Goals: Rest  Family Goals: Talk to Doctor    Progress made toward(s) clinical / shift goals:      Problem: Pain - Standard  Goal: Alleviation of pain or a reduction in pain to the patient’s comfort goal  Outcome: Progressing   Q4 pain assessments in place. Pt educated on pain scale. RN aware of pt's goal pain. PRN medication orders followed.    Problem: Fall Risk  Goal: Patient will remain free from falls  Outcome: Progressing   Bonner tracy fall scale utilized. Bed alarm in place. Pt is up self. Educated pt and family to call for appropriate assistance prior to ambulating.      Patient is not progressing towards the following goals:

## 2021-07-01 NOTE — PROGRESS NOTES
Oncology/Hematology Progress Note               Author: Anjali Loomis M.D. Date & Time created: 7/1/2021  3:58 PM     CC: catastrophic antiphospholipid antibody syndrome    Interval History:  No acute events overnight. Her mother is at the bedside today. She has no new concerns. No pain.    Review of Systems:  Review of Systems   Constitutional: Positive for malaise/fatigue.   All other systems reviewed and are negative.    Physical Exam:  Physical Exam  Constitutional:       General: She is not in acute distress.     Appearance: Normal appearance.   HENT:      Head: Normocephalic and atraumatic.   Eyes:      General: No scleral icterus.     Conjunctiva/sclera: Conjunctivae normal.   Cardiovascular:      Rate and Rhythm: Normal rate.   Pulmonary:      Effort: Pulmonary effort is normal. No respiratory distress.   Neurological:      Mental Status: She is alert and oriented to person, place, and time.   Psychiatric:         Mood and Affect: Mood normal.         Behavior: Behavior normal.         Thought Content: Thought content normal.         Judgment: Judgment normal.       Labs:          Recent Labs     06/29/21 0312 06/30/21 0222 07/01/21 0058   SODIUM 140 138 139   POTASSIUM 3.9 3.6 3.4*   CHLORIDE 109 106 107   CO2 19* 22 22   BUN 40* 36* 32*   CREATININE 1.56* 1.60* 1.36   CALCIUM 8.5 8.6 9.0     Recent Labs     06/29/21 0312 06/30/21 0222 07/01/21  0058   ALTSGPT 48 46  --    ASTSGOT 47* 33  --    ALKPHOSPHAT 82 80  --    TBILIRUBIN 0.9 0.7  --    GLUCOSE 84 89 112*     Recent Labs     06/28/21  2101 06/29/21 0312 06/29/21  0756 06/30/21 0222 07/01/21  0058 07/01/21  0755   RBC   < >  --  3.49* 3.43* 3.45*  --    HEMOGLOBIN   < >  --  7.9* 7.8* 8.1*  --    HEMATOCRIT   < >  --  27.0* 26.4* 27.0*  --    PLATELETCT   < >  --  305 317 295  --    PROTHROMBTM  --  15.2*  --  14.9*  --  14.1   INR  --  1.24*  --  1.20*  --  1.12    < > = values in this interval not displayed.     Recent Labs      21  0756 21  0058   WBC   < >  --  11.8* 9.5 10.2   NEUTSPOLYS   < >  --  93.90* 74.30* 73.40*   LYMPHOCYTES   < >  --  3.50* 17.30* 17.50*   MONOCYTES   < >  --  0.90 6.60 7.50   EOSINOPHILS   < >  --  1.70 0.60 0.40   BASOPHILS   < >  --  0.00 0.00 0.20   ASTSGOT  --  47*  --  33  --    ALTSGPT  --  48  --  46  --    ALKPHOSPHAT  --  82  --  80  --    TBILIRUBIN  --  0.9  --  0.7  --     < > = values in this interval not displayed.     Recent Labs     21  0058   SODIUM 140 138 139   POTASSIUM 3.9 3.6 3.4*   CHLORIDE 109 106 107   CO2 19* 22 22   GLUCOSE 84 89 112*   BUN 40* 36* 32*   CREATININE 1.56* 1.60* 1.36   CALCIUM 8.5 8.6 9.0     Hemodynamics:  Temp (24hrs), Av.9 °C (98.4 °F), Min:36.7 °C (98.1 °F), Max:37.2 °C (98.9 °F)  Temperature: 36.8 °C (98.3 °F)  Pulse  Av  Min: 59  Max: 128   Blood Pressure: 146/94     Respiratory:    Respiration: 16, Pulse Oximetry: 96 %     Work Of Breathing / Effort: Within Normal Limits  RUL Breath Sounds: Clear, RML Breath Sounds: Diminished, RLL Breath Sounds: Diminished, BLAYNE Breath Sounds: Clear, LLL Breath Sounds: Diminished  Fluids:    Intake/Output Summary (Last 24 hours) at 2021 1111  Last data filed at 2021 0900  Gross per 24 hour   Intake --   Output 900 ml   Net -900 ml     Weight: 83 kg (182 lb 15.7 oz)  GI/Nutrition:  Orders Placed This Encounter   Procedures   • Diet Order Diet: Regular     Standing Status:   Standing     Number of Occurrences:   1     Order Specific Question:   Diet:     Answer:   Regular [1]     Medical Decision Making, by Problem:  Active Hospital Problems    Diagnosis    • *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 pylori infection [A04.8]    • Hyponatremia [E87.1]    • Kidney lesion, native, right [N28.9]    • Lesion of cervix [N88.9]    • Leukocytosis [D72.829]        Assessment and Plan:  # Catastrophic antiphospholipid antibody syndrome- triple positive APLA with presumed bilateral adrenal hemorrhage secondary to micro-thrombus/thrombotic storm along with liver involvement,?  possible kidney involvement, thrombocytopenia.  - No TTP ,DIC, TMA, HIT.  CUONG ANCA Hep/HIV negative    -Complicated case due to bilateral adrenal hemorrhage presumed to be secondary to micro thrombus/thrombotic storm, this has now stablizied    - Initially received high-dose Solu-Medrol, switched to prednisone 60 mg with plans to gradually taper off over the next few weeks. I would discharge her on prednisone 40 mg, then taper by 10 mg weekly, then 5 mg x 7 days, then stop.    -Follow-up CT scan from 6/24/2021 improving to stable adrenal hemorrhage nonspecific retroperitoneal lymphadenopathy, possibly reactive, will need to be followed up as an outpatient  - H/H remaining stable.  - monitor for any DVT/arterial thrombotic events like stroke heart attack etc.  - Stopped heparin gtt today and started Lovenox 1 mg/kg SQ BID with a bridge to warfarin. She will need at least 5 days of overlap and continue until INR Is in range between 2.5-3.5.  - Need life long anticoagulation on Coumadin given triple positive APLA , per TRAPS data aim for iNR 2.5-3.5 range as she has mild baseline PT elevation.    # Thrombocytopenia - platelets down to 64 on 6/22/21  - now normalized since 6/23/21 with IVIg and steroids  - monitor closely as prednisone is tapered, platelets today are 295    # VIDAL - her creatinine is improving, down to 1.32 today, per  pharmacy creatinine clearance is above 50 and ok for lovenox.  - monitor    -Anemia-multifactorial. Pre existing HEATHER . She had mucocutaneous/vaginal bleeding and adrenal , possible RP hemorrhage which appears to be stable/ improving.   - Receiving IV iron, switching to PO  Transfuse for HgB < 7  Or any bleed  Johan test was negative.      -Risk of adrenal insufficiency due to bilateral adrenal hemorrhage.  Cortisol level okay, continue steroids with taper    High complexity, High Risk Patient  - Will continue to follow, on discharge she will need follow up with Dr. Varela and Coumadin clinic.      Quality-Core Measures   Reviewed items::  Labs reviewed and Medications reviewed  Villegas catheter::  No Villegas  DVT prophylaxis pharmacological::  Heparin

## 2021-07-01 NOTE — DISCHARGE PLANNING
Anticipated Discharge Disposition: Home when cleared    Action: pt pending medical clearance, pt to dc with anticoagulant, will need to be ordered to check if prior auth required for pts [employer] insurance.    Barriers to Discharge: medical clearance    Plan: f/u with medical team and pt, and pharmacy if auth is needed    UPATE: Contacted pts Rx, lovenox is covered by insurance, pt cost is $34.80. pt notified at bedside and is amenable.

## 2021-07-01 NOTE — PROGRESS NOTES
Assumed care of patient. Heparin drip DC'd. Stopped with night RN. Lovenox and potassium ordered. Will administer once verified by pharmacy.

## 2021-07-01 NOTE — PROGRESS NOTES
Inpatient Anticoagulation Service Note    Date: 7/1/2021    Reason for Anticoagulation: Other (Comments) (catastrophic antiphospholipid anitbody syndrome)   Target INR: 2.5 to 3.5         Hemoglobin Value: (!) 8.1  Hematocrit Value: (!) 27  Lab Platelet Value: 295    INR from last 7 days     Date/Time INR Value    07/01/21 0755  1.12    06/30/21 0222  (!) 1.2    06/29/21 0312  (!) 1.24    06/28/21 0114  (!) 1.22    06/27/21 0123  (!) 1.35    06/26/21 0041  (!) 1.56    06/25/21 0056  (!) 1.73        Dose from last 7 days     Date/Time Dose (mg)    07/01/21 1433  10        Average Dose (mg):  (new start)  Significant Interactions: Corticosteroids, Proton Pump Inhibitor  Bridge Therapy: Yes (Lovenox 80 mg BID)  Bridge Therapy Start Date: 07/01/21  Days of Overlap Therapy: 0   INR Value Greater than 2 Prior to Discontinuation of Parenteral Anticoagulation: Not Applicable     Comments: Pt with antiphospholipid antibody syndrome with Heme/Onc consulting. Pt has been transitioned from heaprin drip to therapeutic Lovenox and warfarin has been ordered to start. INR goal is 2.5-3.5. Will start with warfarin 10 mg today and then start warfarin 7.5 mg daily tomorrow. DDI with warfarin noted. Will trend the INR.    Education Material Provided?: No  Pharmacist suggested discharge dosing: TBD, warfarin 7.5 mg daily for now. Pt will need a follow up INR within 2-3 days of discharge.     Nitesh Howard, PharmD

## 2021-07-01 NOTE — PROGRESS NOTES
Nephrology Daily Progress Note    Date of Service  7/1/2021    Chief Complaint  46 y.o. 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    Interval Problem Update  6/24 -no urine output documented in the last 24 hours, but patient says she is urinating.  Repeat CT imaging shows bladder distention and mild left hydronephrosis.  Patient complains of ongoing right upper quadrant pain.  6/25-urine output 3.2 L in the last 24 hours with Villegas catheter in place.  Complains of ongoing abdominal pain.  Denies chest pain, shortness of breath.  6/26 - UOP 1.4L in last 24 hours. Patient somnolent today, unable to obtain ROS.   6/27-urine output 725 mL in the last 24 hours.  Villegas catheter replaced, and patient feels more comfortable.  Complains of some abdominal pain this morning.  6/28 -doing well, no complaints except mild abdominal pain  Creat better  6/29 feels better, improved abdominal pain  Creat improving  6/30 - doing well, no complaints  Creat improving    Review of Systems  Review of Systems   Constitutional: Negative for chills, fever, malaise/fatigue and weight loss.   HENT: Negative for congestion, hearing loss and sinus pain.    Eyes: Negative.    Respiratory: Negative for cough, hemoptysis, shortness of breath and wheezing.    Cardiovascular: Negative for chest pain, palpitations, orthopnea and leg swelling.   Gastrointestinal: Negative for abdominal pain, nausea and vomiting.   Genitourinary: Negative for dysuria, flank pain, frequency, hematuria and urgency.   Skin: Negative.    All other systems reviewed and are negative.       Physical Exam  Temp:  [36.7 °C (98.1 °F)-37.2 °C (98.9 °F)] 36.7 °C (98.1 °F)  Pulse:  [81-90] 85  Resp:  [16] 16  BP: (140-150)/(84-98) 145/90  SpO2:  [94 %-97  %] 96 %    Physical Exam  Vitals and nursing note reviewed.   Constitutional:       General: She is not in acute distress.     Appearance: Normal appearance. She is well-developed.   HENT:      Head: Normocephalic and atraumatic.      Nose: Nose normal.      Mouth/Throat:      Mouth: Mucous membranes are moist.      Pharynx: Oropharynx is clear.   Eyes:      General: No scleral icterus.     Extraocular Movements: Extraocular movements intact.      Conjunctiva/sclera: Conjunctivae normal.      Pupils: Pupils are equal, round, and reactive to light.   Neck:      Thyroid: No thyromegaly.   Cardiovascular:      Rate and Rhythm: Normal rate and regular rhythm.      Pulses: Normal pulses.      Heart sounds: Normal heart sounds. No friction rub. No gallop.    Pulmonary:      Effort: Pulmonary effort is normal. No respiratory distress.      Breath sounds: Normal breath sounds. No wheezing, rhonchi or rales.   Abdominal:      General: Bowel sounds are normal. There is no distension.      Palpations: Abdomen is soft. There is no mass.      Tenderness: There is no abdominal tenderness. There is no right CVA tenderness, left CVA tenderness or guarding.   Musculoskeletal:      Cervical back: Normal range of motion and neck supple.      Comments: Trace pedal edema   Skin:     General: Skin is warm.      Findings: No erythema or rash.   Neurological:      Mental Status: She is alert and oriented to person, place, and time.         Fluids    Intake/Output Summary (Last 24 hours) at 7/1/2021 1330  Last data filed at 7/1/2021 1004  Gross per 24 hour   Intake 220 ml   Output 2000 ml   Net -1780 ml       Laboratory  Labs reviewed, pertinent labs below.  Recent Labs     06/29/21  0756 06/30/21  0222 07/01/21  0058   WBC 11.8* 9.5 10.2   RBC 3.49* 3.43* 3.45*   HEMOGLOBIN 7.9* 7.8* 8.1*   HEMATOCRIT 27.0* 26.4* 27.0*   MCV 77.4* 77.0* 78.3*   MCH 22.6* 22.7* 23.5*   MCHC 29.3* 29.5* 30.0*   RDW 67.9* 67.3* 72.1*   PLATELETCT 305 317 939    MPV 9.6 10.0 9.8     Recent Labs     06/29/21  0312 06/30/21  0222 07/01/21  0058   SODIUM 140 138 139   POTASSIUM 3.9 3.6 3.4*   CHLORIDE 109 106 107   CO2 19* 22 22   GLUCOSE 84 89 112*   BUN 40* 36* 32*   CREATININE 1.56* 1.60* 1.36   CALCIUM 8.5 8.6 9.0     Recent Labs     06/29/21  0312 06/30/21  0222 07/01/21  0755   INR 1.24* 1.20* 1.12           URINALYSIS:  Lab Results   Component Value Date/Time    COLORURINE DK Yellow 06/22/2021 1810    CLARITY Clear 06/22/2021 1810    SPECGRAVITY 1.007 06/22/2021 1810    PHURINE 6.0 06/22/2021 1810    KETONES Negative 06/22/2021 1810    PROTEINURIN Negative 06/22/2021 1810    BILIRUBINUR Moderate (A) 06/22/2021 1810    UROBILU 0.2 06/22/2021 1810    NITRITE Negative 06/22/2021 1810    LEUKESTERAS Negative 06/22/2021 1810    OCCULTBLOOD Large (A) 06/22/2021 1810     UPC  No results found for: TOTPROTUR No results found for: CREATININEU      Imaging interpreted by radiologist. Imaging reports reviewed with pertinent findings below  US-RENAL   Final Result      Mild prominence of the left renal pelvis, less than prior. No right hydronephrosis.      The urinary bladder is only partially decompressed by Villegas catheter. Correlate clinically for position of the catheter.      MR-BRAIN-W/O   Final Result      MRI of the brain without contrast within normal limits.      DX-CHEST-PORTABLE (1 VIEW)   Final Result      1.  Mild hypoinflation with minimal bibasilar atelectasis.   2.  No lobar pneumonia or pneumothorax.   3.  No gross mass.      CT-ABDOMEN-PELVIS W/O   Final Result      1.  Stranding surrounding the adrenal glands is improved.   2.  Dense right adrenal mass likely representing adrenal hemorrhage is mildly decreased in size compared to prior.   3.  Mild left hydronephrosis.   4.  Multiple mildly enlarged retroperitoneal lymph nodes are not significantly changed.   5.  Density within the gallbladder may represent sludge/vicarious excretion of contrast.   6.  Trace  right pleural fluid and bibasilar atelectasis.      US-RENAL   Final Result      1.  Mild left hydronephrosis.   2.  No right hydronephrosis.      CN-BFOVBEU-C/O   Final Result         1. Normal sized CBD. No CBD stone.   2. Thickening and heterogeneity of the bilateral adrenal glands with surrounding stranding, incompletely evaluated but concerning for hemorrhage.   3. No gallstone. Mild pericholecystic fluid could be reactive change due to adjacent adrenal hemorrhage.   4. Worsening left hydronephrosis, concerning for distal obstruction      DX-CHEST-LIMITED (1 VIEW)   Final Result         No acute cardiopulmonary abnormalities are identified.      EC-ECHOCARDIOGRAM COMPLETE W/O CONT   Final Result      CT-ABDOMEN-PELVIS WITH   Final Result      1.  New right adrenal mass likely represents hemorrhage. Thickening of the left adrenal gland is concerning for developing hemorrhage as well.      2.  Low attenuation filling defect in the superior vena cava is consistent with thrombus. Echocardiogram may be helpful for further evaluation.      3.  Prominent retroperitoneal lymph nodes with nonspecific retroperitoneal inflammatory stranding as reported on the prior CT.      4.  Small bilateral pleural effusions, right greater than left. Adjacent airspace disease likely atelectasis.            Comment: Results discussed with Dr. Gunter at 9:32 AM      [id number] VIEW   Final Result         No specific finding to suggest small bowel obstruction.      US-PELVIC COMPLETE (TRANSABDOMINAL/TRANSVAGINAL) (COMBO)   Final Result         1.  Heterogeneous appearance of the uterus with large heterogeneous uterine mass, appearance most compatible with uterine fibroid.   2.  Heterogeneous lesion in the left ovary, could represent hemorrhagic cyst or endometrioma, otherwise indeterminate, recommend follow-up sonogram in 6 weeks for repeat characterization and evaluation of stability.   3.  Thickened endometrial stripe, likely  proliferative changes, consider endometrial pathology with additional follow-up as clinically appropriate.   4.  Nabothian cyst      US-RUQ   Final Result      1.  No evidence of gallstones or biliary ductal dilatation.   2.  1.9 cm cystic lesion in the right kidney. Mural nodularity is not excluded and further evaluation with renal protocol MRI is recommended.         CT-ABDOMEN-PELVIS WITH   Final Result      1.  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   2.  Hypodensity in the cervix could be artifactual or could indicate underlying cervical mass. Suggest correlation with physical exam and gynecological history.   3.  19 mm hypodense RIGHT renal lesion, likely but not definitely a cyst. Suggest further assessment with ultrasound when clinically appropriate.                     Current Facility-Administered Medications   Medication Dose Route Frequency Provider Last Rate Last Admin   • enoxaparin (LOVENOX) inj 80 mg  1 mg/kg Subcutaneous Q12HRS Yuri Ivy M.D.   80 mg at 07/01/21 0758   • MD Alert...Warfarin per Pharmacy   Other PHARMACY TO DOSE Yuri Ivy M.D.       • potassium chloride SA (Kdur) tablet 20 mEq  20 mEq Oral TID Yuri Ivy M.D.   20 mEq at 07/01/21 1240   • acetaminophen (TYLENOL) tablet 1,000 mg  1,000 mg Oral Q6HRS PRN Yuri Ivy M.D.       • omeprazole (PRILOSEC) capsule 20 mg  20 mg Oral DAILY Yuri Ivy M.D.   20 mg at 07/01/21 0547   • guaiFENesin (ROBITUSSIN) 100 MG/5ML solution 200 mg  10 mL Oral Q4HRS PRN Sylvia Mueller M.D.   200 mg at 07/01/21 0221   • amLODIPine (NORVASC) tablet 10 mg  10 mg Oral Q DAY Victoria Powell M.D.   10 mg at 07/01/21 0547   • benzonatate (TESSALON) capsule 100 mg  100 mg Oral TID PRN Yuri Ivy M.D.   100 mg at 06/28/21 2238   • nystatin (MYCOSTATIN) 235539 UNIT/ML suspension 500,000 Units  5 mL Swish & Swallow 4X/DAY Jaylen Bland M.D.    500,000 Units at 07/01/21 1240   • predniSONE (DELTASONE) tablet 60 mg  60 mg Oral DAILY Da Varela M.D.   60 mg at 07/01/21 0547   • ipratropium-albuterol (DUONEB) nebulizer solution  3 mL Nebulization Q4H PRN (RT) Dominguez Reilly M.D.   3 mL at 06/24/21 0507   • baclofen (LIORESAL) tablet 10 mg  10 mg Oral TID PRN Salina Zuniga D.O.   10 mg at 06/26/21 2306   • oxyCODONE immediate-release (ROXICODONE) tablet 5 mg  5 mg Oral Q4HRS PRN Salina Zuniga, D.O.   5 mg at 06/27/21 0807   • lidocaine (LIDODERM) 5 % 1 Patch  1 Patch Transdermal Q24HR Gareth Castro M.D.   1 Patch at 06/27/21 2135   • senna-docusate (PERICOLACE or SENOKOT S) 8.6-50 MG per tablet 2 tablet  2 tablet Oral BID Jamar Mo M.D.   2 tablet at 06/24/21 1741    And   • polyethylene glycol/lytes (MIRALAX) PACKET 1 Packet  1 Packet Oral QDAY PRCORNELIA Mo M.D.        And   • magnesium hydroxide (MILK OF MAGNESIA) suspension 30 mL  30 mL Oral QDAY PRCORNELIA Mo M.D.        And   • bisacodyl (DULCOLAX) suppository 10 mg  10 mg Rectal QDAY PRCORNELIA Mo M.D.   10 mg at 06/15/21 1201   • cloNIDine (CATAPRES) tablet 0.1 mg  0.1 mg Oral Q6HRS PRCORNELIA Mo M.D.       • enalaprilat (VASOTEC) injection 1.25 mg  1.25 mg Intravenous Q6HRS PRCORNELIA Mo M.D.       • labetalol (NORMODYNE/TRANDATE) injection 10 mg  10 mg Intravenous Q4HRS PRCORNELIA Mo M.D.       • ondansetron (ZOFRAN) syringe/vial injection 4 mg  4 mg Intravenous Q4HRS PRCORNELIA Mo M.D.   4 mg at 06/16/21 1355   • ondansetron (ZOFRAN ODT) dispertab 4 mg  4 mg Oral Q4HRS PRCORNELIA Mo M.D.       • promethazine (PHENERGAN) tablet 12.5-25 mg  12.5-25 mg Oral Q4HRS PRCORNELIA Mo M.D.       • promethazine (PHENERGAN) suppository 12.5-25 mg  12.5-25 mg Rectal Q4HRS PRCORNELIA Mo M.D.       • prochlorperazine (COMPAZINE) injection 5-10 mg  5-10 mg Intravenous Q4HRS PRCORNELIA Mo M.D.             Assessment/Plan  46 y.o. 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     1.  Acute kidney injury, nonoliguric: could be secondary to urinary retention, NICKOLAS       Improving -to monitor closely    2.  HTN: elevated BP under better control -to monitor     3.  Left renal hydronephrosis, mild on kidney ultrasound 6/22/2021 and again on CT scan 6/24/2021.  Accompanied by bladder distention.  Continue Bowens catheter, placed 6/24/20, repeated kidney ultrasound on 6/28/2021 revealed improvement.      4.  Electrolytes: well controlled    5.  Microcytic anemia, persistent.  Concern for antiphospholipid antibody syndrome with intravascular clotting and hemolysis.  Defer further management to primary team and hematology oncology.  Check CBC daily.    6. Metabolic acidosis:corrected      Recs; d/c bowens catheter             Daily labs             Close BP monitoring             Avoid nephrotoxic agents             Will follow

## 2021-07-01 NOTE — CARE PLAN
The patient is Stable - Low risk of patient condition declining or worsening    Shift Goals  Clinical Goals: Transition to Lovenox; Monitor Labs; DC plan  Patient Goals: DC Plan  Family Goals: NA    Progress made toward(s) clinical / shift goals:    Problem: Pain - Standard  Goal: Alleviation of pain or a reduction in pain to the patient’s comfort goal  Outcome: Progressing     Problem: Knowledge Deficit - Standard  Goal: Patient and family/care givers will demonstrate understanding of plan of care, disease process/condition, diagnostic tests and medications  Outcome: Progressing     Problem: Gastrointestinal Irritability  Goal: Nausea and vomiting will be absent or improve  Outcome: Progressing     Problem: Bowel Elimination  Goal: Establish and maintain regular bowel function  Outcome: Progressing     Problem: Hemodynamics  Goal: Patient's hemodynamics, fluid balance and neurologic status will be stable or improve  Outcome: Progressing     Problem: Fluid Volume  Goal: Fluid volume balance will be maintained  Outcome: Progressing     Problem: Urinary - Renal Perfusion  Goal: Ability to achieve and maintain adequate renal perfusion and functioning will improve  Outcome: Progressing     Problem: Respiratory  Goal: Patient will achieve/maintain optimum respiratory ventilation and gas exchange  Outcome: Progressing     Problem: Mechanical Ventilation  Goal: Safe management of artificial airway and ventilation  Outcome: Progressing  Goal: Successful weaning off mechanical ventilator, spontaneously maintains adequate gas exchange  Outcome: Progressing  Goal: Patient will be able to express needs and understand communication  Outcome: Progressing     Problem: Physical Regulation  Goal: Diagnostic test results will improve  Outcome: Progressing  Goal: Signs and symptoms of infection will decrease  Outcome: Progressing     Problem: Fall Risk  Goal: Patient will remain free from falls  Outcome: Progressing       Patient is  not progressing towards the following goals:

## 2021-07-01 NOTE — PROGRESS NOTES
Bedside report received from day RN, pt care assumed, assessment completed. Pt is A&O4, pain 0, SR on the monitor. Updated on POC, questions answered. Bed in lowest, locked position, treaded socks on, call light and belongings within reach. Fall precautions in place.

## 2021-07-02 VITALS
BODY MASS INDEX: 27.1 KG/M2 | TEMPERATURE: 99.1 F | OXYGEN SATURATION: 96 % | HEART RATE: 85 BPM | DIASTOLIC BLOOD PRESSURE: 97 MMHG | HEIGHT: 69 IN | RESPIRATION RATE: 16 BRPM | WEIGHT: 182.98 LBS | SYSTOLIC BLOOD PRESSURE: 158 MMHG

## 2021-07-02 LAB
ANION GAP SERPL CALC-SCNC: 10 MMOL/L (ref 7–16)
BASOPHILS # BLD AUTO: 0 % (ref 0–1.8)
BASOPHILS # BLD: 0 K/UL (ref 0–0.12)
BUN SERPL-MCNC: 27 MG/DL (ref 8–22)
CALCIUM SERPL-MCNC: 9.2 MG/DL (ref 8.5–10.5)
CHLORIDE SERPL-SCNC: 108 MMOL/L (ref 96–112)
CO2 SERPL-SCNC: 23 MMOL/L (ref 20–33)
CREAT SERPL-MCNC: 1.13 MG/DL (ref 0.5–1.4)
EOSINOPHIL # BLD AUTO: 0.05 K/UL (ref 0–0.51)
EOSINOPHIL NFR BLD: 0.5 % (ref 0–6.9)
ERYTHROCYTE [DISTWIDTH] IN BLOOD BY AUTOMATED COUNT: 75.5 FL (ref 35.9–50)
GLUCOSE SERPL-MCNC: 83 MG/DL (ref 65–99)
HCT VFR BLD AUTO: 28.1 % (ref 37–47)
HGB BLD-MCNC: 8.2 G/DL (ref 12–16)
IMM GRANULOCYTES # BLD AUTO: 0.09 K/UL (ref 0–0.11)
IMM GRANULOCYTES NFR BLD AUTO: 0.9 % (ref 0–0.9)
INR PPP: 1.18 (ref 0.87–1.13)
LYMPHOCYTES # BLD AUTO: 2.03 K/UL (ref 1–4.8)
LYMPHOCYTES NFR BLD: 20.4 % (ref 22–41)
MCH RBC QN AUTO: 23.3 PG (ref 27–33)
MCHC RBC AUTO-ENTMCNC: 29.2 G/DL (ref 33.6–35)
MCV RBC AUTO: 79.8 FL (ref 81.4–97.8)
MONOCYTES # BLD AUTO: 0.68 K/UL (ref 0–0.85)
MONOCYTES NFR BLD AUTO: 6.8 % (ref 0–13.4)
NEUTROPHILS # BLD AUTO: 7.12 K/UL (ref 2–7.15)
NEUTROPHILS NFR BLD: 71.4 % (ref 44–72)
NRBC # BLD AUTO: 0 K/UL
NRBC BLD-RTO: 0 /100 WBC
PLATELET # BLD AUTO: 265 K/UL (ref 164–446)
PMV BLD AUTO: 9.9 FL (ref 9–12.9)
POTASSIUM SERPL-SCNC: 3.7 MMOL/L (ref 3.6–5.5)
PROTHROMBIN TIME: 14.7 SEC (ref 12–14.6)
RBC # BLD AUTO: 3.52 M/UL (ref 4.2–5.4)
SODIUM SERPL-SCNC: 141 MMOL/L (ref 135–145)
WBC # BLD AUTO: 10 K/UL (ref 4.8–10.8)

## 2021-07-02 PROCEDURE — 700102 HCHG RX REV CODE 250 W/ 637 OVERRIDE(OP): Performed by: STUDENT IN AN ORGANIZED HEALTH CARE EDUCATION/TRAINING PROGRAM

## 2021-07-02 PROCEDURE — 85025 COMPLETE CBC W/AUTO DIFF WBC: CPT

## 2021-07-02 PROCEDURE — A9270 NON-COVERED ITEM OR SERVICE: HCPCS | Performed by: STUDENT IN AN ORGANIZED HEALTH CARE EDUCATION/TRAINING PROGRAM

## 2021-07-02 PROCEDURE — 700111 HCHG RX REV CODE 636 W/ 250 OVERRIDE (IP): Performed by: STUDENT IN AN ORGANIZED HEALTH CARE EDUCATION/TRAINING PROGRAM

## 2021-07-02 PROCEDURE — 700102 HCHG RX REV CODE 250 W/ 637 OVERRIDE(OP): Performed by: INTERNAL MEDICINE

## 2021-07-02 PROCEDURE — 36415 COLL VENOUS BLD VENIPUNCTURE: CPT

## 2021-07-02 PROCEDURE — A9270 NON-COVERED ITEM OR SERVICE: HCPCS | Performed by: INTERNAL MEDICINE

## 2021-07-02 PROCEDURE — 80048 BASIC METABOLIC PNL TOTAL CA: CPT

## 2021-07-02 PROCEDURE — 85610 PROTHROMBIN TIME: CPT

## 2021-07-02 PROCEDURE — 700111 HCHG RX REV CODE 636 W/ 250 OVERRIDE (IP): Performed by: INTERNAL MEDICINE

## 2021-07-02 PROCEDURE — 99232 SBSQ HOSP IP/OBS MODERATE 35: CPT | Performed by: INTERNAL MEDICINE

## 2021-07-02 RX ORDER — AMLODIPINE BESYLATE 10 MG/1
10 TABLET ORAL DAILY
Qty: 30 TABLET | Refills: 0 | Status: SHIPPED | OUTPATIENT
Start: 2021-07-03 | End: 2021-08-02

## 2021-07-02 RX ORDER — 0.9 % SODIUM CHLORIDE 0.9 %
20 VIAL (ML) INJECTION PRN
Status: CANCELLED | OUTPATIENT
Start: 2021-07-04

## 2021-07-02 RX ORDER — PREDNISONE 10 MG/1
30 TABLET ORAL DAILY
Qty: 21 TABLET | Refills: 0 | Status: SHIPPED | OUTPATIENT
Start: 2021-07-10 | End: 2021-07-17

## 2021-07-02 RX ORDER — HEPARIN SODIUM (PORCINE) LOCK FLUSH IV SOLN 100 UNIT/ML 100 UNIT/ML
500 SOLUTION INTRAVENOUS PRN
Status: CANCELLED | OUTPATIENT
Start: 2021-07-04

## 2021-07-02 RX ORDER — 0.9 % SODIUM CHLORIDE 0.9 %
10 VIAL (ML) INJECTION PRN
Status: CANCELLED | OUTPATIENT
Start: 2021-07-04

## 2021-07-02 RX ORDER — PREDNISONE 20 MG/1
20 TABLET ORAL DAILY
Qty: 7 TABLET | Refills: 0 | Status: SHIPPED | OUTPATIENT
Start: 2021-07-17 | End: 2021-07-24

## 2021-07-02 RX ORDER — WARFARIN SODIUM 5 MG/1
5 TABLET ORAL DAILY
Qty: 30 TABLET | Refills: 3 | Status: SHIPPED | OUTPATIENT
Start: 2021-07-02 | End: 2021-08-20 | Stop reason: SDUPTHER

## 2021-07-02 RX ORDER — PREDNISONE 10 MG/1
10 TABLET ORAL DAILY
Qty: 7 TABLET | Refills: 0 | Status: SHIPPED | OUTPATIENT
Start: 2021-07-24 | End: 2021-07-31

## 2021-07-02 RX ORDER — PREDNISONE 20 MG/1
40 TABLET ORAL DAILY
Qty: 14 TABLET | Refills: 0 | Status: SHIPPED | OUTPATIENT
Start: 2021-07-03 | End: 2021-07-10

## 2021-07-02 RX ORDER — FERROUS SULFATE 325(65) MG
325 TABLET ORAL
Qty: 30 TABLET | Refills: 0 | Status: SHIPPED | OUTPATIENT
Start: 2021-07-05 | End: 2021-09-11

## 2021-07-02 RX ORDER — OMEPRAZOLE 20 MG/1
40 CAPSULE, DELAYED RELEASE ORAL DAILY
Qty: 60 CAPSULE | Refills: 0 | Status: SHIPPED | OUTPATIENT
Start: 2021-07-03 | End: 2021-08-02

## 2021-07-02 RX ORDER — OMEPRAZOLE 20 MG/1
40 CAPSULE, DELAYED RELEASE ORAL DAILY
Status: CANCELLED | OUTPATIENT
Start: 2021-07-03

## 2021-07-02 RX ORDER — PREDNISONE 2.5 MG/1
5 TABLET ORAL DAILY
Qty: 14 TABLET | Refills: 0 | Status: SHIPPED | OUTPATIENT
Start: 2021-07-31 | End: 2021-08-07

## 2021-07-02 RX ORDER — WARFARIN SODIUM 2.5 MG/1
2.5 TABLET ORAL DAILY
Qty: 30 TABLET | Refills: 3 | Status: SHIPPED | OUTPATIENT
Start: 2021-07-02 | End: 2021-10-15 | Stop reason: SDUPTHER

## 2021-07-02 RX ADMIN — PREDNISONE 60 MG: 50 TABLET ORAL at 06:15

## 2021-07-02 RX ADMIN — AMLODIPINE BESYLATE 10 MG: 10 TABLET ORAL at 06:15

## 2021-07-02 RX ADMIN — NYSTATIN 500000 UNITS: 100000 SUSPENSION ORAL at 12:16

## 2021-07-02 RX ADMIN — ENOXAPARIN SODIUM 80 MG: 80 INJECTION SUBCUTANEOUS at 06:15

## 2021-07-02 RX ADMIN — FERROUS SULFATE TAB 325 MG (65 MG ELEMENTAL FE) 325 MG: 325 (65 FE) TAB at 08:39

## 2021-07-02 RX ADMIN — NYSTATIN 500000 UNITS: 100000 SUSPENSION ORAL at 06:15

## 2021-07-02 RX ADMIN — OMEPRAZOLE 20 MG: 20 CAPSULE, DELAYED RELEASE ORAL at 06:15

## 2021-07-02 ASSESSMENT — ENCOUNTER SYMPTOMS
VOMITING: 0
ORTHOPNEA: 0
HEMOPTYSIS: 0
SINUS PAIN: 0
DIARRHEA: 0
WEIGHT LOSS: 0
NAUSEA: 0
FLANK PAIN: 0
PALPITATIONS: 0
EYES NEGATIVE: 1
SHORTNESS OF BREATH: 0
CHILLS: 0
WHEEZING: 0
FEVER: 0
ABDOMINAL PAIN: 0
COUGH: 0

## 2021-07-02 ASSESSMENT — PAIN DESCRIPTION - PAIN TYPE: TYPE: ACUTE PAIN

## 2021-07-02 NOTE — DOCUMENTATION QUERY
[facility]                                                                       Query Response Note      PATIENT:               GASTON KRAMER  ACCT #:                  [id number]  MRN:                     [id number]  :                      1975  ADMIT DATE:       2021 12:35 PM  DISCH DATE:          RESPONDING  PROVIDER #:        [id number]           QUERY TEXT:    Severe anemia requiring pRBC transfusion, thrombocytopenia, vaginal bleeding, rectal bleeding and hemorrhage of both adrenal glands is documented in the MD PN on 21. Can a clarification be made for the type of anemia present?    NOTE:  If the appropriate response is not listed below, please respond with a new note.              The patient's Clinical Indicators include:  21 MD PN: Microcytic anemia  21 MD PN: Adrenal hemorrhages are not the source of her anemia   21 MD PN: Anemia-multifactorial.  Risk for further adrenal hemorrhage secondary to anticoagulation  21 MD PN: Severe anemia requiring multiple blood transfusions.    21 MD PN: Anemia, iron deficiency     Treatment: Transfuse pRBC's, Monitor H/H, IV iron, Hematology consult  Risk Factors: Thrombocytopenia, Vaginal bleeding, Rectal bleeding, Hemorrhage of both adrenal glands, Catastrophic antiphospholipid syndrome     Thank you,  Juan Cordoba RN, BSN  Clinical   Connect via [employer]  .  Options provided:   -- Blood loss anemia, acute   -- Blood loss anemia, chronic   -- Iron deficiency anemia   -- Microcytic anemia   -- Multifactorial anemia, please document all types of anemia present   -- Unable to determine      Query created by: Juan Cordoba on 2021 3:17 PM    RESPONSE TEXT:    Blood loss anemia, acute          Electronically signed by:  NATALYA YEPEZ MD 2021 3:25 PM

## 2021-07-02 NOTE — PROGRESS NOTES
Nephrology Daily Progress Note    Date of Service  7/2/2021    Chief Complaint  46 y.o. 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    Interval Problem Update  6/24 -no urine output documented in the last 24 hours, but patient says she is urinating.  Repeat CT imaging shows bladder distention and mild left hydronephrosis.  Patient complains of ongoing right upper quadrant pain.  6/25-urine output 3.2 L in the last 24 hours with Villegas catheter in place.  Complains of ongoing abdominal pain.  Denies chest pain, shortness of breath.  6/26 - UOP 1.4L in last 24 hours. Patient somnolent today, unable to obtain ROS.   6/27-urine output 725 mL in the last 24 hours.  Villegas catheter replaced, and patient feels more comfortable.  Complains of some abdominal pain this morning.  6/28 -doing well, no complaints except mild abdominal pain  Creat better  6/29 feels better, improved abdominal pain  Creat improving  6/30 - doing well, no complaints  Creat improving  7/2 -doing well, no complaints  Creat level better to 1.13  Review of Systems  Review of Systems   Constitutional: Negative for chills, fever, malaise/fatigue and weight loss.   HENT: Negative for congestion, hearing loss and sinus pain.    Eyes: Negative.    Respiratory: Negative for cough, hemoptysis, shortness of breath and wheezing.    Cardiovascular: Negative for chest pain, palpitations and orthopnea.   Gastrointestinal: Negative for abdominal pain, diarrhea, nausea and vomiting.   Genitourinary: Negative for dysuria, flank pain, frequency, hematuria and urgency.   Skin: Negative.    All other systems reviewed and are negative.       Physical Exam  Temp:  [36.8 °C (98.3 °F)-37.6 °C (99.7 °F)] 37.3 °C (99.1 °F)  Pulse:  [85-95] 85  Resp:   [16] 16  BP: (134-158)/(73-97) 158/97  SpO2:  [94 %-97 %] 96 %    Physical Exam  Vitals and nursing note reviewed.   Constitutional:       Appearance: Normal appearance.   HENT:      Head: Normocephalic and atraumatic.      Nose: Nose normal.      Mouth/Throat:      Mouth: Mucous membranes are moist.      Pharynx: Oropharynx is clear.   Eyes:      General: No scleral icterus.     Extraocular Movements: Extraocular movements intact.      Conjunctiva/sclera: Conjunctivae normal.      Pupils: Pupils are equal, round, and reactive to light.   Cardiovascular:      Rate and Rhythm: Normal rate and regular rhythm.      Pulses: Normal pulses.      Heart sounds: Normal heart sounds. No friction rub. No gallop.    Pulmonary:      Effort: Pulmonary effort is normal. No respiratory distress.      Breath sounds: Normal breath sounds. No wheezing or rales.   Abdominal:      General: Bowel sounds are normal. There is no distension.      Palpations: Abdomen is soft. There is no mass.      Tenderness: There is no abdominal tenderness. There is no right CVA tenderness, left CVA tenderness or guarding.   Musculoskeletal:         General: Normal range of motion.      Cervical back: Normal range of motion and neck supple.      Comments: Trace pedal edema   Skin:     Coloration: Skin is not jaundiced or pale.      Findings: No erythema or rash.   Neurological:      General: No focal deficit present.      Mental Status: She is alert and oriented to person, place, and time.   Psychiatric:         Mood and Affect: Mood normal.         Behavior: Behavior normal.         Thought Content: Thought content normal.         Judgment: Judgment normal.         Fluids    Intake/Output Summary (Last 24 hours) at 7/2/2021 1153  Last data filed at 7/2/2021 1021  Gross per 24 hour   Intake 240 ml   Output --   Net 240 ml       Laboratory  Labs reviewed, pertinent labs below.  Recent Labs     06/30/21  0222 07/01/21  0058 07/02/21  0300   WBC 9.5 10.2  10.0   RBC 3.43* 3.45* 3.52*   HEMOGLOBIN 7.8* 8.1* 8.2*   HEMATOCRIT 26.4* 27.0* 28.1*   MCV 77.0* 78.3* 79.8*   MCH 22.7* 23.5* 23.3*   MCHC 29.5* 30.0* 29.2*   RDW 67.3* 72.1* 75.5*   PLATELETCT 317 295 265   MPV 10.0 9.8 9.9     Recent Labs     06/30/21  0222 07/01/21  0058 07/02/21  0300   SODIUM 138 139 141   POTASSIUM 3.6 3.4* 3.7   CHLORIDE 106 107 108   CO2 22 22 23   GLUCOSE 89 112* 83   BUN 36* 32* 27*   CREATININE 1.60* 1.36 1.13   CALCIUM 8.6 9.0 9.2     Recent Labs     06/30/21  0222 07/01/21  0755 07/02/21  0300   INR 1.20* 1.12 1.18*           URINALYSIS:  Lab Results   Component Value Date/Time    COLORURINE DK Yellow 06/22/2021 1810    CLARITY Clear 06/22/2021 1810    SPECGRAVITY 1.007 06/22/2021 1810    PHURINE 6.0 06/22/2021 1810    KETONES Negative 06/22/2021 1810    PROTEINURIN Negative 06/22/2021 1810    BILIRUBINUR Moderate (A) 06/22/2021 1810    UROBILU 0.2 06/22/2021 1810    NITRITE Negative 06/22/2021 1810    LEUKESTERAS Negative 06/22/2021 1810    OCCULTBLOOD Large (A) 06/22/2021 1810     UPC  No results found for: TOTPROTUR No results found for: CREATININEU      Imaging interpreted by radiologist. Imaging reports reviewed with pertinent findings below  US-RENAL   Final Result      Mild prominence of the left renal pelvis, less than prior. No right hydronephrosis.      The urinary bladder is only partially decompressed by Villegas catheter. Correlate clinically for position of the catheter.      MR-BRAIN-W/O   Final Result      MRI of the brain without contrast within normal limits.      DX-CHEST-PORTABLE (1 VIEW)   Final Result      1.  Mild hypoinflation with minimal bibasilar atelectasis.   2.  No lobar pneumonia or pneumothorax.   3.  No gross mass.      CT-ABDOMEN-PELVIS W/O   Final Result      1.  Stranding surrounding the adrenal glands is improved.   2.  Dense right adrenal mass likely representing adrenal hemorrhage is mildly decreased in size compared to prior.   3.  Mild left  hydronephrosis.   4.  Multiple mildly enlarged retroperitoneal lymph nodes are not significantly changed.   5.  Density within the gallbladder may represent sludge/vicarious excretion of contrast.   6.  Trace right pleural fluid and bibasilar atelectasis.      US-RENAL   Final Result      1.  Mild left hydronephrosis.   2.  No right hydronephrosis.      RD-[id number]-I/O   Final Result         1. Normal sized CBD. No CBD stone.   2. Thickening and heterogeneity of the bilateral adrenal glands with surrounding stranding, incompletely evaluated but concerning for hemorrhage.   3. No gallstone. Mild pericholecystic fluid could be reactive change due to adjacent adrenal hemorrhage.   4. Worsening left hydronephrosis, concerning for distal obstruction      DX-CHEST-LIMITED (1 VIEW)   Final Result         No acute cardiopulmonary abnormalities are identified.      EC-ECHOCARDIOGRAM COMPLETE W/O CONT   Final Result      CT-ABDOMEN-PELVIS WITH   Final Result      1.  New right adrenal mass likely represents hemorrhage. Thickening of the left adrenal gland is concerning for developing hemorrhage as well.      2.  Low attenuation filling defect in the superior vena cava is consistent with thrombus. Echocardiogram may be helpful for further evaluation.      3.  Prominent retroperitoneal lymph nodes with nonspecific retroperitoneal inflammatory stranding as reported on the prior CT.      4.  Small bilateral pleural effusions, right greater than left. Adjacent airspace disease likely atelectasis.            Comment: Results discussed with Dr. Gunter at 9:32 AM      [id number] VIEW   Final Result         No specific finding to suggest small bowel obstruction.      US-PELVIC COMPLETE (TRANSABDOMINAL/TRANSVAGINAL) (COMBO)   Final Result         1.  Heterogeneous appearance of the uterus with large heterogeneous uterine mass, appearance most compatible with uterine fibroid.   2.  Heterogeneous lesion in the left ovary, could  represent hemorrhagic cyst or endometrioma, otherwise indeterminate, recommend follow-up sonogram in 6 weeks for repeat characterization and evaluation of stability.   3.  Thickened endometrial stripe, likely proliferative changes, consider endometrial pathology with additional follow-up as clinically appropriate.   4.  Nabothian cyst      US-RUQ   Final Result      1.  No evidence of gallstones or biliary ductal dilatation.   2.  1.9 cm cystic lesion in the right kidney. Mural nodularity is not excluded and further evaluation with renal protocol MRI is recommended.         CT-ABDOMEN-PELVIS WITH   Final Result      1.  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   2.  Hypodensity in the cervix could be artifactual or could indicate underlying cervical mass. Suggest correlation with physical exam and gynecological history.   3.  19 mm hypodense RIGHT renal lesion, likely but not definitely a cyst. Suggest further assessment with ultrasound when clinically appropriate.                     Current Facility-Administered Medications   Medication Dose Route Frequency Provider Last Rate Last Admin   • enoxaparin (LOVENOX) inj 80 mg  1 mg/kg Subcutaneous Q12HRS Yuri Ivy M.D.   80 mg at 07/02/21 0615   • MD Alert...Warfarin per Pharmacy   Other PHARMACY TO DOSE Yuri Ivy M.D.       • acetaminophen (TYLENOL) tablet 1,000 mg  1,000 mg Oral Q6HRS PRN Yuri Ivy M.D.       • warfarin (COUMADIN) tablet 7.5 mg  7.5 mg Oral DAILY AT 1800 Yuri Ivy M.D.       • ferrous sulfate tablet 325 mg  325 mg Oral MO, WE + FR Yuri Ivy M.D.   325 mg at 07/02/21 0839   • omeprazole (PRILOSEC) capsule 20 mg  20 mg Oral DAILY Yuri Ivy M.D.   20 mg at 07/02/21 0615   • guaiFENesin (ROBITUSSIN) 100 MG/5ML solution 200 mg  10 mL Oral Q4HRS PRN Sylvia Mueller M.D.   100 mg at 07/01/21 2153   • amLODIPine (NORVASC) tablet 10  mg  10 mg Oral Q DAY Victoria Powell M.D.   10 mg at 07/02/21 0615   • benzonatate (TESSALON) capsule 100 mg  100 mg Oral TID PRN Yuri Ivy M.D.   100 mg at 06/28/21 2238   • nystatin (MYCOSTATIN) 471917 UNIT/ML suspension 500,000 Units  5 mL Swish & Swallow 4X/DAY Jaylen Bland M.D.   500,000 Units at 07/02/21 0615   • predniSONE (DELTASONE) tablet 60 mg  60 mg Oral DAILY Da Varela M.D.   60 mg at 07/02/21 0615   • ipratropium-albuterol (DUONEB) nebulizer solution  3 mL Nebulization Q4H PRN (RT) Dominguez Reilly M.D.   3 mL at 06/24/21 0507   • baclofen (LIORESAL) tablet 10 mg  10 mg Oral TID PRN Salina Zuniga D.O.   10 mg at 06/26/21 2306   • oxyCODONE immediate-release (ROXICODONE) tablet 5 mg  5 mg Oral Q4HRS PRN Salina Zuniga, D.O.   5 mg at 06/27/21 0807   • lidocaine (LIDODERM) 5 % 1 Patch  1 Patch Transdermal Q24HR Gareth Castro M.D.   1 Patch at 06/27/21 2135   • senna-docusate (PERICOLACE or SENOKOT S) 8.6-50 MG per tablet 2 tablet  2 tablet Oral BID Jamar Mo M.D.   2 tablet at 06/24/21 1741    And   • polyethylene glycol/lytes (MIRALAX) PACKET 1 Packet  1 Packet Oral QDAY PRN Jamar Mo M.D.        And   • magnesium hydroxide (MILK OF MAGNESIA) suspension 30 mL  30 mL Oral QDAY PRN Jamar Mo M.D.        And   • bisacodyl (DULCOLAX) suppository 10 mg  10 mg Rectal QDAY PRN Jamar Mo M.D.   10 mg at 06/15/21 1201   • cloNIDine (CATAPRES) tablet 0.1 mg  0.1 mg Oral Q6HRS PRN Jamar Mo M.D.       • enalaprilat (VASOTEC) injection 1.25 mg  1.25 mg Intravenous Q6HRS PRCORNELIA Mo M.D.       • labetalol (NORMODYNE/TRANDATE) injection 10 mg  10 mg Intravenous Q4HRS PRCORNELIA Mo M.D.       • ondansetron (ZOFRAN) syringe/vial injection 4 mg  4 mg Intravenous Q4HRS PRCORNELIA Mo M.D.   4 mg at 06/16/21 1355   • ondansetron (ZOFRAN ODT) dispertab 4 mg  4 mg Oral Q4HRS PRCORNELIA Mo M.D.       • promethazine (PHENERGAN) tablet 12.5-25 mg  12.5-25 mg Oral Q4HRS  CRISTAL Mo M.D.       • promethazine (PHENERGAN) suppository 12.5-25 mg  12.5-25 mg Rectal Q4HRS CRISTAL Mo M.D.       • prochlorperazine (COMPAZINE) injection 5-10 mg  5-10 mg Intravenous Q4HRS CRISTAL Mo M.D.             Assessment/Plan  46 y.o. 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     1.  Acute kidney injury, nonoliguric: could be secondary to urinary retention, NICKOLAS       recovered -to monitor    2.  HTN: elevated BP under better control -to monitor     3.  Left renal hydronephrosis, mild improved    4.  Electrolytes: well controlled    5.  Microcytic anemia, persistent.  Concern for antiphospholipid antibody syndrome with intravascular clotting and hemolysis.  Defer further management to primary team and hematology oncology.  Check CBC daily.    6. Metabolic acidosis:corrected      Recs; continue current treatment             Keep well hydrated             Low salt diet             Daily labs             Close BP monitoring             Avoid nephrotoxic agents             Will follow

## 2021-07-02 NOTE — NON-PROVIDER
"  [facility] [place] FAMILY MEDICINE PROGRESS NOTE     Attending: Dr. Tamie Rodriguez M.D.  Senior Resident: Dr. Yuri Ivy M.D. (PGY-3)  Andrew Resident: Dr. Suzanne Mei M.D. (PGY-1)  PATIENT: Jaclyn Olvera; [id number]; 1975    ID: 46 y.o. female admitted for on 6/14/2021 for acute pyelonephritis and hyponatremia who was subsequently found to have multiple clots and was diagnosed with catastrophic antiphospholipid syndrome.    SUBJECTIVE - -   Interval Events:  Switched Heparin drip to Lovenox 80mg BID yesterday.  Started Warfarin 7.5mg yesterday.  Catheter removed.  Consults completed with Nephrology and Hematology/Oncology.    Per pt:  Pt states that she feels like \"a million dollars\" today. She states that she is walking more and going to the bathroom on her own. Pt denies hematuria, dysuria, hematochezia, and melena. She says that her stool is normal \"taupe\" color and denies any problems without catheter in place. Pt reports that she is eating and drinking without issue, besides continued belching that has remained unchanged. She still has a dry cough at night, but her sore throat has greatly improved. She did illicit some \"grasping of air\" when lying flat. However, she was able to get \"very good\" sleep this past evening.    ROS:  Constitutional: Negative for chills and fever.   HENT: Negative for ear pain and sore throat.    Eyes: Negative for pain.   Respiratory: Positive for cough. Negative for sputum production and shortness of breath.    Cardiovascular: Positive for orthopnea. Negative for chest pain and palpitations.   Gastrointestinal: Negative for abdominal pain, blood in stool, constipation, diarrhea, melena, nausea and vomiting.   Genitourinary: Negative for dysuria, hematuria and vaginal discharge/bleeding.   Neurological: Negative for tingling and headaches.     OBJECTIVE - -   Temp:  [36.8 °C (98.3 °F)-37.6 °C (99.7 °F)] 37.6 °C (99.7 °F)  Pulse:  [89-95] 95  Resp:  [16] 16  BP: (134-146)/(73-94) " 136/73  SpO2:  [94 %-97 %] 94 %    Intake/Output Summary (Last 24 hours) at 7/2/2021 0825  Last data filed at 7/1/2021 1004  Gross per 24 hour   Intake 100 ml   Output --   Net 100 ml     PE:  Vitals signs reviewed.     Constitutional:       General: Pt is awake and sitting in bed.     Appearance: Pt is not ill-appearing or toxic-appearing.   HEENT:      Head: Atraumatic.      Eyes: Lids are normal. Conjunctivae normal bilaterally.     Nose: No nasal deformity or septal deviation.      Mouth: Mucous membranes are dry. Lips are pink. Minimal white plaques present in buccal region.     Tongue: Tongue does not deviate from midline.     Pharynx: Uvula midline. No oropharyngeal exudate, posterior oropharyngeal erythema or uvula swelling.   Cardiovascular:      Rate and Rhythm: Normal rate and regular rhythm.      Heart sounds: S1 normal and S2 normal. No murmur.      Trace peripheral edema of LE.  Pulmonary:      Effort: Pulmonary effort is diminished.     Breath sounds: Decreased breath sounds throughout, difficult to auscultate. No overt wheezing, rhonchi or rales.   Abdominal:      General: Bowel sounds are normal. Chronic surgical scar present, no signs of erythema, edema, warmth, or tenderness.     Palpations: Abdomen is soft. No overt hepatosplenomegaly.     Tenderness: No tenderness to palpation in all 4 quadrants. There is no guarding or rebound.   Skin:     General: Skin is warm and dry. Multiple ecchymosis of bilateral UE.  Neurological:      General: No focal deficit present.      Mental Status: Pt is alert.   Psychiatric:         Behavior: Cooperative.     LABS:  Recent Labs     06/30/21  0222 07/01/21  0058 07/02/21  0300   WBC 9.5 10.2 10.0   RBC 3.43* 3.45* 3.52*   HEMOGLOBIN 7.8* 8.1* 8.2*   HEMATOCRIT 26.4* 27.0* 28.1*   MCV 77.0* 78.3* 79.8*   MCH 22.7* 23.5* 23.3*   RDW 67.3* 72.1* 75.5*   PLATELETCT 317 295 265   MPV 10.0 9.8 9.9   NEUTSPOLYS 74.30* 73.40* 71.40   LYMPHOCYTES 17.30* 17.50* 20.40*    MONOCYTES 6.60 7.50 6.80   EOSINOPHILS 0.60 0.40 0.50   BASOPHILS 0.00 0.20 0.00     Recent Labs     06/30/21 0222 07/01/21 0058 07/02/21  0300   SODIUM 138 139 141   POTASSIUM 3.6 3.4* 3.7   CHLORIDE 106 107 108   CO2 22 22 23   BUN 36* 32* 27*   CREATININE 1.60* 1.36 1.13   CALCIUM 8.6 9.0 9.2   ALBUMIN 2.9*  --   --      Estimated GFR/CRCL = Estimated Creatinine Clearance: 72.4 mL/min (by C-G formula based on SCr of 1.13 mg/dL).  Recent Labs     06/30/21 0222 07/01/21 0058 07/02/21  0300   GLUCOSE 89 112* 83     Recent Labs     06/30/21 0222 07/01/21 0755 07/02/21  0300   ASTSGOT 33  --   --    ALTSGPT 46  --   --    TBILIRUBIN 0.7  --   --    ALKPHOSPHAT 80  --   --    GLOBULIN 3.4  --   --    INR 1.20* 1.12 1.18*     Recent Labs     06/30/21 0222 07/01/21 0755 07/02/21  0300   INR 1.20* 1.12 1.18*     MICROBIOLOGY: N/A    IMAGING:  US-RENAL   Final Result      Mild prominence of the left renal pelvis, less than prior. No right hydronephrosis.      The urinary bladder is only partially decompressed by Villegas catheter. Correlate clinically for position of the catheter.      MR-BRAIN-W/O   Final Result      MRI of the brain without contrast within normal limits.      DX-CHEST-PORTABLE (1 VIEW)   Final Result      1.  Mild hypoinflation with minimal bibasilar atelectasis.   2.  No lobar pneumonia or pneumothorax.   3.  No gross mass.      CT-ABDOMEN-PELVIS W/O   Final Result      1.  Stranding surrounding the adrenal glands is improved.   2.  Dense right adrenal mass likely representing adrenal hemorrhage is mildly decreased in size compared to prior.   3.  Mild left hydronephrosis.   4.  Multiple mildly enlarged retroperitoneal lymph nodes are not significantly changed.   5.  Density within the gallbladder may represent sludge/vicarious excretion of contrast.   6.  Trace right pleural fluid and bibasilar atelectasis.      US-RENAL   Final Result      1.  Mild left hydronephrosis.   2.  No right  hydronephrosis.      UT-HNCEPNY-O/O   Final Result         1. Normal sized CBD. No CBD stone.   2. Thickening and heterogeneity of the bilateral adrenal glands with surrounding stranding, incompletely evaluated but concerning for hemorrhage.   3. No gallstone. Mild pericholecystic fluid could be reactive change due to adjacent adrenal hemorrhage.   4. Worsening left hydronephrosis, concerning for distal obstruction      DX-CHEST-LIMITED (1 VIEW)   Final Result         No acute cardiopulmonary abnormalities are identified.      EC-ECHOCARDIOGRAM COMPLETE W/O CONT   Final Result      CT-ABDOMEN-PELVIS WITH   Final Result      1.  New right adrenal mass likely represents hemorrhage. Thickening of the left adrenal gland is concerning for developing hemorrhage as well.      2.  Low attenuation filling defect in the superior vena cava is consistent with thrombus. Echocardiogram may be helpful for further evaluation.      3.  Prominent retroperitoneal lymph nodes with nonspecific retroperitoneal inflammatory stranding as reported on the prior CT.      4.  Small bilateral pleural effusions, right greater than left. Adjacent airspace disease likely atelectasis.            Comment: Results discussed with Dr. Gunter at 9:32 AM      [id number] VIEW   Final Result         No specific finding to suggest small bowel obstruction.      US-PELVIC COMPLETE (TRANSABDOMINAL/TRANSVAGINAL) (COMBO)   Final Result         1.  Heterogeneous appearance of the uterus with large heterogeneous uterine mass, appearance most compatible with uterine fibroid.   2.  Heterogeneous lesion in the left ovary, could represent hemorrhagic cyst or endometrioma, otherwise indeterminate, recommend follow-up sonogram in 6 weeks for repeat characterization and evaluation of stability.   3.  Thickened endometrial stripe, likely proliferative changes, consider endometrial pathology with additional follow-up as clinically appropriate.   4.  Nabothian cyst       US-RUQ   Final Result      1.  No evidence of gallstones or biliary ductal dilatation.   2.  1.9 cm cystic lesion in the right kidney. Mural nodularity is not excluded and further evaluation with renal protocol MRI is recommended.         CT-ABDOMEN-PELVIS WITH   Final Result      1.  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   2.  Hypodensity in the cervix could be artifactual or could indicate underlying cervical mass. Suggest correlation with physical exam and gynecological history.   3.  19 mm hypodense RIGHT renal lesion, likely but not definitely a cyst. Suggest further assessment with ultrasound when clinically appropriate.                 MEDS:  Current Facility-Administered Medications   Medication Last Admin   • enoxaparin (LOVENOX) inj 80 mg 80 mg at 07/02/21 0615   • MD Alert...Warfarin per Pharmacy     • acetaminophen (TYLENOL) tablet 1,000 mg     • warfarin (COUMADIN) tablet 7.5 mg     • ferrous sulfate tablet 325 mg     • omeprazole (PRILOSEC) capsule 20 mg 20 mg at 07/02/21 0615   • guaiFENesin (ROBITUSSIN) 100 MG/5ML solution 200 mg 100 mg at 07/01/21 2153   • amLODIPine (NORVASC) tablet 10 mg 10 mg at 07/02/21 0615   • benzonatate (TESSALON) capsule 100 mg 100 mg at 06/28/21 2238   • nystatin (MYCOSTATIN) 479518 UNIT/ML suspension 500,000 Units 500,000 Units at 07/02/21 0615   • predniSONE (DELTASONE) tablet 60 mg 60 mg at 07/02/21 0615   • ipratropium-albuterol (DUONEB) nebulizer solution 3 mL at 06/24/21 0507   • baclofen (LIORESAL) tablet 10 mg 10 mg at 06/26/21 2306   • oxyCODONE immediate-release (ROXICODONE) tablet 5 mg 5 mg at 06/27/21 0807   • lidocaine (LIDODERM) 5 % 1 Patch 1 Patch at 06/27/21 2135   • senna-docusate (PERICOLACE or SENOKOT S) 8.6-50 MG per tablet 2 tablet 2 tablet at 06/24/21 1741    And   • polyethylene glycol/lytes (MIRALAX) PACKET 1 Packet      And   • magnesium hydroxide (MILK OF  MAGNESIA) suspension 30 mL      And   • bisacodyl (DULCOLAX) suppository 10 mg 10 mg at 06/15/21 1201   • cloNIDine (CATAPRES) tablet 0.1 mg     • enalaprilat (VASOTEC) injection 1.25 mg     • labetalol (NORMODYNE/TRANDATE) injection 10 mg     • ondansetron (ZOFRAN) syringe/vial injection 4 mg 4 mg at 06/16/21 1355   • ondansetron (ZOFRAN ODT) dispertab 4 mg     • promethazine (PHENERGAN) tablet 12.5-25 mg     • promethazine (PHENERGAN) suppository 12.5-25 mg     • prochlorperazine (COMPAZINE) injection 5-10 mg       ASSESSMENT/PLAN - -  Jaclyn Slater is a 46 y.o. female with PMH of PE who had complaint of abdominal pain and was admitted on 6/14/2021 for acute pyelonephritis and hyponatremia who was subsequently found to have multiple clots and was diagnosed with catastrophic antiphospholipid syndrome..    #Sore Throat, Improved  #Cough, Unchanged  #Oral Candidiasis, Improving  ~Pt still has complaint of cough.   ~White plaques throughout mouth cavity have shown improvement.  ~No other signs of infection. Symptoms may be attributed to gastritis vs oral candidiasis vs URI.  ~Oral candidiasis most likely secondary to corticosteroid course.  Plan:  ~Continue PRN Tessalon Perles.  ~Continue Nystatin swish and swallow.  ~Monitor for change in symptoms.   ~Re-evaluate for infectious etiology if symptoms do not improve.     #Catastrophic Antiphospholipid Syndrome  ~Pt denies changes in vision, numbness, acute neurological deficits, and CP.  ~Hgb/Hct today, 7/2/21, of 8.2/28.61. Similar to previous, 7/1/21, values of 8.1/27.0.  ~INR of 1.18 on 7/2/21 at 0300.  Plan:  ~Heme/Onc actively following and recs appreciated:              Prednisone 60mg to gradually taper off over next few weeks. Discharge on Prednisone 40mg.              Monitor for any DVT/arterial thrombotic events.              Switched to Lovenox w/ Xa monitoring and Coumadin yesterday.              Requires lifelong anticoagulation w/ Coumadin.  ~Monitor INR  for therapeutic range.     #VIDAL, Steady  #Urinary Retention vs Obstruction  ~BUN/creatinine improved today at 27/1.13.  ~GFR improved today of 52.  ~Villegas catheter discontinued yesterday. Pt is not reporting dysuria or hematuria and is urinating normally.  Plan:  ~Nephrology actively following and recs appreciated:              Daily CMP.              Monitor BP.              Avoid nephrotoxic agents and renally dose medications.   Encourage PO hydration and start low salt diet.     #Microcytic Anemia, Steady  ~Likely multifactorial in etiology.  ~Repeat Hgb/Hct this AM steady at 8.2/28.1.  Plan:  ~Continue to trend CBCs.  ~Transfuse when hemoglobin less than 7.     #Elevated LFTs, Resolved  ~Last AST/ALT normal of 33/46 on 6/30/21 at 0222.  Plan:  ~Add LFP if subjective or objective findings require.     #H. pylori infection  ~Diagnosis received in May of 2021.  ~Patient started on triple therapy but did not complete secondary to abdominal pain.  ~EGD completed 6/17 took biopsies to see if infection have resolved; biopsy report on 6/18/21 had diagnosis of unremarkable duodenal mucosa and mild chronic inactive gastritis, negative for H. pylori.  ~Pt has noticed no change to belching, her associated symptom from previous episodes of H. pylori.   Plan:  ~Increase Prilosec from 20mg to 40mg.  ~Monitor for improvement in symptoms.     Disposition: Inpatient for continued monitoring and anticoagulation for catastrophic antiphospholipid antibody syndrome. Needing Anticoagulation Clinic appointment, not available until 7/6/21.     #Core Measures   VTE PPx: Lovenox 1mg/kg BID and Warfarin  Abx: None   Lines/Tubes: PIV   Fluids: None   Diet: Low Salt Diet  Code Status: Full      [place], MS4  Family Medicine Sub-I

## 2021-07-02 NOTE — PROGRESS NOTES
Pt being discharged. Pt educated on antiphopholipid syndrome and received instructions. New prescriptions given and pt verbalized understanding of all medications. Follow up appt made with PCP. PIV removed. Monitor checked in, monitor room notified. All personal belongings with pt. Pt going home with mom. RN to call transport for escort out. Will monitor pt until off unit.

## 2021-07-02 NOTE — PROGRESS NOTES
Received report from Jasper JAVIER, at pt bedside.  POC discussed. Call light and belongings within reach. Bed locked and in low position. Alarm on and fall precautions in place.

## 2021-07-02 NOTE — PROGRESS NOTES
Inpatient Anticoagulation Service Note    Date: 7/2/2021    Reason for Anticoagulation: Other (Comments) (catastrophic antiphospholipid anitbody syndrome)   Target INR: 2.5 to 3.5         Hemoglobin Value: (!) 8.2  Hematocrit Value: (!) 28.1  Lab Platelet Value: 265    INR from last 7 days     Date/Time INR Value    07/02/21 03:00:01  (!) 1.18    07/01/21 0755  1.12    06/30/21 0222  (!) 1.2    06/29/21 0312  (!) 1.24    06/28/21 0114  (!) 1.22    06/27/21 0123  (!) 1.35    06/26/21 0041  (!) 1.56        Dose from last 7 days     Date/Time Dose (mg)    07/02/21 1428  7.5    07/01/21 1433  10        Average Dose (mg):  (new start)  Significant Interactions: Corticosteroids, Proton Pump Inhibitor  Bridge Therapy: Yes (Lovenox 80 mg BID)  Bridge Therapy Start Date: 07/01/21  Days of Overlap Therapy: 1   INR Value Greater than 2 Prior to Discontinuation of Parenteral Anticoagulation: Not Applicable    Comments: Pt has antiphospholipid antibody syndrome. INR goal is 2.5-3.5. Pt started warfarin dosing yesterday with a 10 mg dose. Pt will start warfarin 7.5 mg daily today. Will trend the INR. Pt is being bridged with therapeutic Lovenox. DDI with warfarin noted.    Education Material Provided?: No  Pharmacist suggested discharge dosing: TBD, warfarin 7.5 mg daily for now. Pt will need a follow up INR within 2-3 days of discharge     Nitesh Howard, PharmD

## 2021-07-02 NOTE — CARE PLAN
Problem: Knowledge Deficit - Standard  Goal: Patient and family/care givers will demonstrate understanding of plan of care, disease process/condition, diagnostic tests and medications  Outcome: Progressing  Note: Discussed POC and medications with patient.  Patient verbalized understanding.

## 2021-07-02 NOTE — CARE PLAN
Problem: Nutritional:  Goal: Achieve adequate nutritional intake  Description: Diet will advance beyond NPO / clear liquids and patient will consume >50% of meals.  Outcome: Met     PO intake of pt's regular diet has improved to % of all recorded meals since 6/30/21. RD will follow weekly or PRN.

## 2021-07-02 NOTE — CARE PLAN
The patient is Watcher - Medium risk of patient condition declining or worsening    Shift Goals  Clinical Goals: Transition to Lovenox; Monitor Labs; DC plan  Patient Goals: DC Plan  Family Goals: NA      Problem: Knowledge Deficit - Standard  Goal: Patient and family/care givers will demonstrate understanding of plan of care, disease process/condition, diagnostic tests and medications  Outcome: Progressing  Note: Pt aware of plan to discharge on Sunday 7/4/21

## 2021-07-02 NOTE — DISCHARGE INSTRUCTIONS
Follow up with Nephrology and Hematology within 1-2 weeks after discharge.  Go to the outpatient infusion center on Sunday to get your labs checked.  Got to the Coumadin clinic on Tuesday at 09:00.  We are going to taper your steroids over the course of 5 weeks. (40, 30, 20,10, and 5)      Discharge Instructions    Discharged to home by car with relative. Discharged via wheelchair, hospital escort: Yes.  Special equipment needed: Not Applicable    Be sure to schedule a follow-up appointment with your primary care doctor or any specialists as instructed.     Discharge Plan:   Diet Plan: Discussed  Activity Level: Discussed  Confirmed Follow up Appointment: Appointment Scheduled  Confirmed Symptoms Management: Discussed  Medication Reconciliation Updated: Yes    I understand that a diet low in cholesterol, fat, and sodium is recommended for good health. Unless I have been given specific instructions below for another diet, I accept this instruction as my diet prescription.   Other diet: Regular as tolerated    Special Instructions: None    · Is patient discharged on Warfarin / Coumadin?   Yes    You are receiving the drug warfarin. Please understand the importance of monitoring warfarin with scheduled PT/INR blood draws.  Follow-up with a call to your personal Doctor's office in 3 days to schedule a PT/INR. .    IMPORTANT: HOW TO USE THIS INFORMATION:  This is a summary and does NOT have all possible information about this product. This information does not assure that this product is safe, effective, or appropriate for you. This information is not individual medical advice and does not substitute for the advice of your health care professional. Always ask your health care professional for complete information about this product and your specific health needs.      WARFARIN - ORAL (WARF-uh-rin)      COMMON BRAND NAME(S): Coumadin      WARNING:  Warfarin can cause very serious (possibly fatal) bleeding. This is more  "likely to occur when you first start taking this medication or if you take too much warfarin. To decrease your risk for bleeding, your doctor or other health care provider will monitor you closely and check your lab results (INR test) to make sure you are not taking too much warfarin. Keep all medical and laboratory appointments. Tell your doctor right away if you notice any signs of serious bleeding. See also Side Effects section.      USES:  This medication is used to treat blood clots (such as in deep vein thrombosis-DVT or pulmonary embolus-PE) and/or to prevent new clots from forming in your body. Preventing harmful blood clots helps to reduce the risk of a stroke or heart attack. Conditions that increase your risk of developing blood clots include a certain type of irregular heart rhythm (atrial fibrillation), heart valve replacement, recent heart attack, and certain surgeries (such as hip/knee replacement). Warfarin is commonly called a \"blood thinner,\" but the more correct term is \"anticoagulant.\" It helps to keep blood flowing smoothly in your body by decreasing the amount of certain substances (clotting proteins) in your blood.      HOW TO USE:  Read the Medication Guide provided by your pharmacist before you start taking warfarin and each time you get a refill. If you have any questions, ask your doctor or pharmacist. Take this medication by mouth with or without food as directed by your doctor or other health care professional, usually once a day. It is very important to take it exactly as directed. Do not increase the dose, take it more frequently, or stop using it unless directed by your doctor. Dosage is based on your medical condition, laboratory tests (such as INR), and response to treatment. Your doctor or other health care provider will monitor you closely while you are taking this medication to determine the right dose for you. Use this medication regularly to get the most benefit from it. To " help you remember, take it at the same time each day. It is important to eat a balanced, consistent diet while taking warfarin. Some foods can affect how warfarin works in your body and may affect your treatment and dose. Avoid sudden large increases or decreases in your intake of foods high in vitamin K (such as broccoli, cauliflower, cabbage, brussels sprouts, kale, spinach, and other green leafy vegetables, liver, green tea, certain vitamin supplements). If you are trying to lose weight, check with your doctor before you try to go on a diet. Cranberry products may also affect how your warfarin works. Limit the amount of cranberry juice (16 ounces/480 milliliters a day) or other cranberry products you may drink or eat.      SIDE EFFECTS:  Nausea, loss of appetite, or stomach/abdominal pain may occur. If any of these effects persist or worsen, tell your doctor or pharmacist promptly. Remember that your doctor has prescribed this medication because he or she has judged that the benefit to you is greater than the risk of side effects. Many people using this medication do not have serious side effects. This medication can cause serious bleeding if it affects your blood clotting proteins too much (shown by unusually high INR lab results). Even if your doctor stops your medication, this risk of bleeding can continue for up to a week. Tell your doctor right away if you have any signs of serious bleeding, including: unusual pain/swelling/discomfort, unusual/easy bruising, prolonged bleeding from cuts or gums, persistent/frequent nosebleeds, unusually heavy/prolonged menstrual flow, pink/dark urine, coughing up blood, vomit that is bloody or looks like coffee grounds, severe headache, dizziness/fainting, unusual or persistent tiredness/weakness, bloody/black/tarry stools, chest pain, shortness of breath, difficulty swallowing. Tell your doctor right away if any of these unlikely but serious side effects occur: persistent  nausea/vomiting, severe stomach/abdominal pain, yellowing eyes/skin. This drug rarely has caused very serious (possibly fatal) problems if its effects lead to small blood clots (usually at the beginning of treatment). This can lead to severe skin/tissue damage that may require surgery or amputation if left untreated. Patients with certain blood conditions (protein C or S deficiency) may be at greater risk. Get medical help right away if any of these rare but serious side effects occur: painful/red/purplish patches on the skin (such as on the toe, breast, abdomen), change in the amount of urine, vision changes, confusion, slurred speech, weakness on one side of the body. A very serious allergic reaction to this drug is rare. However, get medical help right away if you notice any symptoms of a serious allergic reaction, including: rash, itching/swelling (especially of the face/tongue/throat), severe dizziness, trouble breathing. This is not a complete list of possible side effects. If you notice other effects not listed above, contact your doctor or pharmacist. In the [place] - Call your doctor for medical advice about side effects. You may report side effects to FDA at 1-623-CRT-8065. In [place] - Call your doctor for medical advice about side effects. You may report side effects to [facility] [place] at 1-319.362.8406.      PRECAUTIONS:  Before taking warfarin, tell your doctor or pharmacist if you are allergic to it; or if you have any other allergies. This product may contain inactive ingredients, which can cause allergic reactions or other problems. Talk to your pharmacist for more details. Before using this medication, tell your doctor or pharmacist your medical history, especially of: blood disorders (such as anemia, hemophilia), bleeding problems (such as bleeding of the stomach/intestines, bleeding in the brain), blood vessel disorders (such as aneurysms), recent major injury/surgery, liver disease, alcohol use,  mental/mood disorders (including memory problems), frequent falls/injuries. It is important that all your doctors and dentists know that you take warfarin. Before having surgery or any medical/dental procedures, tell your doctor or dentist that you are taking this medication and about all the products you use (including prescription drugs, nonprescription drugs, and herbal products). Avoid getting injections into the muscles. If you must have an injection into a muscle (for example, a flu shot), it should be given in the arm. This way, it will be easier to check for bleeding and/or apply pressure bandages. This medication may cause stomach bleeding. Daily use of alcohol while using this medicine will increase your risk for stomach bleeding and may also affect how this medication works. Limit or avoid alcoholic beverages. If you have not been eating well, if you have an illness or infection that causes fever, vomiting, or diarrhea for more than 2 days, or if you start using any antibiotic medications, contact your doctor or pharmacist immediately because these conditions can affect how warfarin works. This medication can cause heavy bleeding. To lower the chance of getting cut, bruised, or injured, use great caution with sharp objects like safety razors and nail cutters. Use an electric razor when shaving and a soft toothbrush when brushing your teeth. Avoid activities such as contact sports. If you fall or injure yourself, especially if you hit your head, call your doctor immediately. Your doctor may need to check you. The Food & Drug Administration has stated that generic warfarin products are interchangeable. However, consult your doctor or pharmacist before switching warfarin products. Be careful not to take more than one medication that contains warfarin unless specifically directed by the doctor or health care provider who is monitoring your warfarin treatment. Older adults may be at greater risk for bleeding  "while using this drug. This medication is not recommended for use during pregnancy because of serious (possibly fatal) harm to an unborn baby. Discuss the use of reliable forms of birth control with your doctor. If you become pregnant or think you may be pregnant, tell your doctor immediately. If you are planning pregnancy, discuss a plan for managing your condition with your doctor before you become pregnant. Your doctor may switch the type of medication you use during pregnancy. Very small amounts of this medication may pass into breast milk but is unlikely to harm a nursing infant. Consult your doctor before breast-feeding.      DRUG INTERACTIONS:  Drug interactions may change how your medications work or increase your risk for serious side effects. This document does not contain all possible drug interactions. Keep a list of all the products you use (including prescription/nonprescription drugs and herbal products) and share it with your doctor and pharmacist. Do not start, stop, or change the dosage of any medicines without your doctor's approval. Warfarin interacts with many prescription, nonprescription, vitamin, and herbal products. This includes medications that are applied to the skin or inside the vagina or rectum. The interactions with warfarin usually result in an increase or decrease in the \"blood-thinning\" (anticoagulant) effect. Your doctor or other health care professional should closely monitor you to prevent serious bleeding or clotting problems. While taking warfarin, it is very important to tell your doctor or pharmacist of any changes in medications, vitamins, or herbal products that you are taking. Some products that may interact with this drug include: capecitabine, imatinib, mifepristone. Aspirin, aspirin-like drugs (salicylates), and nonsteroidal anti-inflammatory drugs (NSAIDs such as ibuprofen, naproxen, celecoxib) may have effects similar to warfarin. These drugs may increase the risk of " bleeding problems if taken during treatment with warfarin. Carefully check all prescription/nonprescription product labels (including drugs applied to the skin such as pain-relieving creams) since the products may contain NSAIDs or salicylates. Talk to your doctor about using a different medication (such as acetaminophen) to treat pain/fever. Low-dose aspirin and related drugs (such as clopidogrel, ticlopidine) should be continued if prescribed by your doctor for specific medical reasons such as heart attack or stroke prevention. Consult your doctor or pharmacist for more details. Many herbal products interact with warfarin. Tell your doctor before taking any herbal products, especially bromelains, coenzyme Q10, cranberry, danshen, dong quai, fenugreek, garlic, ginkgo biloba, ginseng, and Ru's wort, among others. This medication may interfere with a certain laboratory test to measure theophylline levels, possibly causing false test results. Make sure laboratory personnel and all your doctors know you use this drug.      OVERDOSE:  If overdose is suspected, contact a poison control center or emergency room immediately. [place] residents can call the [place] National Poison Hotline at 1-659.301.5694. [place] residents can call a provincial poison control center. Symptoms of overdose may include: bloody/black/tarry stools, pink/dark urine, unusual/prolonged bleeding.      NOTES:  Do not share this medication with others. Laboratory and/or medical tests (such as INR, complete blood count) must be performed periodically to monitor your progress or check for side effects. Consult your doctor for more details.      MISSED DOSE:  For the best possible benefit, do not miss any doses. If you do miss a dose and remember on the same day, take it as soon as you remember. If you remember on the next day, skip the missed dose and resume your usual dosing schedule. Do not double the dose to catch up because this could increase your risk  for bleeding. Keep a record of missed doses to give to your doctor or pharmacist. Contact your doctor or pharmacist if you miss 2 or more doses in a row.      STORAGE:  Store at room temperature away from light and moisture. Do not store in the bathroom. Keep all medications away from children and pets. Do not flush medications down the toilet or pour them into a drain unless instructed to do so. Properly discard this product when it is  or no longer needed. Consult your pharmacist or local waste disposal company for more details about how to safely discard your product.      MEDICAL ALERT:  Your condition and medication can cause complications in a medical emergency. For information about enrolling in MedicAlert, call 1-230.194.2314 ([place]) or 1-763.861.4679 ([place]).      Information last revised 2010 Copyright(c)  [employer]             Depression / Suicide Risk    As you are discharged from this [facility] facility, it is important to learn how to keep safe from harming yourself.    Recognize the warning signs:  · Abrupt changes in personality, positive or negative- including increase in energy   · Giving away possessions  · Change in eating patterns- significant weight changes-  positive or negative  · Change in sleeping patterns- unable to sleep or sleeping all the time   · Unwillingness or inability to communicate  · Depression  · Unusual sadness, discouragement and loneliness  · Talk of wanting to die  · Neglect of personal appearance   · Rebelliousness- reckless behavior  · Withdrawal from people/activities they love  · Confusion- inability to concentrate     If you or a loved one observes any of these behaviors or has concerns about self-harm, here's what you can do:  · Talk about it- your feelings and reasons for harming yourself  · Remove any means that you might use to hurt yourself (examples: pills, rope, extension cords, firearm)  · Get professional help from the community  (Mental Health, Substance Abuse, psychological counseling)  · Do not be alone:Call your Safe Contact- someone whom you trust who will be there for you.  · Call your local CRISIS HOTLINE 616-4401 or 641-717-8048  · Call your local Children's Mobile Crisis Response Team [place] (546) 010-4297 or www.[place]  · Call the toll free National Suicide Prevention Hotlines   · National Suicide Prevention Lifeline 038-729-NTPG (3246)  · [place] [facility] 800-SUICIDE (660-0703)      Anemia    Anemia is a condition in which you do not have enough red blood cells or hemoglobin. Hemoglobin is a substance in red blood cells that carries oxygen. When you do not have enough red blood cells or hemoglobin (are anemic), your body cannot get enough oxygen and your organs may not work properly. As a result, you may feel very tired or have other problems.  What are the causes?  Common causes of anemia include:  Excessive bleeding. Anemia can be caused by excessive bleeding inside or outside the body, including bleeding from the intestine or from periods in women.  Poor nutrition.  Long-lasting (chronic) kidney, thyroid, and liver disease.  Bone marrow disorders.  Cancer and treatments for cancer.  HIV (human immunodeficiency virus) and AIDS (acquired immunodeficiency syndrome).  Treatments for HIV and AIDS.  Spleen problems.  Blood disorders.  Infections, medicines, and autoimmune disorders that destroy red blood cells.  What are the signs or symptoms?  Symptoms of this condition include:  Minor weakness.  Dizziness.  Headache.  Feeling heartbeats that are irregular or faster than normal (palpitations).  Shortness of breath, especially with exercise.  Paleness.  Cold sensitivity.  Indigestion.  Nausea.  Difficulty sleeping.  Difficulty concentrating.  Symptoms may occur suddenly or develop slowly. If your anemia is mild, you may not have symptoms.  How is this diagnosed?  This condition is diagnosed based on:  Blood  tests.  Your medical history.  A physical exam.  Bone marrow biopsy.  Your health care provider may also check your stool (feces) for blood and may do additional testing to look for the cause of your bleeding.  You may also have other tests, including:  Imaging tests, such as a CT scan or MRI.  Endoscopy.  Colonoscopy.  How is this treated?  Treatment for this condition depends on the cause. If you continue to lose a lot of blood, you may need to be treated at a hospital. Treatment may include:  Taking supplements of iron, vitamin B12, or folic acid.  Taking a hormone medicine (erythropoietin) that can help to stimulate red blood cell growth.  Having a blood transfusion. This may be needed if you lose a lot of blood.  Making changes to your diet.  Having surgery to remove your spleen.  Follow these instructions at home:  Take over-the-counter and prescription medicines only as told by your health care provider.  Take supplements only as told by your health care provider.  Follow any diet instructions that you were given.  Keep all follow-up visits as told by your health care provider. This is important.  Contact a health care provider if:  You develop new bleeding anywhere in the body.  Get help right away if:  You are very weak.  You are short of breath.  You have pain in your abdomen or chest.  You are dizzy or feel faint.  You have trouble concentrating.  You have bloody or black, tarry stools.  You vomit repeatedly or you vomit up blood.  Summary  Anemia is a condition in which you do not have enough red blood cells or enough of a substance in your red blood cells that carries oxygen (hemoglobin).  Symptoms may occur suddenly or develop slowly.  If your anemia is mild, you may not have symptoms.  This condition is diagnosed with blood tests as well as a medical history and physical exam. Other tests may be needed.  Treatment for this condition depends on the cause of the anemia.  This information is not intended  to replace advice given to you by your health care provider. Make sure you discuss any questions you have with your health care provider.  Document Released: 01/25/2006 Document Revised: 11/30/2018 Document Reviewed: 01/19/2018    Warfarin tablets  What is this medicine?  WARFARIN (WAR far in) is an anticoagulant. It is used to treat or prevent clots in the veins, arteries, lungs, or heart.  This medicine may be used for other purposes; ask your health care provider or pharmacist if you have questions.  COMMON BRAND NAME(S): Coumadin, Jantoven  What should I tell my health care provider before I take this medicine?  They need to know if you have any of these conditions:  alcoholism  anemia  bleeding disorders  cancer  diabetes  heart disease  high blood pressure  history of bleeding in the gastrointestinal tract  history of stroke or other brain injury or disease  kidney or liver disease  protein C deficiency  protein S deficiency  psychosis or dementia  recent injury, recent or planned surgery or procedure  an unusual or allergic reaction to warfarin, other medicines, foods, dyes, or preservatives  pregnant or trying to get pregnant  breast-feeding  How should I use this medicine?  Take this medicine by mouth with a glass of water. Follow the directions on the prescription label. You can take this medicine with or without food. Take your medicine at the same time each day. Do not take it more often than directed. Do not stop taking except on your doctor's advice. Stopping this medicine may increase your risk of a blood clot. Be sure to refill your prescription before you run out of medicine.  If your doctor or healthcare professional calls to change your dose, write down the dose and any other instructions. Always read the dose and instructions back to him or her to make sure you understand them. Tell your doctor or healthcare professional what strength of tablets you have on hand. Ask how many tablets you should  take to equal your new dose. Write the date on the new instructions and keep them near your medicine. If you are told to stop taking your medicine until your next blood test, call your doctor or healthcare professional if you do not hear anything within 24 hours of the test to find out your new dose or when to restart your prior dose.  A special MedGuide will be given to you by the pharmacist with each prescription and refill. Be sure to read this information carefully each time.  Talk to your pediatrician regarding the use of this medicine in children. Special care may be needed.  Overdosage: If you think you have taken too much of this medicine contact a poison control center or emergency room at once.  NOTE: This medicine is only for you. Do not share this medicine with others.  What if I miss a dose?  It is important not to miss a dose. If you miss a dose, call your healthcare provider. Take the dose as soon as possible on the same day. If it is almost time for your next dose, take only that dose. Do not take double or extra doses to make up for a missed dose.  What may interact with this medicine?  Do not take this medicine with any of the following medications:  agents that prevent or dissolve blood clots  aspirin or other salicylates  danshen  dextrothyroxine  mifepristone  [place]'s Wort  red yeast rice  This medicine may also interact with the following medications:  acetaminophen  agents that lower cholesterol  alcohol  allopurinol  amiodarone  antibiotics or medicines for treating bacterial, fungal or viral infections  azathioprine  barbiturate medicines for inducing sleep or treating seizures  certain medicines for diabetes  certain medicines for heart rhythm problems  certain medicines for hepatitis C virus infections like daclatasvir, dasabuvir; ombitasvir; paritaprevir; ritonavir, elbasvir; grazoprevir, ledipasvir; sofosbuvir, simeprevir, sofosbuvir, sofosbuvir; velpatasvir, sofosbuvir; velpatasvir;  voxilaprevir  certain medicines for high blood pressure  chloral hydrate  cisapride  conivaptan  disulfiram  female hormones, including contraceptive or birth control pills  general anesthetics  herbal or dietary products like garlic, ginkgo, ginseng, green tea, or kava kava  influenza virus vaccine  male hormones  medicines for mental depression or psychosis  medicines for some types of cancer  medicines for stomach problems  methylphenidate  NSAIDs, medicines for pain and inflammation, like ibuprofen or naproxen  propoxyphene  quinidine, quinine  raloxifene  seizure or epilepsy medicine like carbamazepine, phenytoin, and valproic acid  steroids like cortisone and prednisone  tamoxifen  thyroid medicine  tramadol  vitamin c, vitamin e, and vitamin K  zafirlukast  zileuton  This list may not describe all possible interactions. Give your health care provider a list of all the medicines, herbs, non-prescription drugs, or dietary supplements you use. Also tell them if you smoke, drink alcohol, or use illegal drugs. Some items may interact with your medicine.  What should I watch for while using this medicine?  Visit your healthcare professional for regular checks on your progress. You will need to have a blood test called a PT/INR regularly. The PT/INR blood test is done to make sure you are getting the right dose of this medicine. It is important to not miss your appointment for the blood tests. When you first start taking this medicine, these tests are done often. Once the correct dose is determined and you take your medicine properly, these tests can be done less often.  Wear a medical ID bracelet or chain, and carry a card that describes your disease and details of your medicine and dosage times.  Do not start taking or stop taking any medicines or over-the-counter medicines except on the advice of your healthcare professional.  You should discuss your diet with your healthcare professional. Do not make major  changes in your diet. Vitamin K can affect how well this medicine works. Many foods contain vitamin K. It is important to eat a consistent amount of foods with vitamin K. Other foods with vitamin K that you should eat in consistent amounts are asparagus, basil, black-eyed peas, broccoli, brussel sprouts, cabbage, green onions, green tea, parsley, green leafy vegetables like beet greens, miki greens, kale, spinach, turnip greens, or certain lettuces like green leaf or vandana.  This medicine can cause birth defects or bleeding in an unborn child. Women of childbearing age should use effective birth control while taking this medicine. If a woman becomes pregnant while taking this medicine, she should discuss the potential risks and her options with her healthcare professional.  Avoid sports and activities that might cause injury while you are using this medicine. Severe falls or injuries can cause unseen bleeding. Be careful when using sharp tools or knives. Consider using an electric razor. Take special care brushing or flossing your teeth. Report any injuries, bruising, or red spots on the skin to your healthcare professional.  If you have an illness that causes vomiting, diarrhea, or fever for more than a few days, contact your health care professional. Also, check with your healthcare professional if you are unable to eat for several days. These problems can change the effect of this medicine.  Even after you stop taking this medicine, it takes several days before your body recovers its normal ability to clot blood. Ask your healthcare professional how long you need to be careful. If you are going to have surgery or dental work, tell your health care professional that you have been taking this medicine.  What side effects may I notice from receiving this medicine?  Side effects that you should report to your doctor or health care professional as soon as possible:  allergic reactions like skin rash, itching or  hives, swelling of the face, lips, or tongue  heavy menstrual bleeding or vaginal bleeding  painful, blue or purple toes  painful skin ulcers that do not go away  signs and symptoms of bleeding such as bloody or black, tarry stools; red or dark-brown urine; spitting up blood or brown material that looks like coffee grounds; red spots on the skin; unusual bruising or bleeding from the eye, gums, or nose  signs and symptoms of a blood clot such as chest pain; shortness of breath; pain, swelling, or warmth in the leg  signs and symptoms of a stroke such as changes in vision; confusion; trouble speaking or understanding; severe headaches; sudden numbness or weakness of the face, arm or leg; trouble walking; dizziness; loss of coordination  stomach pain  unusually weak or tired  Side effects that usually do not require medical attention (report to your doctor or health care professional if they continue or are bothersome):  diarrhea  hair loss  This list may not describe all possible side effects. Call your doctor for medical advice about side effects. You may report side effects to FDA at 6-000-FDA-8388.  Where should I keep my medicine?  Keep out of the reach of children.  Store at room temperature between 15 and 30 degrees C (59 and 86 degrees F). Protect from light. Throw away any unused medicine after the expiration date. Do not flush down the toilet.  NOTE: This sheet is a summary. It may not cover all possible information. If you have questions about this medicine, talk to your doctor, pharmacist, or health care provider.  © 2020 [employer]vier/Gold Standard (2018-10-03 13:06:45)  [employer] Patient Education © 2020 [employer]                                                                                                       Antiphospholipid Antibody  Does this test have other names?  APA, lupus anticoagulant, anticardiolipin antibodies  What is this test?  This blood test checks for antiphospholipid antibodies. These  may be found in people with abnormal blood clots or autoimmune diseases.   Your immune system usually creates antibodies in response to an infection or foreign invaders like bacteria. Antiphospholipid antibodies are usually made when your immune system mistakes part of your own body for a harmful substance. In this case, the antibodies seem to be reacting to phospholipids. Phospholipids are a normal part of your blood vessels.   People who have abnormal blood clots, repeated miscarriages, or autoimmune diseases such as systemic lupus erythematosus (SLE) and multiple sclerosis often have antiphospholipid antibodies. People with cancer may also have these antibodies. The antibodies often fade away when the cancer is treated.   The 2 most common types of antiphospholipid antibodies are lupus anticoagulant and anticardiolipin antibodies. Testing for lupus anticoagulant often uses a test such as the Tommie viper venom time (RVVT) or kaolin clotting time. RVVT measures how long it takes a type of viper venom to trigger a blood clot. Kaolin clotting time is used to diagnose clotting disorders and find the lupus anticoagulant. Measuring anticardiolipin antibodies is done by looking for antibodies against the cardiolipin molecule.     Why do I need this test?  You may need this test if you:  · Have repeated miscarriages  · Get abnormal blood clots that could lead to heart attack or stroke  · Have antiphospholipid antibody syndrome. This is a group of symptoms that includes miscarriages, a platelet deficiency, and abnormal blood clots.   · Have SLE or cancer  · Have an unexpectedly prolonged and activated partial thromboplastin time (aPTT)    What other tests might I have along with this test?  You may also need an aPTT. This may help find out what is causing a blood clot or bleeding disorder. You may also have a dilute prothrombin test. This helps measure how long it takes a clot to form.    What do my test results  mean?  Test results may vary depending on your age, gender, health history, the method used for the test, and other things. Your test results may not mean you have a problem. Ask your healthcare provider what your test results mean for you.    A negative result means you don’t have these antibodies. Low to moderate results may mean the antibodies are there because of a recent health problem or a medicine you have taken. High levels of this antibody may mean you have a higher risk for blood clots. Your healthcare provider can't predict when a clot may happen. You may need a second test in about 12 weeks to confirm the results.   A positive result doesn't mean you need treatment. If you have antiphospholipid syndrome, your provider may suggest treatment that includes warfarin, an anti-clotting medicine. Your provider will tell you what the results mean in light of your overall health.    How is this test done?  The test is done with a blood sample. A needle is used to draw blood from a vein in your arm or hand.    Does this test pose any risks?  Having a blood test with a needle carries some risks. These include bleeding, infection, bruising, and feeling lightheaded. When the needle pricks your arm or hand, you may feel a slight sting or pain. Afterward, the site may be sore.    What might affect my test results?  A test done for syphilis can cause a false-positive antiphospholipid antibody test result if done at the same time. That’s because the substances used to test for syphilis have phospholipids in them. You may need a second test to confirm the results.   Some medicines may raise antibody levels. These include quinidine, procainamide, phenytoin, and penicillin. Recent viral infections such as HIV can also affect the results.   How do I get ready for this test?  You don't need to prepare for this test. Tell your healthcare provider about all medicines, herbs, vitamins, and supplements you are taking. This  includes medicines that don't need a prescription and any illegal drugs you may use.      © 5692-9184 [employer]. All rights reserved. This information is not intended as a substitute for professional medical care. Always follow your healthcare professional's instructions. Antiphospholipid Antibody  Does this test have other names?  APA, lupus anticoagulant, anticardiolipin antibodies  What is this test?  This blood test checks for antiphospholipid antibodies. These may be found in people with abnormal blood clots or autoimmune diseases.   Your immune system usually creates antibodies in response to an infection or foreign invaders like bacteria. Antiphospholipid antibodies are usually made when your immune system mistakes part of your own body for a harmful substance. In this case, the antibodies seem to be reacting to phospholipids. Phospholipids are a normal part of your blood vessels.   People who have abnormal blood clots, repeated miscarriages, or autoimmune diseases such as systemic lupus erythematosus (SLE) and multiple sclerosis often have antiphospholipid antibodies. People with cancer may also have these antibodies. The antibodies often fade away when the cancer is treated.   The 2 most common types of antiphospholipid antibodies are lupus anticoagulant and anticardiolipin antibodies. Testing for lupus anticoagulant often uses a test such as the Tommie viper venom time (RVVT) or kaolin clotting time. RVVT measures how long it takes a type of viper venom to trigger a blood clot. Kaolin clotting time is used to diagnose clotting disorders and find the lupus anticoagulant. Measuring anticardiolipin antibodies is done by looking for antibodies against the cardiolipin molecule.     Why do I need this test?  You may need this test if you:  · Have repeated miscarriages  · Get abnormal blood clots that could lead to heart attack or stroke  · Have antiphospholipid antibody syndrome. This is a group  of symptoms that includes miscarriages, a platelet deficiency, and abnormal blood clots.   · Have SLE or cancer  · Have an unexpectedly prolonged and activated partial thromboplastin time (aPTT)    What other tests might I have along with this test?  You may also need an aPTT. This may help find out what is causing a blood clot or bleeding disorder. You may also have a dilute prothrombin test. This helps measure how long it takes a clot to form.    What do my test results mean?  Test results may vary depending on your age, gender, health history, the method used for the test, and other things. Your test results may not mean you have a problem. Ask your healthcare provider what your test results mean for you.    A negative result means you don’t have these antibodies. Low to moderate results may mean the antibodies are there because of a recent health problem or a medicine you have taken. High levels of this antibody may mean you have a higher risk for blood clots. Your healthcare provider can't predict when a clot may happen. You may need a second test in about 12 weeks to confirm the results.   A positive result doesn't mean you need treatment. If you have antiphospholipid syndrome, your provider may suggest treatment that includes warfarin, an anti-clotting medicine. Your provider will tell you what the results mean in light of your overall health.    How is this test done?  The test is done with a blood sample. A needle is used to draw blood from a vein in your arm or hand.    Does this test pose any risks?  Having a blood test with a needle carries some risks. These include bleeding, infection, bruising, and feeling lightheaded. When the needle pricks your arm or hand, you may feel a slight sting or pain. Afterward, the site may be sore.    What might affect my test results?  A test done for syphilis can cause a false-positive antiphospholipid antibody test result if done at the same time. That’s because the  substances used to test for syphilis have phospholipids in them. You may need a second test to confirm the results.   Some medicines may raise antibody levels. These include quinidine, procainamide, phenytoin, and penicillin. Recent viral infections such as HIV can also affect the results.   How do I get ready for this test?  You don't need to prepare for this test. Tell your healthcare provider about all medicines, herbs, vitamins, and supplements you are taking. This includes medicines that don't need a prescription and any illegal drugs you may use.      © 1436-5938 [employer]. All rights reserved. This information is not intended as a substitute for professional medical care. Always follow your healthcare professional's instructions.

## 2021-07-02 NOTE — PROGRESS NOTES
Oncology/Hematology Progress Note               Author: Anjali Loomis M.D. Date & Time created: 7/2/2021  3:14 PM     CC: catastrophic antiphospholipid antibody syndrome    Interval History:  No acute events overnight. She really wants to go home, she wants to check labs outpatient if possible. No fevers, cough, Sob. No further pain.     Review of Systems:  Review of Systems   Constitutional: Positive for malaise/fatigue.   All other systems reviewed and are negative.    Physical Exam:  Physical Exam  Constitutional:       General: She is not in acute distress.     Appearance: Normal appearance.   HENT:      Head: Normocephalic and atraumatic.   Eyes:      General: No scleral icterus.     Conjunctiva/sclera: Conjunctivae normal.   Cardiovascular:      Rate and Rhythm: Normal rate.   Pulmonary:      Effort: Pulmonary effort is normal. No respiratory distress.   Neurological:      Mental Status: She is alert and oriented to person, place, and time.   Psychiatric:         Mood and Affect: Mood normal.         Behavior: Behavior normal.         Thought Content: Thought content normal.         Judgment: Judgment normal.       Labs:          Recent Labs     06/30/21 0222 07/01/21 0058 07/02/21  0300   SODIUM 138 139 141   POTASSIUM 3.6 3.4* 3.7   CHLORIDE 106 107 108   CO2 22 22 23   BUN 36* 32* 27*   CREATININE 1.60* 1.36 1.13   CALCIUM 8.6 9.0 9.2     Recent Labs     06/30/21 0222 07/01/21 0058 07/02/21  0300   ALTSGPT 46  --   --    ASTSGOT 33  --   --    ALKPHOSPHAT 80  --   --    TBILIRUBIN 0.7  --   --    GLUCOSE 89 112* 83     Recent Labs     06/30/21 0222 07/01/21 0058 07/01/21  0755 07/02/21  0300   RBC 3.43* 3.45*  --  3.52*   HEMOGLOBIN 7.8* 8.1*  --  8.2*   HEMATOCRIT 26.4* 27.0*  --  28.1*   PLATELETCT 317 295  --  265   PROTHROMBTM 14.9*  --  14.1 14.7*   INR 1.20*  --  1.12 1.18*     Recent Labs     06/30/21  0222 07/01/21  0058 07/02/21  0300   WBC 9.5 10.2 10.0   NEUTSPOLYS 74.30* 73.40* 71.40    LYMPHOCYTES 17.30* 17.50* 20.40*   MONOCYTES 6.60 7.50 6.80   EOSINOPHILS 0.60 0.40 0.50   BASOPHILS 0.00 0.20 0.00   ASTSGOT 33  --   --    ALTSGPT 46  --   --    ALKPHOSPHAT 80  --   --    TBILIRUBIN 0.7  --   --      Recent Labs     21  0222 21  0058 21  0300   SODIUM 138 139 141   POTASSIUM 3.6 3.4* 3.7   CHLORIDE 106 107 108   CO2 22 22 23   GLUCOSE 89 112* 83   BUN 36* 32* 27*   CREATININE 1.60* 1.36 1.13   CALCIUM 8.6 9.0 9.2     Hemodynamics:  Temp (24hrs), Av.2 °C (99 °F), Min:36.8 °C (98.3 °F), Max:37.6 °C (99.7 °F)  Temperature: 37.3 °C (99.1 °F)  Pulse  Av  Min: 59  Max: 128   Blood Pressure: 158/97     Respiratory:    Respiration: 16, Pulse Oximetry: 96 %     Work Of Breathing / Effort: Within Normal Limits  RUL Breath Sounds: Clear, RML Breath Sounds: Diminished, RLL Breath Sounds: Diminished, BLAYNE Breath Sounds: Clear, LLL Breath Sounds: Diminished  Fluids:    Intake/Output Summary (Last 24 hours) at 2021 1111  Last data filed at 2021 0900  Gross per 24 hour   Intake --   Output 900 ml   Net -900 ml        GI/Nutrition:  Orders Placed This Encounter   Procedures   • Diet Order Diet: Regular     Standing Status:   Standing     Number of Occurrences:   1     Order Specific Question:   Diet:     Answer:   Regular [1]     Medical Decision Making, by Problem:  Active Hospital Problems    Diagnosis    • *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 pylori infection [A04.8]    • Hyponatremia [E87.1]    • Kidney lesion, native, right [N28.9]     • Lesion of cervix [N88.9]    • Leukocytosis [D72.829]        Assessment and Plan:  # Catastrophic antiphospholipid antibody syndrome- triple positive APLA with presumed bilateral adrenal hemorrhage secondary to micro-thrombus/thrombotic storm along with liver involvement,?  possible kidney involvement, thrombocytopenia.  - No TTP ,DIC, TMA, HIT.  CUONG ANCA Hep/HIV negative    -Complicated case due to bilateral adrenal hemorrhage presumed to be secondary to micro thrombus/thrombotic storm, this has now stablizied    - Initially received high-dose Solu-Medrol, switched to prednisone 60 mg with plans to gradually taper off over the next few weeks. I would discharge her on prednisone 40 mg, then taper by 10 mg weekly, then 5 mg x 7 days, then stop.    -Follow-up CT scan from 6/24/2021 improving to stable adrenal hemorrhage nonspecific retroperitoneal lymphadenopathy, possibly reactive, will need to be followed up as an outpatient  - H/H remaining stable.  - monitor for any DVT/arterial thrombotic events like stroke heart attack etc.  - Stopped heparin gtt today and started Lovenox 1 mg/kg SQ BID with a bridge to warfarin. She will need at least 5 days of overlap and continue until INR Is in range between 2.5-3.5.  - Need life long anticoagulation on Coumadin given triple positive APLA , per TRAPS data aim for iNR 2.5-3.5 range as she has mild baseline PT elevation.  - Pt will come to [facility] on 7/4/21 for CBC, PT/INR check at 11 AM and nursing will call me to review results before she leaves, she then will follow up with Coumadin clinic on Tuesday for ongoing management of her coumadin.    # Thrombocytopenia - platelets down to 64 on 6/22/21  - now normalized since 6/23/21 with IVIg and steroids  - monitor closely as prednisone is tapered, platelets today are 265    # VIDAL - this has resolved, creatinine normal today    -Anemia-multifactorial. Pre existing HEATHER . She had mucocutaneous/vaginal bleeding and adrenal ,  possible RP hemorrhage which appears to be stable/ improving.   - Receiving IV iron, switching to PO  Transfuse for HgB < 7  Or any bleed  Johan test was negative.      -Risk of adrenal insufficiency due to bilateral adrenal hemorrhage.  Cortisol level okay, continue steroids with taper    High complexity, High Risk Patient  - Will continue to follow, on discharge she will need follow up with Dr. Varela and Coumadin clinic.      Quality-Core Measures   Reviewed items::  Labs reviewed and Medications reviewed  Villegas catheter::  No Villegas  DVT prophylaxis pharmacological::  Heparin

## 2021-07-03 NOTE — DISCHARGE SUMMARY
"  [facility] FAMILY MEDICINE ADULT DISCHARGE SUMMARY     Admit Date:  6/14/2021       Discharge Date:  7/02/2021    Service:   [facility] [place] Family Medicine Inpatient Team  Attending Physician(s):   Yanna Ortiz MD; Juvenal Michele MD; Christi Yoder MD; Tamie Rodriguez MD       Senior Resident(s):   Yuri Ivy MD  Andrew Resident(s):   Jaylen Bland MD; Suzanne Mei MD     Primary Care Physician: None    Discharge Diagnoses:  Catastrophic antiphospholipid syndrome, resolved  Antiphospholipid syndrome, stable  Acute kidney injury, resolved  Urinary retention, resolved  Microcytic anemia secondary to acute blood loss and iron deficiency, stable  Oral candidiasis, resolved  Elevated liver function tests, resolved  Superior vena cava thrombus, stable  Intra-abdominal infection, resolved  Thrombocytopenia, resolved  Vaginal bleeding, resolved  Rectal bleeding, resolved  Bilateral adrenal gland hemorrhage, resolved  Retroperitoneal lymphadenopathy, stable  Fibroids, stable  Ovarian lesion, stable  GERD, persistent    HPI:     Per admission H&P:  \"46 y.o. female with past medical history of H.pylori infection, who presented 6/14/2021 with left sided abdominal pain onset 4 days. She was recently diagnosed with H.Pylori infection at [facility] and she has been taking triple therapy for this, however she notes she was having increased abdominal pain during the treatment, so she stopped the treatments 4 days ago. She reports she has very minimal oral intake due to abdominal pain. Endorses nausea and constipations for 4 days, but denies vomiting, fever, chills, chest pain, sob, melena, hematochezia.      Here labs remarkable for Na 124, Cl 89, wbc 13.7. US RUQ shows No evidence of gallstones or biliary ductal dilatation. 1.9 cm cystic lesion in the right kidney. Mural nodularity is not excluded and further evaluation with renal protocol MRI is recommended.      CT abd shows changes in the retroperitoneal fat " "suspicious for retroperitoneal fibrosis, less likely lymphoma or metastatic disease. Hypodensity in the cervix could be artifactual or could indicate underlying cervical mass. Suggest correlation with physical exam and gynecological history. 19 mm hypodense RIGHT renal lesion, likely but not definitely a cyst.   Therefore, patient is admitted for further evaluations and managements.\"    Problem-based Hospital Course:    #Catastrophic antiphospholipid syndrome  -After evidence of bleeding found, patient was tested positive for lupus anticoagulant, with positive DRV VT test  -Also found to be positive for cardiolipin B  -Determined to be antiphospholipid syndrome, and with evidence of dysfunction in liver, kidneys, and adrenals, was determined to have a catastrophic antiphospholipid syndrome  -Patient received IVIG x 2 on 6/22  -Patient received Solu-Medrol 6/22 x 2 days  -Patient transitioned to prednisone 80 mg daily on 6/24 with a taper  -HIT panel negative and patient was started on a heparin drip on 6/23  -DIC/HIT/TTP all ruled out per heme/onc   -Trop leak minor on 6/25 - no indication for antiplatelet   -CXR 6/25 showed no alvelolar hemorrhage   -Prednisone dose decreased to 60 mg daily on 6/27  -Patient switched from heparin drip to Lovenox 1 mg/kg twice daily on 7/01  -Warfarin started on 7/01  -She clinically improved greatly and looked fantastic over the last 4 days of her hospitalization  -On the day of discharge she was really desiring to go home  -Hematologist arranged for blood draw on 7/04 to check PT/INR.  Plan at discharge:  -Continue prednisone taper - Prednisone 40 mg daily, decreasing by 10 mg each week, and 5 mg for her final week.  -PT/INR on 7/04 at outpatient infusion center  -PT/INR at Coumadin clinic at 7/06  -Follow-up with hematology within 1 to 2 weeks     #VIDAL, steady   #Urinary retention   -Mild left hydronephrosis still seen on CT abd done 6/24  -Microalbumin-Cr ratio of 39 on " 6/24  -Protein-Cr ratio of 189 on 6/24  -Bladder scan 6/24 showed >700mL  -Villegas catheter placed 6/24 per nephro recs  -Kidney function improving the last few days  -eGFR 25 --> 30 ---> 36 ---> 35 -->42-->52  -Repeat ultrasound on 6/28 showed some improvement in hydronephrosis  -Villegas catheter removed 7/01 and she had no difficulties urinating on her own  Plan at discharge:  -Has follow-up appointment with nephrology on 7/28     #Cough  #GERD  #H. pylori  -Patient was started on triple therapy for H. pylori 4 days prior to admission  -Did not complain much about this early on during her hospitalization  -About 5 days prior to discharge she started complaining of cough and belching  -She was continued on a PPI throughout her stay with some improvement,  Plan at discharge:   -Increase omeprazole to 40 mg daily  -Continue Robitussin as needed for coughing  -Continue to follow symptoms, if patient develops fever/productive cough consider different etiology  -Should follow up with PCP/GI after discharge     #Oral candidiasis  -Patient noted to have some thrush on 6/27  -Started on nystatin suspension 6/28  -Thrush had resolved by day of discharge  Plan at discharge:  -Monitor for recurrence and restart nystatin if necessary      #Microcytic anemia  -Likely multifactorial in etiology  -Acute blood loss from hemorrhages, but also seem to have low iron  -Iron studies show iron levels of 32 but normal ferritin  -Johan test was negative  -Elevated LDH which could be related to antiphospholipid syndrome versus active hemolysis  -Repeat CT abdomen/pelvis without contrast on 6/24 showed improvement of the hemorrhage located in both adrenal glands  -Transfused 1 U PRBCs on 6/24 for Hgb 6.8  -Daily IV iron started 6/28 for 4 doses  -Hgb stayed stable at around 8 for the last 4 to 5 days of hospitalization  -Transition to oral iron on the day before discharge  Plan at discharge:  -Continue ferrous sulfate 325 mg M, W, F  -Continue  to intermittently monitor H&H  -Close follow-up with hematology     #Elevated LFTs, resolved  -Patient's LFTs were elevated on 6/22 at ALD088, ATLT 76, and Alk Phos 187  -MRCP completed on 6/22 showed no biliary obstruction  -Hepatitis panel negative  -Possibly due to ischemic hepatopathy  -AST, ALT, and Alk Phos on 6/30 were 33, 46, and 80 respectively  Plan at discharge:  -Repeat LFTs only if clinically suspected     #Thrombus of SVC  -Discovered on CT abdomen/pelvis on 6/18  -Vascular surgery consulted, recommended TTE and RADHIKA if necessary to confirm SVC thrombus  -RADHIKA never done but started on heparin anyway for antiphospholipid syndrome   -Spoke with vascular surgeon 6/30 who said no need for anymore imaging or follow-up with them  Plan at discharge:  -Patient will be bridged with Lovenox 1 mg/kg twice daily until therapeutic on warfarin     #Intra-abdominal infection, resolved  -Patient had 3/4 SIRS criteria including fever, tachycardia, leukocytosis  -Patient was started on cefepime and Flagyl on 6/22  -Patient continued to remain afebrile and pain improving   -Abx stopped on 6/26      #Thrombocytopenia  #Vaginal bleeding  #Rectal bleeding  #Hemorrhage of both adrenal glands  -All likely secondary to catastrophic antiphospholipid syndrome  -Hematology/oncology actively following  -Platelets have been much improved recently  -CT abdomen 6/24 showed improvement in adrenal hemorrhage  -Has not shown signs of bleeding recently  Plan at discharge:  -Closely monitor for bleeding  -Continue to monitor CBC intermittently  -Follow-up with hematology     #Fibroids  -Found on pelvic ultrasound completed on 6/14  Plan at discharge:  -Follow-up outpatient with gynecology     #Ovarian lesion  -Found in the left ovary on pelvic ultrasound completed 6/14  Plan at discharge:  -Recommended follow-up in 6 weeks with repeat ultrasound  -Follow-up with outpatient gynecology     #Lesion of cervix  -Picked up incidentally on  "abdominal CT which stated that this could be artifactual or indicate an underlying cervical mass  -Cervix was unable to be visualized by pelvic exam in the emergency department  Plan at discharge:  -Outpatient follow-up with gynecology as recommended       Subjective on Day of Discharge:  Today she was feeling great.  She denies any pain in her abdomen, no blood in her stool or urine.  She denies any chest pain, shortness of breath, nausea, or vomiting.  She really desires to go home.    Physical Exam on Day of Discharge:  /97   Pulse 85   Temp 37.3 °C (99.1 °F) (Temporal)   Resp 16   Ht 1.753 m (5' 9\")   Wt 83 kg (182 lb 15.7 oz)   SpO2 96%   BMI 27.02 kg/m²   Gen: NAD, up walking around with walker  HEENT: NCAT, EOMI  Pulm: CTA bilaterally, no respiratory distress   Cardio: RRR, normal S1 and S2, no murmurs  Abdom: soft, non-tender, non-distended, normal BS  Ext: No edema, 2+ pulses  Skin: Scattered ecchymoses on upper extremities bilaterally    Consultants:      Gastroenterology  General surgery  Vascular surgery  Hematology/oncology  Nephrology    Procedures:        Esophagogastroduodenoscopy on 6/17    Labs:  Hemoglobin range 6.8-8.7  Platelet range 109-318  Recent Labs     06/30/21 0222 07/01/21 0058 07/02/21  0300   WBC 9.5 10.2 10.0   RBC 3.43* 3.45* 3.52*   HEMOGLOBIN 7.8* 8.1* 8.2*   HEMATOCRIT 26.4* 27.0* 28.1*   MCV 77.0* 78.3* 79.8*   MCH 22.7* 23.5* 23.3*   RDW 67.3* 72.1* 75.5*   PLATELETCT 317 295 265   MPV 10.0 9.8 9.9   NEUTSPOLYS 74.30* 73.40* 71.40   LYMPHOCYTES 17.30* 17.50* 20.40*   MONOCYTES 6.60 7.50 6.80   EOSINOPHILS 0.60 0.40 0.50   BASOPHILS 0.00 0.20 0.00       Creatinine ranged from 0.62-2.50    Recent Labs     06/30/21 0222 07/01/21 0058 07/02/21  0300   SODIUM 138 139 141   POTASSIUM 3.6 3.4* 3.7   CHLORIDE 106 107 108   CO2 22 22 23   GLUCOSE 89 112* 83   BUN 36* 32* 27*     Recent Labs     06/30/21  0222 07/01/21  0755 07/02/21  0300   ASTSGOT 33  --   --    ALTSGPT " 46  --   --    TBILIRUBIN 0.7  --   --    ALKPHOSPHAT 80  --   --    GLOBULIN 3.4  --   --    INR 1.20* 1.12 1.18*       LFTs were intermittently deranged, but normalized on day of discharge    Results for GASTON KRAMER (MRN [id number]) as of 7/2/2021 19:01   Ref. Range 6/22/2021 06:14 6/22/2021 14:51 6/22/2021 20:19 6/24/2021 03:14 6/24/2021 18:29   Beta 2 Microglobulin Latest Ref Range: 1.1 - 2.4 mg/L   2.9 (H)     Beta-2 Glycoprotein I Ab Igg Latest Ref Range: 0 - 20 SGU   71 (H)     Beta-2 Glycoprotein I Igm Latest Ref Range: 0 - 20 SMU   3     Cortisol Latest Ref Range: 0.0 - 23.0 ug/dL 17.3       Folate -Folic Acid Latest Ref Range: >4.0 ng/mL  7.8      Haptoglobin Latest Ref Range: 30 - 200 mg/dL     284 (H)   Procalcitonin Latest Ref Range: <0.25 ng/mL    7.87 (H)    Vitamin B12 -True Cobalamin Latest Ref Range: 211 - 911 pg/mL  863        Results for GASTON KRAMER (MRN [id number]) as of 7/2/2021 19:01   Ref. Range 6/14/2021 18:42 6/18/2021 07:31 6/19/2021 05:57 6/20/2021 06:34 6/21/2021 12:13 6/22/2021 06:14 6/22/2021 14:51 6/22/2021 20:19 6/24/2021 03:14 6/24/2021 18:29   Anti-Cardiolipin Ab Iga Latest Ref Range: 0 - 11 APL    21 (H)         Anti-Cardiolipin Ab Igm Latest Ref Range: 0 - 12 MPL    11         Anti-Cariolipin Ab Igg Latest Ref Range: 0 - 14 GPL    146 (H)         Beta 2 Microglobulin Latest Ref Range: 1.1 - 2.4 mg/L        2.9 (H)     Beta-2 Glycoprotein I Ab Igg Latest Ref Range: 0 - 20 SGU        71 (H)     Beta-2 Glycoprotein I Igm Latest Ref Range: 0 - 20 SMU        3     Cortisol Latest Ref Range: 0.0 - 23.0 ug/dL      17.3       Ferritin Latest Ref Range: 10.0 - 291.0 ng/mL    153.0         Folate -Folic Acid Latest Ref Range: >4.0 ng/mL       7.8      Haptoglobin Latest Ref Range: 30 - 200 mg/dL          284 (H)   Procalcitonin Latest Ref Range: <0.25 ng/mL         7.87 (H)    Vitamin B12 -True Cobalamin Latest Ref Range: 211 - 911 pg/mL       863        Results for GASTON KRAMER  (MRN [id number]) as of 7/2/2021 19:01   Ref. Range 6/14/2021 18:42 6/18/2021 07:31 6/19/2021 05:57 6/20/2021 06:34 6/21/2021 12:13   TSH Latest Ref Range: 0.380 - 5.330 uIU/mL   8.530 (H)     Free T-4 Latest Ref Range: 0.93 - 1.70 ng/dL   1.25     Hepatitis A Virus Ab, IgM Latest Ref Range: Non-Reactive      Non-Reactive   Hepatitis B Surface Antigen Latest Ref Range: Non-Reactive      Non-Reactive   Hepatitis B Cors Ab,IgM Latest Ref Range: Non-Reactive      Non-Reactive   Hepatitis C Antibody Latest Ref Range: Non-Reactive      Non-Reactive   HIV Ag/Ab Combo Assay Latest Ref Range: Non Reactive      Non-Reactive   SARS-CoV-2 by PCR Unknown NotDetected       SARS-CoV-2 Source Unknown NP Swab       Rheumatoid Factor -Neph- Latest Ref Range: 0 - 14 IU/mL    <10    Stat C-Reactive Protein Latest Ref Range: 0.00 - 0.75 mg/dL  27.58 (H)      Anti Dnase-B Latest Ref Range: 0 - 260 U/mL    <86    Ccp Antibodies Latest Ref Range: 0 - 19 Units    8    Antinuclear Antibody Latest Ref Range: None Detected     None Detected    Smith Antibodies Latest Ref Range: 0 - 40 AU/mL    6    SSA 52 (R0)(RODERICK) Ab, IgG Latest Ref Range: 0 - 40 AU/mL    3    SSA 60 (R0)(RODERICK) Ab, IgG Latest Ref Range: 0 - 40 AU/mL    1    Sjogren'S Anti-Ss-B Latest Ref Range: 0 - 40 AU/mL    0    ANCA IgG Latest Ref Range: <1:20      <1:20       INR   Date Value Ref Range Status   07/02/2021 1.18 (H) 0.87 - 1.13 Final     Comment:     INR - Non-therapeutic Reference Range: 0.87-1.13  INR - Therapeutic Reference Range: 2.0-4.0       No results found for: POCINR      Imaging/Testing:      US-RENAL   Final Result      Mild prominence of the left renal pelvis, less than prior. No right hydronephrosis.      The urinary bladder is only partially decompressed by Villegas catheter. Correlate clinically for position of the catheter.      MR-BRAIN-W/O   Final Result      MRI of the brain without contrast within normal limits.      DX-CHEST-PORTABLE (1 VIEW)   Final Result       1.  Mild hypoinflation with minimal bibasilar atelectasis.   2.  No lobar pneumonia or pneumothorax.   3.  No gross mass.      CT-ABDOMEN-PELVIS W/O   Final Result      1.  Stranding surrounding the adrenal glands is improved.   2.  Dense right adrenal mass likely representing adrenal hemorrhage is mildly decreased in size compared to prior.   3.  Mild left hydronephrosis.   4.  Multiple mildly enlarged retroperitoneal lymph nodes are not significantly changed.   5.  Density within the gallbladder may represent sludge/vicarious excretion of contrast.   6.  Trace right pleural fluid and bibasilar atelectasis.      US-RENAL   Final Result      1.  Mild left hydronephrosis.   2.  No right hydronephrosis.      AC-YKLTBXH-G/O   Final Result         1. Normal sized CBD. No CBD stone.   2. Thickening and heterogeneity of the bilateral adrenal glands with surrounding stranding, incompletely evaluated but concerning for hemorrhage.   3. No gallstone. Mild pericholecystic fluid could be reactive change due to adjacent adrenal hemorrhage.   4. Worsening left hydronephrosis, concerning for distal obstruction      DX-CHEST-LIMITED (1 VIEW)   Final Result         No acute cardiopulmonary abnormalities are identified.      EC-ECHOCARDIOGRAM COMPLETE W/O CONT   Final Result      CT-ABDOMEN-PELVIS WITH   Final Result      1.  New right adrenal mass likely represents hemorrhage. Thickening of the left adrenal gland is concerning for developing hemorrhage as well.      2.  Low attenuation filling defect in the superior vena cava is consistent with thrombus. Echocardiogram may be helpful for further evaluation.      3.  Prominent retroperitoneal lymph nodes with nonspecific retroperitoneal inflammatory stranding as reported on the prior CT.      4.  Small bilateral pleural effusions, right greater than left. Adjacent airspace disease likely atelectasis.            Comment: Results discussed with Dr. Gunter at 9:32 AM       [id number] VIEW   Final Result         No specific finding to suggest small bowel obstruction.      US-PELVIC COMPLETE (TRANSABDOMINAL/TRANSVAGINAL) (COMBO)   Final Result         1.  Heterogeneous appearance of the uterus with large heterogeneous uterine mass, appearance most compatible with uterine fibroid.   2.  Heterogeneous lesion in the left ovary, could represent hemorrhagic cyst or endometrioma, otherwise indeterminate, recommend follow-up sonogram in 6 weeks for repeat characterization and evaluation of stability.   3.  Thickened endometrial stripe, likely proliferative changes, consider endometrial pathology with additional follow-up as clinically appropriate.   4.  Nabothian cyst      US-RUQ   Final Result      1.  No evidence of gallstones or biliary ductal dilatation.   2.  1.9 cm cystic lesion in the right kidney. Mural nodularity is not excluded and further evaluation with renal protocol MRI is recommended.         CT-ABDOMEN-PELVIS WITH   Final Result      1.  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   2.  Hypodensity in the cervix could be artifactual or could indicate underlying cervical mass. Suggest correlation with physical exam and gynecological history.   3.  19 mm hypodense RIGHT renal lesion, likely but not definitely a cyst. Suggest further assessment with ultrasound when clinically appropriate.                 Discharge Medications:         Jaclyn Rhodes   Home Medication Instructions DARBY:[id number]    Printed on:07/02/21 6658   Medication Information                      amLODIPine (NORVASC) 10 MG Tab  Take 1 tablet by mouth every day for 30 days.             enoxaparin (LOVENOX) 80 MG/0.8ML Solution inj  Inject 80 mg under the skin every 12 hours for 7 days.             ferrous sulfate 325 (65 Fe) MG tablet  Take 1 tablet by mouth every Monday, Wednesday, and Friday for 30 doses.             guaiFENesin  (ROBITUSSIN) 100 MG/5ML Solution  Take 10 mL by mouth every four hours as needed for Cough for up to 5 days.             omeprazole (PRILOSEC) 20 MG delayed-release capsule  Take 2 Capsules by mouth every day for 30 days.             predniSONE (DELTASONE) 10 MG Tab  Take 3 Tablets by mouth every day for 7 days.             predniSONE (DELTASONE) 10 MG Tab  Take 1 tablet by mouth every day for 7 days.             prednisONE (DELTASONE) 2.5 MG Tab  Take 2 Tablets by mouth every day for 7 days.             predniSONE (DELTASONE) 20 MG Tab  Take 2 Tablets by mouth every day for 7 days.             predniSONE (DELTASONE) 20 MG Tab  Take 1 tablet by mouth every day for 7 days.             warfarin (COUMADIN) 2.5 MG Tab  Take 1 tablet by mouth every day.             warfarin (COUMADIN) 5 MG Tab  Take 1 tablet by mouth every day.                 Disposition:   Discharge  Condition: Stable  Destination: Home  Services: None    Diet:   Regular    Activity:   As tolerated    Instructions:      The patient was instructed to return to the ER in the event of worsening symptoms. I have counseled the patient on the importance of compliance and the patient has agreed to proceed with all medical recommendations and follow up plan indicated above.   The patient understands that all medications come with benefits and risks. Risks may include permanent injury or death and these risks can be minimized with close reassessment and monitoring.        Please CC: Pcp Pt States None    Follow up appointment details :      Outpatient infusion center blood draw: 7/04  Coumadin clinic blood draw: 7/06  Nephrology: 7/28  Hematology-still pending    Pending Studies:        None

## 2021-07-04 ENCOUNTER — OUTPATIENT INFUSION SERVICES (OUTPATIENT)
Dept: ONCOLOGY | Facility: MEDICAL CENTER | Age: 46
End: 2021-07-04
Attending: INTERNAL MEDICINE
Payer: COMMERCIAL

## 2021-07-04 VITALS
SYSTOLIC BLOOD PRESSURE: 148 MMHG | OXYGEN SATURATION: 97 % | TEMPERATURE: 97 F | DIASTOLIC BLOOD PRESSURE: 96 MMHG | RESPIRATION RATE: 18 BRPM | HEART RATE: 89 BPM

## 2021-07-04 DIAGNOSIS — D68.61 CATASTROPHIC ANTIPHOSPHOLIPID SYNDROME (HCC): ICD-10-CM

## 2021-07-04 LAB
ANISOCYTOSIS BLD QL SMEAR: ABNORMAL
BASOPHILS # BLD AUTO: 0.2 % (ref 0–1.8)
BASOPHILS # BLD: 0.02 K/UL (ref 0–0.12)
COMMENT 1642: NORMAL
EOSINOPHIL # BLD AUTO: 0.03 K/UL (ref 0–0.51)
EOSINOPHIL NFR BLD: 0.2 % (ref 0–6.9)
ERYTHROCYTE [DISTWIDTH] IN BLOOD BY AUTOMATED COUNT: 77.1 FL (ref 35.9–50)
HCT VFR BLD AUTO: 32.6 % (ref 37–47)
HGB BLD-MCNC: 9.4 G/DL (ref 12–16)
HYPOCHROMIA BLD QL SMEAR: ABNORMAL
IMM GRANULOCYTES # BLD AUTO: 0.07 K/UL (ref 0–0.11)
IMM GRANULOCYTES NFR BLD AUTO: 0.5 % (ref 0–0.9)
INR PPP: 1.36 (ref 0.87–1.13)
LYMPHOCYTES # BLD AUTO: 0.81 K/UL (ref 1–4.8)
LYMPHOCYTES NFR BLD: 6.3 % (ref 22–41)
MCH RBC QN AUTO: 23.4 PG (ref 27–33)
MCHC RBC AUTO-ENTMCNC: 28.8 G/DL (ref 33.6–35)
MCV RBC AUTO: 81.3 FL (ref 81.4–97.8)
MICROCYTES BLD QL SMEAR: ABNORMAL
MONOCYTES # BLD AUTO: 0.32 K/UL (ref 0–0.85)
MONOCYTES NFR BLD AUTO: 2.5 % (ref 0–13.4)
MORPHOLOGY BLD-IMP: NORMAL
NEUTROPHILS # BLD AUTO: 11.67 K/UL (ref 2–7.15)
NEUTROPHILS NFR BLD: 90.3 % (ref 44–72)
NRBC # BLD AUTO: 0 K/UL
NRBC BLD-RTO: 0 /100 WBC
OVALOCYTES BLD QL SMEAR: NORMAL
PLATELET # BLD AUTO: 235 K/UL (ref 164–446)
PMV BLD AUTO: 9.6 FL (ref 9–12.9)
POIKILOCYTOSIS BLD QL SMEAR: NORMAL
PROTHROMBIN TIME: 16.4 SEC (ref 12–14.6)
RBC # BLD AUTO: 4.01 M/UL (ref 4.2–5.4)
RBC BLD AUTO: PRESENT
WBC # BLD AUTO: 12.9 K/UL (ref 4.8–10.8)

## 2021-07-04 PROCEDURE — 36415 COLL VENOUS BLD VENIPUNCTURE: CPT

## 2021-07-04 PROCEDURE — 85610 PROTHROMBIN TIME: CPT

## 2021-07-04 PROCEDURE — 85025 COMPLETE CBC W/AUTO DIFF WBC: CPT

## 2021-07-04 NOTE — PROGRESS NOTES
Pt presented to infusion center for CBC and PT/INR labs. Labs drawn as ordered from [facility] with 23G butterfly needle, gauze and coban dressing placed. Nose bled momentarily, resolved with patient placing tissue and pressure. Results called to Dr. Loomis, per Dr. Loomis Jaclyn is to continue with same doses of lovenox and coumadin and to follow-up with coumadin clinic as scheduled. Jaclyn has schedulers' phone number if needed and has appointment with coumadin clinic, discharged home to self care.

## 2021-07-06 ENCOUNTER — ANTICOAGULATION VISIT (OUTPATIENT)
Dept: VASCULAR LAB | Facility: MEDICAL CENTER | Age: 46
End: 2021-07-06
Attending: INTERNAL MEDICINE
Payer: COMMERCIAL

## 2021-07-06 DIAGNOSIS — E27.49 HEMORRHAGE OF BOTH ADRENAL GLANDS (HCC): ICD-10-CM

## 2021-07-06 DIAGNOSIS — D68.61 CATASTROPHIC ANTIPHOSPHOLIPID SYNDROME (HCC): ICD-10-CM

## 2021-07-06 DIAGNOSIS — I82.210 THROMBOSIS OF SUPERIOR VENA CAVA (HCC): ICD-10-CM

## 2021-07-06 DIAGNOSIS — N93.9 VAGINAL BLEEDING: ICD-10-CM

## 2021-07-06 LAB — INR PPP: 1.8 (ref 2–3.5)

## 2021-07-06 PROCEDURE — 99213 OFFICE O/P EST LOW 20 MIN: CPT

## 2021-07-06 PROCEDURE — 85610 PROTHROMBIN TIME: CPT

## 2021-07-06 NOTE — PROGRESS NOTES
.  Anticoagulation Patient Findings      PCP DANIELLE Morales.    Cardiologist n/a    Pt hx - Pt was previously on warfarin 15 years ago after developing a PE, then was taken off of warfarin since the PE dissolved. Pt was then admitted 6/14/21 to 7/2/21 for abdominal pain. She was found to have SVC thrombus, vaginal and rectal bleeding, and hemorrhage of adrenal glands and was diagnosed with catastrophic antiphospholipid syndrome. Pt was discharged on warfarin/Lovenox bridge.    CHADSVASC = n/a  HAS-BLED = 1 (hx of bleeding)    DOAC = not indicated    Target end date = indefinite    Pt is not new to warfarin and new to [facility]. Discussed:   · Indication for warfarin therapy and INR goal range.   · Importance of monitoring and compliance.   · Monitoring parameters, signs and symptoms of bleeding or clotting.  · Warfarin therapy, side effects, potential DDIs, OTC medications  · DDI - prednisone taper: 40 mg x 7 days, then 30 mg x 7 days, then 20 mg x 7 days, then 10 mg x 7 days, then 5 mg x 7 days, then stop.  · Importance of diet consistency, ie vitamin K intake, supplements  · Lifestyle safety, ie smoking, ETOH, hobby safety, fall safety/prevention  · Procedures for missed doses or suspected missed doses, surgeries/procedures, travel, dental work, any medication changes    Pt denies any unusual s/s of bleeding, bruising, clotting or any changes to diet or medications.    INR is subtherapeutic today.   Will continue to titrate pt's warfarin dose. Pt's been taking 7.5 mg daily   Will have pt bolus with warfarin 10 mg x 2 days then 7.5 mg x 1 day + continue bridging with Lovenox.    Recheck INR in 3 days.    Rachelle Ricketts, PharmD      Added [facility] to care team   Send to [facility]

## 2021-07-08 ENCOUNTER — HOSPITAL ENCOUNTER (EMERGENCY)
Facility: MEDICAL CENTER | Age: 46
End: 2021-07-08
Attending: EMERGENCY MEDICINE
Payer: COMMERCIAL

## 2021-07-08 VITALS
RESPIRATION RATE: 15 BRPM | HEART RATE: 78 BPM | SYSTOLIC BLOOD PRESSURE: 134 MMHG | OXYGEN SATURATION: 93 % | TEMPERATURE: 98.1 F | HEIGHT: 69 IN | WEIGHT: 169.53 LBS | DIASTOLIC BLOOD PRESSURE: 84 MMHG | BODY MASS INDEX: 25.11 KG/M2

## 2021-07-08 DIAGNOSIS — R04.0 EPISTAXIS: ICD-10-CM

## 2021-07-08 DIAGNOSIS — Z79.01 ANTICOAGULATED: ICD-10-CM

## 2021-07-08 LAB
BASOPHILS # BLD AUTO: 0.3 % (ref 0–1.8)
BASOPHILS # BLD: 0.03 K/UL (ref 0–0.12)
EOSINOPHIL # BLD AUTO: 0.04 K/UL (ref 0–0.51)
EOSINOPHIL NFR BLD: 0.5 % (ref 0–6.9)
ERYTHROCYTE [DISTWIDTH] IN BLOOD BY AUTOMATED COUNT: 77.4 FL (ref 35.9–50)
HCT VFR BLD AUTO: 31.4 % (ref 37–47)
HGB BLD-MCNC: 9.1 G/DL (ref 12–16)
IMM GRANULOCYTES # BLD AUTO: 0.03 K/UL (ref 0–0.11)
IMM GRANULOCYTES NFR BLD AUTO: 0.3 % (ref 0–0.9)
INR BLD: 1.8 (ref 0.9–1.2)
INR PPP: 2.32 (ref 0.87–1.13)
LYMPHOCYTES # BLD AUTO: 2.39 K/UL (ref 1–4.8)
LYMPHOCYTES NFR BLD: 27.3 % (ref 22–41)
MCH RBC QN AUTO: 24.1 PG (ref 27–33)
MCHC RBC AUTO-ENTMCNC: 29 G/DL (ref 33.6–35)
MCV RBC AUTO: 83.1 FL (ref 81.4–97.8)
MONOCYTES # BLD AUTO: 0.68 K/UL (ref 0–0.85)
MONOCYTES NFR BLD AUTO: 7.8 % (ref 0–13.4)
NEUTROPHILS # BLD AUTO: 5.57 K/UL (ref 2–7.15)
NEUTROPHILS NFR BLD: 63.8 % (ref 44–72)
NRBC # BLD AUTO: 0 K/UL
NRBC BLD-RTO: 0 /100 WBC
PLATELET # BLD AUTO: 253 K/UL (ref 164–446)
PMV BLD AUTO: 10.1 FL (ref 9–12.9)
PROTHROMBIN TIME: 24.8 SEC (ref 12–14.6)
RBC # BLD AUTO: 3.78 M/UL (ref 4.2–5.4)
WBC # BLD AUTO: 8.7 K/UL (ref 4.8–10.8)

## 2021-07-08 PROCEDURE — 700102 HCHG RX REV CODE 250 W/ 637 OVERRIDE(OP): Performed by: EMERGENCY MEDICINE

## 2021-07-08 PROCEDURE — 700101 HCHG RX REV CODE 250: Performed by: EMERGENCY MEDICINE

## 2021-07-08 PROCEDURE — 85025 COMPLETE CBC W/AUTO DIFF WBC: CPT

## 2021-07-08 PROCEDURE — 36415 COLL VENOUS BLD VENIPUNCTURE: CPT

## 2021-07-08 PROCEDURE — 85610 PROTHROMBIN TIME: CPT

## 2021-07-08 PROCEDURE — 303620 HCHG EPISTAXIS CONTROL

## 2021-07-08 PROCEDURE — A9270 NON-COVERED ITEM OR SERVICE: HCPCS | Performed by: EMERGENCY MEDICINE

## 2021-07-08 PROCEDURE — 99285 EMERGENCY DEPT VISIT HI MDM: CPT

## 2021-07-08 RX ORDER — TRANEXAMIC ACID 100 MG/ML
3 INJECTION, SOLUTION INTRAVENOUS ONCE
Status: COMPLETED | OUTPATIENT
Start: 2021-07-08 | End: 2021-07-08

## 2021-07-08 RX ORDER — LIDOCAINE HYDROCHLORIDE AND EPINEPHRINE 10; 10 MG/ML; UG/ML
10 INJECTION, SOLUTION INFILTRATION; PERINEURAL ONCE
Status: COMPLETED | OUTPATIENT
Start: 2021-07-08 | End: 2021-07-08

## 2021-07-08 RX ADMIN — LIDOCAINE HYDROCHLORIDE,EPINEPHRINE BITARTRATE 10 ML: 10; .01 INJECTION, SOLUTION INFILTRATION; PERINEURAL at 05:15

## 2021-07-08 RX ADMIN — PHENYLEPHRINE HYDROCHLORIDE 1 SPRAY: 1 SPRAY NASAL at 05:15

## 2021-07-08 RX ADMIN — TRANEXAMIC ACID 300 MG: 100 INJECTION, SOLUTION INTRAVENOUS at 05:15

## 2021-07-08 ASSESSMENT — FIBROSIS 4 INDEX: FIB4 SCORE: 0.93

## 2021-07-08 NOTE — ED NOTES
"Pt discharged home, with mother. Pt is no longer bleeding from her nose. No pain. Pt is A/O x 4, ambulatory with a steady gait. IV discontinued and gauze placed, pt in possession of belongings. Pt provided discharge education and information pertaining to medications and follow up appointments. Discussed signs and symptoms to return to the ER, patient verbalized understanding.  Pt received copy of discharge instructions and verbalized understanding.       /84   Pulse 78   Temp 36.7 °C (98.1 °F) (Temporal)   Resp 15   Ht 1.753 m (5' 9\")   Wt 76.9 kg (169 lb 8.5 oz)   SpO2 93%   BMI 25.04 kg/m²     "

## 2021-07-08 NOTE — ED NOTES
Pt stated nose began bleeding at 2200 without stopping since. Pt recently started Cumidine and has hx of other blood thinners. Gauze traded out and pressure applied.

## 2021-07-08 NOTE — ED TRIAGE NOTES
"Chief Complaint   Patient presents with   • Epistaxis     Pt states she has been having a nosebleed since 10pm last night. Pt states \"I slowed down a little but i got up to go to the bathroom this morning and it all just gushed out. Pt currently taking coumadin \"for a blood clot in my main artery\"       Ambulatory to triage for above complaint. ER charge notified of uncontrolled bleeding with use of blood thinners.  Educated on triage process, encourage to inform staff of any changes.     /95   Pulse 76   Temp 36.2 °C (97.2 °F) (Temporal)   Resp 14   Ht 1.753 m (5' 9\")   Wt 76.9 kg (169 lb 8.5 oz)   SpO2 97%   BMI 25.04 kg/m²     "

## 2021-07-08 NOTE — ED PROVIDER NOTES
"ED Provider Note    CHIEF COMPLAINT  Chief Complaint   Patient presents with   • Epistaxis     Pt states she has been having a nosebleed since 10pm last night. Pt states \"I slowed down a little but i got up to go to the bathroom this morning and it all just gushed out. Pt currently taking coumadin \"for a blood clot in my main artery\"       HPI  Jaclyn Olvera is a 46 y.o. female who presents with epistaxis.  Patient has a history of recently diagnosed antiphospholipid syndrome with superior vena cava thrombus as well as transient thrombocytopenia.  She was recently hospitalized for the above.  She was started on warfarin.  Most recent INR was 1.8 on 7/6.  Her warfarin dose was increased from 7.5 daily to 10 mg daily.  First 10 mg dose 7/7.  She is still using Lovenox.  Last night started having sputtering nasal bleeding from the right side.  This morning she got up to use the restroom and started bleeding heavily.  She pinched her nose and came to the ER.  No other easy bruising or bleeding.  No dizziness lightheadedness.  She reports her nose has felt dry and irritated for the last few days.  Mostly in the lower front bilaterally.  She has not had cough, congestion, runny nose    REVIEW OF SYSTEMS  As per HPI, all other systems reviewed and negative    PAST MEDICAL HISTORY  Antiphospholipid syndrome, superior vena cava thrombosis, thrombocytopenia, GI bleeding, retroperitoneal lymphadenopathy, fibroids, GERD    SOCIAL HISTORY  Social History     Tobacco Use   • Smoking status: Current Every Day Smoker   • Tobacco comment: 1 pk per week   Substance Use Topics   • Alcohol use: Yes     Comment: occasionally   • Drug use: Yes     Types: Inhaled     Comment: marijuana       SURGICAL HISTORY  Past Surgical History:   Procedure Laterality Date   • PB UPPER GI ENDOSCOPY,DIAGNOSIS N/A 6/17/2021    Procedure: GASTROSCOPY;  Surgeon: Elfego Lopez M.D.;  Location: SURGERY SAME DAY [facility];  Service: Gastroenterology " "  • PB UPPER GI ENDOSCOPY,BIOPSY N/A 6/17/2021    Procedure: GASTROSCOPY, WITH BIOPSY;  Surgeon: Elfego Lopez M.D.;  Location: SURGERY SAME DAY [facility];  Service: Gastroenterology       ALLERGIES  No Known Allergies    PHYSICAL EXAM  VITAL SIGNS: /95   Pulse 76   Temp 36.2 °C (97.2 °F) (Temporal)   Resp 14   Ht 1.753 m (5' 9\")   Wt 76.9 kg (169 lb 8.5 oz)   SpO2 97%   BMI 25.04 kg/m²    Constitutional: Awake and alert. Nontoxic  HENT: Minimal slow oozing from the anterior inferior nasal septum on the right.  No blood on the left.  No posterior pharyngeal blood.  Eyes: Grossly normal  Neck: Normal range of motion  Cardiovascular: Normal heart rate   Thorax & Lungs: No respiratory distress  Abdomen: Nontender  Skin:  No pathologic rash.   Extremities: Well perfused  Psychiatric: Affect normal        Labs:  Laboratory data ordered and reviewed, please see chart    COURSE & MEDICAL DECISION MAKING  Patient presents with epistaxis.  Nasal clamp was applied.  On inspection there is minimal bleeding.  Instilled topical TXA and lidocaine with epinephrine via nasal atomizer.  Bernard-Synephrine was given as well.  Cotton ball saturated with TXA and lidocaine with epinephrine were placed in both nostrils.  Obtain laboratory data.    Condyles were removed.  She did not have any further bleeding.    Laboratory data returned as noted.  She now has a therapeutic INR.  I have advised discontinuation of Lovenox.  She has an appointment at the anticoagulation clinic tomorrow to discuss warfarin dosing.    After several hours of observation in the ER patient has not had any recurrent nosebleeding.  Does not appear to require nasal packing at this time.  Discussed risk of rebleeding.  Advised humidifier, nasal emollient.  If rebleeding occurs.  Patient is to use Bernard-Synephrine and apply nasal clamp.  If bleeding does not resolve she should return to the ER.  Asked patient to follow-up with primary provider.    FINAL " IMPRESSION  1.  Epistaxis  2.  Chronic anticoagulation with warfarin    Disposition: home in good condition      This dictation was created using voice recognition software. The accuracy of the dictation is limited to the abilities of the software.  The nursing notes were reviewed and certain aspects of this information were incorporated into this note.      Electronically signed by: Juvenal Ann M.D., 7/8/2021 5:13 AM

## 2021-07-09 ENCOUNTER — ANTICOAGULATION VISIT (OUTPATIENT)
Dept: VASCULAR LAB | Facility: MEDICAL CENTER | Age: 46
End: 2021-07-09
Attending: INTERNAL MEDICINE
Payer: COMMERCIAL

## 2021-07-09 DIAGNOSIS — D68.61 CATASTROPHIC ANTIPHOSPHOLIPID SYNDROME (HCC): ICD-10-CM

## 2021-07-09 DIAGNOSIS — E27.49 HEMORRHAGE OF BOTH ADRENAL GLANDS (HCC): ICD-10-CM

## 2021-07-09 DIAGNOSIS — I82.210 THROMBOSIS OF SUPERIOR VENA CAVA (HCC): ICD-10-CM

## 2021-07-09 DIAGNOSIS — N93.9 VAGINAL BLEEDING: ICD-10-CM

## 2021-07-09 LAB
INR BLD: 2.7 (ref 0.9–1.2)
INR PPP: 2.7 (ref 2–3.5)

## 2021-07-09 PROCEDURE — 85610 PROTHROMBIN TIME: CPT

## 2021-07-09 PROCEDURE — 99211 OFF/OP EST MAY X REQ PHY/QHP: CPT

## 2021-07-09 NOTE — PROGRESS NOTES
Anticoagulation Summary  As of 2021    INR goal:  2.5-3.5   TTR:  --   INR used for dosin.70 (2021)   Warfarin maintenance plan:  10 mg (5 mg x 2) every Mon, Wed, Fri; 7.5 mg (5 mg x 1 and 2.5 mg x 1) all other days   Weekly warfarin total:  60 mg   Plan last modified:  Edilberto Enciso PharmD (2021)   Next INR check:  2021   Target end date:  Indefinite    Indications    Thrombosis of superior vena cava (HCC) [I82.210]  Hemorrhage of both adrenal glands (HCC) [E27.49]  Vaginal bleeding [N93.9]  Catastrophic antiphospholipid syndrome (HCC) [D68.61]             Anticoagulation Episode Summary     INR check location:      Preferred lab:      Send INR reminders to:      Comments:        Anticoagulation Care Providers     Provider Role Specialty Phone number    [facility] Responsible  [phone]        Anticoagulation Patient Findings      HPI:  Jaclyn Olvera seen in clinic today for follow up on anticoagulation therapy in the presence of Thrombosis of superior vena cava and Catastrophic antiphospholipid syndrome .   Since last apt, patient was in ED for nose bleeds 2021, Lovenox was stopped in ED as INR was 2.3  Denies any medication or diet changes.   No current symptoms of bleeding or thrombosis reported.  Verified dose with patient.  BP recorded in vitals.    Pt is not on antiplatelet therapy     A/P:   INR is therapeutic.   Patient is to stop Lovenox, patient is currently on prednisone taper. We will have to monitor INR closely.    Patient is to begin increased weekly regimen as outlined on calender    Pt educated to contact our clinic with any changes in medications or s/s of bleeding or thrombosis. Pt is aware to seek immediate medical attention for falls, head injury or deep cuts    Follow up appointment in 3 days    Edilberto Enciso Pharm.D, BC-ACP, BC-ADM

## 2021-07-12 ENCOUNTER — ANTICOAGULATION VISIT (OUTPATIENT)
Dept: VASCULAR LAB | Facility: MEDICAL CENTER | Age: 46
End: 2021-07-12
Attending: INTERNAL MEDICINE
Payer: COMMERCIAL

## 2021-07-12 ENCOUNTER — DOCUMENTATION (OUTPATIENT)
Dept: VASCULAR LAB | Facility: MEDICAL CENTER | Age: 46
End: 2021-07-12

## 2021-07-12 VITALS — HEART RATE: 86 BPM | SYSTOLIC BLOOD PRESSURE: 122 MMHG | DIASTOLIC BLOOD PRESSURE: 85 MMHG

## 2021-07-12 DIAGNOSIS — E27.49 HEMORRHAGE OF BOTH ADRENAL GLANDS (HCC): ICD-10-CM

## 2021-07-12 DIAGNOSIS — D68.61 CATASTROPHIC ANTIPHOSPHOLIPID SYNDROME (HCC): ICD-10-CM

## 2021-07-12 DIAGNOSIS — I82.210 THROMBOSIS OF SUPERIOR VENA CAVA (HCC): ICD-10-CM

## 2021-07-12 DIAGNOSIS — N93.9 VAGINAL BLEEDING: ICD-10-CM

## 2021-07-12 LAB — INR PPP: 3.6 (ref 2–3.5)

## 2021-07-12 PROCEDURE — 99212 OFFICE O/P EST SF 10 MIN: CPT

## 2021-07-12 PROCEDURE — 85610 PROTHROMBIN TIME: CPT

## 2021-07-12 NOTE — PROGRESS NOTES
Anticoagulation Summary  As of 7/12/2021    INR goal:  2.5-3.5   TTR:  66.7 % (1 d)   INR used for dosing:  3.60 (7/12/2021)   Warfarin maintenance plan:  10 mg (5 mg x 2) every Mon, Wed, Fri; 7.5 mg (5 mg x 1 and 2.5 mg x 1) all other days   Weekly warfarin total:  60 mg   Plan last modified:  Evan MilianD (7/9/2021)   Next INR check:  7/16/2021   Target end date:  Indefinite    Indications    Thrombosis of superior vena cava (HCC) [I82.210]  Hemorrhage of both adrenal glands (HCC) [E27.49]  Vaginal bleeding [N93.9]  Catastrophic antiphospholipid syndrome (HCC) [D68.61]             Anticoagulation Episode Summary     INR check location:      Preferred lab:      Send INR reminders to:      Comments:        Anticoagulation Care Providers     Provider Role Specialty Phone number    [facility] Responsible  [phone]        Anticoagulation Patient Findings      HPI:  Jaclyn Galvanparvin seen in clinic today, on anticoagulation therapy with warfarin for thrombosis of superior vena cava and catastrophic APL syndrome.   Changes to current medical/health status since last appt: none  Denies signs/symptoms of bleeding and/or thrombosis since the last appt.    Denies any interval changes to diet  Denies any interval changes to medications since last appt.   Denies any complications or cost restrictions with current therapy.   BP declined today.   Confirmed current dosing regimen.     Patient is not on antiplatelet therapy.       A/P   INR is slightly SUPRA-therapeutic today at 3.6.   Patient instructed to reduce dose to 7.5mg TONIGHT, as a one time adjustment, then to resume her current dosing regimen.   Patient asked to retest again on Friday.     Next appt: Friday, July 16, 2021  9:45am    Andrew GordonD

## 2021-07-13 NOTE — PROGRESS NOTES
Initial anticoag note and most recent d/c summary reviewed    Pending further recs from heme, we will continue with indefinite anticoagulation with warfarin and INR goal 2.5-3.5 as reccommended at d/c for APLA syndrome with h/o arterial and venous thrombosis     Will defer all further w/u and management, aside from monitoring inr and adjusting warfarin dose, to pcp, nephrology and heme.    Michael Bloch, MD  Anticoagulation Clinic    Cc:  Dr. Nichole smart

## 2021-07-16 ENCOUNTER — ANTICOAGULATION VISIT (OUTPATIENT)
Dept: VASCULAR LAB | Facility: MEDICAL CENTER | Age: 46
End: 2021-07-16
Attending: INTERNAL MEDICINE
Payer: COMMERCIAL

## 2021-07-16 DIAGNOSIS — D68.61 CATASTROPHIC ANTIPHOSPHOLIPID SYNDROME (HCC): ICD-10-CM

## 2021-07-16 DIAGNOSIS — E27.49 HEMORRHAGE OF BOTH ADRENAL GLANDS (HCC): ICD-10-CM

## 2021-07-16 DIAGNOSIS — I82.210 THROMBOSIS OF SUPERIOR VENA CAVA (HCC): ICD-10-CM

## 2021-07-16 DIAGNOSIS — N93.9 VAGINAL BLEEDING: ICD-10-CM

## 2021-07-16 LAB
INR BLD: 2.7 (ref 0.9–1.2)
INR PPP: 2.7 (ref 2–3.5)

## 2021-07-16 PROCEDURE — 85610 PROTHROMBIN TIME: CPT

## 2021-07-16 PROCEDURE — 99211 OFF/OP EST MAY X REQ PHY/QHP: CPT

## 2021-07-16 NOTE — PROGRESS NOTES
Anticoagulation Summary  As of 2021    INR goal:  2.5-3.5   TTR:  84.4 % (5 d)   INR used for dosin.70 (2021)   Warfarin maintenance plan:  10 mg (5 mg x 2) every Mon, Wed, Fri; 7.5 mg (5 mg x 1 and 2.5 mg x 1) all other days   Weekly warfarin total:  60 mg   Plan last modified:  Evan MilianD (2021)   Next INR check:  2021   Target end date:  Indefinite    Indications    Thrombosis of superior vena cava (HCC) [I82.210]  Hemorrhage of both adrenal glands (HCC) [E27.49]  Vaginal bleeding [N93.9]  Catastrophic antiphospholipid syndrome (HCC) [D68.61]             Anticoagulation Episode Summary     INR check location:      Preferred lab:      Send INR reminders to:      Comments:        Anticoagulation Care Providers     Provider Role Specialty Phone number    [facility] Responsible  [phone]        Anticoagulation Patient Findings      HPI:  Jaclyn Olvera seen in clinic today, on anticoagulation therapy with warfarin for thrombosis of superior vena cava and APS  Changes to current medical/health status since last appt: None  Denies signs/symptoms of bleeding and/or thrombosis since the last appt.    Denies any interval changes to diet.  Denies any interval changes to medications since last appt.   Denies any complications or cost restrictions with current therapy.   BP deferred.    Pt is not on antiplatelet therapy.    A/P   INR  therapeutic.     Instructed pt to continue on with current regimen.     Pt currently on prednisone taper.    Pt has f/u w/ Dr. Varela on .     Follow up appointment in 3 day(s).    Casey Daniels PharmD

## 2021-07-20 ENCOUNTER — ANTICOAGULATION VISIT (OUTPATIENT)
Dept: VASCULAR LAB | Facility: MEDICAL CENTER | Age: 46
End: 2021-07-20
Attending: INTERNAL MEDICINE
Payer: COMMERCIAL

## 2021-07-20 DIAGNOSIS — I82.210 THROMBOSIS OF SUPERIOR VENA CAVA (HCC): ICD-10-CM

## 2021-07-20 DIAGNOSIS — E27.49 HEMORRHAGE OF BOTH ADRENAL GLANDS (HCC): ICD-10-CM

## 2021-07-20 DIAGNOSIS — D68.61 CATASTROPHIC ANTIPHOSPHOLIPID SYNDROME (HCC): ICD-10-CM

## 2021-07-20 DIAGNOSIS — N93.9 VAGINAL BLEEDING: ICD-10-CM

## 2021-07-20 LAB — INR PPP: 3.4 (ref 2–3.5)

## 2021-07-20 PROCEDURE — 85610 PROTHROMBIN TIME: CPT

## 2021-07-20 PROCEDURE — 99212 OFFICE O/P EST SF 10 MIN: CPT

## 2021-07-20 NOTE — PROGRESS NOTES
Anticoagulation Summary  As of 7/20/2021    INR goal:  2.5-3.5   TTR:  91.4 % (1.3 wk)   INR used for dosing:  3.40 (7/20/2021)   Warfarin maintenance plan:  10 mg (5 mg x 2) every Mon, Fri; 7.5 mg (5 mg x 1 and 2.5 mg x 1) all other days   Weekly warfarin total:  57.5 mg   Plan last modified:  Sharmin Shi (7/20/2021)   Next INR check:  7/23/2021   Target end date:  Indefinite    Indications    Thrombosis of superior vena cava (HCC) [I82.210]  Hemorrhage of both adrenal glands (HCC) [E27.49]  Vaginal bleeding [N93.9]  Catastrophic antiphospholipid syndrome (HCC) [D68.61]             Anticoagulation Episode Summary     INR check location:      Preferred lab:      Send INR reminders to:      Comments:        Anticoagulation Care Providers     Provider Role Specialty Phone number    [facility] Responsible  [phone]        Anticoagulation Patient Findings  Patient Findings     Negatives:  Signs/symptoms of thrombosis, Signs/symptoms of bleeding, Laboratory test error suspected, Change in health, Change in alcohol use, Change in activity, Upcoming invasive procedure, Emergency department visit, Upcoming dental procedure, Missed doses, Extra doses, Change in medications, Change in diet/appetite, Hospital admission, Bruising, Other complaints          HPI:  Jaclyn Olvera seen in clinic today, on anticoagulation therapy with warfarin for thrombosis of superior vena cava and APLS  Changes to current medical/health status since last appt: none  Denies signs/symptoms of bleeding and/or thrombosis since the last appt.    Denies any interval changes to diet  Denies any interval changes to medications since last appt.   Denies any complications or cost restrictions with current therapy.   BP declined today.  Confirmed current dosing regimen.     Patient is not on antiplatelet therapy.     A/P   INR is therapeutic today at 3.4.   Patient will continue with lowered weekly regimen reflective of  last weeks dosing. Patient will take 10mg on Mon and Fri and 7.5mg ROW. Patient will follow up again on Friday.       Next appt: Friday, July 23, 2021  11:30am    Andrew Shi PharmD

## 2021-07-23 ENCOUNTER — ANTICOAGULATION VISIT (OUTPATIENT)
Dept: VASCULAR LAB | Facility: MEDICAL CENTER | Age: 46
End: 2021-07-23
Attending: INTERNAL MEDICINE
Payer: COMMERCIAL

## 2021-07-23 DIAGNOSIS — E27.49 HEMORRHAGE OF BOTH ADRENAL GLANDS (HCC): ICD-10-CM

## 2021-07-23 DIAGNOSIS — D68.61 CATASTROPHIC ANTIPHOSPHOLIPID SYNDROME (HCC): ICD-10-CM

## 2021-07-23 DIAGNOSIS — N93.9 VAGINAL BLEEDING: ICD-10-CM

## 2021-07-23 DIAGNOSIS — I82.210 THROMBOSIS OF SUPERIOR VENA CAVA (HCC): ICD-10-CM

## 2021-07-23 LAB
INR BLD: 2.4 (ref 0.9–1.2)
INR PPP: 2.4 (ref 2–3.5)

## 2021-07-23 PROCEDURE — 85610 PROTHROMBIN TIME: CPT

## 2021-07-23 PROCEDURE — 99212 OFFICE O/P EST SF 10 MIN: CPT

## 2021-07-23 NOTE — PROGRESS NOTES
Anticoagulation Summary  As of 2021    INR goal:  2.5-3.5   TTR:  91.0 % (1.7 wk)   INR used for dosin.40 (2021)   Warfarin maintenance plan:  10 mg (5 mg x 2) every Mon, Wed, Fri; 7.5 mg (5 mg x 1 and 2.5 mg x 1) all other days   Weekly warfarin total:  60 mg   Plan last modified:  Evan MilianD (2021)   Next INR check:  2021   Target end date:  Indefinite    Indications    Thrombosis of superior vena cava (HCC) [I82.210]  Hemorrhage of both adrenal glands (HCC) [E27.49]  Vaginal bleeding [N93.9]  Catastrophic antiphospholipid syndrome (HCC) [D68.61]             Anticoagulation Episode Summary     INR check location:      Preferred lab:      Send INR reminders to:      Comments:        Anticoagulation Care Providers     Provider Role Specialty Phone number    [facility] Responsible  [phone]        Anticoagulation Patient Findings      HPI:  Jaclyn Olvera seen in clinic today for follow up on anticoagulation therapy in the presence of Thrombosis of superior vena cava.   Denies any changes to current medical/health status since last appointment.   Denies any medication or diet changes.   No current symptoms of bleeding or thrombosis reported.  Verified dose with patient.    Pt is not on antiplatelet therapy    A/P:   INR is slightly sub-therapeutic, patient is to increase weekly regimen by ~5%.     Pt educated to contact our clinic with any changes in medications or s/s of bleeding or thrombosis. Pt is aware to seek immediate medical attention for falls, head injury or deep cuts    Follow up appointment in 1 week(s).    Edilberto Enciso, Pharm.D, BC-ACP, BC-ADM

## 2021-07-28 ENCOUNTER — OFFICE VISIT (OUTPATIENT)
Dept: NEPHROLOGY | Facility: MEDICAL CENTER | Age: 46
End: 2021-07-28
Payer: COMMERCIAL

## 2021-07-28 VITALS
WEIGHT: 173 LBS | DIASTOLIC BLOOD PRESSURE: 66 MMHG | TEMPERATURE: 97.2 F | SYSTOLIC BLOOD PRESSURE: 110 MMHG | RESPIRATION RATE: 16 BRPM | BODY MASS INDEX: 26.22 KG/M2 | HEART RATE: 86 BPM | HEIGHT: 68 IN | OXYGEN SATURATION: 100 %

## 2021-07-28 DIAGNOSIS — N18.31 STAGE 3A CHRONIC KIDNEY DISEASE: ICD-10-CM

## 2021-07-28 DIAGNOSIS — E55.9 VITAMIN D DEFICIENCY: ICD-10-CM

## 2021-07-28 DIAGNOSIS — I10 ESSENTIAL HYPERTENSION: ICD-10-CM

## 2021-07-28 DIAGNOSIS — N17.9 AKI (ACUTE KIDNEY INJURY) (HCC): ICD-10-CM

## 2021-07-28 DIAGNOSIS — D64.9 ANEMIA, UNSPECIFIED TYPE: ICD-10-CM

## 2021-07-28 DIAGNOSIS — N13.30 HYDRONEPHROSIS OF LEFT KIDNEY: ICD-10-CM

## 2021-07-28 PROCEDURE — 99214 OFFICE O/P EST MOD 30 MIN: CPT | Performed by: INTERNAL MEDICINE

## 2021-07-28 ASSESSMENT — ENCOUNTER SYMPTOMS
ORTHOPNEA: 0
PALPITATIONS: 0
ABDOMINAL PAIN: 0
HEMOPTYSIS: 0
SHORTNESS OF BREATH: 0
FEVER: 0
SINUS PAIN: 0
FLANK PAIN: 0
NAUSEA: 0
CHILLS: 0
DIARRHEA: 0
EYES NEGATIVE: 1
WHEEZING: 0
WEIGHT LOSS: 0
VOMITING: 0
COUGH: 0

## 2021-07-28 ASSESSMENT — FIBROSIS 4 INDEX: FIB4 SCORE: 0.87

## 2021-07-28 NOTE — PROGRESS NOTES
Nephrology Daily Progress Note    Date of Service  7/28/2021    Chief Complaint  46 y.o. 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    Interval Problem Update  Jaclyn is coming today for post hospitalization f/u of VIDAL, right hydronephrosis, CAPS  Doing well, no complaints  Creat improved to 1.1  HTN: BP well controlled  Anemia stable  Review of Systems  Review of Systems   Constitutional: Negative for chills, fever, malaise/fatigue and weight loss.   HENT: Negative for congestion, hearing loss and sinus pain.    Eyes: Negative.    Respiratory: Negative for cough, hemoptysis, shortness of breath and wheezing.    Cardiovascular: Negative for chest pain, palpitations and orthopnea.   Gastrointestinal: Negative for abdominal pain, diarrhea, nausea and vomiting.   Genitourinary: Negative for dysuria, flank pain, frequency, hematuria and urgency.   Skin: Negative.    All other systems reviewed and are negative.       Physical Exam  Temp:  [36.2 °C (97.2 °F)] 36.2 °C (97.2 °F)  Pulse:  [86] 86  Resp:  [16] 16  BP: (110)/(66) 110/66  SpO2:  [100 %] 100 %    Physical Exam  Vitals and nursing note reviewed.   Constitutional:       General: She is not in acute distress.     Appearance: Normal appearance. She is well-developed.   HENT:      Head: Normocephalic and atraumatic.      Nose: Nose normal.      Mouth/Throat:      Mouth: Mucous membranes are moist.      Pharynx: Oropharynx is clear.   Eyes:      General: No scleral icterus.     Extraocular Movements: Extraocular movements intact.      Conjunctiva/sclera: Conjunctivae normal.      Pupils: Pupils are equal, round, and reactive to light.   Neck:      Thyroid: No thyromegaly.   Cardiovascular:      Rate and Rhythm: Normal rate and regular  rhythm.      Pulses: Normal pulses.      Heart sounds: Normal heart sounds. No friction rub. No gallop.    Pulmonary:      Effort: Pulmonary effort is normal. No respiratory distress.      Breath sounds: Normal breath sounds. No wheezing, rhonchi or rales.   Abdominal:      General: Bowel sounds are normal. There is no distension.      Palpations: Abdomen is soft. There is no mass.      Tenderness: There is no abdominal tenderness. There is no guarding.   Musculoskeletal:      Cervical back: Normal range of motion and neck supple.      Right lower leg: No edema.      Left lower leg: No edema.   Skin:     General: Skin is warm.      Coloration: Skin is not pale.      Findings: No erythema or rash.   Neurological:      General: No focal deficit present.      Mental Status: She is alert and oriented to person, place, and time.   Psychiatric:         Mood and Affect: Mood normal.         Behavior: Behavior normal.         Thought Content: Thought content normal.         Judgment: Judgment normal.         Fluids    Intake/Output Summary (Last 24 hours) at 7/2/2021 1153  Last data filed at 7/2/2021 1021  Gross per 24 hour   Intake 240 ml   Output --   Net 240 ml       Laboratory  Labs reviewed, pertinent labs below.                    URINALYSIS:  Lab Results   Component Value Date/Time    COLORURINE DK Yellow 06/22/2021 1810    CLARITY Clear 06/22/2021 1810    SPECGRAVITY 1.007 06/22/2021 1810    PHURINE 6.0 06/22/2021 1810    KETONES Negative 06/22/2021 1810    PROTEINURIN Negative 06/22/2021 1810    BILIRUBINUR Moderate (A) 06/22/2021 1810    UROBILU 0.2 06/22/2021 1810    NITRITE Negative 06/22/2021 1810    LEUKESTERAS Negative 06/22/2021 1810    OCCULTBLOOD Large (A) 06/22/2021 1810     UPC  No results found for: TOTPROTUR No results found for: CREATININEU      Imaging interpreted by radiologist. Imaging reports reviewed with pertinent findings below  No orders to display         Current Outpatient Medications    Medication Sig Dispense Refill   • amLODIPine (NORVASC) 10 MG Tab Take 1 tablet by mouth every day for 30 days. 30 tablet 0   • ferrous sulfate 325 (65 Fe) MG tablet Take 1 tablet by mouth every Monday, Wednesday, and Friday for 30 doses. 30 tablet 0   • predniSONE (DELTASONE) 10 MG Tab Take 1 tablet by mouth every day for 7 days. 7 tablet 0   • [START ON 7/31/2021] prednisONE (DELTASONE) 2.5 MG Tab Take 2 Tablets by mouth every day for 7 days. 14 tablet 0   • warfarin (COUMADIN) 5 MG Tab Take 1 tablet by mouth every day. 30 tablet 3   • warfarin (COUMADIN) 2.5 MG Tab Take 1 tablet by mouth every day. 30 tablet 3   • omeprazole (PRILOSEC) 20 MG delayed-release capsule Take 2 Capsules by mouth every day for 30 days. (Patient not taking: Reported on 7/4/2021) 60 capsule 0     No current facility-administered medications for this visit.         Assessment/Plan  46 y.o. 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     1.  Acute kidney injury/CKD IIIa -recovered       To monitor    2.  HTN: elevated BP under better control -to monitor     3.  Left renal hydronephrosis: improved -to monitor with US    4.  Electrolytes: well controlled    5.  Microcytic anemia: continue iron       Hb stable    6. Metabolic acidosis:corrected      Recs; continue current treatment             Keep well hydrated             Low salt diet             BP monitoring             Avoid nephrotoxic agents             F/u in 3-4 months

## 2021-07-30 ENCOUNTER — ANTICOAGULATION VISIT (OUTPATIENT)
Dept: VASCULAR LAB | Facility: MEDICAL CENTER | Age: 46
End: 2021-07-30
Attending: INTERNAL MEDICINE
Payer: COMMERCIAL

## 2021-07-30 DIAGNOSIS — I82.210 THROMBOSIS OF SUPERIOR VENA CAVA (HCC): ICD-10-CM

## 2021-07-30 DIAGNOSIS — E27.49 HEMORRHAGE OF BOTH ADRENAL GLANDS (HCC): ICD-10-CM

## 2021-07-30 DIAGNOSIS — D68.61 CATASTROPHIC ANTIPHOSPHOLIPID SYNDROME (HCC): ICD-10-CM

## 2021-07-30 DIAGNOSIS — N93.9 VAGINAL BLEEDING: ICD-10-CM

## 2021-07-30 LAB
INR BLD: 3.4 (ref 0.9–1.2)
INR PPP: 3.4 (ref 2–3.5)

## 2021-07-30 PROCEDURE — 99211 OFF/OP EST MAY X REQ PHY/QHP: CPT

## 2021-07-30 PROCEDURE — 85610 PROTHROMBIN TIME: CPT

## 2021-07-30 NOTE — PROGRESS NOTES
Anticoagulation Summary  As of 7/30/2021    INR goal:  2.5-3.5   TTR:  90.6 % (2.7 wk)   INR used for dosing:  3.40 (7/30/2021)   Warfarin maintenance plan:  10 mg (5 mg x 2) every Mon, Wed, Fri; 7.5 mg (5 mg x 1 and 2.5 mg x 1) all other days   Weekly warfarin total:  60 mg   Plan last modified:  Evan MilianD (7/23/2021)   Next INR check:  8/6/2021   Target end date:  Indefinite    Indications    Thrombosis of superior vena cava (HCC) [I82.210]  Hemorrhage of both adrenal glands (HCC) [E27.49]  Vaginal bleeding [N93.9]  Catastrophic antiphospholipid syndrome (HCC) [D68.61]             Anticoagulation Episode Summary     INR check location:      Preferred lab:      Send INR reminders to:      Comments:        Anticoagulation Care Providers     Provider Role Specialty Phone number    [facility] Responsible  [phone]                Refer to Patient Findings for HPI:  Patient Findings     Negatives:  Signs/symptoms of thrombosis, Signs/symptoms of bleeding, Laboratory test error suspected, Change in health, Change in alcohol use, Change in activity, Upcoming invasive procedure, Emergency department visit, Upcoming dental procedure, Missed doses, Extra doses, Change in medications, Change in diet/appetite, Hospital admission, Bruising, Other complaints            There were no vitals filed for this visit.  Pt declined vitals    Verified current warfarin dosing schedule.    Medications reconciled. Prednisone taper dose decreasing from 10 mg daily to 5 mg daily tomorrow.   Pt is NOT on antiplatelet therapy.    A/P   INR  therapeutic.     Warfarin dosing recommendation: Continue current dosing.    Pt educated to contact our clinic with any changes in medications or s/s of bleeding or thrombosis. Pt is aware to seek immediate medical attention for falls, head injury or deep cuts.    Follow up appointment in 1 week(s).    Michelle Good, EvanD

## 2021-08-06 ENCOUNTER — ANTICOAGULATION VISIT (OUTPATIENT)
Dept: VASCULAR LAB | Facility: MEDICAL CENTER | Age: 46
End: 2021-08-06
Attending: INTERNAL MEDICINE
Payer: COMMERCIAL

## 2021-08-06 DIAGNOSIS — I82.210 THROMBOSIS OF SUPERIOR VENA CAVA (HCC): ICD-10-CM

## 2021-08-06 DIAGNOSIS — N93.9 VAGINAL BLEEDING: ICD-10-CM

## 2021-08-06 DIAGNOSIS — D68.61 CATASTROPHIC ANTIPHOSPHOLIPID SYNDROME (HCC): ICD-10-CM

## 2021-08-06 DIAGNOSIS — E27.49 HEMORRHAGE OF BOTH ADRENAL GLANDS (HCC): ICD-10-CM

## 2021-08-06 LAB
INR BLD: 2.2 (ref 0.9–1.2)
INR PPP: 2.2 (ref 2–3.5)

## 2021-08-06 PROCEDURE — 99212 OFFICE O/P EST SF 10 MIN: CPT

## 2021-08-06 PROCEDURE — 85610 PROTHROMBIN TIME: CPT

## 2021-08-06 NOTE — PROGRESS NOTES
Anticoagulation Summary  As of 2021    INR goal:  2.5-3.5   TTR:  86.4 % (3.7 wk)   INR used for dosin.20 (2021)   Warfarin maintenance plan:  10 mg (5 mg x 2) every Mon, Wed, Fri; 7.5 mg (5 mg x 1 and 2.5 mg x 1) all other days   Weekly warfarin total:  60 mg   Plan last modified:  Evan MilianD (2021)   Next INR check:  2021   Target end date:  Indefinite    Indications    Thrombosis of superior vena cava (HCC) [I82.210]  Hemorrhage of both adrenal glands (HCC) [E27.49]  Vaginal bleeding [N93.9]  Catastrophic antiphospholipid syndrome (HCC) [D68.61]             Anticoagulation Episode Summary     INR check location:      Preferred lab:      Send INR reminders to:      Comments:        Anticoagulation Care Providers     Provider Role Specialty Phone number    [facility] Responsible  [phone]                Refer to Patient Findings for HPI:  Patient Findings     Positives:  Missed doses    Negatives:  Signs/symptoms of thrombosis, Signs/symptoms of bleeding, Laboratory test error suspected, Change in health, Change in alcohol use, Change in activity, Upcoming invasive procedure, Emergency department visit, Upcoming dental procedure, Extra doses, Change in medications, Change in diet/appetite, Hospital admission, Bruising, Other complaints            There were no vitals filed for this visit.  pt declined vitals    Verified current warfarin dosing schedule.    Medications reconciled  Pt is NOT on antiplatelet therapy     A/P   INR  is sub-therapeutic due to missed dose. Patient is also tapering down prednisone    Warfarin dosing recommendation: Bolus tonight with 12.5 mg then continue current regimen    Pt educated to contact our clinic with any changes in medications or s/s of bleeding or thrombosis. Pt is aware to seek immediate medical attention for falls, head injury or deep cuts.    Follow up appointment in 1 week(s).    Edilberto Enciso, EvanD

## 2021-08-12 ENCOUNTER — DOCUMENTATION (OUTPATIENT)
Dept: VASCULAR LAB | Facility: MEDICAL CENTER | Age: 46
End: 2021-08-12

## 2021-08-12 LAB
25(OH)D3+25(OH)D2 SERPL-MCNC: 20.3 NG/ML (ref 30–100)
APPEARANCE UR: CLEAR
BILIRUB UR QL STRIP: NEGATIVE
BUN SERPL-MCNC: 5 MG/DL (ref 6–24)
BUN/CREAT SERPL: 8 (ref 9–23)
CALCIUM SERPL-MCNC: 8.7 MG/DL (ref 8.7–10.2)
CHLORIDE SERPL-SCNC: 105 MMOL/L (ref 96–106)
CO2 SERPL-SCNC: 19 MMOL/L (ref 20–29)
COLOR UR: YELLOW
CREAT SERPL-MCNC: 0.59 MG/DL (ref 0.57–1)
ERYTHROCYTE [DISTWIDTH] IN BLOOD BY AUTOMATED COUNT: 19.7 % (ref 11.7–15.4)
GLUCOSE SERPL-MCNC: 89 MG/DL (ref 65–99)
GLUCOSE UR QL: NEGATIVE
HCT VFR BLD AUTO: 37 % (ref 34–46.6)
HGB BLD-MCNC: 11.2 G/DL (ref 11.1–15.9)
HGB UR QL STRIP: NEGATIVE
KETONES UR QL STRIP: NEGATIVE
LEUKOCYTE ESTERASE UR QL STRIP: NEGATIVE
MCH RBC QN AUTO: 26.5 PG (ref 26.6–33)
MCHC RBC AUTO-ENTMCNC: 30.3 G/DL (ref 31.5–35.7)
MCV RBC AUTO: 88 FL (ref 79–97)
MICRO URNS: NORMAL
NITRITE UR QL STRIP: NEGATIVE
NRBC BLD AUTO-RTO: ABNORMAL %
PH UR STRIP: 6 [PH] (ref 5–7.5)
PLATELET # BLD AUTO: 314 X10E3/UL (ref 150–450)
POTASSIUM SERPL-SCNC: 4.1 MMOL/L (ref 3.5–5.2)
PROT UR QL STRIP: NEGATIVE
RBC # BLD AUTO: 4.23 X10E6/UL (ref 3.77–5.28)
SODIUM SERPL-SCNC: 137 MMOL/L (ref 134–144)
SP GR UR: 1.01 (ref 1–1.03)
UROBILINOGEN UR STRIP-MCNC: 0.2 MG/DL (ref 0.2–1)
WBC # BLD AUTO: 4.7 X10E3/UL (ref 3.4–10.8)

## 2021-08-13 ENCOUNTER — ANTICOAGULATION VISIT (OUTPATIENT)
Dept: VASCULAR LAB | Facility: MEDICAL CENTER | Age: 46
End: 2021-08-13
Attending: INTERNAL MEDICINE
Payer: COMMERCIAL

## 2021-08-13 DIAGNOSIS — I82.210 THROMBOSIS OF SUPERIOR VENA CAVA (HCC): ICD-10-CM

## 2021-08-13 DIAGNOSIS — E27.49 HEMORRHAGE OF BOTH ADRENAL GLANDS (HCC): ICD-10-CM

## 2021-08-13 DIAGNOSIS — N93.9 VAGINAL BLEEDING: ICD-10-CM

## 2021-08-13 DIAGNOSIS — D68.61 CATASTROPHIC ANTIPHOSPHOLIPID SYNDROME (HCC): ICD-10-CM

## 2021-08-13 LAB
INR BLD: 3.8 (ref 0.9–1.2)
INR PPP: 3.8 (ref 2–3.5)

## 2021-08-13 PROCEDURE — 85610 PROTHROMBIN TIME: CPT

## 2021-08-13 PROCEDURE — 99212 OFFICE O/P EST SF 10 MIN: CPT

## 2021-08-13 NOTE — PROGRESS NOTES
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]  Hemorrhage of both adrenal glands (HCC) [E27.49]  Vaginal bleeding [N93.9]  Catastrophic antiphospholipid syndrome (HCC) [D68.61]             Anticoagulation Episode Summary     INR check location:      Preferred lab:      Send INR reminders to:      Comments:        Anticoagulation Care Providers     Provider Role Specialty Phone number    [facility] Responsible  [phone]          Refer to Patient Findings for HPI:  Patient Findings     Negatives:  Signs/symptoms of thrombosis, Signs/symptoms of bleeding, Laboratory test error suspected, Change in health, Change in alcohol use, Change in activity, Upcoming invasive procedure, Emergency department visit, Upcoming dental procedure, Missed doses, Extra doses, Change in medications, Change in diet/appetite, Hospital admission, Bruising, Other complaints        There were no vitals filed for this visit.  pt declined vitals    Verified current warfarin dosing schedule.    Medications reconciled   Pt is not on antiplatelet therapy    A/P   INR is supra-therapeutic at 3.8.     Warfarin dosing recommendation:   Patient has procedure 8/18 she was advised not to bridge prior to procedure only after. Patient a has a hx of hemorrhage of both adrenal glands and vaginal bleeding, she is to start holding warfarin today.  Patient is very concerned and scared about Lovenox shots, her mother used to help her inject in the past but she is Covid positive hence patient cannot see her mother. Patient  does not have any other family members willing to help her inject.  We discussed the possibility of [facility] for administration of her Lovenox, patient is willing to come in on Tuesday to set that up.   Patient states that she will also contact her PCP office to see if they will be able to administer her Lovenox. She will notify us if her PCP will be willing to set her up to administer her Lovenox.  Post procedure bridging instructions listed below.     Pt educated to contact our clinic with any changes in medications or s/s of bleeding or thrombosis. Pt is aware to seek immediate medical attention for falls, head injury or deep cuts.    Current weight 78.5 kg, Lovenox 120 mg once daily (1.5 U/kg)  Results for GASTON NIÑO (MRN [id number]) as of 8/13/2021 10:05   Ref. Range 8/11/2021 07:40   GFR If Non  Latest Ref Range: >59 mL/min/1.73 111     Lab Results   Component Value Date/Time    BUN 5 (L) 08/11/2021 07:40 AM    BUN 27 (H) 07/02/2021 03:00 AM    CREATININE 0.59 08/11/2021 07:40 AM    CREATININE 1.13 07/02/2021 03:00 AM        Used Lovenox before Yes    PROCEDURE 08/18 12.5 mg NONE   1 Day after procedure 08/19 12.5 mg Lovenox injection   2 Days after procedure 08/20 12.5 mg Lovenox injection   3 Days after procedure 08/21 7.5 mg Lovenox injection   4 Days after procedure 08/22 7.5 mg  Lovenox injection   5 Days after procedure 08/23  GET INR checked     FU in 4 days      Edilberto Enciso, EvanD

## 2021-08-13 NOTE — PATIENT INSTRUCTIONS
PROCEDURE 08/18 12.5 mg NONE NONE   1 Day after procedure 08/19 12.5 mg Restart Lovenox injections    Lovenox injection   2 Days after procedure 08/20 12.5 mg Lovenox injection Lovenox injection   3 Days after procedure 08/21 7.5 mg Lovenox injection Lovenox injection   4 Days after procedure 08/22 7.5 mg  Lovenox injection Lovenox injection   5 Days after procedure 08/23  Lovenox injection GET INR checked

## 2021-08-17 ENCOUNTER — APPOINTMENT (OUTPATIENT)
Dept: VASCULAR LAB | Facility: MEDICAL CENTER | Age: 46
End: 2021-08-17
Attending: INTERNAL MEDICINE
Payer: COMMERCIAL

## 2021-08-17 NOTE — PROGRESS NOTES
Pt c/b stating that she no longer requires assistance w/ injecting her Lovenox - she can now administer on her own. Cancelled 8/17 [facility] appt accordingly.    Casey Daniels, EvanD

## 2021-08-20 ENCOUNTER — ANTICOAGULATION VISIT (OUTPATIENT)
Dept: VASCULAR LAB | Facility: MEDICAL CENTER | Age: 46
End: 2021-08-20
Attending: INTERNAL MEDICINE
Payer: COMMERCIAL

## 2021-08-20 DIAGNOSIS — N93.9 VAGINAL BLEEDING: ICD-10-CM

## 2021-08-20 DIAGNOSIS — I82.210 THROMBOSIS OF SUPERIOR VENA CAVA (HCC): ICD-10-CM

## 2021-08-20 DIAGNOSIS — D68.61 CATASTROPHIC ANTIPHOSPHOLIPID SYNDROME (HCC): ICD-10-CM

## 2021-08-20 DIAGNOSIS — E27.49 HEMORRHAGE OF BOTH ADRENAL GLANDS (HCC): ICD-10-CM

## 2021-08-20 LAB
INR BLD: 1.5 (ref 0.9–1.2)
INR PPP: 1.5 (ref 2–3.5)

## 2021-08-20 PROCEDURE — 85610 PROTHROMBIN TIME: CPT

## 2021-08-20 PROCEDURE — 99212 OFFICE O/P EST SF 10 MIN: CPT

## 2021-08-20 RX ORDER — WARFARIN SODIUM 5 MG/1
5-7.5 TABLET ORAL DAILY
Qty: 45 TABLET | Refills: 1 | Status: SHIPPED | OUTPATIENT
Start: 2021-08-20 | End: 2021-09-13

## 2021-08-20 NOTE — PROGRESS NOTES
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]  Hemorrhage of both adrenal glands (HCC) [E27.49]  Vaginal bleeding [N93.9]  Catastrophic antiphospholipid syndrome (HCC) [D68.61]             Anticoagulation Episode Summary     INR check location:      Preferred lab:      Send INR reminders to:      Comments:        Anticoagulation Care Providers     Provider Role Specialty Phone number    [facility] Responsible  [phone]        Anticoagulation Patient Findings  Patient Findings     Positives:  Missed doses    Negatives:  Signs/symptoms of thrombosis, Signs/symptoms of bleeding, Laboratory test error suspected, Change in health, Change in alcohol use, Change in activity, Upcoming invasive procedure, Emergency department visit, Upcoming dental procedure, Extra doses, Change in medications, Change in diet/appetite, Hospital admission, Bruising, Other complaints          HPI:  Jaclyn Olvera seen in clinic today for follow up on anticoagulation therapy in the presence of Thrombosis of superior vena cava.   Denies any changes to current medical/health status since last appointment.   Denies any medication or diet changes.   No current symptoms of bleeding or thrombosis reported.  Verified dose with patient.  BP recorded in vitals.    Pt is not on antiplatelet therapy    A/P:   INR is sub-therapeutic at 1.5. Patient was holding warfarin for a procedure but ended up canceling procedure as she did not feel good. She was started on prednisone taper of 40 mg X 3 days then 20 mg  thereafter.  She is still bridging with Lovenox.  Patient is to bolus with 15 mg tonight then continue current regimen. Continue Lovenox    Pt educated to contact our clinic with any changes in medications or s/s of bleeding or thrombosis. Pt is aware to seek immediate medical attention for falls, head injury or deep cuts    Follow up appointment in 3 days    Edilberto Enciso, Evan.D, BC-ACP, BC-ADM

## 2021-08-23 ENCOUNTER — DOCUMENTATION (OUTPATIENT)
Dept: VASCULAR LAB | Facility: MEDICAL CENTER | Age: 46
End: 2021-08-23

## 2021-08-23 ENCOUNTER — APPOINTMENT (OUTPATIENT)
Dept: VASCULAR LAB | Facility: MEDICAL CENTER | Age: 46
End: 2021-08-23
Attending: INTERNAL MEDICINE
Payer: COMMERCIAL

## 2021-08-23 DIAGNOSIS — E27.49 HEMORRHAGE OF BOTH ADRENAL GLANDS (HCC): ICD-10-CM

## 2021-08-23 DIAGNOSIS — I82.210 THROMBOSIS OF SUPERIOR VENA CAVA (HCC): ICD-10-CM

## 2021-08-23 DIAGNOSIS — D68.61 CATASTROPHIC ANTIPHOSPHOLIPID SYNDROME (HCC): ICD-10-CM

## 2021-08-23 DIAGNOSIS — N93.9 VAGINAL BLEEDING: ICD-10-CM

## 2021-08-23 LAB — INR PPP: 2.7 (ref 2–3.5)

## 2021-08-23 PROCEDURE — 99211 OFF/OP EST MAY X REQ PHY/QHP: CPT

## 2021-08-23 NOTE — PROGRESS NOTES
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]  Hemorrhage of both adrenal glands (HCC) [E27.49]  Vaginal bleeding [N93.9]  Catastrophic antiphospholipid syndrome (HCC) [D68.61]             Anticoagulation Episode Summary     INR check location:      Preferred lab:      Send INR reminders to:      Comments:        Anticoagulation Care Providers     Provider Role Specialty Phone number    [facility] Responsible  [phone]                    Refer to Patient Findings for HPI:  Patient Findings     Positives:  Change in health (positive for COVID)    Negatives:  Signs/symptoms of thrombosis, Signs/symptoms of bleeding, Laboratory test error suspected, Change in alcohol use, Change in activity, Upcoming invasive procedure, Emergency department visit, Upcoming dental procedure, Missed doses, Extra doses, Change in medications, Change in diet/appetite, Hospital admission, Bruising, Other complaints            There were no vitals filed for this visit.  Unable to check BP since this was a drive-up visit    Verified current warfarin dosing schedule.      Medications reconciled   Pt is not on antiplatelet therapy      A/P   INR  therapeutic.     Warfarin dosing recommendation: STOP Lovenox, Continue warfarin regimen    Pt educated to contact our clinic with any changes in medications or s/s of bleeding or thrombosis. Pt is aware to seek immediate medical attention for falls, head injury or deep cuts.    Follow up appointment in 1.5 week(s) on  @ 1:15pm.    Rachelle Ricketts  PharmD

## 2021-08-23 NOTE — PROGRESS NOTES
Received message that pt cancelled her INR check today due active COVID+ infection    Pt is bridging with Lovenox on warfarin    S/w Landon Milton - ok to make f/u INR a car visit     LVM for pt to call back ASAP to reschedule INR check    Rachelle Ricketts, EvanD

## 2021-09-03 ENCOUNTER — ANTICOAGULATION VISIT (OUTPATIENT)
Dept: VASCULAR LAB | Facility: MEDICAL CENTER | Age: 46
End: 2021-09-03
Attending: INTERNAL MEDICINE
Payer: COMMERCIAL

## 2021-09-03 DIAGNOSIS — E27.49 HEMORRHAGE OF BOTH ADRENAL GLANDS (HCC): ICD-10-CM

## 2021-09-03 DIAGNOSIS — D68.61 CATASTROPHIC ANTIPHOSPHOLIPID SYNDROME (HCC): ICD-10-CM

## 2021-09-03 DIAGNOSIS — I82.210 THROMBOSIS OF SUPERIOR VENA CAVA (HCC): ICD-10-CM

## 2021-09-03 DIAGNOSIS — N93.9 VAGINAL BLEEDING: ICD-10-CM

## 2021-09-03 LAB
INR BLD: 2 (ref 0.9–1.2)
INR PPP: 2 (ref 2–3.5)

## 2021-09-03 PROCEDURE — 99212 OFFICE O/P EST SF 10 MIN: CPT

## 2021-09-03 PROCEDURE — 85610 PROTHROMBIN TIME: CPT

## 2021-09-03 NOTE — PROGRESS NOTES
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]  Hemorrhage of both adrenal glands (HCC) [E27.49]  Vaginal bleeding [N93.9]  Catastrophic antiphospholipid syndrome (HCC) [D68.61]             Anticoagulation Episode Summary     INR check location:      Preferred lab:      Send INR reminders to:      Comments:        Anticoagulation Care Providers     Provider Role Specialty Phone number    [facility] Responsible  [phone]                Refer to Patient Findings for HPI:  Patient Findings     Negatives:  Signs/symptoms of thrombosis, Signs/symptoms of bleeding, Laboratory test error suspected, Change in health, Change in alcohol use, Change in activity, Upcoming invasive procedure, Emergency department visit, Upcoming dental procedure, Missed doses, Extra doses, Change in medications, Change in diet/appetite, Hospital admission, Bruising, Other complaints            There were no vitals filed for this visit.   pt declined vitals    Verified current warfarin dosing schedule.      Medications reconciled   Pt is not on antiplatelet therapy      A/P   INR  SUB-therapeutic.     Warfarin dosing recommendation: Instructed pt to BOLUS 1x to 15mg then continue regimen.  - Pt instructed to resume Lovenox until INR is therapeutic    Pt educated to contact our clinic with any changes in medications or s/s of bleeding or thrombosis. Pt is aware to seek immediate medical attention for falls, head injury or deep cuts.    Follow up appointment in 4 days    Emmanuel Hickman, EvanD

## 2021-09-07 ENCOUNTER — HOSPITAL ENCOUNTER (OUTPATIENT)
Dept: RADIOLOGY | Facility: MEDICAL CENTER | Age: 46
End: 2021-09-07
Attending: INTERNAL MEDICINE
Payer: COMMERCIAL

## 2021-09-07 ENCOUNTER — ANTICOAGULATION VISIT (OUTPATIENT)
Dept: VASCULAR LAB | Facility: MEDICAL CENTER | Age: 46
End: 2021-09-07
Attending: INTERNAL MEDICINE
Payer: COMMERCIAL

## 2021-09-07 VITALS — SYSTOLIC BLOOD PRESSURE: 113 MMHG | HEART RATE: 83 BPM | DIASTOLIC BLOOD PRESSURE: 76 MMHG

## 2021-09-07 DIAGNOSIS — I82.210 THROMBOSIS OF SUPERIOR VENA CAVA (HCC): ICD-10-CM

## 2021-09-07 DIAGNOSIS — N18.31 STAGE 3A CHRONIC KIDNEY DISEASE: ICD-10-CM

## 2021-09-07 DIAGNOSIS — N93.9 VAGINAL BLEEDING: ICD-10-CM

## 2021-09-07 DIAGNOSIS — D68.61 CATASTROPHIC ANTIPHOSPHOLIPID SYNDROME (HCC): ICD-10-CM

## 2021-09-07 DIAGNOSIS — N13.30 HYDRONEPHROSIS OF LEFT KIDNEY: ICD-10-CM

## 2021-09-07 DIAGNOSIS — E27.49 HEMORRHAGE OF BOTH ADRENAL GLANDS (HCC): ICD-10-CM

## 2021-09-07 LAB — INR PPP: 7 (ref 2–3.5)

## 2021-09-07 PROCEDURE — 99212 OFFICE O/P EST SF 10 MIN: CPT

## 2021-09-07 PROCEDURE — 76775 US EXAM ABDO BACK WALL LIM: CPT

## 2021-09-07 PROCEDURE — 85610 PROTHROMBIN TIME: CPT

## 2021-09-07 NOTE — PROGRESS NOTES
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]  Hemorrhage of both adrenal glands (HCC) [E27.49]  Vaginal bleeding [N93.9]  Catastrophic antiphospholipid syndrome (HCC) [D68.61]             Anticoagulation Episode Summary     INR check location:      Preferred lab:      Send INR reminders to:      Comments:        Anticoagulation Care Providers     Provider Role Specialty Phone number    [facility] Responsible  [phone]                Refer to Patient Findings for HPI:  Patient Findings     Negatives:  Signs/symptoms of thrombosis, Signs/symptoms of bleeding, Laboratory test error suspected, Change in health, Change in alcohol use, Change in activity, Upcoming invasive procedure, Emergency department visit, Upcoming dental procedure, Missed doses, Extra doses, Change in medications, Change in diet/appetite, Hospital admission, Bruising, Other complaints          Vitals:    21 1459   BP: 113/76   Pulse: 83       Verified current warfarin dosing schedule.    Medications reconciled  Pt is not on antiplatelet therapy      A/P   INR 7.0, supra-therapeutic x2 readings. Pt declines confirmatory lab draw INR.    Warfarin dosing recommendation: Pt to hold x2 days, and DC Lovenox.    Pt educated to contact our clinic with any changes in medications or s/s of bleeding or thrombosis. Pt is aware to seek immediate medical attention for falls, head injury or deep cuts.    Follow up appointment in 2 day(s).    Angélica Hyman, Pharmacy Intern  Casey Daniels, PharmD

## 2021-09-08 LAB — INR BLD: 7.1 (ref 0.9–1.2)

## 2021-09-09 ENCOUNTER — ANTICOAGULATION VISIT (OUTPATIENT)
Dept: VASCULAR LAB | Facility: MEDICAL CENTER | Age: 46
End: 2021-09-09
Attending: INTERNAL MEDICINE
Payer: COMMERCIAL

## 2021-09-09 VITALS — HEART RATE: 75 BPM | SYSTOLIC BLOOD PRESSURE: 126 MMHG | DIASTOLIC BLOOD PRESSURE: 85 MMHG

## 2021-09-09 DIAGNOSIS — N93.9 VAGINAL BLEEDING: ICD-10-CM

## 2021-09-09 DIAGNOSIS — E27.49 HEMORRHAGE OF BOTH ADRENAL GLANDS (HCC): ICD-10-CM

## 2021-09-09 DIAGNOSIS — I82.210 THROMBOSIS OF SUPERIOR VENA CAVA (HCC): ICD-10-CM

## 2021-09-09 DIAGNOSIS — D68.61 CATASTROPHIC ANTIPHOSPHOLIPID SYNDROME (HCC): ICD-10-CM

## 2021-09-09 LAB
INR BLD: 2.5 (ref 0.9–1.2)
INR PPP: 2.5 (ref 2–3.5)

## 2021-09-09 PROCEDURE — 99212 OFFICE O/P EST SF 10 MIN: CPT | Performed by: NURSE PRACTITIONER

## 2021-09-09 PROCEDURE — 85610 PROTHROMBIN TIME: CPT

## 2021-09-09 RX ORDER — AMLODIPINE BESYLATE 5 MG/1
5 TABLET ORAL DAILY
COMMUNITY
End: 2021-09-27

## 2021-09-09 RX ORDER — PREDNISONE 10 MG/1
10 TABLET ORAL DAILY
COMMUNITY
End: 2021-09-27

## 2021-09-09 NOTE — PROGRESS NOTES
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    Medications reconciled   Pt is not on antiplatelet therapy      A/P   INR  -therapeutic. INR down from 7.0 on Tuesday with two dose holds.    Warfarin dosing recommendation: Instructed to take 10 mg tonight then resume previous regimen.    Pt educated to contact our clinic with any changes in medications or s/s of bleeding or thrombosis. Pt is aware to seek immediate medical attention for falls, head injury or deep cuts.    Follow up appointment on Monday.    PABLO Funk.

## 2021-09-13 ENCOUNTER — ANTICOAGULATION VISIT (OUTPATIENT)
Dept: VASCULAR LAB | Facility: MEDICAL CENTER | Age: 46
End: 2021-09-13
Attending: INTERNAL MEDICINE
Payer: COMMERCIAL

## 2021-09-13 DIAGNOSIS — I82.210 THROMBOSIS OF SUPERIOR VENA CAVA (HCC): ICD-10-CM

## 2021-09-13 DIAGNOSIS — N93.9 VAGINAL BLEEDING: ICD-10-CM

## 2021-09-13 DIAGNOSIS — E27.49 HEMORRHAGE OF BOTH ADRENAL GLANDS (HCC): ICD-10-CM

## 2021-09-13 DIAGNOSIS — D68.61 CATASTROPHIC ANTIPHOSPHOLIPID SYNDROME (HCC): ICD-10-CM

## 2021-09-13 LAB — INR PPP: 2.1 (ref 2–3.5)

## 2021-09-13 PROCEDURE — 99212 OFFICE O/P EST SF 10 MIN: CPT | Performed by: NURSE PRACTITIONER

## 2021-09-13 PROCEDURE — 85610 PROTHROMBIN TIME: CPT

## 2021-09-13 NOTE — PROGRESS NOTES
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    Medications reconciled   Pt is not on antiplatelet therapy      A/P   INR  sub-therapeutic at 2.1. Will start Lovenox 120 mg injections to abdomen once daily.    Warfarin dosing recommendation: Increase warfarin tonight then resume prior regimen.    Pt educated to contact our clinic with any changes in medications or s/s of bleeding or thrombosis. Pt is aware to seek immediate medical attention for falls, head injury or deep cuts.    Follow up appointment on Wednesday.    PABLO Funk.

## 2021-09-14 LAB — INR BLD: 2.1 (ref 0.9–1.2)

## 2021-09-15 ENCOUNTER — ANTICOAGULATION VISIT (OUTPATIENT)
Dept: VASCULAR LAB | Facility: MEDICAL CENTER | Age: 46
End: 2021-09-15
Attending: INTERNAL MEDICINE
Payer: COMMERCIAL

## 2021-09-15 VITALS — HEART RATE: 77 BPM | DIASTOLIC BLOOD PRESSURE: 90 MMHG | SYSTOLIC BLOOD PRESSURE: 132 MMHG

## 2021-09-15 DIAGNOSIS — D68.61 CATASTROPHIC ANTIPHOSPHOLIPID SYNDROME (HCC): ICD-10-CM

## 2021-09-15 DIAGNOSIS — N93.9 VAGINAL BLEEDING: ICD-10-CM

## 2021-09-15 DIAGNOSIS — I82.210 THROMBOSIS OF SUPERIOR VENA CAVA (HCC): ICD-10-CM

## 2021-09-15 DIAGNOSIS — E27.49 HEMORRHAGE OF BOTH ADRENAL GLANDS (HCC): ICD-10-CM

## 2021-09-15 LAB
INR BLD: 3.8 (ref 0.9–1.2)
INR PPP: 3.8 (ref 2–3.5)

## 2021-09-15 PROCEDURE — 99212 OFFICE O/P EST SF 10 MIN: CPT | Performed by: NURSE PRACTITIONER

## 2021-09-15 PROCEDURE — 85610 PROTHROMBIN TIME: CPT

## 2021-09-15 NOTE — PROGRESS NOTES
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    Medications reconciled   Pt is not on antiplatelet therapy      A/P   INR  supra-therapeutic. INR increased from 2.1 with a loading dose on Monday.    Warfarin dosing recommendation: Resume 10 mg Wed/Fridays, 7.5 mg all other days.    Pt educated to contact our clinic with any changes in medications or s/s of bleeding or thrombosis. Pt is aware to seek immediate medical attention for falls, head injury or deep cuts.    Follow up appointment on Monday.    PABLO Funk.

## 2021-09-20 ENCOUNTER — ANTICOAGULATION VISIT (OUTPATIENT)
Dept: VASCULAR LAB | Facility: MEDICAL CENTER | Age: 46
End: 2021-09-20
Attending: INTERNAL MEDICINE
Payer: COMMERCIAL

## 2021-09-20 DIAGNOSIS — N93.9 VAGINAL BLEEDING: ICD-10-CM

## 2021-09-20 DIAGNOSIS — D68.61 CATASTROPHIC ANTIPHOSPHOLIPID SYNDROME (HCC): ICD-10-CM

## 2021-09-20 DIAGNOSIS — E27.49 HEMORRHAGE OF BOTH ADRENAL GLANDS (HCC): ICD-10-CM

## 2021-09-20 DIAGNOSIS — I82.210 THROMBOSIS OF SUPERIOR VENA CAVA (HCC): ICD-10-CM

## 2021-09-20 LAB
INR BLD: 1.8 (ref 0.9–1.2)
INR PPP: 1.8 (ref 2–3.5)

## 2021-09-20 PROCEDURE — 99212 OFFICE O/P EST SF 10 MIN: CPT | Performed by: NURSE PRACTITIONER

## 2021-09-20 PROCEDURE — 85610 PROTHROMBIN TIME: CPT

## 2021-09-20 NOTE — PROGRESS NOTES
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    Medications reconciled   Pt is not on antiplatelet therapy      A/P   INR  sub-therapeutic. INR decreased from 3.8 last week. Will restart Lovenox 120 mg injections (she currently has 2 at home).    Warfarin dosing recommendation: Increase warfarin as outlined on calendar.    Pt educated to contact our clinic with any changes in medications or s/s of bleeding or thrombosis. Pt is aware to seek immediate medical attention for falls, head injury or deep cuts.    Follow up appointment in 2 days.    PABLO Funk.

## 2021-09-22 ENCOUNTER — ANTICOAGULATION VISIT (OUTPATIENT)
Dept: VASCULAR LAB | Facility: MEDICAL CENTER | Age: 46
End: 2021-09-22
Attending: INTERNAL MEDICINE
Payer: COMMERCIAL

## 2021-09-22 VITALS — DIASTOLIC BLOOD PRESSURE: 72 MMHG | SYSTOLIC BLOOD PRESSURE: 101 MMHG | HEART RATE: 94 BPM

## 2021-09-22 DIAGNOSIS — E27.49 HEMORRHAGE OF BOTH ADRENAL GLANDS (HCC): ICD-10-CM

## 2021-09-22 DIAGNOSIS — D68.61 CATASTROPHIC ANTIPHOSPHOLIPID SYNDROME (HCC): ICD-10-CM

## 2021-09-22 DIAGNOSIS — I82.210 THROMBOSIS OF SUPERIOR VENA CAVA (HCC): ICD-10-CM

## 2021-09-22 DIAGNOSIS — N93.9 VAGINAL BLEEDING: ICD-10-CM

## 2021-09-22 LAB — INR PPP: 2.9 (ref 2–3.5)

## 2021-09-22 PROCEDURE — 85610 PROTHROMBIN TIME: CPT

## 2021-09-22 PROCEDURE — 99211 OFF/OP EST MAY X REQ PHY/QHP: CPT | Performed by: PHARMACIST

## 2021-09-23 LAB — INR BLD: 2.9 (ref 0.9–1.2)

## 2021-09-27 ENCOUNTER — ANTICOAGULATION VISIT (OUTPATIENT)
Dept: VASCULAR LAB | Facility: MEDICAL CENTER | Age: 46
End: 2021-09-27
Attending: INTERNAL MEDICINE
Payer: COMMERCIAL

## 2021-09-27 VITALS — HEART RATE: 71 BPM | DIASTOLIC BLOOD PRESSURE: 70 MMHG | SYSTOLIC BLOOD PRESSURE: 100 MMHG

## 2021-09-27 DIAGNOSIS — I82.210 THROMBOSIS OF SUPERIOR VENA CAVA (HCC): ICD-10-CM

## 2021-09-27 DIAGNOSIS — N93.9 VAGINAL BLEEDING: ICD-10-CM

## 2021-09-27 DIAGNOSIS — E27.49 HEMORRHAGE OF BOTH ADRENAL GLANDS (HCC): ICD-10-CM

## 2021-09-27 DIAGNOSIS — D68.61 CATASTROPHIC ANTIPHOSPHOLIPID SYNDROME (HCC): ICD-10-CM

## 2021-09-27 LAB — INR PPP: 2.9 (ref 2–3.5)

## 2021-09-27 PROCEDURE — 99211 OFF/OP EST MAY X REQ PHY/QHP: CPT

## 2021-09-27 PROCEDURE — 85610 PROTHROMBIN TIME: CPT

## 2021-09-27 NOTE — PROGRESS NOTES
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]  Hemorrhage of both adrenal glands (HCC) [E27.49]  Vaginal bleeding [N93.9]  Catastrophic antiphospholipid syndrome (HCC) [D68.61]             Anticoagulation Episode Summary     INR check location:      Preferred lab:      Send INR reminders to:      Comments:        Anticoagulation Care Providers     Provider Role Specialty Phone number    [facility] Responsible  [phone]                Refer to Patient Findings for HPI:  Patient Findings     Positives:  Change in medications    Comments:  Pt finished prednisone taper on 21          Vitals:    21 0743   BP: 100/70   Pulse: 71     Verified current warfarin dosing schedule.    Medications reconciled   Pt is not on antiplatelet therapy      A/P   INR  therapeutic.     Warfarin dosing recommendation: Pt is to continue with current warfarin dosing regimen.    Follow up in 4 days as she finished a prednisone taper on 21.    Pt educated to contact our clinic with any changes in medications or s/s of bleeding or thrombosis. Pt is aware to seek immediate medical attention for falls, head injury or deep cuts.    Follow up appointment in 4 days.   Landon Milton, PharmD

## 2021-09-28 LAB — INR BLD: 2.9 (ref 0.9–1.2)

## 2021-10-01 ENCOUNTER — ANTICOAGULATION VISIT (OUTPATIENT)
Dept: VASCULAR LAB | Facility: MEDICAL CENTER | Age: 46
End: 2021-10-01
Attending: INTERNAL MEDICINE
Payer: COMMERCIAL

## 2021-10-01 DIAGNOSIS — D68.61 CATASTROPHIC ANTIPHOSPHOLIPID SYNDROME (HCC): ICD-10-CM

## 2021-10-01 DIAGNOSIS — N93.9 VAGINAL BLEEDING: ICD-10-CM

## 2021-10-01 DIAGNOSIS — E27.49 HEMORRHAGE OF BOTH ADRENAL GLANDS (HCC): ICD-10-CM

## 2021-10-01 DIAGNOSIS — I82.210 THROMBOSIS OF SUPERIOR VENA CAVA (HCC): ICD-10-CM

## 2021-10-01 LAB
INR BLD: 3.4 (ref 0.9–1.2)
INR PPP: 3.4 (ref 2–3.5)

## 2021-10-01 PROCEDURE — 99211 OFF/OP EST MAY X REQ PHY/QHP: CPT

## 2021-10-01 PROCEDURE — 85610 PROTHROMBIN TIME: CPT

## 2021-10-01 NOTE — PROGRESS NOTES
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]  Hemorrhage of both adrenal glands (HCC) [E27.49]  Vaginal bleeding [N93.9]  Catastrophic antiphospholipid syndrome (HCC) [D68.61]             Anticoagulation Episode Summary     INR check location:      Preferred lab:      Send INR reminders to:      Comments:        Anticoagulation Care Providers     Provider Role Specialty Phone number    [facility] Responsible  [phone]        Anticoagulation Patient Findings          History of Present Illness: follow up appointment for chronic anticoagulation with the high risk medication, warfarin for thrombosis of superior vena cava    Medications reconciled yes  Pt is not on antiplatelet therapy    Pt remains therapeutic today. Continue current dosing regimen.  Follow up in 2 weeks, to reduce the risk of adverse events related to this high risk medication, warfarin.    America Medina, Clinical Pharmacist      [employer] home meter application faxed

## 2021-10-15 ENCOUNTER — ANTICOAGULATION VISIT (OUTPATIENT)
Dept: VASCULAR LAB | Facility: MEDICAL CENTER | Age: 46
End: 2021-10-15
Attending: INTERNAL MEDICINE
Payer: COMMERCIAL

## 2021-10-15 DIAGNOSIS — D68.61 CATASTROPHIC ANTIPHOSPHOLIPID SYNDROME (HCC): ICD-10-CM

## 2021-10-15 DIAGNOSIS — N93.9 VAGINAL BLEEDING: ICD-10-CM

## 2021-10-15 DIAGNOSIS — E27.49 HEMORRHAGE OF BOTH ADRENAL GLANDS (HCC): ICD-10-CM

## 2021-10-15 DIAGNOSIS — I82.210 THROMBOSIS OF SUPERIOR VENA CAVA (HCC): ICD-10-CM

## 2021-10-15 LAB
INR BLD: 3.9 (ref 0.9–1.2)
INR PPP: 3.9 (ref 2–3.5)

## 2021-10-15 PROCEDURE — 99212 OFFICE O/P EST SF 10 MIN: CPT

## 2021-10-15 PROCEDURE — 85610 PROTHROMBIN TIME: CPT

## 2021-10-15 RX ORDER — WARFARIN SODIUM 2.5 MG/1
2.5 TABLET ORAL DAILY
Qty: 30 TABLET | Refills: 11 | Status: SHIPPED | OUTPATIENT
Start: 2021-10-15 | End: 2021-11-22 | Stop reason: SDUPTHER

## 2021-10-15 NOTE — PROGRESS NOTES
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  Verified current warfarin dosing schedule.     Medications reconciled   Pt is not on antiplatelet therapy        A/P   INR  is supra-therapeutic at 3.9. Patient admits to eating less greens     Warfarin dosing recommendation: Take decreased dose of 5 mg tonight, then continue regular regimen. Patient will incorporate more greens into her diet.     Pt educated to contact our clinic with any changes in medications or s/s of bleeding or thrombosis. Pt is aware to seek immediate medical attention for falls, head injury or deep cuts.     Follow up appointment in 2 week(s).     Edilberto Enciso, EvanD

## 2021-10-15 NOTE — NON-PROVIDER
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  pt declined vitals    Verified current warfarin dosing schedule.    Medications reconciled   Pt is not on antiplatelet therapy      A/P   INR  is supra-therapeutic at 3.9. Patient admits to eating less greens    Warfarin dosing recommendation: Take decreased dose of 5 mg tonight, then continue regular regimen. Patient will incorporate more greens into her diet.    Pt educated to contact our clinic with any changes in medications or s/s of bleeding or thrombosis. Pt is aware to seek immediate medical attention for falls, head injury or deep cuts.    Follow up appointment in 2 week(s).    Edilberto Enciso, EvanD

## 2021-10-27 ENCOUNTER — ANTICOAGULATION VISIT (OUTPATIENT)
Dept: VASCULAR LAB | Facility: MEDICAL CENTER | Age: 46
End: 2021-10-27
Attending: INTERNAL MEDICINE
Payer: COMMERCIAL

## 2021-10-27 DIAGNOSIS — D68.61 CATASTROPHIC ANTIPHOSPHOLIPID SYNDROME (HCC): ICD-10-CM

## 2021-10-27 DIAGNOSIS — I82.210 THROMBOSIS OF SUPERIOR VENA CAVA (HCC): ICD-10-CM

## 2021-10-27 DIAGNOSIS — E27.49 HEMORRHAGE OF BOTH ADRENAL GLANDS (HCC): ICD-10-CM

## 2021-10-27 DIAGNOSIS — N93.9 VAGINAL BLEEDING: ICD-10-CM

## 2021-10-27 LAB
INR BLD: 4.5 (ref 0.9–1.2)
INR PPP: 4.5 (ref 2–3.5)

## 2021-10-27 PROCEDURE — 99212 OFFICE O/P EST SF 10 MIN: CPT

## 2021-10-27 PROCEDURE — 85610 PROTHROMBIN TIME: CPT

## 2021-10-27 RX ORDER — PREDNISONE 5 MG/1
5 TABLET ORAL DAILY
COMMUNITY
Start: 2021-10-18 | End: 2022-02-09

## 2021-10-27 NOTE — PROGRESS NOTES
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]  Hemorrhage of both adrenal glands (HCC) [E27.49]  Vaginal bleeding [N93.9]  Catastrophic antiphospholipid syndrome (HCC) [D68.61]             Anticoagulation Episode Summary     INR check location:      Preferred lab:      Send INR reminders to:      Comments:        Anticoagulation Care Providers     Provider Role Specialty Phone number    [facility] Responsible  [phone]                Refer to Patient Findings for HPI:  Patient Findings     Positives:  Change in medications (restarted prednsione)    Negatives:  Signs/symptoms of thrombosis, Signs/symptoms of bleeding, Laboratory test error suspected, Change in health, Change in alcohol use, Change in activity, Upcoming invasive procedure, Emergency department visit, Upcoming dental procedure, Missed doses, Extra doses, Change in diet/appetite, Hospital admission, Bruising, Other complaints          There were no vitals filed for this visit.   pt declined vitals    Verified current warfarin dosing schedule.    Medications reconciled   Pt is not on antiplatelet therapy      A/P   INR  supra-therapeutic.     Warfarin dosing recommendation: Reduce dose to 5 mg tonight, then reduce weekly regimen to 7.5 mg daily    Pt educated to contact our clinic with any changes in medications or s/s of bleeding or thrombosis. Pt is aware to seek immediate medical attention for falls, head injury or deep cuts.    Follow up appointment in 1.5 week(s).    Rachelle Ricketts PharmD

## 2021-10-28 ENCOUNTER — APPOINTMENT (OUTPATIENT)
Dept: NEPHROLOGY | Facility: MEDICAL CENTER | Age: 46
End: 2021-10-28
Payer: COMMERCIAL

## 2021-11-06 LAB
25(OH)D3+25(OH)D2 SERPL-MCNC: 20.9 NG/ML (ref 30–100)
APPEARANCE UR: CLEAR
BILIRUB UR QL STRIP: NEGATIVE
BUN SERPL-MCNC: 12 MG/DL (ref 6–24)
BUN/CREAT SERPL: 14 (ref 9–23)
CALCIUM SERPL-MCNC: 9.9 MG/DL (ref 8.7–10.2)
CHLORIDE SERPL-SCNC: 103 MMOL/L (ref 96–106)
CO2 SERPL-SCNC: 21 MMOL/L (ref 20–29)
COLOR UR: YELLOW
CREAT SERPL-MCNC: 0.86 MG/DL (ref 0.57–1)
ERYTHROCYTE [DISTWIDTH] IN BLOOD BY AUTOMATED COUNT: 13.8 % (ref 11.7–15.4)
GLUCOSE SERPL-MCNC: 73 MG/DL (ref 65–99)
GLUCOSE UR QL: NEGATIVE
HCT VFR BLD AUTO: 38.2 % (ref 34–46.6)
HGB BLD-MCNC: 11.5 G/DL (ref 11.1–15.9)
HGB UR QL STRIP: NEGATIVE
KETONES UR QL STRIP: NEGATIVE
LEUKOCYTE ESTERASE UR QL STRIP: NEGATIVE
MCH RBC QN AUTO: 23.8 PG (ref 26.6–33)
MCHC RBC AUTO-ENTMCNC: 30.1 G/DL (ref 31.5–35.7)
MCV RBC AUTO: 79 FL (ref 79–97)
MICRO URNS: NORMAL
NITRITE UR QL STRIP: NEGATIVE
NRBC BLD AUTO-RTO: ABNORMAL %
PH UR STRIP: 6 [PH] (ref 5–7.5)
PLATELET # BLD AUTO: 371 X10E3/UL (ref 150–450)
POTASSIUM SERPL-SCNC: 4 MMOL/L (ref 3.5–5.2)
PROT UR QL STRIP: NEGATIVE
RBC # BLD AUTO: 4.84 X10E6/UL (ref 3.77–5.28)
SODIUM SERPL-SCNC: 138 MMOL/L (ref 134–144)
SP GR UR: 1.01 (ref 1–1.03)
UROBILINOGEN UR STRIP-MCNC: 0.2 MG/DL (ref 0.2–1)
WBC # BLD AUTO: 9 X10E3/UL (ref 3.4–10.8)

## 2021-11-08 ENCOUNTER — ANTICOAGULATION VISIT (OUTPATIENT)
Dept: VASCULAR LAB | Facility: MEDICAL CENTER | Age: 46
End: 2021-11-08
Attending: INTERNAL MEDICINE
Payer: COMMERCIAL

## 2021-11-08 DIAGNOSIS — D68.61 CATASTROPHIC ANTIPHOSPHOLIPID SYNDROME (HCC): ICD-10-CM

## 2021-11-08 DIAGNOSIS — I82.210 THROMBOSIS OF SUPERIOR VENA CAVA (HCC): ICD-10-CM

## 2021-11-08 DIAGNOSIS — N93.9 VAGINAL BLEEDING: ICD-10-CM

## 2021-11-08 DIAGNOSIS — E27.49 HEMORRHAGE OF BOTH ADRENAL GLANDS (HCC): ICD-10-CM

## 2021-11-08 LAB — INR PPP: 1 (ref 2–3.5)

## 2021-11-08 PROCEDURE — 85610 PROTHROMBIN TIME: CPT

## 2021-11-08 PROCEDURE — 99212 OFFICE O/P EST SF 10 MIN: CPT | Performed by: NURSE PRACTITIONER

## 2021-11-08 NOTE — PROGRESS NOTES
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]  Hemorrhage of both adrenal glands (HCC) [E27.49]  Vaginal bleeding [N93.9]  Catastrophic antiphospholipid syndrome (HCC) [D68.61]             Anticoagulation Episode Summary     INR check location:      Preferred lab:      Send INR reminders to:      Comments:        Anticoagulation Care Providers     Provider Role Specialty Phone number    [facility] Responsible  [phone]                Refer to Patient Findings for HPI:  Patient Findings     Positives:  Missed doses, Change in diet/appetite    Negatives:  Signs/symptoms of thrombosis, Signs/symptoms of bleeding, Laboratory test error suspected, Change in health, Change in alcohol use, Change in activity, Upcoming invasive procedure, Emergency department visit, Upcoming dental procedure, Extra doses, Change in medications, Hospital admission, Bruising, Other complaints    Comments:  She missed her warfarin dose on Saturday evening. Ate salads most days but used ice allan lettuce.          Verified current warfarin dosing schedule.    Medications reconciled   Pt is not on antiplatelet therapy      A/P   INR  sub-therapeutic at 1.0. INR very low today. Discussed the risks and benefits of initiating Lovenox. With shared decision-making we will initiate Lovenox. Rx for 120 mg daily injections sent to [facility]. She will inject once daily starting this AM.    Warfarin dosing recommendation: Take 10 mg tonight and tomorrow, then resume your previous regimen.    Pt educated to contact our clinic with any changes in medications or s/s of  bleeding or thrombosis. Pt is aware to seek immediate medical attention for falls, head injury or deep cuts.    Follow up appointment on Thursday.    PABLO Funk.

## 2021-11-11 ENCOUNTER — ANTICOAGULATION VISIT (OUTPATIENT)
Dept: VASCULAR LAB | Facility: MEDICAL CENTER | Age: 46
End: 2021-11-11
Attending: INTERNAL MEDICINE
Payer: COMMERCIAL

## 2021-11-11 VITALS — DIASTOLIC BLOOD PRESSURE: 73 MMHG | HEART RATE: 78 BPM | SYSTOLIC BLOOD PRESSURE: 119 MMHG

## 2021-11-11 DIAGNOSIS — E27.49 HEMORRHAGE OF BOTH ADRENAL GLANDS (HCC): ICD-10-CM

## 2021-11-11 DIAGNOSIS — D68.61 CATASTROPHIC ANTIPHOSPHOLIPID SYNDROME (HCC): ICD-10-CM

## 2021-11-11 DIAGNOSIS — N93.9 VAGINAL BLEEDING: ICD-10-CM

## 2021-11-11 DIAGNOSIS — I82.210 THROMBOSIS OF SUPERIOR VENA CAVA (HCC): ICD-10-CM

## 2021-11-11 LAB
INR BLD: 1.6 (ref 0.9–1.2)
INR PPP: 1.6 (ref 2–3.5)

## 2021-11-11 PROCEDURE — 99212 OFFICE O/P EST SF 10 MIN: CPT

## 2021-11-11 PROCEDURE — 85610 PROTHROMBIN TIME: CPT

## 2021-11-11 NOTE — PROGRESS NOTES
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]  Hemorrhage of both adrenal glands (HCC) [E27.49]  Vaginal bleeding [N93.9]  Catastrophic antiphospholipid syndrome (HCC) [D68.61]             Anticoagulation Episode Summary     INR check location:      Preferred lab:      Send INR reminders to:      Comments:        Anticoagulation Care Providers     Provider Role Specialty Phone number    [facility] Responsible  [phone]                Refer to Patient Findings for HPI:  Patient Findings     Positives:  Change in medications (Patient took 80mg lovenox Monday (old rx patient had at home due to pharmacy being out of stock), Tuesday patient had no lovenox, and Wednesday had 120mg lovenox. )    Negatives:  Signs/symptoms of thrombosis, Signs/symptoms of bleeding, Laboratory test error suspected, Change in health, Change in alcohol use, Change in activity, Upcoming invasive procedure, Emergency department visit, Upcoming dental procedure, Missed doses, Extra doses, Change in diet/appetite, Hospital admission, Bruising, Other complaints          Vitals:    21 0749   BP: 119/73   Pulse: 78       Verified current warfarin dosing schedule.    Medications reconciled      A/P   INR  SUB-therapeutic.     Warfarin dosing recommendation: Patient to BOLUS with 10mg tonight, then patient to continue current regimen. Patient as well to continue lovenox shots.     Pt educated to contact our clinic with any changes in medications or s/s of bleeding or thrombosis. Pt is aware to seek immediate medical attention for falls, head  injury or deep cuts.    Follow up appointment on Monday (11/15/2021)       Hazel Mondragon, Pharmacy Intern  Casey Daniels, PharmD

## 2021-11-12 LAB — INR BLD: 1 (ref 0.9–1.2)

## 2021-11-15 ENCOUNTER — ANTICOAGULATION VISIT (OUTPATIENT)
Dept: VASCULAR LAB | Facility: MEDICAL CENTER | Age: 46
End: 2021-11-15
Attending: INTERNAL MEDICINE
Payer: COMMERCIAL

## 2021-11-15 DIAGNOSIS — E27.49 HEMORRHAGE OF BOTH ADRENAL GLANDS (HCC): ICD-10-CM

## 2021-11-15 DIAGNOSIS — N93.9 VAGINAL BLEEDING: ICD-10-CM

## 2021-11-15 DIAGNOSIS — D68.61 CATASTROPHIC ANTIPHOSPHOLIPID SYNDROME (HCC): ICD-10-CM

## 2021-11-15 DIAGNOSIS — I82.210 THROMBOSIS OF SUPERIOR VENA CAVA (HCC): ICD-10-CM

## 2021-11-15 LAB — INR PPP: 1.7 (ref 2–3.5)

## 2021-11-15 PROCEDURE — 99212 OFFICE O/P EST SF 10 MIN: CPT | Performed by: NURSE PRACTITIONER

## 2021-11-15 PROCEDURE — 85610 PROTHROMBIN TIME: CPT

## 2021-11-15 NOTE — PROGRESS NOTES
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   pt declined vitals    Verified current warfarin dosing schedule.    Medications reconciled   Pt is not on antiplatelet therapy      A/P   INR  sub-therapeutic despite loading doses of warfarin. Unsure why INR still low. Discussed continuing injections but since no recent thrombosis, with shared decision-making we will forgo Lovenoxc.     Warfarin dosing recommendation: take 10 mg for three nights then return for f/u.    Pt educated to contact our clinic with any changes in medications or s/s of bleeding or thrombosis. Pt is aware to seek immediate medical attention for falls, head injury or deep cuts.    Follow up appointment on Thursday.    PABLO Funk.

## 2021-11-16 LAB — INR BLD: 1.7 (ref 0.9–1.2)

## 2021-11-18 ENCOUNTER — ANTICOAGULATION VISIT (OUTPATIENT)
Dept: VASCULAR LAB | Facility: MEDICAL CENTER | Age: 46
End: 2021-11-18
Attending: INTERNAL MEDICINE
Payer: COMMERCIAL

## 2021-11-18 DIAGNOSIS — D68.61 CATASTROPHIC ANTIPHOSPHOLIPID SYNDROME (HCC): ICD-10-CM

## 2021-11-18 DIAGNOSIS — E27.49 HEMORRHAGE OF BOTH ADRENAL GLANDS (HCC): ICD-10-CM

## 2021-11-18 DIAGNOSIS — I82.210 THROMBOSIS OF SUPERIOR VENA CAVA (HCC): ICD-10-CM

## 2021-11-18 DIAGNOSIS — N93.9 VAGINAL BLEEDING: ICD-10-CM

## 2021-11-18 LAB — INR PPP: 2 (ref 2–3.5)

## 2021-11-18 PROCEDURE — 85610 PROTHROMBIN TIME: CPT

## 2021-11-18 PROCEDURE — 99212 OFFICE O/P EST SF 10 MIN: CPT

## 2021-11-18 NOTE — PROGRESS NOTES
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]  Hemorrhage of both adrenal glands (HCC) [E27.49]  Vaginal bleeding [N93.9]  Catastrophic antiphospholipid syndrome (HCC) [D68.61]             Anticoagulation Episode Summary     INR check location:      Preferred lab:      Send INR reminders to:      Comments:        Anticoagulation Care Providers     Provider Role Specialty Phone number    [facility] Responsible  [phone]                Refer to Patient Findings for HPI:  Patient Findings     Negatives:  Signs/symptoms of thrombosis, Signs/symptoms of bleeding, Laboratory test error suspected, Change in health, Change in alcohol use, Change in activity, Upcoming invasive procedure, Emergency department visit, Upcoming dental procedure, Missed doses, Extra doses, Change in medications, Change in diet/appetite, Hospital admission, Bruising, Other complaints          There were no vitals filed for this visit.   pt declined vitals    Verified current warfarin dosing schedule.    Medications reconciled   Pt is not on antiplatelet therapy      A/P   INR  SUB-therapeutic.     Warfarin dosing recommendation: Instructed pt to BOLUS 2x to 12.5mg TODAY AND TOMORROW, then 10mg one time, then 7.5mg one time, then repeat INR  - pt to continue Lovenox until INR therapeutic    Pt educated to contact our clinic with any changes in medications or s/s of bleeding or thrombosis. Pt is aware to seek immediate medical attention for falls, head injury or deep cuts.    Follow up appointment in 4 days per pt preference  Emmanuel Hickman, EvanD

## 2021-11-19 LAB — INR BLD: 2 (ref 0.9–1.2)

## 2021-11-22 ENCOUNTER — ANTICOAGULATION VISIT (OUTPATIENT)
Dept: VASCULAR LAB | Facility: MEDICAL CENTER | Age: 46
End: 2021-11-22
Attending: INTERNAL MEDICINE
Payer: COMMERCIAL

## 2021-11-22 DIAGNOSIS — E27.49 HEMORRHAGE OF BOTH ADRENAL GLANDS (HCC): ICD-10-CM

## 2021-11-22 DIAGNOSIS — N93.9 VAGINAL BLEEDING: ICD-10-CM

## 2021-11-22 DIAGNOSIS — D68.61 CATASTROPHIC ANTIPHOSPHOLIPID SYNDROME (HCC): ICD-10-CM

## 2021-11-22 DIAGNOSIS — I82.210 THROMBOSIS OF SUPERIOR VENA CAVA (HCC): ICD-10-CM

## 2021-11-22 LAB
INR BLD: 3.6 (ref 0.9–1.2)
INR PPP: 3.6 (ref 2–3.5)

## 2021-11-22 PROCEDURE — 85610 PROTHROMBIN TIME: CPT

## 2021-11-22 PROCEDURE — 99212 OFFICE O/P EST SF 10 MIN: CPT | Performed by: STUDENT IN AN ORGANIZED HEALTH CARE EDUCATION/TRAINING PROGRAM

## 2021-11-22 RX ORDER — WARFARIN SODIUM 2.5 MG/1
2.5 TABLET ORAL DAILY
Qty: 30 TABLET | Refills: 11 | Status: SHIPPED | OUTPATIENT
Start: 2021-11-22 | End: 2022-12-05

## 2021-11-22 RX ORDER — WARFARIN SODIUM 5 MG/1
5-10 TABLET ORAL DAILY
Qty: 60 EACH | Refills: 1 | Status: SHIPPED | OUTPATIENT
Start: 2021-11-22 | End: 2022-02-09 | Stop reason: SDUPTHER

## 2021-11-22 NOTE — PROGRESS NOTES
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]  Hemorrhage of both adrenal glands (HCC) [E27.49]  Vaginal bleeding [N93.9]  Catastrophic antiphospholipid syndrome (HCC) [D68.61]             Anticoagulation Episode Summary     INR check location:      Preferred lab:      Send INR reminders to:      Comments:        Anticoagulation Care Providers     Provider Role Specialty Phone number    [facility] Responsible  [phone]                Refer to Patient Findings for HPI:  Patient Findings     Negatives:  Signs/symptoms of thrombosis, Signs/symptoms of bleeding, Laboratory test error suspected, Change in health, Change in alcohol use, Change in activity, Upcoming invasive procedure, Emergency department visit, Upcoming dental procedure, Missed doses, Extra doses, Change in medications, Change in diet/appetite, Hospital admission, Bruising, Other complaints          There were no vitals filed for this visit.  pt declined vitals    Verified current warfarin dosing schedule.    Medications reconciled  Pt is not on antiplatelet therapy      A/P   INR  supra-therapeutic.     Warfarin dosing recommendation: Pt instructed to stop taking Lovenox, increased weekly regimen to 62.5mg with 7.5mg on Mon, Wed, and Fri then 10mg the rest of the week. Pt received a total of 72.5mg last week. If pt is low on Wednesday would recommend increasing weekly regimen closer to 72.5mg.    Pt educated to contact our clinic with any changes in medications or s/s of bleeding or thrombosis. Pt is aware to seek  immediate medical attention for falls, head injury or deep cuts.    Follow up appointment in 2 day(s) given the holiday pt is not able to be seen on Friday.    Frankie Mojica, EvanD

## 2021-11-22 NOTE — Clinical Note
Jaclyn has been low but finaly therapuetic to supra-therapuetic today at 3.6. Instructed her to stop Lovenox and increase weekly regimen. Scheduled her to be seen on Wednesday. If she is low on Wednesday I would recommend increasing her regimen further to be closer to the last weeks total weekly dose.

## 2021-11-24 ENCOUNTER — ANTICOAGULATION VISIT (OUTPATIENT)
Dept: VASCULAR LAB | Facility: MEDICAL CENTER | Age: 46
End: 2021-11-24
Attending: INTERNAL MEDICINE
Payer: COMMERCIAL

## 2021-11-24 DIAGNOSIS — E27.49 HEMORRHAGE OF BOTH ADRENAL GLANDS (HCC): ICD-10-CM

## 2021-11-24 DIAGNOSIS — N93.9 VAGINAL BLEEDING: ICD-10-CM

## 2021-11-24 DIAGNOSIS — D68.61 CATASTROPHIC ANTIPHOSPHOLIPID SYNDROME (HCC): ICD-10-CM

## 2021-11-24 DIAGNOSIS — I82.210 THROMBOSIS OF SUPERIOR VENA CAVA (HCC): ICD-10-CM

## 2021-11-24 LAB
INR BLD: 3.9 (ref 0.9–1.2)
INR PPP: 3.9 (ref 2–3.5)

## 2021-11-24 PROCEDURE — 99211 OFF/OP EST MAY X REQ PHY/QHP: CPT | Performed by: NURSE PRACTITIONER

## 2021-11-24 PROCEDURE — 85610 PROTHROMBIN TIME: CPT

## 2021-11-24 NOTE — PROGRESS NOTES
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]  Hemorrhage of both adrenal glands (HCC) [E27.49]  Vaginal bleeding [N93.9]  Catastrophic antiphospholipid syndrome (HCC) [D68.61]             Anticoagulation Episode Summary     INR check location:      Preferred lab:      Send INR reminders to:      Comments:        Anticoagulation Care Providers     Provider Role Specialty Phone number    [facility] Responsible  [phone]                Refer to Patient Findings for HPI:  Patient Findings     Positives:  Change in diet/appetite    Negatives:  Signs/symptoms of thrombosis, Signs/symptoms of bleeding, Laboratory test error suspected, Change in health, Change in alcohol use, Change in activity, Upcoming invasive procedure, Emergency department visit, Upcoming dental procedure, Missed doses, Extra doses, Change in medications, Hospital admission, Bruising, Other complaints    Comments:  Hasn't been eating greens but will began eating a salad every other day and will stay consistent.          There were no vitals filed for this visit.   pt declined vitals    Verified current warfarin dosing schedule.    Medications reconciled   Pt is not on antiplatelet therapy      A/P   INR  supra-therapeutic. INR trended up from 3.6 on Monday. She wants to increase her vit k intake (salad every other day) and eat consistently. With shared decision-making we will continue her current regimen.    Warfarin dosing recommendation: Continue current dose.    Pt educated to contact our clinic with any changes  in medications or s/s of bleeding or thrombosis. Pt is aware to seek immediate medical attention for falls, head injury or deep cuts.    Follow up appointment on Tuesday (soonest she can return).    PABLO Funk.

## 2021-12-03 ENCOUNTER — ANTICOAGULATION VISIT (OUTPATIENT)
Dept: VASCULAR LAB | Facility: MEDICAL CENTER | Age: 46
End: 2021-12-03
Attending: INTERNAL MEDICINE
Payer: COMMERCIAL

## 2021-12-03 DIAGNOSIS — N93.9 VAGINAL BLEEDING: ICD-10-CM

## 2021-12-03 DIAGNOSIS — I82.210 THROMBOSIS OF SUPERIOR VENA CAVA (HCC): ICD-10-CM

## 2021-12-03 DIAGNOSIS — E27.49 HEMORRHAGE OF BOTH ADRENAL GLANDS (HCC): ICD-10-CM

## 2021-12-03 DIAGNOSIS — D68.61 CATASTROPHIC ANTIPHOSPHOLIPID SYNDROME (HCC): ICD-10-CM

## 2021-12-03 LAB
INR BLD: 3.9 (ref 0.9–1.2)
INR PPP: 3.9 (ref 2–3.5)

## 2021-12-03 PROCEDURE — 85610 PROTHROMBIN TIME: CPT

## 2021-12-03 PROCEDURE — 99212 OFFICE O/P EST SF 10 MIN: CPT

## 2021-12-03 NOTE — PROGRESS NOTES
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]  Hemorrhage of both adrenal glands (HCC) [E27.49]  Vaginal bleeding [N93.9]  Catastrophic antiphospholipid syndrome (HCC) [D68.61]             Anticoagulation Episode Summary     INR check location:      Preferred lab:      Send INR reminders to:      Comments:        Anticoagulation Care Providers     Provider Role Specialty Phone number    [facility] Responsible  [phone]        Anticoagulation Patient Findings  Patient Findings     Negatives:  Signs/symptoms of thrombosis, Signs/symptoms of bleeding, Laboratory test error suspected, Change in health, Change in alcohol use, Change in activity, Upcoming invasive procedure, Emergency department visit, Upcoming dental procedure, Missed doses, Extra doses, Change in medications, Change in diet/appetite, Hospital admission, Bruising, Other complaints              History of Present Illness: follow up appointment for chronic anticoagulation with the high risk medication, warfarin for thrombosis of superior vena cava    Medications reconciled yes   Pt is not on antiplatelet therapy    Last INR was out of range, dosage adjusted: pt remains supra therapeutic today.  Will reduce weekly dose by 4%. Follow up in 1 weeks, to reduce the risk of adverse events related to this high risk medication, warfarin.    America Medina, Clinical Pharmacist

## 2021-12-09 ENCOUNTER — OFFICE VISIT (OUTPATIENT)
Dept: NEPHROLOGY | Facility: MEDICAL CENTER | Age: 46
End: 2021-12-09
Payer: COMMERCIAL

## 2021-12-09 VITALS
SYSTOLIC BLOOD PRESSURE: 114 MMHG | TEMPERATURE: 97.4 F | DIASTOLIC BLOOD PRESSURE: 74 MMHG | WEIGHT: 190 LBS | HEIGHT: 68 IN | OXYGEN SATURATION: 99 % | HEART RATE: 86 BPM | BODY MASS INDEX: 28.79 KG/M2

## 2021-12-09 DIAGNOSIS — D68.61 ANTIPHOSPHOLIPID SYNDROME (HCC): ICD-10-CM

## 2021-12-09 DIAGNOSIS — N18.2 CKD (CHRONIC KIDNEY DISEASE), STAGE II: ICD-10-CM

## 2021-12-09 DIAGNOSIS — I10 HYPERTENSION, UNSPECIFIED TYPE: ICD-10-CM

## 2021-12-09 DIAGNOSIS — E55.9 VITAMIN D DEFICIENCY: ICD-10-CM

## 2021-12-09 DIAGNOSIS — D64.9 ANEMIA, UNSPECIFIED TYPE: ICD-10-CM

## 2021-12-09 DIAGNOSIS — N17.9 AKI (ACUTE KIDNEY INJURY) (HCC): ICD-10-CM

## 2021-12-09 PROCEDURE — 99213 OFFICE O/P EST LOW 20 MIN: CPT | Performed by: INTERNAL MEDICINE

## 2021-12-09 ASSESSMENT — ENCOUNTER SYMPTOMS
SINUS PAIN: 0
NAUSEA: 0
EYES NEGATIVE: 1
WEIGHT LOSS: 0
COUGH: 0
SHORTNESS OF BREATH: 0
ORTHOPNEA: 0
PALPITATIONS: 0
FEVER: 0
CHILLS: 0
WHEEZING: 0
VOMITING: 0
HEMOPTYSIS: 0
ABDOMINAL PAIN: 0
FLANK PAIN: 0

## 2021-12-09 ASSESSMENT — FIBROSIS 4 INDEX: FIB4 SCORE: 0.59

## 2021-12-10 ENCOUNTER — ANTICOAGULATION VISIT (OUTPATIENT)
Dept: VASCULAR LAB | Facility: MEDICAL CENTER | Age: 46
End: 2021-12-10
Attending: INTERNAL MEDICINE
Payer: COMMERCIAL

## 2021-12-10 DIAGNOSIS — D68.61 CATASTROPHIC ANTIPHOSPHOLIPID SYNDROME (HCC): ICD-10-CM

## 2021-12-10 DIAGNOSIS — E27.49 HEMORRHAGE OF BOTH ADRENAL GLANDS (HCC): ICD-10-CM

## 2021-12-10 DIAGNOSIS — I82.210 THROMBOSIS OF SUPERIOR VENA CAVA (HCC): ICD-10-CM

## 2021-12-10 DIAGNOSIS — N93.9 VAGINAL BLEEDING: ICD-10-CM

## 2021-12-10 LAB — INR PPP: 3.3 (ref 2–3.5)

## 2021-12-10 PROCEDURE — 99211 OFF/OP EST MAY X REQ PHY/QHP: CPT

## 2021-12-10 PROCEDURE — 85610 PROTHROMBIN TIME: CPT

## 2021-12-10 NOTE — PROGRESS NOTES
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]  Hemorrhage of both adrenal glands (HCC) [E27.49]  Vaginal bleeding [N93.9]  Catastrophic antiphospholipid syndrome (HCC) [D68.61]             Anticoagulation Episode Summary     INR check location:      Preferred lab:      Send INR reminders to:      Comments:        Anticoagulation Care Providers     Provider Role Specialty Phone number    [facility] Responsible  [phone]          Refer to Patient Findings for HPI:  Patient Findings     Positives:  Change in medications (0% decrease in prednisone starting tomorrow )    Negatives:  Signs/symptoms of thrombosis, Signs/symptoms of bleeding, Laboratory test error suspected, Change in health, Change in alcohol use, Change in activity, Upcoming invasive procedure, Emergency department visit, Upcoming dental procedure, Missed doses, Extra doses, Change in diet/appetite, Hospital admission, Bruising, Other complaints          Verified current warfarin dosing schedule.    Medications reconciled   Pt is not on antiplatelet therapy    A/P     Patient has colonoscopy 12/14/2021. She was instructed to remain on warfarin through procedure.    INR  is therapeutic.     Warfarin dosing recommendation:   Patient is to continue on current regimen.   She does mention she will be decreasing her prednisone dose from 10 mg every other day to 5 mg every other day.  We can anticipate a reduction in INR, as INR today is on higher end of her range she is to continue on current regimen and we will  monitor INR closely    Pt educated to contact our clinic with any changes in medications or s/s of bleeding or thrombosis. Pt is aware to seek immediate medical attention for falls, head injury or deep cuts.    Follow up appointment in 1 week(s).    Edilberto Enciso, EvanD

## 2021-12-10 NOTE — PROGRESS NOTES
Nephrology Daily Progress Note    Date of Service  12/9/2021    Chief Complaint  46 y.o. 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    Interval Problem Update  Jaclyn is coming today for post hospitalization f/u of VIDAL, right hydronephrosis, CAPS  Doing well, no complaints  Creat improved first to 1.1-now at 0.86 -CKD II  HTN: BP well controlled  Anemia improved -Hb stable  Review of Systems  Review of Systems   Constitutional: Negative for chills, fever, malaise/fatigue and weight loss.   HENT: Negative for congestion, hearing loss and sinus pain.    Eyes: Negative.    Respiratory: Negative for cough, hemoptysis, shortness of breath and wheezing.    Cardiovascular: Negative for chest pain, palpitations, orthopnea and leg swelling.   Gastrointestinal: Negative for abdominal pain, nausea and vomiting.   Genitourinary: Negative for dysuria, flank pain, frequency, hematuria and urgency.   Skin: Negative.    All other systems reviewed and are negative.       Physical Exam  Temp:  [36.3 °C (97.4 °F)] 36.3 °C (97.4 °F)  Pulse:  [86] 86  BP: (114)/(74) 114/74  SpO2:  [99 %] 99 %    Physical Exam  Vitals and nursing note reviewed.   Constitutional:       General: She is not in acute distress.     Appearance: Normal appearance. She is well-developed.   HENT:      Head: Normocephalic and atraumatic.      Nose: Nose normal.      Mouth/Throat:      Mouth: Mucous membranes are moist.      Pharynx: Oropharynx is clear.   Eyes:      Conjunctiva/sclera: Conjunctivae normal.      Pupils: Pupils are equal, round, and reactive to light.   Neck:      Thyroid: No thyromegaly.   Cardiovascular:      Pulses: Normal pulses.      Heart sounds: Normal heart sounds. No friction rub. No gallop.    Pulmonary:       Effort: Pulmonary effort is normal. No respiratory distress.      Breath sounds: Normal breath sounds. No wheezing or rales.   Abdominal:      General: Bowel sounds are normal. There is no distension.      Palpations: Abdomen is soft. There is no mass.      Tenderness: There is no abdominal tenderness. There is no guarding.   Musculoskeletal:      Cervical back: Normal range of motion and neck supple.      Right lower leg: No edema.      Left lower leg: No edema.   Skin:     General: Skin is warm.      Coloration: Skin is not jaundiced or pale.      Findings: No erythema or rash.   Neurological:      General: No focal deficit present.      Mental Status: She is alert and oriented to person, place, and time.   Psychiatric:         Mood and Affect: Mood normal.         Behavior: Behavior normal.         Thought Content: Thought content normal.         Judgment: Judgment normal.         Fluids    Intake/Output Summary (Last 24 hours) at 7/2/2021 1153  Last data filed at 7/2/2021 1021  Gross per 24 hour   Intake 240 ml   Output --   Net 240 ml       Laboratory  Labs reviewed, pertinent labs below.                    URINALYSIS:  Lab Results   Component Value Date/Time    COLORURINE DK Yellow 06/22/2021 1810    CLARITY Clear 06/22/2021 1810    SPECGRAVITY 1.007 06/22/2021 1810    PHURINE 6.0 06/22/2021 1810    KETONES Negative 06/22/2021 1810    PROTEINURIN Negative 06/22/2021 1810    BILIRUBINUR Moderate (A) 06/22/2021 1810    UROBILU 0.2 06/22/2021 1810    NITRITE Negative 06/22/2021 1810    LEUKESTERAS Negative 06/22/2021 1810    OCCULTBLOOD Large (A) 06/22/2021 1810     UPC  No results found for: TOTPROTUR No results found for: CREATININEU      Imaging interpreted by radiologist. Imaging reports reviewed with pertinent findings below  No orders to display         Current Outpatient Medications   Medication Sig Dispense Refill   • warfarin (COUMADIN) 2.5 MG Tab Take 1 Tablet by mouth every day. 30 Tablet 11   •  warfarin (COUMADIN) 5 MG Tab Take 1-2 Tablets by mouth every day. Or as directed 60 Each 1   • predniSONE (DELTASONE) 10 MG Tab Take 10 mg by mouth every day.       No current facility-administered medications for this visit.         Assessment/Plan  46 y.o. 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     1.  Acute kidney injury/CKD IIIa -recovered -now at CKD II       To monitor    2.  HTN: elevated BP -well controlled -to monitor     3.  Left renal hydronephrosis: improved -to monitor with US    4.  Electrolytes: well controlled    5.  Microcytic anemia: improved       Hb stable    6. Metabolic acidosis:corrected      Recs; continue current treatment             Keep well hydrated             Low salt diet             BP monitoring             Avoid nephrotoxic agents             F/u in 4 months

## 2021-12-14 LAB — INR BLD: 3.3 (ref 0.9–1.2)

## 2021-12-17 ENCOUNTER — ANTICOAGULATION VISIT (OUTPATIENT)
Dept: VASCULAR LAB | Facility: MEDICAL CENTER | Age: 46
End: 2021-12-17
Attending: INTERNAL MEDICINE
Payer: COMMERCIAL

## 2021-12-17 DIAGNOSIS — I82.210 THROMBOSIS OF SUPERIOR VENA CAVA (HCC): ICD-10-CM

## 2021-12-17 DIAGNOSIS — N93.9 VAGINAL BLEEDING: ICD-10-CM

## 2021-12-17 DIAGNOSIS — D68.61 CATASTROPHIC ANTIPHOSPHOLIPID SYNDROME (HCC): ICD-10-CM

## 2021-12-17 DIAGNOSIS — E27.49 HEMORRHAGE OF BOTH ADRENAL GLANDS (HCC): ICD-10-CM

## 2021-12-17 LAB
INR BLD: 2.4 (ref 0.9–1.2)
INR PPP: 2.4 (ref 2–3.5)

## 2021-12-17 PROCEDURE — 85610 PROTHROMBIN TIME: CPT

## 2021-12-17 PROCEDURE — 99212 OFFICE O/P EST SF 10 MIN: CPT

## 2021-12-17 NOTE — PROGRESS NOTES
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]  Hemorrhage of both adrenal glands (HCC) [E27.49]  Vaginal bleeding [N93.9]  Catastrophic antiphospholipid syndrome (HCC) [D68.61]             Anticoagulation Episode Summary     INR check location:      Preferred lab:      Send INR reminders to:      Comments:        Anticoagulation Care Providers     Provider Role Specialty Phone number    [facility] Responsible  [phone]                Refer to Patient Findings for HPI:  Patient Findings     Positives:  Change in medications (Prednisone was decreased to 5 mg daily)    Negatives:  Signs/symptoms of thrombosis, Signs/symptoms of bleeding, Laboratory test error suspected, Change in health, Change in alcohol use, Change in activity, Upcoming invasive procedure, Emergency department visit, Upcoming dental procedure, Missed doses, Extra doses, Change in diet/appetite, Hospital admission, Bruising, Other complaints          There were no vitals filed for this visit.  pt declined vitals    Verified current warfarin dosing schedule - Pt took a decreased dose of 5 mg on Wed as she was unsure if she took her warfarin.    Medications reconciled   Pt is not on antiplatelet therapy      A/P   INR  SUB-therapeutic.     Warfarin dosing recommendation: Instructed pt to begin newly increased regimen of 7.5 mg M/W/ and 10 mg ROW - previously SUPRA-therapeutic/therapeutic at this dose. She will increase greens consumption slightly. Changed d/t convenience.    Pt educated to contact our clinic with  any changes in medications or s/s of bleeding or thrombosis. Pt is aware to seek immediate medical attention for falls, head injury or deep cuts.    Follow up appointment in 1 week(s).    Casey Daniels, PharmD, BCACP

## 2021-12-23 ENCOUNTER — ANTICOAGULATION VISIT (OUTPATIENT)
Dept: VASCULAR LAB | Facility: MEDICAL CENTER | Age: 46
End: 2021-12-23
Attending: INTERNAL MEDICINE
Payer: COMMERCIAL

## 2021-12-23 DIAGNOSIS — D68.61 CATASTROPHIC ANTIPHOSPHOLIPID SYNDROME (HCC): ICD-10-CM

## 2021-12-23 DIAGNOSIS — N93.9 VAGINAL BLEEDING: ICD-10-CM

## 2021-12-23 DIAGNOSIS — I82.210 THROMBOSIS OF SUPERIOR VENA CAVA (HCC): ICD-10-CM

## 2021-12-23 DIAGNOSIS — E27.49 HEMORRHAGE OF BOTH ADRENAL GLANDS (HCC): ICD-10-CM

## 2021-12-23 LAB
INR BLD: 2.6 (ref 0.9–1.2)
INR PPP: 2.6 (ref 2–3.5)

## 2021-12-23 PROCEDURE — 99211 OFF/OP EST MAY X REQ PHY/QHP: CPT

## 2021-12-23 PROCEDURE — 85610 PROTHROMBIN TIME: CPT

## 2021-12-23 NOTE — PROGRESS NOTES
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]  Hemorrhage of both adrenal glands (HCC) [E27.49]  Vaginal bleeding [N93.9]  Catastrophic antiphospholipid syndrome (HCC) [D68.61]             Anticoagulation Episode Summary     INR check location:      Preferred lab:      Send INR reminders to:      Comments:        Anticoagulation Care Providers     Provider Role Specialty Phone number    [facility] Responsible  [phone]                Refer to Patient Findings for HPI:  Patient Findings     Negatives:  Signs/symptoms of thrombosis, Signs/symptoms of bleeding, Laboratory test error suspected, Change in health, Change in alcohol use, Change in activity, Upcoming invasive procedure, Emergency department visit, Upcoming dental procedure, Missed doses, Extra doses, Change in medications, Change in diet/appetite, Hospital admission, Bruising, Other complaints          There were no vitals filed for this visit.   pt declined vitals    Verified current warfarin dosing schedule.    Medications reconciled   Pt is not on antiplatelet therapy      A/P   INR  now therapeutic.     Warfarin dosing recommendation: Pt is to continue with current warfarin dosing regimen.      Pt educated to contact our clinic with any changes in medications or s/s of bleeding or thrombosis. Pt is aware to seek immediate medical attention for falls, head injury or deep cuts.    Follow up appointment in 2 week(s).    Emmanuel Hickman, EvanD

## 2022-01-05 ENCOUNTER — ANTICOAGULATION VISIT (OUTPATIENT)
Dept: VASCULAR LAB | Facility: MEDICAL CENTER | Age: 47
End: 2022-01-05
Attending: INTERNAL MEDICINE
Payer: COMMERCIAL

## 2022-01-05 DIAGNOSIS — E27.49 HEMORRHAGE OF BOTH ADRENAL GLANDS (HCC): ICD-10-CM

## 2022-01-05 DIAGNOSIS — I82.210 THROMBOSIS OF SUPERIOR VENA CAVA (HCC): ICD-10-CM

## 2022-01-05 DIAGNOSIS — D68.61 CATASTROPHIC ANTIPHOSPHOLIPID SYNDROME (HCC): ICD-10-CM

## 2022-01-05 DIAGNOSIS — N93.9 VAGINAL BLEEDING: ICD-10-CM

## 2022-01-05 LAB — INR PPP: 2.9 (ref 2–3.5)

## 2022-01-05 PROCEDURE — 99211 OFF/OP EST MAY X REQ PHY/QHP: CPT | Performed by: NURSE PRACTITIONER

## 2022-01-05 PROCEDURE — 85610 PROTHROMBIN TIME: CPT

## 2022-01-05 NOTE — PROGRESS NOTES
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]  Hemorrhage of both adrenal glands (HCC) [E27.49]  Vaginal bleeding [N93.9]  Catastrophic antiphospholipid syndrome (HCC) [D68.61]             Anticoagulation Episode Summary     INR check location:      Preferred lab:      Send INR reminders to:      Comments:        Anticoagulation Care Providers     Provider Role Specialty Phone number    [facility] Responsible  [phone]                Refer to Patient Findings for HPI:  Patient Findings     Negatives:  Signs/symptoms of thrombosis, Signs/symptoms of bleeding, Laboratory test error suspected, Change in health, Change in alcohol use, Change in activity, Upcoming invasive procedure, Emergency department visit, Upcoming dental procedure, Missed doses, Extra doses, Change in medications, Change in diet/appetite, Hospital admission, Bruising, Other complaints    Comments:  Not sure if she missed a 2.5 mg tablet one night. No other missed doses.          There were no vitals filed for this visit.   pt declined vitals    Verified current warfarin dosing schedule.    Medications reconciled   Pt is not on antiplatelet therapy      A/P   INR  -therapeutic.     Warfarin dosing recommendation: Continue current regimen.    Pt educated to contact our clinic with any changes in medications or s/s of bleeding or thrombosis. Pt is aware to seek immediate medical attention for falls, head injury or deep cuts.    Follow up appointment in 2 week(s).    CHRISTAL Funk

## 2022-01-06 LAB — INR BLD: 2.9 (ref 0.9–1.2)

## 2022-01-19 ENCOUNTER — ANTICOAGULATION VISIT (OUTPATIENT)
Dept: VASCULAR LAB | Facility: MEDICAL CENTER | Age: 47
End: 2022-01-19
Attending: INTERNAL MEDICINE
Payer: COMMERCIAL

## 2022-01-19 DIAGNOSIS — E27.49 HEMORRHAGE OF BOTH ADRENAL GLANDS (HCC): ICD-10-CM

## 2022-01-19 DIAGNOSIS — D68.61 CATASTROPHIC ANTIPHOSPHOLIPID SYNDROME (HCC): ICD-10-CM

## 2022-01-19 DIAGNOSIS — N93.9 VAGINAL BLEEDING: ICD-10-CM

## 2022-01-19 DIAGNOSIS — I82.210 THROMBOSIS OF SUPERIOR VENA CAVA (HCC): ICD-10-CM

## 2022-01-19 LAB
INR BLD: 2.6 (ref 0.9–1.2)
INR PPP: 2.6 (ref 2–3.5)

## 2022-01-19 PROCEDURE — 85610 PROTHROMBIN TIME: CPT

## 2022-01-19 PROCEDURE — 99211 OFF/OP EST MAY X REQ PHY/QHP: CPT | Performed by: PHARMACIST

## 2022-01-19 NOTE — PROGRESS NOTES
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]  Hemorrhage of both adrenal glands (HCC) [E27.49]  Vaginal bleeding [N93.9]  Catastrophic antiphospholipid syndrome (HCC) [D68.61]             Anticoagulation Episode Summary     INR check location:      Preferred lab:      Send INR reminders to:      Comments:        Anticoagulation Care Providers     Provider Role Specialty Phone number    [facility] Responsible  [phone]                Refer to Patient Findings for HPI:  Patient Findings     Negatives:  Signs/symptoms of thrombosis, Signs/symptoms of bleeding, Laboratory test error suspected, Change in health, Change in alcohol use, Change in activity, Upcoming invasive procedure, Emergency department visit, Upcoming dental procedure, Missed doses, Extra doses, Change in medications, Change in diet/appetite, Hospital admission, Bruising, Other complaints          There were no vitals filed for this visit.   pt declined vitals    Verified current warfarin dosing schedule.    Medications reconciled   Pt is not on antiplatelet therapy       A/P   INR  remains -therapeutic at 2.6.     Warfarin dosing recommendation: Pt is to continue with current warfarin dosing regimen.    Pt educated to contact our clinic with any changes in medications or s/s of bleeding or thrombosis. Pt is aware to seek immediate medical attention for falls, head injury or deep cuts.    Follow up appointment in 3 week(s).    Oliver Carolina, EvanD, BCACP

## 2022-01-24 ENCOUNTER — APPOINTMENT (OUTPATIENT)
Dept: RADIOLOGY | Facility: MEDICAL CENTER | Age: 47
End: 2022-01-24
Attending: EMERGENCY MEDICINE
Payer: COMMERCIAL

## 2022-01-24 ENCOUNTER — HOSPITAL ENCOUNTER (EMERGENCY)
Facility: MEDICAL CENTER | Age: 47
End: 2022-01-24
Attending: EMERGENCY MEDICINE
Payer: COMMERCIAL

## 2022-01-24 VITALS
SYSTOLIC BLOOD PRESSURE: 118 MMHG | HEART RATE: 79 BPM | WEIGHT: 194 LBS | HEIGHT: 68 IN | DIASTOLIC BLOOD PRESSURE: 72 MMHG | RESPIRATION RATE: 20 BRPM | OXYGEN SATURATION: 98 % | BODY MASS INDEX: 29.4 KG/M2 | TEMPERATURE: 97.6 F

## 2022-01-24 DIAGNOSIS — D50.9 IRON DEFICIENCY ANEMIA, UNSPECIFIED IRON DEFICIENCY ANEMIA TYPE: ICD-10-CM

## 2022-01-24 LAB
ABO GROUP BLD: NORMAL
ALBUMIN SERPL BCP-MCNC: 4.1 G/DL (ref 3.2–4.9)
ALBUMIN/GLOB SERPL: 1.3 G/DL
ALP SERPL-CCNC: 53 U/L (ref 30–99)
ALT SERPL-CCNC: 28 U/L (ref 2–50)
ANION GAP SERPL CALC-SCNC: 14 MMOL/L (ref 7–16)
ANISOCYTOSIS BLD QL SMEAR: ABNORMAL
APTT PPP: 56.7 SEC (ref 24.7–36)
AST SERPL-CCNC: 18 U/L (ref 12–45)
BASOPHILS # BLD AUTO: 0.4 % (ref 0–1.8)
BASOPHILS # BLD: 0.03 K/UL (ref 0–0.12)
BILIRUB SERPL-MCNC: 0.5 MG/DL (ref 0.1–1.5)
BLD GP AB SCN SERPL QL: NORMAL
BUN SERPL-MCNC: 15 MG/DL (ref 8–22)
CALCIUM SERPL-MCNC: 9.2 MG/DL (ref 8.5–10.5)
CHLORIDE SERPL-SCNC: 105 MMOL/L (ref 96–112)
CO2 SERPL-SCNC: 20 MMOL/L (ref 20–33)
COMMENT 1642: NORMAL
CREAT SERPL-MCNC: 0.73 MG/DL (ref 0.5–1.4)
EKG IMPRESSION: NORMAL
EOSINOPHIL # BLD AUTO: 0.14 K/UL (ref 0–0.51)
EOSINOPHIL NFR BLD: 1.8 % (ref 0–6.9)
ERYTHROCYTE [DISTWIDTH] IN BLOOD BY AUTOMATED COUNT: 43.8 FL (ref 35.9–50)
GLOBULIN SER CALC-MCNC: 3.1 G/DL (ref 1.9–3.5)
GLUCOSE SERPL-MCNC: 110 MG/DL (ref 65–99)
HCT VFR BLD AUTO: 30.9 % (ref 37–47)
HGB BLD-MCNC: 8.4 G/DL (ref 12–16)
HYPOCHROMIA BLD QL SMEAR: ABNORMAL
IMM GRANULOCYTES # BLD AUTO: 0.04 K/UL (ref 0–0.11)
IMM GRANULOCYTES NFR BLD AUTO: 0.5 % (ref 0–0.9)
INR PPP: 1.76 (ref 0.87–1.13)
LYMPHOCYTES # BLD AUTO: 2.28 K/UL (ref 1–4.8)
LYMPHOCYTES NFR BLD: 29.3 % (ref 22–41)
MACROCYTES BLD QL SMEAR: ABNORMAL
MCH RBC QN AUTO: 19.4 PG (ref 27–33)
MCHC RBC AUTO-ENTMCNC: 27.2 G/DL (ref 33.6–35)
MCV RBC AUTO: 71.2 FL (ref 81.4–97.8)
MICROCYTES BLD QL SMEAR: ABNORMAL
MONOCYTES # BLD AUTO: 0.44 K/UL (ref 0–0.85)
MONOCYTES NFR BLD AUTO: 5.7 % (ref 0–13.4)
MORPHOLOGY BLD-IMP: NORMAL
NEUTROPHILS # BLD AUTO: 4.85 K/UL (ref 2–7.15)
NEUTROPHILS NFR BLD: 62.3 % (ref 44–72)
NRBC # BLD AUTO: 0 K/UL
NRBC BLD-RTO: 0 /100 WBC
OVALOCYTES BLD QL SMEAR: NORMAL
PLATELET # BLD AUTO: 242 K/UL (ref 164–446)
PLATELET BLD QL SMEAR: NORMAL
PMV BLD AUTO: 9.4 FL (ref 9–12.9)
POIKILOCYTOSIS BLD QL SMEAR: NORMAL
POLYCHROMASIA BLD QL SMEAR: NORMAL
POTASSIUM SERPL-SCNC: 3.4 MMOL/L (ref 3.6–5.5)
PROT SERPL-MCNC: 7.2 G/DL (ref 6–8.2)
PROTHROMBIN TIME: 20 SEC (ref 12–14.6)
RBC # BLD AUTO: 4.34 M/UL (ref 4.2–5.4)
RBC BLD AUTO: PRESENT
RH BLD: NORMAL
SCHISTOCYTES BLD QL SMEAR: NORMAL
SODIUM SERPL-SCNC: 139 MMOL/L (ref 135–145)
SPHEROCYTES BLD QL SMEAR: NORMAL
WBC # BLD AUTO: 7.8 K/UL (ref 4.8–10.8)

## 2022-01-24 PROCEDURE — 99283 EMERGENCY DEPT VISIT LOW MDM: CPT

## 2022-01-24 PROCEDURE — 85610 PROTHROMBIN TIME: CPT

## 2022-01-24 PROCEDURE — 85025 COMPLETE CBC W/AUTO DIFF WBC: CPT

## 2022-01-24 PROCEDURE — 80053 COMPREHEN METABOLIC PANEL: CPT

## 2022-01-24 PROCEDURE — 93005 ELECTROCARDIOGRAM TRACING: CPT | Performed by: EMERGENCY MEDICINE

## 2022-01-24 PROCEDURE — 93005 ELECTROCARDIOGRAM TRACING: CPT

## 2022-01-24 PROCEDURE — 86901 BLOOD TYPING SEROLOGIC RH(D): CPT

## 2022-01-24 PROCEDURE — 85730 THROMBOPLASTIN TIME PARTIAL: CPT

## 2022-01-24 PROCEDURE — 86900 BLOOD TYPING SEROLOGIC ABO: CPT

## 2022-01-24 PROCEDURE — 71045 X-RAY EXAM CHEST 1 VIEW: CPT

## 2022-01-24 PROCEDURE — 86850 RBC ANTIBODY SCREEN: CPT

## 2022-01-24 RX ORDER — LANOLIN ALCOHOL/MO/W.PET/CERES
325 CREAM (GRAM) TOPICAL
Qty: 90 TABLET | Refills: 0 | Status: SHIPPED | OUTPATIENT
Start: 2022-01-24 | End: 2022-07-05

## 2022-01-24 ASSESSMENT — FIBROSIS 4 INDEX: FIB4 SCORE: 0.59

## 2022-01-24 NOTE — ED NOTES
Pt ambulated to [facility] with a steady gait. Pt changed into a gown and placed on monitor. Chart up for ERP.

## 2022-01-24 NOTE — ED PROVIDER NOTES
ED Provider  Scribed for Edwin Echevarria D.O. by Mckenzie Draper. 1/24/2022  12:38 PM    Means of arrival:Walk-In  History obtained from:Patient  History limited by: None    CHIEF COMPLAINT  Chief Complaint   Patient presents with    Sent by MD     by hematologist to r/o internal bleeding after Hbg of 8 on 1/21. 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 hx anti-phospholipid syndrome. c/o feeling tired, denies SOB    Abnormal Labs     Hgb 8 on 1/21       HPI  Jaclyn Olvera is a 46 y.o. female who presents to the ED for evaluation of abnormal hemoglobin levels onset three days ago. Patient reports that she was seen by her Hematologist, Dr. Varela, two days ago and had blood work done. Patient's hemoglobin levels returned back low at 8. Dr. Varela then directed her here for further evaluation and to rule out internal bleeding. Patient has associated shortness of breath, general tiredness, and mild dizziness. Patient notes that her shortness of breath is exacerbated with exertion. Denies any black or bloody stools, cough, congestion, nausea, vomiting, diarrhea, or fever. Patient has a history of blood clots. Patient is currently taking Coumadin. No know drug allergies.     REVIEW OF SYSTEMS  See HPI for further details. All other systems are negative.     PAST MEDICAL HISTORY   has a past medical history of Antiphospholipid antibody syndrome (HCC), H/O thrombosis of vena cava, Hemorrhage of both adrenal glands (HCC), and PE (pulmonary embolism).    SOCIAL HISTORY  Social History     Tobacco Use    Smoking status: Current Some Day Smoker    Smokeless tobacco: Current User    Tobacco comment: 1 pk per week   Vaping Use    Vaping Use: Never used   Substance and Sexual Activity    Alcohol use: Yes     Comment: occasionally    Drug use: Not Currently     SURGICAL HISTORY   has a past surgical history that includes upper gi endoscopy,diagnosis (N/A, 6/17/2021) and upper gi endoscopy,biopsy (N/A,  "6/17/2021).    CURRENT MEDICATIONS  Home Medications       Reviewed by Jaja Aguirre R.N. (Registered Nurse) on 01/24/22 at 1022  Med List Status: Partial     Medication Last Dose Status   predniSONE (DELTASONE) 10 MG Tab  Active   warfarin (COUMADIN) 2.5 MG Tab  Active   warfarin (COUMADIN) 5 MG Tab  Active                    ALLERGIES  No Known Allergies    PHYSICAL EXAM  VITAL SIGNS: /74   Pulse 91   Temp 36.4 °C (97.6 °F) (Temporal)   Resp 14   Ht 1.727 m (5' 8\")   Wt 88 kg (194 lb 0.1 oz)   LMP 12/29/2021   SpO2 99%   BMI 29.50 kg/m²   Constitutional: Alert in no apparent distress.  HENT: No signs of trauma, mucous membranes are moist  Eyes: Conjunctiva normal, Non-icteric.   Neck: Normal range of motion, No tenderness, Supple.  Lymphatic: No lymphadenopathy noted.   Cardiovascular: Regular rate and rhythm, no murmurs.   Thorax & Lungs: Normal breath sounds, No respiratory distress, No wheezing, No chest tenderness.   Abdomen: Bowel sounds normal, Soft, No tenderness, No masses, No pulsatile masses. No peritoneal signs.  Skin: Warm, Dry, normal color.   Back: No bony tenderness, No CVA tenderness.   Extremities: No edema, No tenderness, No cyanosis  Musculoskeletal: Good range of motion in all major joints. No tenderness to palpation or major deformities noted.   Neurologic: Alert and oriented x4, Normal motor function, Normal sensory function, No focal deficits noted.   Psychiatric: Affect normal, Judgment normal, Mood normal.     DIAGNOSTIC STUDIES / PROCEDURES    EKG  12 Lead EKG interpreted by me shown below.     LABS  Results for orders placed or performed during the hospital encounter of 01/24/22   COD (ADULT)   Result Value Ref Range    ABO Grouping Only A     Rh Grouping Only POS     Antibody Screen-Cod NEG    CBC WITH DIFFERENTIAL   Result Value Ref Range    WBC 7.8 4.8 - 10.8 K/uL    RBC 4.34 4.20 - 5.40 M/uL    Hemoglobin 8.4 (L) 12.0 - 16.0 g/dL    Hematocrit 30.9 (L) 37.0 - 47.0 " %    MCV 71.2 (L) 81.4 - 97.8 fL    MCH 19.4 (L) 27.0 - 33.0 pg    MCHC 27.2 (L) 33.6 - 35.0 g/dL    RDW 43.8 35.9 - 50.0 fL    Platelet Count 242 164 - 446 K/uL    MPV 9.4 9.0 - 12.9 fL    Neutrophils-Polys 62.30 44.00 - 72.00 %    Lymphocytes 29.30 22.00 - 41.00 %    Monocytes 5.70 0.00 - 13.40 %    Eosinophils 1.80 0.00 - 6.90 %    Basophils 0.40 0.00 - 1.80 %    Immature Granulocytes 0.50 0.00 - 0.90 %    Nucleated RBC 0.00 /100 WBC    Neutrophils (Absolute) 4.85 2.00 - 7.15 K/uL    Lymphs (Absolute) 2.28 1.00 - 4.80 K/uL    Monos (Absolute) 0.44 0.00 - 0.85 K/uL    Eos (Absolute) 0.14 0.00 - 0.51 K/uL    Baso (Absolute) 0.03 0.00 - 0.12 K/uL    Immature Granulocytes (abs) 0.04 0.00 - 0.11 K/uL    NRBC (Absolute) 0.00 K/uL    Hypochromia 2+ (A)     Anisocytosis 2+ (A)     Macrocytosis 1+     Microcytosis 2+ (A)    COMP METABOLIC PANEL   Result Value Ref Range    Sodium 139 135 - 145 mmol/L    Potassium 3.4 (L) 3.6 - 5.5 mmol/L    Chloride 105 96 - 112 mmol/L    Co2 20 20 - 33 mmol/L    Anion Gap 14.0 7.0 - 16.0    Glucose 110 (H) 65 - 99 mg/dL    Bun 15 8 - 22 mg/dL    Creatinine 0.73 0.50 - 1.40 mg/dL    Calcium 9.2 8.5 - 10.5 mg/dL    AST(SGOT) 18 12 - 45 U/L    ALT(SGPT) 28 2 - 50 U/L    Alkaline Phosphatase 53 30 - 99 U/L    Total Bilirubin 0.5 0.1 - 1.5 mg/dL    Albumin 4.1 3.2 - 4.9 g/dL    Total Protein 7.2 6.0 - 8.2 g/dL    Globulin 3.1 1.9 - 3.5 g/dL    A-G Ratio 1.3 g/dL   PROTHROMBIN TIME   Result Value Ref Range    PT 20.0 (H) 12.0 - 14.6 sec    INR 1.76 (H) 0.87 - 1.13   APTT   Result Value Ref Range    APTT 56.7 (H) 24.7 - 36.0 sec   ESTIMATED GFR   Result Value Ref Range    GFR If African American >60 >60 mL/min/1.73 m 2    GFR If Non African American >60 >60 mL/min/1.73 m 2   PERIPHERAL SMEAR REVIEW   Result Value Ref Range    Peripheral Smear Review see below    PLATELET ESTIMATE   Result Value Ref Range    Plt Estimation Normal    MORPHOLOGY   Result Value Ref Range    RBC Morphology Present      Polychromia 2+     Poikilocytosis 1+     Ovalocytes 1+     Schistocytes 1+     Spherocytes 1+    DIFFERENTIAL COMMENT   Result Value Ref Range    Comments-Diff see below    EKG   Result Value Ref Range    Report       [facility]    Test Date:  2022  Pt Name:    GASTON NIÑO                  Department: ER  MRN:        [id number]                      Room:  Gender:     Female                       Technician: [id number]  :        [id number]                   Requested By:ER TRIAGE PROTOCOL  Order #:    [id number]                    Reading MD: AZAR HINDS D.O.    Measurements  Intervals                                Axis  Rate:       80                           P:          70  ME:         156                          QRS:        80  QRSD:       82                           T:          64  QT:         384  QTc:        443    Interpretive Statements  SINUS RHYTHM  BASELINE WANDER IN LEAD(S) V6  No previous ECG available for comparison  Electronically Signed On 2022 13:17:23 PST by AZAR HINDS D.O.         All labs reviewed by me.    RADIOLOGY  DX-CHEST-PORTABLE (1 VIEW)   Final Result      No acute cardiopulmonary abnormality.        The radiologist's interpretations of all radiological studies have been reviewed by me.    Films have been independently by me      COURSE  Pertinent Labs & Imaging studies reviewed. (See chart for details)    Review of past medical records shows the patient shows that patient had lab work completed at [facility] on 22, which showed a hemoglobin of 8.1.     12:38 PM - Patient seen and examined at bedside. Discussed plan of care with the patient, which includes obtaining lab work and imaging for further evaluation. Patient understands and verbalizes agreement to plan of care. Ordered for DX-chest, EKG, estimated GFR, peripheral smear review, platelet estimate, morphology, differential comment, COD, CBC with diff, CMP, Prothrombin,  and APTT to evaluate her symptoms.     1:11 PM - Paged Dr. Varela    1:13 PM - I spoke with Dr. Varela on the phone, and discussed the patient's labs. We will discharge the patient home with a prescription with iron tablets, and have the patient follow up with her in office.     1:22 PM - Patient was reevaluated at bedside. Discussed lab and radiology results with the patient and informed them of my consultation with Dr. Varela, and the plan. Patient understands and verbalizes agreement to plan of care. I then informed the patient of my plan for discharge, which includes sending her home with a prescription for ferrous sulfate, as well as giving strict return precautions for any new or worsening symptoms. Patient understands and verbalizes agreement to plan of care. Patient is comfortable going home at this time.      MEDICAL DECISION MAKING  This is a 46 y.o. female who presents with outpatient labs that shows acute anemia.  She believes her last hemoglobin was 12 months and October.  She said had no black or bloody stools.  She does complain of some shortness of breath with exertion.    Her hCG at this time is 8.4.  Lab test from 3 days ago shows a hemoglobin of 8.1 this is essentially unchanged and is stable.  She is not having tachycardia, or hypotension I do not believe she has acute anemia.    Hemoccult of stool should be done but she cannot provide a stool sample, emergency department physicians per protocol of [facility] cannot do Hemoccult prep slides, or Hemoccult test.  With her able to provide a stool sample otherwise the patient would not have a Hemoccult done.  Her iron is low per the labs done last Friday.  She will be placed on a iron supplement.      The patient will return for new or worsening symptoms and is stable at the time of discharge.    The patient is referred to a primary physician for blood pressure management, diabetic screening, and for all other preventative health  concerns.    DISPOSITION:  Patient will be discharged home in stable condition.    FOLLOW UP:  No follow-up provider specified.    OUTPATIENT MEDICATIONS:  Discharge Medication List as of 1/24/2022  1:59 PM        START taking these medications    Details   ferrous sulfate 325 (65 Fe) MG EC tablet Take 1 Tablet by mouth 3 times a day with meals., Disp-90 Tablet, R-0, Normal           FINAL IMPRESSION  1. Iron deficiency anemia, unspecified iron deficiency anemia type         I, Mckenzie Draper (Scribe), am scribing for, and in the presence of, Edwin Echevarria D.O..    Electronically signed by: Mckenzie Draper (Scribe), 1/24/2022    I, Edwin Echevarria D.O. personally performed the services described in this documentation, as scribed by Mckenzie Draper in my presence, and it is both accurate and complete.    C    The note accurately reflects work and decisions made by me.  Edwin Echevarria D.O.  1/24/2022  3:38 PM

## 2022-01-24 NOTE — DISCHARGE INSTRUCTIONS
Your hemoglobin is stable from Friday.  There is no change significantly.    I spoke with your hematologist the plan will be discharged home, you be placed on a iron tablet and follow-up in his office.    Iron tablets can cause constipation, use laxative as needed, MiraLAX is the recommended laxative of choice.  It could also cause black stools, do not be surprised by that.    Return if your symptoms change or worsen

## 2022-01-24 NOTE — ED NOTES
Pt ready for discharge.  Instructions provided and px called in.  Pt verbalized understanding.  Leaves ER ambulatory, steady gait, no distress.

## 2022-01-24 NOTE — ED TRIAGE NOTES
"Jaclyn ARMIJO Coleparvin  46 y.o. female  Chief Complaint   Patient presents with   • Sent by MD     by hematologist to r/o internal bleeding after Hbg of 8 on 1/21. 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 hx anti-phospholipid syndrome. c/o feeling tired, denies SOB       Patient ambulatory to triage with a steady gait for above complaint. Denies obvious, external sources of bleeding.    Patient is alert and oriented, speaking in full sentences, following commands, and responding appropriately to questions. Educated on triage process and instructed patient to alert staff to any changes in condition or worsening symptoms.     (+) COVID vaccine    /84   Pulse 84   Temp 36.4 °C (97.6 °F) (Temporal)   Resp 14   Ht 1.727 m (5' 8\")   Wt 88 kg (194 lb 0.1 oz)   LMP 12/29/2021   SpO2 99%   BMI 29.50 kg/m²       "

## 2022-01-27 ENCOUNTER — HOSPITAL ENCOUNTER (OUTPATIENT)
Facility: MEDICAL CENTER | Age: 47
End: 2022-01-28
Attending: EMERGENCY MEDICINE | Admitting: INTERNAL MEDICINE
Payer: COMMERCIAL

## 2022-01-27 ENCOUNTER — APPOINTMENT (OUTPATIENT)
Dept: RADIOLOGY | Facility: MEDICAL CENTER | Age: 47
End: 2022-01-27
Payer: COMMERCIAL

## 2022-01-27 ENCOUNTER — APPOINTMENT (OUTPATIENT)
Dept: RADIOLOGY | Facility: MEDICAL CENTER | Age: 47
End: 2022-01-27
Attending: EMERGENCY MEDICINE
Payer: COMMERCIAL

## 2022-01-27 DIAGNOSIS — R10.13 EPIGASTRIC PAIN: ICD-10-CM

## 2022-01-27 DIAGNOSIS — Z79.01 ANTICOAGULATED: ICD-10-CM

## 2022-01-27 DIAGNOSIS — D68.61 CATASTROPHIC ANTIPHOSPHOLIPID SYNDROME (HCC): ICD-10-CM

## 2022-01-27 DIAGNOSIS — R11.2 NAUSEA AND VOMITING, INTRACTABILITY OF VOMITING NOT SPECIFIED, UNSPECIFIED VOMITING TYPE: ICD-10-CM

## 2022-01-27 DIAGNOSIS — K92.1 BLOOD IN STOOL: Primary | ICD-10-CM

## 2022-01-27 DIAGNOSIS — D64.9 ANEMIA, UNSPECIFIED TYPE: ICD-10-CM

## 2022-01-27 LAB
ABO GROUP BLD: NORMAL
ALBUMIN SERPL BCP-MCNC: 4 G/DL (ref 3.2–4.9)
ALBUMIN/GLOB SERPL: 1.3 G/DL
ALP SERPL-CCNC: 58 U/L (ref 30–99)
ALT SERPL-CCNC: 27 U/L (ref 2–50)
ANION GAP SERPL CALC-SCNC: 11 MMOL/L (ref 7–16)
APTT PPP: 71.8 SEC (ref 24.7–36)
AST SERPL-CCNC: 20 U/L (ref 12–45)
BASOPHILS # BLD AUTO: 0.6 % (ref 0–1.8)
BASOPHILS # BLD: 0.04 K/UL (ref 0–0.12)
BILIRUB SERPL-MCNC: 0.6 MG/DL (ref 0.1–1.5)
BLD GP AB SCN SERPL QL: NORMAL
BUN SERPL-MCNC: 10 MG/DL (ref 8–22)
CALCIUM SERPL-MCNC: 9 MG/DL (ref 8.5–10.5)
CHLORIDE SERPL-SCNC: 106 MMOL/L (ref 96–112)
CO2 SERPL-SCNC: 21 MMOL/L (ref 20–33)
CREAT SERPL-MCNC: 0.72 MG/DL (ref 0.5–1.4)
EOSINOPHIL # BLD AUTO: 0.12 K/UL (ref 0–0.51)
EOSINOPHIL NFR BLD: 1.9 % (ref 0–6.9)
ERYTHROCYTE [DISTWIDTH] IN BLOOD BY AUTOMATED COUNT: 44.5 FL (ref 35.9–50)
FERRITIN SERPL-MCNC: 10.4 NG/ML (ref 10–291)
GLOBULIN SER CALC-MCNC: 3 G/DL (ref 1.9–3.5)
GLUCOSE SERPL-MCNC: 95 MG/DL (ref 65–99)
HCG SERPL QL: NEGATIVE
HCT VFR BLD AUTO: 30.3 % (ref 37–47)
HCT VFR BLD AUTO: 31.4 % (ref 37–47)
HGB BLD-MCNC: 8.3 G/DL (ref 12–16)
HGB BLD-MCNC: 8.4 G/DL (ref 12–16)
IMM GRANULOCYTES # BLD AUTO: 0.02 K/UL (ref 0–0.11)
IMM GRANULOCYTES NFR BLD AUTO: 0.3 % (ref 0–0.9)
INR PPP: 2.42 (ref 0.87–1.13)
IRON SATN MFR SERPL: 36 % (ref 15–55)
IRON SERPL-MCNC: 132 UG/DL (ref 40–170)
LDH SERPL L TO P-CCNC: 225 U/L (ref 107–266)
LIPASE SERPL-CCNC: 18 U/L (ref 11–82)
LIPASE SERPL-CCNC: 19 U/L (ref 11–82)
LYMPHOCYTES # BLD AUTO: 2.11 K/UL (ref 1–4.8)
LYMPHOCYTES NFR BLD: 33.7 % (ref 22–41)
MCH RBC QN AUTO: 19.1 PG (ref 27–33)
MCHC RBC AUTO-ENTMCNC: 26.8 G/DL (ref 33.6–35)
MCV RBC AUTO: 71.5 FL (ref 81.4–97.8)
MONOCYTES # BLD AUTO: 0.5 K/UL (ref 0–0.85)
MONOCYTES NFR BLD AUTO: 8 % (ref 0–13.4)
MORPHOLOGY BLD-IMP: NORMAL
NEUTROPHILS # BLD AUTO: 3.47 K/UL (ref 2–7.15)
NEUTROPHILS NFR BLD: 55.5 % (ref 44–72)
NRBC # BLD AUTO: 0 K/UL
NRBC BLD-RTO: 0 /100 WBC
PLATELET # BLD AUTO: 211 K/UL (ref 164–446)
PMV BLD AUTO: 9.4 FL (ref 9–12.9)
POTASSIUM SERPL-SCNC: 4 MMOL/L (ref 3.6–5.5)
PROT SERPL-MCNC: 7 G/DL (ref 6–8.2)
PROTHROMBIN TIME: 25.6 SEC (ref 12–14.6)
RBC # BLD AUTO: 4.39 M/UL (ref 4.2–5.4)
RH BLD: NORMAL
SODIUM SERPL-SCNC: 138 MMOL/L (ref 135–145)
TIBC SERPL-MCNC: 362 UG/DL (ref 250–450)
TRANSFERRIN SERPL-MCNC: 307 MG/DL (ref 200–370)
UIBC SERPL-MCNC: 230 UG/DL (ref 110–370)
WBC # BLD AUTO: 6.3 K/UL (ref 4.8–10.8)

## 2022-01-27 PROCEDURE — 86850 RBC ANTIBODY SCREEN: CPT

## 2022-01-27 PROCEDURE — 74177 CT ABD & PELVIS W/CONTRAST: CPT

## 2022-01-27 PROCEDURE — 83550 IRON BINDING TEST: CPT

## 2022-01-27 PROCEDURE — 82728 ASSAY OF FERRITIN: CPT

## 2022-01-27 PROCEDURE — 86900 BLOOD TYPING SEROLOGIC ABO: CPT

## 2022-01-27 PROCEDURE — 700102 HCHG RX REV CODE 250 W/ 637 OVERRIDE(OP): Performed by: NURSE PRACTITIONER

## 2022-01-27 PROCEDURE — 71045 X-RAY EXAM CHEST 1 VIEW: CPT

## 2022-01-27 PROCEDURE — 99220 PR INITIAL OBSERVATION CARE,LEVL III: CPT | Mod: GC | Performed by: INTERNAL MEDICINE

## 2022-01-27 PROCEDURE — 85610 PROTHROMBIN TIME: CPT

## 2022-01-27 PROCEDURE — 85730 THROMBOPLASTIN TIME PARTIAL: CPT

## 2022-01-27 PROCEDURE — 85014 HEMATOCRIT: CPT

## 2022-01-27 PROCEDURE — 84703 CHORIONIC GONADOTROPIN ASSAY: CPT

## 2022-01-27 PROCEDURE — 99285 EMERGENCY DEPT VISIT HI MDM: CPT

## 2022-01-27 PROCEDURE — 700117 HCHG RX CONTRAST REV CODE 255: Performed by: EMERGENCY MEDICINE

## 2022-01-27 PROCEDURE — 84466 ASSAY OF TRANSFERRIN: CPT

## 2022-01-27 PROCEDURE — 76705 ECHO EXAM OF ABDOMEN: CPT

## 2022-01-27 PROCEDURE — 83690 ASSAY OF LIPASE: CPT

## 2022-01-27 PROCEDURE — G0378 HOSPITAL OBSERVATION PER HR: HCPCS

## 2022-01-27 PROCEDURE — A9270 NON-COVERED ITEM OR SERVICE: HCPCS | Performed by: NURSE PRACTITIONER

## 2022-01-27 PROCEDURE — 83540 ASSAY OF IRON: CPT

## 2022-01-27 PROCEDURE — 85025 COMPLETE CBC W/AUTO DIFF WBC: CPT

## 2022-01-27 PROCEDURE — 80053 COMPREHEN METABOLIC PANEL: CPT

## 2022-01-27 PROCEDURE — 700102 HCHG RX REV CODE 250 W/ 637 OVERRIDE(OP): Performed by: STUDENT IN AN ORGANIZED HEALTH CARE EDUCATION/TRAINING PROGRAM

## 2022-01-27 PROCEDURE — 86901 BLOOD TYPING SEROLOGIC RH(D): CPT

## 2022-01-27 PROCEDURE — 700111 HCHG RX REV CODE 636 W/ 250 OVERRIDE (IP): Performed by: STUDENT IN AN ORGANIZED HEALTH CARE EDUCATION/TRAINING PROGRAM

## 2022-01-27 PROCEDURE — 85018 HEMOGLOBIN: CPT

## 2022-01-27 PROCEDURE — A9270 NON-COVERED ITEM OR SERVICE: HCPCS | Performed by: STUDENT IN AN ORGANIZED HEALTH CARE EDUCATION/TRAINING PROGRAM

## 2022-01-27 PROCEDURE — 83615 LACTATE (LD) (LDH) ENZYME: CPT

## 2022-01-27 RX ORDER — OMEPRAZOLE 20 MG/1
20 CAPSULE, DELAYED RELEASE ORAL 2 TIMES DAILY
Status: DISCONTINUED | OUTPATIENT
Start: 2022-01-27 | End: 2022-01-28 | Stop reason: HOSPADM

## 2022-01-27 RX ORDER — LABETALOL HYDROCHLORIDE 5 MG/ML
10 INJECTION, SOLUTION INTRAVENOUS EVERY 4 HOURS PRN
Status: DISCONTINUED | OUTPATIENT
Start: 2022-01-27 | End: 2022-01-28 | Stop reason: HOSPADM

## 2022-01-27 RX ORDER — PROMETHAZINE HYDROCHLORIDE 25 MG/1
12.5-25 TABLET ORAL EVERY 4 HOURS PRN
Status: DISCONTINUED | OUTPATIENT
Start: 2022-01-27 | End: 2022-01-28 | Stop reason: HOSPADM

## 2022-01-27 RX ORDER — ACETAMINOPHEN 325 MG/1
650 TABLET ORAL EVERY 6 HOURS PRN
Status: DISCONTINUED | OUTPATIENT
Start: 2022-01-27 | End: 2022-01-28 | Stop reason: HOSPADM

## 2022-01-27 RX ORDER — FERROUS SULFATE 325(65) MG
325 TABLET ORAL
Status: DISCONTINUED | OUTPATIENT
Start: 2022-01-27 | End: 2022-01-28 | Stop reason: HOSPADM

## 2022-01-27 RX ORDER — ONDANSETRON 2 MG/ML
4 INJECTION INTRAMUSCULAR; INTRAVENOUS EVERY 4 HOURS PRN
Status: DISCONTINUED | OUTPATIENT
Start: 2022-01-27 | End: 2022-01-28 | Stop reason: HOSPADM

## 2022-01-27 RX ORDER — SUCRALFATE ORAL 1 G/10ML
1 SUSPENSION ORAL EVERY 6 HOURS
Status: DISCONTINUED | OUTPATIENT
Start: 2022-01-27 | End: 2022-01-28 | Stop reason: HOSPADM

## 2022-01-27 RX ORDER — AMOXICILLIN 250 MG
2 CAPSULE ORAL 2 TIMES DAILY
Status: DISCONTINUED | OUTPATIENT
Start: 2022-01-27 | End: 2022-01-28 | Stop reason: HOSPADM

## 2022-01-27 RX ORDER — POLYETHYLENE GLYCOL 3350 17 G/17G
1 POWDER, FOR SOLUTION ORAL
Status: DISCONTINUED | OUTPATIENT
Start: 2022-01-27 | End: 2022-01-28 | Stop reason: HOSPADM

## 2022-01-27 RX ORDER — BISACODYL 10 MG
10 SUPPOSITORY, RECTAL RECTAL
Status: DISCONTINUED | OUTPATIENT
Start: 2022-01-27 | End: 2022-01-28 | Stop reason: HOSPADM

## 2022-01-27 RX ORDER — PREDNISONE 10 MG/1
5 TABLET ORAL DAILY
Status: DISCONTINUED | OUTPATIENT
Start: 2022-01-27 | End: 2022-01-28 | Stop reason: HOSPADM

## 2022-01-27 RX ORDER — ONDANSETRON 4 MG/1
4 TABLET, ORALLY DISINTEGRATING ORAL EVERY 4 HOURS PRN
Status: DISCONTINUED | OUTPATIENT
Start: 2022-01-27 | End: 2022-01-28 | Stop reason: HOSPADM

## 2022-01-27 RX ORDER — PROCHLORPERAZINE EDISYLATE 5 MG/ML
5-10 INJECTION INTRAMUSCULAR; INTRAVENOUS EVERY 4 HOURS PRN
Status: DISCONTINUED | OUTPATIENT
Start: 2022-01-27 | End: 2022-01-28 | Stop reason: HOSPADM

## 2022-01-27 RX ORDER — PROMETHAZINE HYDROCHLORIDE 25 MG/1
12.5-25 SUPPOSITORY RECTAL EVERY 4 HOURS PRN
Status: DISCONTINUED | OUTPATIENT
Start: 2022-01-27 | End: 2022-01-28 | Stop reason: HOSPADM

## 2022-01-27 RX ADMIN — IOHEXOL 100 ML: 350 INJECTION, SOLUTION INTRAVENOUS at 09:41

## 2022-01-27 RX ADMIN — PREDNISONE 5 MG: 10 TABLET ORAL at 13:30

## 2022-01-27 RX ADMIN — SUCRALFATE 1 G: 1 SUSPENSION ORAL at 17:31

## 2022-01-27 RX ADMIN — SUCRALFATE 1 G: 1 SUSPENSION ORAL at 23:09

## 2022-01-27 RX ADMIN — FERROUS SULFATE TAB 325 MG (65 MG ELEMENTAL FE) 325 MG: 325 (65 FE) TAB at 17:31

## 2022-01-27 RX ADMIN — SENNOSIDES AND DOCUSATE SODIUM 2 TABLET: 50; 8.6 TABLET ORAL at 17:31

## 2022-01-27 RX ADMIN — OMEPRAZOLE 20 MG: 20 CAPSULE, DELAYED RELEASE ORAL at 17:31

## 2022-01-27 ASSESSMENT — ENCOUNTER SYMPTOMS
VOMITING: 0
SHORTNESS OF BREATH: 0
FLANK PAIN: 0
PSYCHIATRIC NEGATIVE: 1
CARDIOVASCULAR NEGATIVE: 1
CONSTITUTIONAL NEGATIVE: 1
SENSORY CHANGE: 0
MUSCULOSKELETAL NEGATIVE: 1
NERVOUS/ANXIOUS: 0
BLURRED VISION: 0
CONSTIPATION: 0
DOUBLE VISION: 0
ABDOMINAL PAIN: 1
BRUISES/BLEEDS EASILY: 0
EYES NEGATIVE: 1
FEVER: 0
BLOOD IN STOOL: 0
CHILLS: 0
HEADACHES: 0
COUGH: 0
RESPIRATORY NEGATIVE: 1
PALPITATIONS: 0
DIARRHEA: 0
DIZZINESS: 0
NEUROLOGICAL NEGATIVE: 1
NAUSEA: 0
SORE THROAT: 0
SPEECH CHANGE: 0
HEARTBURN: 0

## 2022-01-27 ASSESSMENT — PAIN DESCRIPTION - DESCRIPTORS: DESCRIPTORS: SHARP;CRAMPING

## 2022-01-27 ASSESSMENT — LIFESTYLE VARIABLES
CONSUMPTION TOTAL: NEGATIVE
HAVE PEOPLE ANNOYED YOU BY CRITICIZING YOUR DRINKING: NO
EVER HAD A DRINK FIRST THING IN THE MORNING TO STEADY YOUR NERVES TO GET RID OF A HANGOVER: NO
TOTAL SCORE: 0
ALCOHOL_USE: NO
AVERAGE NUMBER OF DAYS PER WEEK YOU HAVE A DRINK CONTAINING ALCOHOL: 0
HAVE YOU EVER FELT YOU SHOULD CUT DOWN ON YOUR DRINKING: NO
DOES PATIENT WANT TO STOP DRINKING: NO
TOTAL SCORE: 0
TOTAL SCORE: 0
HOW MANY TIMES IN THE PAST YEAR HAVE YOU HAD 5 OR MORE DRINKS IN A DAY: 0
EVER FELT BAD OR GUILTY ABOUT YOUR DRINKING: NO
ON A TYPICAL DAY WHEN YOU DRINK ALCOHOL HOW MANY DRINKS DO YOU HAVE: 0

## 2022-01-27 ASSESSMENT — FIBROSIS 4 INDEX
FIB4 SCORE: 0.82
FIB4 SCORE: 0.63

## 2022-01-27 ASSESSMENT — PATIENT HEALTH QUESTIONNAIRE - PHQ9
SUM OF ALL RESPONSES TO PHQ9 QUESTIONS 1 AND 2: 0
1. LITTLE INTEREST OR PLEASURE IN DOING THINGS: NOT AT ALL
2. FEELING DOWN, DEPRESSED, IRRITABLE, OR HOPELESS: NOT AT ALL

## 2022-01-27 ASSESSMENT — PAIN DESCRIPTION - PAIN TYPE: TYPE: ACUTE PAIN

## 2022-01-27 NOTE — ASSESSMENT & PLAN NOTE
Sudden onset this morning  Crampy in nature  Resolving since  H/o H Pylori s/p EGD by GI consultants and Rx.  Lipase normal.

## 2022-01-27 NOTE — ED PROVIDER NOTES
ED Provider Note    CHIEF COMPLAINT  Chief Complaint   Patient presents with   • Abdominal Pain     + blood thinners, blood in stool, dark and tarry. URQ ABD pain onset today. Patient states pain is 5/10 radiation to epigastrum. PMH antiphosphoilipid syndrome, PE. Endorses nasuea. On coumadin at this time.        DUSTIN Olvera is a 46 y.o. female with antiphospholipid syndrome/PE on Coumadin who presents complaining of possible blood in her stool.  She also complains of right upper quadrant abdominal pain.  Patient has a history of vena cava thrombosis.    Patient states she noticed bright red blood in her stool last evening.  This morning she awoke with moderate to severe, nonradiating, squeezing pain in the epigastric and right upper quadrant regions.  She admits to associated nausea and a burning sensation.  She denies diarrhea, hematemesis, hematochezia.  She reports that her stool is currently dark since starting iron for hemoglobin of 8.5 when seen in the ER on Monday.  She reports vaginal bleeding currently that is irregular for her.    Patient admits to fatigue and shortness of breath with walking up the stairs but is unclear if this is due to anemia or the fact that she has gained weight while on prednisone for her APL.    Patient sees Dr. Swartz from GI and Dr. Varela from hematology.      ALLERGIES  No Known Allergies    CURRENT MEDICATIONS  Coumadin    PAST MEDICAL HISTORY   has a past medical history of Antiphospholipid antibody syndrome (HCC), H/O thrombosis of vena cava, Hemorrhage of both adrenal glands (HCC), and PE (pulmonary embolism).    SURGICAL HISTORY   has a past surgical history that includes upper gi endoscopy,diagnosis (N/A, 6/17/2021) and upper gi endoscopy,biopsy (N/A, 6/17/2021).    SOCIAL HISTORY  Social History     Tobacco Use   • Smoking status: Current Some Day Smoker     Types: Cigarettes   • Smokeless tobacco: Current User   • Tobacco comment: 1 pk per week   Vaping Use  "  • Vaping Use: Never used   Substance and Sexual Activity   • Alcohol use: Yes     Comment: occasionally   • Drug use: Not Currently   • Sexual activity: Not on file       Family Hx:  denies      REVIEW OF SYSTEMS  See HPI for further details.  All other systems are negative except as above in HPI.    PHYSICAL EXAM  VITAL SIGNS: /69   Pulse 75   Temp 36.1 °C (97 °F) (Temporal)   Resp 16   Ht 1.727 m (5' 8\")   Wt 87.1 kg (192 lb 0.3 oz)   LMP 12/29/2021   SpO2 99%   Breastfeeding No   BMI 29.20 kg/m²     General:  WDWN female, nontoxic appearing in NAD; A+Ox3; V/S as above; not tachycardic or hypotensive  Skin: warm and dry; good color; no rash  HEENT: NCAT; EOMs intact; PERRL; no scleral icterus   Neck: FROM, soft  Cardiovascular: Regular heart rate and rhythm.  No murmurs, rubs, or gallops; pulses 2+ bilaterally radially and DP areas  Lungs: Clear to auscultation with good air movement bilaterally.  No wheezes, rhonchi, or rales.   Abdomen: BS present; soft; NTND; no rebound, guarding, or rigidity.  No organomegaly or pulsatile mass  Extremities: DOMINGUEZ x 4; no e/o trauma; no pedal edema; neg Jose's  Neurologic: CNs III-XII grossly intact; speech clear; distal sensation intact; strength 5/5 UE/LEs  Psychiatric: Appropriate affect, normal mood  Rectal: Dark brown stool that is guaiac positive; no hematochezia, clots, or melena    LABS  Results for orders placed or performed during the hospital encounter of 01/27/22   COD (ADULT)   Result Value Ref Range    ABO Grouping Only A     Rh Grouping Only POS     Antibody Screen-Cod NEG    CBC WITH DIFFERENTIAL   Result Value Ref Range    WBC 6.3 4.8 - 10.8 K/uL    RBC 4.39 4.20 - 5.40 M/uL    Hemoglobin 8.4 (L) 12.0 - 16.0 g/dL    Hematocrit 31.4 (L) 37.0 - 47.0 %    MCV 71.5 (L) 81.4 - 97.8 fL    MCH 19.1 (L) 27.0 - 33.0 pg    MCHC 26.8 (L) 33.6 - 35.0 g/dL    RDW 44.5 35.9 - 50.0 fL    Platelet Count 211 164 - 446 K/uL    MPV 9.4 9.0 - 12.9 fL    " Neutrophils-Polys 55.50 44.00 - 72.00 %    Lymphocytes 33.70 22.00 - 41.00 %    Monocytes 8.00 0.00 - 13.40 %    Eosinophils 1.90 0.00 - 6.90 %    Basophils 0.60 0.00 - 1.80 %    Immature Granulocytes 0.30 0.00 - 0.90 %    Nucleated RBC 0.00 /100 WBC    Neutrophils (Absolute) 3.47 2.00 - 7.15 K/uL    Lymphs (Absolute) 2.11 1.00 - 4.80 K/uL    Monos (Absolute) 0.50 0.00 - 0.85 K/uL    Eos (Absolute) 0.12 0.00 - 0.51 K/uL    Baso (Absolute) 0.04 0.00 - 0.12 K/uL    Immature Granulocytes (abs) 0.02 0.00 - 0.11 K/uL    NRBC (Absolute) 0.00 K/uL   COMP METABOLIC PANEL   Result Value Ref Range    Sodium 138 135 - 145 mmol/L    Potassium 4.0 3.6 - 5.5 mmol/L    Chloride 106 96 - 112 mmol/L    Co2 21 20 - 33 mmol/L    Anion Gap 11.0 7.0 - 16.0    Glucose 95 65 - 99 mg/dL    Bun 10 8 - 22 mg/dL    Creatinine 0.72 0.50 - 1.40 mg/dL    Calcium 9.0 8.5 - 10.5 mg/dL    AST(SGOT) 20 12 - 45 U/L    ALT(SGPT) 27 2 - 50 U/L    Alkaline Phosphatase 58 30 - 99 U/L    Total Bilirubin 0.6 0.1 - 1.5 mg/dL    Albumin 4.0 3.2 - 4.9 g/dL    Total Protein 7.0 6.0 - 8.2 g/dL    Globulin 3.0 1.9 - 3.5 g/dL    A-G Ratio 1.3 g/dL   LIPASE   Result Value Ref Range    Lipase 19 11 - 82 U/L   PROTHROMBIN TIME   Result Value Ref Range    PT 25.6 (H) 12.0 - 14.6 sec    INR 2.42 (H) 0.87 - 1.13   APTT   Result Value Ref Range    APTT 71.8 (H) 24.7 - 36.0 sec   ESTIMATED GFR   Result Value Ref Range    GFR If African American >60 >60 mL/min/1.73 m 2    GFR If Non African American >60 >60 mL/min/1.73 m 2   BETA-HCG QUALITATIVE SERUM   Result Value Ref Range    Beta-Hcg Qualitative Serum Negative Negative         IMAGING  US-RUQ   Final Result      Hepatomegaly.      CT-ABDOMEN-PELVIS WITH   Final Result      1.  Decreased size and density of right adrenal lesion likely represents resolving hematoma. Small underlying indeterminate adrenal mass could be present.   2.  No bowel obstruction or acute inflammation is seen.   3.  Heterogeneous enhancement of  the uterus which is of uncertain etiology although cannot exclude underlying adenomyosis, fibroids or malignancy. Further evaluation with pelvic ultrasound is suggested. Likely physiologic 4.3 cm right ovarian cyst.   4.  Mild hepatomegaly.      DX-CHEST-PORTABLE (1 VIEW)   Final Result      No acute cardiopulmonary abnormality.        MEDICAL RECORD  I have reviewed patient's medical record and pertinent results are listed below.      COURSE & MEDICAL DECISION MAKING  I have reviewed any medical record information, laboratory studies and radiographic results as noted.    Jaclyn Olvera is a 46 y.o. female who presents complaining of bright red blood per rectum yesterday with epigastric pain this morning.  Patient is not tachycardic or hypotensive.  She is on Coumadin.  Concern for brisk upper versus lower GI bleed.    Appropriate PPE was worn at all times while interacting with the patient.    Orthostatics ordered.    Hemoglobin is stable at 8.4 with an INR of 2.4.  BUN and platelets are normal.    12:03 PM  Paging Nichole    12:20 PM  Paging hospitalist after discussion with Dr. Queen from hematology who requests that the patient be admitted, serial CBCs, and observed.    12:33 PM  Discussed with [facility] IM resident who agrees to admit.      FINAL IMPRESSION  1. Epigastric pain     2. Nausea and vomiting, intractability of vomiting not specified, unspecified vomiting type     3. Anemia, unspecified type     4. Blood in stool     5. Anticoagulated         Electronically signed by: Aye Cooley M.D., 1/27/2022 8:58 AM

## 2022-01-27 NOTE — ED TRIAGE NOTES
Chief Complaint   Patient presents with   • Abdominal Pain     + blood thinners, blood in stool, dark and tarry. URQ ABD pain onset today. Patient states pain is 5/10 radiation to epigastrum. PMH antiphosphoilipid syndrome, PE. Endorses nasuea. On coumadin at this time.        Protocol ordered.    Vitals:    01/27/22 0701   BP: 126/75   Pulse: 83   Resp: 16   Temp: 36.1 °C (97 °F)   SpO2: 97%

## 2022-01-27 NOTE — H&P
History & Physical Note    Date of Admission: 1/27/2022  Admission Status: Observation-Outpatient  [facility] Team: [facility] Team  Attending: Sandro Rogers MD  Senior Resident: Dr. Simons  Intern:   Contact Number: [phone]    Chief Complaint: dark stool with blood on the toilet paper    History of Present Illness (HPI):   Jaclyn is a 46 y.o. female with past medical history of fibroids, microcytic anemia, H. 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    The patient was recently in the ED on 1/24/2022 for abnormal hemoglobin levels.  The patient was recently seen by her hematologist Dr. Varela who recommended patient visit the ED due to drop in hemoglobin to eight.  The patient's hemoglobin since June has been between 8 to 9 g/dL which did increase to 11.5 g/dL in November but does show a drop in hemoglobin to 8.4 on labs on January 24, 2022.  The patient was discharged from the ER with iron tablets.  Patient reports initiating iron tablets on Monday followed by dark black stool on Tuesday morning and then noticed bright red blood on the toilet paper last night.  This was followed by sudden onset severe epigastric pain this morning that radiated across the right upper quadrants and was associated with nausea.  She reports the pain is still persistent but decreased in intensity to quite an extent.    Of note the patient reports that she has had increasingly worsening dyspnea with exertion over the last 1 month where she has to split up her laundry over the week due to fatigue and dyspnea.  She also reports that he has  had longer durations of menstrual cycle from 3 to 7 days since the initiation of Coumadin.  Of note patient has history of fibroids.    In the ED the patient was afebrile, heart rate eighty-three, normotensive.  Saturating 97% on room air.    Labs:  Beta-hCG negative  PT 25.6  PTT 71.8  INR 2.4  Hemoglobin 8.4 (baseline between 8-9 with one-time rise 11.5 in November 2021)  .  Imaging chest x-ray unremarkable for cardiopulmonary abnormalities.  CT abdomen pelvis with resolving hematoma.  Evidence of fibroids and 4.3 cm right ovarian cyst.  Right upper quadrant ultrasound with hepatomegaly.            Review of Systems:   Review of Systems   Constitutional: Negative for chills and fever.   HENT: Negative for nosebleeds and sore throat.    Eyes: Negative for blurred vision and double vision.   Respiratory: Negative for cough and shortness of breath.    Cardiovascular: Negative for chest pain, palpitations and leg swelling.   Gastrointestinal: Positive for abdominal pain. Negative for blood in stool, constipation, diarrhea, heartburn, melena, nausea and vomiting.   Genitourinary: Negative for dysuria, flank pain, frequency, hematuria and urgency.   Skin: Negative for rash.   Neurological: Negative for dizziness, sensory change, speech change and headaches.   Endo/Heme/Allergies: Does not bruise/bleed easily.   Psychiatric/Behavioral: The patient is not nervous/anxious.          Past Medical History:   Past Medical History was reviewed with patient.   has a past medical history of Antiphospholipid antibody syndrome (HCC), H/O thrombosis of vena cava, Hemorrhage of both adrenal glands (HCC), and PE (pulmonary embolism).    Past Surgical History: Past Surgical History was reviewed with patient.   has a past surgical history that includes pr upper gi endoscopy,diagnosis (N/A, 6/17/2021) and pr upper gi endoscopy,biopsy (N/A, 6/17/2021).    Medications: Medications have been reviewed with patient.  Prior to Admission  Medications   Prescriptions Last Dose Informant Patient Reported? Taking?   Biotin w/ Vitamins C & E (HAIR/SKIN/NAILS PO) 1/26/2022 at PM Patient Yes Yes   Sig: Take 1 Tablet by mouth every day.   ferrous sulfate 325 (65 Fe) MG EC tablet 1/26/2022 at PM Patient No No   Sig: Take 1 Tablet by mouth 3 times a day with meals.   predniSONE (DELTASONE) 5 MG Tab 1/26/2022 at AM Patient Yes No   Sig: Take 5 mg by mouth every day.   warfarin (COUMADIN) 2.5 MG Tab 1/26/2022 at PM Patient No No   Sig: Take 1 Tablet by mouth every day.   Patient taking differently: Take 2.5 mg by mouth every day. 7.5 mg Monday, Wednesday, and Friday   10 mg all other days   warfarin (COUMADIN) 5 MG Tab 1/26/2022 at PM Patient No No   Sig: Take 1-2 Tablets by mouth every day. Or as directed   Patient taking differently: Take 5-10 mg by mouth every day. 7.5 mg Monday, Wednesday, and Friday   10 mg all other days      Facility-Administered Medications: None        Allergies: Allergies have been reviewed with patient.  No Known Allergies    Family History: Family history of blood clots and all the sisters,  Father with stroke  family history is not on file.     Social History:   Tobacco: Non-smoker  Alcohol: Rare  Recreational drugs (illegal and prescription): Denies  Employment: Employed  Activity Level: Moderate  Living situation: Lives with family  Recent travel:   Primary Care Provider: reviewed Davi Mortensen P.A.-C.  Other (stressors, spirituality, exposures):    Physical Exam:   Vitals:  Temp:  [36.1 °C (97 °F)] 36.1 °C (97 °F)  Pulse:  [75-95] 95  Resp:  [16] 16  BP: (108-126)/(65-82) 108/82  SpO2:  [96 %-100 %] 96 %    Physical Exam  Constitutional:       General: She is not in acute distress.     Appearance: Normal appearance. She is not ill-appearing.   HENT:      Head: Normocephalic and atraumatic.      Nose: Nose normal.      Mouth/Throat:      Mouth: Mucous membranes are moist.   Eyes:      Extraocular Movements: Extraocular movements  intact.   Cardiovascular:      Rate and Rhythm: Normal rate.      Heart sounds: No murmur heard.      Pulmonary:      Effort: Pulmonary effort is normal. No respiratory distress.      Breath sounds: Normal breath sounds. No wheezing or rales.   Abdominal:      General: Abdomen is flat. Bowel sounds are normal. There is no distension.      Palpations: Abdomen is soft. There is no mass.      Tenderness: There is abdominal tenderness ( mild epigastric). There is no guarding or rebound.      Hernia: No hernia is present.   Musculoskeletal:      Cervical back: Normal range of motion.      Right lower leg: No edema.      Left lower leg: No edema.   Skin:     General: Skin is warm and dry.   Neurological:      General: No focal deficit present.      Mental Status: She is oriented to person, place, and time.   Psychiatric:         Mood and Affect: Mood normal.         Labs:   Lab Results   Component Value Date/Time    WBC 6.3 01/27/2022 07:15 AM    RBC 4.39 01/27/2022 07:15 AM    HEMOGLOBIN 8.4 (L) 01/27/2022 07:15 AM    HEMATOCRIT 31.4 (L) 01/27/2022 07:15 AM    MCV 71.5 (L) 01/27/2022 07:15 AM    MCH 19.1 (L) 01/27/2022 07:15 AM    MCHC 26.8 (L) 01/27/2022 07:15 AM    MPV 9.4 01/27/2022 07:15 AM    NEUTSPOLYS 55.50 01/27/2022 07:15 AM    LYMPHOCYTES 33.70 01/27/2022 07:15 AM    MONOCYTES 8.00 01/27/2022 07:15 AM    EOSINOPHILS 1.90 01/27/2022 07:15 AM    BASOPHILS 0.60 01/27/2022 07:15 AM    HYPOCHROMIA 2+ (A) 01/24/2022 10:57 AM    ANISOCYTOSIS 2+ (A) 01/24/2022 10:57 AM      Lab Results   Component Value Date/Time    SODIUM 138 01/27/2022 07:15 AM    POTASSIUM 4.0 01/27/2022 07:15 AM    CHLORIDE 106 01/27/2022 07:15 AM    CO2 21 01/27/2022 07:15 AM    GLUCOSE 95 01/27/2022 07:15 AM    BUN 10 01/27/2022 07:15 AM    CREATININE 0.72 01/27/2022 07:15 AM    BUNCREATRAT 14 11/05/2021 04:07 AM        EKG: Per my read, none today    Imaging:   US-RUQ   Final Result      Hepatomegaly.      CT-ABDOMEN-PELVIS WITH   Final Result       1.  Decreased size and density of right adrenal lesion likely represents resolving hematoma. Small underlying indeterminate adrenal mass could be present.   2.  No bowel obstruction or acute inflammation is seen.   3.  Heterogeneous enhancement of the uterus which is of uncertain etiology although cannot exclude underlying adenomyosis, fibroids or malignancy. Further evaluation with pelvic ultrasound is suggested. Likely physiologic 4.3 cm right ovarian cyst.   4.  Mild hepatomegaly.      DX-CHEST-PORTABLE (1 VIEW)   Final Result      No acute cardiopulmonary abnormality.          Previous Data Review: reviewed    Problem Representation:    46 y.o. female with past medical history of fibroids, microcytic anemia, H. 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 Hemoglobin 8.4 and MCV 71.    * Microcytic anemia- (present on admission)  Assessment & Plan  Hb 8.4, MCV 96  Worsening dyspnea and fatigue over the last 1 month  Patient reports longer duration of menstrual cycles from 3 to 7 days since hospitalization in July  Iron, ferritin, tibc, transferrin, ldh  Ferrous Sulphate  H&H at 8 PM  Gastroenterology GI consultants consulted for the need of colonoscopy/EGD.  Monitor CBC in am per Dr Queen  Follows with Dr Vareal.    Black stool  Assessment & Plan  Non foul smelling  Recent Initiation of ferrous sulphate  One episode of blood streaked stool.  Hb close to baseline in the last 6 months (8-9, with once 11.5 in Nov 2021)  On Coumadin  Rectal exam unremarkable per ER  physician.  FOBT  Monitor H and H.    Thrombosis of superior vena cava (HCC)- (present on admission)  Assessment & Plan  On Coumadin  INR 2.42.  Hold Coumadin until resolution of chief complaint.    Epigastric pain  Assessment & Plan  Sudden onset this morning  Crampy in nature  Resolving since  H/o H Pylori s/p EGD by GI consultants and Rx.  Lipase normal.

## 2022-01-27 NOTE — ED NOTES
Med Rec completed per patient   Allergies reviewed  No ORAL antibiotics in last 30 days    Patient takes Warfarin   7.5 mg Monday, Wednesday, and Friday   10 mg all other days

## 2022-01-27 NOTE — ASSESSMENT & PLAN NOTE
Non foul smelling  Recent Initiation of ferrous sulphate  One episode of blood streaked stool.  Hb close to baseline in the last 6 months (8-9, with once 11.5 in Nov 2021)  On Coumadin  Rectal exam unremarkable per ER physician.  FOBT  Monitor H and H.

## 2022-01-27 NOTE — ASSESSMENT & PLAN NOTE
Hb 8.4, MCV 96  Worsening dyspnea and fatigue over the last 1 month  Patient reports longer duration of menstrual cycles from 3 to 7 days since hospitalization in July  Iron, ferritin, tibc, transferrin, ldh  Ferrous Sulphate  H&H at 8 PM  Gastroenterology GI consultants consulted for the need of colonoscopy/EGD.  Monitor CBC in am per Dr Queen  Follows with Dr Varela.

## 2022-01-28 VITALS
BODY MASS INDEX: 28.73 KG/M2 | TEMPERATURE: 97.4 F | RESPIRATION RATE: 18 BRPM | DIASTOLIC BLOOD PRESSURE: 74 MMHG | OXYGEN SATURATION: 98 % | HEART RATE: 81 BPM | SYSTOLIC BLOOD PRESSURE: 124 MMHG | HEIGHT: 68 IN | WEIGHT: 189.6 LBS

## 2022-01-28 LAB
ALBUMIN SERPL BCP-MCNC: 3.6 G/DL (ref 3.2–4.9)
ALBUMIN/GLOB SERPL: 1.2 G/DL
ALP SERPL-CCNC: 54 U/L (ref 30–99)
ALT SERPL-CCNC: 25 U/L (ref 2–50)
ANION GAP SERPL CALC-SCNC: 12 MMOL/L (ref 7–16)
AST SERPL-CCNC: 17 U/L (ref 12–45)
BASOPHILS # BLD AUTO: 0.6 % (ref 0–1.8)
BASOPHILS # BLD: 0.04 K/UL (ref 0–0.12)
BILIRUB SERPL-MCNC: 0.5 MG/DL (ref 0.1–1.5)
BUN SERPL-MCNC: 12 MG/DL (ref 8–22)
CALCIUM SERPL-MCNC: 9 MG/DL (ref 8.5–10.5)
CHLORIDE SERPL-SCNC: 104 MMOL/L (ref 96–112)
CO2 SERPL-SCNC: 21 MMOL/L (ref 20–33)
CREAT SERPL-MCNC: 0.64 MG/DL (ref 0.5–1.4)
EOSINOPHIL # BLD AUTO: 0.11 K/UL (ref 0–0.51)
EOSINOPHIL NFR BLD: 1.6 % (ref 0–6.9)
ERYTHROCYTE [DISTWIDTH] IN BLOOD BY AUTOMATED COUNT: 43.7 FL (ref 35.9–50)
GLOBULIN SER CALC-MCNC: 2.9 G/DL (ref 1.9–3.5)
GLUCOSE SERPL-MCNC: 98 MG/DL (ref 65–99)
HCT VFR BLD AUTO: 29.5 % (ref 37–47)
HGB BLD-MCNC: 8 G/DL (ref 12–16)
IMM GRANULOCYTES # BLD AUTO: 0.04 K/UL (ref 0–0.11)
IMM GRANULOCYTES NFR BLD AUTO: 0.6 % (ref 0–0.9)
LYMPHOCYTES # BLD AUTO: 1.96 K/UL (ref 1–4.8)
LYMPHOCYTES NFR BLD: 29.3 % (ref 22–41)
MCH RBC QN AUTO: 19 PG (ref 27–33)
MCHC RBC AUTO-ENTMCNC: 27.1 G/DL (ref 33.6–35)
MCV RBC AUTO: 70.1 FL (ref 81.4–97.8)
MONOCYTES # BLD AUTO: 0.54 K/UL (ref 0–0.85)
MONOCYTES NFR BLD AUTO: 8.1 % (ref 0–13.4)
NEUTROPHILS # BLD AUTO: 4.01 K/UL (ref 2–7.15)
NEUTROPHILS NFR BLD: 59.8 % (ref 44–72)
NRBC # BLD AUTO: 0 K/UL
NRBC BLD-RTO: 0 /100 WBC
PLATELET # BLD AUTO: 201 K/UL (ref 164–446)
PMV BLD AUTO: 9.4 FL (ref 9–12.9)
POTASSIUM SERPL-SCNC: 4.1 MMOL/L (ref 3.6–5.5)
PROT SERPL-MCNC: 6.5 G/DL (ref 6–8.2)
RBC # BLD AUTO: 4.21 M/UL (ref 4.2–5.4)
SODIUM SERPL-SCNC: 137 MMOL/L (ref 135–145)
WBC # BLD AUTO: 6.7 K/UL (ref 4.8–10.8)

## 2022-01-28 PROCEDURE — 80053 COMPREHEN METABOLIC PANEL: CPT

## 2022-01-28 PROCEDURE — 700111 HCHG RX REV CODE 636 W/ 250 OVERRIDE (IP): Performed by: STUDENT IN AN ORGANIZED HEALTH CARE EDUCATION/TRAINING PROGRAM

## 2022-01-28 PROCEDURE — 700102 HCHG RX REV CODE 250 W/ 637 OVERRIDE(OP): Performed by: NURSE PRACTITIONER

## 2022-01-28 PROCEDURE — 99217 PR OBSERVATION CARE DISCHARGE: CPT | Performed by: INTERNAL MEDICINE

## 2022-01-28 PROCEDURE — G0378 HOSPITAL OBSERVATION PER HR: HCPCS

## 2022-01-28 PROCEDURE — A9270 NON-COVERED ITEM OR SERVICE: HCPCS | Performed by: NURSE PRACTITIONER

## 2022-01-28 PROCEDURE — 85025 COMPLETE CBC W/AUTO DIFF WBC: CPT

## 2022-01-28 RX ORDER — OMEPRAZOLE 20 MG/1
20 CAPSULE, DELAYED RELEASE ORAL 2 TIMES DAILY
Qty: 60 CAPSULE | Refills: 3 | Status: SHIPPED | OUTPATIENT
Start: 2022-01-28 | End: 2022-06-01

## 2022-01-28 RX ADMIN — OMEPRAZOLE 20 MG: 20 CAPSULE, DELAYED RELEASE ORAL at 06:31

## 2022-01-28 RX ADMIN — SUCRALFATE 1 G: 1 SUSPENSION ORAL at 06:31

## 2022-01-28 RX ADMIN — PREDNISONE 5 MG: 10 TABLET ORAL at 06:31

## 2022-01-28 ASSESSMENT — PAIN DESCRIPTION - PAIN TYPE: TYPE: ACUTE PAIN

## 2022-01-28 ASSESSMENT — PATIENT HEALTH QUESTIONNAIRE - PHQ9
1. LITTLE INTEREST OR PLEASURE IN DOING THINGS: NOT AT ALL
2. FEELING DOWN, DEPRESSED, IRRITABLE, OR HOPELESS: NOT AT ALL
SUM OF ALL RESPONSES TO PHQ9 QUESTIONS 1 AND 2: 0

## 2022-01-28 NOTE — CARE PLAN
The patient is Stable - Low risk of patient condition declining or worsening    Shift Goals  Clinical Goals: education for GI concerns  Patient Goals: discharge  Family Goals: N/A    Progress made toward(s) clinical / shift goals:  patient is preparing for AM discharge.    Patient is not progressing towards the following goals:

## 2022-01-28 NOTE — CARE PLAN
The patient is Stable - Low risk of patient condition declining or worsening    Shift Goals  Clinical Goals: DC  Patient Goals: DC  Family Goals: N/A    Progress made toward(s) clinical / shift goals:    Problem: Knowledge Deficit - Standard  Goal: Patient and family/care givers will demonstrate understanding of plan of care, disease process/condition, diagnostic tests and medications  Outcome: Progressing       Patient is not progressing towards the following goals:

## 2022-01-28 NOTE — DISCHARGE SUMMARY
Discharge Summary    CHIEF COMPLAINT ON ADMISSION  Chief Complaint   Patient presents with   • Abdominal Pain     + blood thinners, blood in stool, dark and tarry. URQ ABD pain onset today. Patient states pain is 5/10 radiation to epigastrum. PMH antiphosphoilipid syndrome, PE. Endorses nasuea. On coumadin at this time.        Reason for Admission  abdominal pain with blood in stool  Possible hemorrhoid bleeding    Admission Date  1/27/2022    CODE STATUS  Full Code    HPI & HOSPITAL COURSE    46-year-old female with complicated medical history of pulmonary embolism antiphospholipid syndrome and fibroids presented 6/22 with abdominal pain and blood in stool, patient is on warfarin, on the day of admission patient had some epigastric pain with no significant nausea or vomiting also she noticed bright blood with the stool, on admission rectal exam by ER physician was unremarkable with no significant blood or masses, her hemoglobin around 8 last hemoglobin 3 months ago was 11, GI was consulted for possible colonoscopy however repeating hemoglobin was stable around 8 and no other episode of blood in stool, GI did not recommend any intervention and recommended to continue PPI and follow-up as outpatient, patient feels better, no pain or other symptoms, hemoglobin stable around 8, patient is cleared by GI for discharge.    On the day of discharge the patient is alert and oriented x4, denied any symptoms, patient feels okay for discharge to continue her home medication including omeprazole and warfarin.    Therefore, she is discharged in good and stable condition to home with close outpatient follow-up.    The patient recovered much more quickly than anticipated on admission.    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 *      FOLLOW UP  Future Appointments   Date Time Provider Department Center   2/9/2022  7:30 AM V EXAM 5 [facility] None   4/14/2022  9:00 AM Victoria Powell M.D. [facility] [address] [place] [address], Emergency Dept  [address] [facility] [address] [phone]    As needed, If symptoms worsen    Davi Mortensen P.A.-C.  [address]  [address]  [place] [address] [phone]    Call in 1 day          In 1 week      Davi Mortensen P.A.-C.  [address]  [address]  [place] [address] [phone]    In 1 week        MEDICATIONS ON DISCHARGE     Medication List      START taking these medications      Instructions   omeprazole 20 MG delayed-release capsule  Commonly known as: PRILOSEC   Take 1 Capsule by mouth 2 times a day.  Dose: 20 mg        CHANGE how you take these medications      Instructions   * warfarin 2.5 MG Tabs  What changed: additional instructions  Commonly known as: COUMADIN   Doctor's comments: This rx was submitted by a pharmacist working under a collaborative practice agreement.  Take 1 Tablet by mouth every day.  Dose: 2.5 mg     * warfarin 5 MG Tabs  What changed: additional instructions  Commonly known as: COUMADIN   Doctor's comments: This rx was submitted by a pharmacist working under a collaborative practice agreement.  Take 1-2 Tablets by mouth every day. Or as directed  Dose: 5-10 mg         * This list has 2 medication(s) that are the same as other medications prescribed for you. Read the directions carefully, and ask your doctor or other care provider to review them with you.            CONTINUE taking these medications      Instructions   ferrous sulfate 325 (65 Fe) MG EC tablet   Take 1 Tablet by mouth 3 times a day with meals.  Dose: 325 mg     HAIR/SKIN/NAILS PO   Take 1 Tablet by mouth every day.  Dose: 1 Tablet     predniSONE 5 MG  Tabs  Commonly known as: DELTASONE   Take 5 mg by mouth every day.  Dose: 5 mg            Allergies  No Known Allergies    DIET  Orders Placed This Encounter   Procedures   • Diet Order Diet: Regular     Standing Status:   Standing     Number of Occurrences:   1     Order Specific Question:   Diet:     Answer:   Regular [1]       ACTIVITY  As tolerated.  Weight bearing as tolerated    CONSULTATIONS  GI    PROCEDURES  None    LABORATORY  Lab Results   Component Value Date    SODIUM 137 01/28/2022    POTASSIUM 4.1 01/28/2022    CHLORIDE 104 01/28/2022    CO2 21 01/28/2022    GLUCOSE 98 01/28/2022    BUN 12 01/28/2022    CREATININE 0.64 01/28/2022        Lab Results   Component Value Date    WBC 6.7 01/28/2022    HEMOGLOBIN 8.0 (L) 01/28/2022    HEMATOCRIT 29.5 (L) 01/28/2022    PLATELETCT 201 01/28/2022        Total time of the discharge process exceeds 32 minutes.

## 2022-01-28 NOTE — CARE PLAN
The patient is Stable - Low risk of patient condition declining or worsening    Shift Goals  Clinical Goals: monitor labs  Patient Goals: DC in AM  Family Goals: N/A    Progress made toward(s) clinical / shift goals:    Problem: Knowledge Deficit - Standard  Goal: Patient and family/care givers will demonstrate understanding of plan of care, disease process/condition, diagnostic tests and medications  Outcome: Progressing       Patient is not progressing towards the following goals:

## 2022-01-28 NOTE — DISCHARGE INSTRUCTIONS
"Bloody Stools  Bloody stools often mean that there is a problem in the digestive tract. Your caregiver may use the term \"melena\" to describe black, tarry, and bad smelling stools or \"hematochezia\" to describe red or maroon-colored stools. Blood seen in the stool can be caused by bleeding anywhere along the intestinal tract.   A black stool usually means that blood is coming from the upper part of the gastrointestinal tract (esophagus, stomach, or small bowel). Passing maroon-colored stools or bright red blood usually means that blood is coming from lower down in the large bowel or the rectum. However, sometimes massive bleeding in the stomach or small intestine can cause bright red bloody stools.   Consuming black licorice, lead, iron pills, medicines containing bismuth subsalicylate, or blueberries can also cause black stools. Your caregiver can test black stools to see if blood is present.  It is important that the cause of the bleeding be found. Treatment can then be started, and the problem can be corrected. Rectal bleeding may not be serious, but you should not assume everything is okay until you know the cause. It is very important to follow up with your caregiver or a specialist in gastrointestinal problems.  CAUSES   Blood in the stools can come from various underlying causes. Often, the cause is not found during your first visit. Testing is often needed to discover the cause of bleeding in the gastrointestinal tract. Causes range from simple to serious or even life-threatening. Possible causes include:  · Hemorrhoids. These are veins that are full of blood (engorged) in the rectum. They cause pain, inflammation, and may bleed.  · Anal fissures. These are areas of painful tearing which may bleed. They are often caused by passing hard stool.  · Diverticulosis. These are pouches that form on the colon over time, with age, and may bleed significantly.  · Diverticulitis. This is inflammation in areas with " diverticulosis. It can cause pain, fever, and bloody stools, although bleeding is rare.  · Proctitis and colitis. These are inflamed areas of the rectum or colon. They may cause pain, fever, and bloody stools.  · Polyps and cancer. Colon cancer is a leading cause of preventable cancer death. It often starts out as precancerous polyps that can be removed during a colonoscopy, preventing progression into cancer. Sometimes, polyps and cancer may cause rectal bleeding.  · Gastritis and ulcers. Bleeding from the upper gastrointestinal tract (near the stomach) may travel through the intestines and produce black, sometimes tarry, often bad smelling stools. In certain cases, if the bleeding is fast enough, the stools may not be black, but red and the condition may be life-threatening.  SYMPTOMS   You may have stools that are bright red and bloody, that are normal color with blood on them, or that are dark black and tarry. In some cases, you may only have blood in the toilet bowl. Any of these cases need medical care. You may also have:  · Pain at the anus or anywhere in the rectum.  · Lightheadedness or feeling faint.  · Extreme weakness.  · Nausea or vomiting.  · Fever.  DIAGNOSIS  Your caregiver may use the following methods to find the cause of your bleeding:  · Taking a medical history. Age is important. Older people tend to develop polyps and cancer more often. If there is anal pain and a hard, large stool associated with bleeding, a tear of the anus may be the cause. If blood drips into the toilet after a bowel movement, bleeding hemorrhoids may be the problem. The color and frequency of the bleeding are additional considerations. In most cases, the medical history provides clues, but seldom the final answer.  · A visual and finger (digital) exam. Your caregiver will inspect the anal area, looking for tears and hemorrhoids. A finger exam can provide information when there is tenderness or a growth inside. In men, the  prostate is also examined.  · Endoscopy. Several types of small, long scopes (endoscopes) are used to view the colon.  · In the office, your caregiver may use a rigid, or more commonly, a flexible viewing sigmoidoscope. This exam is called flexible sigmoidoscopy. It is performed in 5 to 10 minutes.  · A more thorough exam is accomplished with a colonoscope. It allows your caregiver to view the entire 5 to 6 foot long colon. Medicine to help you relax (sedative) is usually given for this exam. Frequently, a bleeding lesion may be present beyond the reach of the sigmoidoscope. So, a colonoscopy may be the best exam to start with. Both exams are usually done on an outpatient basis. This means the patient does not stay overnight in the hospital or surgery center.  · An upper endoscopy may be needed to examine your stomach. Sedation is used and a flexible endoscope is put in your mouth, down to your stomach.  · A barium enema X-ray. This is an X-ray exam. It uses liquid barium inserted by enema into the rectum. This test alone may not identify an actual bleeding point. X-rays highlight abnormal shadows, such as those made by lumps (tumors), diverticuli, or colitis.  TREATMENT   Treatment depends on the cause of your bleeding.   · For bleeding from the stomach or colon, the caregiver doing your endoscopy or colonoscopy may be able to stop the bleeding as part of the procedure.  · Inflammation or infection of the colon can be treated with medicines.  · Many rectal problems can be treated with creams, suppositories, or warm baths.  · Surgery is sometimes needed.  · Blood transfusions are sometimes needed if you have lost a lot of blood.  · For any bleeding problem, let your caregiver know if you take aspirin or other blood thinners regularly.  HOME CARE INSTRUCTIONS   · Take any medicines exactly as prescribed.  · Keep your stools soft by eating a diet high in fiber. Prunes (1 to 3 a day) work well for many people.  · Drink  enough water and fluids to keep your urine clear or pale yellow.  · Take sitz baths if advised. A sitz bath is when you sit in a bathtub with warm water for 10 to 15 minutes to soak, soothe, and cleanse the rectal area.  · If enemas or suppositories are advised, be sure you know how to use them. Tell your caregiver if you have problems with this.  · Monitor your bowel movements to look for signs of improvement or worsening.  SEEK MEDICAL CARE IF:   · You do not improve in the time expected.  · Your condition worsens after initial improvement.  · You develop any new symptoms.  SEEK IMMEDIATE MEDICAL CARE IF:   · You develop severe or prolonged rectal bleeding.  · You vomit blood.  · You feel weak or faint.  · You have a fever.  MAKE SURE YOU:  · Understand these instructions.  · Will watch your condition.  · Will get help right away if you are not doing well or get worse.  Document Released: 12/08/2003 Document Revised: 03/11/2013 Document Reviewed: 05/04/2012  [employer] Patient Information ©2014 [address].            Discharge Instructions    Discharged to home by car with friend. Discharged via wheelchair, hospital escort: Yes.  Special equipment needed: Not Applicable    Be sure to schedule a follow-up appointment with your primary care doctor or any specialists as instructed.     Discharge Plan:   Diet Plan: Discussed  Activity Level: Discussed  Confirmed Follow up Appointment: Patient to Call and Schedule Appointment  Confirmed Symptoms Management: Discussed  Medication Reconciliation Updated: Yes  Influenza Vaccine Indication: Patient Refuses    I understand that a diet low in cholesterol, fat, and sodium is recommended for good health. Unless I have been given specific instructions below for another diet, I accept this instruction as my diet prescription.   Other diet: heart healthy     Special Instructions: None    · Is patient discharged on Warfarin / Coumadin?   Yes    You are receiving the drug  "warfarin. Please understand the importance of monitoring warfarin with scheduled PT/INR blood draws.  Follow-up with a call to your personal Doctor's office in 3 days to schedule a PT/INR. .    IMPORTANT: HOW TO USE THIS INFORMATION:  This is a summary and does NOT have all possible information about this product. This information does not assure that this product is safe, effective, or appropriate for you. This information is not individual medical advice and does not substitute for the advice of your health care professional. Always ask your health care professional for complete information about this product and your specific health needs.      WARFARIN - ORAL (WARF-uh-rin)      COMMON BRAND NAME(S): Coumadin      WARNING:  Warfarin can cause very serious (possibly fatal) bleeding. This is more likely to occur when you first start taking this medication or if you take too much warfarin. To decrease your risk for bleeding, your doctor or other health care provider will monitor you closely and check your lab results (INR test) to make sure you are not taking too much warfarin. Keep all medical and laboratory appointments. Tell your doctor right away if you notice any signs of serious bleeding. See also Side Effects section.      USES:  This medication is used to treat blood clots (such as in deep vein thrombosis-DVT or pulmonary embolus-PE) and/or to prevent new clots from forming in your body. Preventing harmful blood clots helps to reduce the risk of a stroke or heart attack. Conditions that increase your risk of developing blood clots include a certain type of irregular heart rhythm (atrial fibrillation), heart valve replacement, recent heart attack, and certain surgeries (such as hip/knee replacement). Warfarin is commonly called a \"blood thinner,\" but the more correct term is \"anticoagulant.\" It helps to keep blood flowing smoothly in your body by decreasing the amount of certain substances (clotting proteins) " in your blood.      HOW TO USE:  Read the Medication Guide provided by your pharmacist before you start taking warfarin and each time you get a refill. If you have any questions, ask your doctor or pharmacist. Take this medication by mouth with or without food as directed by your doctor or other health care professional, usually once a day. It is very important to take it exactly as directed. Do not increase the dose, take it more frequently, or stop using it unless directed by your doctor. Dosage is based on your medical condition, laboratory tests (such as INR), and response to treatment. Your doctor or other health care provider will monitor you closely while you are taking this medication to determine the right dose for you. Use this medication regularly to get the most benefit from it. To help you remember, take it at the same time each day. It is important to eat a balanced, consistent diet while taking warfarin. Some foods can affect how warfarin works in your body and may affect your treatment and dose. Avoid sudden large increases or decreases in your intake of foods high in vitamin K (such as broccoli, cauliflower, cabbage, brussels sprouts, kale, spinach, and other green leafy vegetables, liver, green tea, certain vitamin supplements). If you are trying to lose weight, check with your doctor before you try to go on a diet. Cranberry products may also affect how your warfarin works. Limit the amount of cranberry juice (16 ounces/480 milliliters a day) or other cranberry products you may drink or eat.      SIDE EFFECTS:  Nausea, loss of appetite, or stomach/abdominal pain may occur. If any of these effects persist or worsen, tell your doctor or pharmacist promptly. Remember that your doctor has prescribed this medication because he or she has judged that the benefit to you is greater than the risk of side effects. Many people using this medication do not have serious side effects. This medication can cause  serious bleeding if it affects your blood clotting proteins too much (shown by unusually high INR lab results). Even if your doctor stops your medication, this risk of bleeding can continue for up to a week. Tell your doctor right away if you have any signs of serious bleeding, including: unusual pain/swelling/discomfort, unusual/easy bruising, prolonged bleeding from cuts or gums, persistent/frequent nosebleeds, unusually heavy/prolonged menstrual flow, pink/dark urine, coughing up blood, vomit that is bloody or looks like coffee grounds, severe headache, dizziness/fainting, unusual or persistent tiredness/weakness, bloody/black/tarry stools, chest pain, shortness of breath, difficulty swallowing. Tell your doctor right away if any of these unlikely but serious side effects occur: persistent nausea/vomiting, severe stomach/abdominal pain, yellowing eyes/skin. This drug rarely has caused very serious (possibly fatal) problems if its effects lead to small blood clots (usually at the beginning of treatment). This can lead to severe skin/tissue damage that may require surgery or amputation if left untreated. Patients with certain blood conditions (protein C or S deficiency) may be at greater risk. Get medical help right away if any of these rare but serious side effects occur: painful/red/purplish patches on the skin (such as on the toe, breast, abdomen), change in the amount of urine, vision changes, confusion, slurred speech, weakness on one side of the body. A very serious allergic reaction to this drug is rare. However, get medical help right away if you notice any symptoms of a serious allergic reaction, including: rash, itching/swelling (especially of the face/tongue/throat), severe dizziness, trouble breathing. This is not a complete list of possible side effects. If you notice other effects not listed above, contact your doctor or pharmacist. In the [place] - Call your doctor for medical advice about side effects.  You may report side effects to FDA at 7-720-TOV-2400. In [place] - Call your doctor for medical advice about side effects. You may report side effects to [facility] [place] at 1-536.912.1341.      PRECAUTIONS:  Before taking warfarin, tell your doctor or pharmacist if you are allergic to it; or if you have any other allergies. This product may contain inactive ingredients, which can cause allergic reactions or other problems. Talk to your pharmacist for more details. Before using this medication, tell your doctor or pharmacist your medical history, especially of: blood disorders (such as anemia, hemophilia), bleeding problems (such as bleeding of the stomach/intestines, bleeding in the brain), blood vessel disorders (such as aneurysms), recent major injury/surgery, liver disease, alcohol use, mental/mood disorders (including memory problems), frequent falls/injuries. It is important that all your doctors and dentists know that you take warfarin. Before having surgery or any medical/dental procedures, tell your doctor or dentist that you are taking this medication and about all the products you use (including prescription drugs, nonprescription drugs, and herbal products). Avoid getting injections into the muscles. If you must have an injection into a muscle (for example, a flu shot), it should be given in the arm. This way, it will be easier to check for bleeding and/or apply pressure bandages. This medication may cause stomach bleeding. Daily use of alcohol while using this medicine will increase your risk for stomach bleeding and may also affect how this medication works. Limit or avoid alcoholic beverages. If you have not been eating well, if you have an illness or infection that causes fever, vomiting, or diarrhea for more than 2 days, or if you start using any antibiotic medications, contact your doctor or pharmacist immediately because these conditions can affect how warfarin works. This medication can cause heavy  bleeding. To lower the chance of getting cut, bruised, or injured, use great caution with sharp objects like safety razors and nail cutters. Use an electric razor when shaving and a soft toothbrush when brushing your teeth. Avoid activities such as contact sports. If you fall or injure yourself, especially if you hit your head, call your doctor immediately. Your doctor may need to check you. The Food & Drug Administration has stated that generic warfarin products are interchangeable. However, consult your doctor or pharmacist before switching warfarin products. Be careful not to take more than one medication that contains warfarin unless specifically directed by the doctor or health care provider who is monitoring your warfarin treatment. Older adults may be at greater risk for bleeding while using this drug. This medication is not recommended for use during pregnancy because of serious (possibly fatal) harm to an unborn baby. Discuss the use of reliable forms of birth control with your doctor. If you become pregnant or think you may be pregnant, tell your doctor immediately. If you are planning pregnancy, discuss a plan for managing your condition with your doctor before you become pregnant. Your doctor may switch the type of medication you use during pregnancy. Very small amounts of this medication may pass into breast milk but is unlikely to harm a nursing infant. Consult your doctor before breast-feeding.      DRUG INTERACTIONS:  Drug interactions may change how your medications work or increase your risk for serious side effects. This document does not contain all possible drug interactions. Keep a list of all the products you use (including prescription/nonprescription drugs and herbal products) and share it with your doctor and pharmacist. Do not start, stop, or change the dosage of any medicines without your doctor's approval. Warfarin interacts with many prescription, nonprescription, vitamin, and herbal  "products. This includes medications that are applied to the skin or inside the vagina or rectum. The interactions with warfarin usually result in an increase or decrease in the \"blood-thinning\" (anticoagulant) effect. Your doctor or other health care professional should closely monitor you to prevent serious bleeding or clotting problems. While taking warfarin, it is very important to tell your doctor or pharmacist of any changes in medications, vitamins, or herbal products that you are taking. Some products that may interact with this drug include: capecitabine, imatinib, mifepristone. Aspirin, aspirin-like drugs (salicylates), and nonsteroidal anti-inflammatory drugs (NSAIDs such as ibuprofen, naproxen, celecoxib) may have effects similar to warfarin. These drugs may increase the risk of bleeding problems if taken during treatment with warfarin. Carefully check all prescription/nonprescription product labels (including drugs applied to the skin such as pain-relieving creams) since the products may contain NSAIDs or salicylates. Talk to your doctor about using a different medication (such as acetaminophen) to treat pain/fever. Low-dose aspirin and related drugs (such as clopidogrel, ticlopidine) should be continued if prescribed by your doctor for specific medical reasons such as heart attack or stroke prevention. Consult your doctor or pharmacist for more details. Many herbal products interact with warfarin. Tell your doctor before taking any herbal products, especially bromelains, coenzyme Q10, cranberry, danshen, dong quai, fenugreek, garlic, ginkgo biloba, ginseng, and Ru's wort, among others. This medication may interfere with a certain laboratory test to measure theophylline levels, possibly causing false test results. Make sure laboratory personnel and all your doctors know you use this drug.      OVERDOSE:  If overdose is suspected, contact a poison control center or emergency room immediately. [place] " residents can call the [place] National Poison Hotline at 1-700.762.4947. [place] residents can call a provincial poison control center. Symptoms of overdose may include: bloody/black/tarry stools, pink/dark urine, unusual/prolonged bleeding.      NOTES:  Do not share this medication with others. Laboratory and/or medical tests (such as INR, complete blood count) must be performed periodically to monitor your progress or check for side effects. Consult your doctor for more details.      MISSED DOSE:  For the best possible benefit, do not miss any doses. If you do miss a dose and remember on the same day, take it as soon as you remember. If you remember on the next day, skip the missed dose and resume your usual dosing schedule. Do not double the dose to catch up because this could increase your risk for bleeding. Keep a record of missed doses to give to your doctor or pharmacist. Contact your doctor or pharmacist if you miss 2 or more doses in a row.      STORAGE:  Store at room temperature away from light and moisture. Do not store in the bathroom. Keep all medications away from children and pets. Do not flush medications down the toilet or pour them into a drain unless instructed to do so. Properly discard this product when it is  or no longer needed. Consult your pharmacist or local waste disposal company for more details about how to safely discard your product.      MEDICAL ALERT:  Your condition and medication can cause complications in a medical emergency. For information about enrolling in MedicAlert, call 1-228.755.7256 ([place]) or 1-697.201.9095 ([place]).      Information last revised 2010 Copyright(c) 2010 [employer]             Depression / Suicide Risk    As you are discharged from this [facility] facility, it is important to learn how to keep safe from harming yourself.    Recognize the warning signs:  · Abrupt changes in personality, positive or negative- including increase in energy    · Giving away possessions  · Change in eating patterns- significant weight changes-  positive or negative  · Change in sleeping patterns- unable to sleep or sleeping all the time   · Unwillingness or inability to communicate  · Depression  · Unusual sadness, discouragement and loneliness  · Talk of wanting to die  · Neglect of personal appearance   · Rebelliousness- reckless behavior  · Withdrawal from people/activities they love  · Confusion- inability to concentrate     If you or a loved one observes any of these behaviors or has concerns about self-harm, here's what you can do:  · Talk about it- your feelings and reasons for harming yourself  · Remove any means that you might use to hurt yourself (examples: pills, rope, extension cords, firearm)  · Get professional help from the community (Mental Health, Substance Abuse, psychological counseling)  · Do not be alone:Call your Safe Contact- someone whom you trust who will be there for you.  · Call your local CRISIS HOTLINE 983-1107 or 003-007-9005  · Call your local Children's Mobile Crisis Response Team [place] (011) 314-0779 or www.[employer]  · Call the toll free National Suicide Prevention Hotlines   · National Suicide Prevention Lifeline 868-268-WCWD (4920)  · National Hope Line Network 800-SUICIDE (751-6719)

## 2022-01-28 NOTE — CONSULTS
"Gastroenterology Consult Note:    MARILEE Ramos  Date & Time note created:    1/27/2022   4:32 PM     Referring MD:  Dr. Sandro Rogers    Patient ID:  Name:             Jaclyn Olvera   YOB: 1975  Age:                 46 y.o.  female   MRN:               [id number]                                                             Reason for Consult:      Epigastric abdominal pain x 1 day  1 episode of BRB on toilet tissue  Dark stool since starting iron tablets    History of Present Illness:    This is a 45 yo female PMH antiphospholipid syndrome on Coumadin, Pulmonary embolism, microcytic anemia, started iron 1/24/22, fibroids, GERD who presented to the ER with a sudden onset of epigastric abdominal pain which she describes as a burning sensation that has improved through out the day. Patient was recently diagnosed with microcytic anemia, was told to start iron tablets three times per day 1/24/22. Since starting iron tablets her stools were black. Last night, she had a formed bowel movement with BRB on toilet tissue. No further episodes of rectal bleeding. When she woke up this morning with epigastric burning sensation patient stated \"she panicked\" and thought she had a GI bleed. She was able to eat a salad which made the upper abdominal pain improve. Hgb; 8.4 (11.5 on Nov. 2021)    CT scan abdomen/pelvis: Decreased size and density of right adrenal lesion likely represents resolving hematoma. Small underlying indeterminate adrenal mass could be present.  2.  No bowel obstruction or acute inflammation is seen.  3.  Heterogeneous enhancement of the uterus which is of uncertain etiology although cannot exclude underlying adenomyosis, fibroids or malignancy. Further evaluation with pelvic ultrasound is suggested. Likely physiologic 4.3 cm right ovarian cyst.      4.  Mild hepatomegaly.    Currently, abdominal pain has improved. She believes that the iron tablets may be causing " abdominal discomfort.    Colonoscopy Dec. 2021 performed by Jessica: Grade 1 internal hemorrhoids. 1 polyp removed        Review of Systems:      Review of Systems   Constitutional: Negative.    HENT: Negative.    Eyes: Negative.    Respiratory: Negative.    Cardiovascular: Negative.    Gastrointestinal: Positive for abdominal pain.   Genitourinary: Negative.    Musculoskeletal: Negative.    Skin: Negative.    Neurological: Negative.    Endo/Heme/Allergies: Negative.    Psychiatric/Behavioral: Negative.              Physical Exam:  Vitals/ General Appearance:   Weight/BMI: Body mass index is 29.2 kg/m².    Vitals:    01/27/22 1100 01/27/22 1200 01/27/22 1300 01/27/22 1400   BP: 115/69 109/68 122/71 108/82   Pulse: 75 77 80 95   Resp: 16 16 16 16   Temp:       TempSrc:       SpO2: 99% 98% 97% 96%   Weight:       Height:         Oxygen Therapy:  Pulse Oximetry: 96 %, O2 (LPM): 0, O2 Delivery Device: Room air w/o2 available    Physical Exam  Vitals and nursing note reviewed.   Constitutional:       Appearance: Normal appearance.   HENT:      Head: Normocephalic and atraumatic.      Right Ear: External ear normal.      Left Ear: External ear normal.      Nose: Nose normal.      Mouth/Throat:      Mouth: Mucous membranes are moist.   Eyes:      Extraocular Movements: Extraocular movements intact.      Pupils: Pupils are equal, round, and reactive to light.   Musculoskeletal:      Cervical back: Normal range of motion and neck supple.   Neurological:      Mental Status: She is alert.         Past Medical History:   Past Medical History:   Diagnosis Date   • Antiphospholipid antibody syndrome (HCC)    • H/O thrombosis of vena cava    • Hemorrhage of both adrenal glands (HCC)    • PE (pulmonary embolism)        Past Surgical History:  Past Surgical History:   Procedure Laterality Date   • DC UPPER GI ENDOSCOPY,DIAGNOSIS N/A 6/17/2021    Procedure: GASTROSCOPY;  Surgeon: Elfego Lopez M.D.;  Location: SURGERY SAME DAY  [facility];  Service: Gastroenterology   • ID UPPER GI ENDOSCOPY,BIOPSY N/A 6/17/2021    Procedure: GASTROSCOPY, WITH BIOPSY;  Surgeon: Elfego Lopez M.D.;  Location: SURGERY SAME DAY [facility];  Service: Gastroenterology       Hospital Medications:    Current Facility-Administered Medications:   •  senna-docusate (PERICOLACE or SENOKOT S) 8.6-50 MG per tablet 2 Tablet, 2 Tablet, Oral, BID **AND** polyethylene glycol/lytes (MIRALAX) PACKET 1 Packet, 1 Packet, Oral, QDAY PRN **AND** magnesium hydroxide (MILK OF MAGNESIA) suspension 30 mL, 30 mL, Oral, QDAY PRN **AND** bisacodyl (DULCOLAX) suppository 10 mg, 10 mg, Rectal, QDAY PRN, Audrey Simons M.D.  •  acetaminophen (Tylenol) tablet 650 mg, 650 mg, Oral, Q6HRS PRN, Audrey Simons M.D.  •  labetalol (NORMODYNE/TRANDATE) injection 10 mg, 10 mg, Intravenous, Q4HRS PRN, Audrey Simons M.D.  •  ondansetron (ZOFRAN) syringe/vial injection 4 mg, 4 mg, Intravenous, Q4HRS PRN, Audrey Simons M.D.  •  ondansetron (ZOFRAN ODT) dispertab 4 mg, 4 mg, Oral, Q4HRS PRN, Audrey Simons M.D.  •  promethazine (PHENERGAN) tablet 12.5-25 mg, 12.5-25 mg, Oral, Q4HRS PRN, Audrey Simons M.D.  •  promethazine (PHENERGAN) suppository 12.5-25 mg, 12.5-25 mg, Rectal, Q4HRS PRN, Audrey Simons M.D.  •  prochlorperazine (COMPAZINE) injection 5-10 mg, 5-10 mg, Intravenous, Q4HRS PRN, Audrey Simons M.D.  •  predniSONE (DELTASONE) tablet 5 mg, 5 mg, Oral, DAILY, Audrey Simons M.D., 5 mg at 01/27/22 1330  •  ferrous sulfate tablet 325 mg, 325 mg, Oral, TID WITH MEALS, Audrey Simons M.D.    Current Outpatient Medications:   •  Biotin w/ Vitamins C & E (HAIR/SKIN/NAILS PO), Take 1 Tablet by mouth every day., Disp: , Rfl:   •  ferrous sulfate 325 (65 Fe) MG EC tablet, Take 1 Tablet by mouth 3 times a day with meals., Disp: 90 Tablet, Rfl: 0  •  warfarin (COUMADIN) 2.5 MG Tab, Take 1 Tablet by mouth every day. (Patient taking differently: Take 2.5 mg by mouth every day. 7.5 mg Monday,  Wednesday, and Friday  10 mg all other days), Disp: 30 Tablet, Rfl: 11  •  warfarin (COUMADIN) 5 MG Tab, Take 1-2 Tablets by mouth every day. Or as directed (Patient taking differently: Take 5-10 mg by mouth every day. 7.5 mg Monday, Wednesday, and Friday  10 mg all other days), Disp: 60 Each, Rfl: 1  •  predniSONE (DELTASONE) 5 MG Tab, Take 5 mg by mouth every day., Disp: , Rfl:     Current Outpatient Medications:  Current Facility-Administered Medications   Medication Dose Route Frequency Provider Last Rate Last Admin   • senna-docusate (PERICOLACE or SENOKOT S) 8.6-50 MG per tablet 2 Tablet  2 Tablet Oral BID Audrey Simons M.D.        And   • polyethylene glycol/lytes (MIRALAX) PACKET 1 Packet  1 Packet Oral QDAY PRN Audrey Simons M.D.        And   • magnesium hydroxide (MILK OF MAGNESIA) suspension 30 mL  30 mL Oral QDAY PRN Audrey Simons M.D.        And   • bisacodyl (DULCOLAX) suppository 10 mg  10 mg Rectal QDAY PRN Audrey Simons M.D.       • acetaminophen (Tylenol) tablet 650 mg  650 mg Oral Q6HRS PRCORNELIA Simons M.D.       • labetalol (NORMODYNE/TRANDATE) injection 10 mg  10 mg Intravenous Q4HRS PRCORNELIA Simons M.D.       • ondansetron (ZOFRAN) syringe/vial injection 4 mg  4 mg Intravenous Q4HRS CRISTAL Simons M.D.       • ondansetron (ZOFRAN ODT) dispertab 4 mg  4 mg Oral Q4HRS PRCORNELIA Simons M.D.       • promethazine (PHENERGAN) tablet 12.5-25 mg  12.5-25 mg Oral Q4HRS CRISTAL Simons M.D.       • promethazine (PHENERGAN) suppository 12.5-25 mg  12.5-25 mg Rectal Q4HRS PRCORNELIA Simons M.D.       • prochlorperazine (COMPAZINE) injection 5-10 mg  5-10 mg Intravenous Q4HRS PRCORNELIA Simons M.D.       • predniSONE (DELTASONE) tablet 5 mg  5 mg Oral DAILY Audrey Simons M.D.   5 mg at 01/27/22 1330   • ferrous sulfate tablet 325 mg  325 mg Oral TID WITH MEALS Audrey Simons M.D.         Current Outpatient Medications   Medication Sig Dispense Refill   • Biotin w/ Vitamins  C & E (HAIR/SKIN/NAILS PO) Take 1 Tablet by mouth every day.     • ferrous sulfate 325 (65 Fe) MG EC tablet Take 1 Tablet by mouth 3 times a day with meals. 90 Tablet 0   • warfarin (COUMADIN) 2.5 MG Tab Take 1 Tablet by mouth every day. (Patient taking differently: Take 2.5 mg by mouth every day. 7.5 mg Monday, Wednesday, and Friday   10 mg all other days) 30 Tablet 11   • warfarin (COUMADIN) 5 MG Tab Take 1-2 Tablets by mouth every day. Or as directed (Patient taking differently: Take 5-10 mg by mouth every day. 7.5 mg Monday, Wednesday, and Friday   10 mg all other days) 60 Each 1   • predniSONE (DELTASONE) 5 MG Tab Take 5 mg by mouth every day.         Medication Allergy:  No Known Allergies    Family History:  No family history on file.    Social History:  Social History     Socioeconomic History   • Marital status:      Spouse name: Not on file   • Number of children: Not on file   • Years of education: Not on file   • Highest education level: Not on file   Occupational History   • Not on file   Tobacco Use   • Smoking status: Current Some Day Smoker     Types: Cigarettes   • Smokeless tobacco: Current User   • Tobacco comment: 1 pk per week   Vaping Use   • Vaping Use: Never used   Substance and Sexual Activity   • Alcohol use: Yes     Comment: occasionally   • Drug use: Not Currently   • Sexual activity: Not on file   Other Topics Concern   • Not on file   Social History Narrative   • Not on file     Social Determinants of Health     Financial Resource Strain:    • Difficulty of Paying Living Expenses: Not on file   Food Insecurity:    • Worried About Running Out of Food in the Last Year: Not on file   • Ran Out of Food in the Last Year: Not on file   Transportation Needs:    • Lack of Transportation (Medical): Not on file   • Lack of Transportation (Non-Medical): Not on file   Physical Activity:    • Days of Exercise per Week: Not on file   • Minutes of Exercise per Session: Not on file   Stress:     • Feeling of Stress : Not on file   Social Connections:    • Frequency of Communication with Friends and Family: Not on file   • Frequency of Social Gatherings with Friends and Family: Not on file   • Attends [employer] Services: Not on file   • Active Member of Clubs or Organizations: Not on file   • Attends Club or Organization Meetings: Not on file   • Marital Status: Not on file   Intimate Partner Violence:    • Fear of Current or Ex-Partner: Not on file   • Emotionally Abused: Not on file   • Physically Abused: Not on file   • Sexually Abused: Not on file   Housing Stability:    • Unable to Pay for Housing in the Last Year: Not on file   • Number of Places Lived in the Last Year: Not on file   • Unstable Housing in the Last Year: Not on file         MDM (Data Review):     Records reviewed and summarized in current documentation    Lab Data Review:  Recent Results (from the past 24 hour(s))   COD (ADULT)    Collection Time: 01/27/22  7:15 AM   Result Value Ref Range    ABO Grouping Only A     Rh Grouping Only POS     Antibody Screen-Cod NEG    CBC WITH DIFFERENTIAL    Collection Time: 01/27/22  7:15 AM   Result Value Ref Range    WBC 6.3 4.8 - 10.8 K/uL    RBC 4.39 4.20 - 5.40 M/uL    Hemoglobin 8.4 (L) 12.0 - 16.0 g/dL    Hematocrit 31.4 (L) 37.0 - 47.0 %    MCV 71.5 (L) 81.4 - 97.8 fL    MCH 19.1 (L) 27.0 - 33.0 pg    MCHC 26.8 (L) 33.6 - 35.0 g/dL    RDW 44.5 35.9 - 50.0 fL    Platelet Count 211 164 - 446 K/uL    MPV 9.4 9.0 - 12.9 fL    Neutrophils-Polys 55.50 44.00 - 72.00 %    Lymphocytes 33.70 22.00 - 41.00 %    Monocytes 8.00 0.00 - 13.40 %    Eosinophils 1.90 0.00 - 6.90 %    Basophils 0.60 0.00 - 1.80 %    Immature Granulocytes 0.30 0.00 - 0.90 %    Nucleated RBC 0.00 /100 WBC    Neutrophils (Absolute) 3.47 2.00 - 7.15 K/uL    Lymphs (Absolute) 2.11 1.00 - 4.80 K/uL    Monos (Absolute) 0.50 0.00 - 0.85 K/uL    Eos (Absolute) 0.12 0.00 - 0.51 K/uL    Baso (Absolute) 0.04 0.00 - 0.12 K/uL    Immature  Granulocytes (abs) 0.02 0.00 - 0.11 K/uL    NRBC (Absolute) 0.00 K/uL   COMP METABOLIC PANEL    Collection Time: 01/27/22  7:15 AM   Result Value Ref Range    Sodium 138 135 - 145 mmol/L    Potassium 4.0 3.6 - 5.5 mmol/L    Chloride 106 96 - 112 mmol/L    Co2 21 20 - 33 mmol/L    Anion Gap 11.0 7.0 - 16.0    Glucose 95 65 - 99 mg/dL    Bun 10 8 - 22 mg/dL    Creatinine 0.72 0.50 - 1.40 mg/dL    Calcium 9.0 8.5 - 10.5 mg/dL    AST(SGOT) 20 12 - 45 U/L    ALT(SGPT) 27 2 - 50 U/L    Alkaline Phosphatase 58 30 - 99 U/L    Total Bilirubin 0.6 0.1 - 1.5 mg/dL    Albumin 4.0 3.2 - 4.9 g/dL    Total Protein 7.0 6.0 - 8.2 g/dL    Globulin 3.0 1.9 - 3.5 g/dL    A-G Ratio 1.3 g/dL   LIPASE    Collection Time: 01/27/22  7:15 AM   Result Value Ref Range    Lipase 19 11 - 82 U/L   PROTHROMBIN TIME    Collection Time: 01/27/22  7:15 AM   Result Value Ref Range    PT 25.6 (H) 12.0 - 14.6 sec    INR 2.42 (H) 0.87 - 1.13   APTT    Collection Time: 01/27/22  7:15 AM   Result Value Ref Range    APTT 71.8 (H) 24.7 - 36.0 sec   ESTIMATED GFR    Collection Time: 01/27/22  7:15 AM   Result Value Ref Range    GFR If African American >60 >60 mL/min/1.73 m 2    GFR If Non African American >60 >60 mL/min/1.73 m 2   BETA-HCG QUALITATIVE SERUM    Collection Time: 01/27/22  7:15 AM   Result Value Ref Range    Beta-Hcg Qualitative Serum Negative Negative   IRON/TOTAL IRON BIND    Collection Time: 01/27/22  7:15 AM   Result Value Ref Range    Iron 132 40 - 170 ug/dL   FERRITIN    Collection Time: 01/27/22  7:15 AM   Result Value Ref Range    Ferritin 10.4 10.0 - 291.0 ng/mL   LDH    Collection Time: 01/27/22  7:15 AM   Result Value Ref Range    LDH Total 225 107 - 266 U/L   TRANSFERRIN    Collection Time: 01/27/22  7:15 AM   Result Value Ref Range    Transferrin 307 200 - 370 mg/dL   LIPASE    Collection Time: 01/27/22  7:15 AM   Result Value Ref Range    Lipase 18 11 - 82 U/L       Imaging/Procedures Review:      US-RUQ   Final Result       Hepatomegaly.      CT-ABDOMEN-PELVIS WITH   Final Result      1.  Decreased size and density of right adrenal lesion likely represents resolving hematoma. Small underlying indeterminate adrenal mass could be present.   2.  No bowel obstruction or acute inflammation is seen.   3.  Heterogeneous enhancement of the uterus which is of uncertain etiology although cannot exclude underlying adenomyosis, fibroids or malignancy. Further evaluation with pelvic ultrasound is suggested. Likely physiologic 4.3 cm right ovarian cyst.   4.  Mild hepatomegaly.      DX-CHEST-PORTABLE (1 VIEW)   Final Result      No acute cardiopulmonary abnormality.           MDM (Assessment and Plan):     Patient Active Problem List    Diagnosis Date Noted   • Anemia 01/27/2022   • Black stool 01/27/2022   • Epigastric pain 01/27/2022   • Catastrophic antiphospholipid syndrome (HCC) 06/23/2021   • Elevated liver function tests 06/22/2021   • Elevated C-reactive protein (CRP) 06/21/2021   • Sepsis (HCC) 06/21/2021   • Microcytic anemia 06/19/2021   • Thrombocytopenia (HCC) 06/19/2021   • Hemorrhage of both adrenal glands (HCC) 06/18/2021   • Retroperitoneal lymphadenopathy 06/18/2021   • Vaginal bleeding 06/18/2021   • Hyperbilirubinemia 06/17/2021   • Fibroids 06/15/2021   • Thrombosis of superior vena cava (HCC) 06/14/2021   • H. pylori infection 06/14/2021   • Hyponatremia 06/14/2021   • Kidney lesion, native, right 06/14/2021   • Lesion of cervix 06/14/2021   • Leukocytosis 06/14/2021       This is a 45 yo female who was admitted to the hospital with sudden onset of upper abdominal pain-burning sensation, starting this morning improved after eating a salad. She was recently diagnosed with microcytic anemia-started on iron tablets three times per day on 1/24/22. After starting iron, her stools became dark. Yesterday evening, she had 1 episode of BRB on toilet tissue. This morning she woke up with epigastric pain burning sensation with some  "discomfort sternum. Hgb: 8.5 ( over past 6 months her Hgb has ranged between 8.2-11.5). Currently, abdominal pain has improved, gets twinges of burning sensation. She stated, \" I freaked out thinking I was having a GI bleed.\" She is on coumadin due to history of antiphospholipid syndrome and PE. Colonoscopy Dec. 2021: Grade 1 internal hemorrhoids, 1 small polyp otherwise unremarkable. Repeat EGD June 2021: H. Pylori negative.      ASSESSMENT:  1. Sudden onset epigastric abdominal pain: likely due to starting iron tablets  2. Microcytic anemia: over the past 6 months, Hgb stable. Started on iron tablet three times per day on Monday  3. Hematochezia: 1 episode of BRB on toilet tissue. Denies any further rectal bleeding. Colonoscopy 1 month ago: grade 1 internal hemorrhoids. Otherwise unremarkable.  4. Antiphospholipid syndrome: on coumadin and prednisone  5. PE    PLAN:  1. Recommend oral PPI twice a day for 1 month then daily  2. Take iron tablets with food  3. Follow up with GI consultants  4. Continue Prednisone and Coumadin    GI WILL SIGN OFF PLEASE CALL IF ANY QUESTIONS OR CONCERNS       Thank your for the opportunity to assist in the care of your patient.  Please call for any questions or concerns.    Selam Regalado, [email or web address].      GI attending attestation:  Elida Lowery DO, FACP    I saw and evaluated the patient.  I agree with the assessment and recommendations above as edited by me.    The patient is a very pleasant 46-year-old woman with a history of GERD, internal hemorrhoids, H. pylori gastritis (status post antibiotic therapy with confirmed eradication), antiphospholipid syndrome (on Coumadin), PE, chronic microcytic anemia (previously received IV iron infusions while in the hospital, recently started p.o. ferrous sulfate), and uterine fibroids presented with a single episode of scant hematochezia and 2 hours of epigastric burning pain.  Her hemoglobin is at her baseline of 8-9 (did have hemoglobin " in the 11s but this was immediately following IV iron infusions).  Suspect her dark stools secondary to ferrous sulfate use.  Her abdominal pain could be secondary to PUD (on prednisone), gastritis/duodenitis, or mild constipation related to ferrous sulfate.  She has had a recent colonoscopy in 12/2021 and 2 to EGDs in the past 6 to 7 months.  Given stable hemoglobin and vital signs, would recommend medical management.    -Start Prilosec 40 mg p.o. twice daily taken 30 minutes prior to a meal  -Take ferrous sulfate with food  -Continue home medications  -Start Colace 100 mg p.o. twice daily while taking ferrous sulfate  -Consider additional evaluation of chronic anemia as an outpatient  -Patient stable to discharge home from a GI standpoint  -No endoscopy planned at this time      Thank you for inviting me to participate in the care of this patient.  GI to sign off at this time.  If the patient develops changes in clinical course/decompensation or you have any additional questions or concerns, please don't hesitate to reengage GI consultants.  The patient should follow up with Dr. Lopez at [facility] in 6-8 weeks after discharge.  The patient will be called to schedule an appointment time.  If the patient does not receive a call from [facility] scheduling or they have additional questions, recommend the patient call the clinic at [phone] to schedule an appointment.

## 2022-01-28 NOTE — PROGRESS NOTES
4 Eyes Skin Assessment Completed by CONCEPCINO Earl and CONCEPCION Houston.    Head WDL  Ears WDL  Nose WDL  Mouth WDL  Neck WDL  Breast/Chest WDL  Shoulder Blades WDL  Spine WDL  (R) Arm/Elbow/Hand WDL  (L) Arm/Elbow/Hand WDL  Abdomen WDL  Groin WDL  Scrotum/Coccyx/Buttocks WDL  (R) Leg WDL  (L) Leg WDL  (R) Heel/Foot/Toe WDL  (L) Heel/Foot/Toe WDL          Devices In Places Blood Pressure Cuff and Pulse Ox      Interventions In Place N/A    Possible Skin Injury No    Pictures Uploaded Into Epic N/A  Wound Consult Placed N/A  RN Wound Prevention Protocol Ordered No

## 2022-02-03 RX ORDER — 0.9 % SODIUM CHLORIDE 0.9 %
10 VIAL (ML) INJECTION PRN
Status: CANCELLED | OUTPATIENT
Start: 2022-02-03

## 2022-02-03 RX ORDER — ACETAMINOPHEN 325 MG/1
650 TABLET ORAL PRN
Status: CANCELLED | OUTPATIENT
Start: 2022-02-03

## 2022-02-03 RX ORDER — 0.9 % SODIUM CHLORIDE 0.9 %
3 VIAL (ML) INJECTION PRN
Status: CANCELLED | OUTPATIENT
Start: 2022-02-03

## 2022-02-03 RX ORDER — 0.9 % SODIUM CHLORIDE 0.9 %
VIAL (ML) INJECTION PRN
Status: CANCELLED | OUTPATIENT
Start: 2022-02-03

## 2022-02-03 RX ORDER — HEPARIN SODIUM (PORCINE) LOCK FLUSH IV SOLN 100 UNIT/ML 100 UNIT/ML
500 SOLUTION INTRAVENOUS PRN
Status: CANCELLED | OUTPATIENT
Start: 2022-02-03

## 2022-02-03 RX ORDER — SODIUM CHLORIDE 9 MG/ML
INJECTION, SOLUTION INTRAVENOUS CONTINUOUS
Status: CANCELLED | OUTPATIENT
Start: 2022-02-03

## 2022-02-03 RX ORDER — DIPHENHYDRAMINE HYDROCHLORIDE 50 MG/ML
25 INJECTION INTRAMUSCULAR; INTRAVENOUS PRN
Status: CANCELLED | OUTPATIENT
Start: 2022-02-03

## 2022-02-06 ENCOUNTER — OUTPATIENT INFUSION SERVICES (OUTPATIENT)
Dept: ONCOLOGY | Facility: MEDICAL CENTER | Age: 47
End: 2022-02-06
Attending: INTERNAL MEDICINE
Payer: COMMERCIAL

## 2022-02-06 DIAGNOSIS — D50.1 IRON DEFICIENCY ANEMIA DUE TO SIDEROPENIC DYSPHAGIA: ICD-10-CM

## 2022-02-06 LAB
ABO GROUP BLD: NORMAL
ANISOCYTOSIS BLD QL SMEAR: ABNORMAL
BASOPHILS # BLD AUTO: 0.5 % (ref 0–1.8)
BASOPHILS # BLD: 0.03 K/UL (ref 0–0.12)
BLD GP AB SCN SERPL QL: NORMAL
EOSINOPHIL # BLD AUTO: 0.13 K/UL (ref 0–0.51)
EOSINOPHIL NFR BLD: 2 % (ref 0–6.9)
ERYTHROCYTE [DISTWIDTH] IN BLOOD BY AUTOMATED COUNT: 63.9 FL (ref 35.9–50)
HCT VFR BLD AUTO: 34.5 % (ref 37–47)
HGB BLD-MCNC: 9.4 G/DL (ref 12–16)
IMM GRANULOCYTES # BLD AUTO: 0.04 K/UL (ref 0–0.11)
IMM GRANULOCYTES NFR BLD AUTO: 0.6 % (ref 0–0.9)
LYMPHOCYTES # BLD AUTO: 1.68 K/UL (ref 1–4.8)
LYMPHOCYTES NFR BLD: 25.7 % (ref 22–41)
MCH RBC QN AUTO: 20.8 PG (ref 27–33)
MCHC RBC AUTO-ENTMCNC: 27.2 G/DL (ref 33.6–35)
MCV RBC AUTO: 76.5 FL (ref 81.4–97.8)
MICROCYTES BLD QL SMEAR: ABNORMAL
MONOCYTES # BLD AUTO: 0.38 K/UL (ref 0–0.85)
MONOCYTES NFR BLD AUTO: 5.8 % (ref 0–13.4)
MORPHOLOGY BLD-IMP: NORMAL
NEUTROPHILS # BLD AUTO: 4.27 K/UL (ref 2–7.15)
NEUTROPHILS NFR BLD: 65.4 % (ref 44–72)
NRBC # BLD AUTO: 0 K/UL
NRBC BLD-RTO: 0 /100 WBC
OUTPT INFUS CBC COMMENT OICOM: ABNORMAL
OVALOCYTES BLD QL SMEAR: NORMAL
PLATELET # BLD AUTO: 199 K/UL (ref 164–446)
PLATELET BLD QL SMEAR: NORMAL
PMV BLD AUTO: 10.2 FL (ref 9–12.9)
POIKILOCYTOSIS BLD QL SMEAR: NORMAL
POLYCHROMASIA BLD QL SMEAR: NORMAL
RBC # BLD AUTO: 4.51 M/UL (ref 4.2–5.4)
RBC BLD AUTO: PRESENT
RH BLD: NORMAL
WBC # BLD AUTO: 6.5 K/UL (ref 4.8–10.8)

## 2022-02-06 PROCEDURE — 86901 BLOOD TYPING SEROLOGIC RH(D): CPT

## 2022-02-06 PROCEDURE — 36415 COLL VENOUS BLD VENIPUNCTURE: CPT

## 2022-02-06 PROCEDURE — 85025 COMPLETE CBC W/AUTO DIFF WBC: CPT

## 2022-02-06 PROCEDURE — 86850 RBC ANTIBODY SCREEN: CPT

## 2022-02-06 PROCEDURE — 86900 BLOOD TYPING SEROLOGIC ABO: CPT

## 2022-02-06 RX ORDER — 0.9 % SODIUM CHLORIDE 0.9 %
10 VIAL (ML) INJECTION PRN
Status: CANCELLED | OUTPATIENT
Start: 2022-02-06

## 2022-02-06 RX ORDER — ACETAMINOPHEN 325 MG/1
650 TABLET ORAL PRN
Status: CANCELLED | OUTPATIENT
Start: 2022-02-06

## 2022-02-06 RX ORDER — 0.9 % SODIUM CHLORIDE 0.9 %
3 VIAL (ML) INJECTION PRN
Status: CANCELLED | OUTPATIENT
Start: 2022-02-06

## 2022-02-06 RX ORDER — SODIUM CHLORIDE 9 MG/ML
INJECTION, SOLUTION INTRAVENOUS CONTINUOUS
Status: CANCELLED | OUTPATIENT
Start: 2022-02-06

## 2022-02-06 RX ORDER — HEPARIN SODIUM (PORCINE) LOCK FLUSH IV SOLN 100 UNIT/ML 100 UNIT/ML
500 SOLUTION INTRAVENOUS PRN
Status: CANCELLED | OUTPATIENT
Start: 2022-02-06

## 2022-02-06 RX ORDER — MULTIVIT-MIN/IRON/FOLIC ACID/K 18-600-40
1 CAPSULE ORAL DAILY
COMMUNITY

## 2022-02-06 RX ORDER — DIPHENHYDRAMINE HYDROCHLORIDE 50 MG/ML
25 INJECTION INTRAMUSCULAR; INTRAVENOUS PRN
Status: CANCELLED | OUTPATIENT
Start: 2022-02-06

## 2022-02-06 RX ORDER — 0.9 % SODIUM CHLORIDE 0.9 %
VIAL (ML) INJECTION PRN
Status: CANCELLED | OUTPATIENT
Start: 2022-02-06

## 2022-02-06 NOTE — PROGRESS NOTES
Patient presented to [facility] for labs and possible blood products.  Peripheral IV placed, flushed easily, grisel briskly. CBC drawn and sent to lab. Hgb = 9.4. Patient did not meet parameters for PRBC. Peripheral IV removed, catheter tip remained intact. Gauze and coban dressing to site. Patient left [facility] in NAD.

## 2022-02-09 ENCOUNTER — ANTICOAGULATION VISIT (OUTPATIENT)
Dept: VASCULAR LAB | Facility: MEDICAL CENTER | Age: 47
End: 2022-02-09
Attending: INTERNAL MEDICINE
Payer: COMMERCIAL

## 2022-02-09 VITALS — HEART RATE: 87 BPM | DIASTOLIC BLOOD PRESSURE: 81 MMHG | SYSTOLIC BLOOD PRESSURE: 114 MMHG

## 2022-02-09 DIAGNOSIS — E27.49 HEMORRHAGE OF BOTH ADRENAL GLANDS (HCC): ICD-10-CM

## 2022-02-09 DIAGNOSIS — D68.61 CATASTROPHIC ANTIPHOSPHOLIPID SYNDROME (HCC): ICD-10-CM

## 2022-02-09 DIAGNOSIS — I82.210 THROMBOSIS OF SUPERIOR VENA CAVA (HCC): ICD-10-CM

## 2022-02-09 DIAGNOSIS — N93.9 VAGINAL BLEEDING: ICD-10-CM

## 2022-02-09 LAB
INR BLD: 1.9 (ref 0.9–1.2)
INR PPP: 1.9 (ref 2–3.5)

## 2022-02-09 PROCEDURE — 85610 PROTHROMBIN TIME: CPT

## 2022-02-09 PROCEDURE — 99212 OFFICE O/P EST SF 10 MIN: CPT

## 2022-02-09 RX ORDER — WARFARIN SODIUM 5 MG/1
5-10 TABLET ORAL DAILY
Qty: 60 TABLET | Refills: 5 | Status: SHIPPED | OUTPATIENT
Start: 2022-02-09 | End: 2022-11-18 | Stop reason: SDUPTHER

## 2022-02-09 NOTE — PROGRESS NOTES
Anticoagulation Summary  As of 2022    INR goal:  2.5-3.5   TTR:  45.1 % (7.1 mo)   INR used for dosin.90 (2022)   Warfarin maintenance plan:  10 mg (5 mg x 2) every day   Weekly warfarin total:  70 mg   Plan last modified:  Brett Wilhelm, PharmD (2022)   Next INR check:  2022   Target end date:  Indefinite    Indications    Thrombosis of superior vena cava (HCC) [I82.210]  Hemorrhage of both adrenal glands (HCC) [E27.49]  Vaginal bleeding [N93.9]  Catastrophic antiphospholipid syndrome (HCC) [D68.61]             Anticoagulation Episode Summary     INR check location:      Preferred lab:      Send INR reminders to:      Comments:        Anticoagulation Care Providers     Provider Role Specialty Phone number    [facility] Responsible  [phone]                Refer to Patient Findings for HPI:  Patient Findings     Positives:  Change in medications (Stopped taking prednisone)    Negatives:  Signs/symptoms of thrombosis, Signs/symptoms of bleeding, Laboratory test error suspected, Change in health, Change in alcohol use, Change in activity, Upcoming invasive procedure, Emergency department visit, Upcoming dental procedure, Missed doses, Extra doses, Change in diet/appetite, Hospital admission, Bruising, Other complaints          Vitals:    22 0737   BP: 114/81   Pulse: 87       Verified current warfarin dosing schedule.    Medications reconciled Yes  Pt is not on antiplatelet therapy    A/P   INR  is sub-therapeutic most likely due to discontinuing prednisone.     Warfarin dosing recommendation: Bolus with 12.5mg tonight  then start a 12% increased regimen. Will have patient start bridging with lovenox 80mg BID which patient has 4 days worth at home given hx of catastrophic antiphospholipid syndrome.     Pt educated to contact our clinic with any changes in medications or s/s of bleeding or thrombosis. Pt is aware to seek immediate medical attention for  falls, head injury or deep cuts.    Follow up appointment in 2 days    Brett Wilhelm PharmD     Faxed note as requested to  Davi Mortensen at [facility] [phone]

## 2022-02-11 ENCOUNTER — ANTICOAGULATION VISIT (OUTPATIENT)
Dept: VASCULAR LAB | Facility: MEDICAL CENTER | Age: 47
End: 2022-02-11
Attending: INTERNAL MEDICINE
Payer: COMMERCIAL

## 2022-02-11 DIAGNOSIS — N93.9 VAGINAL BLEEDING: ICD-10-CM

## 2022-02-11 DIAGNOSIS — I82.210 THROMBOSIS OF SUPERIOR VENA CAVA (HCC): ICD-10-CM

## 2022-02-11 DIAGNOSIS — D68.61 CATASTROPHIC ANTIPHOSPHOLIPID SYNDROME (HCC): ICD-10-CM

## 2022-02-11 DIAGNOSIS — E27.49 HEMORRHAGE OF BOTH ADRENAL GLANDS (HCC): ICD-10-CM

## 2022-02-11 LAB
INR BLD: 3 (ref 0.9–1.2)
INR PPP: 3 (ref 2–3.5)
INR PPP: 3 (ref 2–3.5)

## 2022-02-11 PROCEDURE — 85610 PROTHROMBIN TIME: CPT

## 2022-02-11 PROCEDURE — 99211 OFF/OP EST MAY X REQ PHY/QHP: CPT

## 2022-02-11 NOTE — PROGRESS NOTES
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     The reason for today's visit is to prevent morbidity and mortality from a blood clot and/or stroke and to reduce the risk of bleeding while on a anticoagulant.     PCP:  Davi Mortensen P.A.-C.  [address]  [facility] [id number] [phone]    No results found for: HBA1C   No results found for: CHOLSTRLTOT, LDL, HDL, TRIGLYCERIDE    Lab Results   Component Value Date/Time    GLUCOSE 98 01/28/2022 05:18 AM    BUN 12 01/28/2022 05:18 AM    CREATININE 0.64 01/28/2022 05:18 AM     Lab Results   Component Value Date/Time    ALKPHOSPHAT 54 01/28/2022 05:18 AM    ASTSGOT 17 01/28/2022 05:18 AM    ALTSGPT 25 01/28/2022 05:18 AM    TBILIRUBIN 0.5 01/28/2022 05:18 AM    INR 3.00 02/11/2022 07:34 AM    ALBUMIN 3.6 01/28/2022 05:18 AM      Lab Results   Component Value Date/Time    RBC 4.51 02/06/2022 11:17 AM    HEMOGLOBIN 9.4 (L) 02/06/2022 11:17 AM    HEMATOCRIT 34.5 (L) 02/06/2022 11:17 AM    PLATELETCT 199 02/06/2022 11:17 AM      Lab Results   Component  Value Date/Time    [facility] 06/24/2021 05:08 PM    SUZANNE 1.2 06/24/2021 05:08 PM       Current Outpatient Medications:   •  warfarin, 5-10 mg, Oral, DAILY  •  Vitamin D (Cholecalciferol), 1 Capsule, Oral, DAILY  •  omeprazole, 20 mg, Oral, BID  •  Biotin w/ Vitamins C & E (HAIR/SKIN/NAILS PO), 1 Tablet, Oral, DAILY  •  ferrous sulfate, 325 mg, Oral, TID WITH MEALS  •  warfarin, 2.5 mg, Oral, DAILY (Patient taking differently: 2.5 mg, Oral, DAILY, 10 mg)    Assessment:     • INR  therapeutic.   • Is pt on antiplatelet therapy: no  • Is pt on NSAID: no  • Off prednisone     3 vitals included with today's appt-unless patient declined:  (BP, weight, ht, RR)   There were no vitals filed for this visit.    Plan:     • Continue the same warfarin dose, as noted above.       Follow-up:     • Test in 1.5 week(s).    • This decision was made using shared decision making with the pt and benefits vs risks were discussed.      Additional information discussed with patient:     • Pt educated to contact our clinic with any changes in medications or s/s of bleeding or thrombosis.  • Education was provided today regarding tips to reduce their bleed risk and dietary constraints while on a anticoagulant.    National recommendations regarding anticoagulation therapy:     The CHEST guidelines recommends frequent monitoring at regular intervals for any patient on a anticoagulant, to ensure the patient is on the proper dose, and to monitor that they are not having any complications from therapy.       Yuri Garcia, PharmD, MS, BCACP, C  [facility]  Phone [phone] fax [phone]    This note was created using voice recognition software (Dragon). The accuracy of the dictation is limited by the abilities of the software. I have reviewed the note prior to signing, however some errors in grammar and context are still possible. If you have any questions related to this note please do not  hesitate to contact our office.

## 2022-02-22 ENCOUNTER — APPOINTMENT (OUTPATIENT)
Dept: VASCULAR LAB | Facility: MEDICAL CENTER | Age: 47
End: 2022-02-22
Attending: INTERNAL MEDICINE
Payer: COMMERCIAL

## 2022-02-24 ENCOUNTER — ANTICOAGULATION VISIT (OUTPATIENT)
Dept: VASCULAR LAB | Facility: MEDICAL CENTER | Age: 47
End: 2022-02-24
Attending: INTERNAL MEDICINE
Payer: COMMERCIAL

## 2022-02-24 DIAGNOSIS — I82.210 THROMBOSIS OF SUPERIOR VENA CAVA (HCC): ICD-10-CM

## 2022-02-24 DIAGNOSIS — D68.61 CATASTROPHIC ANTIPHOSPHOLIPID SYNDROME (HCC): ICD-10-CM

## 2022-02-24 DIAGNOSIS — E27.49 HEMORRHAGE OF BOTH ADRENAL GLANDS (HCC): ICD-10-CM

## 2022-02-24 DIAGNOSIS — N93.9 VAGINAL BLEEDING: ICD-10-CM

## 2022-02-24 LAB
INR BLD: 3.8 (ref 0.9–1.2)
INR PPP: 3.8 (ref 2–3.5)

## 2022-02-24 PROCEDURE — 99212 OFFICE O/P EST SF 10 MIN: CPT

## 2022-02-24 PROCEDURE — 85610 PROTHROMBIN TIME: CPT

## 2022-02-24 NOTE — PROGRESS NOTES
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]  Hemorrhage of both adrenal glands (HCC) [E27.49]  Vaginal bleeding [N93.9]  Catastrophic antiphospholipid syndrome (HCC) [D68.61]             Anticoagulation Episode Summary     INR check location:      Preferred lab:      Send INR reminders to:      Comments:        Anticoagulation Care Providers     Provider Role Specialty Phone number    [facility] Responsible  [phone]                Refer to Patient Findings for HPI:  Patient Findings     Positives:  Change in alcohol use (Pt had some cranberry juice and alcohol), Missed doses, Change in diet/appetite    Negatives:  Signs/symptoms of thrombosis, Signs/symptoms of bleeding, Laboratory test error suspected, Change in health, Change in activity, Upcoming invasive procedure, Emergency department visit, Upcoming dental procedure, Extra doses, Change in medications, Hospital admission, Bruising, Other complaints          There were no vitals filed for this visit.  pt declined vitals    Verified current warfarin dosing schedule.    Medications reconciled   Pt is not on antiplatelet therapy      A/P   INR  SUPRA-therapeutic.     Warfarin dosing recommendation: Instructed pt to begin slightly reduced regimen of 7.5 mg on Thurs and 10 mg ROW.    Pt educated to contact our clinic with any changes in medications or s/s of bleeding or thrombosis. Pt is aware to seek immediate medical attention for falls, head injury or deep cuts.    Follow up appointment in 2 week(s).    Casey Daniels PharmD, BCACP

## 2022-03-07 ENCOUNTER — ANTICOAGULATION VISIT (OUTPATIENT)
Dept: VASCULAR LAB | Facility: MEDICAL CENTER | Age: 47
End: 2022-03-07
Attending: INTERNAL MEDICINE
Payer: COMMERCIAL

## 2022-03-07 DIAGNOSIS — D68.61 CATASTROPHIC ANTIPHOSPHOLIPID SYNDROME (HCC): ICD-10-CM

## 2022-03-07 DIAGNOSIS — E27.49 HEMORRHAGE OF BOTH ADRENAL GLANDS (HCC): ICD-10-CM

## 2022-03-07 DIAGNOSIS — N93.9 VAGINAL BLEEDING: ICD-10-CM

## 2022-03-07 DIAGNOSIS — I82.210 THROMBOSIS OF SUPERIOR VENA CAVA (HCC): ICD-10-CM

## 2022-03-07 LAB
INR BLD: 5.5 (ref 0.9–1.2)
INR PPP: 5.5 (ref 2–3.5)

## 2022-03-07 PROCEDURE — 99212 OFFICE O/P EST SF 10 MIN: CPT | Performed by: STUDENT IN AN ORGANIZED HEALTH CARE EDUCATION/TRAINING PROGRAM

## 2022-03-07 PROCEDURE — 85610 PROTHROMBIN TIME: CPT

## 2022-03-07 NOTE — PROGRESS NOTES
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]  Hemorrhage of both adrenal glands (HCC) [E27.49]  Vaginal bleeding [N93.9]  Catastrophic antiphospholipid syndrome (HCC) [D68.61]             Anticoagulation Episode Summary     INR check location:      Preferred lab:      Send INR reminders to:      Comments:        Anticoagulation Care Providers     Provider Role Specialty Phone number    [facility] Responsible  [phone]                Refer to Patient Findings for HPI:  Patient Findings     Positives:  Change in alcohol use (EtOH this weekend)    Negatives:  Signs/symptoms of thrombosis, Signs/symptoms of bleeding, Laboratory test error suspected, Change in health, Change in activity, Upcoming invasive procedure, Emergency department visit, Upcoming dental procedure, Missed doses, Extra doses, Change in medications, Change in diet/appetite, Hospital admission, Bruising, Other complaints          There were no vitals filed for this visit.  pt declined vitals    Verified current warfarin dosing schedule.    Medications reconciled, pt notes she will likely be starting Plaquenil with Dr. Varela from [facility]  Pt is not on antiplatelet therapy      A/P   INR  supra-therapeutic.     Warfarin dosing recommendation: Pt to hold dose today and reduce dose to 5mg tomorrow. Pt will return on Friday.    Pt educated to contact our clinic with any changes in medications or s/s of bleeding or thrombosis. Pt is aware to seek immediate medical attention for falls, head injury or deep cuts.    Follow up appointment in 4  day(s).    Frankie Mojica, PharmD

## 2022-03-11 ENCOUNTER — ANTICOAGULATION VISIT (OUTPATIENT)
Dept: VASCULAR LAB | Facility: MEDICAL CENTER | Age: 47
End: 2022-03-11
Attending: INTERNAL MEDICINE
Payer: COMMERCIAL

## 2022-03-11 DIAGNOSIS — N93.9 VAGINAL BLEEDING: ICD-10-CM

## 2022-03-11 DIAGNOSIS — E27.49 HEMORRHAGE OF BOTH ADRENAL GLANDS (HCC): ICD-10-CM

## 2022-03-11 DIAGNOSIS — D68.61 CATASTROPHIC ANTIPHOSPHOLIPID SYNDROME (HCC): ICD-10-CM

## 2022-03-11 DIAGNOSIS — I82.210 THROMBOSIS OF SUPERIOR VENA CAVA (HCC): ICD-10-CM

## 2022-03-11 LAB
INR BLD: 1.9 (ref 0.9–1.2)
INR PPP: 1.9 (ref 2–3.5)

## 2022-03-11 PROCEDURE — 85610 PROTHROMBIN TIME: CPT

## 2022-03-11 PROCEDURE — 99212 OFFICE O/P EST SF 10 MIN: CPT

## 2022-03-11 NOTE — PROGRESS NOTES
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]  Hemorrhage of both adrenal glands (HCC) [E27.49]  Vaginal bleeding [N93.9]  Catastrophic antiphospholipid syndrome (HCC) [D68.61]             Anticoagulation Episode Summary     INR check location:      Preferred lab:      Send INR reminders to:      Comments:        Anticoagulation Care Providers     Provider Role Specialty Phone number    [facility] Responsible  [phone]        Refer to Patient Findings for HPI:  Patient Findings     Positives:  Change in diet/appetite    Negatives:  Signs/symptoms of thrombosis, Signs/symptoms of bleeding, Laboratory test error suspected, Change in health, Change in alcohol use, Change in activity, Upcoming invasive procedure, Emergency department visit, Upcoming dental procedure, Missed doses, Extra doses, Change in medications, Hospital admission, Bruising, Other complaints        There were no vitals filed for this visit.  The pt declined vitals today     Patient seen in clinic today for follow up on anticoagulation therapy with warfarin (a high risk medication) for hx of Thrombosis of vena cava and APLS  Verified current warfarin dosing schedule.  Patient was supratherapeutic on Monday. Patient reports following dosing calendar.     Medications reconciled   Pt is not on antiplatelet therapy      A/P   INR is SUB-therapeutic today at 1.9.     Warfarin dosing recommendation: Patient instructed to bolus with 12.5mg TONIGHT, as a one time boost, then to resume her current regimen.   Patient will follow up again in 1 week.      Pt educated to contact our clinic with any changes in medications or s/s of bleeding or thrombosis. Pt is aware to seek immediate medical attention for falls, head injury or deep cuts.    Follow up appointment in 1 week(s).    Next appt: Fri, March 18 @ 7:45am     Andrew Shi PharmD

## 2022-03-18 ENCOUNTER — ANTICOAGULATION VISIT (OUTPATIENT)
Dept: VASCULAR LAB | Facility: MEDICAL CENTER | Age: 47
End: 2022-03-18
Attending: INTERNAL MEDICINE
Payer: COMMERCIAL

## 2022-03-18 DIAGNOSIS — I82.210 THROMBOSIS OF SUPERIOR VENA CAVA (HCC): ICD-10-CM

## 2022-03-18 DIAGNOSIS — D68.61 CATASTROPHIC ANTIPHOSPHOLIPID SYNDROME (HCC): ICD-10-CM

## 2022-03-18 DIAGNOSIS — E27.49 HEMORRHAGE OF BOTH ADRENAL GLANDS (HCC): ICD-10-CM

## 2022-03-18 DIAGNOSIS — N93.9 VAGINAL BLEEDING: ICD-10-CM

## 2022-03-18 LAB
INR BLD: 4.2 (ref 0.9–1.2)
INR PPP: 4.2 (ref 2–3.5)

## 2022-03-18 PROCEDURE — 99212 OFFICE O/P EST SF 10 MIN: CPT

## 2022-03-18 PROCEDURE — 85610 PROTHROMBIN TIME: CPT

## 2022-03-18 NOTE — PROGRESS NOTES
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]  Hemorrhage of both adrenal glands (HCC) [E27.49]  Vaginal bleeding [N93.9]  Catastrophic antiphospholipid syndrome (HCC) [D68.61]             Anticoagulation Episode Summary     INR check location:      Preferred lab:      Send INR reminders to:      Comments:        Anticoagulation Care Providers     Provider Role Specialty Phone number    [facility] Responsible  [phone]        Refer to Patient Findings for HPI:  Patient Findings     Negatives:  Signs/symptoms of thrombosis, Signs/symptoms of bleeding, Laboratory test error suspected, Change in health, Change in alcohol use, Change in activity, Upcoming invasive procedure, Emergency department visit, Upcoming dental procedure, Missed doses, Extra doses, Change in medications, Change in diet/appetite, Hospital admission, Bruising, Other complaints        There were no vitals filed for this visit.  The pt declined vitals today     Patient seen in clinic today for follow up on anticoagulation therapy with warfarin (a high risk medication) for hx of thrombosis of vena cava and APLS  Verified current warfarin dosing schedule.  Patient denies any missed doses of warfarin.    Medications reconciled   Pt is not on antiplatelet therapy      A/P   INR is SUPRA-therapeutic today at 4.2.     Warfarin dosing recommendation: Patient will reduce dose to 7.5mg TONIGHT, as a one time adjustment, then will begin reduced weekly regimen of 7.5mg on Mon and Thurs and 10mg ROW.   Patient will follow up again in 2 weeks.      Pt educated to contact our clinic with any changes in medications or s/s of bleeding or thrombosis. Pt is aware to seek immediate medical attention for falls, head injury or deep cuts.    Follow up appointment in 2 week(s).    Next appt: Friday, April 1 @ 8am     Andrew Shi PharmD

## 2022-03-29 ENCOUNTER — ANTICOAGULATION VISIT (OUTPATIENT)
Dept: VASCULAR LAB | Facility: MEDICAL CENTER | Age: 47
End: 2022-03-29
Attending: INTERNAL MEDICINE
Payer: COMMERCIAL

## 2022-03-29 DIAGNOSIS — N93.9 VAGINAL BLEEDING: ICD-10-CM

## 2022-03-29 DIAGNOSIS — E27.49 HEMORRHAGE OF BOTH ADRENAL GLANDS (HCC): ICD-10-CM

## 2022-03-29 DIAGNOSIS — I82.210 THROMBOSIS OF SUPERIOR VENA CAVA (HCC): ICD-10-CM

## 2022-03-29 DIAGNOSIS — D68.61 CATASTROPHIC ANTIPHOSPHOLIPID SYNDROME (HCC): ICD-10-CM

## 2022-03-29 LAB
INR BLD: 1.1 (ref 0.9–1.2)
INR PPP: 1.1 (ref 2–3.5)

## 2022-03-29 PROCEDURE — 99212 OFFICE O/P EST SF 10 MIN: CPT

## 2022-03-29 PROCEDURE — 85610 PROTHROMBIN TIME: CPT

## 2022-03-29 NOTE — PROGRESS NOTES
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]  Hemorrhage of both adrenal glands (HCC) [E27.49]  Vaginal bleeding [N93.9]  Catastrophic antiphospholipid syndrome (HCC) [D68.61]             Anticoagulation Episode Summary     INR check location:      Preferred lab:      Send INR reminders to:      Comments:        Anticoagulation Care Providers     Provider Role Specialty Phone number    [facility] Responsible  [phone]        Anticoagulation Patient Findings  Patient Findings     Positives:  Upcoming invasive procedure (Dermatology procedure tomorrow)    Negatives:  Signs/symptoms of thrombosis, Signs/symptoms of bleeding, Laboratory test error suspected, Change in health, Change in alcohol use, Change in activity, Emergency department visit, Upcoming dental procedure, Missed doses, Extra doses, Change in medications, Change in diet/appetite, Hospital admission, Bruising, Other complaints          HPI:     Jaclyn Olvera is in the Anticoagulation Clinic today.     The reason for today's visit is to prevent morbidity and mortality from a blood clot and/or stroke and to reduce the risk of bleeding while on a anticoagulant.     PCP:  Davi Mortensen, P.A.-C.  [address]  [facility] [id number]  [phone]    Lab Results   Component Value Date/Time    HBA1C 5.7 (H) 2022 07:02 AM      No results found for: CHOLSTRLTOT, LDL, HDL, TRIGLYCERIDE    Lab Results   Component Value Date/Time    GLUCOSE 98 2022 05:18 AM    BUN 12 2022  05:18 AM    CREATININE 0.64 01/28/2022 05:18 AM     Lab Results   Component Value Date/Time    ALKPHOSPHAT 54 01/28/2022 05:18 AM    ASTSGOT 17 01/28/2022 05:18 AM    ALTSGPT 25 01/28/2022 05:18 AM    TBILIRUBIN 0.5 01/28/2022 05:18 AM    INR 1.10 (A) 03/29/2022 12:00 AM    ALBUMIN 3.6 01/28/2022 05:18 AM      Lab Results   Component Value Date/Time    RBC 4.51 02/06/2022 11:17 AM    HEMOGLOBIN 9.4 (L) 02/06/2022 11:17 AM    HEMATOCRIT 34.5 (L) 02/06/2022 11:17 AM    PLATELETCT 199 02/06/2022 11:17 AM      Lab Results   Component Value Date/Time    MALBCRT 39 (H) 06/24/2021 05:08 PM    MICROALBUR 1.2 06/24/2021 05:08 PM       Current Outpatient Medications:   •  warfarin, 5-10 mg, Oral, DAILY  •  Vitamin D (Cholecalciferol), 1 Capsule, Oral, DAILY  •  omeprazole, 20 mg, Oral, BID  •  Biotin w/ Vitamins C & E (HAIR/SKIN/NAILS PO), 1 Tablet, Oral, DAILY  •  ferrous sulfate, 325 mg, Oral, TID WITH MEALS  •  warfarin, 2.5 mg, Oral, DAILY (Patient taking differently: 2.5 mg, Oral, DAILY, 10 mg)    Assessment:     INR  sub-therapeutic.  She is having a biopsy performed tomorrow on the 30th.  She was told to hold warfarin prior to this procedure and use Lovenox.  The Lovenox was prescribed by outside primary care provider.  No record of it in our epic medication section under medication reconciliation.  But she believes it is approximately 100 mg twice daily.  She was told to hold Lovenox the day before, or take a single dose today in the AM, but she would prefer to hold Lovenox totally today.    Is pt on antiplatelet therapy: no    Is pt on NSAID: no    3 vitals included with today's appt-unless patient declined:  (BP, weight, ht, RR)   There were no vitals filed for this visit.    Plan:     • No warfarin or Lovenox today  • Resume warfarin at 10 mg on Wednesday, at the discretion of the dermatologist  • Resume Lovenox twice daily on Thursday  • Continue both warfarin and Lovenox until INR is therapeutic    Follow-up:      • Test in 1 week(s).    • This decision was made using shared decision making with the pt and benefits vs risks were discussed.      Additional information discussed with patient:     • Pt educated to contact our clinic with any changes in medications or s/s of bleeding or thrombosis.  • Education was provided today regarding tips to reduce their bleed risk and dietary constraints while on a anticoagulant.    National recommendations regarding anticoagulation therapy:     The CHEST guidelines recommends frequent monitoring at regular intervals for any patient on a anticoagulant, to ensure the patient is on the proper dose, and to monitor that they are not having any complications from therapy.       Yuri Garcia, PharmD, MS, BCACP, [facility]  Phone [phone] fax [phone]    This note was created using voice recognition software (Dragon). The accuracy of the dictation is limited by the abilities of the software. I have reviewed the note prior to signing, however some errors in grammar and context are still possible. If you have any questions related to this note please do not hesitate to contact our office.

## 2022-04-01 ENCOUNTER — ANTICOAGULATION VISIT (OUTPATIENT)
Dept: VASCULAR LAB | Facility: MEDICAL CENTER | Age: 47
End: 2022-04-01
Attending: INTERNAL MEDICINE
Payer: COMMERCIAL

## 2022-04-01 DIAGNOSIS — E27.49 HEMORRHAGE OF BOTH ADRENAL GLANDS (HCC): ICD-10-CM

## 2022-04-01 DIAGNOSIS — N93.9 VAGINAL BLEEDING: ICD-10-CM

## 2022-04-01 DIAGNOSIS — I82.210 THROMBOSIS OF SUPERIOR VENA CAVA (HCC): ICD-10-CM

## 2022-04-01 DIAGNOSIS — D68.61 CATASTROPHIC ANTIPHOSPHOLIPID SYNDROME (HCC): ICD-10-CM

## 2022-04-01 LAB
INR BLD: 1.2 (ref 0.9–1.2)
INR PPP: 1.2 (ref 2–3.5)

## 2022-04-01 PROCEDURE — 85610 PROTHROMBIN TIME: CPT

## 2022-04-01 PROCEDURE — 99213 OFFICE O/P EST LOW 20 MIN: CPT

## 2022-04-01 NOTE — PROGRESS NOTES
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]  Hemorrhage of both adrenal glands (HCC) [E27.49]  Vaginal bleeding [N93.9]  Catastrophic antiphospholipid syndrome (HCC) [D68.61]             Anticoagulation Episode Summary     INR check location:      Preferred lab:      Send INR reminders to:      Comments:        Anticoagulation Care Providers     Provider Role Specialty Phone number    [facility] Responsible  [phone]        Anticoagulation Patient Findings  Patient Findings     Negatives:  Signs/symptoms of thrombosis, Signs/symptoms of bleeding, Laboratory test error suspected, Change in health, Change in alcohol use, Change in activity, Upcoming invasive procedure, Emergency department visit, Upcoming dental procedure, Missed doses, Extra doses, Change in medications, Change in diet/appetite, Hospital admission, Bruising, Other complaints              History of Present Illness: follow up appointment for chronic anticoagulation with the high risk medication, warfarin for thormbosis of superior vena cava    Medications reconciled yes  Pt is not on antiplatelet therapy    Pt held for biopsy.  Pt continues to bridge With LOVENOX. Continue current dosing regimen.  Continue lovenox  Follow up in 4 days, to reduce the risk of adverse events related to this high risk medication, warfarin.    America Medina, Clinical Pharmacist

## 2022-04-03 NOTE — PROGRESS NOTES
[facility]  [address], [address], [place] [address]  Phone: [phone] / Fax: [phone]    RHEUMATOLOGY NEW PATIENT VISIT NOTE      DATE OF SERVICE: 04/05/2022    REFERRING PROVIDER:  Davi Mortensen P.A.-C.  [address]  [address]  [place] [id number]      SUBJECTIVE:     CHIEF COMPLAINT:   Chief Complaint   Patient presents with   • New Patient     Evaluation for antiphospholipid syndrome       HISTORY OF PRESENT ILLNESS:  Jaclyn Olvera is a 47 y.o. female with pertinent history notable for quadruple positive antiphospholipid syndrome (positive anti-CL, anti-GP1, anti-B2MG, and LA) diagnosed in 6/2021 in the setting of a catastrophic episode with SVC thrombosis and bilateral adrenal hemorrhage. Since then she has been on long-term anticoagulation with warfarin, and hydroxychloroquine was started in 2/2021 by hem/onc at [facility]. She reports onset of joint symptoms in 8/2021 with joint pain in her shoulders and hands, associated with morning stiffness lasting all day and improving with activity. She also reports a history of multiple skin bumps on her upper/lower extremities since around 2016 s/p biopsy with unrevealing results, as well as some hip pain with sciatica.  Pertinent labs as of 6/2021: Positive IgG anti-CL of 146, IgA anti-CL of 21, IgG anti-GP1 of 71, anti-B2MG of 2.9, and lupus anticoagulant with DRVV time of 107.2 sec; high ESR >140 and CRP 27.58, high TSH of 8.53 with normal fT4 of 1.25; negative IgM anti-CL, IgM anti-GP1, CUONG, ANCA, RF, ACPA, HBV, and HCV.    REVIEW OF SYSTEMS:  Except as noted in the history above, a complete review of systems with emphasis on autoimmune inflammatory conditions was otherwise negative for any significant symptoms.      ACTIVE PROBLEM LIST:  Patient Active Problem List   Diagnosis   • Thrombosis of superior vena cava (HCC)   • H. pylori infection   • Hyponatremia   • Kidney lesion, native, right   • Lesion  of cervix   • Leukocytosis   • Fibroids   • Hyperbilirubinemia   • Hemorrhage of both adrenal glands (HCC)   • Retroperitoneal lymphadenopathy   • Vaginal bleeding   • Microcytic anemia   • Thrombocytopenia (HCC)   • Elevated C-reactive protein (CRP)   • Sepsis (HCC)   • Elevated liver function tests   • Catastrophic antiphospholipid syndrome (HCC)   • Anemia   • Black stool   • Epigastric pain   • Multiple skin nodules   • Chronic pain of both shoulders   • Long-term use of hydroxychloroquine   No problem-specific Assessment & Plan notes found for this encounter.      PAST MEDICAL HISTORY:  Past Medical History:   Diagnosis Date   • Antiphospholipid antibody syndrome (HCC)    • H/O thrombosis of vena cava    • Hemorrhage of both adrenal glands (HCC)    • PE (pulmonary embolism)        PAST SURGICAL HISTORY:  Past Surgical History:   Procedure Laterality Date   • NV UPPER GI ENDOSCOPY,DIAGNOSIS N/A 6/17/2021    Procedure: GASTROSCOPY;  Surgeon: Elfego Lopez M.D.;  Location: SURGERY SAME DAY [facility];  Service: Gastroenterology   • NV UPPER GI ENDOSCOPY,BIOPSY N/A 6/17/2021    Procedure: GASTROSCOPY, WITH BIOPSY;  Surgeon: Elfego Lopez M.D.;  Location: SURGERY SAME DAY [facility];  Service: Gastroenterology       MEDICATIONS:  Current Outpatient Medications   Medication Sig Dispense Refill   • hydroxychloroquine (PLAQUENIL) 200 MG Tab Take 200 mg by mouth.     • diclofenac sodium (VOLTAREN) 1 % Gel Apply 2 g topically 3 times a day for 15 days. 100 g 3   • warfarin (COUMADIN) 5 MG Tab Take 1-2 Tablets by mouth every day. Or as directed 60 Tablet 5   • Vitamin D, Cholecalciferol, 50 MCG (2000 UT) Cap Take 1 Capsule by mouth every day.     • omeprazole (PRILOSEC) 20 MG delayed-release capsule Take 1 Capsule by mouth 2 times a day. 60 Capsule 3   • Biotin w/ Vitamins C & E (HAIR/SKIN/NAILS PO) Take 1 Tablet by mouth every day.     • ferrous sulfate 325 (65 Fe) MG EC tablet Take 1 Tablet by mouth 3 times a day  "with meals. 90 Tablet 0   • warfarin (COUMADIN) 2.5 MG Tab Take 1 Tablet by mouth every day. (Patient taking differently: Take 2.5 mg by mouth every day. 10 mg) 30 Tablet 11     No current facility-administered medications for this visit.       ALLERGIES:   No Known Allergies    IMMUNIZATIONS:  Immunization History   Administered Date(s) Administered   • Pfizer SARS-CoV-2 Vaccine 12+ 03/30/2021, 04/20/2021       SOCIAL HISTORY:   Social History     Tobacco Use   • Smoking status: Current Some Day Smoker     Types: Cigarettes   • Smokeless tobacco: Current User   • Tobacco comment: 1 pk per week   Vaping Use   • Vaping Use: Never used   Substance Use Topics   • Alcohol use: Yes     Comment: occasionally   • Drug use: Not Currently       FAMILY HISTORY:  No family history on file.   No reported family history of autoimmune rheumatic diseases.       OBJECTIVE:     Vital Signs: /70 (BP Location: Left arm, Patient Position: Sitting, BP Cuff Size: Adult)   Pulse 86   Temp 36.7 °C (98 °F) (Temporal)   Resp 16   Ht 1.727 m (5' 8\")   Wt 91.4 kg (201 lb 6.4 oz)   SpO2 96% Body mass index is 30.62 kg/m².    General: Appears well and comfortable; no acute distress  Eyes: Extraocular muscles and vision intact; no conjunctival lesions  ENT: Oral mucosa pink and moist; no oral or nasal lesions  Head/Neck: Neck supple; no cervical LAD; no scalp lesions  Cardiovascular: Regular rate and rhythm; no murmurs  Respiratory: Clear to auscultation bilaterally; no wheezes, rales, or rhonchi  Gastrointestinal: Abdomen soft and non-tender; no masses or organomegaly  Integumentary: Multiple skin nodules on bilateral upper extremities, especially on forearms; no skin soft and supple  Musculoskeletal: Mild poorly localized tenderness on shoulders with ROM limited by pain; no significant tenderness on hands; no joint swelling, warmth, erythema, or signs of synovitis  Neurologic: No focal sensory or motor deficits  Psychiatric: Mood " and affect appropriate      LABORATORY RESULTS REVIEWED AND INTERPRETED BY ME:  Lab Results   Component Value Date/Time    WBC 6.5 02/06/2022 11:17 AM    RBC 4.51 02/06/2022 11:17 AM    HEMOGLOBIN 9.4 (L) 02/06/2022 11:17 AM    HEMATOCRIT 34.5 (L) 02/06/2022 11:17 AM    MCV 76.5 (L) 02/06/2022 11:17 AM    MCH 20.8 (L) 02/06/2022 11:17 AM    MCHC 27.2 (L) 02/06/2022 11:17 AM    RDW 63.9 (H) 02/06/2022 11:17 AM    PLATELETCT 199 02/06/2022 11:17 AM    MPV 10.2 02/06/2022 11:17 AM    NEUTS 4.27 02/06/2022 11:17 AM    LYMPHOCYTES 25.70 02/06/2022 11:17 AM    MONOCYTES 5.80 02/06/2022 11:17 AM    EOSINOPHILS 2.00 02/06/2022 11:17 AM    BASOPHILS 0.50 02/06/2022 11:17 AM    HYPOCHROMIA 2+ (A) 01/24/2022 10:57 AM    ANISOCYTOSIS 3+ (A) 02/06/2022 11:17 AM     Lab Results   Component Value Date/Time    SODIUM 137 01/28/2022 05:18 AM    POTASSIUM 4.1 01/28/2022 05:18 AM    CHLORIDE 104 01/28/2022 05:18 AM    CO2 21 01/28/2022 05:18 AM    GLUCOSE 98 01/28/2022 05:18 AM    BUN 12 01/28/2022 05:18 AM    CREATININE 0.64 01/28/2022 05:18 AM    BUNCREATRAT 14 11/05/2021 04:07 AM     Lab Results   Component Value Date/Time    ASTSGOT 17 01/28/2022 05:18 AM    ALTSGPT 25 01/28/2022 05:18 AM    ALKPHOSPHAT 54 01/28/2022 05:18 AM    TBILIRUBIN 0.5 01/28/2022 05:18 AM    TOTPROTEIN 6.5 01/28/2022 05:18 AM    ALBUMIN 3.6 01/28/2022 05:18 AM    CALCIUM 9.0 01/28/2022 05:18 AM    MAGNESIUM 1.7 06/22/2021 06:14 AM     Lab Results   Component Value Date/Time    SEDRATEWES >140 (H) 06/18/2021 11:13 AM    CREACTPROT 27.58 (H) 06/18/2021 07:31 AM     Lab Results   Component Value Date/Time    RHEUMFACTN <10 06/20/2021 06:34 AM    CCPANTIBODY 8 06/20/2021 06:34 AM     Lab Results   Component Value Date/Time    ANTINUCAB None Detected 06/20/2021 06:34 AM     Lab Results   Component Value Date/Time    SMITHAB 6 06/20/2021 06:34 AM    RNPAB See Note 06/20/2021 06:34 AM    SSA60 1 06/20/2021 06:34 AM    SSA52 3 06/20/2021 06:34 AM    ANTISSBSJ 0  06/20/2021 06:34 AM     Lab Results   Component Value Date/Time    IGACARDIOLI 21 (H) 06/20/2021 06:34 AM    IGMCARDIOLI 11 06/20/2021 06:34 AM    IGGCARDIOLI 146 (H) 06/20/2021 06:34 AM    RUSSELVIPER 107.2 (H) 06/20/2021 06:34 AM    DRVVTINTERP Present 06/20/2021 06:34 AM    DRVVTINTERP Positive (A) 06/20/2021 06:34 AM    PROTHROMBTM 25.6 (H) 01/27/2022 07:15 AM    INR 1.20 (A) 04/01/2022 12:00 AM     Lab Results   Component Value Date/Time    ANCAIGG <1:20 06/21/2021 12:13 PM     Lab Results   Component Value Date/Time    COLORURINE Yellow 11/05/2021 04:07 AM    COLORURINE Yellow 06/24/2021 05:08 PM    SPECGRAVITY 1.008 11/05/2021 04:07 AM    SPECGRAVITY 1.009 06/24/2021 05:08 PM    PHURINE 6.0 11/05/2021 04:07 AM    PHURINE 5.0 06/24/2021 05:08 PM    GLUCOSEUR Negative 11/05/2021 04:07 AM    GLUCOSEUR Negative 06/24/2021 05:08 PM    KETONES Negative 11/05/2021 04:07 AM    KETONES Negative 06/24/2021 05:08 PM    PROTEINURIN Negative 11/05/2021 04:07 AM    PROTEINURIN Negative 06/24/2021 05:08 PM     Lab Results   Component Value Date/Time    TOTPROTUR 6.0 06/24/2021 05:08 PM    MICROALBUR 1.2 06/24/2021 05:08 PM    CREATININEU 31.76 06/24/2021 05:08 PM    MALBCRT 39 (H) 06/24/2021 05:08 PM     Lab Results   Component Value Date/Time    TSHULTRASEN 8.530 (H) 06/19/2021 05:57 AM    FREET4 1.25 06/19/2021 05:57 AM     Lab Results   Component Value Date/Time    25HYDROXY 20.9 (L) 11/05/2021 04:07 AM     Lab Results   Component Value Date/Time    FERRITIN 10.4 01/27/2022 07:15 AM    IRON 132 01/27/2022 07:15 AM    TRANSFERRIN 307 01/27/2022 07:15 AM    HAPTOGLOBIN 284 (H) 06/24/2021 06:29 PM    FOLATE 7.8 06/22/2021 02:51 PM     Lab Results   Component Value Date/Time    HBA1C 5.7 (H) 01/21/2022 07:02 AM     Lab Results   Component Value Date/Time    HEPBSAG Non-Reactive 06/21/2021 12:13 PM    HEPBCORIGM Non-Reactive 06/21/2021 12:13 PM    HEPCAB Non-Reactive 06/21/2021 12:13 PM       RADIOLOGY RESULTS REVIEWED AND  INTERPRETED BY ME:    Results for orders placed during the hospital encounter of 05/30/10    DX-KNEE COMPLETE 4+    Results for orders placed during the hospital encounter of 09/07/21    US-RENAL    Impression  1.  Resolution of previously seen left hydronephrosis.  2.  There is a hypoechoic 2.8 cm mass along the inferior right margin of the liver, this is likely evolving hematoma from patient's known adrenal hemorrhage.    All relevant laboratory and imaging results reported on this note were reviewed and interpreted by me.      ASSESSMENT AND PLAN:     Jaclyn Olvera is a 47 y.o. female with history as noted above whose presentation merits the following diagnostic and clinical status impressions and recommendations:    1. Antiphospholipid syndrome (HCC)  Her history is consistent with initially catastrophic antiphospholipid syndrome which currently appears to be under control with no clinical evidence of evolving  thrombosis flare.  - Continue warfarin typically with a goal INR of 3-4 (plus low-dose aspirin preferred over INR of 2.5-3.5 alone)  - Continue hydroxychloroquine at 400 mg daily (has immunomodulatory and protective anticoagulant effect against APL antibodies, so may offset the need for aspirin while keeping INR at 2-3)    2. Multiple skin nodules  Differential diagnoses for this includes IgG4-related disease, cutaneous sarcoidosis, and other fibroinflammatory conditions.  - IGG SUBCLASSES (1234); Future  - ANGIOTENSIN I CONVERTING ENZYME; Future  - VITAMIN 1,25 DIHYDROXY; Future  - CRP QUANTITIVE (NON-CARDIAC); Future  - Sed Rate; Future    3. Chronic pain of both shoulders  Her presentation has features of both inflammatory arthritis (based on history) and biomechanical joint pain secondary to osteoarthritis (based on physical exam).  - DX-SHOULDER 2+ LEFT; Future  - DX-SHOULDER 2+ RIGHT; Future  - diclofenac sodium (VOLTAREN) 1 % Gel; Apply 2 g topically 3 times a day for 15 days.  Dispense: 100 g;  Refill: 3    4. Long-term use of hydroxychloroquine  Risk of retinal toxicity is considered minimal in this case given the low dose of hydroxychloroquine used (less than 5 mg/kg of body weight) per rheumatology/ophthalmology guidelines. That said, counseled that baseline ophthalmologic evaluation is still recommended with the requency of routine follow-up eye exams determined by the ophthalmologist, typically annually or every other year.  - Ophthalmology appointment reportedly scheduled    FOLLOW-UP: Return in about 3 months (around 7/5/2022) for Short.           Thank you for giving me the opportunity to participate in the care of Jaclyn ALEKSANDER Olvera.    Nicanor Orlando MD, MS  Rheumatologist, [facility]  , Department of Internal Medicine  [facility]

## 2022-04-03 NOTE — PATIENT INSTRUCTIONS
AFTER VISIT INSTRUCTIONS    Below are important information to help you navigate your healthcare needs and help us serve you safely and effectively:  • If laboratory tests and/or imaging studies were ordered, remember to go get them done.  • If new prescriptions or refills were sent to the pharmacy, remember to go pick them up.  • Take your medications exactly as prescribed unless instructed otherwise.  • Follow up with your primary care provider and/or specialists as scheduled.  • If there are significant findings from your lab tests and imaging studies, I will notify you with explanations via [employer] or phone call, otherwise you can view them on [employer] and let me know if you have any questions.  • Sign up for [employer] if you have not already done so, in order to have access to the results of your lab tests and imaging studies, and to be able to send and receive messages from me.  • Please note that [employer] messaging is for non-urgent matters that do not require immediate attention and are typically read only during office hours, so do not expect immediate responses from me.

## 2022-04-05 ENCOUNTER — ANTICOAGULATION VISIT (OUTPATIENT)
Dept: VASCULAR LAB | Facility: MEDICAL CENTER | Age: 47
End: 2022-04-05
Attending: INTERNAL MEDICINE
Payer: COMMERCIAL

## 2022-04-05 ENCOUNTER — OFFICE VISIT (OUTPATIENT)
Dept: RHEUMATOLOGY | Facility: MEDICAL CENTER | Age: 47
End: 2022-04-05
Payer: COMMERCIAL

## 2022-04-05 VITALS
BODY MASS INDEX: 30.52 KG/M2 | TEMPERATURE: 98 F | DIASTOLIC BLOOD PRESSURE: 70 MMHG | WEIGHT: 201.4 LBS | SYSTOLIC BLOOD PRESSURE: 104 MMHG | RESPIRATION RATE: 16 BRPM | HEART RATE: 86 BPM | HEIGHT: 68 IN | OXYGEN SATURATION: 96 %

## 2022-04-05 DIAGNOSIS — E27.49 HEMORRHAGE OF BOTH ADRENAL GLANDS (HCC): ICD-10-CM

## 2022-04-05 DIAGNOSIS — R22.9 MULTIPLE SKIN NODULES: ICD-10-CM

## 2022-04-05 DIAGNOSIS — I82.210 THROMBOSIS OF SUPERIOR VENA CAVA (HCC): ICD-10-CM

## 2022-04-05 DIAGNOSIS — G89.29 CHRONIC PAIN OF BOTH SHOULDERS: ICD-10-CM

## 2022-04-05 DIAGNOSIS — Z79.899 LONG-TERM USE OF HYDROXYCHLOROQUINE: ICD-10-CM

## 2022-04-05 DIAGNOSIS — M25.512 CHRONIC PAIN OF BOTH SHOULDERS: ICD-10-CM

## 2022-04-05 DIAGNOSIS — M25.511 CHRONIC PAIN OF BOTH SHOULDERS: ICD-10-CM

## 2022-04-05 DIAGNOSIS — D68.61 CATASTROPHIC ANTIPHOSPHOLIPID SYNDROME (HCC): ICD-10-CM

## 2022-04-05 DIAGNOSIS — N93.9 VAGINAL BLEEDING: ICD-10-CM

## 2022-04-05 DIAGNOSIS — D68.61 ANTIPHOSPHOLIPID SYNDROME (HCC): ICD-10-CM

## 2022-04-05 LAB
INR BLD: 1.6 (ref 0.9–1.2)
INR PPP: 1.6 (ref 2–3.5)

## 2022-04-05 PROCEDURE — 99212 OFFICE O/P EST SF 10 MIN: CPT

## 2022-04-05 PROCEDURE — 99204 OFFICE O/P NEW MOD 45 MIN: CPT | Performed by: STUDENT IN AN ORGANIZED HEALTH CARE EDUCATION/TRAINING PROGRAM

## 2022-04-05 PROCEDURE — 85610 PROTHROMBIN TIME: CPT

## 2022-04-05 RX ORDER — HYDROXYCHLOROQUINE SULFATE 200 MG/1
200 TABLET, FILM COATED ORAL
COMMUNITY
Start: 2022-03-24 | End: 2022-06-22

## 2022-04-05 ASSESSMENT — FIBROSIS 4 INDEX: FIB4 SCORE: 0.79

## 2022-04-05 NOTE — PROGRESS NOTES
Anticoagulation Summary  As of 2022    INR goal:  2.5-3.5   TTR:  42.1 % (8.9 mo)   INR used for dosin.60 (2022)   Warfarin maintenance plan:  7.5 mg (5 mg x 1.5) every Mon, Thu; 10 mg (5 mg x 2) all other days   Weekly warfarin total:  65 mg   Plan last modified:  Sharmin Shi (3/18/2022)   Next INR check:  2022   Target end date:  Indefinite    Indications    Thrombosis of superior vena cava (HCC) [I82.210]  Hemorrhage of both adrenal glands (HCC) [E27.49]  Vaginal bleeding [N93.9]  Catastrophic antiphospholipid syndrome (HCC) [D68.61]             Anticoagulation Episode Summary     INR check location:      Preferred lab:      Send INR reminders to:      Comments:        Anticoagulation Care Providers     Provider Role Specialty Phone number    [facility] Responsible  [phone]                Refer to Patient Findings for HPI:  Patient Findings     Positives:  Missed doses (Missed Saturday dose)    Negatives:  Signs/symptoms of thrombosis, Signs/symptoms of bleeding, Laboratory test error suspected, Change in health, Change in alcohol use, Change in activity, Upcoming invasive procedure, Emergency department visit, Upcoming dental procedure, Extra doses, Change in medications, Change in diet/appetite, Hospital admission, Bruising, Other complaints          There were no vitals filed for this visit.  pt declined vitals    Verified current warfarin dosing schedule.    Medications reconciled   Pt is not on antiplatelet therapy      A/P   INR  remains sub-therapeutic due to missed doses.     Warfarin dosing recommendation: Will have patient continue to bridge with enoxaparin 100mg injected subcutaneously twice daily. Instructed patient to bolus with 15mg of warfarin tonight and 10mg on Thursday (see calendar)    Pt educated to contact our clinic with any changes in medications or s/s of bleeding or thrombosis. Pt is aware to seek immediate medical attention for  falls, head injury or deep cuts.    Follow up appointment in 3 days.     Brett Wilhelm, EvanD

## 2022-04-07 ENCOUNTER — TELEPHONE (OUTPATIENT)
Dept: NEPHROLOGY | Facility: MEDICAL CENTER | Age: 47
End: 2022-04-07

## 2022-04-07 NOTE — TELEPHONE ENCOUNTER
Nicolas Rendon,  Patient has follow up with you next week, she completed labs for you shortly after last follow up.   Would you like to order additional labs for patient to complete or are you okay with current labs    Please review,    Thank you!

## 2022-04-08 ENCOUNTER — ANTICOAGULATION VISIT (OUTPATIENT)
Dept: VASCULAR LAB | Facility: MEDICAL CENTER | Age: 47
End: 2022-04-08
Attending: INTERNAL MEDICINE
Payer: COMMERCIAL

## 2022-04-08 DIAGNOSIS — I82.210 THROMBOSIS OF SUPERIOR VENA CAVA (HCC): ICD-10-CM

## 2022-04-08 DIAGNOSIS — D68.61 CATASTROPHIC ANTIPHOSPHOLIPID SYNDROME (HCC): ICD-10-CM

## 2022-04-08 DIAGNOSIS — N93.9 VAGINAL BLEEDING: ICD-10-CM

## 2022-04-08 DIAGNOSIS — E27.49 HEMORRHAGE OF BOTH ADRENAL GLANDS (HCC): ICD-10-CM

## 2022-04-08 LAB
INR BLD: 3.1 (ref 0.9–1.2)
INR PPP: 3.1 (ref 2–3.5)

## 2022-04-08 PROCEDURE — 85610 PROTHROMBIN TIME: CPT

## 2022-04-08 PROCEDURE — 99211 OFF/OP EST MAY X REQ PHY/QHP: CPT

## 2022-04-08 NOTE — PROGRESS NOTES
Anticoagulation Summary  As of 4/8/2022    INR goal:  2.5-3.5   TTR:  42.1 % (9 mo)   INR used for dosing:  3.10 (4/8/2022)   Warfarin maintenance plan:  7.5 mg (5 mg x 1.5) every Mon, Thu; 10 mg (5 mg x 2) all other days   Weekly warfarin total:  65 mg   Plan last modified:  Sharmin Shi (3/18/2022)   Next INR check:  4/14/2022   Target end date:  Indefinite    Indications    Thrombosis of superior vena cava (HCC) [I82.210]  Hemorrhage of both adrenal glands (HCC) [E27.49]  Vaginal bleeding [N93.9]  Catastrophic antiphospholipid syndrome (HCC) [D68.61]             Anticoagulation Episode Summary     INR check location:      Preferred lab:      Send INR reminders to:      Comments:        Anticoagulation Care Providers     Provider Role Specialty Phone number    [facility] Responsible  [phone]                Refer to Patient Findings for HPI:      Verified current warfarin dosing schedule.      A/P   INR  therapeutic.     Warfarin dosing recommendation: Pt is to continue with current warfarin dosing regimen.    D/C enoxaparin.      Pt educated to contact our clinic with any changes in medications or s/s of bleeding or thrombosis. Pt is aware to seek immediate medical attention for falls, head injury or deep cuts.    Follow up appointment in 1 week(s).    Landon Milton, PharmD

## 2022-04-14 ENCOUNTER — OFFICE VISIT (OUTPATIENT)
Dept: NEPHROLOGY | Facility: MEDICAL CENTER | Age: 47
End: 2022-04-14
Payer: COMMERCIAL

## 2022-04-14 ENCOUNTER — ANTICOAGULATION VISIT (OUTPATIENT)
Dept: VASCULAR LAB | Facility: MEDICAL CENTER | Age: 47
End: 2022-04-14
Attending: INTERNAL MEDICINE
Payer: COMMERCIAL

## 2022-04-14 VITALS
HEART RATE: 79 BPM | SYSTOLIC BLOOD PRESSURE: 116 MMHG | OXYGEN SATURATION: 99 % | HEIGHT: 68 IN | TEMPERATURE: 97.6 F | DIASTOLIC BLOOD PRESSURE: 78 MMHG | WEIGHT: 201 LBS | BODY MASS INDEX: 30.46 KG/M2

## 2022-04-14 DIAGNOSIS — I10 HYPERTENSION, UNSPECIFIED TYPE: ICD-10-CM

## 2022-04-14 DIAGNOSIS — N18.1 CKD (CHRONIC KIDNEY DISEASE), STAGE I: ICD-10-CM

## 2022-04-14 DIAGNOSIS — D64.9 ANEMIA, UNSPECIFIED TYPE: ICD-10-CM

## 2022-04-14 DIAGNOSIS — E55.9 VITAMIN D DEFICIENCY: ICD-10-CM

## 2022-04-14 DIAGNOSIS — I82.210 THROMBOSIS OF SUPERIOR VENA CAVA (HCC): ICD-10-CM

## 2022-04-14 DIAGNOSIS — E27.49 HEMORRHAGE OF BOTH ADRENAL GLANDS (HCC): ICD-10-CM

## 2022-04-14 DIAGNOSIS — N93.9 VAGINAL BLEEDING: ICD-10-CM

## 2022-04-14 DIAGNOSIS — D68.61 CATASTROPHIC ANTIPHOSPHOLIPID SYNDROME (HCC): ICD-10-CM

## 2022-04-14 DIAGNOSIS — D68.61 ANTIPHOSPHOLIPID SYNDROME (HCC): ICD-10-CM

## 2022-04-14 LAB
INR BLD: 5.9 (ref 0.9–1.2)
INR PPP: 5.9 (ref 2–3.5)

## 2022-04-14 PROCEDURE — 85610 PROTHROMBIN TIME: CPT

## 2022-04-14 PROCEDURE — 99212 OFFICE O/P EST SF 10 MIN: CPT | Performed by: NURSE PRACTITIONER

## 2022-04-14 PROCEDURE — 99214 OFFICE O/P EST MOD 30 MIN: CPT | Performed by: INTERNAL MEDICINE

## 2022-04-14 ASSESSMENT — ENCOUNTER SYMPTOMS
HEMOPTYSIS: 0
NAUSEA: 0
WHEEZING: 0
VOMITING: 0
COUGH: 0
EYES NEGATIVE: 1
PALPITATIONS: 0
ORTHOPNEA: 0
SHORTNESS OF BREATH: 0
FLANK PAIN: 0
SINUS PAIN: 0
CHILLS: 0
FEVER: 0
WEIGHT LOSS: 0
ABDOMINAL PAIN: 0

## 2022-04-14 ASSESSMENT — FIBROSIS 4 INDEX: FIB4 SCORE: 0.79

## 2022-04-14 NOTE — PROGRESS NOTES
Nephrology Daily Progress Note    Date of Service  4/14/2022    Chief Complaint  46 y.o. 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    DUSTIN Anaya is coming today for post hospitalization f/u of VIDAL, right hydronephrosis, CAPS  Doing well, no complaints  No dysuria/hematuria flank pain  Creat improved first to 1.1- 0.86 - 0.64 -CKD I  UA: no proteinuria/hematuria  HTN: BP well controlled  Anemia improving  F/u with Rheumatology    Review of Systems  Review of Systems   Constitutional: Negative for chills, fever, malaise/fatigue and weight loss.   HENT: Negative for congestion, hearing loss and sinus pain.    Eyes: Negative.    Respiratory: Negative for cough, hemoptysis, shortness of breath and wheezing.    Cardiovascular: Negative for chest pain, palpitations, orthopnea and leg swelling.   Gastrointestinal: Negative for abdominal pain, nausea and vomiting.   Genitourinary: Negative for dysuria, flank pain, frequency, hematuria and urgency.   Skin: Negative.    All other systems reviewed and are negative.     Past Medical History:   Diagnosis Date   • Antiphospholipid antibody syndrome (HCC)    • H/O thrombosis of vena cava    • Hemorrhage of both adrenal glands (HCC)    • PE (pulmonary embolism)        History reviewed. No pertinent family history.    Social History     Socioeconomic History   • Marital status:    Tobacco Use   • Smoking status: Current Some Day Smoker     Types: Cigarettes   • Smokeless tobacco: Current User   • Tobacco comment: 1 pk per week   Vaping Use   • Vaping Use: Never used   Substance and Sexual Activity   • Alcohol use: Yes     Comment: occasionally   • Drug use: Not Currently     Physical Exam  Temp:  [36.4 °C (97.6 °F)] 36.4 °C (97.6 °F)  Pulse:   [79] 79  BP: (116)/(78) 116/78  SpO2:  [99 %] 99 %    Physical Exam  Vitals and nursing note reviewed.   Constitutional:       General: She is not in acute distress.     Appearance: Normal appearance. She is well-developed.   HENT:      Head: Normocephalic and atraumatic.      Nose: Nose normal.      Mouth/Throat:      Mouth: Mucous membranes are moist.      Pharynx: Oropharynx is clear.   Eyes:      General: No scleral icterus.     Conjunctiva/sclera: Conjunctivae normal.      Pupils: Pupils are equal, round, and reactive to light.   Neck:      Thyroid: No thyromegaly.   Cardiovascular:      Rate and Rhythm: Normal rate and regular rhythm.      Pulses: Normal pulses.      Heart sounds: Normal heart sounds.     No friction rub. No gallop.   Pulmonary:      Effort: Pulmonary effort is normal. No respiratory distress.      Breath sounds: Normal breath sounds. No wheezing or rales.   Abdominal:      General: Bowel sounds are normal. There is no distension.      Palpations: Abdomen is soft. There is no mass.      Tenderness: There is no abdominal tenderness. There is no right CVA tenderness, left CVA tenderness or guarding.   Musculoskeletal:         General: Injury:       Cervical back: Normal range of motion and neck supple.      Right lower leg: No edema.      Left lower leg: No edema.   Skin:     General: Skin is warm.      Findings: No erythema or rash.   Neurological:      General: No focal deficit present.      Mental Status: She is alert and oriented to person, place, and time.   Psychiatric:         Mood and Affect: Mood normal.         Behavior: Behavior normal.         Thought Content: Thought content normal.         Judgment: Judgment normal.               Laboratory  Labs reviewed: d/ww Pt      Lab Results   Component Value Date/Time    CREATININE 0.64 01/28/2022 05:18 AM    POTASSIUM 4.1 01/28/2022 05:18 AM             Current Outpatient Medications   Medication Sig Dispense Refill   • hydroxychloroquine  (PLAQUENIL) 200 MG Tab Take 200 mg by mouth.     • diclofenac sodium (VOLTAREN) 1 % Gel Apply 2 g topically 3 times a day for 15 days. 100 g 3   • Vitamin D, Cholecalciferol, 50 MCG (2000 UT) Cap Take 1 Capsule by mouth every day.     • Biotin w/ Vitamins C & E (HAIR/SKIN/NAILS PO) Take 1 Tablet by mouth every day.     • warfarin (COUMADIN) 2.5 MG Tab Take 1 Tablet by mouth every day. (Patient taking differently: Take 2.5 mg by mouth every day. 10 mg) 30 Tablet 11   • warfarin (COUMADIN) 5 MG Tab Take 1-2 Tablets by mouth every day. Or as directed 60 Tablet 5   • omeprazole (PRILOSEC) 20 MG delayed-release capsule Take 1 Capsule by mouth 2 times a day. (Patient not taking: Reported on 4/14/2022) 60 Capsule 3   • ferrous sulfate 325 (65 Fe) MG EC tablet Take 1 Tablet by mouth 3 times a day with meals. (Patient not taking: Reported on 4/14/2022) 90 Tablet 0     No current facility-administered medications for this visit.         Assessment/Plan  47 y.o. 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  Acute kidney injury/CKD IIIa -recovered -now stable at CKD I       To monitor    2.  HTN: BP -well controlled -to monitor     3.  Vit D def -continue supplementation    4.  Electrolytes: well controlled    5.  Microcytic anemia: improving, s/p iron transfusion       Hb better -to monitor    6.  CAPS -stable       F/u with Rheumatology      Recs; continue current treatment             Keep well hydrated             Low salt diet             BP monitoring             Avoid nephrotoxic agents             F/u in 12 months

## 2022-04-14 NOTE — PROGRESS NOTES
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]  Hemorrhage of both adrenal glands (HCC) [E27.49]  Vaginal bleeding [N93.9]  Catastrophic antiphospholipid syndrome (HCC) [D68.61]             Anticoagulation Episode Summary     INR check location:      Preferred lab:      Send INR reminders to:      Comments:        Anticoagulation Care Providers     Provider Role Specialty Phone number    [facility] Responsible  [phone]                Refer to Patient Findings for HPI:  Patient Findings     Positives:  Missed doses, Extra doses    Comments:  She isn't sure if she missed last night's dose so she took an extra 5 mg tablet. Reports having a couple of beers over the past weekend but no alcohol since then. Is consistent with her greens.          There were no vitals filed for this visit.   pt declined vitals    Verified current warfarin dosing schedule.    Medications reconciled   Pt is not on antiplatelet therapy      A/P   INR  supra-therapeutic. INR increased from 3.1 last weekw with a dose decrease. Suggested pt use a pill box to help her know if she has taken her dose.    Warfarin dosing recommendation: HOLD warfarin tonight and tomorrow, then began reduced weekly regimen as outlined on calendar.    Pt educated to contact our clinic with any changes in medications or s/s of bleeding or thrombosis. Pt is aware to seek immediate medical attention for falls, head injury or deep cuts.    Follow up appointment in 1 week (soonest she can return).    CHRISTAL Funk

## 2022-04-20 ENCOUNTER — APPOINTMENT (OUTPATIENT)
Dept: VASCULAR LAB | Facility: MEDICAL CENTER | Age: 47
End: 2022-04-20
Attending: INTERNAL MEDICINE
Payer: COMMERCIAL

## 2022-04-21 ENCOUNTER — ANTICOAGULATION VISIT (OUTPATIENT)
Dept: VASCULAR LAB | Facility: MEDICAL CENTER | Age: 47
End: 2022-04-21
Attending: INTERNAL MEDICINE
Payer: COMMERCIAL

## 2022-04-21 DIAGNOSIS — N93.9 VAGINAL BLEEDING: ICD-10-CM

## 2022-04-21 DIAGNOSIS — I82.210 THROMBOSIS OF SUPERIOR VENA CAVA (HCC): ICD-10-CM

## 2022-04-21 DIAGNOSIS — E27.49 HEMORRHAGE OF BOTH ADRENAL GLANDS (HCC): ICD-10-CM

## 2022-04-21 DIAGNOSIS — D68.61 CATASTROPHIC ANTIPHOSPHOLIPID SYNDROME (HCC): ICD-10-CM

## 2022-04-21 LAB
INR BLD: 1.2 (ref 0.9–1.2)
INR PPP: 1.2 (ref 2–3.5)

## 2022-04-21 PROCEDURE — 85610 PROTHROMBIN TIME: CPT

## 2022-04-21 PROCEDURE — 99212 OFFICE O/P EST SF 10 MIN: CPT

## 2022-04-21 NOTE — PROGRESS NOTES
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]  Hemorrhage of both adrenal glands (HCC) [E27.49]  Vaginal bleeding [N93.9]  Catastrophic antiphospholipid syndrome (HCC) [D68.61]             Anticoagulation Episode Summary     INR check location:      Preferred lab:      Send INR reminders to:      Comments:        Anticoagulation Care Providers     Provider Role Specialty Phone number    [facility] Responsible  [phone]                Refer to Patient Findings for HPI:  Patient Findings     Positives:  Signs/symptoms of bleeding, Missed doses    Negatives:  Signs/symptoms of thrombosis, Laboratory test error suspected, Change in health, Change in alcohol use, Change in activity, Upcoming invasive procedure, Emergency department visit, Upcoming dental procedure, Extra doses, Change in medications, Change in diet/appetite, Hospital admission, Bruising, Other complaints           pt declined vitals    Verified current warfarin dosing schedule.      Pt is not on antiplatelet therapy      A/P   INR  SUB-therapeutic.     Warfarin dosing recommendation: Bolus warfarin today, pt reports she has enoxaparin at home (120 mg). Pt willing to take enoxaparin for 2 days (no more per pt).    Pt educated to contact our clinic with any changes in medications or s/s of bleeding or thrombosis. Pt is aware to seek immediate medical attention for falls, head injury or deep cuts.    Follow up appointment in 6 days (earliest pt could agree to returning)    Landon Milton, EvanD

## 2022-04-27 ENCOUNTER — ANTICOAGULATION VISIT (OUTPATIENT)
Dept: VASCULAR LAB | Facility: MEDICAL CENTER | Age: 47
End: 2022-04-27
Attending: INTERNAL MEDICINE
Payer: COMMERCIAL

## 2022-04-27 DIAGNOSIS — N93.9 VAGINAL BLEEDING: ICD-10-CM

## 2022-04-27 DIAGNOSIS — D68.61 CATASTROPHIC ANTIPHOSPHOLIPID SYNDROME (HCC): ICD-10-CM

## 2022-04-27 DIAGNOSIS — I82.210 THROMBOSIS OF SUPERIOR VENA CAVA (HCC): ICD-10-CM

## 2022-04-27 DIAGNOSIS — E27.49 HEMORRHAGE OF BOTH ADRENAL GLANDS (HCC): ICD-10-CM

## 2022-04-27 LAB — INR PPP: 3.8 (ref 2–3.5)

## 2022-04-27 PROCEDURE — 85610 PROTHROMBIN TIME: CPT

## 2022-04-27 PROCEDURE — 99212 OFFICE O/P EST SF 10 MIN: CPT | Performed by: NURSE PRACTITIONER

## 2022-04-27 NOTE — PROGRESS NOTES
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]  Hemorrhage of both adrenal glands (HCC) [E27.49]  Vaginal bleeding [N93.9]  Catastrophic antiphospholipid syndrome (HCC) [D68.61]             Anticoagulation Episode Summary     INR check location:      Preferred lab:      Send INR reminders to:      Comments:        Anticoagulation Care Providers     Provider Role Specialty Phone number    [facility] Responsible  [phone]                Refer to Patient Findings for HPI:  Patient Findings     Positives:  Upcoming invasive procedure, Missed doses    Negatives:  Signs/symptoms of thrombosis, Signs/symptoms of bleeding, Laboratory test error suspected, Change in health, Change in alcohol use, Change in activity, Emergency department visit, Upcoming dental procedure, Extra doses, Change in medications, Change in diet/appetite, Hospital admission, Bruising, Other complaints    Comments:  She is scheduled for a uterine ablation on 5/5/22. She states she doesn't need to stop warfarin prior. Her doctor is aware she takes warfarin. Pt thinks she may have missed last night's dose. Is planning to have some alcohol this weekend but will not drink excessively. Is aware of the increased risk of bleeding.          Verified current warfarin dosing schedule.    Medications reconciled   Pt is not on antiplatelet therapy      A/P   INR  supra-therapeutic. Discussed getting a pill box with patients. She states she will get one. With shared decision making, we will began a reduced regimen by ~8%.    Warfarin dosing recommendation: Began taking 7.5 mg daily.    Pt  educated to contact our clinic with any changes in medications or s/s of bleeding or thrombosis. Pt is aware to seek immediate medical attention for falls, head injury or deep cuts.    Follow up appointment in 1.5 week(s).    PABLO Funk.

## 2022-04-28 LAB — INR BLD: 3.8 (ref 0.9–1.2)

## 2022-05-04 ENCOUNTER — ANTICOAGULATION VISIT (OUTPATIENT)
Dept: VASCULAR LAB | Facility: MEDICAL CENTER | Age: 47
End: 2022-05-04
Attending: INTERNAL MEDICINE
Payer: COMMERCIAL

## 2022-05-04 DIAGNOSIS — N93.9 VAGINAL BLEEDING: ICD-10-CM

## 2022-05-04 DIAGNOSIS — E27.49 HEMORRHAGE OF BOTH ADRENAL GLANDS (HCC): ICD-10-CM

## 2022-05-04 DIAGNOSIS — I82.210 THROMBOSIS OF SUPERIOR VENA CAVA (HCC): ICD-10-CM

## 2022-05-04 DIAGNOSIS — D68.61 CATASTROPHIC ANTIPHOSPHOLIPID SYNDROME (HCC): ICD-10-CM

## 2022-05-04 LAB
INR BLD: 3.8 (ref 0.9–1.2)
INR PPP: 3.8 (ref 2–3.5)

## 2022-05-04 PROCEDURE — 85610 PROTHROMBIN TIME: CPT

## 2022-05-04 PROCEDURE — 99212 OFFICE O/P EST SF 10 MIN: CPT

## 2022-05-04 NOTE — PROGRESS NOTES
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]  Hemorrhage of both adrenal glands (HCC) [E27.49]  Vaginal bleeding [N93.9]  Catastrophic antiphospholipid syndrome (HCC) [D68.61]             Anticoagulation Episode Summary     INR check location:      Preferred lab:      Send INR reminders to:      Comments:        Anticoagulation Care Providers     Provider Role Specialty Phone number    [facility] Responsible  [phone]                Refer to Patient Findings for HPI:  Patient Findings     Positives:  Change in alcohol use (More EtOH this last weekend.)    Negatives:  Signs/symptoms of thrombosis, Signs/symptoms of bleeding, Laboratory test error suspected, Change in health, Change in activity, Upcoming invasive procedure, Emergency department visit, Upcoming dental procedure, Missed doses, Extra doses, Change in medications, Change in diet/appetite, Hospital admission, Bruising, Other complaints          There were no vitals filed for this visit.  pt declined vitals    Verified current warfarin dosing schedule.    Medications reconciled   Pt is not on antiplatelet therapy      A/P   INR  SUPRA-therapeutic.     Warfarin dosing recommendation: Instructed pt to take a decreased dose of 5 mg x 1 today and to then continue on w/ her current regimen.    Pt educated to contact our clinic with any changes in medications or s/s of bleeding or thrombosis. Pt is aware to seek immediate medical attention for falls, head injury or deep cuts.    Follow up appointment in 1 week(s).    Casey Daniels, PharmD, BCACP

## 2022-05-11 ENCOUNTER — ANTICOAGULATION VISIT (OUTPATIENT)
Dept: VASCULAR LAB | Facility: MEDICAL CENTER | Age: 47
End: 2022-05-11
Attending: INTERNAL MEDICINE
Payer: COMMERCIAL

## 2022-05-11 DIAGNOSIS — N93.9 VAGINAL BLEEDING: ICD-10-CM

## 2022-05-11 DIAGNOSIS — D68.61 CATASTROPHIC ANTIPHOSPHOLIPID SYNDROME (HCC): ICD-10-CM

## 2022-05-11 DIAGNOSIS — I82.210 THROMBOSIS OF SUPERIOR VENA CAVA (HCC): ICD-10-CM

## 2022-05-11 DIAGNOSIS — E27.49 HEMORRHAGE OF BOTH ADRENAL GLANDS (HCC): ICD-10-CM

## 2022-05-11 LAB
INR BLD: 2.2 (ref 0.9–1.2)
INR PPP: 2.2 (ref 2–3.5)

## 2022-05-11 PROCEDURE — 99211 OFF/OP EST MAY X REQ PHY/QHP: CPT | Performed by: NURSE PRACTITIONER

## 2022-05-11 PROCEDURE — 85610 PROTHROMBIN TIME: CPT

## 2022-05-11 NOTE — PROGRESS NOTES
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]  Hemorrhage of both adrenal glands (HCC) [E27.49]  Vaginal bleeding [N93.9]  Catastrophic antiphospholipid syndrome (HCC) [D68.61]             Anticoagulation Episode Summary     INR check location:      Preferred lab:      Send INR reminders to:      Comments:        Anticoagulation Care Providers     Provider Role Specialty Phone number    [facility] Responsible  [phone]                Refer to Patient Findings for HPI:  Patient Findings     Negatives:  Signs/symptoms of thrombosis, Signs/symptoms of bleeding, Laboratory test error suspected, Change in health, Change in alcohol use, Change in activity, Upcoming invasive procedure, Emergency department visit, Upcoming dental procedure, Missed doses, Extra doses, Change in medications, Change in diet/appetite, Hospital admission, Bruising, Other complaints          There were no vitals filed for this visit.   pt declined vitals    Verified current warfarin dosing schedule.    Medications reconciled   Pt is not on antiplatelet therapy      A/P   INR  sub-therapeutic. INR decreased from 3.8 last week with a one time dose decrease.    Warfarin dosing recommendation: Resume taking 7.5 mg daily.    Pt educated to contact our clinic with any changes in medications or s/s of bleeding or thrombosis. Pt is aware to seek immediate medical attention for falls, head injury or deep cuts.    Follow up appointment in 1 week(s).    CHRISTAL Funk

## 2022-05-18 ENCOUNTER — ANTICOAGULATION VISIT (OUTPATIENT)
Dept: VASCULAR LAB | Facility: MEDICAL CENTER | Age: 47
End: 2022-05-18
Attending: INTERNAL MEDICINE
Payer: COMMERCIAL

## 2022-05-18 DIAGNOSIS — E27.49 HEMORRHAGE OF BOTH ADRENAL GLANDS (HCC): ICD-10-CM

## 2022-05-18 DIAGNOSIS — I82.210 THROMBOSIS OF SUPERIOR VENA CAVA (HCC): ICD-10-CM

## 2022-05-18 DIAGNOSIS — D68.61 CATASTROPHIC ANTIPHOSPHOLIPID SYNDROME (HCC): ICD-10-CM

## 2022-05-18 DIAGNOSIS — N93.9 VAGINAL BLEEDING: ICD-10-CM

## 2022-05-18 LAB
INR BLD: 2.6 (ref 0.9–1.2)
INR PPP: 2.6 (ref 2–3.5)

## 2022-05-18 PROCEDURE — 85610 PROTHROMBIN TIME: CPT

## 2022-05-18 PROCEDURE — 99211 OFF/OP EST MAY X REQ PHY/QHP: CPT

## 2022-05-18 NOTE — PROGRESS NOTES
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]  Hemorrhage of both adrenal glands (HCC) [E27.49]  Vaginal bleeding [N93.9]  Catastrophic antiphospholipid syndrome (HCC) [D68.61]             Anticoagulation Episode Summary     INR check location:      Preferred lab:      Send INR reminders to:      Comments:        Anticoagulation Care Providers     Provider Role Specialty Phone number    [facility] Responsible  [phone]                Refer to Patient Findings for HPI:  Patient Findings     Negatives:  Signs/symptoms of thrombosis, Signs/symptoms of bleeding, Laboratory test error suspected, Change in health, Change in alcohol use, Change in activity, Upcoming invasive procedure, Emergency department visit, Upcoming dental procedure, Missed doses, Extra doses, Change in medications, Change in diet/appetite, Hospital admission, Bruising, Other complaints          There were no vitals filed for this visit.   pt declined vitals    Verified current warfarin dosing schedule.    Medications reconciled       A/P   INR  therapeutic.     Warfarin dosing recommendation: Pt is to continue with current warfarin dosing regimen. Also advised pt to keep EtOH intake as consistent as possible.    Pt educated to contact our clinic with any changes in medications or s/s of bleeding or thrombosis. Pt is aware to seek immediate medical attention for falls, head injury or deep cuts.    Follow up appointment in 1 week(s).    Rachelle Blair, EvanD

## 2022-05-25 ENCOUNTER — ANTICOAGULATION VISIT (OUTPATIENT)
Dept: VASCULAR LAB | Facility: MEDICAL CENTER | Age: 47
End: 2022-05-25
Attending: INTERNAL MEDICINE
Payer: COMMERCIAL

## 2022-05-25 DIAGNOSIS — E27.49 HEMORRHAGE OF BOTH ADRENAL GLANDS (HCC): ICD-10-CM

## 2022-05-25 DIAGNOSIS — D68.61 CATASTROPHIC ANTIPHOSPHOLIPID SYNDROME (HCC): ICD-10-CM

## 2022-05-25 DIAGNOSIS — I82.210 THROMBOSIS OF SUPERIOR VENA CAVA (HCC): ICD-10-CM

## 2022-05-25 DIAGNOSIS — N93.9 VAGINAL BLEEDING: ICD-10-CM

## 2022-05-25 LAB
INR BLD: 4.1 (ref 0.9–1.2)
INR PPP: 4.1 (ref 2–3.5)

## 2022-05-25 PROCEDURE — 99212 OFFICE O/P EST SF 10 MIN: CPT

## 2022-05-25 PROCEDURE — 85610 PROTHROMBIN TIME: CPT

## 2022-05-25 NOTE — PROGRESS NOTES
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]  Hemorrhage of both adrenal glands (HCC) [E27.49]  Vaginal bleeding [N93.9]  Catastrophic antiphospholipid syndrome (HCC) [D68.61]             Anticoagulation Episode Summary     INR check location:      Preferred lab:      Send INR reminders to:      Comments:        Anticoagulation Care Providers     Provider Role Specialty Phone number    [facility] Responsible  [phone]                Refer to Patient Findings for HPI:  Patient Findings     Positives:  Change in alcohol use (More EtOH than usual)    Negatives:  Signs/symptoms of thrombosis, Signs/symptoms of bleeding, Laboratory test error suspected, Change in health, Change in activity, Upcoming invasive procedure, Emergency department visit, Upcoming dental procedure, Missed doses, Extra doses, Change in medications, Change in diet/appetite, Hospital admission, Bruising, Other complaints          There were no vitals filed for this visit.    Verified current warfarin dosing schedule.    Medications reconciled  Pt is not on antiplatelet therapy      A/P   INR  SUPRA-therapeutic.     Warfarin dosing recommendation: Instructed pt to HOLD x 1 dose and to then continue on w/ her current regimen.    Pt educated to contact our clinic with any changes in medications or s/s of bleeding or thrombosis. Pt is aware to seek immediate medical attention for falls, head injury or deep cuts.    Follow up appointment in 1 week(s).    Casey Daniels, PharmD, BCACP

## 2022-06-01 ENCOUNTER — ANTICOAGULATION VISIT (OUTPATIENT)
Dept: VASCULAR LAB | Facility: MEDICAL CENTER | Age: 47
End: 2022-06-01
Attending: INTERNAL MEDICINE
Payer: COMMERCIAL

## 2022-06-01 VITALS — HEART RATE: 76 BPM | SYSTOLIC BLOOD PRESSURE: 114 MMHG | DIASTOLIC BLOOD PRESSURE: 71 MMHG

## 2022-06-01 DIAGNOSIS — E27.49 HEMORRHAGE OF BOTH ADRENAL GLANDS (HCC): ICD-10-CM

## 2022-06-01 DIAGNOSIS — N93.9 VAGINAL BLEEDING: ICD-10-CM

## 2022-06-01 DIAGNOSIS — I82.210 THROMBOSIS OF SUPERIOR VENA CAVA (HCC): ICD-10-CM

## 2022-06-01 DIAGNOSIS — D68.61 CATASTROPHIC ANTIPHOSPHOLIPID SYNDROME (HCC): ICD-10-CM

## 2022-06-01 LAB — INR PPP: 1.7 (ref 2–3.5)

## 2022-06-01 PROCEDURE — 99212 OFFICE O/P EST SF 10 MIN: CPT

## 2022-06-01 PROCEDURE — 85610 PROTHROMBIN TIME: CPT

## 2022-06-01 NOTE — PROGRESS NOTES
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   pt declined vitals    Verified current warfarin dosing schedule.    Medications reconciled   Pt is not on antiplatelet therapy      A/P   INR  SUB-therapeutic.     Warfarin dosing recommendation: Suggested enoxaparin, pt declines.  Bolus 10 mg x 2 days then Pt is to continue with current warfarin dosing regimen.     Pt educated to contact our clinic with any changes in medications or s/s of bleeding or thrombosis. Pt is aware to seek immediate medical attention for falls, head injury or deep cuts.    Follow up appointment in 1 weeks (earliets she can return)    Landon Milton, EvanD

## 2022-06-02 LAB — INR BLD: 1.7 (ref 0.9–1.2)

## 2022-06-09 ENCOUNTER — ANTICOAGULATION VISIT (OUTPATIENT)
Dept: VASCULAR LAB | Facility: MEDICAL CENTER | Age: 47
End: 2022-06-09
Attending: INTERNAL MEDICINE
Payer: COMMERCIAL

## 2022-06-09 DIAGNOSIS — E27.49 HEMORRHAGE OF BOTH ADRENAL GLANDS (HCC): ICD-10-CM

## 2022-06-09 DIAGNOSIS — N93.9 VAGINAL BLEEDING: ICD-10-CM

## 2022-06-09 DIAGNOSIS — D68.61 CATASTROPHIC ANTIPHOSPHOLIPID SYNDROME (HCC): ICD-10-CM

## 2022-06-09 DIAGNOSIS — I82.210 THROMBOSIS OF SUPERIOR VENA CAVA (HCC): ICD-10-CM

## 2022-06-09 LAB
INR BLD: 1.8 (ref 0.9–1.2)
INR PPP: 1.8 (ref 2–3.5)

## 2022-06-09 PROCEDURE — 85610 PROTHROMBIN TIME: CPT

## 2022-06-09 PROCEDURE — 99212 OFFICE O/P EST SF 10 MIN: CPT

## 2022-06-09 NOTE — PROGRESS NOTES
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   Changes to current medical/health status since last appt: none  Denies signs/symptoms of bleeding and/or thrombosis since the last appt.    Reports eating more greens and will continue eating more greens.   Denies any interval changes to medications since last appt.   Denies any complications or cost restrictions with current therapy.     A/P   INR  SUB-therapeutic.   Offered enoxaparin, pt declines.   Bolus 10 mg today and begin 10%increased regimen for diet changes.     Next INR in 1 week(s).    Landon Milton, PharmD

## 2022-06-16 ENCOUNTER — ANTICOAGULATION VISIT (OUTPATIENT)
Dept: VASCULAR LAB | Facility: MEDICAL CENTER | Age: 47
End: 2022-06-16
Attending: INTERNAL MEDICINE
Payer: COMMERCIAL

## 2022-06-16 DIAGNOSIS — N93.9 VAGINAL BLEEDING: ICD-10-CM

## 2022-06-16 DIAGNOSIS — E27.49 HEMORRHAGE OF BOTH ADRENAL GLANDS (HCC): ICD-10-CM

## 2022-06-16 DIAGNOSIS — I82.210 THROMBOSIS OF SUPERIOR VENA CAVA (HCC): ICD-10-CM

## 2022-06-16 DIAGNOSIS — D68.61 CATASTROPHIC ANTIPHOSPHOLIPID SYNDROME (HCC): ICD-10-CM

## 2022-06-16 LAB
INR BLD: 2.1 (ref 0.9–1.2)
INR PPP: 2.1 (ref 2–3.5)

## 2022-06-16 PROCEDURE — 85610 PROTHROMBIN TIME: CPT

## 2022-06-16 PROCEDURE — 99211 OFF/OP EST MAY X REQ PHY/QHP: CPT

## 2022-06-16 NOTE — PROGRESS NOTES
.    [facility] Anticoagulation Service Note    Date: 2022  There were no vitals filed for this visit.   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]  Hemorrhage of both adrenal glands (HCC) [E27.49]  Vaginal bleeding [N93.9]  Catastrophic antiphospholipid syndrome (HCC) [D68.61]             Anticoagulation Episode Summary     INR check location:      Preferred lab:      Send INR reminders to:      Comments:        Anticoagulation Care Providers     Provider Role Specialty Phone number    [facility] Responsible  [phone]            HPI:   Jaclyn Olvera seen in clinic today, on anticoagulation therapy with warfarin (a high risk medication) for APS      Pt is here today to evaluate anticoagulation therapy    Previous INR was  1.8 on 22    Pt was instructed to take 10 mg then increase regimen    Anticoagulation Patient Findings  Patient Findings     Positives:  Change in diet/appetite (more salads d/t weather change)    Negatives:  Signs/symptoms of thrombosis, Signs/symptoms of bleeding, Laboratory test error suspected, Change in health, Change in alcohol use, Change in activity, Upcoming invasive procedure, Emergency department visit, Upcoming dental procedure, Missed doses, Extra doses, Change in medications, Hospital admission, Bruising, Other complaints          Confirmed warfarin dosing regimen    Pt is not on antiplatelet/NSAID therapy    Falls or accidents since last visit No        A/P   INR  SUB-therapeutic today, will require dose adjust ment today to prevent  thrombosis and closer follow up.    Tonight 10 mg then increase weekly regimen. Pt started using a pill box so she knows if she missed a pill         Pt educated to contact our clinic with any changes in medications or s/s of bleeding or thrombosis. Pt is aware to seek immediate medical attention for falls, head injury or deep cuts    Follow up appointment in 1 week(s) to reduce risk of adverse events from warfarin  Laura Bain, PharmD

## 2022-06-24 ENCOUNTER — ANTICOAGULATION VISIT (OUTPATIENT)
Dept: VASCULAR LAB | Facility: MEDICAL CENTER | Age: 47
End: 2022-06-24
Attending: INTERNAL MEDICINE
Payer: COMMERCIAL

## 2022-06-24 DIAGNOSIS — I82.210 THROMBOSIS OF SUPERIOR VENA CAVA (HCC): ICD-10-CM

## 2022-06-24 DIAGNOSIS — N93.9 VAGINAL BLEEDING: ICD-10-CM

## 2022-06-24 DIAGNOSIS — D68.61 CATASTROPHIC ANTIPHOSPHOLIPID SYNDROME (HCC): ICD-10-CM

## 2022-06-24 DIAGNOSIS — E27.49 HEMORRHAGE OF BOTH ADRENAL GLANDS (HCC): ICD-10-CM

## 2022-06-24 LAB — INR PPP: 4.6 (ref 2–3.5)

## 2022-06-24 PROCEDURE — 85610 PROTHROMBIN TIME: CPT

## 2022-06-24 PROCEDURE — 99212 OFFICE O/P EST SF 10 MIN: CPT

## 2022-06-24 NOTE — PROGRESS NOTES
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]  Hemorrhage of both adrenal glands (HCC) [E27.49]  Vaginal bleeding [N93.9]  Catastrophic antiphospholipid syndrome (HCC) [D68.61]             Anticoagulation Episode Summary     INR check location:      Preferred lab:      Send INR reminders to:      Comments:        Anticoagulation Care Providers     Provider Role Specialty Phone number    [facility] Responsible  [phone]        Refer to Patient Findings for HPI:  Patient Findings     Positives:  Change in alcohol use (Pt did have alcohol this past weekend, but nothing \"out of the ordinary\")    Negatives:  Signs/symptoms of thrombosis, Signs/symptoms of bleeding, Laboratory test error suspected, Change in health, Change in activity, Upcoming invasive procedure, Emergency department visit, Upcoming dental procedure, Missed doses, Extra doses, Change in medications, Change in diet/appetite, Hospital admission, Bruising, Other complaints        There were no vitals filed for this visit.  The pt declined vitals today     Patient seen in clinic today for follow up on anticoagulation therapy with warfarin (a high risk medication) for hx of anitphospholipid syndrome  Verified current warfarin dosing schedule.  Patient denies any missed doses of warfarin.    Medications reconciled   Pt is not on antiplatelet therapy      A/P   INR is SUPRA-therapeutic today at 4.6.     Warfarin dosing recommendation: Patient instructed to HOLD warfarin dose TONIGHT ONLY, then to resume her current dosing regimen.   Patient " asked to retest again in 1 week.     Pt educated to contact our clinic with any changes in medications or s/s of bleeding or thrombosis. Pt is aware to seek immediate medical attention for falls, head injury or deep cuts.    Follow up appointment in 1 week(s).    Next appt: Friday, July 1 @ 7:30am     Andrew Shi PharmD

## 2022-06-27 LAB — INR BLD: 4.6 (ref 0.9–1.2)

## 2022-07-01 ENCOUNTER — ANTICOAGULATION VISIT (OUTPATIENT)
Dept: VASCULAR LAB | Facility: MEDICAL CENTER | Age: 47
End: 2022-07-01
Attending: INTERNAL MEDICINE
Payer: COMMERCIAL

## 2022-07-01 DIAGNOSIS — D68.61 CATASTROPHIC ANTIPHOSPHOLIPID SYNDROME (HCC): ICD-10-CM

## 2022-07-01 DIAGNOSIS — N93.9 VAGINAL BLEEDING: ICD-10-CM

## 2022-07-01 DIAGNOSIS — E27.49 HEMORRHAGE OF BOTH ADRENAL GLANDS (HCC): ICD-10-CM

## 2022-07-01 DIAGNOSIS — I82.210 THROMBOSIS OF SUPERIOR VENA CAVA (HCC): ICD-10-CM

## 2022-07-01 LAB — INR PPP: 3.1 (ref 2–3.5)

## 2022-07-01 PROCEDURE — 85610 PROTHROMBIN TIME: CPT

## 2022-07-01 PROCEDURE — 99211 OFF/OP EST MAY X REQ PHY/QHP: CPT

## 2022-07-01 NOTE — PROGRESS NOTES
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    Medications reconciled   Pt is not on antiplatelet therapy      A/P   INR  therapeutic.     Warfarin dosing recommendation: Pt is to continue with current warfarin dosing regimen.     Pt educated to contact our clinic with any changes in medications or s/s of bleeding or thrombosis. Pt is aware to seek immediate medical attention for falls, head injury or deep cuts.    Follow up appointment in 1 week(s).    Landon Milton, PharmD

## 2022-07-05 ENCOUNTER — OFFICE VISIT (OUTPATIENT)
Dept: RHEUMATOLOGY | Facility: MEDICAL CENTER | Age: 47
End: 2022-07-05
Payer: COMMERCIAL

## 2022-07-05 VITALS
OXYGEN SATURATION: 98 % | RESPIRATION RATE: 16 BRPM | WEIGHT: 192 LBS | HEIGHT: 68 IN | SYSTOLIC BLOOD PRESSURE: 102 MMHG | DIASTOLIC BLOOD PRESSURE: 80 MMHG | TEMPERATURE: 97.7 F | BODY MASS INDEX: 29.1 KG/M2 | HEART RATE: 72 BPM

## 2022-07-05 DIAGNOSIS — D68.61 ANTIPHOSPHOLIPID SYNDROME (HCC): ICD-10-CM

## 2022-07-05 DIAGNOSIS — M25.512 CHRONIC LEFT SHOULDER PAIN: ICD-10-CM

## 2022-07-05 DIAGNOSIS — Z79.899 LONG-TERM USE OF HYDROXYCHLOROQUINE: ICD-10-CM

## 2022-07-05 DIAGNOSIS — R22.9 MULTIPLE SKIN NODULES: ICD-10-CM

## 2022-07-05 DIAGNOSIS — G89.29 CHRONIC LEFT SHOULDER PAIN: ICD-10-CM

## 2022-07-05 LAB — INR BLD: 3.1 (ref 0.9–1.2)

## 2022-07-05 PROCEDURE — 99214 OFFICE O/P EST MOD 30 MIN: CPT | Performed by: STUDENT IN AN ORGANIZED HEALTH CARE EDUCATION/TRAINING PROGRAM

## 2022-07-05 RX ORDER — HYDROXYCHLOROQUINE SULFATE 200 MG/1
400 TABLET, FILM COATED ORAL 2 TIMES DAILY
COMMUNITY
End: 2022-07-05

## 2022-07-05 RX ORDER — HYDROXYCHLOROQUINE SULFATE 200 MG/1
400 TABLET, FILM COATED ORAL DAILY
Qty: 60 TABLET | Refills: 5 | Status: SHIPPED | OUTPATIENT
Start: 2022-07-05 | End: 2022-08-04

## 2022-07-05 ASSESSMENT — FIBROSIS 4 INDEX: FIB4 SCORE: 0.79

## 2022-07-05 ASSESSMENT — PAIN SCALES - GENERAL: PAINLEVEL: NO PAIN

## 2022-07-05 NOTE — PATIENT INSTRUCTIONS
AFTER VISIT INSTRUCTIONS    Below are important information to help you navigate your healthcare needs and help us serve you safely and effectively:  If laboratory tests and/or imaging studies were ordered, remember to go get them done.  If new prescriptions or refills were sent to the pharmacy, remember to go pick them up.  Take your medications exactly as prescribed unless instructed otherwise.  Follow up with your primary care provider and/or specialists as scheduled.  If there are significant findings from your lab tests and imaging studies, I will notify you with explanations via Mind[employer] or phone call, otherwise you can view them on [email or web address] and let me know if you have any questions.  Sign up for [email or web address] if you have not already done so, in order to have access to the results of your lab tests and imaging studies, and to be able to send and receive messages from me.  Note that [email or web address] messaging is for non-urgent matters that do not require immediate attention and are typically read only during office hours, so do not expect immediate responses from me.

## 2022-07-05 NOTE — PROGRESS NOTES
[facility]  [address], [address], [place] [address]  Phone: [phone] / Fax: [phone]    RHEUMATOLOGY FOLLOW-UP VISIT NOTE      DATE OF SERVICE: 07/05/2022    PRIMARY CARE PROVIDER:   Davi Mortensen P.A.-C.  [address]  [place] [address]    LAST CLINIC VISIT:  4/5/2022      SUBJECTIVE:     CHIEF COMPLAINT:   Chief Complaint   Patient presents with   • Follow-Up     Antiphospholipid Syndrome        RHEUMATOLOGIC HISTORY:  Jaclyn Olvera is a 47 y.o. female with pertinent history notable for triple positive antiphospholipid syndrome (positive anti-CL, anti-B2GP1, and LA) diagnosed in 6/2021 in the setting of a catastrophic episode with SVC thrombosis and bilateral adrenal hemorrhage. Since then she has been on long-term anticoagulation with warfarin, and hydroxychloroquine was started in 2/2022 by hem/onc at [facility]. She initially presented on 4/5/2022, reportet onset of joint symptoms in 8/2021 with joint pain in her shoulders and hands, associated with morning stiffness lasting all day and improving with activity. She also reported a history of multiple skin bumps on her upper/lower extremities since around 2016 s/p biopsy with unrevealing results, as well as some hip pain with sciatica.    Pertinent treatments to date: Warfarin (6/2021-present), hydroxychloroquine 400 mg daily (2/2022-present).  Pertinent labs as of 6/2021-5/2022: Positive IgG anti-CL of 146, IgA anti-CL of 21, IgG anti-B2GP1 of 71, anti-B2MG of 2.9, and lupus anticoagulant with prolonged DRVV time of 107.2 sec; high ESR 67 (from >140 in 6/2021), CRP 14 (from 27.58 in 6/2021), fibrinogen 777, TSH of 8.53 with normal fT4 of 1.25; negative/normal IgM anti-CL, IgM anti-B2GP1, CUONG, ANCA, RF, ACPA, HBV, HCV, and IgG subclasses (in 5/2022).    INTERVAL HISTORY:  Left shoulder pain persists, and multiple skin nodules on upper/lower extremities persist.  Right shoulder pain resolved,  and she is generally doing quite well with no significant reports.  Would like to transition from warfarin to a DOAC so that she can stop weekly blood draws for warfarin.    REVIEW OF SYSTEMS:  Except as noted in the history above, a complete review of systems with emphasis on autoimmune inflammatory conditions was otherwise negative for any significant symptoms.      ACTIVE PROBLEM LIST:  Patient Active Problem List   Diagnosis   • Thrombosis of superior vena cava (HCC)   • H. pylori infection   • Hyponatremia   • Kidney lesion, native, right   • Lesion of cervix   • Leukocytosis   • Fibroids   • Hyperbilirubinemia   • Hemorrhage of both adrenal glands (HCC)   • Retroperitoneal lymphadenopathy   • Vaginal bleeding   • Microcytic anemia   • Thrombocytopenia (HCC)   • Elevated C-reactive protein (CRP)   • Sepsis (HCC)   • Elevated liver function tests   • Antiphospholipid syndrome (HCC)   • Anemia   • Black stool   • Epigastric pain   • Multiple skin nodules   • Chronic pain of both shoulders   • Long-term use of hydroxychloroquine   No problem-specific Assessment & Plan notes found for this encounter.      PAST MEDICAL HISTORY:  Past Medical History:   Diagnosis Date   • Antiphospholipid antibody syndrome (HCC)    • H/O thrombosis of vena cava    • Hemorrhage of both adrenal glands (HCC)    • PE (pulmonary embolism)        PAST SURGICAL HISTORY:  Past Surgical History:   Procedure Laterality Date   • GA UPPER GI ENDOSCOPY,DIAGNOSIS N/A 6/17/2021    Procedure: GASTROSCOPY;  Surgeon: Elfego Lopez M.D.;  Location: SURGERY SAME DAY [facility];  Service: Gastroenterology   • GA UPPER GI ENDOSCOPY,BIOPSY N/A 6/17/2021    Procedure: GASTROSCOPY, WITH BIOPSY;  Surgeon: Elfego Lopez M.D.;  Location: SURGERY SAME DAY [facility];  Service: Gastroenterology       MEDICATIONS:  Current Outpatient Medications   Medication Sig Dispense Refill   • hydroxychloroquine (PLAQUENIL) 200 MG Tab Take 2 Tablets by mouth every day  "for 30 days. 60 Tablet 5   • warfarin (COUMADIN) 5 MG Tab Take 1-2 Tablets by mouth every day. Or as directed 60 Tablet 5   • Vitamin D, Cholecalciferol, 50 MCG (2000 UT) Cap Take 1 Capsule by mouth every day.     • Biotin w/ Vitamins C & E (HAIR/SKIN/NAILS PO) Take 1 Tablet by mouth every day.     • warfarin (COUMADIN) 2.5 MG Tab Take 1 Tablet by mouth every day. (Patient taking differently: Take 2.5 mg by mouth every day. 10 mg) 30 Tablet 11     No current facility-administered medications for this visit.       ALLERGIES:   No Known Allergies    IMMUNIZATIONS:  Immunization History   Administered Date(s) Administered   • PFIZER PURPLE CAP SARS-COV-2 VACCINATION (12+) 03/30/2021, 04/20/2021       SOCIAL HISTORY:   Social History     Tobacco Use   • Smoking status: Current Some Day Smoker     Types: Cigarettes   • Smokeless tobacco: Current User   • Tobacco comment: 1 pk per week   Vaping Use   • Vaping Use: Never used   Substance Use Topics   • Alcohol use: Yes     Comment: occasionally   • Drug use: Not Currently       FAMILY HISTORY:  No family history on file.     OBJECTIVE:     Vital Signs: /80 (BP Location: Left arm, Patient Position: Sitting, BP Cuff Size: Large adult)   Pulse 72   Temp 36.5 °C (97.7 °F) (Temporal)   Resp 16   Ht 1.727 m (5' 8\")   Wt 87.1 kg (192 lb)   SpO2 98% Body mass index is 29.19 kg/m².    General: Appears well and comfortable  Eyes: No scleral or conjunctival lesions  ENT: No apparent oral or nasal lesions  Head/Neck: No apparent scalp or neck lesions  Cardiovascular: Regular rate and rhythm  Respiratory: Breathing quiet and unlabored  Gastrointestinal: No organomegaly or abdominal masses  Integumentary: Multiple skin nodules on bilateral upper extremities, especially on forearms; no significant skin rashes  Musculoskeletal: Range of motion of left shoulder minimally limited by pain; no periarticular soft tissue swelling, warmth, erythema, or overt signs of " synovitis  Neurologic: No focal sensory or motor deficits  Psychiatric: Mood and affect appropriate      LABORATORY RESULTS REVIEWED AND INTERPRETED BY ME:  Lab Results   Component Value Date/Time    SEDRATEWES >140 (H) 06/18/2021 11:13 AM    CREACTPROT 27.58 (H) 06/18/2021 07:31 AM    FIBRINOGEN 777 (H) 06/22/2021 02:51 PM     Lab Results   Component Value Date/Time    RHEUMFACTN <10 06/20/2021 06:34 AM    CCPANTIBODY 8 06/20/2021 06:34 AM     Lab Results   Component Value Date/Time    ANTINUCAB None Detected 06/20/2021 06:34 AM     Lab Results   Component Value Date/Time    SMITHAB 6 06/20/2021 06:34 AM    RNPAB See Note 06/20/2021 06:34 AM    SSA60 1 06/20/2021 06:34 AM    SSA52 3 06/20/2021 06:34 AM    ANTISSBSJ 0 06/20/2021 06:34 AM     Lab Results   Component Value Date/Time    ANCAIGG <1:20 06/21/2021 12:13 PM     Lab Results   Component Value Date/Time    IGACARDIOLI 21 (H) 06/20/2021 06:34 AM    IGMCARDIOLI 11 06/20/2021 06:34 AM    IGGCARDIOLI 146 (H) 06/20/2021 06:34 AM    RUSSELVIPER 107.2 (H) 06/20/2021 06:34 AM    PROTHROMBTM 25.6 (H) 01/27/2022 07:15 AM    INR 3.10 07/01/2022 12:00 AM     Lab Results   Component Value Date/Time    TSHULTRASEN 8.530 (H) 06/19/2021 05:57 AM    FREET4 1.25 06/19/2021 05:57 AM     Lab Results   Component Value Date/Time    HEPBSAG Non-Reactive 06/21/2021 12:13 PM    HEPBCORIGM Non-Reactive 06/21/2021 12:13 PM    HEPCAB Non-Reactive 06/21/2021 12:13 PM     Lab Results   Component Value Date/Time    ASTSGOT 17 01/28/2022 05:18 AM    ALTSGPT 25 01/28/2022 05:18 AM    ALKPHOSPHAT 54 01/28/2022 05:18 AM    TBILIRUBIN 0.5 01/28/2022 05:18 AM    TOTPROTEIN 6.5 01/28/2022 05:18 AM    ALBUMIN 3.6 01/28/2022 05:18 AM     Lab Results   Component Value Date/Time    SODIUM 137 01/28/2022 05:18 AM    POTASSIUM 4.1 01/28/2022 05:18 AM    CHLORIDE 104 01/28/2022 05:18 AM    CO2 21 01/28/2022 05:18 AM    GLUCOSE 98 01/28/2022 05:18 AM    BUN 12 01/28/2022 05:18 AM    CREATININE 0.64  01/28/2022 05:18 AM    BUNCREATRAT 14 11/05/2021 04:07 AM    CALCIUM 9.0 01/28/2022 05:18 AM    MAGNESIUM 1.7 06/22/2021 06:14 AM     Lab Results   Component Value Date/Time    WBC 6.5 02/06/2022 11:17 AM    RBC 4.51 02/06/2022 11:17 AM    HEMOGLOBIN 9.4 (L) 02/06/2022 11:17 AM    HEMATOCRIT 34.5 (L) 02/06/2022 11:17 AM    MCV 76.5 (L) 02/06/2022 11:17 AM    MCH 20.8 (L) 02/06/2022 11:17 AM    MCHC 27.2 (L) 02/06/2022 11:17 AM    RDW 63.9 (H) 02/06/2022 11:17 AM    PLATELETCT 199 02/06/2022 11:17 AM    MPV 10.2 02/06/2022 11:17 AM    NEUTS 4.27 02/06/2022 11:17 AM    LYMPHOCYTES 25.70 02/06/2022 11:17 AM    MONOCYTES 5.80 02/06/2022 11:17 AM    EOSINOPHILS 2.00 02/06/2022 11:17 AM    BASOPHILS 0.50 02/06/2022 11:17 AM    HYPOCHROMIA 2+ (A) 01/24/2022 10:57 AM    ANISOCYTOSIS 3+ (A) 02/06/2022 11:17 AM     Lab Results   Component Value Date/Time    FERRITIN 10.4 01/27/2022 07:15 AM    IRON 132 01/27/2022 07:15 AM    TRANSFERRIN 307 01/27/2022 07:15 AM    FOLATE 7.8 06/22/2021 02:51 PM    HAPTOGLOBIN 284 (H) 06/24/2021 06:29 PM     Lab Results   Component Value Date/Time    25HYDROXY 20.9 (L) 11/05/2021 04:07 AM     Lab Results   Component Value Date/Time    COLORURINE Yellow 11/05/2021 04:07 AM    COLORURINE Yellow 06/24/2021 05:08 PM    SPECGRAVITY 1.008 11/05/2021 04:07 AM    SPECGRAVITY 1.009 06/24/2021 05:08 PM    PHURINE 6.0 11/05/2021 04:07 AM    PHURINE 5.0 06/24/2021 05:08 PM    GLUCOSEUR Negative 11/05/2021 04:07 AM    GLUCOSEUR Negative 06/24/2021 05:08 PM    KETONES Negative 11/05/2021 04:07 AM    KETONES Negative 06/24/2021 05:08 PM    PROTEINURIN Negative 11/05/2021 04:07 AM    PROTEINURIN Negative 06/24/2021 05:08 PM     Lab Results   Component Value Date/Time    TOTPROTUR 6.0 06/24/2021 05:08 PM    MICROALBUR 1.2 06/24/2021 05:08 PM    CREATININEU 31.76 06/24/2021 05:08 PM    MALBCRT 39 (H) 06/24/2021 05:08 PM     Lab Results   Component Value Date/Time    HBA1C 5.7 (H) 01/21/2022 07:02 AM        RADIOLOGY RESULTS REVIEWED AND INTERPRETED BY ME:  Results for orders placed during the hospital encounter of 05/30/10    DX-KNEE COMPLETE 4+    Results for orders placed during the hospital encounter of 09/07/21    US-RENAL    Impression  1.  Resolution of previously seen left hydronephrosis.  2.  There is a hypoechoic 2.8 cm mass along the inferior right margin of the liver, this is likely evolving hematoma from patient's known adrenal hemorrhage.      All relevant laboratory and imaging results reported on this note were reviewed and interpreted by me.      ASSESSMENT AND PLAN:     Jaclyn Olvera is a 47 y.o. female with history as noted above whose presentation merits the following diagnostic and clinical status impressions and recommendations:    1. Antiphospholipid syndrome (HCC)  Clinical picture suggests relatively low disease activity reasonably well-controlled on the current regimen with no evidence of evolving or impending flare of venous or arterial thrombosis. Current evidence-based guideline favor use of warfarin over DOAC in long-term anticoagulation for secondary prevention of APS-associated thrombosis. However, newer studies are increasingly showing that DOACs are non-inferior to warfarin for long-term anticoagulation in APS (reference below), particularly in the setting of hydroxychloroquine use which has immunomodulatory and protective anticoagulant effect against APL antibodies.  - Hydroxychloroquine (PLAQUENIL) 200 MG Tab; Take 2 Tablets by mouth every day for 30 days.  Dispense: 60 Tablet; Refill: 5  - Consider switching from warfarin to rivaroxaban or apixaban (will discuss with hem/onc)    2. Multiple skin nodules  Etiology unclear but a fibroinflammatory condition remains high on the differential.  - Referral to Dermatology (for punch biopsy)    3. Chronic left shoulder pain  Presumably secondary to osteoarthritis but need to confirm with radiograph.  - XR left shoulder (previously  ordered)    4. Long-term use of hydroxychloroquine  Risk of retinal toxicity is considered minimal in this case given the low dose of hydroxychloroquine prescribed (less than 5 mg/kg of body weight) per rheumatology/ophthalmology guidelines. However, ophthalmologic evaluation is still recommended with the frequency of routine follow-up eye exams determined by the ophthalmologist, typically annually or every other year.  - Follow up with ophthalmology as recommended      REFERENCE:  · Jorge S, Whitney S, David R, et al. Role of Direct Oral Anticoagulation Agents as Thromboprophylaxis in Antiphospholipid Syndrome. Cureus. 2021 Oct 24;13(10):c13671. doi: 10.7759/cureus.69061. PMID: 18942316; PMCID: DNB5012204.      FOLLOW-UP: Return in about 4 months (around 11/5/2022) for Short.           Thank you for giving me the opportunity to participate in the care of Jaclyn Olvera.    Nicanor Orlando MD, MS  Rheumatologist, [facility]  , Department of Internal Medicine  [facility]

## 2022-07-05 NOTE — Clinical Note
Nicolas Varela,  This patient wishes to stop warfarin and switch to a DOAC for her APS. Based on her clinical picture and the current evidence in literature (see my note below), I would be okay with this, but wanted to confer with you in case you have any additional thoughts. If you agree, would you be willing to prescribe Xarelto or Eliquis for her, or would you rather have me handle it from here?  Nicanor Orlando M.D.

## 2022-07-08 ENCOUNTER — ANTICOAGULATION VISIT (OUTPATIENT)
Dept: VASCULAR LAB | Facility: MEDICAL CENTER | Age: 47
End: 2022-07-08
Attending: INTERNAL MEDICINE
Payer: COMMERCIAL

## 2022-07-08 DIAGNOSIS — E27.49 HEMORRHAGE OF BOTH ADRENAL GLANDS (HCC): ICD-10-CM

## 2022-07-08 DIAGNOSIS — D68.61 ANTIPHOSPHOLIPID SYNDROME (HCC): ICD-10-CM

## 2022-07-08 DIAGNOSIS — I82.210 THROMBOSIS OF SUPERIOR VENA CAVA (HCC): ICD-10-CM

## 2022-07-08 DIAGNOSIS — N93.9 VAGINAL BLEEDING: ICD-10-CM

## 2022-07-08 LAB
INR BLD: 4.4 (ref 0.9–1.2)
INR PPP: 4.4 (ref 2–3.5)

## 2022-07-08 PROCEDURE — 99212 OFFICE O/P EST SF 10 MIN: CPT

## 2022-07-08 PROCEDURE — 85610 PROTHROMBIN TIME: CPT

## 2022-07-08 NOTE — PROGRESS NOTES
Anticoagulation Summary  As of 2022    INR goal:  2.5-3.5   TTR:  37.6 % (1 y)   INR used for dosin.40 (2022)   Warfarin maintenance plan:  10 mg (5 mg x 2) every Mon, Wed, Fri; 7.5 mg (5 mg x 1.5) all other days   Weekly warfarin total:  60 mg   Plan last modified:  Laura Bain PharmD (2022)   Next INR check:  7/15/2022   Target end date:  Indefinite    Indications    Thrombosis of superior vena cava (HCC) [I82.210]  Hemorrhage of both adrenal glands (HCC) [E27.49]  Vaginal bleeding [N93.9]  Antiphospholipid syndrome (HCC) [D68.61]             Anticoagulation Episode Summary     INR check location:      Preferred lab:      Send INR reminders to:      Comments:        Anticoagulation Care Providers     Provider Role Specialty Phone number    [facility] Responsible  [phone]           Refer to Patient Findings for HPI:  Patient Findings     Negatives:  Signs/symptoms of thrombosis, Signs/symptoms of bleeding, Laboratory test error suspected, Change in health, Change in alcohol use, Change in activity, Upcoming invasive procedure, Emergency department visit, Upcoming dental procedure, Missed doses, Extra doses, Change in medications, Change in diet/appetite, Hospital admission, Bruising, Other complaints          There were no vitals filed for this visit.  pt declined vitals    Verified current warfarin dosing schedule.    Medications reconciled  Pt is not on antiplatelet therapy      A/P   INR  is supra therapeutic she forgot to have servings of broccoli the past week. She intends on resuming vit K consumption     Warfarin dosing recommendation: Patient is to HOLD dose tonight, then continue on current regimen    Pt educated to contact our clinic with any changes in medications or s/s of bleeding or thrombosis. Pt is aware to seek immediate medical attention for falls, head injury or deep cuts.    Follow up appointment in 1 week(s).    Edilberto Enciso, EvanD

## 2022-07-15 ENCOUNTER — ANTICOAGULATION VISIT (OUTPATIENT)
Dept: VASCULAR LAB | Facility: MEDICAL CENTER | Age: 47
End: 2022-07-15
Attending: INTERNAL MEDICINE
Payer: COMMERCIAL

## 2022-07-15 DIAGNOSIS — N93.9 VAGINAL BLEEDING: ICD-10-CM

## 2022-07-15 DIAGNOSIS — D68.61 ANTIPHOSPHOLIPID SYNDROME (HCC): ICD-10-CM

## 2022-07-15 DIAGNOSIS — E27.49 HEMORRHAGE OF BOTH ADRENAL GLANDS (HCC): ICD-10-CM

## 2022-07-15 DIAGNOSIS — I82.210 THROMBOSIS OF SUPERIOR VENA CAVA (HCC): ICD-10-CM

## 2022-07-15 LAB
INR BLD: 3.1 (ref 0.9–1.2)
INR PPP: 3.1 (ref 2–3.5)

## 2022-07-15 PROCEDURE — 99211 OFF/OP EST MAY X REQ PHY/QHP: CPT

## 2022-07-15 PROCEDURE — 85610 PROTHROMBIN TIME: CPT

## 2022-07-15 NOTE — PROGRESS NOTES
Anticoagulation Summary  As of 7/15/2022    INR goal:  2.5-3.5   TTR:  37.6 % (1 y)   INR used for dosing:  3.10 (7/15/2022)   Warfarin maintenance plan:  10 mg (5 mg x 2) every Mon, Wed, Fri; 7.5 mg (5 mg x 1.5) all other days   Weekly warfarin total:  60 mg   Plan last modified:  Laura Bain, PharmD (6/16/2022)   Next INR check:  7/22/2022   Target end date:  Indefinite    Indications    Thrombosis of superior vena cava (HCC) [I82.210]  Hemorrhage of both adrenal glands (HCC) [E27.49]  Vaginal bleeding [N93.9]  Antiphospholipid syndrome (HCC) [D68.61]             Anticoagulation Episode Summary     INR check location:      Preferred lab:      Send INR reminders to:      Comments:        Anticoagulation Care Providers     Provider Role Specialty Phone number    [facility] Responsible  [phone]        Refer to Patient Findings for HPI:  Patient Findings     Negatives:  Signs/symptoms of thrombosis, Signs/symptoms of bleeding, Laboratory test error suspected, Change in health, Change in alcohol use, Change in activity, Upcoming invasive procedure, Emergency department visit, Upcoming dental procedure, Missed doses, Extra doses, Change in medications, Change in diet/appetite, Hospital admission, Bruising, Other complaints        There were no vitals filed for this visit.  The pt declined vitals today     Patient seen in clinic today for follow up on anticoagulation therapy with warfarin (a high risk medication) for hx of APLS and thrombosis of vena cava  Verified current warfarin dosing schedule.  Patient denies any missed doses of warfarin.    Medications reconciled   Pt is not on antiplatelet therapy      A/P   INR is therapeutic today at 3.1.     Warfarin dosing recommendation: Continue with the current dosing regimen.   Patient will follow up again in 1 week.     Pt educated to contact our clinic with any changes in medications or s/s of bleeding or thrombosis. Pt is aware to seek  immediate medical attention for falls, head injury or deep cuts.    Follow up appointment in 1 week(s).    Next appt: Friday, July 22 @ 7:45am     Andrew Shi  PharmD  Tracy Rios PharmD Intern

## 2022-07-21 ENCOUNTER — APPOINTMENT (RX ONLY)
Dept: URBAN - METROPOLITAN AREA CLINIC 22 | Facility: CLINIC | Age: 47
Setting detail: DERMATOLOGY
End: 2022-07-21

## 2022-07-21 DIAGNOSIS — D17 BENIGN LIPOMATOUS NEOPLASM: ICD-10-CM

## 2022-07-21 DIAGNOSIS — Z71.89 OTHER SPECIFIED COUNSELING: ICD-10-CM

## 2022-07-21 PROBLEM — D48.5 NEOPLASM OF UNCERTAIN BEHAVIOR OF SKIN: Status: ACTIVE | Noted: 2022-07-21

## 2022-07-21 PROCEDURE — 99212 OFFICE O/P EST SF 10 MIN: CPT

## 2022-07-21 PROCEDURE — ? COUNSELING

## 2022-07-21 PROCEDURE — ? DEFER

## 2022-07-21 PROCEDURE — ? PHOTO-DOCUMENTATION

## 2022-07-21 ASSESSMENT — LOCATION SIMPLE DESCRIPTION DERM
LOCATION SIMPLE: LEFT CALF
LOCATION SIMPLE: RIGHT CALF
LOCATION SIMPLE: RIGHT FOREARM
LOCATION SIMPLE: LEFT FOREARM
LOCATION SIMPLE: RIGHT PRETIBIAL REGION
LOCATION SIMPLE: LEFT PRETIBIAL REGION

## 2022-07-21 ASSESSMENT — LOCATION DETAILED DESCRIPTION DERM
LOCATION DETAILED: RIGHT DISTAL DORSAL FOREARM
LOCATION DETAILED: LEFT PROXIMAL CALF
LOCATION DETAILED: RIGHT PROXIMAL PRETIBIAL REGION
LOCATION DETAILED: LEFT PROXIMAL DORSAL FOREARM
LOCATION DETAILED: LEFT PROXIMAL PRETIBIAL REGION
LOCATION DETAILED: RIGHT DISTAL CALF
LOCATION DETAILED: RIGHT VENTRAL PROXIMAL FOREARM
LOCATION DETAILED: LEFT VENTRAL DISTAL FOREARM

## 2022-07-21 ASSESSMENT — LOCATION ZONE DERM
LOCATION ZONE: LEG
LOCATION ZONE: ARM

## 2022-07-21 NOTE — PROCEDURE: DEFER
Chief Complaint   Patient presents with    Follow-up     GBP 5/8/2019     Pt ID confirmed    Weight Loss Metrics 5/6/2020 5/6/2020 11/21/2019 11/21/2019 8/15/2019 8/15/2019 5/24/2019   Pre op / Initial Wt 295 - 295 - 295 - 295   Today's Wt - 168 lb 6.4 oz - 212 lb - 238 lb 3.2 oz -   BMI - 26.38 kg/m2 - 33.2 kg/m2 - 37.31 kg/m2 -   Ideal Body Wt 140 - 140 - 140 - 140   Excess Body Wt 155 - 155 - 155 - 155   Goal Wt - - 171 - 171 - 171   Wt loss to date 126.6 - - - 56.8 - 23   % Wt Loss 1.02 - - - 0.46 - 0.19   80%  - 124 - 124 - 124       Body mass index is 26.38 kg/m².     Post op medications:  MVI: flintstones twice daily  Calcium Citrate: 1500 milligrams  B-1 100 milligrams  B-12 1000 micrograms  Vit D 3 5000 units
Instructions (Optional): She is interested in removing 1 lesion to confirm they are Lipomas.
Procedure To Be Performed At Next Visit: Excision
Introduction Text (Please End With A Colon): The following procedure was deferred:
Detail Level: Zone

## 2022-07-22 ENCOUNTER — ANTICOAGULATION VISIT (OUTPATIENT)
Dept: VASCULAR LAB | Facility: MEDICAL CENTER | Age: 47
End: 2022-07-22
Attending: INTERNAL MEDICINE
Payer: COMMERCIAL

## 2022-07-22 DIAGNOSIS — N93.9 VAGINAL BLEEDING: ICD-10-CM

## 2022-07-22 DIAGNOSIS — E27.49 HEMORRHAGE OF BOTH ADRENAL GLANDS (HCC): ICD-10-CM

## 2022-07-22 DIAGNOSIS — I82.210 THROMBOSIS OF SUPERIOR VENA CAVA (HCC): ICD-10-CM

## 2022-07-22 DIAGNOSIS — D68.61 ANTIPHOSPHOLIPID SYNDROME (HCC): ICD-10-CM

## 2022-07-22 LAB
INR BLD: 4.3 (ref 0.9–1.2)
INR PPP: 4.3 (ref 2–3.5)

## 2022-07-22 PROCEDURE — 85610 PROTHROMBIN TIME: CPT

## 2022-07-22 PROCEDURE — 99212 OFFICE O/P EST SF 10 MIN: CPT

## 2022-07-22 NOTE — PROGRESS NOTES
Anticoagulation Summary  As of 2022    INR goal:  2.5-3.5   TTR:  37.1 % (1 y)   INR used for dosin.30 (2022)   Warfarin maintenance plan:  10 mg (5 mg x 2) every Mon, Wed, Fri; 7.5 mg (5 mg x 1.5) all other days   Weekly warfarin total:  60 mg   Plan last modified:  Laura Bain, PharmD (2022)   Next INR check:  2022   Target end date:  Indefinite    Indications    Thrombosis of superior vena cava (HCC) [I82.210]  Hemorrhage of both adrenal glands (HCC) [E27.49]  Vaginal bleeding [N93.9]  Antiphospholipid syndrome (HCC) [D68.61]             Anticoagulation Episode Summary     INR check location:      Preferred lab:      Send INR reminders to:      Comments:        Anticoagulation Care Providers     Provider Role Specialty Phone number    [facility] Responsible  [phone]                Refer to Patient Findings for HPI:  Patient Findings     Positives:  Change in diet/appetite    Negatives:  Signs/symptoms of thrombosis, Signs/symptoms of bleeding, Laboratory test error suspected, Change in health, Change in alcohol use, Change in activity, Upcoming invasive procedure, Emergency department visit, Upcoming dental procedure, Missed doses, Extra doses, Change in medications, Hospital admission, Bruising, Other complaints          There were no vitals filed for this visit.  pt declined vitals    Verified current warfarin dosing schedule.    Medications reconciled  Pt is not on antiplatelet therapy      A/P   INR  is supra-therapeutic.     Warfarin dosing recommendation: Patient did not have her routine serving of greens this past week.  She is to HOLD dose tonight, patient will keep green serving consistent for 1 week, if next INR elevated weekly dose will be decreased.    Pt educated to contact our clinic with any changes in medications or s/s of bleeding or thrombosis. Pt is aware to seek immediate medical attention for falls, head injury or deep cuts.    Follow up  appointment in 1 week(s).    Edilberto Enciso, EvanD

## 2022-07-29 ENCOUNTER — ANTICOAGULATION VISIT (OUTPATIENT)
Dept: VASCULAR LAB | Facility: MEDICAL CENTER | Age: 47
End: 2022-07-29
Attending: INTERNAL MEDICINE
Payer: COMMERCIAL

## 2022-07-29 DIAGNOSIS — I82.210 THROMBOSIS OF SUPERIOR VENA CAVA (HCC): ICD-10-CM

## 2022-07-29 DIAGNOSIS — N93.9 VAGINAL BLEEDING: ICD-10-CM

## 2022-07-29 DIAGNOSIS — E27.49 HEMORRHAGE OF BOTH ADRENAL GLANDS (HCC): ICD-10-CM

## 2022-07-29 DIAGNOSIS — D68.61 ANTIPHOSPHOLIPID SYNDROME (HCC): ICD-10-CM

## 2022-07-29 LAB
INR BLD: 2.8 (ref 0.9–1.2)
INR PPP: 2.8 (ref 2–3.5)

## 2022-07-29 PROCEDURE — 85610 PROTHROMBIN TIME: CPT

## 2022-07-29 PROCEDURE — 99211 OFF/OP EST MAY X REQ PHY/QHP: CPT

## 2022-07-29 NOTE — PROGRESS NOTES
Anticoagulation Summary  As of 2022    INR goal:  2.5-3.5   TTR:  37.5 % (1 y)   INR used for dosin.80 (2022)   Warfarin maintenance plan:  10 mg (5 mg x 2) every Mon, Wed, Fri; 7.5 mg (5 mg x 1.5) all other days   Weekly warfarin total:  60 mg   Plan last modified:  Laura Bain PharmD (2022)   Next INR check:  2022   Target end date:  Indefinite    Indications    Thrombosis of superior vena cava (HCC) [I82.210]  Hemorrhage of both adrenal glands (HCC) [E27.49]  Vaginal bleeding [N93.9]  Antiphospholipid syndrome (HCC) [D68.61]             Anticoagulation Episode Summary     INR check location:      Preferred lab:      Send INR reminders to:      Comments:        Anticoagulation Care Providers     Provider Role Specialty Phone number    [facility] Responsible  [phone]           Refer to Patient Findings for HPI:  Patient Findings     Negatives:  Signs/symptoms of thrombosis, Signs/symptoms of bleeding, Laboratory test error suspected, Change in health, Change in alcohol use, Change in activity, Upcoming invasive procedure, Emergency department visit, Upcoming dental procedure, Missed doses, Extra doses, Change in medications, Change in diet/appetite, Hospital admission, Bruising, Other complaints          There were no vitals filed for this visit.  pt declined vitals    Verified current warfarin dosing schedule.    Medications reconciled  Pt is not on antiplatelet therapy      A/P   INR is therapeutic as patient had normal servings of greens this week    Warfarin dosing recommendation: Pt is to continue with current warfarin dosing regimen.     Pt educated to contact our clinic with any changes in medications or s/s of bleeding or thrombosis. Pt is aware to seek immediate medical attention for falls, head injury or deep cuts.    Follow up appointment in 1 week(s).    Edilberto Enciso, EvanD

## 2022-08-05 ENCOUNTER — ANTICOAGULATION VISIT (OUTPATIENT)
Dept: VASCULAR LAB | Facility: MEDICAL CENTER | Age: 47
End: 2022-08-05
Attending: INTERNAL MEDICINE
Payer: COMMERCIAL

## 2022-08-05 DIAGNOSIS — D68.61 ANTIPHOSPHOLIPID SYNDROME (HCC): ICD-10-CM

## 2022-08-05 DIAGNOSIS — N93.9 VAGINAL BLEEDING: ICD-10-CM

## 2022-08-05 DIAGNOSIS — E27.49 HEMORRHAGE OF BOTH ADRENAL GLANDS (HCC): ICD-10-CM

## 2022-08-05 DIAGNOSIS — I82.210 THROMBOSIS OF SUPERIOR VENA CAVA (HCC): ICD-10-CM

## 2022-08-05 LAB — INR PPP: 2 (ref 2–3.5)

## 2022-08-05 PROCEDURE — 99212 OFFICE O/P EST SF 10 MIN: CPT

## 2022-08-05 PROCEDURE — 85610 PROTHROMBIN TIME: CPT

## 2022-08-05 NOTE — PROGRESS NOTES
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]  Hemorrhage of both adrenal glands (HCC) [E27.49]  Vaginal bleeding [N93.9]  Antiphospholipid syndrome (HCC) [D68.61]             Anticoagulation Episode Summary     INR check location:      Preferred lab:      Send INR reminders to:      Comments:        Anticoagulation Care Providers     Provider Role Specialty Phone number    [facility] Responsible  [phone]          Refer to Patient Findings for HPI:  Patient Findings     Negatives:  Signs/symptoms of thrombosis, Signs/symptoms of bleeding, Laboratory test error suspected, Change in health, Change in alcohol use, Change in activity, Upcoming invasive procedure, Emergency department visit, Upcoming dental procedure, Missed doses, Extra doses, Change in medications, Change in diet/appetite, Hospital admission, Bruising, Other complaints        There were no vitals filed for this visit.  The pt declined vitals today     Patient seen in clinic today for follow up on anticoagulation therapy with warfarin (a high risk medication) for hx of APLS and thrombosis of vena cava  Verified current warfarin dosing schedule.  Patient denies any missed doses of warfarin.    Medications reconciled   Pt is not on antiplatelet therapy      A/P   INR is SUB-therapeutic today at 2.0.     Warfarin dosing recommendation: Patient instructed to bolus with 12.5mg TONIGHT, then to boost dose on Sun as well. Patient will then resume her current regimen.   Patient will follow up again in 1 week.     Pt educated to contact our  clinic with any changes in medications or s/s of bleeding or thrombosis. Pt is aware to seek immediate medical attention for falls, head injury or deep cuts.    Follow up appointment in 1 week(s).    Next appt: Friday, Aug 12 @ 7:30am     Andrew Shi PharmD

## 2022-08-12 ENCOUNTER — ANTICOAGULATION VISIT (OUTPATIENT)
Dept: VASCULAR LAB | Facility: MEDICAL CENTER | Age: 47
End: 2022-08-12
Attending: INTERNAL MEDICINE
Payer: COMMERCIAL

## 2022-08-12 DIAGNOSIS — E27.49 HEMORRHAGE OF BOTH ADRENAL GLANDS (HCC): ICD-10-CM

## 2022-08-12 DIAGNOSIS — N93.9 VAGINAL BLEEDING: ICD-10-CM

## 2022-08-12 DIAGNOSIS — D68.61 ANTIPHOSPHOLIPID SYNDROME (HCC): ICD-10-CM

## 2022-08-12 DIAGNOSIS — I82.210 THROMBOSIS OF SUPERIOR VENA CAVA (HCC): ICD-10-CM

## 2022-08-12 LAB
INR BLD: 3.8 (ref 0.9–1.2)
INR PPP: 3.8 (ref 2–3.5)

## 2022-08-12 PROCEDURE — 85610 PROTHROMBIN TIME: CPT

## 2022-08-12 PROCEDURE — 99212 OFFICE O/P EST SF 10 MIN: CPT

## 2022-08-12 NOTE — PROGRESS NOTES
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]  Hemorrhage of both adrenal glands (HCC) [E27.49]  Vaginal bleeding [N93.9]  Antiphospholipid syndrome (HCC) [D68.61]                 Anticoagulation Episode Summary       INR check location:      Preferred lab:      Send INR reminders to:      Comments:            Anticoagulation Care Providers       Provider Role Specialty Phone number    [facility] Responsible  [phone]                  Refer to Patient Findings for HPI:  Patient Findings       Positives:  Change in diet/appetite (Pt states less greens this week.)    Negatives:  Signs/symptoms of thrombosis, Signs/symptoms of bleeding, Laboratory test error suspected, Change in health, Change in alcohol use, Change in activity, Upcoming invasive procedure, Emergency department visit, Upcoming dental procedure, Missed doses, Extra doses, Change in medications, Hospital admission, Bruising, Other complaints            Verified current warfarin dosing schedule.    Medications reconciled   Pt is not on antiplatelet therapy      A/P   INR  SUPRA-therapeutic.     Warfarin dosing recommendation: Instructed pt to take a decreased dose of 5 mg today and to then continue on w/ her current regimen.    Pt educated to contact our clinic with any changes in medications or s/s of bleeding or thrombosis. Pt is aware to seek immediate medical attention for falls, head injury or deep cuts.    Follow up appointment in 1 week(s).    Casey Daniels, PharmD, BCACP

## 2022-08-19 ENCOUNTER — ANTICOAGULATION VISIT (OUTPATIENT)
Dept: VASCULAR LAB | Facility: MEDICAL CENTER | Age: 47
End: 2022-08-19
Attending: INTERNAL MEDICINE
Payer: COMMERCIAL

## 2022-08-19 DIAGNOSIS — N93.9 VAGINAL BLEEDING: ICD-10-CM

## 2022-08-19 DIAGNOSIS — D68.61 ANTIPHOSPHOLIPID SYNDROME (HCC): ICD-10-CM

## 2022-08-19 DIAGNOSIS — I82.210 THROMBOSIS OF SUPERIOR VENA CAVA (HCC): ICD-10-CM

## 2022-08-19 DIAGNOSIS — E27.49 HEMORRHAGE OF BOTH ADRENAL GLANDS (HCC): ICD-10-CM

## 2022-08-19 LAB
INR BLD: 4.4 (ref 0.9–1.2)
INR PPP: 4.4 (ref 2–3.5)

## 2022-08-19 PROCEDURE — 99212 OFFICE O/P EST SF 10 MIN: CPT

## 2022-08-19 PROCEDURE — 85610 PROTHROMBIN TIME: CPT

## 2022-08-19 NOTE — PROGRESS NOTES
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]  Hemorrhage of both adrenal glands (HCC) [E27.49]  Vaginal bleeding [N93.9]  Antiphospholipid syndrome (HCC) [D68.61]                 Anticoagulation Episode Summary       INR check location:      Preferred lab:      Send INR reminders to:      Comments:            Anticoagulation Care Providers       Provider Role Specialty Phone number    [facility] Responsible  [phone]                  Refer to Patient Findings for HPI:  Patient Findings       Positives:  Change in medications (Pt has been using more APAP this week.)    Negatives:  Signs/symptoms of thrombosis, Signs/symptoms of bleeding, Laboratory test error suspected, Change in health, Change in alcohol use, Change in activity, Upcoming invasive procedure, Emergency department visit, Upcoming dental procedure, Missed doses, Extra doses, Change in diet/appetite, Hospital admission, Bruising, Other complaints            Verified current warfarin dosing schedule.    Medications reconciled   Pt is not on antiplatelet therapy      A/P   INR  SUPRA-therapeutic.     Warfarin dosing recommendation: Instructed pt to HOLD x 1 dose and then continue on w/ her current regimen.    Pt educated to contact our clinic with any changes in medications or s/s of bleeding or thrombosis. Pt is aware to seek immediate medical attention for falls, head injury or deep cuts.    Follow up appointment in 1 week(s).    Casey Daniels, PharmD, BCACP

## 2022-08-26 ENCOUNTER — ANTICOAGULATION VISIT (OUTPATIENT)
Dept: VASCULAR LAB | Facility: MEDICAL CENTER | Age: 47
End: 2022-08-26
Attending: INTERNAL MEDICINE
Payer: COMMERCIAL

## 2022-08-26 DIAGNOSIS — D68.61 ANTIPHOSPHOLIPID SYNDROME (HCC): ICD-10-CM

## 2022-08-26 DIAGNOSIS — N93.9 VAGINAL BLEEDING: ICD-10-CM

## 2022-08-26 DIAGNOSIS — I82.210 THROMBOSIS OF SUPERIOR VENA CAVA (HCC): ICD-10-CM

## 2022-08-26 DIAGNOSIS — E27.49 HEMORRHAGE OF BOTH ADRENAL GLANDS (HCC): ICD-10-CM

## 2022-08-26 LAB
INR BLD: 1.3 (ref 0.9–1.2)
INR PPP: 1.3 (ref 2–3.5)

## 2022-08-26 PROCEDURE — 85610 PROTHROMBIN TIME: CPT

## 2022-08-26 PROCEDURE — 99212 OFFICE O/P EST SF 10 MIN: CPT

## 2022-08-26 NOTE — PROGRESS NOTES
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        A/P   INR  SUB-therapeutic.     Warfarin dosing recommendation: Unknown cause of decreased INR.  Bolus today and tomorrow.   Pt would prefer to start enoxaparin.  She still has some at home and will use 100 mg BID until next INR in 72 hours    Pt educated to contact our clinic with any changes in medications or s/s of bleeding or thrombosis. Pt is aware to seek immediate medical attention for falls, head injury or deep cuts.    Follow up appointment in 3 days.     Landon Milton, PharmD

## 2022-08-29 ENCOUNTER — ANTICOAGULATION VISIT (OUTPATIENT)
Dept: VASCULAR LAB | Facility: MEDICAL CENTER | Age: 47
End: 2022-08-29
Attending: INTERNAL MEDICINE
Payer: COMMERCIAL

## 2022-08-29 DIAGNOSIS — I82.210 THROMBOSIS OF SUPERIOR VENA CAVA (HCC): ICD-10-CM

## 2022-08-29 DIAGNOSIS — N93.9 VAGINAL BLEEDING: ICD-10-CM

## 2022-08-29 DIAGNOSIS — E27.49 HEMORRHAGE OF BOTH ADRENAL GLANDS (HCC): ICD-10-CM

## 2022-08-29 DIAGNOSIS — D68.61 ANTIPHOSPHOLIPID SYNDROME (HCC): ICD-10-CM

## 2022-08-29 LAB
INR BLD: 1.9 (ref 0.9–1.2)
INR PPP: 1.9 (ref 2–3.5)

## 2022-08-29 PROCEDURE — 99212 OFFICE O/P EST SF 10 MIN: CPT

## 2022-08-29 PROCEDURE — 85610 PROTHROMBIN TIME: CPT

## 2022-08-29 NOTE — PROGRESS NOTES
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]  Hemorrhage of both adrenal glands (HCC) [E27.49]  Vaginal bleeding [N93.9]  Antiphospholipid syndrome (HCC) [D68.61]                 Anticoagulation Episode Summary       INR check location:      Preferred lab:      Send INR reminders to:      Comments:            Anticoagulation Care Providers       Provider Role Specialty Phone number    [facility] Responsible  [phone]          Refer to Patient Findings for HPI:  Patient Findings       Positives:  Missed doses, Change in medications (on Lovenox)    Negatives:  Signs/symptoms of thrombosis, Signs/symptoms of bleeding, Laboratory test error suspected, Change in health, Change in alcohol use, Change in activity, Upcoming invasive procedure, Emergency department visit, Upcoming dental procedure, Extra doses, Change in diet/appetite, Hospital admission, Bruising, Other complaints          There were no vitals filed for this visit.  The pt declined vitals today   Patient seen in clinic today for follow up on anticoagulation therapy with warfarin (a high risk medication) for hx of APLS and thrombosis of SVC  Verified current warfarin dosing schedule.  Patient reports she left out of town this weekend and forgot her warfarin, so she missed Sat and Sun dose. She was taking her Lovenox injections.     Medications reconciled   Pt is not on antiplatelet therapy      A/P   INR is SUB-therapeutic today at 1.9.     Warfarin dosing recommendation: Patient will continue with the Lovenox injections today and  tomorrow, along with warfarin 15mg bolus dose TONIGHT, then back to her usual regimen.   Patient will follow up again on Friday.     Pt educated to contact our clinic with any changes in medications or s/s of bleeding or thrombosis. Pt is aware to seek immediate medical attention for falls, head injury or deep cuts.    Follow up appointment in 4 days.    Next appt: Friday, Sept 2 @ 7:30am     Andrew Shi PharmD

## 2022-09-02 ENCOUNTER — ANTICOAGULATION VISIT (OUTPATIENT)
Dept: VASCULAR LAB | Facility: MEDICAL CENTER | Age: 47
End: 2022-09-02
Attending: INTERNAL MEDICINE
Payer: COMMERCIAL

## 2022-09-02 DIAGNOSIS — E27.49 HEMORRHAGE OF BOTH ADRENAL GLANDS (HCC): ICD-10-CM

## 2022-09-02 DIAGNOSIS — I82.210 THROMBOSIS OF SUPERIOR VENA CAVA (HCC): ICD-10-CM

## 2022-09-02 DIAGNOSIS — N93.9 VAGINAL BLEEDING: ICD-10-CM

## 2022-09-02 DIAGNOSIS — D68.61 ANTIPHOSPHOLIPID SYNDROME (HCC): ICD-10-CM

## 2022-09-02 LAB
INR BLD: 1.8 (ref 0.9–1.2)
INR PPP: 1.8 (ref 2–3.5)

## 2022-09-02 PROCEDURE — 99212 OFFICE O/P EST SF 10 MIN: CPT

## 2022-09-02 PROCEDURE — 85610 PROTHROMBIN TIME: CPT

## 2022-09-02 NOTE — PROGRESS NOTES
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]  Hemorrhage of both adrenal glands (HCC) [E27.49]  Vaginal bleeding [N93.9]  Antiphospholipid syndrome (HCC) [D68.61]                 Anticoagulation Episode Summary       INR check location:      Preferred lab:      Send INR reminders to:      Comments:            Anticoagulation Care Providers       Provider Role Specialty Phone number    [facility] Responsible  [phone]                  Refer to Patient Findings for HPI:  Patient Findings       Positives:  Change in activity (Patient reports being a littl;e more active the past couple of weeks.)    Negatives:  Signs/symptoms of thrombosis, Signs/symptoms of bleeding, Laboratory test error suspected, Change in health, Change in alcohol use, Upcoming invasive procedure, Emergency department visit, Upcoming dental procedure, Missed doses, Extra doses, Change in medications, Change in diet/appetite, Hospital admission, Bruising, Other complaints            There were no vitals filed for this visit.  pt declined vitals    Verified current warfarin dosing schedule.    Medications reconciled         A/P   INR  sub-therapeutic.     Warfarin dosing recommendation: Patient will increase weekly regimen to 7.5 mg every Mon Wed and Frid and 10 mg all other days. Patient will take a one time dose increase today of 10 mg.    Pt educated to contact our clinic with any changes in medications or s/s of bleeding or thrombosis. Pt is aware to seek immediate medical attention for falls, head injury or deep cuts.    Follow  up appointment in 1 week(s).    Casey Daniels, PharmD, BCACP  Beau Brandt, Student Pharmacist

## 2022-09-09 ENCOUNTER — ANTICOAGULATION VISIT (OUTPATIENT)
Dept: VASCULAR LAB | Facility: MEDICAL CENTER | Age: 47
End: 2022-09-09
Attending: INTERNAL MEDICINE
Payer: COMMERCIAL

## 2022-09-09 DIAGNOSIS — I82.210 THROMBOSIS OF SUPERIOR VENA CAVA (HCC): ICD-10-CM

## 2022-09-09 DIAGNOSIS — N93.9 VAGINAL BLEEDING: ICD-10-CM

## 2022-09-09 DIAGNOSIS — E27.49 HEMORRHAGE OF BOTH ADRENAL GLANDS (HCC): ICD-10-CM

## 2022-09-09 DIAGNOSIS — D68.61 ANTIPHOSPHOLIPID SYNDROME (HCC): ICD-10-CM

## 2022-09-09 LAB
INR BLD: 1.9 (ref 0.9–1.2)
INR PPP: 1.9 (ref 2–3.5)

## 2022-09-09 PROCEDURE — 85610 PROTHROMBIN TIME: CPT

## 2022-09-09 PROCEDURE — 99212 OFFICE O/P EST SF 10 MIN: CPT

## 2022-09-09 RX ORDER — FERROUS SULFATE 325(65) MG
325 TABLET ORAL DAILY
COMMUNITY
End: 2023-06-14

## 2022-09-09 NOTE — PROGRESS NOTES
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]  Hemorrhage of both adrenal glands (HCC) [E27.49]  Vaginal bleeding [N93.9]  Antiphospholipid syndrome (HCC) [D68.61]                 Anticoagulation Episode Summary       INR check location:      Preferred lab:      Send INR reminders to:      Comments:            Anticoagulation Care Providers       Provider Role Specialty Phone number    [facility] Responsible  [phone]                  Refer to Patient Findings for HPI:  Patient Findings       Positives:  Change in health (Patient continues to have increased activity)    Negatives:  Signs/symptoms of thrombosis, Signs/symptoms of bleeding, Laboratory test error suspected, Change in alcohol use, Change in activity, Upcoming invasive procedure, Emergency department visit, Upcoming dental procedure, Missed doses, Extra doses, Change in medications, Change in diet/appetite, Hospital admission, Bruising, Other complaints              pt declined vitals    Verified current warfarin dosing schedule.    Medications reconciled   Pt is not on antiplatelet therapy      A/P   INR  SUB-therapeutic. Patient has increased exercise significantly, eating a small amount of salad daily.     Warfarin dosing recommendation: Increase warfarin dosing tonight to 10 mg and resume normal dosing regimen.     Pt educated to contact our clinic with any changes in medications or s/s of bleeding or thrombosis. Pt is aware to seek immediate medical attention for falls, head injury or deep cuts.    Follow up appointment in 1  week(s).    Megan Diaz, PharmD

## 2022-09-15 ENCOUNTER — ANTICOAGULATION VISIT (OUTPATIENT)
Dept: VASCULAR LAB | Facility: MEDICAL CENTER | Age: 47
End: 2022-09-15
Attending: INTERNAL MEDICINE
Payer: COMMERCIAL

## 2022-09-15 DIAGNOSIS — D68.61 ANTIPHOSPHOLIPID SYNDROME (HCC): ICD-10-CM

## 2022-09-15 DIAGNOSIS — N93.9 VAGINAL BLEEDING: ICD-10-CM

## 2022-09-15 DIAGNOSIS — E27.49 HEMORRHAGE OF BOTH ADRENAL GLANDS (HCC): ICD-10-CM

## 2022-09-15 DIAGNOSIS — I82.210 THROMBOSIS OF SUPERIOR VENA CAVA (HCC): ICD-10-CM

## 2022-09-15 LAB — INR PPP: 6.2 (ref 2–3.5)

## 2022-09-15 PROCEDURE — 85610 PROTHROMBIN TIME: CPT

## 2022-09-15 PROCEDURE — 99212 OFFICE O/P EST SF 10 MIN: CPT

## 2022-09-15 NOTE — PROGRESS NOTES
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      A/P   INR  SUPRA-therapeutic.     Warfarin dosing recommendation: Pt reports some more ETOH, she is aware of the interaction.     Hold warfarin for 3 days then return ASAP.     Pt educated to contact our clinic with any changes in medications or s/s of bleeding or thrombosis. Pt is aware to seek immediate medical attention for falls, head injury or deep cuts.    Follow up appointment in 4 days.     Landon Milton, PharmD

## 2022-09-16 LAB — INR BLD: 6.2 (ref 0.9–1.2)

## 2022-09-19 ENCOUNTER — ANTICOAGULATION VISIT (OUTPATIENT)
Dept: VASCULAR LAB | Facility: MEDICAL CENTER | Age: 47
End: 2022-09-19
Attending: INTERNAL MEDICINE
Payer: COMMERCIAL

## 2022-09-19 DIAGNOSIS — N93.9 VAGINAL BLEEDING: ICD-10-CM

## 2022-09-19 DIAGNOSIS — I82.210 THROMBOSIS OF SUPERIOR VENA CAVA (HCC): ICD-10-CM

## 2022-09-19 DIAGNOSIS — D68.61 ANTIPHOSPHOLIPID SYNDROME (HCC): ICD-10-CM

## 2022-09-19 DIAGNOSIS — E27.49 HEMORRHAGE OF BOTH ADRENAL GLANDS (HCC): ICD-10-CM

## 2022-09-19 LAB — INR PPP: 1.4 (ref 2–3.5)

## 2022-09-19 PROCEDURE — 99212 OFFICE O/P EST SF 10 MIN: CPT | Performed by: NURSE PRACTITIONER

## 2022-09-19 PROCEDURE — 85610 PROTHROMBIN TIME: CPT

## 2022-09-19 NOTE — PROGRESS NOTES
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]  Hemorrhage of both adrenal glands (HCC) [E27.49]  Vaginal bleeding [N93.9]  Antiphospholipid syndrome (HCC) [D68.61]                 Anticoagulation Episode Summary       INR check location:      Preferred lab:      Send INR reminders to:      Comments:            Anticoagulation Care Providers       Provider Role Specialty Phone number    [facility] Responsible  [phone]                  Refer to Patient Findings for HPI:  Patient Findings       Positives:  Missed doses    Negatives:  Signs/symptoms of thrombosis, Signs/symptoms of bleeding, Laboratory test error suspected, Change in health, Change in alcohol use, Change in activity, Upcoming invasive procedure, Emergency department visit, Upcoming dental procedure, Extra doses, Change in medications, Change in diet/appetite, Hospital admission, Bruising, Other complaints    Comments:  Missed her dose yesterday.            There were no vitals filed for this visit.   pt declined vitals    Verified current warfarin dosing schedule.    Medications reconciled   Pt is not on antiplatelet therapy      A/P   INR  sub-therapeutic. INR down from 6.2 on Thursday. No recent VTEs. INR labile. Discussed loading doses but with shared decision-making we will resume her previous regimen and recheck in 4 days.    Warfarin dosing recommendation: Take as outlined on calendar.    Pt educated to contact our clinic with any changes in medications or s/s of bleeding or thrombosis. Pt is aware to seek immediate medical  attention for falls, head injury or deep cuts.    Follow up appointment on Friday.    PABLO Funk.

## 2022-09-20 LAB — INR BLD: 1.4 (ref 0.9–1.2)

## 2022-09-23 ENCOUNTER — ANTICOAGULATION VISIT (OUTPATIENT)
Dept: VASCULAR LAB | Facility: MEDICAL CENTER | Age: 47
End: 2022-09-23
Attending: INTERNAL MEDICINE
Payer: COMMERCIAL

## 2022-09-23 DIAGNOSIS — E27.49 HEMORRHAGE OF BOTH ADRENAL GLANDS (HCC): ICD-10-CM

## 2022-09-23 DIAGNOSIS — N93.9 VAGINAL BLEEDING: ICD-10-CM

## 2022-09-23 DIAGNOSIS — I82.210 THROMBOSIS OF SUPERIOR VENA CAVA (HCC): ICD-10-CM

## 2022-09-23 DIAGNOSIS — D68.61 ANTIPHOSPHOLIPID SYNDROME (HCC): ICD-10-CM

## 2022-09-23 LAB
INR BLD: 1.7 (ref 0.9–1.2)
INR PPP: 1.7 (ref 2–3.5)

## 2022-09-23 PROCEDURE — 85610 PROTHROMBIN TIME: CPT

## 2022-09-23 PROCEDURE — 99212 OFFICE O/P EST SF 10 MIN: CPT

## 2022-09-23 NOTE — PROGRESS NOTES
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    Pt is not on antiplatelet therapy      A/P   INR  SUB-therapeutic.     Warfarin dosing recommendation: Pt plans to have a lot of alcohol intake today and possibly tomorrow for a party. Provided education regarding the increased risk of bleeding; pt verbalizes understanding.     Will forgo warfarin bolus although INR is low in setting of planned ETOH intake.   Pt to watch carefully for any s/s of bleeding or thrombosis.     Pt educated to contact our clinic with any changes in medications or s/s of bleeding or thrombosis. Pt is aware to seek immediate medical attention for falls, head injury or deep cuts.    Follow up appointment in 4 days.     Landon Milton, EvanD

## 2022-09-27 ENCOUNTER — ANTICOAGULATION VISIT (OUTPATIENT)
Dept: VASCULAR LAB | Facility: MEDICAL CENTER | Age: 47
End: 2022-09-27
Attending: INTERNAL MEDICINE
Payer: COMMERCIAL

## 2022-09-27 DIAGNOSIS — N93.9 VAGINAL BLEEDING: ICD-10-CM

## 2022-09-27 DIAGNOSIS — I82.210 THROMBOSIS OF SUPERIOR VENA CAVA (HCC): ICD-10-CM

## 2022-09-27 DIAGNOSIS — E27.49 HEMORRHAGE OF BOTH ADRENAL GLANDS (HCC): ICD-10-CM

## 2022-09-27 DIAGNOSIS — D68.61 ANTIPHOSPHOLIPID SYNDROME (HCC): ICD-10-CM

## 2022-09-27 LAB
INR BLD: 5.8 (ref 0.9–1.2)
INR PPP: 5.8 (ref 2–3.5)

## 2022-09-27 PROCEDURE — 99212 OFFICE O/P EST SF 10 MIN: CPT

## 2022-09-27 PROCEDURE — 85610 PROTHROMBIN TIME: CPT

## 2022-09-27 NOTE — PROGRESS NOTES
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]  Hemorrhage of both adrenal glands (HCC) [E27.49]  Vaginal bleeding [N93.9]  Antiphospholipid syndrome (HCC) [D68.61]                 Anticoagulation Episode Summary       INR check location:      Preferred lab:      Send INR reminders to:      Comments:            Anticoagulation Care Providers       Provider Role Specialty Phone number    [facility] Responsible  [phone]                  Refer to Patient Findings for HPI:  Patient Findings       Positives:  Change in alcohol use (ETOH on Saturday, more than normal), Change in medications (Iron infusion yesterday)    Negatives:  Signs/symptoms of thrombosis, Signs/symptoms of bleeding, Laboratory test error suspected, Change in health, Change in activity, Upcoming invasive procedure, Emergency department visit, Upcoming dental procedure, Missed doses, Extra doses, Change in diet/appetite, Hospital admission, Bruising, Other complaints            There were no vitals filed for this visit.  pt declined vitals    Verified current warfarin dosing schedule.    Medications reconciled   Pt is not on antiplatelet therapy      A/P   INR  SUPRA-therapeutic. Due to increased ETOH, due to subtherapeutic levels in the past from holding. Will reduce warfarin regimen.    Warfarin dosing recommendation: Reduce warfarin to 5 mg daily for 3 days then resume warfarin regimen as above.     Pt educated to contact our clinic with any changes in medications or s/s of bleeding or thrombosis. Pt is aware to seek immediate  medical attention for falls, head injury or deep cuts.    Follow up appointment in 6 day(s). Per patient request.     Evan ContrerasD

## 2022-10-03 ENCOUNTER — APPOINTMENT (OUTPATIENT)
Dept: VASCULAR LAB | Facility: MEDICAL CENTER | Age: 47
End: 2022-10-03
Attending: INTERNAL MEDICINE
Payer: COMMERCIAL

## 2022-10-06 ENCOUNTER — ANTICOAGULATION VISIT (OUTPATIENT)
Dept: VASCULAR LAB | Facility: MEDICAL CENTER | Age: 47
End: 2022-10-06
Attending: INTERNAL MEDICINE
Payer: COMMERCIAL

## 2022-10-06 DIAGNOSIS — I82.210 THROMBOSIS OF SUPERIOR VENA CAVA (HCC): ICD-10-CM

## 2022-10-06 DIAGNOSIS — D68.61 ANTIPHOSPHOLIPID SYNDROME (HCC): ICD-10-CM

## 2022-10-06 DIAGNOSIS — E27.49 HEMORRHAGE OF BOTH ADRENAL GLANDS (HCC): ICD-10-CM

## 2022-10-06 DIAGNOSIS — N93.9 VAGINAL BLEEDING: ICD-10-CM

## 2022-10-06 LAB — INR PPP: 1.9 (ref 2–3.5)

## 2022-10-06 PROCEDURE — 85610 PROTHROMBIN TIME: CPT

## 2022-10-06 PROCEDURE — 99212 OFFICE O/P EST SF 10 MIN: CPT

## 2022-10-06 NOTE — PROGRESS NOTES
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     Verified current warfarin dosing schedule.    Medications reconciled   Pt is not on antiplatelet therapy      A/P   INR  SUB-therapeutic.     Warfarin dosing recommendation: Given her INR has been labile, will refrain from bolus dosing or changes to try and get a more consistent INR.     Pt educated to contact our clinic with any changes in medications or s/s of bleeding or thrombosis. Pt is aware to seek immediate medical attention for falls, head injury or deep cuts.    Follow up appointment in 5 days.     Landon Milton, EvanD

## 2022-10-07 LAB — INR BLD: 1.9 (ref 0.9–1.2)

## 2022-10-11 ENCOUNTER — ANTICOAGULATION VISIT (OUTPATIENT)
Dept: VASCULAR LAB | Facility: MEDICAL CENTER | Age: 47
End: 2022-10-11
Attending: INTERNAL MEDICINE
Payer: COMMERCIAL

## 2022-10-11 DIAGNOSIS — D68.61 ANTIPHOSPHOLIPID SYNDROME (HCC): ICD-10-CM

## 2022-10-11 DIAGNOSIS — I82.210 THROMBOSIS OF SUPERIOR VENA CAVA (HCC): ICD-10-CM

## 2022-10-11 DIAGNOSIS — E27.49 HEMORRHAGE OF BOTH ADRENAL GLANDS (HCC): ICD-10-CM

## 2022-10-11 DIAGNOSIS — N93.9 VAGINAL BLEEDING: ICD-10-CM

## 2022-10-11 LAB
INR BLD: 2.8 (ref 0.9–1.2)
INR PPP: 2.8 (ref 2–3.5)

## 2022-10-11 PROCEDURE — 99211 OFF/OP EST MAY X REQ PHY/QHP: CPT

## 2022-10-11 PROCEDURE — 85610 PROTHROMBIN TIME: CPT

## 2022-10-11 NOTE — PROGRESS NOTES
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]  Hemorrhage of both adrenal glands (HCC) [E27.49]  Vaginal bleeding [N93.9]  Antiphospholipid syndrome (HCC) [D68.61]                 Anticoagulation Episode Summary       INR check location:      Preferred lab:      Send INR reminders to:      Comments:            Anticoagulation Care Providers       Provider Role Specialty Phone number    [facility] Responsible  [phone]                  Refer to Patient Findings for HPI:  Patient Findings       Positives:  Missed doses, Extra doses    Negatives:  Signs/symptoms of thrombosis, Signs/symptoms of bleeding, Laboratory test error suspected, Change in health, Change in alcohol use, Change in activity, Upcoming invasive procedure, Emergency department visit, Upcoming dental procedure, Change in medications, Change in diet/appetite, Hospital admission, Bruising, Other complaints            There were no vitals filed for this visit.   pt declined vitals    Verified current warfarin dosing schedule.    Medications reconciled   Pt is not on antiplatelet therapy      A/P   INR  therapeutic.     Warfarin dosing recommendation: Pt is to continue with current warfarin dosing regimen.    Pt educated to contact our clinic with any changes in medications or s/s of bleeding or thrombosis. Pt is aware to seek immediate medical attention for falls, head injury or deep cuts.    Follow up appointment in 3 day(s).    Rachelle Blair, EvanD

## 2022-10-14 ENCOUNTER — ANTICOAGULATION VISIT (OUTPATIENT)
Dept: VASCULAR LAB | Facility: MEDICAL CENTER | Age: 47
End: 2022-10-14
Attending: INTERNAL MEDICINE
Payer: COMMERCIAL

## 2022-10-14 DIAGNOSIS — N93.9 VAGINAL BLEEDING: ICD-10-CM

## 2022-10-14 DIAGNOSIS — E27.49 HEMORRHAGE OF BOTH ADRENAL GLANDS (HCC): ICD-10-CM

## 2022-10-14 DIAGNOSIS — D68.61 ANTIPHOSPHOLIPID SYNDROME (HCC): ICD-10-CM

## 2022-10-14 DIAGNOSIS — I82.210 THROMBOSIS OF SUPERIOR VENA CAVA (HCC): ICD-10-CM

## 2022-10-14 LAB
INR BLD: 3.3 (ref 0.9–1.2)
INR PPP: 3.3 (ref 2–3.5)

## 2022-10-14 PROCEDURE — 85610 PROTHROMBIN TIME: CPT

## 2022-10-14 PROCEDURE — 99211 OFF/OP EST MAY X REQ PHY/QHP: CPT

## 2022-10-14 NOTE — PROGRESS NOTES
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]  Hemorrhage of both adrenal glands (HCC) [E27.49]  Vaginal bleeding [N93.9]  Antiphospholipid syndrome (HCC) [D68.61]                 Anticoagulation Episode Summary       INR check location:      Preferred lab:      Send INR reminders to:      Comments:            Anticoagulation Care Providers       Provider Role Specialty Phone number    [facility] Responsible  [phone]                  Refer to Patient Findings for HPI:  Patient Findings       Negatives:  Signs/symptoms of thrombosis, Signs/symptoms of bleeding, Laboratory test error suspected, Change in health, Change in alcohol use, Change in activity, Upcoming invasive procedure, Emergency department visit, Upcoming dental procedure, Missed doses, Extra doses, Change in medications, Change in diet/appetite, Hospital admission, Bruising, Other complaints            There were no vitals filed for this visit.    Verified current warfarin dosing schedule.    Medications reconciled   Pt is not on antiplatelet therapy      A/P   INR  therapeutic.     Warfarin dosing recommendation: Instructed pt to continue on with current regimen.    Pt educated to contact our clinic with any changes in medications or s/s of bleeding or thrombosis. Pt is aware to seek immediate medical attention for falls, head injury or deep cuts.    Follow up appointment in 1 week(s).    Csaey Daniels, PharmD, BCACP

## 2022-10-21 ENCOUNTER — ANTICOAGULATION VISIT (OUTPATIENT)
Dept: VASCULAR LAB | Facility: MEDICAL CENTER | Age: 47
End: 2022-10-21
Attending: INTERNAL MEDICINE
Payer: COMMERCIAL

## 2022-10-21 DIAGNOSIS — E27.49 HEMORRHAGE OF BOTH ADRENAL GLANDS (HCC): ICD-10-CM

## 2022-10-21 DIAGNOSIS — I82.210 THROMBOSIS OF SUPERIOR VENA CAVA (HCC): ICD-10-CM

## 2022-10-21 DIAGNOSIS — D68.61 ANTIPHOSPHOLIPID SYNDROME (HCC): ICD-10-CM

## 2022-10-21 DIAGNOSIS — N93.9 VAGINAL BLEEDING: ICD-10-CM

## 2022-10-21 LAB
INR BLD: 4.5 (ref 0.9–1.2)
INR PPP: 4.5 (ref 2–3.5)

## 2022-10-21 PROCEDURE — 99212 OFFICE O/P EST SF 10 MIN: CPT

## 2022-10-21 PROCEDURE — 85610 PROTHROMBIN TIME: CPT

## 2022-10-21 NOTE — PROGRESS NOTES
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]  Hemorrhage of both adrenal glands (HCC) [E27.49]  Vaginal bleeding [N93.9]  Antiphospholipid syndrome (HCC) [D68.61]                 Anticoagulation Episode Summary       INR check location:      Preferred lab:      Send INR reminders to:      Comments:            Anticoagulation Care Providers       Provider Role Specialty Phone number    [facility] Responsible  [phone]            Refer to Patient Findings for HPI:  Patient Findings       Negatives:  Signs/symptoms of thrombosis, Signs/symptoms of bleeding, Laboratory test error suspected, Change in health, Change in alcohol use, Change in activity, Upcoming invasive procedure, Emergency department visit, Upcoming dental procedure, Missed doses, Extra doses, Change in medications, Change in diet/appetite, Hospital admission, Bruising, Other complaints            There were no vitals filed for this visit.  pt declined vitals    Verified current warfarin dosing schedule.    Medications reconciled  Pt is not on antiplatelet therapy      A/P   INR is supra therapeutic.     Warfarin dosing recommendation: Patient is to HOLD dose tonight then continue on current regimen     Pt educated to contact our clinic with any changes in medications or s/s of bleeding or thrombosis. Pt is aware to seek immediate medical attention for falls, head injury or deep cuts.    Follow up appointment in 1 week(s).    Evan MilianD

## 2022-10-27 ENCOUNTER — ANTICOAGULATION VISIT (OUTPATIENT)
Dept: VASCULAR LAB | Facility: MEDICAL CENTER | Age: 47
End: 2022-10-27
Attending: INTERNAL MEDICINE
Payer: COMMERCIAL

## 2022-10-27 DIAGNOSIS — I82.210 THROMBOSIS OF SUPERIOR VENA CAVA (HCC): ICD-10-CM

## 2022-10-27 DIAGNOSIS — D68.61 ANTIPHOSPHOLIPID SYNDROME (HCC): ICD-10-CM

## 2022-10-27 DIAGNOSIS — E27.49 HEMORRHAGE OF BOTH ADRENAL GLANDS (HCC): ICD-10-CM

## 2022-10-27 DIAGNOSIS — N93.9 VAGINAL BLEEDING: ICD-10-CM

## 2022-10-27 LAB — INR PPP: 4.2 (ref 2–3.5)

## 2022-10-27 PROCEDURE — 85610 PROTHROMBIN TIME: CPT

## 2022-10-27 PROCEDURE — 99212 OFFICE O/P EST SF 10 MIN: CPT

## 2022-10-27 NOTE — PROGRESS NOTES
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]  Hemorrhage of both adrenal glands (HCC) [E27.49]  Vaginal bleeding [N93.9]  Antiphospholipid syndrome (HCC) [D68.61]                 Anticoagulation Episode Summary       INR check location:      Preferred lab:      Send INR reminders to:      Comments:            Anticoagulation Care Providers       Provider Role Specialty Phone number    [facility] Responsible  [phone]                  Refer to Patient Findings for HPI:  Patient Findings       Positives:  Extra doses    Negatives:  Signs/symptoms of thrombosis, Signs/symptoms of bleeding, Laboratory test error suspected, Change in health, Change in alcohol use, Change in activity, Upcoming invasive procedure, Emergency department visit, Upcoming dental procedure, Missed doses, Change in medications, Change in diet/appetite, Hospital admission, Bruising, Other complaints            There were no vitals filed for this visit.   pt declined vitals    Verified current warfarin dosing schedule.    Medications reconciled   Pt is not on antiplatelet therapy      A/P   INR  supra-therapeutic.     Warfarin dosing recommendation: Hold x 1 day then continue regimen - did not opt to lower maintenance regimen as pt accidentally an took extra dose this past week    Pt educated to contact our clinic with any changes in medications or s/s of bleeding or thrombosis. Pt is aware to seek immediate medical attention for falls, head injury or deep cuts.    Follow up appointment in 1 week(s).    Rachelle Blair,  PharmD

## 2022-11-01 LAB — INR BLD: 4.2 (ref 0.9–1.2)

## 2022-11-04 ENCOUNTER — ANTICOAGULATION VISIT (OUTPATIENT)
Dept: VASCULAR LAB | Facility: MEDICAL CENTER | Age: 47
End: 2022-11-04
Attending: INTERNAL MEDICINE
Payer: COMMERCIAL

## 2022-11-04 DIAGNOSIS — N93.9 VAGINAL BLEEDING: ICD-10-CM

## 2022-11-04 DIAGNOSIS — D68.61 ANTIPHOSPHOLIPID SYNDROME (HCC): ICD-10-CM

## 2022-11-04 DIAGNOSIS — E27.49 HEMORRHAGE OF BOTH ADRENAL GLANDS (HCC): ICD-10-CM

## 2022-11-04 DIAGNOSIS — I82.210 THROMBOSIS OF SUPERIOR VENA CAVA (HCC): ICD-10-CM

## 2022-11-04 LAB
INR BLD: 2.1 (ref 0.9–1.2)
INR PPP: 2.1 (ref 2–3.5)

## 2022-11-04 PROCEDURE — 99212 OFFICE O/P EST SF 10 MIN: CPT

## 2022-11-04 PROCEDURE — 85610 PROTHROMBIN TIME: CPT

## 2022-11-04 NOTE — PROGRESS NOTES
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]  Hemorrhage of both adrenal glands (HCC) [E27.49]  Vaginal bleeding [N93.9]  Antiphospholipid syndrome (HCC) [D68.61]                 Anticoagulation Episode Summary       INR check location:      Preferred lab:      Send INR reminders to:      Comments:            Anticoagulation Care Providers       Provider Role Specialty Phone number    [facility] Responsible  [phone]                  Refer to Patient Findings for HPI:  Patient Findings       Positives:  Change in diet/appetite (Pt eating more greens lately.)    Negatives:  Signs/symptoms of thrombosis, Signs/symptoms of bleeding, Laboratory test error suspected, Change in health, Change in alcohol use, Change in activity, Upcoming invasive procedure, Emergency department visit, Upcoming dental procedure, Missed doses, Extra doses, Change in medications, Hospital admission, Bruising, Other complaints            There were no vitals filed for this visit.    Verified current warfarin dosing schedule.    Medications reconciled   Pt is not on antiplatelet therapy      A/P   INR  SUB-therapeutic.     Warfarin dosing recommendation: Instructed pt to BOLUS x 1 dose w/ 10 mg and to then continue on w/ her current regimen.    Pt educated to contact our clinic with any changes in medications or s/s of bleeding or thrombosis. Pt is aware to seek immediate medical attention for falls, head injury or deep cuts.    Follow up appointment in 1 week(s).    Casey Daniels, PharmD, BCACP

## 2022-11-11 ENCOUNTER — ANTICOAGULATION VISIT (OUTPATIENT)
Dept: VASCULAR LAB | Facility: MEDICAL CENTER | Age: 47
End: 2022-11-11
Attending: INTERNAL MEDICINE
Payer: COMMERCIAL

## 2022-11-11 DIAGNOSIS — N93.9 VAGINAL BLEEDING: ICD-10-CM

## 2022-11-11 DIAGNOSIS — D68.61 ANTIPHOSPHOLIPID SYNDROME (HCC): ICD-10-CM

## 2022-11-11 DIAGNOSIS — I82.210 THROMBOSIS OF SUPERIOR VENA CAVA (HCC): ICD-10-CM

## 2022-11-11 DIAGNOSIS — E27.49 HEMORRHAGE OF BOTH ADRENAL GLANDS (HCC): ICD-10-CM

## 2022-11-11 LAB — INR PPP: 3 (ref 2–3.5)

## 2022-11-11 PROCEDURE — 99211 OFF/OP EST MAY X REQ PHY/QHP: CPT

## 2022-11-11 PROCEDURE — 85610 PROTHROMBIN TIME: CPT

## 2022-11-11 NOTE — PROGRESS NOTES
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]  Hemorrhage of both adrenal glands (HCC) [E27.49]  Vaginal bleeding [N93.9]  Antiphospholipid syndrome (HCC) [D68.61]                 Anticoagulation Episode Summary       INR check location:      Preferred lab:      Send INR reminders to:      Comments:            Anticoagulation Care Providers       Provider Role Specialty Phone number    [facility] Responsible  [phone]                  Refer to Patient Findings for HPI:  Patient Findings       Negatives:  Signs/symptoms of thrombosis, Signs/symptoms of bleeding, Laboratory test error suspected, Change in health, Change in alcohol use, Change in activity, Upcoming invasive procedure, Emergency department visit, Upcoming dental procedure, Missed doses, Extra doses, Change in medications, Change in diet/appetite, Hospital admission, Bruising, Other complaints            There were no vitals filed for this visit.    Verified current warfarin dosing schedule.    Medications reconciled   Pt is not on antiplatelet therapy      A/P   INR  therapeutic.     Warfarin dosing recommendation: Instructed pt to continue on with current regimen.    Pt educated to contact our clinic with any changes in medications or s/s of bleeding or thrombosis. Pt is aware to seek immediate medical attention for falls, head injury or deep cuts.    Follow up appointment in 1 week(s).    Casey Daniels, Shayy, BCACP

## 2022-11-14 LAB — INR BLD: 3 (ref 0.9–1.2)

## 2022-11-18 ENCOUNTER — ANTICOAGULATION VISIT (OUTPATIENT)
Dept: VASCULAR LAB | Facility: MEDICAL CENTER | Age: 47
End: 2022-11-18
Attending: INTERNAL MEDICINE
Payer: COMMERCIAL

## 2022-11-18 DIAGNOSIS — E27.49 HEMORRHAGE OF BOTH ADRENAL GLANDS (HCC): ICD-10-CM

## 2022-11-18 DIAGNOSIS — I82.210 THROMBOSIS OF SUPERIOR VENA CAVA (HCC): ICD-10-CM

## 2022-11-18 DIAGNOSIS — D68.61 ANTIPHOSPHOLIPID SYNDROME (HCC): ICD-10-CM

## 2022-11-18 DIAGNOSIS — N93.9 VAGINAL BLEEDING: ICD-10-CM

## 2022-11-18 LAB
INR BLD: 4.2 (ref 0.9–1.2)
INR PPP: 4.2 (ref 2–3.5)

## 2022-11-18 PROCEDURE — 99212 OFFICE O/P EST SF 10 MIN: CPT

## 2022-11-18 PROCEDURE — 85610 PROTHROMBIN TIME: CPT

## 2022-11-18 RX ORDER — WARFARIN SODIUM 5 MG/1
5-10 TABLET ORAL DAILY
Qty: 60 TABLET | Refills: 5 | Status: SHIPPED | OUTPATIENT
Start: 2022-11-18 | End: 2023-06-14

## 2022-11-18 NOTE — PROGRESS NOTES
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]  Hemorrhage of both adrenal glands (HCC) [E27.49]  Vaginal bleeding [N93.9]  Antiphospholipid syndrome (HCC) [D68.61]                 Anticoagulation Episode Summary       INR check location:      Preferred lab:      Send INR reminders to:      Comments:            Anticoagulation Care Providers       Provider Role Specialty Phone number    [facility] Responsible  [phone]                  Refer to Patient Findings for HPI:  Patient Findings       Negatives:  Signs/symptoms of thrombosis, Signs/symptoms of bleeding, Laboratory test error suspected, Change in health, Change in alcohol use, Change in activity, Upcoming invasive procedure, Emergency department visit, Upcoming dental procedure, Missed doses, Extra doses, Change in medications, Change in diet/appetite, Hospital admission, Bruising, Other complaints            There were no vitals filed for this visit.    Verified current warfarin dosing schedule.    Medications reconciled   Pt is not on antiplatelet therapy      A/P   INR  SUPRA-therapeutic.     Warfarin dosing recommendation: Instructed pt to HOLD x 1 dose and to then continue on w/ her current regimen.    Pt educated to contact our clinic with any changes in medications or s/s of bleeding or thrombosis. Pt is aware to seek immediate medical attention for falls, head injury or deep cuts.    Follow up appointment in 2 week(s).    Casey Daniels PharmD, BCACP

## 2022-12-02 ENCOUNTER — ANTICOAGULATION VISIT (OUTPATIENT)
Dept: VASCULAR LAB | Facility: MEDICAL CENTER | Age: 47
End: 2022-12-02
Attending: INTERNAL MEDICINE
Payer: COMMERCIAL

## 2022-12-02 DIAGNOSIS — I82.210 THROMBOSIS OF SUPERIOR VENA CAVA (HCC): ICD-10-CM

## 2022-12-02 DIAGNOSIS — N93.9 VAGINAL BLEEDING: ICD-10-CM

## 2022-12-02 DIAGNOSIS — E27.49 HEMORRHAGE OF BOTH ADRENAL GLANDS (HCC): ICD-10-CM

## 2022-12-02 DIAGNOSIS — D68.61 ANTIPHOSPHOLIPID SYNDROME (HCC): ICD-10-CM

## 2022-12-02 LAB
INR BLD: 3.8 (ref 0.9–1.2)
INR PPP: 3.8 (ref 2–3.5)

## 2022-12-02 PROCEDURE — 99212 OFFICE O/P EST SF 10 MIN: CPT

## 2022-12-02 PROCEDURE — 85610 PROTHROMBIN TIME: CPT

## 2022-12-02 NOTE — PROGRESS NOTES
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]  Hemorrhage of both adrenal glands (HCC) [E27.49]  Vaginal bleeding [N93.9]  Antiphospholipid syndrome (HCC) [D68.61]                 Anticoagulation Episode Summary       INR check location:      Preferred lab:      Send INR reminders to:      Comments:            Anticoagulation Care Providers       Provider Role Specialty Phone number    [facility] Responsible  [phone]                  Refer to Patient Findings for HPI:  Patient Findings       Positives:  Change in medications (Started Trulicity)    Negatives:  Signs/symptoms of thrombosis, Signs/symptoms of bleeding, Laboratory test error suspected, Change in health, Change in alcohol use, Change in activity, Upcoming invasive procedure, Emergency department visit, Upcoming dental procedure, Missed doses, Extra doses, Change in diet/appetite, Hospital admission, Bruising, Other complaints            There were no vitals filed for this visit.    Verified current warfarin dosing schedule.    Medications reconciled   Pt is not on antiplatelet therapy      A/P   INR  SUPRA-therapeutic.     Warfarin dosing recommendation: Instructed pt to take a decreased dose of 5 mg today and to then continue on w/ her current regimen.    Pt educated to contact our clinic with any changes in medications or s/s of bleeding or thrombosis. Pt is aware to seek immediate medical attention for falls, head injury or deep cuts.    Follow up appointment in 2 week(s).    Casey Daniels PharmD, BCACP

## 2022-12-16 ENCOUNTER — OFFICE VISIT (OUTPATIENT)
Dept: RHEUMATOLOGY | Facility: MEDICAL CENTER | Age: 47
End: 2022-12-16
Attending: STUDENT IN AN ORGANIZED HEALTH CARE EDUCATION/TRAINING PROGRAM
Payer: COMMERCIAL

## 2022-12-16 ENCOUNTER — ANTICOAGULATION VISIT (OUTPATIENT)
Dept: VASCULAR LAB | Facility: MEDICAL CENTER | Age: 47
End: 2022-12-16
Attending: INTERNAL MEDICINE
Payer: COMMERCIAL

## 2022-12-16 VITALS
OXYGEN SATURATION: 96 % | SYSTOLIC BLOOD PRESSURE: 94 MMHG | HEIGHT: 68 IN | HEART RATE: 80 BPM | DIASTOLIC BLOOD PRESSURE: 70 MMHG | BODY MASS INDEX: 29.89 KG/M2 | TEMPERATURE: 98 F | RESPIRATION RATE: 16 BRPM | WEIGHT: 197.2 LBS

## 2022-12-16 VITALS — WEIGHT: 194 LBS | BODY MASS INDEX: 29.5 KG/M2

## 2022-12-16 DIAGNOSIS — D68.61 ANTIPHOSPHOLIPID SYNDROME (HCC): ICD-10-CM

## 2022-12-16 DIAGNOSIS — Z79.899 LONG-TERM USE OF HYDROXYCHLOROQUINE: ICD-10-CM

## 2022-12-16 DIAGNOSIS — E27.49 HEMORRHAGE OF BOTH ADRENAL GLANDS (HCC): ICD-10-CM

## 2022-12-16 DIAGNOSIS — I82.210 THROMBOSIS OF SUPERIOR VENA CAVA (HCC): ICD-10-CM

## 2022-12-16 DIAGNOSIS — N93.9 VAGINAL BLEEDING: ICD-10-CM

## 2022-12-16 LAB
INR BLD: 2.3 (ref 0.9–1.2)
INR PPP: 2.3 (ref 2–3.5)

## 2022-12-16 PROCEDURE — 99212 OFFICE O/P EST SF 10 MIN: CPT | Performed by: STUDENT IN AN ORGANIZED HEALTH CARE EDUCATION/TRAINING PROGRAM

## 2022-12-16 PROCEDURE — 85610 PROTHROMBIN TIME: CPT

## 2022-12-16 PROCEDURE — 99212 OFFICE O/P EST SF 10 MIN: CPT

## 2022-12-16 PROCEDURE — 99214 OFFICE O/P EST MOD 30 MIN: CPT | Performed by: STUDENT IN AN ORGANIZED HEALTH CARE EDUCATION/TRAINING PROGRAM

## 2022-12-16 RX ORDER — HYDROXYCHLOROQUINE SULFATE 200 MG/1
400 TABLET, FILM COATED ORAL
COMMUNITY
Start: 2022-12-04 | End: 2023-05-10

## 2022-12-16 RX ORDER — DULAGLUTIDE 0.75 MG/.5ML
INJECTION, SOLUTION SUBCUTANEOUS
COMMUNITY
Start: 2022-11-30

## 2022-12-16 ASSESSMENT — FIBROSIS 4 INDEX
FIB4 SCORE: 0.59
FIB4 SCORE: 0.59

## 2022-12-16 NOTE — PATIENT INSTRUCTIONS
AFTER VISIT INSTRUCTIONS    Below are important information to help you navigate your healthcare needs and help us serve you safely and effectively:  If laboratory tests and/or imaging studies were ordered, remember to go get them done as instructed.  If new prescriptions and/or refills were sent, remember to go pick them up from your local pharmacy, or call the specialty pharmacy to request shipment.  Always take your prescription medications exactly as prescribed unless instructed otherwise.  Note that antirheumatic drugs and steroids are immunosuppressive which means they increase your risk of infections and have multiple potential adverse effects on various organ systems in your body, though most of them are uncommon.  It is important that you are up-to-date on age-appropriate immunizations, particularly shingles and bacterial/viral pneumonia vaccines, which you can request from me or your primary care provider.  Be sure to read the drug package inserts to learn about the potential side effects of your medications before you start taking them.  If you experience any significant drug side effects, stop taking the medication and notify me promptly, and depending on the severity of the side effects, consider going to an urgent care or emergency department for immediate attention.  If there are significant findings on your lab tests and imaging studies that warrant further action, I will notify you with explanations via [employer] or phone call, otherwise you can view them on [email or web address] and let me know if you have any questions.  Note that [email or web address] messages are typically read during office hours and may take 1-7 business days before a response depending on the urgency of the situation and how busy my clinic schedule is.  In general, [email or web address] messaging is for non-urgent matters that do not require immediate attention, so for urgent matters that cannot wait, you are advised to go to an urgent care.  Lastly, you are granted  MyChart access to my documentation of your visit and are encouraged to read my note which details my assessment and plan for your condition.

## 2022-12-16 NOTE — PROGRESS NOTES
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]  Hemorrhage of both adrenal glands (HCC) [E27.49]  Vaginal bleeding [N93.9]  Antiphospholipid syndrome (HCC) [D68.61]                 Anticoagulation Episode Summary       INR check location:      Preferred lab:      Send INR reminders to:      Comments:            Anticoagulation Care Providers       Provider Role Specialty Phone number    [facility] Responsible  [phone]                  Refer to Patient Findings for HPI:  Patient Findings       Positives:  Change in diet/appetite (Green beans and salad)    Negatives:  Signs/symptoms of thrombosis, Signs/symptoms of bleeding, Laboratory test error suspected, Change in health, Change in alcohol use, Change in activity, Upcoming invasive procedure, Emergency department visit, Upcoming dental procedure, Missed doses, Extra doses, Change in medications, Hospital admission, Bruising, Other complaints            Vitals:    12/16/22 0749   Weight: 88 kg (194 lb)     pt declined vitals    Verified current warfarin dosing schedule.    Medications reconciled   Pt is not on antiplatelet therapy      A/P   INR  SUB-therapeutic. Patient plans to continue eating more greens    Warfarin dosing recommendation: Increase weekly regimen to M, W 7.5 mg and 10 mg AOD (4% increase).     Pt educated to contact our clinic with any changes in medications or s/s of bleeding or thrombosis. Pt is aware to seek immediate medical attention for falls, head injury or deep cuts.    Follow up appointment in 2 week(s).    Megan  Joe, PharmD

## 2022-12-16 NOTE — PROGRESS NOTES
[facility] RHEUMATOLOGY  [address], [address], [place] [address]  Phone: [phone] ? Fax: [phone]    RHEUMATOLOGY FOLLOW-UP VISIT NOTE      DATE OF SERVICE: 12/16/2022         Subjective     PRIMARY CARE PRACTITIONER:  Patrizia James M.D.  [address]  [place] [address]    PATIENT IDENTIFICATION:  Jaclyn Olvera  [address]  [place] [address]    YOB: 1975    MEDICAL RECORD NUMBER: [id number]          CHIEF COMPLAINT:   Chief Complaint   Patient presents with    Follow-Up     Antiphospholipid Syndrome       RHEUMATOLOGIC HISTORY:  Jaclyn Olvera is a 47 y.o. 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 Since then she has been on long-term anticoagulation with warfarin, and hydroxychloroquine was started in 2/2022 by hem/onc at [facility]. She initially presented on 4/5/2022, reported onset of joint symptoms in 8/2021 with joint pain in her shoulders and hands, associated with morning stiffness lasting all day and improving with activity. She also reported a history of multiple skin bumps on her upper/lower extremities since around 2016 s/p biopsy with unrevealing results, as well as some hip pain with sciatica.     Pertinent treatments to date: Warfarin 5-10 mg daily based on INR (6/2021-present), hydroxychloroquine 400 mg daily (2/2022-present).    Pertinent labs as of 6/2021-5/2022: Positive IgG anti-CL of 146, IgA anti-CL of 21, IgG anti-B2GP1 of 71, anti-B2MG of 2.9, and lupus anticoagulant with prolonged DRVV time of 107.2 sec; high ESR 67 (from >140 in 6/2021), CRP 14 (from 27.58 in 6/2021), fibrinogen 777, TSH of 8.53 with normal fT4 of 1.25; negative/normal IgM anti-CL, IgM anti-B2GP1, CUONG, ANCA, RF, ACPA, HBV, HCV, and IgG subclasses (in 5/2022).     INTERVAL HISTORY:  Skin nodules  improved since she started taking hydroxychloroquine.  Reportedly saw dermatology who deemed the nodules benign and felt that biopsy was not necessary.  Previously reported pain in shoulders markedly improved with stretching regimen so did not get x-rays done.  Tired of weekly blood draws for INR checks, sooner getting them done every 2 weeks.  Still wishes to transition to a DOAC for her APS so she does not have to get regular blood draws.    REVIEW OF SYSTEMS:  Except as noted in the history above, relevant review of systems with emphasis on autoimmune inflammatory processes was otherwise negative.      ACTIVE PROBLEM LIST:  Patient Active Problem List    Diagnosis Date Noted    Multiple skin nodules 04/05/2022    Chronic pain of both shoulders 04/05/2022    Long-term use of hydroxychloroquine 04/05/2022    Anemia 01/27/2022    Black stool 01/27/2022    Epigastric pain 01/27/2022    Antiphospholipid syndrome (HCC) 06/23/2021    Elevated liver function tests 06/22/2021    Elevated C-reactive protein (CRP) 06/21/2021    Sepsis (HCC) 06/21/2021    Microcytic anemia 06/19/2021    Thrombocytopenia (HCC) 06/19/2021    Hemorrhage of both adrenal glands (HCC) 06/18/2021    Retroperitoneal lymphadenopathy 06/18/2021    Vaginal bleeding 06/18/2021    Hyperbilirubinemia 06/17/2021    Fibroids 06/15/2021    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 pylori infection 06/14/2021    Hyponatremia 06/14/2021    Kidney lesion, native, right 06/14/2021    Lesion of cervix 06/14/2021    Leukocytosis 06/14/2021       PAST MEDICAL HISTORY:  Past Medical History:   Diagnosis Date    Antiphospholipid antibody syndrome (HCC)     H/O thrombosis of vena cava     Hemorrhage of both adrenal glands (HCC)     PE (pulmonary embolism)        PAST SURGICAL HISTORY:  Past Surgical History:   Procedure Laterality Date    DC UPPER GI ENDOSCOPY,DIAGNOSIS N/A 6/17/2021    Procedure: GASTROSCOPY;  Surgeon: Elfego Lopez M.D.;   "Location: SURGERY SAME DAY [facility];  Service: Gastroenterology    RI UPPER GI ENDOSCOPY,BIOPSY N/A 6/17/2021    Procedure: GASTROSCOPY, WITH BIOPSY;  Surgeon: Elfego Lopez M.D.;  Location: SURGERY SAME DAY [facility];  Service: Gastroenterology       MEDICATIONS:  Current Outpatient Medications   Medication Sig    TRULICITY 0.75 MG/0.5ML Solution Pen-injector INJECT 0.5 ML (0.75 MG TOTAL) UNDER THE SKIN EVERY 7 DAYS    hydroxychloroquine (PLAQUENIL) 200 MG Tab Take 400 mg by mouth every day.    warfarin (COUMADIN) 2.5 MG Tab Take 1.5-2 Tablets by mouth every day. Take as directed by anticoag clinic    warfarin (COUMADIN) 5 MG Tab Take 1-2 Tablets by mouth every day. Or as directed    ferrous sulfate 325 (65 Fe) MG tablet Take 325 mg by mouth every day. With vitamin C    Vitamin D, Cholecalciferol, 50 MCG (2000 UT) Cap Take 1 Capsule by mouth every day.    Biotin w/ Vitamins C & E (HAIR/SKIN/NAILS PO) Take 1 Tablet by mouth every day.       ALLERGIES:   No Known Allergies    IMMUNIZATIONS:  Immunization History   Administered Date(s) Administered    PFIZER PURPLE CAP SARS-COV-2 VACCINATION (12+) 03/30/2021, 04/20/2021       SOCIAL HISTORY:   Social History     Socioeconomic History    Marital status:    Tobacco Use    Smoking status: Some Days     Types: Cigarettes    Smokeless tobacco: Current    Tobacco comments:     1 pk per week   Vaping Use    Vaping Use: Never used   Substance and Sexual Activity    Alcohol use: Yes     Comment: occasionally    Drug use: Not Currently       FAMILY HISTORY:  History reviewed. No pertinent family history.         Objective     Vital Signs: BP (!) 94/70 (BP Location: Right arm, Patient Position: Sitting, BP Cuff Size: Adult)   Pulse 80   Temp 36.7 °C (98 °F) (Temporal)   Resp 16   Ht 1.727 m (5' 8\")   Wt 89.4 kg (197 lb 3.2 oz)   SpO2 96% Body mass index is 29.98 kg/m².    General: Appears well and comfortable  Eyes: No scleral or conjunctival lesions  ENT: No " apparent oral or nasal lesions  Head/Neck: No apparent scalp or neck lesions  Cardiovascular: Regular rate and rhythm  Respiratory: Breathing quiet and unlabored  Gastrointestinal: No organomegaly or abdominal masses  Integumentary: Multiple skin nodules on bilateral upper extremities, especially on forearms; no significant cutaneous lesions or discolorations  Musculoskeletal: No significant joint tenderness, periarticular soft tissue swelling, warmth, erythema, or overt signs of synovitis; no significant restriction in range of motion of joints examined  Neurologic: No focal sensory or motor deficits  Psychiatric: Mood and affect appropriate      LABORATORY RESULTS REVIEWED AND INTERPRETED BY ME:  Lab Results   Component Value Date/Time    SEDRATEWES >140 (H) 06/18/2021 11:13 AM    CREACTPROT 27.58 (H) 06/18/2021 07:31 AM    FIBRINOGEN 777 (H) 06/22/2021 02:51 PM     Lab Results   Component Value Date/Time    RHEUMFACTN <10 06/20/2021 06:34 AM    CCPANTIBODY 8 06/20/2021 06:34 AM     Lab Results   Component Value Date/Time    ANTINUCAB None Detected 06/20/2021 06:34 AM     Lab Results   Component Value Date/Time    SMITHAB 6 06/20/2021 06:34 AM    RNPAB See Note 06/20/2021 06:34 AM    SSA60 1 06/20/2021 06:34 AM    SSA52 3 06/20/2021 06:34 AM    ANTISSBSJ 0 06/20/2021 06:34 AM     Lab Results   Component Value Date/Time    ANCAIGG <1:20 06/21/2021 12:13 PM     Lab Results   Component Value Date/Time    IGACARDIOLI 21 (H) 06/20/2021 06:34 AM    IGMCARDIOLI 11 06/20/2021 06:34 AM    IGGCARDIOLI 146 (H) 06/20/2021 06:34 AM    RUSSELVIPER 107.2 (H) 06/20/2021 06:34 AM    PROTHROMBTM 25.6 (H) 01/27/2022 07:15 AM    INR 2.30 12/16/2022 12:00 AM     Lab Results   Component Value Date/Time    TSHULTRASEN 8.530 (H) 06/19/2021 05:57 AM    FREET4 1.25 06/19/2021 05:57 AM     Lab Results   Component Value Date/Time    HEPBSAG Non-Reactive 06/21/2021 12:13 PM    HEPBCORIGM Non-Reactive 06/21/2021 12:13 PM    HEPCAB Non-Reactive  06/21/2021 12:13 PM     Lab Results   Component Value Date/Time    ASTSGOT 17 04/27/2022 08:51 AM    ASTSGOT 17 01/28/2022 05:18 AM    ALTSGPT 28 04/27/2022 08:51 AM    ALTSGPT 25 01/28/2022 05:18 AM    ALKPHOSPHAT 56 04/27/2022 08:51 AM    ALKPHOSPHAT 54 01/28/2022 05:18 AM    TBILIRUBIN 0.5 04/27/2022 08:51 AM    TBILIRUBIN 0.5 01/28/2022 05:18 AM    TOTPROTEIN 7.7 04/27/2022 08:51 AM    TOTPROTEIN 6.5 01/28/2022 05:18 AM    ALBUMIN 3.5 (L) 04/27/2022 08:51 AM    ALBUMIN 3.6 01/28/2022 05:18 AM     Lab Results   Component Value Date/Time    SODIUM 136 04/27/2022 08:51 AM    SODIUM 137 01/28/2022 05:18 AM    POTASSIUM 4.1 04/27/2022 08:51 AM    POTASSIUM 4.1 01/28/2022 05:18 AM    CHLORIDE 106 04/27/2022 08:51 AM    CHLORIDE 104 01/28/2022 05:18 AM    CO2 22.0 04/27/2022 08:51 AM    CO2 21 01/28/2022 05:18 AM    GLUCOSE 98 04/27/2022 08:51 AM    GLUCOSE 98 01/28/2022 05:18 AM    BUN 9.0 04/27/2022 08:51 AM    BUN 12 01/28/2022 05:18 AM    CREATININE 0.8 04/27/2022 08:51 AM    CREATININE 0.64 01/28/2022 05:18 AM    BUNCREATRAT 11.3 04/27/2022 08:51 AM    BUNCREATRAT 14 11/05/2021 04:07 AM    CALCIUM 8.8 04/27/2022 08:51 AM    CALCIUM 9.0 01/28/2022 05:18 AM    MAGNESIUM 1.7 06/22/2021 06:14 AM     Lab Results   Component Value Date/Time    WBC 5.00 04/27/2022 08:51 AM    WBC 6.5 02/06/2022 11:17 AM    RBC 4.75 04/27/2022 08:51 AM    RBC 4.51 02/06/2022 11:17 AM    HEMOGLOBIN 12.1 10/11/2022 07:21 AM    HEMOGLOBIN 9.4 (L) 02/06/2022 11:17 AM    HEMATOCRIT 39.2 10/11/2022 07:21 AM    HEMATOCRIT 34.5 (L) 02/06/2022 11:17 AM    MCV 80 10/11/2022 07:21 AM    MCV 76.5 (L) 02/06/2022 11:17 AM    MCH 24.6 (L) 10/11/2022 07:21 AM    MCH 20.8 (L) 02/06/2022 11:17 AM    MCHC 30.9 (L) 10/11/2022 07:21 AM    MCHC 27.2 (L) 02/06/2022 11:17 AM    RDW 16.1 (H) 10/11/2022 07:21 AM    RDW 63.9 (H) 02/06/2022 11:17 AM    PLATELETCT 250 10/11/2022 07:21 AM    PLATELETCT 199 02/06/2022 11:17 AM    MPV 7.1 (L) 04/27/2022 08:51 AM    MPV  10.2 02/06/2022 11:17 AM    NEUTS 3.6 10/11/2022 07:21 AM    NEUTS 4.27 02/06/2022 11:17 AM    LYMPHOCYTES 27 10/11/2022 07:21 AM    LYMPHOCYTES 25.70 02/06/2022 11:17 AM    MONOCYTES 7 10/11/2022 07:21 AM    MONOCYTES 5.80 02/06/2022 11:17 AM    EOSINOPHILS 2 10/11/2022 07:21 AM    EOSINOPHILS 2.00 02/06/2022 11:17 AM    BASOPHILS 1 10/11/2022 07:21 AM    BASOPHILS 0.50 02/06/2022 11:17 AM    HYPOCHROMIA 2+ (A) 01/24/2022 10:57 AM    ANISOCYTOSIS 3+ (A) 02/06/2022 11:17 AM     Lab Results   Component Value Date/Time    FERRITIN 10.4 01/27/2022 07:15 AM    IRON 132 01/27/2022 07:15 AM    TRANSFERRIN 307 01/27/2022 07:15 AM    FOLATE 7.8 06/22/2021 02:51 PM    HAPTOGLOBIN 284 (H) 06/24/2021 06:29 PM     Lab Results   Component Value Date/Time    25HYDROXY 20.9 (L) 11/05/2021 04:07 AM     Lab Results   Component Value Date/Time    COLORURINE Yellow 11/05/2021 04:07 AM    COLORURINE Yellow 06/24/2021 05:08 PM    SPECGRAVITY 1.008 11/05/2021 04:07 AM    SPECGRAVITY 1.009 06/24/2021 05:08 PM    PHURINE 6.0 11/05/2021 04:07 AM    PHURINE 5.0 06/24/2021 05:08 PM    GLUCOSEUR Negative 11/05/2021 04:07 AM    GLUCOSEUR Negative 06/24/2021 05:08 PM    KETONES Negative 11/05/2021 04:07 AM    KETONES Negative 06/24/2021 05:08 PM    PROTEINURIN Negative 11/05/2021 04:07 AM    PROTEINURIN Negative 06/24/2021 05:08 PM     Lab Results   Component Value Date/Time    TOTPROTUR 6.0 06/24/2021 05:08 PM    MICROALBUR 4.3 03/18/2022 07:04 AM    MICROALBUR 1.2 06/24/2021 05:08 PM    CREATININEU 31.76 06/24/2021 05:08 PM    MALBCRT 39 (H) 06/24/2021 05:08 PM     Lab Results   Component Value Date/Time    HBA1C 5.7 (H) 01/21/2022 07:02 AM       RADIOLOGY RESULTS REVIEWED AND INTERPRETED BY ME:  Results for orders placed during the hospital encounter of 05/30/10    DX-KNEE COMPLETE 4+    Results for orders placed during the hospital encounter of 09/07/21    US-RENAL    Impression  1.  Resolution of previously seen left hydronephrosis.  2.   There is a hypoechoic 2.8 cm mass along the inferior right margin of the liver, this is likely evolving hematoma from patient's known adrenal hemorrhage.      All relevant laboratory and imaging results reported on this note were reviewed and interpreted by me.         Assessment & Plan     Jaclyn Olvera is a 47 y.o. female with history as noted above whose presentation merits the following diagnostic and clinical status impressions and recommendations:    1. Antiphospholipid syndrome (HCC)  Clinically quiescent disease well-controlled on the current regimen with no evidence of evolving or impending flare of venous or arterial thrombosis. Current evidence-based guideline favor use of warfarin over DOAC in long-term anticoagulation for secondary prevention of APS-associated thrombosis. However, newer studies are increasingly showing that DOACs are non-inferior to warfarin for long-term anticoagulation in APS (reference below), particularly in the setting of hydroxychloroquine use which has protective immunomodulatory antithrombotic effect against APL antibodies. She wishes to transition to a DOAC so she does not have to keep getting regular blood draws for INR checks.  - Continue hydroxychloroquine 400 mg daily  - Recommend switching from warfarin to rivaroxaban or apixaban    2. Long-term use of hydroxychloroquine  Risk of retinal toxicity is considered minimal to none in this case given the low dose of hydroxychloroquine prescribed (less than 5 mg/kg of body weight) per rheumatology and ophthalmology guidelines. However, ophthalmologic evaluation is still recommended with the frequency of routine follow-up eye exams determined by the ophthalmologist, typically annually or every other year.  - Routine ophthalmology evaluation as recommended      REFERENCE:  Jorge S, Whitney S, David R, et al. Role of Direct Oral Anticoagulation Agents as Thromboprophylaxis in Antiphospholipid Syndrome. Cureus. 2021 Oct  24;13(10):o19570. doi: 10.7759/cureus.19772. PMID: 26383015; PMCID: PWI0130940.      FOLLOW-UP: Return in about 9 months (around 9/16/2023) for Short.         Thank you for the opportunity to participate in the care of Jaclyn Olvera.    Nicanor Orlando MD, MS  Rheumatologist, [facility] Rheumatology ? [facility]   of Clinical Medicine, Department of Internal Medicine  [facility] ? [facility]

## 2022-12-30 ENCOUNTER — ANTICOAGULATION VISIT (OUTPATIENT)
Dept: VASCULAR LAB | Facility: MEDICAL CENTER | Age: 47
End: 2022-12-30
Attending: INTERNAL MEDICINE
Payer: COMMERCIAL

## 2022-12-30 DIAGNOSIS — E27.49 HEMORRHAGE OF BOTH ADRENAL GLANDS (HCC): ICD-10-CM

## 2022-12-30 DIAGNOSIS — N93.9 VAGINAL BLEEDING: ICD-10-CM

## 2022-12-30 DIAGNOSIS — D68.61 ANTIPHOSPHOLIPID SYNDROME (HCC): ICD-10-CM

## 2022-12-30 DIAGNOSIS — I82.210 THROMBOSIS OF SUPERIOR VENA CAVA (HCC): ICD-10-CM

## 2022-12-30 LAB
INR BLD: 1.7 (ref 0.9–1.2)
INR PPP: 1.7 (ref 2–3.5)

## 2022-12-30 PROCEDURE — 99212 OFFICE O/P EST SF 10 MIN: CPT

## 2022-12-30 PROCEDURE — 85610 PROTHROMBIN TIME: CPT

## 2022-12-30 NOTE — PROGRESS NOTES
Anticoagulation Summary  As of 2022      INR goal:  2.5-3.5   TTR:  33.5 % (1.5 y)   INR used for dosin.70 (2022)   Warfarin maintenance plan:  7.5 mg (5 mg x 1.5) every Mon, Wed; 10 mg (5 mg x 2) all other days   Weekly warfarin total:  65 mg   Plan last modified:  Megan Diaz PharmD (2022)   Next INR check:  1/3/2023   Target end date:  Indefinite    Indications    Thrombosis of superior vena cava (HCC) [I82.210]  Hemorrhage of both adrenal glands (HCC) [E27.49]  Vaginal bleeding [N93.9]  Antiphospholipid syndrome (HCC) [D68.61]                 Anticoagulation Episode Summary       INR check location:      Preferred lab:      Send INR reminders to:      Comments:            Anticoagulation Care Providers       Provider Role Specialty Phone number    [facility] Responsible  [phone]                  Refer to Patient Findings for HPI:  Patient Findings       Negatives:  Signs/symptoms of thrombosis, Signs/symptoms of bleeding, Laboratory test error suspected, Change in health, Change in alcohol use, Change in activity, Upcoming invasive procedure, Emergency department visit, Upcoming dental procedure, Missed doses, Extra doses, Change in medications, Change in diet/appetite, Hospital admission, Bruising, Other complaints            There were no vitals filed for this visit.  pt declined vitals    Verified current warfarin dosing schedule.    Medications reconciled   Pt is not on antiplatelet therapy      A/P   INR  SUB-therapeutic. Unclear reason. Will start Lovenox injections due to history of APS and clots. Patient states she still has Lovenox injections at home.     Warfarin dosing recommendation: Increase warfarin dose tonight to 12.5 mg then 10 mg daily until next INR check. Patient to initiate Lovenox injections.     Pt educated to contact our clinic with any changes in medications or s/s of bleeding or thrombosis. Pt is aware to seek immediate medical  attention for falls, head injury or deep cuts.    Follow up appointment in 4 day(s).    Evan ContrerasD

## 2023-01-06 ENCOUNTER — ANTICOAGULATION VISIT (OUTPATIENT)
Dept: VASCULAR LAB | Facility: MEDICAL CENTER | Age: 48
End: 2023-01-06
Attending: INTERNAL MEDICINE
Payer: COMMERCIAL

## 2023-01-06 DIAGNOSIS — E27.49 HEMORRHAGE OF BOTH ADRENAL GLANDS (HCC): ICD-10-CM

## 2023-01-06 DIAGNOSIS — D68.61 ANTIPHOSPHOLIPID SYNDROME (HCC): ICD-10-CM

## 2023-01-06 DIAGNOSIS — N93.9 VAGINAL BLEEDING: ICD-10-CM

## 2023-01-06 DIAGNOSIS — I82.210 THROMBOSIS OF SUPERIOR VENA CAVA (HCC): ICD-10-CM

## 2023-01-06 LAB
INR BLD: 2.8 (ref 0.9–1.2)
INR PPP: 2.8 (ref 2–3.5)

## 2023-01-06 PROCEDURE — 99211 OFF/OP EST MAY X REQ PHY/QHP: CPT

## 2023-01-06 PROCEDURE — 85610 PROTHROMBIN TIME: CPT

## 2023-01-06 NOTE — PROGRESS NOTES
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]  Hemorrhage of both adrenal glands (HCC) [E27.49]  Vaginal bleeding [N93.9]  Antiphospholipid syndrome (HCC) [D68.61]                 Anticoagulation Episode Summary       INR check location:      Preferred lab:      Send INR reminders to:      Comments:            Anticoagulation Care Providers       Provider Role Specialty Phone number    [facility] Responsible  [phone]            Refer to Patient Findings for HPI:  Patient Findings       Negatives:  Signs/symptoms of thrombosis, Signs/symptoms of bleeding, Laboratory test error suspected, Change in health, Change in alcohol use, Change in activity, Upcoming invasive procedure, Emergency department visit, Upcoming dental procedure, Missed doses, Extra doses, Change in medications, Change in diet/appetite, Hospital admission, Bruising, Other complaints            There were no vitals filed for this visit.  pt declined vitals    Verified current warfarin dosing schedule.    Medications reconciled  Pt is not on antiplatelet therapy      A/P   INR is therapeutic.     Warfarin dosing recommendation: Pt is to continue with current warfarin dosing regimen.      Pt educated to contact our clinic with any changes in medications or s/s of bleeding or thrombosis. Pt is aware to seek immediate medical attention for falls, head injury or deep cuts.    Follow up appointment in 1 week(s).    Edilberto Enciso, EvanD

## 2023-01-13 ENCOUNTER — ANTICOAGULATION VISIT (OUTPATIENT)
Dept: VASCULAR LAB | Facility: MEDICAL CENTER | Age: 48
End: 2023-01-13
Attending: INTERNAL MEDICINE
Payer: COMMERCIAL

## 2023-01-13 DIAGNOSIS — E27.49 HEMORRHAGE OF BOTH ADRENAL GLANDS (HCC): ICD-10-CM

## 2023-01-13 DIAGNOSIS — N93.9 VAGINAL BLEEDING: ICD-10-CM

## 2023-01-13 DIAGNOSIS — I82.210 THROMBOSIS OF SUPERIOR VENA CAVA (HCC): ICD-10-CM

## 2023-01-13 DIAGNOSIS — D68.61 ANTIPHOSPHOLIPID SYNDROME (HCC): ICD-10-CM

## 2023-01-13 LAB
INR BLD: 3.5 (ref 0.9–1.2)
INR PPP: 3.5 (ref 2–3.5)

## 2023-01-13 PROCEDURE — 99211 OFF/OP EST MAY X REQ PHY/QHP: CPT

## 2023-01-13 PROCEDURE — 85610 PROTHROMBIN TIME: CPT

## 2023-01-13 NOTE — PROGRESS NOTES
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]  Hemorrhage of both adrenal glands (HCC) [E27.49]  Vaginal bleeding [N93.9]  Antiphospholipid syndrome (HCC) [D68.61]                 Anticoagulation Episode Summary       INR check location:      Preferred lab:      Send INR reminders to:      Comments:            Anticoagulation Care Providers       Provider Role Specialty Phone number    [facility] Responsible  [phone]          Refer to Patient Findings for HPI:  Patient Findings       Negatives:  Signs/symptoms of thrombosis, Signs/symptoms of bleeding, Laboratory test error suspected, Change in health, Change in alcohol use, Change in activity, Upcoming invasive procedure, Emergency department visit, Upcoming dental procedure, Missed doses, Extra doses, Change in medications, Change in diet/appetite, Hospital admission, Bruising, Other complaints          There were no vitals filed for this visit.  The pt declined vitals today     Patient seen in clinic today for follow up on anticoagulation therapy with warfarin (a high risk medication) for hx of APLS  Verified current warfarin dosing schedule.  Patient denies any missed doses of warfarin.    Medications reconciled   Pt is not on antiplatelet therapy      A/P   INR is therapeutic today at 3.5.     Warfarin dosing recommendation: Patient will take reduced dose today to ensure she does not go over therapeutic range and then will resume her current dosing regimen.   Patient will follow up again in 1 week.    Pt educated to contact our clinic with any  changes in medications or s/s of bleeding or thrombosis. Pt is aware to seek immediate medical attention for falls, head injury or deep cuts.    Follow up appointment in 1 week(s).    Next appt: Friday, Jan 20 @ 7:30am     Andrew Shi PharmD

## 2023-01-20 ENCOUNTER — ANTICOAGULATION VISIT (OUTPATIENT)
Dept: VASCULAR LAB | Facility: MEDICAL CENTER | Age: 48
End: 2023-01-20
Attending: INTERNAL MEDICINE
Payer: COMMERCIAL

## 2023-01-20 DIAGNOSIS — D68.61 ANTIPHOSPHOLIPID SYNDROME (HCC): ICD-10-CM

## 2023-01-20 DIAGNOSIS — E27.49 HEMORRHAGE OF BOTH ADRENAL GLANDS (HCC): ICD-10-CM

## 2023-01-20 DIAGNOSIS — N93.9 VAGINAL BLEEDING: ICD-10-CM

## 2023-01-20 DIAGNOSIS — I82.210 THROMBOSIS OF SUPERIOR VENA CAVA (HCC): ICD-10-CM

## 2023-01-20 LAB
INR BLD: 2.7 (ref 0.9–1.2)
INR PPP: 2.7 (ref 2–3.5)

## 2023-01-20 PROCEDURE — 85610 PROTHROMBIN TIME: CPT

## 2023-01-20 PROCEDURE — 99211 OFF/OP EST MAY X REQ PHY/QHP: CPT

## 2023-01-20 NOTE — PROGRESS NOTES
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]  Hemorrhage of both adrenal glands (HCC) [E27.49]  Vaginal bleeding [N93.9]  Antiphospholipid syndrome (HCC) [D68.61]                 Anticoagulation Episode Summary       INR check location:      Preferred lab:      Send INR reminders to:      Comments:            Anticoagulation Care Providers       Provider Role Specialty Phone number    [facility] Responsible  [phone]                  Refer to Patient Findings for HPI:  Patient Findings       Negatives:  Signs/symptoms of thrombosis, Signs/symptoms of bleeding, Laboratory test error suspected, Change in health, Change in alcohol use, Change in activity, Upcoming invasive procedure, Emergency department visit, Upcoming dental procedure, Missed doses, Extra doses, Change in medications, Change in diet/appetite, Hospital admission, Bruising, Other complaints            There were no vitals filed for this visit.    Verified current warfarin dosing schedule.    Medications reconciled   Pt is not on antiplatelet therapy      A/P   INR  therapeutic.     Warfarin dosing recommendation: Instructed pt to continue on with current regimen.    Pt educated to contact our clinic with any changes in medications or s/s of bleeding or thrombosis. Pt is aware to seek immediate medical attention for falls, head injury or deep cuts.    Follow up appointment in 2 week(s).    Casey Daniels, PharmD, BCACP

## 2023-02-03 ENCOUNTER — ANTICOAGULATION VISIT (OUTPATIENT)
Dept: VASCULAR LAB | Facility: MEDICAL CENTER | Age: 48
End: 2023-02-03
Attending: INTERNAL MEDICINE
Payer: COMMERCIAL

## 2023-02-03 DIAGNOSIS — E27.49 HEMORRHAGE OF BOTH ADRENAL GLANDS (HCC): ICD-10-CM

## 2023-02-03 DIAGNOSIS — D68.61 ANTIPHOSPHOLIPID SYNDROME (HCC): ICD-10-CM

## 2023-02-03 DIAGNOSIS — N93.9 VAGINAL BLEEDING: ICD-10-CM

## 2023-02-03 DIAGNOSIS — I82.210 THROMBOSIS OF SUPERIOR VENA CAVA (HCC): ICD-10-CM

## 2023-02-03 LAB
INR BLD: 2.2 (ref 0.9–1.2)
INR PPP: 2.2 (ref 2–3.5)

## 2023-02-03 PROCEDURE — 85610 PROTHROMBIN TIME: CPT

## 2023-02-03 PROCEDURE — 99212 OFFICE O/P EST SF 10 MIN: CPT

## 2023-02-03 NOTE — PROGRESS NOTES
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]  Hemorrhage of both adrenal glands (HCC) [E27.49]  Vaginal bleeding [N93.9]  Antiphospholipid syndrome (HCC) [D68.61]                 Anticoagulation Episode Summary       INR check location:      Preferred lab:      Send INR reminders to:      Comments:            Anticoagulation Care Providers       Provider Role Specialty Phone number    [facility] Responsible  [phone]                  Refer to Patient Findings for HPI:  Patient Findings       Negatives:  Signs/symptoms of thrombosis, Signs/symptoms of bleeding (gums bleeding more than normal), Laboratory test error suspected, Change in health, Change in alcohol use, Change in activity, Upcoming invasive procedure, Emergency department visit, Upcoming dental procedure, Missed doses, Extra doses, Change in medications, Change in diet/appetite (salad on wednesday), Hospital admission, Bruising, Other complaints            There were no vitals filed for this visit.  pt declined vitals    Verified current warfarin dosing schedule.    Medications reconciled   Pt is not on antiplatelet therapy      A/P   INR  SUB-therapeutic. No over changes. Patient with a history of labile INR.     Warfarin dosing recommendation: Increase warfarin tonight to 12.5 mg and resume previous regimen.     Pt educated to contact our clinic with any changes in medications or s/s of bleeding or thrombosis. Pt is aware to seek immediate medical attention for falls, head injury or deep cuts.    Follow up appointment in 2 week(s) per  patient preference.    Evan ContrerasD

## 2023-02-21 ENCOUNTER — ANTICOAGULATION VISIT (OUTPATIENT)
Dept: VASCULAR LAB | Facility: MEDICAL CENTER | Age: 48
End: 2023-02-21
Attending: INTERNAL MEDICINE
Payer: COMMERCIAL

## 2023-02-21 DIAGNOSIS — D68.61 ANTIPHOSPHOLIPID SYNDROME (HCC): ICD-10-CM

## 2023-02-21 DIAGNOSIS — E27.49 HEMORRHAGE OF BOTH ADRENAL GLANDS (HCC): ICD-10-CM

## 2023-02-21 DIAGNOSIS — N93.9 VAGINAL BLEEDING: ICD-10-CM

## 2023-02-21 DIAGNOSIS — I82.210 THROMBOSIS OF SUPERIOR VENA CAVA (HCC): ICD-10-CM

## 2023-02-21 LAB — INR PPP: 7.7 (ref 2–3.5)

## 2023-02-21 PROCEDURE — 85610 PROTHROMBIN TIME: CPT

## 2023-02-21 PROCEDURE — 99212 OFFICE O/P EST SF 10 MIN: CPT

## 2023-02-21 NOTE — PROGRESS NOTES
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]  Hemorrhage of both adrenal glands (HCC) [E27.49]  Vaginal bleeding [N93.9]  Antiphospholipid syndrome (HCC) [D68.61]                 Anticoagulation Episode Summary       INR check location:      Preferred lab:      Send INR reminders to:      Comments:            Anticoagulation Care Providers       Provider Role Specialty Phone number    [facility] Responsible  [phone]          Refer to Patient Findings for HPI:  Patient Findings       Positives:  Change in alcohol use (drank on Sat)    Negatives:  Signs/symptoms of thrombosis, Signs/symptoms of bleeding, Laboratory test error suspected, Change in health, Change in activity, Upcoming invasive procedure, Emergency department visit, Upcoming dental procedure, Missed doses, Extra doses, Change in medications, Change in diet/appetite, Hospital admission, Bruising, Other complaints          There were no vitals filed for this visit.  The pt declined vitals today     Patient seen in clinic today for follow up on anticoagulation therapy with warfarin (a high risk medication) for hx of APLS and thrombosis of SVC  Verified current warfarin dosing schedule.  Patient denies any missed doses of warfarin.    Medications reconciled   Pt is not on antiplatelet therapy      A/P   INR is SUPRA-therapeutic today at 7.7.     Warfarin dosing recommendation: Patient will HOLD warfarin x 2 days, then take 5mg on Thurs and then resume her current regimen.   Patient will follow up again in about a week (per pt preference).      Pt educated to contact our clinic with any changes in medications or s/s of bleeding or thrombosis. Pt is aware to seek immediate medical attention for falls, head injury or deep cuts.    Follow up appointment in 1 week(s).    Next appt: Monday, Feb 27 @ 7:30am     Andrew Shi PharmD

## 2023-02-22 LAB — INR BLD: 7.7 (ref 0.9–1.2)

## 2023-02-27 ENCOUNTER — APPOINTMENT (OUTPATIENT)
Dept: VASCULAR LAB | Facility: MEDICAL CENTER | Age: 48
End: 2023-02-27
Attending: INTERNAL MEDICINE
Payer: COMMERCIAL

## 2023-02-27 ENCOUNTER — DOCUMENTATION (OUTPATIENT)
Dept: VASCULAR LAB | Facility: MEDICAL CENTER | Age: 48
End: 2023-02-27
Payer: COMMERCIAL

## 2023-02-27 NOTE — PROGRESS NOTES
Spoke with pt by phone. She rescheduled her INR from today to Friday due to weather conditions.    Gloria SERNA

## 2023-03-10 ENCOUNTER — HOSPITAL ENCOUNTER (OUTPATIENT)
Dept: LAB | Facility: MEDICAL CENTER | Age: 48
End: 2023-03-10
Attending: FAMILY MEDICINE
Payer: COMMERCIAL

## 2023-03-10 LAB
ANION GAP SERPL CALC-SCNC: 12 MMOL/L (ref 7–16)
BUN SERPL-MCNC: 7 MG/DL (ref 8–22)
CALCIUM SERPL-MCNC: 9.6 MG/DL (ref 8.5–10.5)
CHLORIDE SERPL-SCNC: 103 MMOL/L (ref 96–112)
CHOLEST SERPL-MCNC: 216 MG/DL (ref 100–199)
CO2 SERPL-SCNC: 23 MMOL/L (ref 20–33)
CREAT SERPL-MCNC: 0.72 MG/DL (ref 0.5–1.4)
EST. AVERAGE GLUCOSE BLD GHB EST-MCNC: 114 MG/DL
FASTING STATUS PATIENT QL REPORTED: NORMAL
GFR SERPLBLD CREATININE-BSD FMLA CKD-EPI: 104 ML/MIN/1.73 M 2
GLUCOSE SERPL-MCNC: 85 MG/DL (ref 65–99)
HBA1C MFR BLD: 5.6 % (ref 4–5.6)
HDLC SERPL-MCNC: 43 MG/DL
LDLC SERPL CALC-MCNC: 134 MG/DL
POTASSIUM SERPL-SCNC: 4.2 MMOL/L (ref 3.6–5.5)
SODIUM SERPL-SCNC: 138 MMOL/L (ref 135–145)
TRIGL SERPL-MCNC: 197 MG/DL (ref 0–149)

## 2023-03-10 PROCEDURE — 36415 COLL VENOUS BLD VENIPUNCTURE: CPT

## 2023-03-10 PROCEDURE — 83036 HEMOGLOBIN GLYCOSYLATED A1C: CPT

## 2023-03-10 PROCEDURE — 80061 LIPID PANEL: CPT

## 2023-03-10 PROCEDURE — 80048 BASIC METABOLIC PNL TOTAL CA: CPT

## 2023-04-12 ENCOUNTER — HOSPITAL ENCOUNTER (OUTPATIENT)
Dept: LAB | Facility: MEDICAL CENTER | Age: 48
End: 2023-04-12
Attending: INTERNAL MEDICINE
Payer: COMMERCIAL

## 2023-04-12 LAB
BASOPHILS # BLD AUTO: 0.7 % (ref 0–1.8)
BASOPHILS # BLD: 0.05 K/UL (ref 0–0.12)
EOSINOPHIL # BLD AUTO: 0.12 K/UL (ref 0–0.51)
EOSINOPHIL NFR BLD: 1.6 % (ref 0–6.9)
ERYTHROCYTE [DISTWIDTH] IN BLOOD BY AUTOMATED COUNT: 45.1 FL (ref 35.9–50)
HCT VFR BLD AUTO: 45 % (ref 37–47)
HGB BLD-MCNC: 14.1 G/DL (ref 12–16)
IMM GRANULOCYTES # BLD AUTO: 0.03 K/UL (ref 0–0.11)
IMM GRANULOCYTES NFR BLD AUTO: 0.4 % (ref 0–0.9)
IRON SATN MFR SERPL: 16 % (ref 15–55)
IRON SERPL-MCNC: 50 UG/DL (ref 40–170)
LYMPHOCYTES # BLD AUTO: 2.09 K/UL (ref 1–4.8)
LYMPHOCYTES NFR BLD: 28.3 % (ref 22–41)
MCH RBC QN AUTO: 26.9 PG (ref 27–33)
MCHC RBC AUTO-ENTMCNC: 31.3 G/DL (ref 33.6–35)
MCV RBC AUTO: 85.9 FL (ref 81.4–97.8)
MONOCYTES # BLD AUTO: 0.51 K/UL (ref 0–0.85)
MONOCYTES NFR BLD AUTO: 6.9 % (ref 0–13.4)
NEUTROPHILS # BLD AUTO: 4.59 K/UL (ref 2–7.15)
NEUTROPHILS NFR BLD: 62.1 % (ref 44–72)
NRBC # BLD AUTO: 0 K/UL
NRBC BLD-RTO: 0 /100 WBC
PLATELET # BLD AUTO: 324 K/UL (ref 164–446)
PMV BLD AUTO: 9.5 FL (ref 9–12.9)
RBC # BLD AUTO: 5.24 M/UL (ref 4.2–5.4)
TIBC SERPL-MCNC: 311 UG/DL (ref 250–450)
UIBC SERPL-MCNC: 261 UG/DL (ref 110–370)
WBC # BLD AUTO: 7.4 K/UL (ref 4.8–10.8)

## 2023-04-12 PROCEDURE — 83550 IRON BINDING TEST: CPT

## 2023-04-12 PROCEDURE — 85025 COMPLETE CBC W/AUTO DIFF WBC: CPT

## 2023-04-12 PROCEDURE — 82728 ASSAY OF FERRITIN: CPT

## 2023-04-12 PROCEDURE — 83540 ASSAY OF IRON: CPT

## 2023-04-12 PROCEDURE — 36415 COLL VENOUS BLD VENIPUNCTURE: CPT

## 2023-04-13 LAB — FERRITIN SERPL-MCNC: 44.9 NG/ML (ref 10–291)

## 2023-05-15 NOTE — PROCEDURE: PHOTO-DOCUMENTATION
Detail Level: Detailed
Photo Preface (Leave Blank If You Do Not Want): Photographs were obtained today
Topical Sulfur Applications Pregnancy And Lactation Text: This medication is Pregnancy Category C and has an unknown safety profile during pregnancy. It is unknown if this topical medication is excreted in breast milk.

## 2023-06-14 ENCOUNTER — OFFICE VISIT (OUTPATIENT)
Dept: RHEUMATOLOGY | Facility: MEDICAL CENTER | Age: 48
End: 2023-06-14
Attending: STUDENT IN AN ORGANIZED HEALTH CARE EDUCATION/TRAINING PROGRAM
Payer: COMMERCIAL

## 2023-06-14 VITALS
TEMPERATURE: 97.5 F | WEIGHT: 187 LBS | HEART RATE: 86 BPM | SYSTOLIC BLOOD PRESSURE: 106 MMHG | DIASTOLIC BLOOD PRESSURE: 80 MMHG | BODY MASS INDEX: 28.34 KG/M2 | HEIGHT: 68 IN | OXYGEN SATURATION: 98 %

## 2023-06-14 DIAGNOSIS — E55.9 VITAMIN D INSUFFICIENCY: ICD-10-CM

## 2023-06-14 DIAGNOSIS — D68.61 ANTIPHOSPHOLIPID SYNDROME (HCC): ICD-10-CM

## 2023-06-14 DIAGNOSIS — Z79.899 LONG-TERM USE OF HYDROXYCHLOROQUINE: ICD-10-CM

## 2023-06-14 PROCEDURE — 99212 OFFICE O/P EST SF 10 MIN: CPT | Performed by: STUDENT IN AN ORGANIZED HEALTH CARE EDUCATION/TRAINING PROGRAM

## 2023-06-14 PROCEDURE — 3079F DIAST BP 80-89 MM HG: CPT | Performed by: STUDENT IN AN ORGANIZED HEALTH CARE EDUCATION/TRAINING PROGRAM

## 2023-06-14 PROCEDURE — 99214 OFFICE O/P EST MOD 30 MIN: CPT | Performed by: STUDENT IN AN ORGANIZED HEALTH CARE EDUCATION/TRAINING PROGRAM

## 2023-06-14 PROCEDURE — 3074F SYST BP LT 130 MM HG: CPT | Performed by: STUDENT IN AN ORGANIZED HEALTH CARE EDUCATION/TRAINING PROGRAM

## 2023-06-14 ASSESSMENT — FIBROSIS 4 INDEX: FIB4 SCORE: 0.47

## 2023-06-14 ASSESSMENT — PATIENT HEALTH QUESTIONNAIRE - PHQ9: CLINICAL INTERPRETATION OF PHQ2 SCORE: 0

## 2023-06-14 NOTE — PATIENT INSTRUCTIONS
AFTER VISIT INSTRUCTIONS    Below are important information to help you navigate your healthcare needs and help us serve you safely and effectively:  If laboratory tests and/or imaging studies were ordered, remember to go get them done as instructed.  If new prescriptions and/or refills were sent, remember to go pick them up from your local pharmacy, or call the specialty pharmacy to request shipment.  Always take your prescription medications exactly as prescribed unless instructed otherwise.  Note that antirheumatic drugs and steroids are immunosuppressive which means they increase your risk of infections and have multiple potential adverse effects on various organ systems in your body, though most of them are uncommon.  It is important that you are up-to-date on age-appropriate immunizations, particularly shingles and bacterial/viral pneumonia vaccines, which you can request from me or your primary care provider.  Be sure to read the drug package inserts to learn about the potential side effects of your medications before you start taking them.  If you experience any significant drug side effects, stop taking the medication and notify me promptly, and depending on the severity of the side effects, consider going to an urgent care or emergency department for immediate attention.  If there are significant findings on your lab tests and imaging studies that warrant further action, I will notify you with explanations via [employer] or phone call, otherwise you can view them on [employer] and let me know if you have any questions.  Note that [employer] messages are typically read during office hours and may take 1-7 business days before a response depending on the urgency of the situation and how busy my clinic schedule is.  In general, [employer] messaging is for non-urgent matters that do not require immediate attention, so for urgent matters that cannot wait, you are advised to go to an urgent care.  Lastly, you are granted  MyChart access to my documentation of your visit and are encouraged to read my note which details my assessment and plan for your condition.

## 2023-06-14 NOTE — PROGRESS NOTES
[facility] RHEUMATOLOGY  [address], [address], [place] [address]  Phone: [phone] ? Fax: [phone]    RHEUMATOLOGY FOLLOW-UP VISIT NOTE      DATE OF SERVICE: 06/14/2023         Subjective     PRIMARY CARE PRACTITIONER:  Patrizia James M.D.  [address]  [place] [address]    PATIENT IDENTIFICATION:  Jaclyn Olvera  [address]  [facility] [address]    YOB: 1975    MEDICAL RECORD NUMBER: [id number]          CHIEF COMPLAINT:   Chief Complaint   Patient presents with    Follow-Up     Antiphospholipid syndrome (HCC)       RHEUMATOLOGIC HISTORY:  Jaclyn Olvera is a 47 y.o. 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 Since then she has been on long-term anticoagulation with warfarin, and hydroxychloroquine was started in 2/2022 by hem/onc at [facility]. She initially presented on 4/5/2022, reported onset of joint symptoms in 8/2021 with joint pain in her shoulders and hands, associated with morning stiffness lasting all day and improving with activity. She also reported a history of multiple skin bumps on her upper/lower extremities since around 2016 s/p biopsy with unrevealing results, as well as some hip pain with sciatica.     Pertinent treatment history: Warfarin 5-10 mg daily based on INR (6/2021-present), hydroxychloroquine 400 mg daily (2/2022-present).    Pertinent laboratory results: Positive IgG anti-, IgA anti-CL 21, IgG anti-B2GP1 71, IgG anti-PS/.5, LAC, and anti-B2MG (in 2-3/2022); positive anti- and anti-TG 9.1 (in 2/2022); low vitamin D 20.9 (11/2021); elevated ESR 67<140, CRP 14<27.58, and fibrinogen 777 (in 5/2022<6/2021); negative/normal ANCA, RF, anti-CCP, HBV and HCV (in 6/2021), CUONG (in 6/2021 and 3/2022), and IgG subclasses (in 5/2022).     INTERVAL  HISTORY:  Skin nodules improved since she started taking hydroxychloroquine.  Got tired of weekly to biweekly blood draws for INR checks, so skipped going but now they are requiring a new referral.  Still wishes to transition to a DOAC for her APS so she does not have to get regular blood draws for INR checks.    REVIEW OF SYSTEMS:  Except as noted in the history above, relevant review of systems with emphasis on autoimmune inflammatory processes was otherwise negative.      ACTIVE PROBLEM LIST:  Patient Active Problem List    Diagnosis Date Noted    Vitamin D insufficiency 06/14/2023    Multiple skin nodules 04/05/2022    Chronic pain of both shoulders 04/05/2022    Long-term use of hydroxychloroquine 04/05/2022    Anemia 01/27/2022    Black stool 01/27/2022    Epigastric pain 01/27/2022    Antiphospholipid syndrome (HCC) 06/23/2021    Elevated liver function tests 06/22/2021    Elevated C-reactive protein (CRP) 06/21/2021    Sepsis (HCC) 06/21/2021    Microcytic anemia 06/19/2021    Thrombocytopenia (HCC) 06/19/2021    Hemorrhage of both adrenal glands (HCC) 06/18/2021    Retroperitoneal lymphadenopathy 06/18/2021    Vaginal bleeding 06/18/2021    Hyperbilirubinemia 06/17/2021    Fibroids 06/15/2021    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 pylori infection 06/14/2021    Hyponatremia 06/14/2021    Kidney lesion, native, right 06/14/2021    Lesion of cervix 06/14/2021    Leukocytosis 06/14/2021       PAST MEDICAL HISTORY:  Past Medical History:   Diagnosis Date    Antiphospholipid antibody syndrome (HCC)     H/O thrombosis of vena cava     Hemorrhage of both adrenal glands (HCC)     PE (pulmonary embolism)        PAST SURGICAL HISTORY:  Past Surgical History:   Procedure Laterality Date    GA UPPER GI ENDOSCOPY,DIAGNOSIS N/A 6/17/2021    Procedure: GASTROSCOPY;  Surgeon: Elfego Lopez M.D.;  Location: SURGERY SAME DAY [facility];  Service: Gastroenterology    GA UPPER GI ENDOSCOPY,BIOPSY N/A  "6/17/2021    Procedure: GASTROSCOPY, WITH BIOPSY;  Surgeon: Elfego Lopez M.D.;  Location: SURGERY SAME DAY [facility];  Service: Gastroenterology       MEDICATIONS:  Current Outpatient Medications   Medication Sig    rivaroxaban (XARELTO) 20 MG Tab tablet Take 1 Tablet by mouth with dinner.    hydroxychloroquine (PLAQUENIL) 200 MG Tab TAKE 2 TABLETS BY MOUTH EVERY DAY    TRULICITY 0.75 MG/0.5ML Solution Pen-injector INJECT 0.5 ML (0.75 MG TOTAL) UNDER THE SKIN EVERY 7 DAYS    Biotin w/ Vitamins C & E (HAIR/SKIN/NAILS PO) Take 1 Tablet by mouth every day.    Vitamin D, Cholecalciferol, 50 MCG (2000 UT) Cap Take 1 Capsule by mouth every day. (Patient not taking: Reported on 6/14/2023)       ALLERGIES:   No Known Allergies    IMMUNIZATIONS:  Immunization History   Administered Date(s) Administered    PFIZER PURPLE CAP SARS-COV-2 VACCINATION (12+) 03/30/2021, 04/20/2021       SOCIAL HISTORY:   Social History     Socioeconomic History    Marital status:    Tobacco Use    Smoking status: Some Days     Types: Cigarettes    Smokeless tobacco: Current    Tobacco comments:     1 pk per week   Vaping Use    Vaping Use: Never used   Substance and Sexual Activity    Alcohol use: Yes     Comment: occasionally    Drug use: Not Currently       FAMILY HISTORY:  History reviewed. No pertinent family history.         Objective     Vital Signs: /80 (BP Location: Left arm, Patient Position: Sitting, BP Cuff Size: Adult)   Pulse 86   Temp 36.4 °C (97.5 °F) (Temporal)   Ht 1.727 m (5' 8\")   Wt 84.8 kg (187 lb)   SpO2 98% Body mass index is 28.43 kg/m².    General: Appears well and comfortable  Eyes: No scleral or conjunctival lesions  ENT: No apparent oral or nasal lesions  Head/Neck: No apparent scalp or neck lesions  Cardiovascular: Regular rate and rhythm  Respiratory: Breathing quiet and unlabored  Gastrointestinal: No organomegaly or abdominal masses  Integumentary: Multiple soft skin nodules on bilateral " upper extremities, especially on forearms; no significant cutaneous lesions or discolorations  Musculoskeletal: No significant joint tenderness, periarticular soft tissue swelling, warmth, erythema, or overt signs of synovitis; no significant restriction in range of motion of joints examined  Neurologic: No focal sensory or motor deficits  Psychiatric: Mood and affect appropriate      LABORATORY RESULTS REVIEWED AND INTERPRETED BY ME:  Lab Results   Component Value Date/Time    SEDRATEWES >140 (H) 06/18/2021 11:13 AM    CREACTPROT 27.58 (H) 06/18/2021 07:31 AM    FIBRINOGEN 777 (H) 06/22/2021 02:51 PM     Lab Results   Component Value Date/Time    RHEUMFACTN <10 06/20/2021 06:34 AM    CCPANTIBODY 8 06/20/2021 06:34 AM     Lab Results   Component Value Date/Time    ANTINUCAB None Detected 06/20/2021 06:34 AM     Lab Results   Component Value Date/Time    SMITHAB 6 06/20/2021 06:34 AM    RNPAB See Note 06/20/2021 06:34 AM    SSA60 1 06/20/2021 06:34 AM    SSA52 3 06/20/2021 06:34 AM    ANTISSBSJ 0 06/20/2021 06:34 AM     Lab Results   Component Value Date/Time    ANCAIGG <1:20 06/21/2021 12:13 PM     Lab Results   Component Value Date/Time    IGACARDIOLI 21 (H) 06/20/2021 06:34 AM    IGMCARDIOLI 11 06/20/2021 06:34 AM    IGGCARDIOLI 146 (H) 06/20/2021 06:34 AM    RUSSELVIPER 107.2 (H) 06/20/2021 06:34 AM    PROTHROMBTM 25.6 (H) 01/27/2022 07:15 AM    INR 7.70 (A) 02/21/2023 12:00 AM     Lab Results   Component Value Date/Time    TSHULTRASEN 8.530 (H) 06/19/2021 05:57 AM    FREET4 1.25 06/19/2021 05:57 AM     Lab Results   Component Value Date/Time    HEPBSAG Non-Reactive 06/21/2021 12:13 PM    HEPBCORIGM Non-Reactive 06/21/2021 12:13 PM    HEPCAB Non-Reactive 06/21/2021 12:13 PM     Lab Results   Component Value Date/Time    ASTSGOT 17 04/27/2022 08:51 AM    ASTSGOT 17 01/28/2022 05:18 AM    ALTSGPT 28 04/27/2022 08:51 AM    ALTSGPT 25 01/28/2022 05:18 AM    ALKPHOSPHAT 56 04/27/2022 08:51 AM    ALKPHOSPHAT 54  01/28/2022 05:18 AM    TBILIRUBIN 0.5 04/27/2022 08:51 AM    TBILIRUBIN 0.5 01/28/2022 05:18 AM    TOTPROTEIN 7.7 04/27/2022 08:51 AM    TOTPROTEIN 6.5 01/28/2022 05:18 AM    ALBUMIN 3.5 (L) 04/27/2022 08:51 AM    ALBUMIN 3.6 01/28/2022 05:18 AM     Lab Results   Component Value Date/Time    SODIUM 138 03/10/2023 06:27 AM    POTASSIUM 4.2 03/10/2023 06:27 AM    CHLORIDE 103 03/10/2023 06:27 AM    CO2 23 03/10/2023 06:27 AM    GLUCOSE 85 03/10/2023 06:27 AM    BUN 7 (L) 03/10/2023 06:27 AM    CREATININE 0.72 03/10/2023 06:27 AM    BUNCREATRAT 11.3 04/27/2022 08:51 AM    BUNCREATRAT 14 11/05/2021 04:07 AM    CALCIUM 9.6 03/10/2023 06:27 AM    MAGNESIUM 1.7 06/22/2021 06:14 AM     Lab Results   Component Value Date/Time    WBC 7.4 04/12/2023 07:23 AM    RBC 5.24 04/12/2023 07:23 AM    HEMOGLOBIN 14.1 04/12/2023 07:23 AM    HEMATOCRIT 45.0 04/12/2023 07:23 AM    MCV 85.9 04/12/2023 07:23 AM    MCH 26.9 (L) 04/12/2023 07:23 AM    MCHC 31.3 (L) 04/12/2023 07:23 AM    RDW 45.1 04/12/2023 07:23 AM    PLATELETCT 324 04/12/2023 07:23 AM    MPV 9.5 04/12/2023 07:23 AM    NEUTS 4.59 04/12/2023 07:23 AM    LYMPHOCYTES 28.30 04/12/2023 07:23 AM    MONOCYTES 6.90 04/12/2023 07:23 AM    EOSINOPHILS 1.60 04/12/2023 07:23 AM    BASOPHILS 0.70 04/12/2023 07:23 AM    HYPOCHROMIA 2+ (A) 01/24/2022 10:57 AM    ANISOCYTOSIS 3+ (A) 02/06/2022 11:17 AM     Lab Results   Component Value Date/Time    FERRITIN 44.9 04/12/2023 07:23 AM    IRON 50 04/12/2023 07:23 AM    TRANSFERRIN 307 01/27/2022 07:15 AM    FOLATE 7.8 06/22/2021 02:51 PM    HAPTOGLOBIN 284 (H) 06/24/2021 06:29 PM     Lab Results   Component Value Date/Time    25HYDROXY 20.9 (L) 11/05/2021 04:07 AM     Lab Results   Component Value Date/Time    COLORURINE Yellow 11/05/2021 04:07 AM    COLORURINE Yellow 06/24/2021 05:08 PM    SPECGRAVITY 1.008 11/05/2021 04:07 AM    SPECGRAVITY 1.009 06/24/2021 05:08 PM    PHURINE 6.0 11/05/2021 04:07 AM    PHURINE 5.0 06/24/2021 05:08 PM     GLUCOSEUR Negative 11/05/2021 04:07 AM    GLUCOSEUR Negative 06/24/2021 05:08 PM    KETONES Negative 11/05/2021 04:07 AM    KETONES Negative 06/24/2021 05:08 PM    PROTEINURIN Negative 11/05/2021 04:07 AM    PROTEINURIN Negative 06/24/2021 05:08 PM     Lab Results   Component Value Date/Time    TOTPROTUR 6.0 06/24/2021 05:08 PM    MICROALBUR 1.2 06/24/2021 05:08 PM    CREATININEU 31.76 06/24/2021 05:08 PM    MALBCRT 39 (H) 06/24/2021 05:08 PM     Lab Results   Component Value Date/Time    CHOLSTRLTOT 216 (H) 03/10/2023 06:27 AM     (H) 03/10/2023 06:27 AM    HDL 43 03/10/2023 06:27 AM    TRIGLYCERIDE 197 (H) 03/10/2023 06:27 AM    HBA1C 5.6 03/10/2023 06:27 AM       RADIOLOGY RESULTS REVIEWED AND INTERPRETED BY ME:  Results for orders placed during the hospital encounter of 05/30/10    DX-KNEE COMPLETE 4+    Results for orders placed during the hospital encounter of 09/07/21    US-RENAL    Impression  1.  Resolution of previously seen left hydronephrosis.  2.  There is a hypoechoic 2.8 cm mass along the inferior right margin of the liver, this is likely evolving hematoma from patient's known adrenal hemorrhage.      All relevant laboratory and imaging results reported on this note were reviewed and interpreted by me.         Assessment & Plan     Jaclyn Olvera is a 47 y.o. female with history as noted above whose presentation merits the following diagnostic and clinical status impressions and recommendations:    1. Antiphospholipid syndrome (HCC)  Clinically quiescent disease well-controlled on the current regimen with no evidence of evolving or impending flare of venous or arterial thrombosis. Current evidence-based guideline favor use of warfarin over DOAC in long-term anticoagulation for secondary prevention of APS-associated thrombosis. However, newer studies are increasingly showing that DOACs are non-inferior to warfarin for long-term anticoagulation in APS (reference below), particularly in the setting  of hydroxychloroquine use which has protective immunomodulatory effect against APLA-mediated coagulopathy. She wishes to transition to a DOAC so she does not have to keep getting regular blood draws for INR checks, so will start her on Xarelto.  - Sed Rate; Future  - CRP QUANTITIVE (NON-CARDIAC); Future  - FIBRINOGEN; Future  - rivaroxaban (XARELTO) 20 MG Tab tablet; Take 1 Tablet by mouth with dinner.  Dispense: 90 Tablet; Refill: 3  - Referral to Vascular Medicine (need to confirm INR less than 3 before starting Xarelto)  - Discontinue warfarin after finishing the remaining supply  - Continue hydroxychloroquine 400 mg daily    2. Vitamin D insufficiency  Hypovitaminosis D can contribute to diffuse musculoskeletal aches, fatigue, and malaise, so need supplementation.  - VITAMIN D,25 HYDROXY (DEFICIENCY); Future  - Recommend vitamin D 0319-4317 IU daily    3. Long-term use of hydroxychloroquine  Minimal to no risk of retinal toxicity given the low dose of hydroxychloroquine prescribed (less than 5 mg/kg of body weight) per rheumatology and ophthalmology guidelines. However, ophthalmologic evaluation is still recommended with the frequency of routine follow-up eye exams determined by the ophthalmologist, typically annually or every other year.  - Routine ophthalmology evaluation as recommended      REFERENCE:  Jorge S, Olson S, David R, et al. Role of Direct Oral Anticoagulation Agents as Thromboprophylaxis in Antiphospholipid Syndrome. Cureus. 2021 Oct 24;13(10):b37203. doi: 10.7759/cureus.25074. PMID: 37265636; PMCID: QXL7365175.      The above assessment and plan were discussed with the patient who acknowledged understanding of the plan.    FOLLOW-UP: Return in about 1 year (around 6/14/2024) for Short.         Thank you for the opportunity to participate in the care of Jaclyn ARMIJO Wade.    Nicanor Orlando MD, MS  Rheumatologist, [facility] Rheumatology ? [facility] [address] [facility] [address]   of  Clinical Medicine, Department of Internal Medicine  [employer] [facility]

## 2023-06-15 ENCOUNTER — TELEPHONE (OUTPATIENT)
Dept: VASCULAR LAB | Facility: MEDICAL CENTER | Age: 48
End: 2023-06-15
Payer: COMMERCIAL

## 2023-06-15 NOTE — TELEPHONE ENCOUNTER
"[facility] and Pharmacotherapy Programs    Received vascular referral from rheumatology to transition pt from warfarin to Xarelto (see Dr Orlando's OV note 6/14/23).     Per Dr Bloch \"Pls schedule initial visit in anticoag clinic. It is entered as a vascular medicine referral, but I think coag clinic is fine.\"     Pt is actually not new to [facility]. She is long overdue for INR. Last seen 02/21/23    S/w pt to schedule INR check. She was not ready to schedule at this time and will call back. She also stated she wanted to finish the remaining month supply of warfarin before she transitions to Xarelto.    Rachelle Blair, PharmD    "

## 2023-06-21 ENCOUNTER — TELEPHONE (OUTPATIENT)
Dept: VASCULAR LAB | Facility: MEDICAL CENTER | Age: 48
End: 2023-06-21
Payer: COMMERCIAL

## 2023-06-21 NOTE — TELEPHONE ENCOUNTER
"[facility] and Pharmacotherapy Programs     Received vascular referral from rheumatology to transition pt from warfarin to Xarelto (see Dr Orlando's OV note 6/14/23).      Per Dr Bloch \"Pls schedule initial visit in anticoag clinic. It is entered as a vascular medicine referral, but I think coag clinic is fine.\"      Pt is actually not new to [facility]. She is long overdue for INR. Last seen 02/21/23    Called pt to schedule appt to discuss the above - no answer. LVM.    Casey Daniels, PharmD, BCACP  "

## 2023-06-28 ENCOUNTER — TELEPHONE (OUTPATIENT)
Dept: VASCULAR LAB | Facility: MEDICAL CENTER | Age: 48
End: 2023-06-28
Payer: COMMERCIAL

## 2023-06-28 NOTE — TELEPHONE ENCOUNTER
"[facility] and Vascular Health and Pharmacotherapy Programs     Received vascular referral from rheumatology to transition pt from warfarin to Xarelto (see Dr Orlando's OV note 6/14/23).      Per Dr Bloch \"Pls schedule initial visit in anticoag clinic. It is entered as a vascular medicine referral, but I think coag clinic is fine.\"      Pt is actually not new to [facility]. She is long overdue for INR. Last seen 02/21/23    2nd call w/ voicemail. Will send letter.     Called pt to schedule appt to discuss the above - no answer. LVM.    Megan Diaz, PharmD    "

## 2023-06-28 NOTE — LETTER
Jaclyn Olvera  [address]  [facility] [address]            06/28/23      Dear Jaclyn Olvera,    We have been unsuccessful in our attempts to contact you regarding your Anticoagulation Service appointments.  Warfarin is a potent blood-thinning agent that requires frequent monitoring to measure its level in the blood.  If your level becomes too high, you could develop serious, sometimes life-threatening bleeding problems.  If your level becomes too low, life-threatening blood clots or stroke could result.     The last recorded INR that we have on file for you is:  INR   Date Value Ref Range Status   02/21/2023 7.70 (A) 2 - 3.5 Final     POC INR   Date Value Ref Range Status   02/21/2023 7.7 (HH) 0.9 - 1.2 Final     Comment:     INR - Non-therapeutic Reference Range: 0.9-1.2  INR - Therapeutic Reference Range: 2.0-4.0          To monitor your warfarin effectively, we need to be able to communicate with you.  This is a requirement to be followed by our Service.  If we are unable to contact you through repeated occasions, you are at risk of being discharged from the Anticoagulation Service.     It is extremely important that you have your lab work drawn as soon as possible.  Alternatively, you may schedule an appointment for a fingerstick INR at our clinic.  We are open Monday-Friday 8 am until 5 pm.  You may reach our Service at [phone].        Sincerely,           Landon Milton, Shayy, [facility]  Clinic Supervisor  [facility]  Outpatient Anticoagulation Service

## 2023-07-03 ENCOUNTER — DOCUMENTATION (OUTPATIENT)
Dept: VASCULAR LAB | Facility: MEDICAL CENTER | Age: 48
End: 2023-07-03
Payer: COMMERCIAL

## 2023-07-03 NOTE — PROGRESS NOTES
S/w patient regarding overdue INR.  Appt made for 7/7/23 to discuss transition to Xarelto.  Oliver Carolina, PharmD, BCACP

## 2023-07-07 ENCOUNTER — ANTICOAGULATION VISIT (OUTPATIENT)
Dept: VASCULAR LAB | Facility: MEDICAL CENTER | Age: 48
End: 2023-07-07
Attending: INTERNAL MEDICINE
Payer: COMMERCIAL

## 2023-07-07 DIAGNOSIS — I82.210 THROMBOSIS OF SUPERIOR VENA CAVA (HCC): ICD-10-CM

## 2023-07-07 DIAGNOSIS — D68.61 ANTIPHOSPHOLIPID SYNDROME (HCC): ICD-10-CM

## 2023-07-07 DIAGNOSIS — E27.49 HEMORRHAGE OF BOTH ADRENAL GLANDS (HCC): ICD-10-CM

## 2023-07-07 DIAGNOSIS — N93.9 VAGINAL BLEEDING: ICD-10-CM

## 2023-07-07 LAB — INR PPP: 2.7 (ref 2–3.5)

## 2023-07-07 PROCEDURE — 99211 OFF/OP EST MAY X REQ PHY/QHP: CPT

## 2023-07-07 PROCEDURE — 85610 PROTHROMBIN TIME: CPT

## 2023-07-07 NOTE — PROGRESS NOTES
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  pt declined vitals    Verified current warfarin dosing schedule.    Medications reconciled  Pt is not on antiplatelet therapy      A/P   INR is therapeutic at 2.7.     Warfarin dosing recommendation:     Her rheumatologist wants her to start Xarelto 20 mg in place of warfarin. Xarelto has already been prescribed and picked up by patient    INR today is <3.0    129 mL/min-Creatinine clearance, original Cockcroft-Gault 03/10/23     ml/min    Patient is to discontinue warfarin and begin Xarelto 20 mg    Pt educated to contact our clinic with any changes in medications or s/s of bleeding or thrombosis. Pt is aware to seek immediate medical attention for falls, head injury or deep cuts.    Follow up appointment in 5 months     Complete CBC and CMP prior to apt     Edilberto Enciso, EvanD

## 2023-07-31 ENCOUNTER — TELEPHONE (OUTPATIENT)
Dept: RHEUMATOLOGY | Facility: MEDICAL CENTER | Age: 48
End: 2023-07-31

## 2023-07-31 NOTE — TELEPHONE ENCOUNTER
Patient called office regarding side effects from Xarelto, patient explained feeling sluggish and that her body feels heavy. Patient stated Vascular gave her ok to start Xarelto - INR level 2.70  I advised patient to follow up with [facility] as 's out of office, I discussed with APRN who advised for patient to go to urgent care and or ER for symptomatic anemia and lab check. If patient noticed any signs of bleeding for patient to go to ER.   I called and notified patient, she did mention some bleeding day after intercourse, patient will go to urgent care for evaluation and labs check.

## 2023-08-14 ENCOUNTER — HOSPITAL ENCOUNTER (EMERGENCY)
Facility: MEDICAL CENTER | Age: 48
End: 2023-08-14
Attending: EMERGENCY MEDICINE
Payer: COMMERCIAL

## 2023-08-14 ENCOUNTER — APPOINTMENT (OUTPATIENT)
Dept: RADIOLOGY | Facility: MEDICAL CENTER | Age: 48
End: 2023-08-14
Attending: EMERGENCY MEDICINE
Payer: COMMERCIAL

## 2023-08-14 VITALS
WEIGHT: 177.25 LBS | TEMPERATURE: 97.4 F | HEART RATE: 86 BPM | BODY MASS INDEX: 26.86 KG/M2 | SYSTOLIC BLOOD PRESSURE: 110 MMHG | HEIGHT: 68 IN | DIASTOLIC BLOOD PRESSURE: 81 MMHG | RESPIRATION RATE: 16 BRPM | OXYGEN SATURATION: 97 %

## 2023-08-14 DIAGNOSIS — R51.9 NONINTRACTABLE HEADACHE, UNSPECIFIED CHRONICITY PATTERN, UNSPECIFIED HEADACHE TYPE: ICD-10-CM

## 2023-08-14 DIAGNOSIS — R42 VERTIGO: ICD-10-CM

## 2023-08-14 LAB
ALBUMIN SERPL BCP-MCNC: 4.2 G/DL (ref 3.2–4.9)
ALBUMIN/GLOB SERPL: 1.2 G/DL
ALP SERPL-CCNC: 57 U/L (ref 30–99)
ALT SERPL-CCNC: 24 U/L (ref 2–50)
ANION GAP SERPL CALC-SCNC: 12 MMOL/L (ref 7–16)
APPEARANCE UR: CLEAR
AST SERPL-CCNC: 22 U/L (ref 12–45)
BACTERIA #/AREA URNS HPF: NEGATIVE /HPF
BASOPHILS # BLD AUTO: 0.9 % (ref 0–1.8)
BASOPHILS # BLD: 0.06 K/UL (ref 0–0.12)
BILIRUB SERPL-MCNC: 0.7 MG/DL (ref 0.1–1.5)
BILIRUB UR QL STRIP.AUTO: NEGATIVE
BUN SERPL-MCNC: 10 MG/DL (ref 8–22)
CALCIUM ALBUM COR SERPL-MCNC: 9.5 MG/DL (ref 8.5–10.5)
CALCIUM SERPL-MCNC: 9.7 MG/DL (ref 8.5–10.5)
CHLORIDE SERPL-SCNC: 102 MMOL/L (ref 96–112)
CO2 SERPL-SCNC: 24 MMOL/L (ref 20–33)
COLOR UR: YELLOW
CREAT SERPL-MCNC: 0.84 MG/DL (ref 0.5–1.4)
EKG IMPRESSION: NORMAL
EOSINOPHIL # BLD AUTO: 0.16 K/UL (ref 0–0.51)
EOSINOPHIL NFR BLD: 2.3 % (ref 0–6.9)
EPI CELLS #/AREA URNS HPF: ABNORMAL /HPF
ERYTHROCYTE [DISTWIDTH] IN BLOOD BY AUTOMATED COUNT: 42.1 FL (ref 35.9–50)
GFR SERPLBLD CREATININE-BSD FMLA CKD-EPI: 86 ML/MIN/1.73 M 2
GLOBULIN SER CALC-MCNC: 3.5 G/DL (ref 1.9–3.5)
GLUCOSE BLD STRIP.AUTO-MCNC: 95 MG/DL (ref 65–99)
GLUCOSE SERPL-MCNC: 92 MG/DL (ref 65–99)
GLUCOSE UR STRIP.AUTO-MCNC: NEGATIVE MG/DL
HCG SERPL QL: NEGATIVE
HCT VFR BLD AUTO: 42.1 % (ref 37–47)
HGB BLD-MCNC: 13 G/DL (ref 12–16)
HYALINE CASTS #/AREA URNS LPF: ABNORMAL /LPF
IMM GRANULOCYTES # BLD AUTO: 0.01 K/UL (ref 0–0.11)
IMM GRANULOCYTES NFR BLD AUTO: 0.1 % (ref 0–0.9)
KETONES UR STRIP.AUTO-MCNC: NEGATIVE MG/DL
LEUKOCYTE ESTERASE UR QL STRIP.AUTO: NEGATIVE
LYMPHOCYTES # BLD AUTO: 1.87 K/UL (ref 1–4.8)
LYMPHOCYTES NFR BLD: 26.7 % (ref 22–41)
MCH RBC QN AUTO: 24.8 PG (ref 27–33)
MCHC RBC AUTO-ENTMCNC: 30.9 G/DL (ref 32.2–35.5)
MCV RBC AUTO: 80.3 FL (ref 81.4–97.8)
MICRO URNS: ABNORMAL
MONOCYTES # BLD AUTO: 0.43 K/UL (ref 0–0.85)
MONOCYTES NFR BLD AUTO: 6.1 % (ref 0–13.4)
NEUTROPHILS # BLD AUTO: 4.48 K/UL (ref 1.82–7.42)
NEUTROPHILS NFR BLD: 63.9 % (ref 44–72)
NITRITE UR QL STRIP.AUTO: NEGATIVE
NRBC # BLD AUTO: 0 K/UL
NRBC BLD-RTO: 0 /100 WBC (ref 0–0.2)
PH UR STRIP.AUTO: 5.5 [PH] (ref 5–8)
PLATELET # BLD AUTO: 329 K/UL (ref 164–446)
PMV BLD AUTO: 9.3 FL (ref 9–12.9)
POTASSIUM SERPL-SCNC: 4.6 MMOL/L (ref 3.6–5.5)
PROT SERPL-MCNC: 7.7 G/DL (ref 6–8.2)
PROT UR QL STRIP: NEGATIVE MG/DL
RBC # BLD AUTO: 5.24 M/UL (ref 4.2–5.4)
RBC # URNS HPF: ABNORMAL /HPF
RBC UR QL AUTO: ABNORMAL
SODIUM SERPL-SCNC: 138 MMOL/L (ref 135–145)
SP GR UR STRIP.AUTO: 1.02
UROBILINOGEN UR STRIP.AUTO-MCNC: 1 MG/DL
WBC # BLD AUTO: 7 K/UL (ref 4.8–10.8)
WBC #/AREA URNS HPF: ABNORMAL /HPF

## 2023-08-14 PROCEDURE — 82962 GLUCOSE BLOOD TEST: CPT

## 2023-08-14 PROCEDURE — 80053 COMPREHEN METABOLIC PANEL: CPT

## 2023-08-14 PROCEDURE — 93005 ELECTROCARDIOGRAM TRACING: CPT

## 2023-08-14 PROCEDURE — 81001 URINALYSIS AUTO W/SCOPE: CPT

## 2023-08-14 PROCEDURE — 70450 CT HEAD/BRAIN W/O DYE: CPT

## 2023-08-14 PROCEDURE — 93005 ELECTROCARDIOGRAM TRACING: CPT | Performed by: EMERGENCY MEDICINE

## 2023-08-14 PROCEDURE — 84703 CHORIONIC GONADOTROPIN ASSAY: CPT

## 2023-08-14 PROCEDURE — A9270 NON-COVERED ITEM OR SERVICE: HCPCS | Performed by: EMERGENCY MEDICINE

## 2023-08-14 PROCEDURE — 85025 COMPLETE CBC W/AUTO DIFF WBC: CPT

## 2023-08-14 PROCEDURE — 99284 EMERGENCY DEPT VISIT MOD MDM: CPT

## 2023-08-14 PROCEDURE — 36415 COLL VENOUS BLD VENIPUNCTURE: CPT

## 2023-08-14 PROCEDURE — 700102 HCHG RX REV CODE 250 W/ 637 OVERRIDE(OP): Performed by: EMERGENCY MEDICINE

## 2023-08-14 RX ORDER — MECLIZINE HYDROCHLORIDE 25 MG/1
25 TABLET ORAL ONCE
Status: COMPLETED | OUTPATIENT
Start: 2023-08-14 | End: 2023-08-14

## 2023-08-14 RX ORDER — MECLIZINE HYDROCHLORIDE 25 MG/1
25 TABLET ORAL 3 TIMES DAILY PRN
Qty: 30 TABLET | Refills: 0 | Status: SHIPPED | OUTPATIENT
Start: 2023-08-14

## 2023-08-14 RX ORDER — DIAZEPAM 2 MG/1
2 TABLET ORAL EVERY 6 HOURS PRN
Qty: 10 TABLET | Refills: 0 | Status: SHIPPED | OUTPATIENT
Start: 2023-08-14 | End: 2023-08-14 | Stop reason: SDUPTHER

## 2023-08-14 RX ORDER — DIAZEPAM 5 MG/1
2.5 TABLET ORAL ONCE
Status: COMPLETED | OUTPATIENT
Start: 2023-08-14 | End: 2023-08-14

## 2023-08-14 RX ORDER — DIAZEPAM 2 MG/1
2 TABLET ORAL EVERY 6 HOURS PRN
Qty: 10 TABLET | Refills: 0 | Status: SHIPPED | OUTPATIENT
Start: 2023-08-14 | End: 2023-08-19

## 2023-08-14 RX ADMIN — MECLIZINE HYDROCHLORIDE 25 MG: 25 TABLET ORAL at 11:46

## 2023-08-14 RX ADMIN — DIAZEPAM 2.5 MG: 5 TABLET ORAL at 11:47

## 2023-08-14 ASSESSMENT — FIBROSIS 4 INDEX: FIB4 SCORE: 0.47

## 2023-08-14 NOTE — ED PROVIDER NOTES
"  ER Provider Note    Scribed for Leobardo Sherman M.d. by Landon Cota. 8/14/2023  11:20 AM    Primary Care Provider: Patrizia James M.D.    CHIEF COMPLAINT  Chief Complaint   Patient presents with    Dizziness     \"All weekend\"     Headache     EXTERNAL RECORDS REVIEWED  Outpatient Notes Patient was seen by her primary care on 7/6/23 for follow up visit.    HPI/ROS  LIMITATION TO HISTORY   Select: : None  OUTSIDE HISTORIAN(S):  None    Jaclyn Olvera is a 48 y.o. female who presents to the ED for evaluation of headache and dizziness. Patient states the headache has been moving throughout her head and has been ongoing and constant over the last couple of weeks. She reports feeling dizzy over the last three days. She describes the dizziness as feeling off-balance and feeling like she will almost fall down. The off-balance feeling has been constant over the last three days. If she lays on her left side or puts her head too low, she will have room-spinning sensation. She has generalized body aches. Denies sore throat, runny nose, or cough. She has abdominal pain and nausea which she thinks might be from taking Trulicity. Denies any cold symptoms. No significant shortness of breath or blood in stools. Denies chance of pregnancy.  Patient mentions she was hospitalized for antiphospholipid antibody syndrome in 2021. She was not initially on blood thinners at the time but was put on warfarin and then later switched to xarelto which she still takes regularly.     PAST MEDICAL HISTORY  Past Medical History:   Diagnosis Date    Antiphospholipid antibody syndrome (HCC)     Diabetes (HCC)     H/O thrombosis of vena cava     Hemorrhage of both adrenal glands (HCC)     PE (pulmonary embolism)        SURGICAL HISTORY  Past Surgical History:   Procedure Laterality Date    RI UPPER GI ENDOSCOPY,DIAGNOSIS N/A 6/17/2021    Procedure: GASTROSCOPY;  Surgeon: Elfego Lopez M.D.;  Location: SURGERY SAME DAY [facility];  Service: " "Gastroenterology    SD UPPER GI ENDOSCOPY,BIOPSY N/A 6/17/2021    Procedure: GASTROSCOPY, WITH BIOPSY;  Surgeon: Elfego Lopez M.D.;  Location: SURGERY SAME DAY [facility];  Service: Gastroenterology       FAMILY HISTORY  History reviewed. No pertinent family history.    SOCIAL HISTORY   reports that she has been smoking cigarettes. She uses smokeless tobacco. She reports current alcohol use. She reports that she does not currently use drugs.    CURRENT MEDICATIONS  Previous Medications    BIOTIN W/ VITAMINS C & E (HAIR/SKIN/NAILS PO)    Take 1 Tablet by mouth every day.    HYDROXYCHLOROQUINE (PLAQUENIL) 200 MG TAB    TAKE 2 TABLETS BY MOUTH EVERY DAY    RIVAROXABAN (XARELTO) 20 MG TAB TABLET    Take 1 Tablet by mouth with dinner.    TRULICITY 0.75 MG/0.5ML SOLUTION PEN-INJECTOR    INJECT 0.5 ML (0.75 MG TOTAL) UNDER THE SKIN EVERY 7 DAYS    VITAMIN D, CHOLECALCIFEROL, 50 MCG (2000 UT) CAP    Take 1 Capsule by mouth every day.       ALLERGIES  Patient has no known allergies.    PHYSICAL EXAM  /81   Pulse 85   Temp 36.2 °C (97.1 °F)   Resp 14   Ht 1.727 m (5' 8\")   Wt 80.4 kg (177 lb 4 oz)   LMP 08/11/2023 (Approximate)   SpO2 98%   BMI 26.95 kg/m²     Constitutional: Well developed, Well nourished, No acute distress, Non-toxic appearance.   HENT: Normocephalic, Atraumatic, Bilateral external ears normal, Oropharynx moist, No oral exudates, Nose normal.  TMs are normal.  Eyes: PERRL, EOMI, Conjunctiva normal, No discharge.   Neck: Normal range of motion, No tenderness, Supple, No stridor.   Cardiovascular: Normal heart rate, Normal rhythm, No murmurs, No rubs, No gallops.   Thorax & Lungs: Normal breath sounds, No respiratory distress, No wheezing, No chest tenderness.   Abdomen: Bowel sounds normal, Soft, No tenderness  Skin: Warm, Dry, No erythema, No rash.   Back: No tenderness, No CVA tenderness.   Musculoskeletal: Good range of motion in all major joints. No edema. No tenderness to palpation or " major deformities noted.   Neurologic: Alert, Normal motor function, Normal sensory function, No focal deficits noted.  Normal finger-nose, normal gait no pronator drift cranials 2 through 12 are intact.      DIAGNOSTIC STUDIES    Labs:   Results for orders placed or performed during the hospital encounter of 08/14/23   CBC WITH DIFFERENTIAL   Result Value Ref Range    WBC 7.0 4.8 - 10.8 K/uL    RBC 5.24 4.20 - 5.40 M/uL    Hemoglobin 13.0 12.0 - 16.0 g/dL    Hematocrit 42.1 37.0 - 47.0 %    MCV 80.3 (L) 81.4 - 97.8 fL    MCH 24.8 (L) 27.0 - 33.0 pg    MCHC 30.9 (L) 32.2 - 35.5 g/dL    RDW 42.1 35.9 - 50.0 fL    Platelet Count 329 164 - 446 K/uL    MPV 9.3 9.0 - 12.9 fL    Neutrophils-Polys 63.90 44.00 - 72.00 %    Lymphocytes 26.70 22.00 - 41.00 %    Monocytes 6.10 0.00 - 13.40 %    Eosinophils 2.30 0.00 - 6.90 %    Basophils 0.90 0.00 - 1.80 %    Immature Granulocytes 0.10 0.00 - 0.90 %    Nucleated RBC 0.00 0.00 - 0.20 /100 WBC    Neutrophils (Absolute) 4.48 1.82 - 7.42 K/uL    Lymphs (Absolute) 1.87 1.00 - 4.80 K/uL    Monos (Absolute) 0.43 0.00 - 0.85 K/uL    Eos (Absolute) 0.16 0.00 - 0.51 K/uL    Baso (Absolute) 0.06 0.00 - 0.12 K/uL    Immature Granulocytes (abs) 0.01 0.00 - 0.11 K/uL    NRBC (Absolute) 0.00 K/uL   COMP METABOLIC PANEL   Result Value Ref Range    Sodium 138 135 - 145 mmol/L    Potassium 4.6 3.6 - 5.5 mmol/L    Chloride 102 96 - 112 mmol/L    Co2 24 20 - 33 mmol/L    Anion Gap 12.0 7.0 - 16.0    Glucose 92 65 - 99 mg/dL    Bun 10 8 - 22 mg/dL    Creatinine 0.84 0.50 - 1.40 mg/dL    Calcium 9.7 8.5 - 10.5 mg/dL    Correct Calcium 9.5 8.5 - 10.5 mg/dL    AST(SGOT) 22 12 - 45 U/L    ALT(SGPT) 24 2 - 50 U/L    Alkaline Phosphatase 57 30 - 99 U/L    Total Bilirubin 0.7 0.1 - 1.5 mg/dL    Albumin 4.2 3.2 - 4.9 g/dL    Total Protein 7.7 6.0 - 8.2 g/dL    Globulin 3.5 1.9 - 3.5 g/dL    A-G Ratio 1.2 g/dL   URINALYSIS CULTURE, IF INDICATED    Specimen: Urine, Clean Catch   Result Value Ref Range     Color Yellow     Character Clear     Specific Gravity 1.019 <1.035    Ph 5.5 5.0 - 8.0    Glucose Negative Negative mg/dL    Ketones Negative Negative mg/dL    Protein Negative Negative mg/dL    Bilirubin Negative Negative    Urobilinogen, Urine 1.0 Negative    Nitrite Negative Negative    Leukocyte Esterase Negative Negative    Occult Blood Large (A) Negative    Micro Urine Req Microscopic    HCG QUAL SERUM   Result Value Ref Range    Beta-Hcg Qualitative Serum Negative Negative   URINE MICROSCOPIC (W/UA)   Result Value Ref Range    WBC 0-2 /hpf    RBC 5-10 (A) /hpf    Bacteria Negative None /hpf    Epithelial Cells Few /hpf    Hyaline Cast 0-2 /lpf   ESTIMATED GFR   Result Value Ref Range    GFR (CKD-EPI) 86 >60 mL/min/1.73 m 2   EKG   Result Value Ref Range    Report       [facility] Emergency Dept.    Test Date:  2023  Pt Name:    GASTON NIÑO                  Department: ER  MRN:        [id number]                      Room:  Gender:     Female                       Technician: [id number]  :        [id number]                   Requested By:ER TRIAGE PROTOCOL  Order #:    [id number]                    Reading MD: AURORA OH. MICAELA    Measurements  Intervals                                Axis  Rate:       84                           P:          54  MN:         152                          QRS:        63  QRSD:       74                           T:          62  QT:         384  QTc:        454    Interpretive Statements  Sinus rhythm  Compared to ECG 2022 10:38:14  No significant changes  Electronically Signed On 2023 14:54:54 PDT by AURORA OH. AMD     POCT glucose device results   Result Value Ref Range    POC Glucose, Blood 95 65 - 99 mg/dL            Radiology:   The attending emergency physician has independently interpreted the diagnostic imaging associated with this visit and am waiting the final reading from the radiologist.     Radiologist interpretation:   CT-HEAD  W/O   Final Result      Head CT without contrast within normal limits. No evidence of acute cerebral infarction, hemorrhage or mass lesion.              COURSE & MEDICAL DECISION MAKING     ED Observation Status?  No    INITIAL ASSESSMENT, COURSE AND PLAN  Care Narrative:     11:20 AM Patient seen and examined at bedside. The patient presents with dizziness and headadche and the differential diagnosis includes but is not limited to dehydration, symptomatic anemia, electrolyte abnormality, peripheral vertigo causes, central vertigo causes, hemorrhage, pregnancy.    The patient's chart is reviewed based on labs and previous work-up for comparison.    .Ordered for CT head, CMP, urinalysis culture, HCG qual serum, CBC with differential, EKG to evaluate. Patient will be treated with meclizine tablet 25 mg, valium tablet 2.5 mg for her symptoms. Patient understands and agrees to plan.       The patient was worked up for dizziness and vertigo for the above additional diagnoses initially she is so symptomatic I cannot get her to lay down flat for an examination she gets anywhere close to supine she sits up abruptly and states she has severe intense vertigo and she is going to vomit.  She is really unable to comply with an exam that would allow me to assess her for nystagmus or to do a [place]-Hallpike maneuver.    I have treated her for her symptoms with meclizine and Valium.  She has a complicated history of severe antiphospholipid antibody syndrome with central dural thrombosis and adrenal hemorrhage and multiple other complications.      Today her pregnancy test is negative her labs are reassuring no signs of anemia electrolyte abnormality and her head CT does not show hemorrhage or any other acute abnormality.    She reports compliance with her chronic anticoagulation therapy.    She does not have any lateralizing stroke symptoms and she is able to walk fairly well.  Her vertigo and intense spinning when she lays supine is  really somewhat longstanding is been there for several weeks and it is intensely positional and symptoms improved dramatically when the patient is treated meclizine and Valium and now she is able to lay supine.  Her ears look normal.  This is likely positional vertigo.  She is given instructions on the Epley maneuver and told to follow-up with her doctor.  The patient states her dizziness is different than her vertigo and she is feels vaguely off her imaging here is unremarkable discussed hospitalist for MRI but she does not want to stay in the hospital she wants to follow-up for continued work-up as an outpatient.  States has been going on for several days number for outpatient management.  I think is unlikely to be a stroke.  I do not see any other acute process that would require hospitalization.  Return for symptoms or other concerns.  Discussed return precautions, follow-up instructions questions were answered she is agreeable to plan.  Understands return precautions and discharged with her  in good condition.      ADDITIONAL PROBLEM LIST  Antiphospholipid antibody syndrome  Chronic anticoagulation.    DISPOSITION AND DISCUSSIONS  Patrizia James M.D.  [address] [facility] [address]  [phone]              FINAL DIANGOSIS  1. Nonintractable headache, unspecified chronicity pattern, unspecified headache type    2. Vertigo              Landon RAMIREZ (Scribe), am scribing for, and in the presence of, Leobardo Sherman M.D..    Electronically signed by: Landon Cota (Scribe), 8/14/2023    Leobardo RAMIREZ M.D. personally performed the services described in this documentation, as scribed by Landon Cota in my presence, and it is both accurate and complete.     The note accurately reflects work and decisions made by me.  Leobardo Sherman M.D.  8/14/2023  3:08 PM

## 2023-08-14 NOTE — ED NOTES
Pt amb to room. CC consistent w/ traige note. Pt reports a few changes w/ meds and new glasses in the past several months.

## 2023-08-14 NOTE — ED NOTES
Patient discharged per order. Oral and written discharge instructions reviewed. IV removed. Medications sent to home pharmacy. New medications reviewed. All belongings accounted for and taken with patient. Questions answered, and patient agrees with discharge plan. Encouraged to follow up with PCP. Ambulatory to lobby with

## 2023-08-14 NOTE — DISCHARGE INSTRUCTIONS
Take medicines as prescribed for your vertigo and dizziness.  Do not drive on these medicines.  Google the Epley maneuver and try this for your vertigo.  Follow-up with your doctor and your specialist.  Return for worsening symptoms of headache, dizziness, spinning, or if you have any new symptoms like numbness weakness fever or severe headache.  Otherwise follow-up with your doctor.

## 2023-08-14 NOTE — ED TRIAGE NOTES
".  Chief Complaint   Patient presents with    Dizziness     \"All weekend\"     Headache       49 yo female ambulatory to triage for above complaint. Blood glucose 95 in triage. EKG completed upon arrival.      Pt is alert and oriented, speaking in full sentences, follows commands and responds appropriately to questions.      Patient placed back in lobby and educated on triage process. Asked to inform RN of any changes.    /78   Pulse 87   Temp 36.2 °C (97.1 °F) (Temporal)   Resp 14   Ht 1.727 m (5' 8\")   Wt 80.4 kg (177 lb 4 oz)   LMP 08/11/2023 (Approximate)   SpO2 97%   BMI 26.95 kg/m²     "

## 2023-08-28 ENCOUNTER — TELEPHONE (OUTPATIENT)
Dept: RHEUMATOLOGY | Facility: MEDICAL CENTER | Age: 48
End: 2023-08-28

## 2023-08-28 NOTE — TELEPHONE ENCOUNTER
VOICEMAIL  1. Caller Name: Jaclyn                      Call Back Number: [phone]    2. Message: 8/23/23 - Patient left voicemail that she is in severe pain, she takes Tylenol and does not know how to proceed.    3. Patient approves office to leave a detailed voicemail/MyChart message: yes    Upon my return, I called patient, she is feeling a bit better but feels the Xarelto medication has worsen her pain and aches.

## 2023-09-22 ENCOUNTER — APPOINTMENT (OUTPATIENT)
Dept: RADIOLOGY | Facility: MEDICAL CENTER | Age: 48
End: 2023-09-22
Attending: EMERGENCY MEDICINE
Payer: COMMERCIAL

## 2023-09-22 ENCOUNTER — HOSPITAL ENCOUNTER (EMERGENCY)
Facility: MEDICAL CENTER | Age: 48
End: 2023-09-22
Attending: EMERGENCY MEDICINE
Payer: COMMERCIAL

## 2023-09-22 VITALS
HEART RATE: 78 BPM | BODY MASS INDEX: 26.37 KG/M2 | DIASTOLIC BLOOD PRESSURE: 65 MMHG | HEIGHT: 68 IN | RESPIRATION RATE: 16 BRPM | OXYGEN SATURATION: 96 % | SYSTOLIC BLOOD PRESSURE: 115 MMHG | WEIGHT: 174 LBS | TEMPERATURE: 96.6 F

## 2023-09-22 DIAGNOSIS — N83.202 CYST OF LEFT OVARY: ICD-10-CM

## 2023-09-22 DIAGNOSIS — R10.30 LOWER ABDOMINAL PAIN: ICD-10-CM

## 2023-09-22 LAB
ALBUMIN SERPL BCP-MCNC: 4.1 G/DL (ref 3.2–4.9)
ALBUMIN/GLOB SERPL: 1.3 G/DL
ALP SERPL-CCNC: 45 U/L (ref 30–99)
ALT SERPL-CCNC: 17 U/L (ref 2–50)
ANION GAP SERPL CALC-SCNC: 12 MMOL/L (ref 7–16)
APPEARANCE UR: CLEAR
AST SERPL-CCNC: 18 U/L (ref 12–45)
BASOPHILS # BLD AUTO: 0.7 % (ref 0–1.8)
BASOPHILS # BLD: 0.05 K/UL (ref 0–0.12)
BILIRUB SERPL-MCNC: 0.8 MG/DL (ref 0.1–1.5)
BILIRUB UR QL STRIP.AUTO: NEGATIVE
BUN SERPL-MCNC: 9 MG/DL (ref 8–22)
CALCIUM ALBUM COR SERPL-MCNC: 9.4 MG/DL (ref 8.5–10.5)
CALCIUM SERPL-MCNC: 9.5 MG/DL (ref 8.5–10.5)
CHLORIDE SERPL-SCNC: 103 MMOL/L (ref 96–112)
CO2 SERPL-SCNC: 22 MMOL/L (ref 20–33)
COLOR UR: YELLOW
CREAT SERPL-MCNC: 0.91 MG/DL (ref 0.5–1.4)
EOSINOPHIL # BLD AUTO: 0.13 K/UL (ref 0–0.51)
EOSINOPHIL NFR BLD: 1.8 % (ref 0–6.9)
ERYTHROCYTE [DISTWIDTH] IN BLOOD BY AUTOMATED COUNT: 40.1 FL (ref 35.9–50)
GFR SERPLBLD CREATININE-BSD FMLA CKD-EPI: 78 ML/MIN/1.73 M 2
GLOBULIN SER CALC-MCNC: 3.1 G/DL (ref 1.9–3.5)
GLUCOSE SERPL-MCNC: 103 MG/DL (ref 65–99)
GLUCOSE UR STRIP.AUTO-MCNC: NEGATIVE MG/DL
HCG SERPL QL: NEGATIVE
HCT VFR BLD AUTO: 35.2 % (ref 37–47)
HGB BLD-MCNC: 10.8 G/DL (ref 12–16)
IMM GRANULOCYTES # BLD AUTO: 0.03 K/UL (ref 0–0.11)
IMM GRANULOCYTES NFR BLD AUTO: 0.4 % (ref 0–0.9)
KETONES UR STRIP.AUTO-MCNC: NEGATIVE MG/DL
LEUKOCYTE ESTERASE UR QL STRIP.AUTO: NEGATIVE
LIPASE SERPL-CCNC: 35 U/L (ref 11–82)
LYMPHOCYTES # BLD AUTO: 1.37 K/UL (ref 1–4.8)
LYMPHOCYTES NFR BLD: 18.7 % (ref 22–41)
MCH RBC QN AUTO: 23.5 PG (ref 27–33)
MCHC RBC AUTO-ENTMCNC: 30.7 G/DL (ref 32.2–35.5)
MCV RBC AUTO: 76.5 FL (ref 81.4–97.8)
MICRO URNS: NORMAL
MONOCYTES # BLD AUTO: 0.43 K/UL (ref 0–0.85)
MONOCYTES NFR BLD AUTO: 5.9 % (ref 0–13.4)
NEUTROPHILS # BLD AUTO: 5.32 K/UL (ref 1.82–7.42)
NEUTROPHILS NFR BLD: 72.5 % (ref 44–72)
NITRITE UR QL STRIP.AUTO: NEGATIVE
NRBC # BLD AUTO: 0 K/UL
NRBC BLD-RTO: 0 /100 WBC (ref 0–0.2)
PH UR STRIP.AUTO: 6.5 [PH] (ref 5–8)
PLATELET # BLD AUTO: 219 K/UL (ref 164–446)
PMV BLD AUTO: 9.1 FL (ref 9–12.9)
POTASSIUM SERPL-SCNC: 4.4 MMOL/L (ref 3.6–5.5)
PROT SERPL-MCNC: 7.2 G/DL (ref 6–8.2)
PROT UR QL STRIP: NEGATIVE MG/DL
RBC # BLD AUTO: 4.6 M/UL (ref 4.2–5.4)
RBC UR QL AUTO: NEGATIVE
SODIUM SERPL-SCNC: 137 MMOL/L (ref 135–145)
SP GR UR STRIP.AUTO: 1.02
UROBILINOGEN UR STRIP.AUTO-MCNC: 0.2 MG/DL
WBC # BLD AUTO: 7.3 K/UL (ref 4.8–10.8)

## 2023-09-22 PROCEDURE — 96375 TX/PRO/DX INJ NEW DRUG ADDON: CPT

## 2023-09-22 PROCEDURE — 36415 COLL VENOUS BLD VENIPUNCTURE: CPT

## 2023-09-22 PROCEDURE — 99284 EMERGENCY DEPT VISIT MOD MDM: CPT

## 2023-09-22 PROCEDURE — 84703 CHORIONIC GONADOTROPIN ASSAY: CPT

## 2023-09-22 PROCEDURE — 81003 URINALYSIS AUTO W/O SCOPE: CPT

## 2023-09-22 PROCEDURE — 700111 HCHG RX REV CODE 636 W/ 250 OVERRIDE (IP): Performed by: EMERGENCY MEDICINE

## 2023-09-22 PROCEDURE — 96374 THER/PROPH/DIAG INJ IV PUSH: CPT

## 2023-09-22 PROCEDURE — 85025 COMPLETE CBC W/AUTO DIFF WBC: CPT

## 2023-09-22 PROCEDURE — 83690 ASSAY OF LIPASE: CPT

## 2023-09-22 PROCEDURE — 80053 COMPREHEN METABOLIC PANEL: CPT

## 2023-09-22 PROCEDURE — 74174 CTA ABD&PLVS W/CONTRAST: CPT

## 2023-09-22 PROCEDURE — 700117 HCHG RX CONTRAST REV CODE 255: Performed by: EMERGENCY MEDICINE

## 2023-09-22 PROCEDURE — 76830 TRANSVAGINAL US NON-OB: CPT

## 2023-09-22 RX ORDER — AMOXICILLIN 250 MG
1 CAPSULE ORAL DAILY
Qty: 30 TABLET | Refills: 0 | Status: SHIPPED | OUTPATIENT
Start: 2023-09-22

## 2023-09-22 RX ORDER — KETOROLAC TROMETHAMINE 30 MG/ML
15 INJECTION, SOLUTION INTRAMUSCULAR; INTRAVENOUS ONCE
Status: COMPLETED | OUTPATIENT
Start: 2023-09-22 | End: 2023-09-22

## 2023-09-22 RX ORDER — HYDROMORPHONE HYDROCHLORIDE 1 MG/ML
1 INJECTION, SOLUTION INTRAMUSCULAR; INTRAVENOUS; SUBCUTANEOUS ONCE
Status: COMPLETED | OUTPATIENT
Start: 2023-09-22 | End: 2023-09-22

## 2023-09-22 RX ORDER — OXYCODONE HYDROCHLORIDE 5 MG/1
5 TABLET ORAL EVERY 4 HOURS PRN
Qty: 8 TABLET | Refills: 0 | Status: SHIPPED | OUTPATIENT
Start: 2023-09-22 | End: 2023-09-25

## 2023-09-22 RX ADMIN — IOHEXOL 100 ML: 350 INJECTION, SOLUTION INTRAVENOUS at 07:49

## 2023-09-22 RX ADMIN — KETOROLAC TROMETHAMINE 15 MG: 30 INJECTION, SOLUTION INTRAMUSCULAR; INTRAVENOUS at 08:56

## 2023-09-22 RX ADMIN — HYDROMORPHONE HYDROCHLORIDE 1 MG: 1 INJECTION, SOLUTION INTRAMUSCULAR; INTRAVENOUS; SUBCUTANEOUS at 06:54

## 2023-09-22 ASSESSMENT — FIBROSIS 4 INDEX: FIB4 SCORE: 0.64

## 2023-09-22 NOTE — ED NOTES
Pt roomed in [facility]. Patient placed on monitor at this time.  Patient oriented to room, and this RN explained ER Process. Patient oriented to call light and verbalizes understanding of use.     Triage note reviewed and agreed with at this time. Chart up for ERP. Patient provided urine sample on way to room.

## 2023-09-22 NOTE — DISCHARGE INSTRUCTIONS
Your work-up today revealed an ovarian cyst, likely a hemorrhagic cyst.  Have your primary care physician schedule an outpatient ultrasound in 6 to 10 weeks to ensure this is resolving.  Your pain and symptoms should resolve significantly much sooner than that.  You did have a lot of gas on your CT as well which may be contributing to your symptoms.  I have given you a short course of oxycodone for your pain, it is important that you also take a stool softener with this and have prescribed this.  Please also  some Gas-X.

## 2023-09-22 NOTE — ED PROVIDER NOTES
ED Provider Note    CHIEF COMPLAINT  Chief Complaint   Patient presents with    Abdominal Pain     Pt reports bilateral lower quadrant pain since 0100    Back Pain     Pt reports pain radiates to lower back       EXTERNAL RECORDS REVIEWED  Hospitalization 6/14/2021, diagnosed with antiphospholipid syndrome, patient switched from warfarin to apixaban recently    HPI/ROS      Jaclyn Olvera is a 48 y.o. female who presents with chief complaint of lower abdominal pain since around 1 AM.  Patient with history of diabetes, portal vein thrombosis and pulmonary embolus on anticoagulation, adrenal hemorrhage, and diabetes.  Patient reports that pain started while she was sleeping and has been constant since then.  She denies any waxing and waning of pain, any provoking or relieving factors.  Patient denies any dysuria urgency or frequency.  She denies any associated vaginal bleeding or vaginal discharge.  She reports pain is most pronounced in her right lower pelvis with radiations to her left lower pelvis and into her bilateral flanks.  She reports it feels relatively similar to when she had an adrenal hemorrhage years ago.  Patient has been on warfarin for years but recently was switched to apixaban.  She reports she missed 1 dose over the weekend but has not missed any doses since.    PAST MEDICAL HISTORY   has a past medical history of Antiphospholipid antibody syndrome (HCC), Diabetes (HCC), H/O thrombosis of vena cava, Hemorrhage of both adrenal glands (HCC), and PE (pulmonary embolism).    SURGICAL HISTORY   has a past surgical history that includes upper gi endoscopy,diagnosis (N/A, 6/17/2021) and upper gi endoscopy,biopsy (N/A, 6/17/2021).    FAMILY HISTORY  History reviewed. No pertinent family history.    SOCIAL HISTORY  Social History     Tobacco Use    Smoking status: Some Days     Types: Cigarettes    Smokeless tobacco: Current    Tobacco comments:     1 pk per week   Vaping Use    Vaping Use: Never used  "  Substance and Sexual Activity    Alcohol use: Yes     Comment: occasionally    Drug use: Not Currently    Sexual activity: Not on file       CURRENT MEDICATIONS  Home Medications       Reviewed by Mary Pate R.N. (Registered Nurse) on 09/22/23 at 0555  Med List Status: Partial     Medication Last Dose Status   Biotin w/ Vitamins C & E (HAIR/SKIN/NAILS PO)  Active   hydroxychloroquine (PLAQUENIL) 200 MG Tab  Active   meclizine (ANTIVERT) 25 MG Tab  Active   rivaroxaban (XARELTO) 20 MG Tab tablet  Active   TRULICITY 0.75 MG/0.5ML Solution Pen-injector  Active   Vitamin D, Cholecalciferol, 50 MCG (2000 UT) Cap  Active                    ALLERGIES  No Known Allergies    PHYSICAL EXAM  VITAL SIGNS: /89   Pulse 74   Temp 35.9 °C (96.6 °F) (Temporal)   Resp 16   Ht 1.727 m (5' 8\")   Wt 78.9 kg (174 lb)   LMP 09/08/2023 (Approximate)   SpO2 100%   BMI 26.46 kg/m²    Physical Exam  Constitutional:       Appearance: She is well-developed.   HENT:      Head: Normocephalic and atraumatic.   Eyes:      Pupils: Pupils are equal, round, and reactive to light.   Cardiovascular:      Rate and Rhythm: Normal rate and regular rhythm.   Pulmonary:      Effort: Pulmonary effort is normal. No accessory muscle usage or respiratory distress.      Breath sounds: Normal breath sounds.   Abdominal:      General: Bowel sounds are normal.      Palpations: Abdomen is soft. There is no mass.      Tenderness: There is abdominal tenderness in the right lower quadrant and left lower quadrant. There is no guarding or rebound. Negative signs include Rovsing's sign.   Musculoskeletal:         General: Normal range of motion.   Skin:     General: Skin is warm.      Capillary Refill: Capillary refill takes less than 2 seconds.   Neurological:      General: No focal deficit present.      Mental Status: She is alert.   Psychiatric:         Mood and Affect: Mood normal. Mood is not anxious.           DIAGNOSTIC STUDIES / " PROCEDURES      LABS  Results for orders placed or performed during the hospital encounter of 09/22/23   CBC WITH DIFFERENTIAL   Result Value Ref Range    WBC 7.3 4.8 - 10.8 K/uL    RBC 4.60 4.20 - 5.40 M/uL    Hemoglobin 10.8 (L) 12.0 - 16.0 g/dL    Hematocrit 35.2 (L) 37.0 - 47.0 %    MCV 76.5 (L) 81.4 - 97.8 fL    MCH 23.5 (L) 27.0 - 33.0 pg    MCHC 30.7 (L) 32.2 - 35.5 g/dL    RDW 40.1 35.9 - 50.0 fL    Platelet Count 219 164 - 446 K/uL    MPV 9.1 9.0 - 12.9 fL    Neutrophils-Polys 72.50 (H) 44.00 - 72.00 %    Lymphocytes 18.70 (L) 22.00 - 41.00 %    Monocytes 5.90 0.00 - 13.40 %    Eosinophils 1.80 0.00 - 6.90 %    Basophils 0.70 0.00 - 1.80 %    Immature Granulocytes 0.40 0.00 - 0.90 %    Nucleated RBC 0.00 0.00 - 0.20 /100 WBC    Neutrophils (Absolute) 5.32 1.82 - 7.42 K/uL    Lymphs (Absolute) 1.37 1.00 - 4.80 K/uL    Monos (Absolute) 0.43 0.00 - 0.85 K/uL    Eos (Absolute) 0.13 0.00 - 0.51 K/uL    Baso (Absolute) 0.05 0.00 - 0.12 K/uL    Immature Granulocytes (abs) 0.03 0.00 - 0.11 K/uL    NRBC (Absolute) 0.00 K/uL   COMP METABOLIC PANEL   Result Value Ref Range    Sodium 137 135 - 145 mmol/L    Potassium 4.4 3.6 - 5.5 mmol/L    Chloride 103 96 - 112 mmol/L    Co2 22 20 - 33 mmol/L    Anion Gap 12.0 7.0 - 16.0    Glucose 103 (H) 65 - 99 mg/dL    Bun 9 8 - 22 mg/dL    Creatinine 0.91 0.50 - 1.40 mg/dL    Calcium 9.5 8.5 - 10.5 mg/dL    Correct Calcium 9.4 8.5 - 10.5 mg/dL    AST(SGOT) 18 12 - 45 U/L    ALT(SGPT) 17 2 - 50 U/L    Alkaline Phosphatase 45 30 - 99 U/L    Total Bilirubin 0.8 0.1 - 1.5 mg/dL    Albumin 4.1 3.2 - 4.9 g/dL    Total Protein 7.2 6.0 - 8.2 g/dL    Globulin 3.1 1.9 - 3.5 g/dL    A-G Ratio 1.3 g/dL   LIPASE   Result Value Ref Range    Lipase 35 11 - 82 U/L   URINALYSIS,CULTURE IF INDICATED    Specimen: Urine   Result Value Ref Range    Color Yellow     Character Clear     Specific Gravity 1.025 <1.035    Ph 6.5 5.0 - 8.0    Glucose Negative Negative mg/dL    Ketones Negative Negative  mg/dL    Protein Negative Negative mg/dL    Bilirubin Negative Negative    Urobilinogen, Urine 0.2 Negative    Nitrite Negative Negative    Leukocyte Esterase Negative Negative    Occult Blood Negative Negative    Micro Urine Req see below    HCG QUAL SERUM   Result Value Ref Range    Beta-Hcg Qualitative Serum Negative Negative   ESTIMATED GFR   Result Value Ref Range    GFR (CKD-EPI) 78 >60 mL/min/1.73 m 2         RADIOLOGY  I have independently interpreted the diagnostic imaging associated with this visit and am waiting the final reading from the radiologist.   My preliminary interpretation is as follows: Ovarian cyst on the left, significant stool burden  Radiologist interpretation:   US-PELVIC COMPLETE (TRANSABDOMINAL/TRANSVAGINAL) (COMBO)   Final Result      1.  No evidence of ovarian torsion.   2.  Complex left ovarian cyst measuring 2.3, possibly a hemorrhagic cyst. Consider follow-up ultrasound in 6 or 10 weeks to document resolution.      CTA ABDOMEN PELVIS W & W/O POST PROCESS   Final Result      1.  Unremarkable CTA of the abdomen and pelvis without evidence of aortic dissection, aneurysm formation, arteriovenous malformation, or acute extravasation.      2.  Interval resolution of 14 mm low-attenuation lesion in right adrenal gland.      3.  Unchanged small benign right renal cyst..      4.  17 mm left ovarian cyst.            COURSE & MEDICAL DECISION MAKING      INITIAL ASSESSMENT, COURSE AND PLAN  Care Narrative: Patient here with complex medical history.  We will check basic labs.  Given patient's significant hypercoagulable state and her missed dose of NOAC recently will check a CTA to evaluate for possible vascular abnormality.  Certainly an adrenal hemorrhage is possible as this has occurred in the past with patient.  CT should help identify this.  Patient's abdominal exam is essentially benign, she has some minimal tenderness but no guarding or rebound.  There is no significant flank ecchymosis.   Patient with potential endometriosis, given the bilateral nature of her symptoms with suspicion of torsion is very low.  Patient without any urinary symptoms.    Patient CT fails to reveal any highly concerning findings.  No vascular abnormalities.  The prior adrenal hemorrhage has entirely resolved.  Patient does have a large ovarian cyst.  Certainly this could be the cause of patient's pain.  Is not colicky, she has no associated guarding or rebound, my suspicion of torsion remains low here however will check pelvic ultrasound for further evaluation.  Patient feeling considerably improved following analgesics, she still remains with some mild pain, will give Toradol.  Patient feeling improved feeling patient pelvic ultrasound is reassuring, redemonstrates ovarian cyst without any highly concerning findings.  Patient instructed to follow-up with primary care for repeat ultrasound in 6-10 weeks.  Short course of oxycodone as chronic NSAIDs contraindicated given patient's anticoagulation.  Patient also instructed to take some Gas-X and senna docusate for her stool burden identified on CT            DISPOSITION AND DISCUSSIONS    FINAL DIAGNOSIS  1. Lower abdominal pain    2. Cyst of left ovary

## 2023-09-22 NOTE — ED TRIAGE NOTES
Chief Complaint   Patient presents with    Abdominal Pain     Pt reports bilateral lower quadrant pain since 0100    Back Pain     Pt reports pain radiates to lower back     Pt ambulatory to triage for above complaint. Pt reports hx of similar pain related to medical hx.     Pt is alert & oriented and follows commands. Pt speaking in full sentences and responds appropriately to questions. No acute distress noted in triage. Respirations are even and unlabored. Skin is pink/warm/dry.    Pt placed back in lobby and educated on triage process. Pt encouraged to alert staff to any changes in condition.

## 2023-09-22 NOTE — ED NOTES
Patient medicated per MAR at this time. Patient verbalized understanding of medication education. Updated on POC at this time. Patient on vitals monitor

## 2023-09-22 NOTE — ED NOTES
Patient resting on stretcher. States her pain is still under control at this time. Awaiting CT results

## 2023-09-22 NOTE — ED NOTES
Bedside report from [facility]. Patient resting on stretcher in NAD. Oxygen on patient after pain medications. Awaiting CT scan

## 2023-11-07 ENCOUNTER — ANCILLARY PROCEDURE (OUTPATIENT)
Dept: VASCULAR LAB | Facility: MEDICAL CENTER | Age: 48
End: 2023-11-07
Attending: INTERNAL MEDICINE
Payer: COMMERCIAL

## 2023-11-07 DIAGNOSIS — I82.210 THROMBOSIS OF SUPERIOR VENA CAVA (HCC): ICD-10-CM

## 2023-11-07 DIAGNOSIS — Z79.01 CHRONIC ANTICOAGULATION: ICD-10-CM

## 2023-11-07 DIAGNOSIS — D68.61 ANTIPHOSPHOLIPID SYNDROME (HCC): ICD-10-CM

## 2023-11-07 DIAGNOSIS — E27.49 HEMORRHAGE OF BOTH ADRENAL GLANDS (HCC): ICD-10-CM

## 2023-11-07 DIAGNOSIS — N93.9 VAGINAL BLEEDING: ICD-10-CM

## 2023-11-07 LAB — INR PPP: 1.8 (ref 2–3.5)

## 2023-11-07 PROCEDURE — 99212 OFFICE O/P EST SF 10 MIN: CPT

## 2023-11-07 PROCEDURE — 85610 PROTHROMBIN TIME: CPT

## 2023-11-07 RX ORDER — WARFARIN SODIUM 5 MG/1
TABLET ORAL
Qty: 180 TABLET | Refills: 1 | Status: SHIPPED | OUTPATIENT
Start: 2023-11-07

## 2023-11-07 RX ORDER — WARFARIN SODIUM 2.5 MG/1
TABLET ORAL
Qty: 30 TABLET | Refills: 1 | Status: SHIPPED | OUTPATIENT
Start: 2023-11-07 | End: 2024-01-10 | Stop reason: SDUPTHER

## 2023-11-07 NOTE — PROGRESS NOTES
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)          Anticoagulation Care Providers       Provider Role Specialty Phone number    [facility] Responsible  [phone]            Refer to Patient Findings for HPI:  Patient Findings       Negatives:  Signs/symptoms of thrombosis, Signs/symptoms of bleeding, Laboratory test error suspected, Change in health, Change in alcohol use, Change in activity, Upcoming invasive procedure, Emergency department visit, Upcoming dental procedure, Missed doses, Extra doses, Change in medications, Change in diet/appetite, Hospital admission, Bruising, Other complaints          There were no vitals filed for this visit.  The pt declined vitals today     Patient seen in clinic today for follow up on anticoagulation therapy with warfarin (a high risk medication) for hx of APLS  Verified current warfarin dosing schedule.    Patient was switched to Xarelto last visit in July per Dr. Varela in hematology. Patient reports she did not feel \"good\" on it and ended up switching back to warfarin about a month ago. She has been very busy so has not been in clinic to test INR since she switched back. She just resumed what she remembered as her " previous dosing regimen.     Medications reconciled   Pt is not on antiplatelet therapy      A/P   INR is SUB-therapeutic today at 1.8.     Warfarin dosing recommendation: Patient will begin slightly increased regimen of 7.5mg on Mon, Wed, Fri and 10mg ROW. Patient will retest again in 2 weeks.     Pt educated to contact our clinic with any changes in medications or s/s of bleeding or thrombosis. Pt is aware to seek immediate medical attention for falls, head injury or deep cuts.    Follow up appointment in 2 week(s).    Next appt: Tues, Nov 21 @ 7:45am     Andrew Shi PharmD

## 2023-11-21 ENCOUNTER — ANTICOAGULATION VISIT (OUTPATIENT)
Dept: VASCULAR LAB | Facility: MEDICAL CENTER | Age: 48
End: 2023-11-21
Attending: INTERNAL MEDICINE
Payer: COMMERCIAL

## 2023-11-21 DIAGNOSIS — E27.49 HEMORRHAGE OF BOTH ADRENAL GLANDS (HCC): ICD-10-CM

## 2023-11-21 DIAGNOSIS — I82.210 THROMBOSIS OF SUPERIOR VENA CAVA (HCC): ICD-10-CM

## 2023-11-21 DIAGNOSIS — D68.61 ANTIPHOSPHOLIPID SYNDROME (HCC): ICD-10-CM

## 2023-11-21 DIAGNOSIS — N93.9 VAGINAL BLEEDING: ICD-10-CM

## 2023-11-21 LAB — INR PPP: 1.7 (ref 2–3.5)

## 2023-11-21 PROCEDURE — 99212 OFFICE O/P EST SF 10 MIN: CPT

## 2023-11-21 PROCEDURE — 85610 PROTHROMBIN TIME: CPT

## 2023-11-21 NOTE — PROGRESS NOTES
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)          Anticoagulation Care Providers       Provider Role Specialty Phone number    [facility] Responsible  [phone]             Refer to Patient Findings for HPI:  Patient Findings       Positives:  Missed doses    Negatives:  Signs/symptoms of thrombosis, Signs/symptoms of bleeding, Laboratory test error suspected, Change in health, Change in alcohol use, Change in activity, Upcoming invasive procedure, Emergency department visit, Upcoming dental procedure, Extra doses, Change in medications, Change in diet/appetite, Hospital admission, Bruising, Other complaints            There were no vitals filed for this visit.    Verified current warfarin dosing schedule.    Medications reconciled: No  Pt is not on antiplatelet therapy.      A/P   INR is subtherapeutic    Warfarin dosing recommendation: Bolus with 15mg tonight, then Continue regimen as listed above.    Pt educated to contact our clinic with any changes in medications or s/s of bleeding or thrombosis. Pt is aware to seek immediate medical attention for falls, head injury or deep cuts.    Request pt to return in 2 week(s). Pt agrees.    Rachelle Blair, PharmD

## 2023-12-05 ENCOUNTER — ANTICOAGULATION VISIT (OUTPATIENT)
Dept: VASCULAR LAB | Facility: MEDICAL CENTER | Age: 48
End: 2023-12-05
Attending: INTERNAL MEDICINE
Payer: COMMERCIAL

## 2023-12-05 DIAGNOSIS — I82.210 THROMBOSIS OF SUPERIOR VENA CAVA (HCC): ICD-10-CM

## 2023-12-05 DIAGNOSIS — N93.9 VAGINAL BLEEDING: ICD-10-CM

## 2023-12-05 DIAGNOSIS — D68.61 ANTIPHOSPHOLIPID SYNDROME (HCC): ICD-10-CM

## 2023-12-05 DIAGNOSIS — E27.49 HEMORRHAGE OF BOTH ADRENAL GLANDS (HCC): ICD-10-CM

## 2023-12-05 LAB — INR PPP: 3.1 (ref 2–3.5)

## 2023-12-05 PROCEDURE — 99211 OFF/OP EST MAY X REQ PHY/QHP: CPT

## 2023-12-05 PROCEDURE — 85610 PROTHROMBIN TIME: CPT

## 2023-12-05 NOTE — PROGRESS NOTES
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)  Unable to tolerate Xarelto          Anticoagulation Care Providers       Provider Role Specialty Phone number    [facility] Responsible  [phone]           Refer to Patient Findings for HPI:  Patient Findings       Positives:  Change in medications (pt started taking Vit D and Vit K2)    Negatives:  Signs/symptoms of thrombosis, Signs/symptoms of bleeding, Laboratory test error suspected, Change in health, Change in alcohol use, Change in activity, Upcoming invasive procedure, Emergency department visit, Upcoming dental procedure, Missed doses, Extra doses, Change in diet/appetite, Hospital admission, Bruising, Other complaints          There were no vitals filed for this visit.  The pt declined vitals today     Patient seen in clinic today for follow up on anticoagulation therapy with warfarin (a high risk medication) for hx of APLS  Verified current warfarin dosing schedule.  Patient denies any missed doses of warfarin.    Medications reconciled   Pt is not on antiplatelet therapy      A/P   INR is therapeutic today at 3.1.     Warfarin dosing recommendation: Continue with the current dosing regimen.  Patient will retest again in 2 weeks.      Pt educated to contact our clinic with any changes in medications or s/s of bleeding or thrombosis. Pt is aware to seek immediate medical attention for falls, head injury or deep cuts.    Follow up appointment in 2 week(s).    Next appt: Tues, Dec 19 @ 7:30am     Andrew Shi PharmD

## 2023-12-15 ENCOUNTER — APPOINTMENT (OUTPATIENT)
Dept: VASCULAR LAB | Facility: MEDICAL CENTER | Age: 48
End: 2023-12-15
Payer: COMMERCIAL

## 2023-12-26 ENCOUNTER — ANTICOAGULATION VISIT (OUTPATIENT)
Dept: VASCULAR LAB | Facility: MEDICAL CENTER | Age: 48
End: 2023-12-26
Attending: INTERNAL MEDICINE
Payer: COMMERCIAL

## 2023-12-26 DIAGNOSIS — D68.61 ANTIPHOSPHOLIPID SYNDROME (HCC): ICD-10-CM

## 2023-12-26 DIAGNOSIS — E27.49 HEMORRHAGE OF BOTH ADRENAL GLANDS (HCC): ICD-10-CM

## 2023-12-26 DIAGNOSIS — N93.9 VAGINAL BLEEDING: ICD-10-CM

## 2023-12-26 DIAGNOSIS — I82.210 THROMBOSIS OF SUPERIOR VENA CAVA (HCC): ICD-10-CM

## 2023-12-26 LAB — INR PPP: 3.5 (ref 2–3.5)

## 2023-12-26 PROCEDURE — 85610 PROTHROMBIN TIME: CPT

## 2023-12-26 PROCEDURE — 99212 OFFICE O/P EST SF 10 MIN: CPT

## 2023-12-26 NOTE — PROGRESS NOTES
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)  Unable to tolerate Xarelto          Anticoagulation Care Providers       Provider Role Specialty Phone number    [facility] Responsible  [phone]                  Refer to Patient Findings for HPI:  Patient Findings       Positives:  Change in alcohol use (increased EtOH intake last night), Missed doses    Negatives:  Signs/symptoms of thrombosis, Signs/symptoms of bleeding, Laboratory test error suspected, Change in health, Change in activity, Upcoming invasive procedure, Emergency department visit, Upcoming dental procedure, Extra doses, Change in medications, Change in diet/appetite, Hospital admission, Bruising, Other complaints            There were no vitals filed for this visit.    Verified current warfarin dosing schedule.    Medications reconciled: No  Pt is not on antiplatelet therapy.      A/P   INR is supratherapeutic    Warfarin dosing recommendation: Hold x1 day, then Continue regimen as listed above.    Pt educated to contact our clinic with any changes in medications or s/s of bleeding or thrombosis. Pt is aware to seek immediate medical attention for falls, head injury or deep cuts.    Request pt to return in 2 week(s). Pt  agrees.    Rachelle Blair, PharmD

## 2024-01-10 ENCOUNTER — ANTICOAGULATION VISIT (OUTPATIENT)
Dept: VASCULAR LAB | Facility: MEDICAL CENTER | Age: 49
End: 2024-01-10
Attending: INTERNAL MEDICINE
Payer: COMMERCIAL

## 2024-01-10 DIAGNOSIS — E27.49 HEMORRHAGE OF BOTH ADRENAL GLANDS (HCC): ICD-10-CM

## 2024-01-10 DIAGNOSIS — N93.9 VAGINAL BLEEDING: ICD-10-CM

## 2024-01-10 DIAGNOSIS — D68.61 ANTIPHOSPHOLIPID SYNDROME (HCC): ICD-10-CM

## 2024-01-10 DIAGNOSIS — Z79.01 CHRONIC ANTICOAGULATION: ICD-10-CM

## 2024-01-10 DIAGNOSIS — I82.210 THROMBOSIS OF SUPERIOR VENA CAVA (HCC): ICD-10-CM

## 2024-01-10 LAB — INR PPP: 1.9 (ref 2–3.5)

## 2024-01-10 PROCEDURE — 99211 OFF/OP EST MAY X REQ PHY/QHP: CPT | Performed by: NURSE PRACTITIONER

## 2024-01-10 PROCEDURE — 85610 PROTHROMBIN TIME: CPT

## 2024-01-10 RX ORDER — WARFARIN SODIUM 2.5 MG/1
TABLET ORAL
Qty: 90 TABLET | Refills: 1 | Status: SHIPPED | OUTPATIENT
Start: 2024-01-10

## 2024-01-10 NOTE — PROGRESS NOTES
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)  Unable to tolerate Xarelto          Anticoagulation Care Providers       Provider Role Specialty Phone number    [facility] Responsible  [phone]                  Refer to Patient Findings for HPI:  Patient Findings       Negatives:  Signs/symptoms of thrombosis, Signs/symptoms of bleeding, Laboratory test error suspected, Change in health, Change in alcohol use, Change in activity, Upcoming invasive procedure, Emergency department visit, Upcoming dental procedure, Missed doses, Extra doses, Change in medications, Change in diet/appetite, Hospital admission, Bruising, Other complaints            There were no vitals filed for this visit.  Pt declined vitals    Verified current warfarin dosing schedule.    Medications reconciled: Yes  Pt is not on antiplatelet therapy.      A/P   INR is slightly subtherapeutic. Previous two INRs have been elevated. With shared decision-making and pt preference, we will not make a dose adjustment but monitor INR closely.    Warfarin dosing recommendation: Continue regimen as listed above.    Pt educated to contact our clinic with any changes in medications or s/s of bleeding or thrombosis. Pt is aware to seek  immediate medical attention for falls, head injury or deep cuts.    Request pt to return in 2 week(s). Pt agrees.    PABLO Funk.

## 2024-01-24 ENCOUNTER — ANTICOAGULATION VISIT (OUTPATIENT)
Dept: VASCULAR LAB | Facility: MEDICAL CENTER | Age: 49
End: 2024-01-24
Attending: INTERNAL MEDICINE
Payer: COMMERCIAL

## 2024-01-24 DIAGNOSIS — E27.49 HEMORRHAGE OF BOTH ADRENAL GLANDS (HCC): ICD-10-CM

## 2024-01-24 DIAGNOSIS — D68.61 ANTIPHOSPHOLIPID SYNDROME (HCC): ICD-10-CM

## 2024-01-24 DIAGNOSIS — I82.210 THROMBOSIS OF SUPERIOR VENA CAVA (HCC): ICD-10-CM

## 2024-01-24 DIAGNOSIS — N93.9 VAGINAL BLEEDING: ICD-10-CM

## 2024-01-24 LAB — INR PPP: 1.3 (ref 2–3.5)

## 2024-01-24 PROCEDURE — 99212 OFFICE O/P EST SF 10 MIN: CPT

## 2024-01-24 PROCEDURE — 85610 PROTHROMBIN TIME: CPT

## 2024-01-24 NOTE — PROGRESS NOTES
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)  Unable to tolerate Xarelto          Anticoagulation Care Providers       Provider Role Specialty Phone number    [facility] Responsible  [phone]                  Refer to Patient Findings for HPI:  Patient Findings       Positives:  Change in medications (taking vitamin K supplementation and wants to continue on with it)    Negatives:  Signs/symptoms of thrombosis, Signs/symptoms of bleeding, Laboratory test error suspected, Change in health, Change in alcohol use, Change in activity, Upcoming invasive procedure, Emergency department visit, Upcoming dental procedure, Missed doses, Extra doses, Change in diet/appetite, Hospital admission, Bruising, Other complaints            There were no vitals filed for this visit.  Pt declined vitals    Verified current warfarin dosing schedule.    Medications reconciled: Yes  Pt is not on antiplatelet therapy.      A/P   INR is subtherapeutic    Warfarin dosing recommendation: Increase to 10mg daily. Considered bridging w/ Lovenox d/t hx of APL syndrome, however given hx of vaginal bleeding and adrenal hemorrhaging, we will forgo for now.    Pt educated to contact our clinic with any changes in medications or s/s of bleeding or thrombosis. Pt is aware  to seek immediate medical attention for falls, head injury or deep cuts.    Request pt to return in 1 week. Pt agrees.    Rachelle Blair, EvanD

## 2024-01-31 ENCOUNTER — ANTICOAGULATION VISIT (OUTPATIENT)
Dept: VASCULAR LAB | Facility: MEDICAL CENTER | Age: 49
End: 2024-01-31
Attending: INTERNAL MEDICINE
Payer: COMMERCIAL

## 2024-01-31 DIAGNOSIS — D68.61 ANTIPHOSPHOLIPID SYNDROME (HCC): ICD-10-CM

## 2024-01-31 DIAGNOSIS — I82.210 THROMBOSIS OF SUPERIOR VENA CAVA (HCC): ICD-10-CM

## 2024-01-31 DIAGNOSIS — N93.9 VAGINAL BLEEDING: ICD-10-CM

## 2024-01-31 DIAGNOSIS — E27.49 HEMORRHAGE OF BOTH ADRENAL GLANDS (HCC): ICD-10-CM

## 2024-01-31 LAB — INR PPP: 2.3 (ref 2–3.5)

## 2024-01-31 PROCEDURE — 99211 OFF/OP EST MAY X REQ PHY/QHP: CPT

## 2024-01-31 PROCEDURE — 85610 PROTHROMBIN TIME: CPT

## 2024-01-31 NOTE — PROGRESS NOTES
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)  Unable to tolerate Xarelto          Anticoagulation Care Providers       Provider Role Specialty Phone number    [facility] Responsible  [phone]                  Refer to Patient Findings for HPI:  Patient Findings       Negatives:  Signs/symptoms of thrombosis, Signs/symptoms of bleeding, Laboratory test error suspected, Change in health, Change in alcohol use, Change in activity, Upcoming invasive procedure, Emergency department visit, Upcoming dental procedure, Missed doses, Extra doses, Change in medications, Change in diet/appetite, Hospital admission, Bruising, Other complaints            There were no vitals filed for this visit.  Pt declined vitals    Verified current warfarin dosing schedule.    Medications reconciled: Yes  Pt is not on antiplatelet therapy.      A/P   INR is therapeutic    Warfarin dosing recommendation: Continue regimen as listed above.    Pt educated to contact our clinic with any changes in medications or s/s of bleeding or thrombosis. Pt is aware to seek immediate medical attention for falls, head injury or deep cuts.    Request pt to return in 3 week(s). Pt agrees.    Rachelle Blair, PharmD

## 2024-02-21 ENCOUNTER — APPOINTMENT (OUTPATIENT)
Dept: VASCULAR LAB | Facility: MEDICAL CENTER | Age: 49
End: 2024-02-21
Payer: COMMERCIAL

## 2024-02-23 ENCOUNTER — ANTICOAGULATION VISIT (OUTPATIENT)
Dept: VASCULAR LAB | Facility: MEDICAL CENTER | Age: 49
End: 2024-02-23
Attending: INTERNAL MEDICINE
Payer: COMMERCIAL

## 2024-02-23 DIAGNOSIS — N93.9 VAGINAL BLEEDING: ICD-10-CM

## 2024-02-23 DIAGNOSIS — I82.210 THROMBOSIS OF SUPERIOR VENA CAVA (HCC): ICD-10-CM

## 2024-02-23 DIAGNOSIS — D68.61 ANTIPHOSPHOLIPID SYNDROME (HCC): ICD-10-CM

## 2024-02-23 DIAGNOSIS — E27.49 HEMORRHAGE OF BOTH ADRENAL GLANDS (HCC): ICD-10-CM

## 2024-02-23 LAB — INR PPP: 8 (ref 2–3.5)

## 2024-02-23 PROCEDURE — 85610 PROTHROMBIN TIME: CPT

## 2024-02-23 PROCEDURE — 99212 OFFICE O/P EST SF 10 MIN: CPT

## 2024-02-23 NOTE — PROGRESS NOTES
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)  Unable to tolerate Xarelto          Anticoagulation Care Providers       Provider Role Specialty Phone number    [facility] Responsible  [phone]                  Refer to Patient Findings for HPI:  Patient Findings       Positives:  Change in medications (patient stoppped trulicity)    Negatives:  Signs/symptoms of thrombosis, Signs/symptoms of bleeding, Laboratory test error suspected, Change in health, Change in alcohol use, Change in activity, Upcoming invasive procedure, Emergency department visit, Upcoming dental procedure, Missed doses, Extra doses, Change in diet/appetite, Hospital admission, Bruising, Other complaints            There were no vitals filed for this visit.  Pt declined vitals    Verified current warfarin dosing schedule.    Medications reconciled: No  Pt is not on antiplatelet therapy.      A/P   INR is supratherapeutic - likely due to patient stopping vitamin K supplement    Warfarin dosing recommendation: HOLD warfarin until INR trending down, patient will go to lab today to have confirmatory INR done.     Pt educated to contact our clinic with any changes in medications or s/s of bleeding or thrombosis. Pt is aware to seek immediate medical attention for falls, head  injury or deep cuts.    Request pt to return in 1 day(s). Pt agrees.    Evan ContrerasD

## 2024-02-24 ENCOUNTER — HOSPITAL ENCOUNTER (OUTPATIENT)
Dept: LAB | Facility: MEDICAL CENTER | Age: 49
End: 2024-02-24
Payer: COMMERCIAL

## 2024-02-24 DIAGNOSIS — I82.210 THROMBOSIS OF SUPERIOR VENA CAVA (HCC): ICD-10-CM

## 2024-02-24 DIAGNOSIS — E27.49 HEMORRHAGE OF BOTH ADRENAL GLANDS (HCC): ICD-10-CM

## 2024-02-24 DIAGNOSIS — D68.61 ANTIPHOSPHOLIPID SYNDROME (HCC): ICD-10-CM

## 2024-02-24 DIAGNOSIS — N93.9 VAGINAL BLEEDING: ICD-10-CM

## 2024-02-24 LAB
INR PPP: 3.66 (ref 0.87–1.13)
PROTHROMBIN TIME: 36.9 SEC (ref 12–14.6)

## 2024-02-24 PROCEDURE — 36415 COLL VENOUS BLD VENIPUNCTURE: CPT

## 2024-02-24 PROCEDURE — 85610 PROTHROMBIN TIME: CPT

## 2024-02-26 ENCOUNTER — ANTICOAGULATION MONITORING (OUTPATIENT)
Dept: VASCULAR LAB | Facility: MEDICAL CENTER | Age: 49
End: 2024-02-26
Payer: COMMERCIAL

## 2024-02-26 DIAGNOSIS — E27.49 HEMORRHAGE OF BOTH ADRENAL GLANDS (HCC): ICD-10-CM

## 2024-02-26 DIAGNOSIS — I82.210 THROMBOSIS OF SUPERIOR VENA CAVA (HCC): ICD-10-CM

## 2024-02-26 DIAGNOSIS — D68.61 ANTIPHOSPHOLIPID SYNDROME (HCC): ICD-10-CM

## 2024-02-26 DIAGNOSIS — N93.9 VAGINAL BLEEDING: ICD-10-CM

## 2024-02-27 ENCOUNTER — APPOINTMENT (OUTPATIENT)
Dept: VASCULAR LAB | Facility: MEDICAL CENTER | Age: 49
End: 2024-02-27
Attending: INTERNAL MEDICINE
Payer: COMMERCIAL

## 2024-02-27 NOTE — PROGRESS NOTES
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)  Unable to tolerate Xarelto          Anticoagulation Care Providers       Provider Role Specialty Phone number    [facility] Responsible  [phone]          Anticoagulation Patient Findings  Patient Findings       Negatives:  Signs/symptoms of thrombosis, Signs/symptoms of bleeding, Laboratory test error suspected, Change in health, Change in alcohol use, Change in activity, Upcoming invasive procedure, Emergency department visit, Upcoming dental procedure, Missed doses, Extra doses, Change in medications, Change in diet/appetite, Hospital admission, Bruising, Other complaints            INR is supratherapeutic    Called and spoke to patient.    Warfarin Plan:  Take 5mg tonight.    Next INR in 1 day.    Rachelle Blair, EvanD

## 2024-02-29 ENCOUNTER — ANTICOAGULATION VISIT (OUTPATIENT)
Dept: VASCULAR LAB | Facility: MEDICAL CENTER | Age: 49
End: 2024-02-29
Attending: INTERNAL MEDICINE
Payer: COMMERCIAL

## 2024-02-29 DIAGNOSIS — D68.61 ANTIPHOSPHOLIPID SYNDROME (HCC): ICD-10-CM

## 2024-02-29 DIAGNOSIS — N93.9 VAGINAL BLEEDING: ICD-10-CM

## 2024-02-29 DIAGNOSIS — I82.210 THROMBOSIS OF SUPERIOR VENA CAVA (HCC): ICD-10-CM

## 2024-02-29 DIAGNOSIS — E27.49 HEMORRHAGE OF BOTH ADRENAL GLANDS (HCC): ICD-10-CM

## 2024-02-29 LAB — INR PPP: 1.5 (ref 2–3.5)

## 2024-02-29 PROCEDURE — 99212 OFFICE O/P EST SF 10 MIN: CPT | Performed by: NURSE PRACTITIONER

## 2024-02-29 PROCEDURE — 85610 PROTHROMBIN TIME: CPT

## 2024-02-29 NOTE — PROGRESS NOTES
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)  Unable to tolerate Xarelto          Anticoagulation Care Providers       Provider Role Specialty Phone number    [facility] Responsible  [phone]                  Refer to Patient Findings for HPI:  Patient Findings       Positives:  Change in medications    Negatives:  Signs/symptoms of thrombosis, Signs/symptoms of bleeding, Laboratory test error suspected, Change in health, Change in alcohol use, Change in activity, Upcoming invasive procedure, Emergency department visit, Upcoming dental procedure, Missed doses, Extra doses, Change in diet/appetite, Hospital admission, Bruising, Other complaints    Comments:  She states she recently stopped a vit k supplement she was taking.            There were no vitals filed for this visit.  Pt declined vitals    Verified current warfarin dosing schedule.    Medications reconciled: Yes  Pt is not on antiplatelet therapy.      A/P   INR is subtherapeutic at 1.5, however INR down from 8 last Friday. Will have pt began a reduce regimen.    Warfarin dosing recommendation: take 5 mg Saturday and , 10 mg all other days.    Pt educated to contact our clinic with any changes in medications or s/s of bleeding or thrombosis. Pt is aware to seek immediate  medical attention for falls, head injury or deep cuts.    Request pt to return in 4 day(s). She can return in 5 days.    PABLO Funk.

## 2024-03-04 ENCOUNTER — TELEPHONE (OUTPATIENT)
Dept: VASCULAR LAB | Facility: MEDICAL CENTER | Age: 49
End: 2024-03-04
Payer: COMMERCIAL

## 2024-03-04 NOTE — TELEPHONE ENCOUNTER
Pt calling with questions regarding warfarin and a bruise on her finger.     She still has complete mobility. No increased turgor, low concern for compartment syndrome.  No other s/s of bleeding.  She may have bruised it by using dental floss too tightly.  Follow up INR tomorrow.  Educated pt when to seek emergency medical help.    Landon Milton, PharmD

## 2024-03-05 ENCOUNTER — ANTICOAGULATION VISIT (OUTPATIENT)
Dept: VASCULAR LAB | Facility: MEDICAL CENTER | Age: 49
End: 2024-03-05
Attending: INTERNAL MEDICINE
Payer: COMMERCIAL

## 2024-03-05 DIAGNOSIS — D68.61 ANTIPHOSPHOLIPID SYNDROME (HCC): ICD-10-CM

## 2024-03-05 DIAGNOSIS — N93.9 VAGINAL BLEEDING: ICD-10-CM

## 2024-03-05 DIAGNOSIS — I82.210 THROMBOSIS OF SUPERIOR VENA CAVA (HCC): ICD-10-CM

## 2024-03-05 DIAGNOSIS — E27.49 HEMORRHAGE OF BOTH ADRENAL GLANDS (HCC): ICD-10-CM

## 2024-03-05 LAB — INR PPP: 1.5 (ref 2–3.5)

## 2024-03-05 PROCEDURE — 99212 OFFICE O/P EST SF 10 MIN: CPT

## 2024-03-05 PROCEDURE — 85610 PROTHROMBIN TIME: CPT

## 2024-03-05 NOTE — PROGRESS NOTES
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)  Unable to tolerate Xarelto          Anticoagulation Care Providers       Provider Role Specialty Phone number    [facility] Responsible  [phone]             Refer to Patient Findings for HPI:  Patient Findings       Positives:  Change in diet/appetite (eating more salads and plans to continue this), Bruising (bruise on her index finger, pt will monitor)    Negatives:  Signs/symptoms of thrombosis, Signs/symptoms of bleeding, Laboratory test error suspected, Change in health, Change in alcohol use, Change in activity, Upcoming invasive procedure, Emergency department visit, Upcoming dental procedure, Missed doses, Extra doses, Change in medications, Hospital admission, Other complaints            There were no vitals filed for this visit.  Pt declined vitals    Verified current warfarin dosing schedule.    Medications reconciled: No  Pt is not on antiplatelet therapy      A/P   INR is subtherapeutic. Will bolus x 1 and follow closely.    Warfarin dosing recommendation: Take 15 mg today, then Continue regimen as listed above.    Pt educated to contact our clinic with any changes in medications or s/s of bleeding or thrombosis. Pt is aware to seek immediate medical attention for falls, head injury  or deep cuts.    Follow up appointment in 3 days    Luis M Escobar, EvanD

## 2024-03-08 ENCOUNTER — ANTICOAGULATION VISIT (OUTPATIENT)
Dept: VASCULAR LAB | Facility: MEDICAL CENTER | Age: 49
End: 2024-03-08
Attending: INTERNAL MEDICINE
Payer: COMMERCIAL

## 2024-03-08 DIAGNOSIS — N93.9 VAGINAL BLEEDING: ICD-10-CM

## 2024-03-08 DIAGNOSIS — D68.61 ANTIPHOSPHOLIPID SYNDROME (HCC): ICD-10-CM

## 2024-03-08 DIAGNOSIS — I82.210 THROMBOSIS OF SUPERIOR VENA CAVA (HCC): ICD-10-CM

## 2024-03-08 DIAGNOSIS — E27.49 HEMORRHAGE OF BOTH ADRENAL GLANDS (HCC): ICD-10-CM

## 2024-03-08 LAB — INR PPP: 1.6 (ref 2–3.5)

## 2024-03-08 PROCEDURE — 99212 OFFICE O/P EST SF 10 MIN: CPT

## 2024-03-08 PROCEDURE — 85610 PROTHROMBIN TIME: CPT

## 2024-03-08 NOTE — PROGRESS NOTES
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)  Unable to tolerate Xarelto          Anticoagulation Care Providers       Provider Role Specialty Phone number    [facility] Responsible  [phone]          Refer to Patient Findings for HPI:  Patient Findings       Positives:  Change in health (patient had upset stomach and n/v just after she took her dose last night.)    Negatives:  Signs/symptoms of thrombosis, Signs/symptoms of bleeding, Laboratory test error suspected, Change in alcohol use, Change in activity, Upcoming invasive procedure, Emergency department visit, Upcoming dental procedure, Missed doses, Extra doses, Change in medications, Change in diet/appetite, Hospital admission, Bruising, Other complaints          There were no vitals filed for this visit.  The pt declined vitals today     Patient seen in clinic today for follow up on anticoagulation therapy with warfarin (a high risk medication) for hx of APLS and DVT  Verified current warfarin dosing schedule.  Patient denies any missed doses of warfarin.    Medications reconciled   Pt is not on antiplatelet therapy      A/P   INR is SUB-therapeutic today at 1.6.     Warfarin dosing recommendation: Patient will bolus with 15mg again TONIGHT, then will resume her current dosing  regimen.   Patient will follow up again in 5 days.    Pt educated to contact our clinic with any changes in medications or s/s of bleeding or thrombosis. Pt is aware to seek immediate medical attention for falls, head injury or deep cuts.    Follow up appointment in 5 days    Next appt: Wed, March 13 @ 1:45pm     Andrew Shi PharmD

## 2024-03-13 ENCOUNTER — ANTICOAGULATION VISIT (OUTPATIENT)
Dept: VASCULAR LAB | Facility: MEDICAL CENTER | Age: 49
End: 2024-03-13
Attending: INTERNAL MEDICINE
Payer: COMMERCIAL

## 2024-03-13 DIAGNOSIS — E27.49 HEMORRHAGE OF BOTH ADRENAL GLANDS (HCC): ICD-10-CM

## 2024-03-13 DIAGNOSIS — N93.9 VAGINAL BLEEDING: ICD-10-CM

## 2024-03-13 DIAGNOSIS — D68.61 ANTIPHOSPHOLIPID SYNDROME (HCC): ICD-10-CM

## 2024-03-13 DIAGNOSIS — I82.210 THROMBOSIS OF SUPERIOR VENA CAVA (HCC): ICD-10-CM

## 2024-03-13 LAB — INR PPP: 2.8 (ref 2–3.5)

## 2024-03-13 PROCEDURE — 99211 OFF/OP EST MAY X REQ PHY/QHP: CPT

## 2024-03-13 PROCEDURE — 85610 PROTHROMBIN TIME: CPT

## 2024-03-13 NOTE — PROGRESS NOTES
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)  Unable to tolerate Xarelto          Anticoagulation Care Providers       Provider Role Specialty Phone number    [facility] Responsible  [phone]                  Refer to Patient Findings for HPI:  Patient Findings       Negatives:  Signs/symptoms of thrombosis, Signs/symptoms of bleeding, Laboratory test error suspected, Change in health, Change in alcohol use, Change in activity, Upcoming invasive procedure, Emergency department visit, Upcoming dental procedure, Missed doses, Extra doses, Change in medications, Change in diet/appetite, Hospital admission, Bruising, Other complaints            There were no vitals filed for this visit.  Pt declined vitals    Verified current warfarin dosing schedule.    Medications reconciled: No  Pt is not on antiplatelet therapy.      A/P   INR is therapeutic    Warfarin dosing recommendation: Continue regimen as listed above.    Pt educated to contact our clinic with any changes in medications or s/s of bleeding or thrombosis. Pt is aware to seek immediate medical attention for falls, head injury or deep cuts.    Request pt to return in 1 week(s). Pt agrees.    Megan Diaz, EvanD

## 2024-03-21 ENCOUNTER — ANTICOAGULATION VISIT (OUTPATIENT)
Dept: VASCULAR LAB | Facility: MEDICAL CENTER | Age: 49
End: 2024-03-21
Attending: INTERNAL MEDICINE
Payer: COMMERCIAL

## 2024-03-21 DIAGNOSIS — E66.3 OVERWEIGHT (BMI 25.0-29.9): ICD-10-CM

## 2024-03-21 DIAGNOSIS — D68.61 ANTIPHOSPHOLIPID SYNDROME (HCC): ICD-10-CM

## 2024-03-21 DIAGNOSIS — E27.49 HEMORRHAGE OF BOTH ADRENAL GLANDS (HCC): ICD-10-CM

## 2024-03-21 DIAGNOSIS — I82.210 THROMBOSIS OF SUPERIOR VENA CAVA (HCC): ICD-10-CM

## 2024-03-21 DIAGNOSIS — N93.9 VAGINAL BLEEDING: ICD-10-CM

## 2024-03-21 LAB — INR PPP: 3.6 (ref 2–3.5)

## 2024-03-21 PROCEDURE — 85610 PROTHROMBIN TIME: CPT

## 2024-03-21 PROCEDURE — 99212 OFFICE O/P EST SF 10 MIN: CPT | Performed by: NURSE PRACTITIONER

## 2024-03-21 NOTE — PROGRESS NOTES
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)  Unable to tolerate Xarelto          Anticoagulation Care Providers       Provider Role Specialty Phone number    [facility] Responsible  [phone]                  Refer to Patient Findings for HPI:  Patient Findings       Negatives:  Signs/symptoms of thrombosis, Signs/symptoms of bleeding, Laboratory test error suspected, Change in health, Change in alcohol use, Change in activity, Upcoming invasive procedure, Emergency department visit, Upcoming dental procedure, Missed doses, Extra doses, Change in medications, Change in diet/appetite, Hospital admission, Bruising, Other complaints            There were no vitals filed for this visit.  Verified current warfarin dosing schedule.    Medications reconciled: Yes  Pt is not on antiplatelet therapy.    Of note, pt requesting to be seen in the pharmacotherapy clinic to discuss wt loss options as she was on Trulicity but states her insurance is no longer covering. Referral placed and pt scheduled.    A/P   INR is supratherapeutic. INR increased from 2.8 last week but INRs labile. She will stay as consistent as possible with her greens. Will have her take a one time dose decrease.    Warfarin dosing recommendation: Take 5 mg tonight, then resume 10  mg daily.    Pt educated to contact our clinic with any changes in medications or s/s of bleeding or thrombosis. Pt is aware to seek immediate medical attention for falls, head injury or deep cuts.    Request pt to return in 1 week(s), however pt can return in 1.5 weeks.    PABLO Funk.

## 2024-03-27 ENCOUNTER — NON-PROVIDER VISIT (OUTPATIENT)
Dept: VASCULAR LAB | Facility: MEDICAL CENTER | Age: 49
End: 2024-03-27
Attending: INTERNAL MEDICINE
Payer: COMMERCIAL

## 2024-03-27 ENCOUNTER — TELEPHONE (OUTPATIENT)
Dept: PHARMACY | Facility: MEDICAL CENTER | Age: 49
End: 2024-03-27

## 2024-03-27 VITALS
WEIGHT: 184 LBS | HEART RATE: 80 BPM | HEIGHT: 68 IN | BODY MASS INDEX: 27.89 KG/M2 | DIASTOLIC BLOOD PRESSURE: 75 MMHG | SYSTOLIC BLOOD PRESSURE: 107 MMHG

## 2024-03-27 DIAGNOSIS — D68.61 ANTIPHOSPHOLIPID SYNDROME (HCC): ICD-10-CM

## 2024-03-27 DIAGNOSIS — N93.9 VAGINAL BLEEDING: ICD-10-CM

## 2024-03-27 DIAGNOSIS — I82.210 THROMBOSIS OF SUPERIOR VENA CAVA (HCC): ICD-10-CM

## 2024-03-27 DIAGNOSIS — E27.49 HEMORRHAGE OF BOTH ADRENAL GLANDS (HCC): ICD-10-CM

## 2024-03-27 DIAGNOSIS — R73.03 PRE-DIABETES: ICD-10-CM

## 2024-03-27 LAB
HBA1C MFR BLD: 5.7 % (ref ?–5.8)
INR PPP: 4 (ref 2–3.5)
POCT INT CON NEG: NEGATIVE
POCT INT CON POS: POSITIVE

## 2024-03-27 PROCEDURE — 83036 HEMOGLOBIN GLYCOSYLATED A1C: CPT

## 2024-03-27 PROCEDURE — 85610 PROTHROMBIN TIME: CPT

## 2024-03-27 PROCEDURE — 99213 OFFICE O/P EST LOW 20 MIN: CPT

## 2024-03-27 RX ORDER — DULAGLUTIDE 0.75 MG/.5ML
1 INJECTION, SOLUTION SUBCUTANEOUS
Qty: 4 EACH | Refills: 1 | Status: SHIPPED | OUTPATIENT
Start: 2024-03-27 | End: 2024-03-28

## 2024-03-27 ASSESSMENT — FIBROSIS 4 INDEX: FIB4 SCORE: 0.96

## 2024-03-27 NOTE — TELEPHONE ENCOUNTER
Dulaglutide (TRULICITY) 0.75 MG/0.5ML Solution Pen-injector      Received Renewal PA request via [employer]  for TRULICITY. (Quantity:2ml, Day Supply:28)     Insurance: [employer]  Member ID:  [id number]  BIN: [id number]  PCN: N/A  Group: [id number]     Ran Test claim via [place] [employer] medication Rejects stating prior authorization is required.     Initiated PA in [facility] Key: [id number]    Prior Authorization for Trulicity has been denied for a quantity of 2ml , day supply 28    Prior authorization was denied per the following: Coverage is provided for the diagnosis of diabetes mellitus type 2. Coverage cannot be  authorized at this time    Prior Authorization denial reference number: [id number]  Insurance: [employer]      Next Steps:Proceed with appeal process. Generate proposed appeal for provider approval & signature.

## 2024-03-27 NOTE — PROGRESS NOTES
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)  Unable to tolerate Xarelto          Anticoagulation Care Providers       Provider Role Specialty Phone number    [facility] Responsible  [phone]                  Refer to Patient Findings for HPI:      Vitals:    24 1352   BP: 107/75   Pulse: 80   Weight: 83.5 kg (184 lb)   Height: 1.727 m (5' 8\")       Verified current warfarin dosing schedule.    Medications reconciled: No  Pt is not on antiplatelet therapy.      A/P   INR is supratherapeutic    Warfarin dosing recommendation: Hold today, then begin reduced regimen    Pt educated to contact our clinic with any changes in medications or s/s of bleeding or thrombosis. Pt is aware to seek immediate medical attention for falls, head injury or deep cuts.    Request pt to return in 1 week(s). Pt agrees.      Patient Consult Note      Primary care physician: Patrizia James M.D.    Reason for consult: Obesity/Weight Management  Referred by Gloria SERNA    HPI:  Jaclyn Olvera is a 49 y.o. old patient who comes in today for evaluation of above stated problem.    Was on trulicity 4.5 mg weekly for a year, recently stopped it about 2 months ago because insurance no longer covers it.  Reports she has " "noticed some weight gain, but when she started the trulicity she lost 20 lbs.     Previously her trulicity was prescribed by her PCP for DM.  Pt states her A1c was above 7 before starting but responded very well to the therapy.     Father diagnosed with DM 10 years ago.   Grandmother also was dx with DM    Initial Weight: 184 lbs    Initial BMI: 27.8 kg/m2    Most Recent HbA1c:   Lab Results   Component Value Date/Time    HBA1C 5.7 03/27/2024 12:00 AM      Lab Results   Component Value Date/Time    CREATININE 0.91 09/22/2023 06:36 AM        Obesity Medication History and Current Regimen  Metformin: Never tried it before.    GLP-1 Agent: None, previously on Trulicity 4.5 mg    Current Exercise - 1 hour walk 3 days/week.   Exercise Goal - During winter she is less active, more active during the summer.    Dietary:  Breakfast - Doesn't eat  Lunch - tuna salad  Dinner - Beef stew, more \"hearty meals\" in the winter  In the summer she has more salad and light foods.   Snack - Doesn't snack  Dessert - usually just on weekends. Not a particular desert.   Beverage - no soda, no juice.    Past Medical History:  Patient Active Problem List    Diagnosis Date Noted    Vitamin D insufficiency 06/14/2023    Multiple skin nodules 04/05/2022    Chronic pain of both shoulders 04/05/2022    Long-term use of hydroxychloroquine 04/05/2022    Anemia 01/27/2022    Black stool 01/27/2022    Epigastric pain 01/27/2022    Antiphospholipid syndrome (HCC) 06/23/2021    Elevated liver function tests 06/22/2021    Elevated C-reactive protein (CRP) 06/21/2021    Sepsis (HCC) 06/21/2021    Microcytic anemia 06/19/2021    Thrombocytopenia (HCC) 06/19/2021    Hemorrhage of both adrenal glands (HCC) 06/18/2021    Retroperitoneal lymphadenopathy 06/18/2021    Vaginal bleeding 06/18/2021    Hyperbilirubinemia 06/17/2021    Fibroids 06/15/2021    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 pylori infection 06/14/2021    Hyponatremia " 06/14/2021    Kidney lesion, native, right 06/14/2021    Lesion of cervix 06/14/2021    Leukocytosis 06/14/2021       Past Surgical History:  Past Surgical History:   Procedure Laterality Date    CT UPPER GI ENDOSCOPY,DIAGNOSIS N/A 6/17/2021    Procedure: GASTROSCOPY;  Surgeon: Elfego Lopez M.D.;  Location: SURGERY SAME DAY [facility];  Service: Gastroenterology    CT UPPER GI ENDOSCOPY,BIOPSY N/A 6/17/2021    Procedure: GASTROSCOPY, WITH BIOPSY;  Surgeon: Elfego Lopez M.D.;  Location: SURGERY SAME DAY [facility];  Service: Gastroenterology       Allergies:  Patient has no known allergies.    Social History:  Social History     Socioeconomic History    Marital status:      Spouse name: Not on file    Number of children: Not on file    Years of education: Not on file    Highest education level: Not on file   Occupational History    Not on file   Tobacco Use    Smoking status: Some Days     Types: Cigarettes    Smokeless tobacco: Current    Tobacco comments:     1 pk per week   Vaping Use    Vaping Use: Never used   Substance and Sexual Activity    Alcohol use: Yes     Comment: occasionally    Drug use: Not Currently    Sexual activity: Not on file   Other Topics Concern    Not on file   Social History Narrative    Not on file     Social Determinants of Health     Financial Resource Strain: Not on file   Food Insecurity: Not on file   Transportation Needs: Not on file   Physical Activity: Not on file   Stress: Not on file   Social Connections: Not on file   Intimate Partner Violence: Not on file   Housing Stability: Not on file       Family History:  No family history on file.    Medications:    Current Outpatient Medications:     Dulaglutide (TRULICITY) 0.75 MG/0.5ML Solution Pen-injector, Inject 1 Pen under the skin every 7 days., Disp: 4 Each, Rfl: 1    warfarin (COUMADIN) 2.5 MG Tab, 1 tab by mouth daily or as directed by the [facility], Disp: 90 Tablet, Rfl: 1    warfarin  "(COUMADIN) 5 MG Tab, Take one to two tablets daily as directed by [facility], Disp: 180 Tablet, Rfl: 1    senna-docusate (PERICOLACE OR SENOKOT S) 8.6-50 MG Tab, Take 1 Tablet by mouth every day., Disp: 30 Tablet, Rfl: 0    meclizine (ANTIVERT) 25 MG Tab, Take 1 Tablet by mouth 3 times a day as needed for Dizziness or Vertigo., Disp: 30 Tablet, Rfl: 0    hydroxychloroquine (PLAQUENIL) 200 MG Tab, TAKE 2 TABLETS BY MOUTH EVERY DAY, Disp: 180 Tablet, Rfl: 3    Vitamin D, Cholecalciferol, 50 MCG (2000 UT) Cap, Take 1 Capsule by mouth every day., Disp: , Rfl:     Biotin w/ Vitamins C & E (HAIR/SKIN/NAILS PO), Take 1 Tablet by mouth every day., Disp: , Rfl:     Labs: Reviewed    Physical Examination:  Vital signs: /75   Pulse 80   Ht 1.727 m (5' 8\")   Wt 83.5 kg (184 lb)   BMI 27.98 kg/m²  Body mass index is 27.98 kg/m².    Assessment and Plan:    1. Obesity/Weight Management  A1c is just into the pre-diabetic range  BMI just above minimum threshold  Pt has regained 10 lbs recently since stopping trulicity.  Pt was able to keep the weight off (20 lb) weight loss with trulicity int he past.  Likely pt will continue to gain weight without GLP1 assistance.   Pt to continue with healthy lifestyle.   Increase exercise to breathless activity.  BMI of greater than 27.5 qualifies her for therapy given strong family hx of DM    - Medication changes:  Restart trulicity 0.75 mg weekly  Discussed 340B price in case insurance does not cover it.  It's something she will think about, but would be more cost efficient to move to Ozempic if 340B is utilized.      - Lifestyle changes:  Diet: Continue with healthy lifestyle pt described today.   Exercise: Increase as tolerated    No follow-ups on file.    Landon Milton, PharmD, BCACP  03/27/24    CC:   ANUP Brown MD      "

## 2024-03-28 ENCOUNTER — TELEPHONE (OUTPATIENT)
Dept: VASCULAR LAB | Facility: MEDICAL CENTER | Age: 49
End: 2024-03-28
Payer: COMMERCIAL

## 2024-03-28 DIAGNOSIS — E11.9 TYPE 2 DIABETES MELLITUS WITHOUT COMPLICATION, WITHOUT LONG-TERM CURRENT USE OF INSULIN (HCC): ICD-10-CM

## 2024-03-28 RX ORDER — SEMAGLUTIDE 0.68 MG/ML
0.25 INJECTION, SOLUTION SUBCUTANEOUS
Qty: 3 ML | Refills: 1 | Status: SHIPPED | OUTPATIENT
Start: 2024-03-28

## 2024-03-28 NOTE — TELEPHONE ENCOUNTER
Caller: Jaclyn Olvera    Topic/issue: Patient calling to follow up with Landon. Patient states Landon asked her to let him know when she heard back about the trulicity medication. She has since found out she has been denied for this medication by insurance and would like to discuss other medication options. Patient is currently out of medication. Please advise.     Callback Number: [phone]     Thank you,  Suzette PHILIPPE

## 2024-03-28 NOTE — TELEPHONE ENCOUNTER
Called and s/w pt - given both PA and appeal for Trulicity was denied, pt would like to move forward w/ 340b Ozempic at [facility].    Sent in RX to [facility].    FU 1 month.    Casey Daniels, PharmD, BCACP

## 2024-03-29 ENCOUNTER — TELEPHONE (OUTPATIENT)
Dept: PHARMACY | Facility: MEDICAL CENTER | Age: 49
End: 2024-03-29
Payer: COMMERCIAL

## 2024-03-29 DIAGNOSIS — Z79.01 CHRONIC ANTICOAGULATION: ICD-10-CM

## 2024-03-29 DIAGNOSIS — I82.210 THROMBOSIS OF SUPERIOR VENA CAVA (HCC): ICD-10-CM

## 2024-03-29 DIAGNOSIS — D68.61 ANTIPHOSPHOLIPID SYNDROME (HCC): ICD-10-CM

## 2024-03-29 PROCEDURE — RXMED WILLOW AMBULATORY MEDICATION CHARGE

## 2024-03-29 RX ORDER — WARFARIN SODIUM 5 MG/1
TABLET ORAL
Qty: 180 TABLET | Refills: 1 | Status: SHIPPED | OUTPATIENT
Start: 2024-03-29

## 2024-03-29 NOTE — TELEPHONE ENCOUNTER
Semaglutide,0.25 or 0.5MG/DOS, (OZEMPIC, 0.25 OR 0.5 MG/DOSE,) 2 MG/3ML Solution Pen-injector    Received [employer] PA request via [employer]  for OZEMPIC,. (Quantity:3ml, Day Supply:28)     Insurance: [employer]  Member ID:  [id number]  BIN: [id number]  PCN: [id number]  Group: [id number]     Ran Test claim via [place] & medication Rejects stating prior authorization is required.    Initiated Pa IN CMM (Key: [id number])    PER CMM: This request has been approved using information available on the patient's profile. CaseId:[id number];Status:Approved;Review Type:Prior Auth;Coverage Start Date:02/28/2024;Coverage End Date:03/29/2025      Prior Authorization for oZEMPIC has been approved for a quantity of 3ML , day supply 28    Prior Authorization reference number: [id number]  Insurance: [employer]  Effective dates: 02/28/2024 - 03/29/2025  Copay: $24.98. PHARMACY NOT IN 90 DAY NETWORK, AND 90 DAY SUPPLY REQUIRED ON FUTURE FILLS. MAX QTY SUPPLY   DAY PERIOD  MAXIMUM DAY SUPPLY  ALLOWED     Is patient eligible to fill with [facility]? Yes    Next Steps: The patient's copay exceeds $5.00. Proceed with contacting patient to offer financial assistance.

## 2024-04-02 ENCOUNTER — APPOINTMENT (OUTPATIENT)
Dept: VASCULAR LAB | Facility: MEDICAL CENTER | Age: 49
End: 2024-04-02
Payer: COMMERCIAL

## 2024-04-03 ENCOUNTER — PHARMACY VISIT (OUTPATIENT)
Dept: PHARMACY | Facility: MEDICAL CENTER | Age: 49
End: 2024-04-03
Payer: COMMERCIAL

## 2024-04-03 ENCOUNTER — ANTICOAGULATION VISIT (OUTPATIENT)
Dept: VASCULAR LAB | Facility: MEDICAL CENTER | Age: 49
End: 2024-04-03
Attending: INTERNAL MEDICINE
Payer: COMMERCIAL

## 2024-04-03 DIAGNOSIS — I82.210 THROMBOSIS OF SUPERIOR VENA CAVA (HCC): ICD-10-CM

## 2024-04-03 DIAGNOSIS — E27.49 HEMORRHAGE OF BOTH ADRENAL GLANDS (HCC): ICD-10-CM

## 2024-04-03 DIAGNOSIS — D68.61 ANTIPHOSPHOLIPID SYNDROME (HCC): ICD-10-CM

## 2024-04-03 DIAGNOSIS — N93.9 VAGINAL BLEEDING: ICD-10-CM

## 2024-04-03 LAB — INR PPP: 3.5 (ref 2–3.5)

## 2024-04-03 PROCEDURE — 99212 OFFICE O/P EST SF 10 MIN: CPT | Performed by: PHARMACIST

## 2024-04-03 PROCEDURE — 85610 PROTHROMBIN TIME: CPT

## 2024-04-03 NOTE — PROGRESS NOTES
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)  Unable to tolerate Xarelto          Anticoagulation Care Providers       Provider Role Specialty Phone number    [facility] Responsible  [phone]                  Refer to Patient Findings for HPI:  Patient Findings       Positives:  Change in medications (starting ozempic this evening)    Negatives:  Signs/symptoms of thrombosis, Signs/symptoms of bleeding, Laboratory test error suspected, Change in health, Change in alcohol use, Change in activity, Upcoming invasive procedure, Emergency department visit, Upcoming dental procedure, Missed doses, Extra doses, Change in diet/appetite, Hospital admission, Bruising, Other complaints            There were no vitals filed for this visit.  Pt declined vitals    Verified current warfarin dosing schedule.    Medications reconciled: Yes  Pt is not on antiplatelet therapy.      A/P   INR is supratherapeutic    Warfarin dosing recommendation: Instructed patient to HOLD X 1, then decrease weekly warfarin regimen as detailed above.    Pt educated to contact our clinic with any changes in medications or s/s of bleeding or thrombosis. Pt is aware to seek immediate medical attention for falls, head injury or deep  cuts.    Request pt to return in 1 week(s). Pt agrees.    Oliver Carolina, PharmD, BCACP

## 2024-04-10 ENCOUNTER — ANTICOAGULATION VISIT (OUTPATIENT)
Dept: VASCULAR LAB | Facility: MEDICAL CENTER | Age: 49
End: 2024-04-10
Attending: INTERNAL MEDICINE
Payer: COMMERCIAL

## 2024-04-10 DIAGNOSIS — E27.49 HEMORRHAGE OF BOTH ADRENAL GLANDS (HCC): ICD-10-CM

## 2024-04-10 DIAGNOSIS — D68.61 ANTIPHOSPHOLIPID SYNDROME (HCC): ICD-10-CM

## 2024-04-10 DIAGNOSIS — N93.9 VAGINAL BLEEDING: ICD-10-CM

## 2024-04-10 DIAGNOSIS — I82.210 THROMBOSIS OF SUPERIOR VENA CAVA (HCC): ICD-10-CM

## 2024-04-10 DIAGNOSIS — R10.30 LOWER ABDOMINAL PAIN: ICD-10-CM

## 2024-04-10 LAB — INR PPP: 2.2 (ref 2–3.5)

## 2024-04-10 PROCEDURE — 99211 OFF/OP EST MAY X REQ PHY/QHP: CPT | Performed by: NURSE PRACTITIONER

## 2024-04-10 PROCEDURE — 85610 PROTHROMBIN TIME: CPT

## 2024-04-10 RX ORDER — AMOXICILLIN 250 MG
1 CAPSULE ORAL DAILY
Qty: 30 TABLET | Refills: 0 | Status: SHIPPED | OUTPATIENT
Start: 2024-04-10

## 2024-04-10 NOTE — PROGRESS NOTES
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)  Unable to tolerate Xarelto          Anticoagulation Care Providers       Provider Role Specialty Phone number    [facility] Responsible  [phone]                  Refer to Patient Findings for HPI:  Patient Findings       Negatives:  Signs/symptoms of thrombosis, Signs/symptoms of bleeding, Laboratory test error suspected, Change in health, Change in alcohol use, Change in activity, Upcoming invasive procedure, Emergency department visit, Upcoming dental procedure, Missed doses, Extra doses, Change in medications, Change in diet/appetite, Hospital admission, Bruising, Other complaints            There were no vitals filed for this visit.    Verified current warfarin dosing schedule.    Medications reconciled: Yes  Pt is not on antiplatelet therapy.      A/P   INR is therapeutic. INR down from 3.5 last week with a dose hold. Will have her continue her current regimen. She will try to stay as consistent as possible with her greens.    Warfarin dosing recommendation: Continue regimen as listed above.    Pt requesting a refill of her senna which she asked her PCP to fill recently, however it wasn't filled. I will send a refill for 30 day supply as a  courtesy then she will get further refills from her PCP.    Pt educated to contact our clinic with any changes in medications or s/s of bleeding or thrombosis. Pt is aware to seek immediate medical attention for falls, head injury or deep cuts.    Request pt to return in 2 week(s). Pt agrees.    PABLO Funk.

## 2024-04-22 ENCOUNTER — TELEPHONE (OUTPATIENT)
Dept: VASCULAR LAB | Facility: MEDICAL CENTER | Age: 49
End: 2024-04-22
Payer: COMMERCIAL

## 2024-04-22 NOTE — TELEPHONE ENCOUNTER
S/w pt - explained we will discuss this further at her appt on 4/24 as insurance will likely ask for clinical documentation from that appt for the dose increase.    Rachelle Blair, PharmD

## 2024-04-22 NOTE — TELEPHONE ENCOUNTER
Caller: Jaclyn Olvera    Topic/issue: Patient would like callback from alfredo Castro. States that insurance needs a pre-authorization for her ozempic to up the dose. They told her it was sent over to the clinic and she wanted to follow up on it. Please advise.     Callback Number: [phone]    Thank you,  Suzette PHILIPPE

## 2024-04-24 ENCOUNTER — ANTICOAGULATION MONITORING (OUTPATIENT)
Dept: VASCULAR LAB | Facility: MEDICAL CENTER | Age: 49
End: 2024-04-24

## 2024-04-24 ENCOUNTER — NON-PROVIDER VISIT (OUTPATIENT)
Dept: VASCULAR LAB | Facility: MEDICAL CENTER | Age: 49
End: 2024-04-24
Attending: INTERNAL MEDICINE
Payer: COMMERCIAL

## 2024-04-24 DIAGNOSIS — N93.9 VAGINAL BLEEDING: ICD-10-CM

## 2024-04-24 DIAGNOSIS — E27.49 HEMORRHAGE OF BOTH ADRENAL GLANDS (HCC): ICD-10-CM

## 2024-04-24 DIAGNOSIS — I82.210 THROMBOSIS OF SUPERIOR VENA CAVA (HCC): ICD-10-CM

## 2024-04-24 DIAGNOSIS — E66.3 OVERWEIGHT (BMI 25.0-29.9): ICD-10-CM

## 2024-04-24 DIAGNOSIS — Z79.01 CHRONIC ANTICOAGULATION: ICD-10-CM

## 2024-04-24 DIAGNOSIS — D68.61 ANTIPHOSPHOLIPID SYNDROME (HCC): ICD-10-CM

## 2024-04-24 LAB — INR PPP: 2.2 (ref 2–3.5)

## 2024-04-24 PROCEDURE — 85610 PROTHROMBIN TIME: CPT

## 2024-04-24 PROCEDURE — RXMED WILLOW AMBULATORY MEDICATION CHARGE: Performed by: INTERNAL MEDICINE

## 2024-04-24 PROCEDURE — 99213 OFFICE O/P EST LOW 20 MIN: CPT | Performed by: PHARMACIST

## 2024-04-24 RX ORDER — SEMAGLUTIDE 0.68 MG/ML
0.5 INJECTION, SOLUTION SUBCUTANEOUS
Qty: 3 ML | Refills: 11 | Status: SHIPPED | OUTPATIENT
Start: 2024-04-24

## 2024-04-24 NOTE — PROGRESS NOTES
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)  Unable to tolerate Xarelto          Anticoagulation Care Providers       Provider Role Specialty Phone number    [facility] Responsible  [phone]                  Refer to Patient Findings for HPI:  Patient Findings       Negatives:  Signs/symptoms of thrombosis, Signs/symptoms of bleeding, Laboratory test error suspected, Change in health, Change in alcohol use, Change in activity, Upcoming invasive procedure, Emergency department visit, Upcoming dental procedure, Missed doses, Extra doses, Change in medications, Change in diet/appetite, Hospital admission, Bruising, Other complaints            There were no vitals filed for this visit.  Pt declined vitals    Verified current warfarin dosing schedule.    Medications reconciled: Yes  Pt is not on antiplatelet therapy.      A/P   INR is therapeutic    Warfarin dosing recommendation: Continue regimen as listed above.    Pt educated to contact our clinic with any changes in medications or s/s of bleeding or thrombosis. Pt is aware to seek immediate medical attention for falls, head injury or deep cuts.    Request pt to return in 4 week(s). Pt agrees.    Oliver Carolina, PharmD, BCACP    Patient Consult  "Note - Follow Up Visit  Primary care physician: Patrizia James M.D.  Referred by Gloria SERNA     Reason for consult: Obesity/Weight Management    HPI:  Jaclyn Olvera is a 49 y.o. old patient who comes in today for evaluation of above stated problem.    Tolerating initial dosing of Ozempic thus far.  Presents today to discuss further optimization of drug therapy to compliment nutrition and exercise improvements.    Today's Weight: 184 lbs  Initial Weight: 184 lbs     Today's BMI:  27.8 kg/m2  Initial BMI: 27.8 kg/m2       Most Recent HbA1c:   Lab Results   Component Value Date/Time    HBA1C 5.7 03/27/2024 12:00 AM        Current Weight Loss Medication Regimen:  Ozempic 0.25mg SQ once weekly.    Previously attempted medications:  Trulicity - no longer covered by insurance plan      Diet/Exercise:  Consistent with previous visit.  Denies any significant impact on appetite or noticeable weight loss.    Current Exercise - 1 hour walk 3 days/week.   Exercise Goal - During winter she is less active, more active during the summer.     Dietary:  Breakfast - Doesn't eat  Lunch - tuna salad  Dinner - Beef stew, more \"hearty meals\" in the winter  In the summer she has more salad and light foods.   Snack - Doesn't snack  Dessert - usually just on weekends. Not a particular desert.   Beverage - no soda, no juice.      Last Microalbumin/Cr:  Lab Results   Component Value Date/Time    MALBCRT 39 (H) 06/24/2021 05:08 PM    MICROALBUR 1.2 06/24/2021 05:08 PM     Last A1c:  Lab Results   Component Value Date/Time    HBA1C 5.7 03/27/2024 12:00 AM        ROS:  Constitutional: No weight loss  Cardiac: No palpitations or racing heart  Resp: No shortness of breath  Neuro: No numbness or tinging in feet  Endo: No heat or cold intolerance, no polyuria or polydipsia  All other systems were reviewed and were negative.    Past Medical History:  Patient Active Problem List    Diagnosis Date Noted    Vitamin D insufficiency 06/14/2023    " Multiple skin nodules 04/05/2022    Chronic pain of both shoulders 04/05/2022    Long-term use of hydroxychloroquine 04/05/2022    Anemia 01/27/2022    Black stool 01/27/2022    Epigastric pain 01/27/2022    Antiphospholipid syndrome (HCC) 06/23/2021    Elevated liver function tests 06/22/2021    Elevated C-reactive protein (CRP) 06/21/2021    Sepsis (HCC) 06/21/2021    Microcytic anemia 06/19/2021    Thrombocytopenia (HCC) 06/19/2021    Hemorrhage of both adrenal glands (HCC) 06/18/2021    Retroperitoneal lymphadenopathy 06/18/2021    Vaginal bleeding 06/18/2021    Hyperbilirubinemia 06/17/2021    Fibroids 06/15/2021    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 pylori infection 06/14/2021    Hyponatremia 06/14/2021    Kidney lesion, native, right 06/14/2021    Lesion of cervix 06/14/2021    Leukocytosis 06/14/2021       Past Surgical History:  Past Surgical History:   Procedure Laterality Date    CT UPPER GI ENDOSCOPY,DIAGNOSIS N/A 6/17/2021    Procedure: GASTROSCOPY;  Surgeon: Elfego Lopez M.D.;  Location: SURGERY SAME DAY [facility];  Service: Gastroenterology    CT UPPER GI ENDOSCOPY,BIOPSY N/A 6/17/2021    Procedure: GASTROSCOPY, WITH BIOPSY;  Surgeon: Elfego Lopez M.D.;  Location: SURGERY SAME DAY [facility];  Service: Gastroenterology       Allergies:  Patient has no known allergies.    Social History:  Social History     Socioeconomic History    Marital status:      Spouse name: Not on file    Number of children: Not on file    Years of education: Not on file    Highest education level: Not on file   Occupational History    Not on file   Tobacco Use    Smoking status: Some Days     Types: Cigarettes    Smokeless tobacco: Current    Tobacco comments:     1 pk per week   Vaping Use    Vaping Use: Never used   Substance and Sexual Activity    Alcohol use: Yes     Comment: occasionally    Drug use: Not Currently    Sexual activity: Not on file   Other Topics Concern    Not on file    Social History Narrative    Not on file     Social Determinants of Health     Financial Resource Strain: Not on file   Food Insecurity: Not on file   Transportation Needs: Not on file   Physical Activity: Not on file   Stress: Not on file   Social Connections: Not on file   Intimate Partner Violence: Not on file   Housing Stability: Not on file       Family History:  No family history on file.    Medications:    Current Outpatient Medications:     senna-docusate (PERICOLACE OR SENOKOT S) 8.6-50 MG Tab, Take 1 Tablet by mouth every day., Disp: 30 Tablet, Rfl: 0    warfarin (COUMADIN) 5 MG Tab, TAKE ONE TO TWO TABLETS DAILY AS DIRECTED BY [facility], Disp: 180 Tablet, Rfl: 1    Semaglutide,0.25 or 0.5MG/DOS, (OZEMPIC, 0.25 OR 0.5 MG/DOSE,) 2 MG/3ML Solution Pen-injector, Inject 0.25 mg under the skin every 7 days., Disp: 3 mL, Rfl: 1    warfarin (COUMADIN) 2.5 MG Tab, 1 tab by mouth daily or as directed by the [facility], Disp: 90 Tablet, Rfl: 1    meclizine (ANTIVERT) 25 MG Tab, Take 1 Tablet by mouth 3 times a day as needed for Dizziness or Vertigo., Disp: 30 Tablet, Rfl: 0    hydroxychloroquine (PLAQUENIL) 200 MG Tab, TAKE 2 TABLETS BY MOUTH EVERY DAY, Disp: 180 Tablet, Rfl: 3    Biotin w/ Vitamins C & E (HAIR/SKIN/NAILS PO), Take 1 Tablet by mouth every day., Disp: , Rfl:     Labs: Reviewed    Physical Examination:  Vital signs: There were no vitals taken for this visit. There is no height or weight on file to calculate BMI.  General: No apparent distress, cooperative  Eyes: No scleral icterus or discharge  ENMT: Normal on external inspection of nose, lips, normal thyroid exam  Neck: No abnormal masses on inspection  Resp: Normal effort, clear to auscultation bilaterally   CVS: Regular rate and rhythm, S1 S2 normal, no murmur   Extremities: No edema  Abdomen: abdominal obesity present  Neuro: Alert and oriented  Skin: No rash  Psych: Normal mood and affect, intact memory and able to make informed  decisions    Assessment and Plan:    Patient tolerating initial dosing of Ozempic.  Denies any significant impact on appetite or appreciable weight loss.  BMI consistent with initial visit.  No interval changes to other medications.  Continues optimize nutrition and exercise routines, but does complain that it can be difficult to maintain proper portion size at meal time.    Will further optimize Ozempic at this time.  Patient states PA is required for dose increase, which will be completed via CoverMyMeds.    INCREASE Ozempic to 0.5mg SQ once weekly.  Continue to focus on optimized nutrition, caloric deficit, and consistent daily/weekly exercise totally at least 150 min/week activity.    Follow Up:  Four weeks.    Thank you for allowing me to participate in the care of this patient.    Oliver Carolina, PharmD, BCACP  04/24/24    CC:   Patrizia James M.D.

## 2024-04-26 ENCOUNTER — PHARMACY VISIT (OUTPATIENT)
Dept: PHARMACY | Facility: MEDICAL CENTER | Age: 49
End: 2024-04-26
Payer: COMMERCIAL

## 2024-05-21 ENCOUNTER — TELEPHONE (OUTPATIENT)
Dept: VASCULAR LAB | Facility: MEDICAL CENTER | Age: 49
End: 2024-05-21

## 2024-05-21 ENCOUNTER — NON-PROVIDER VISIT (OUTPATIENT)
Dept: VASCULAR LAB | Facility: MEDICAL CENTER | Age: 49
End: 2024-05-21
Attending: INTERNAL MEDICINE
Payer: COMMERCIAL

## 2024-05-21 DIAGNOSIS — I82.210 THROMBOSIS OF SUPERIOR VENA CAVA (HCC): ICD-10-CM

## 2024-05-21 DIAGNOSIS — D68.61 ANTIPHOSPHOLIPID SYNDROME (HCC): ICD-10-CM

## 2024-05-21 DIAGNOSIS — N93.9 VAGINAL BLEEDING: ICD-10-CM

## 2024-05-21 DIAGNOSIS — E27.49 HEMORRHAGE OF BOTH ADRENAL GLANDS (HCC): ICD-10-CM

## 2024-05-21 DIAGNOSIS — E11.9 TYPE 2 DIABETES MELLITUS WITHOUT COMPLICATION, WITHOUT LONG-TERM CURRENT USE OF INSULIN (HCC): ICD-10-CM

## 2024-05-21 LAB — INR PPP: 2 (ref 2–3.5)

## 2024-05-21 RX ORDER — SEMAGLUTIDE 1.34 MG/ML
1 INJECTION, SOLUTION SUBCUTANEOUS
Qty: 9 ML | Refills: 1 | Status: SHIPPED | OUTPATIENT
Start: 2024-05-21

## 2024-05-21 NOTE — PROGRESS NOTES
"Patient Consult Note - Follow Up Visit  Primary care physician: Patrizia James M.D.  Referred by Gloria SERNA     Reason for consult: Obesity/Weight Management    HPI:  Jaclyn Olvera is a 49 y.o. old patient who comes in today for evaluation of above stated problem.    Tolerating initial dosing of Ozempic thus far.  Presents today to discuss further optimization of drug therapy to compliment nutrition and exercise improvements.    Today's Weight: 184 lbs  Initial Weight: 184 lbs     Today's BMI:  27.8 kg/m2  Initial BMI: 27.8 kg/m2       Most Recent HbA1c:   Lab Results   Component Value Date/Time    HBA1C 5.7 03/27/2024 12:00 AM        Current Weight Loss Medication Regimen:  Ozempic 0.5mg SQ once weekly.    Previously attempted medications:  Trulicity - no longer covered by insurance plan      Diet/Exercise:  Consistent with previous visit.  Denies any significant impact on appetite or noticeable weight loss.    Current Exercise - 1 hour walk 3 days/week.   Exercise Goal - During winter she is less active, more active during the summer.     Dietary:  Breakfast - Doesn't eat  Lunch - tuna salad  Dinner - Beef stew, more \"hearty meals\" in the winter  In the summer she has more salad and light foods.   Snack - Doesn't snack  Dessert - usually just on weekends. Not a particular desert.   Beverage - no soda, no juice.      Last Microalbumin/Cr:  Lab Results   Component Value Date/Time    MALBCRT 39 (H) 06/24/2021 05:08 PM    MICROALBUR 1.2 06/24/2021 05:08 PM     Last A1c:  Lab Results   Component Value Date/Time    HBA1C 5.7 03/27/2024 12:00 AM        ROS:  Constitutional: No weight loss  Cardiac: No palpitations or racing heart  Resp: No shortness of breath  Neuro: No numbness or tinging in feet  Endo: No heat or cold intolerance, no polyuria or polydipsia  All other systems were reviewed and were negative.    Past Medical History:  Patient Active Problem List    Diagnosis Date Noted    Vitamin D insufficiency " 06/14/2023    Multiple skin nodules 04/05/2022    Chronic pain of both shoulders 04/05/2022    Long-term use of hydroxychloroquine 04/05/2022    Anemia 01/27/2022    Black stool 01/27/2022    Epigastric pain 01/27/2022    Antiphospholipid syndrome (HCC) 06/23/2021    Elevated liver function tests 06/22/2021    Elevated C-reactive protein (CRP) 06/21/2021    Sepsis (HCC) 06/21/2021    Microcytic anemia 06/19/2021    Thrombocytopenia (HCC) 06/19/2021    Hemorrhage of both adrenal glands (HCC) 06/18/2021    Retroperitoneal lymphadenopathy 06/18/2021    Vaginal bleeding 06/18/2021    Hyperbilirubinemia 06/17/2021    Fibroids 06/15/2021    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 pylori infection 06/14/2021    Hyponatremia 06/14/2021    Kidney lesion, native, right 06/14/2021    Lesion of cervix 06/14/2021    Leukocytosis 06/14/2021       Past Surgical History:  Past Surgical History:   Procedure Laterality Date    NC UPPER GI ENDOSCOPY,DIAGNOSIS N/A 6/17/2021    Procedure: GASTROSCOPY;  Surgeon: Elfego Lopez M.D.;  Location: SURGERY SAME DAY [facility];  Service: Gastroenterology    NC UPPER GI ENDOSCOPY,BIOPSY N/A 6/17/2021    Procedure: GASTROSCOPY, WITH BIOPSY;  Surgeon: Elfego Lopez M.D.;  Location: SURGERY SAME DAY [facility];  Service: Gastroenterology       Allergies:  Patient has no known allergies.    Social History:  Social History     Socioeconomic History    Marital status:      Spouse name: Not on file    Number of children: Not on file    Years of education: Not on file    Highest education level: Not on file   Occupational History    Not on file   Tobacco Use    Smoking status: Some Days     Types: Cigarettes    Smokeless tobacco: Current    Tobacco comments:     1 pk per week   Vaping Use    Vaping status: Never Used   Substance and Sexual Activity    Alcohol use: Yes     Comment: occasionally    Drug use: Not Currently    Sexual activity: Not on file   Other Topics Concern     Not on file   Social History Narrative    Not on file     Social Determinants of Health     Financial Resource Strain: Not on file   Food Insecurity: Not on file   Transportation Needs: Not on file   Physical Activity: Not on file   Stress: Not on file   Social Connections: Not on file   Intimate Partner Violence: Not on file   Housing Stability: Not on file       Family History:  No family history on file.    Medications:    Current Outpatient Medications:     Semaglutide,0.25 or 0.5MG/DOS, (OZEMPIC, 0.25 OR 0.5 MG/DOSE,) 2 MG/3ML Solution Pen-injector, Inject 0.5 mg under the skin every 7 days., Disp: 3 mL, Rfl: 11    senna-docusate (PERICOLACE OR SENOKOT S) 8.6-50 MG Tab, Take 1 Tablet by mouth every day., Disp: 30 Tablet, Rfl: 0    warfarin (COUMADIN) 5 MG Tab, TAKE ONE TO TWO TABLETS DAILY AS DIRECTED BY [facility], Disp: 180 Tablet, Rfl: 1    warfarin (COUMADIN) 2.5 MG Tab, 1 tab by mouth daily or as directed by the [facility], Disp: 90 Tablet, Rfl: 1    meclizine (ANTIVERT) 25 MG Tab, Take 1 Tablet by mouth 3 times a day as needed for Dizziness or Vertigo., Disp: 30 Tablet, Rfl: 0    hydroxychloroquine (PLAQUENIL) 200 MG Tab, TAKE 2 TABLETS BY MOUTH EVERY DAY, Disp: 180 Tablet, Rfl: 3    Biotin w/ Vitamins C & E (HAIR/SKIN/NAILS PO), Take 1 Tablet by mouth every day., Disp: , Rfl:     Labs: Reviewed    Physical Examination:  Vital signs: There were no vitals taken for this visit. There is no height or weight on file to calculate BMI.  General: No apparent distress, cooperative  Eyes: No scleral icterus or discharge  ENMT: Normal on external inspection of nose, lips, normal thyroid exam  Neck: No abnormal masses on inspection  Resp: Normal effort, clear to auscultation bilaterally   CVS: Regular rate and rhythm, S1 S2 normal, no murmur   Extremities: No edema  Abdomen: abdominal obesity present  Neuro: Alert and oriented  Skin: No rash  Psych: Normal mood and affect, intact memory and able to  make informed decisions    Assessment and Plan:    Patient tolerating increased dose of Ozempic.  Denies any significant impact on appetite or appreciable weight loss.  BMI consistent with initial visit.  No interval changes to other medications.  Continues optimize nutrition and exercise routines, but does complain that it can be difficult to maintain proper portion size at meal time.    Will further optimize Ozempic at this time.  Patient received a letter from insurance stating that in order to continue receiving this medication long-term, she will need to fill 90 day supply to a [facility].    INCREASE Ozempic to 1mg SQ once weekly.  Continue to focus on optimized nutrition, caloric deficit, and consistent daily/weekly exercise totally at least 150 min/week activity.    Follow Up:  Four weeks.    Rachelle Blair, PharmD    CC:   [facility]

## 2024-05-21 NOTE — PROGRESS NOTES
Anticoagulation Summary  As of 2024      INR goal:  2.0-3.0   TTR:  32.3% (2.9 y)   INR used for dosin.00 (2024)   Warfarin maintenance plan:  10 mg (5 mg x 2) every Mon, Fri; 7.5 mg (5 mg x 1.5) all other days   Weekly warfarin total:  57.5 mg   Plan last modified:  Oliver Carolina, PharmD (4/3/2024)   Next INR check:     Target end date:  Indefinite    Indications    Thrombosis of superior vena cava (HCC) [I82.210]  Hemorrhage of both adrenal glands (HCC) [E27.49]  Vaginal bleeding [N93.9]  Antiphospholipid syndrome (HCC) [D68.61]                 Anticoagulation Episode Summary       INR check location:      Preferred lab:      Send INR reminders to:      Comments:   Nichole changed her INR goal to 2.0 - 3.0 (2023)  Unable to tolerate Xarelto          Anticoagulation Care Providers       Provider Role Specialty Phone number    [facility] Responsible  [phone]               Refer to Patient Findings for HPI:  Patient Findings       Negatives:  Signs/symptoms of thrombosis, Signs/symptoms of bleeding, Laboratory test error suspected, Change in health, Change in alcohol use, Change in activity, Upcoming invasive procedure, Emergency department visit, Upcoming dental procedure, Missed doses, Extra doses, Change in medications, Change in diet/appetite, Hospital admission, Bruising, Other complaints            There were no vitals filed for this visit.  Pt declined vitals    Verified current warfarin dosing schedule.    Medications reconciled: No  Pt is not on antiplatelet therapy.      A/P   INR is therapeutic    Warfarin dosing recommendation: Continue regimen as listed above.    Pt educated to contact our clinic with any changes in medications or s/s of bleeding or thrombosis. Pt is aware to seek immediate medical attention for falls, head injury or deep cuts.    Request pt to return in 4 week(s). Pt agrees.    Rachelle Blair, PharmD

## 2024-05-21 NOTE — TELEPHONE ENCOUNTER
Received New start [place] request via [employer]  for Semaglutide, 1 MG/DOSE, (OZEMPIC, 1 MG/DOSE,) 4 MG/3ML Solution Pen-injector. (Quantity:3 mls, Day Supply:30)     Insurance: [employer]  Member ID:  [id number]  BIN: [id number]  PCN: [id number]  Group: [id number]     Ran Test claim via [employer] & medication  pays for $24.98/30DS. received EMR that pt has to be on continuous therapy with this medication, and needs to fill 90DS for long term per insurance. Will release Rx to pharmacy on file: [id number]---[address], [place] [address]      JUANPABLO Pérez, PhT  Vascular Pharmacy Liaison (Rx Coordinator)  P: [phone]  5/21/2024 8:53 AM

## 2024-05-21 NOTE — Clinical Note
Patient received a letter from insurance stating that in order to continue receiving this medication long-term, she will need to fill 90 day supply to a [facility]. Placed order for 90 ds Ozempic 1mg to be sent to [facility] on [address] TY!

## 2024-05-29 DIAGNOSIS — D68.61 ANTIPHOSPHOLIPID SYNDROME (HCC): ICD-10-CM

## 2024-05-31 RX ORDER — HYDROXYCHLOROQUINE SULFATE 200 MG/1
400 TABLET, FILM COATED ORAL
Qty: 180 TABLET | Refills: 3 | Status: SHIPPED | OUTPATIENT
Start: 2024-05-31

## 2024-06-12 ENCOUNTER — OFFICE VISIT (OUTPATIENT)
Dept: RHEUMATOLOGY | Facility: MEDICAL CENTER | Age: 49
End: 2024-06-12
Attending: STUDENT IN AN ORGANIZED HEALTH CARE EDUCATION/TRAINING PROGRAM
Payer: COMMERCIAL

## 2024-06-12 VITALS
TEMPERATURE: 97.7 F | SYSTOLIC BLOOD PRESSURE: 104 MMHG | HEART RATE: 72 BPM | HEIGHT: 68 IN | DIASTOLIC BLOOD PRESSURE: 70 MMHG | BODY MASS INDEX: 27.89 KG/M2 | WEIGHT: 184 LBS | OXYGEN SATURATION: 97 %

## 2024-06-12 DIAGNOSIS — E55.9 VITAMIN D INSUFFICIENCY: ICD-10-CM

## 2024-06-12 DIAGNOSIS — Z79.01 LONG TERM CURRENT USE OF ANTICOAGULANT: ICD-10-CM

## 2024-06-12 DIAGNOSIS — D68.61 ANTIPHOSPHOLIPID SYNDROME (HCC): ICD-10-CM

## 2024-06-12 DIAGNOSIS — Z79.899 LONG-TERM USE OF HYDROXYCHLOROQUINE: ICD-10-CM

## 2024-06-12 PROCEDURE — 99214 OFFICE O/P EST MOD 30 MIN: CPT | Performed by: STUDENT IN AN ORGANIZED HEALTH CARE EDUCATION/TRAINING PROGRAM

## 2024-06-12 PROCEDURE — 3078F DIAST BP <80 MM HG: CPT | Performed by: STUDENT IN AN ORGANIZED HEALTH CARE EDUCATION/TRAINING PROGRAM

## 2024-06-12 PROCEDURE — 3074F SYST BP LT 130 MM HG: CPT | Performed by: STUDENT IN AN ORGANIZED HEALTH CARE EDUCATION/TRAINING PROGRAM

## 2024-06-12 PROCEDURE — 99212 OFFICE O/P EST SF 10 MIN: CPT | Performed by: STUDENT IN AN ORGANIZED HEALTH CARE EDUCATION/TRAINING PROGRAM

## 2024-06-12 ASSESSMENT — PATIENT HEALTH QUESTIONNAIRE - PHQ9: CLINICAL INTERPRETATION OF PHQ2 SCORE: 0

## 2024-06-12 ASSESSMENT — FIBROSIS 4 INDEX: FIB4 SCORE: 0.96

## 2024-06-12 NOTE — PATIENT INSTRUCTIONS
AFTER VISIT INSTRUCTIONS    Below are important information to help you navigate your healthcare needs and help us serve you safely and effectively:  If laboratory tests and/or imaging studies were ordered, remember to go get them done as instructed.  If new prescriptions and/or refills were sent, remember to go pick them up from your local pharmacy, or call the specialty pharmacy to request shipment.  Always take your prescription medications exactly as prescribed unless instructed otherwise.  Note that antirheumatic drugs and steroids are immunosuppressive which means they increase your risk of infections and have multiple potential adverse effects on various organ systems in your body, though many of them are uncommon.  It is important that you are up-to-date on age-appropriate immunizations, particularly shingles and bacterial/viral pneumonia vaccines, which you can request from me or your primary care provider.  Be sure to read the drug package inserts to learn about the potential side effects of your medications before you start taking them.  If you experience any significant drug side effects, stop taking the medication and notify me promptly, and depending on the severity of the side effects, consider going to an urgent care or emergency department for immediate attention.  If there are significant findings on your lab tests and imaging studies that warrant further action, I will notify you with explanations via [employer] or phone call, otherwise you can view them on [employer] and let me know if you have any questions.  Note that [employer] messages are typically read during office hours and may take 1-7 business days before a response depending on the urgency of the situation and how busy my clinic schedule is.  In general, [employer] messaging is for non-urgent matters that do not require immediate attention, so for urgent matters that cannot wait, you are advised to go to an urgent care.  You are granted [employer]  access to my documentation of your visit and are encouraged to read my note which details my assessment and plan for your condition.  To learn more about your condition and rheumatic diseases evaluated and treated by rheumatologists, as well as gain access to many helpful resources about these diseases, visit our website: [email or web address].  To properly dispose of your unused, unwanted, or residual medications/supplies, visit the Drug Enforcement Administration website to locate your closest drop-off location: www.trista.gov/everyday-takeback-day.

## 2024-06-12 NOTE — PROGRESS NOTES
[facility] RHEUMATOLOGY  [address] [facility], [address], [place] [address]  Phone: [phone] ? Fax: [phone]  [facility]/Health-Services/Rheumatology    FOLLOW-UP VISIT NOTE      DATE OF SERVICE: 06/12/2024         Subjective     PRIMARY CARE PRACTITIONER:  Patrizia James M.D.  [address] [place] [address]    PATIENT IDENTIFICATION:  Jaclyn Olvera  [address] [facility]  [place] [address]    YOB: 1975    MEDICAL RECORD NUMBER: [id number]          CHIEF COMPLAINT:   Chief Complaint   Patient presents with    Follow-Up     Antiphospholipid syndrome (HCC)       RHEUMATOLOGIC HISTORY:  Jaclyn Olvera is a 49 y.o. 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 Since then she had been on long-term anticoagulation with warfarin, and in 2/2022 was started on hydroxychloroquine by hem/onc at [facility]. She initially presented on 4/5/22 to establish care for continued evaluation and management of her condition. Reported onset of musculoskeletal symptoms in 8/2021 with joint pain in her shoulders and hands, associated with joint stiffness lasting most of the day and improving with activity. She additionally reported a history of multiple skin bumps on her upper/lower extremities since around 2016 s/p biopsy with unrevealing results, as well as some hip pain with sciatica. Reported supplemental aspects of her symptoms and medical history as noted on the initial visit questioning.     Pertinent treatments: Xarelto 20 mg daily (ordered 6/14/23-8/2023, self-discontinued due to GI side effects), warfarin 2.5-5<10 mg daily based on INR (6/2021-present, effective), hydroxychloroquine 400 mg daily (2/2022-present, effective).     Pertinent positive labs: Positive IgG anti-, IgA anti-CL 21, IgG anti-B2GP1 71, IgG  anti-PS/.5, and LAC (in 2-3/2022); positive anti- and anti-TG 9.1 (in 2/2022); low vitamin D of 20.9 (in 11/2021); high CRP 14<27.58, ESR 67<140, and fibrinogen 777 (in 5/2022<6/2021).    Pertinent negative labs: Negative ANCA, RF, anti-CCP, HBV and HCV (in 6/2021), CUONG (in 6/2021 and 3/2022), and IgG subclasses (in 5/2022), eGFR, creatinine, and LFTs (in 9/2023).      INTERVAL HISTORY:  Reports interval history as noted on the questionnaire below or scanned under media tab.  Notably persistent joint pain/stiffness in the left shoulder for which she is scheduled to get MRI.  Had some GI side effects on Xarelto so switched back to warfarin by vascular medicine.  Doing quite well otherwise and feels that the current regimen including HCQ has been very helpful.    REVIEW OF SYSTEMS:  Except as noted in the history above, relevant review of systems with emphasis on autoimmune rheumatic diseases was otherwise negative.      ACTIVE PROBLEM LIST:  Patient Active Problem List    Diagnosis Date Noted    Long term current use of anticoagulant 06/12/2024    Vitamin D insufficiency 06/14/2023    Multiple skin nodules 04/05/2022    Chronic pain of both shoulders 04/05/2022    Long-term use of hydroxychloroquine 04/05/2022    Anemia 01/27/2022    Black stool 01/27/2022    Epigastric pain 01/27/2022    Antiphospholipid syndrome (HCC) 06/23/2021    Elevated liver function tests 06/22/2021    Elevated C-reactive protein (CRP) 06/21/2021    Sepsis (HCC) 06/21/2021    Microcytic anemia 06/19/2021    Thrombocytopenia (HCC) 06/19/2021    Hemorrhage of both adrenal glands (HCC) 06/18/2021    Retroperitoneal lymphadenopathy 06/18/2021    Vaginal bleeding 06/18/2021    Hyperbilirubinemia 06/17/2021    Fibroids 06/15/2021    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 pylori infection 06/14/2021    Hyponatremia 06/14/2021    Kidney lesion, native, right 06/14/2021    Lesion of cervix 06/14/2021    Leukocytosis  06/14/2021       PAST MEDICAL HISTORY:  Past Medical History:   Diagnosis Date    Antiphospholipid antibody syndrome (HCC)     Diabetes (HCC)     H/O thrombosis of vena cava     Hemorrhage of both adrenal glands (HCC)     PE (pulmonary embolism)        PAST SURGICAL HISTORY:  Past Surgical History:   Procedure Laterality Date    [place] UPPER GI ENDOSCOPY,DIAGNOSIS N/A 6/17/2021    Procedure: GASTROSCOPY;  Surgeon: Elfego Lopez M.D.;  Location: SURGERY SAME DAY [facility];  Service: Gastroenterology    UT UPPER GI ENDOSCOPY,BIOPSY N/A 6/17/2021    Procedure: GASTROSCOPY, WITH BIOPSY;  Surgeon: Elfego Lopez M.D.;  Location: SURGERY SAME DAY [facility];  Service: Gastroenterology       SOCIAL HISTORY:  Social History     Socioeconomic History    Marital status:      Spouse name: Not on file    Number of children: Not on file    Years of education: Not on file    Highest education level: Not on file   Occupational History    Not on file   Tobacco Use    Smoking status: Former     Types: Cigarettes    Smokeless tobacco: Former     Quit date: 10/10/2023    Tobacco comments:     1 pk per week   Vaping Use    Vaping status: Never Used   Substance and Sexual Activity    Alcohol use: Not Currently     Comment: occasionally    Drug use: Not Currently    Sexual activity: Not on file   Other Topics Concern    Not on file   Social History Narrative    Not on file     Social Determinants of Health     Financial Resource Strain: Not on file   Food Insecurity: Not on file   Transportation Needs: Not on file   Physical Activity: Not on file   Stress: Not on file   Social Connections: Not on file   Intimate Partner Violence: Not on file   Housing Stability: Not on file       FAMILY HISTORY:  History reviewed. No pertinent family history.    MEDICATIONS:  Current Outpatient Medications   Medication Sig    hydroxychloroquine (PLAQUENIL) 200 MG Tab Take 2 Tablets by mouth every day.    Semaglutide, 1 MG/DOSE, (OZEMPIC, 1  "MG/DOSE,) 4 MG/3ML Solution Pen-injector Inject 1 mg under the skin every 7 days.    senna-docusate (PERICOLACE OR SENOKOT S) 8.6-50 MG Tab Take 1 Tablet by mouth every day.    warfarin (COUMADIN) 5 MG Tab TAKE ONE TO TWO TABLETS DAILY AS DIRECTED BY [facility]    warfarin (COUMADIN) 2.5 MG Tab 1 tab by mouth daily or as directed by the [facility]    meclizine (ANTIVERT) 25 MG Tab Take 1 Tablet by mouth 3 times a day as needed for Dizziness or Vertigo.    Biotin w/ Vitamins C & E (HAIR/SKIN/NAILS PO) Take 1 Tablet by mouth every day.       ALLERGIES:   No Known Allergies    IMMUNIZATIONS:  Immunization History   Administered Date(s) Administered    PFIZER PURPLE CAP SARS-COV-2 VACCINATION (12+) 03/30/2021, 04/20/2021            Objective     Vital Signs: /70 (BP Location: Left arm, Patient Position: Sitting, BP Cuff Size: Large adult)   Pulse 72   Temp 36.5 °C (97.7 °F) (Temporal)   Ht 1.727 m (5' 8\")   Wt 83.5 kg (184 lb)   SpO2 97% Body mass index is 27.98 kg/m².    General: Appears well and comfortable  Eyes: No scleral or conjunctival lesions  ENT: No apparent oral, nasal, or ear lesions  Head/Neck: No apparent scalp or neck lesions  Cardiovascular: Regular rate and rhythm  Respiratory: Breathing quiet and unlabored  Gastrointestinal: No apparent organomegaly or abdominal masses  Integumentary: No significant cutaneous lesions or dyspigmentation  Musculoskeletal: No significant joint tenderness, periarticular soft tissue swelling, warmth, erythema, or overt synovitis; no significant restriction in range of motion of joints examined  Neurologic: No focal sensory or motor deficits  Psychiatric: Mood and affect appropriate      LABORATORY RESULTS REVIEWED AND INTERPRETED BY ME:  Lab Results   Component Value Date/Time    CREACTPROT 27.58 (H) 06/18/2021 07:31 AM    SEDRATEWES >140 (H) 06/18/2021 11:13 AM    FIBRINOGEN 777 (H) 06/22/2021 02:51 PM     Lab Results   Component Value Date/Time    " RHEUMFACTN <10 06/20/2021 06:34 AM    CCPANTIBODY 8 06/20/2021 06:34 AM     Lab Results   Component Value Date/Time    ANTINUCAB None Detected 06/20/2021 06:34 AM     Lab Results   Component Value Date/Time    SMITHAB 6 06/20/2021 06:34 AM    RNPAB See Note 06/20/2021 06:34 AM    SSA60 1 06/20/2021 06:34 AM    SSA52 3 06/20/2021 06:34 AM    ANTISSBSJ 0 06/20/2021 06:34 AM     Lab Results   Component Value Date/Time    ANCAIGG <1:20 06/21/2021 12:13 PM     Lab Results   Component Value Date/Time    IGACARDIOLI 21 (H) 06/20/2021 06:34 AM    IGMCARDIOLI 11 06/20/2021 06:34 AM    IGGCARDIOLI 146 (H) 06/20/2021 06:34 AM    RUSSELVIPER 107.2 (H) 06/20/2021 06:34 AM     Lab Results   Component Value Date/Time    TSHULTRASEN 8.530 (H) 06/19/2021 05:57 AM    FREET4 1.25 06/19/2021 05:57 AM     Lab Results   Component Value Date/Time    25HYDROXY 20.9 (L) 11/05/2021 04:07 AM     Lab Results   Component Value Date/Time    FERRITIN 44.9 04/12/2023 07:23 AM    IRON 50 04/12/2023 07:23 AM    TRANSFERRIN 307 01/27/2022 07:15 AM    FOLATE 7.8 06/22/2021 02:51 PM    HAPTOGLOBIN 284 (H) 06/24/2021 06:29 PM    PROTHROMBTM 36.9 (H) 02/24/2024 10:37 AM    INR 2.00 05/21/2024 12:00 AM     Lab Results   Component Value Date/Time    WBC 7.3 09/22/2023 06:36 AM    RBC 4.60 09/22/2023 06:36 AM    HEMOGLOBIN 10.8 (L) 09/22/2023 06:36 AM    HEMATOCRIT 35.2 (L) 09/22/2023 06:36 AM    MCV 76.5 (L) 09/22/2023 06:36 AM    MCH 23.5 (L) 09/22/2023 06:36 AM    MCHC 30.7 (L) 09/22/2023 06:36 AM    RDW 40.1 09/22/2023 06:36 AM    PLATELETCT 219 09/22/2023 06:36 AM    MPV 9.1 09/22/2023 06:36 AM    NEUTS 5.32 09/22/2023 06:36 AM    LYMPHOCYTES 18.70 (L) 09/22/2023 06:36 AM    MONOCYTES 5.90 09/22/2023 06:36 AM    EOSINOPHILS 1.80 09/22/2023 06:36 AM    BASOPHILS 0.70 09/22/2023 06:36 AM    HYPOCHROMIA 2+ (A) 01/24/2022 10:57 AM    ANISOCYTOSIS 3+ (A) 02/06/2022 11:17 AM     Lab Results   Component Value Date/Time    ASTSGOT 18 09/22/2023 06:36 AM     ALTSGPT 17 09/22/2023 06:36 AM    ALKPHOSPHAT 45 09/22/2023 06:36 AM    TBILIRUBIN 0.8 09/22/2023 06:36 AM    TOTPROTEIN 7.2 09/22/2023 06:36 AM    ALBUMIN 4.1 09/22/2023 06:36 AM     Lab Results   Component Value Date/Time    SODIUM 137 09/22/2023 06:36 AM    POTASSIUM 4.4 09/22/2023 06:36 AM    CHLORIDE 103 09/22/2023 06:36 AM    CO2 22 09/22/2023 06:36 AM    GLUCOSE 103 (H) 09/22/2023 06:36 AM    BUN 9 09/22/2023 06:36 AM    CREATININE 0.91 09/22/2023 06:36 AM    BUNCREATRAT 11.3 04/27/2022 08:51 AM    BUNCREATRAT 14 11/05/2021 04:07 AM    CALCIUM 9.5 09/22/2023 06:36 AM    MAGNESIUM 1.7 06/22/2021 06:14 AM     Lab Results   Component Value Date/Time    TOTPROTUR 6.0 06/24/2021 05:08 PM    MICROALBUR 1.2 06/24/2021 05:08 PM    CREATININEU 31.76 06/24/2021 05:08 PM    MALBCRT 39 (H) 06/24/2021 05:08 PM     Lab Results   Component Value Date/Time    COLORURINE Yellow 09/22/2023 06:25 AM    SPECGRAVITY 1.025 09/22/2023 06:25 AM    PHURINE 6.5 09/22/2023 06:25 AM    GLUCOSEUR Negative 09/22/2023 06:25 AM    KETONES Negative 09/22/2023 06:25 AM    PROTEINURIN Negative 09/22/2023 06:25 AM     Lab Results   Component Value Date/Time    HEPBSAG Non-Reactive 06/21/2021 12:13 PM    HEPBCORIGM Non-Reactive 06/21/2021 12:13 PM    HEPCAB Non-Reactive 06/21/2021 12:13 PM     Lab Results   Component Value Date/Time    CHOLSTRLTOT 216 (H) 03/10/2023 06:27 AM     (H) 03/10/2023 06:27 AM    HDL 43 03/10/2023 06:27 AM    TRIGLYCERIDE 197 (H) 03/10/2023 06:27 AM    HBA1C 5.7 03/27/2024 12:00 AM       RADIOLOGY RESULTS REVIEWED AND INTERPRETED BY ME:    Results for orders placed during the hospital encounter of 05/30/10    DX-KNEE COMPLETE 4+    Results for orders placed during the hospital encounter of 09/07/21    US-RENAL    Impression  1.  Resolution of previously seen left hydronephrosis.  2.  There is a hypoechoic 2.8 cm mass along the inferior right margin of the liver, this is likely evolving hematoma from patient's  known adrenal hemorrhage.      All relevant laboratory and imaging results reported on this note were reviewed and interpreted by me.         Assessment & Plan     Jaclyn Olvera is a 49 y.o. female with history and physical as noted above whose presentation merits the following clinical impressions and recommendations:    1. Antiphospholipid syndrome (HCC)  Quadruple positive with anti-CL, anti-B2GP1, anti-PS/PT, and LAC which confer high risk of thrombotic event, hence the need for lifelong anticoagulation. Clinically remains quiescent with no evidence to suggest evolving or impending vascular thrombosis on the current regimen of warfarin and hydroxychloroquine. In addition to the protective immunomodulatory effect of hydroxychloroquine against antiphospholipid antibody-mediated hypercoagulability, it also appears to be helping her previously reported arthralgia which is presently of no concern.  - CRP QUANTITIVE (NON-CARDIAC); Future  - Sed Rate; Future  - FIBRINOGEN; Future  - CBC WITH DIFFERENTIAL; Future  - Comp Metabolic Panel; Future  - Continue warfarin 2.5-5 mg daily per vascular medicine  - Continue hydroxychloroquine 400 mg daily     2. Vitamin D insufficiency  Hypovitaminosis D or deficiency can contribute to diffuse musculoskeletal aches, fatigue, malaise, and risk of decrease in bone density predisposing to osteopenia, so need supplementation and follow-up retesting.  - VITAMIN D,25 HYDROXY (DEFICIENCY); Future    3. Long term current use of anticoagulant  Counseled on the increased risk of excessive bleeding on long-term anticoagulation.  - Routine follow-up with vascular medicine    4. Long-term use of hydroxychloroquine  Minimal to no risk of retinal toxicity given the low dose of hydroxychloroquine prescribed (less than 5 mg/kg of body weight) per rheumatology and ophthalmology guidelines. However, ophthalmologic evaluation is still recommended with the frequency of routine follow-up eye exams  determined by the ophthalmologist, typically annually or every other year.  - Routine ophthalmology evaluation as recommended      The above assessment and plan were discussed with the patient who acknowledged understanding of the plan.    FOLLOW-UP: Return in about 1 year (around 6/12/2025) for Short.         Thank you for the opportunity to participate in the care of Jaclyn Olvera.    Nicanro Orlando MD, MS, FACR  Rheumatologist, [facility] Rheumatology, [facility]   of Clinical Medicine, Department of Internal Medicine  [facility]

## 2024-06-18 ENCOUNTER — NON-PROVIDER VISIT (OUTPATIENT)
Dept: VASCULAR LAB | Facility: MEDICAL CENTER | Age: 49
End: 2024-06-18
Attending: INTERNAL MEDICINE
Payer: COMMERCIAL

## 2024-06-18 VITALS
HEART RATE: 81 BPM | SYSTOLIC BLOOD PRESSURE: 101 MMHG | DIASTOLIC BLOOD PRESSURE: 68 MMHG | BODY MASS INDEX: 27.52 KG/M2 | WEIGHT: 181 LBS

## 2024-06-18 DIAGNOSIS — E66.3 OVERWEIGHT (BMI 25.0-29.9): ICD-10-CM

## 2024-06-18 DIAGNOSIS — D68.61 ANTIPHOSPHOLIPID SYNDROME (HCC): ICD-10-CM

## 2024-06-18 DIAGNOSIS — R73.03 PRE-DIABETES: ICD-10-CM

## 2024-06-18 DIAGNOSIS — E11.9 TYPE 2 DIABETES MELLITUS WITHOUT COMPLICATION, WITHOUT LONG-TERM CURRENT USE OF INSULIN (HCC): ICD-10-CM

## 2024-06-18 DIAGNOSIS — I82.210 THROMBOSIS OF SUPERIOR VENA CAVA (HCC): ICD-10-CM

## 2024-06-18 DIAGNOSIS — N93.9 VAGINAL BLEEDING: ICD-10-CM

## 2024-06-18 DIAGNOSIS — E27.49 HEMORRHAGE OF BOTH ADRENAL GLANDS (HCC): ICD-10-CM

## 2024-06-18 LAB — INR PPP: 2.2 (ref 2–3.5)

## 2024-06-18 PROCEDURE — 85610 PROTHROMBIN TIME: CPT

## 2024-06-18 PROCEDURE — 99212 OFFICE O/P EST SF 10 MIN: CPT

## 2024-06-18 RX ORDER — TIRZEPATIDE 2.5 MG/.5ML
2.5 INJECTION, SOLUTION SUBCUTANEOUS
Qty: 2 ML | Refills: 1 | Status: SHIPPED | OUTPATIENT
Start: 2024-06-18 | End: 2024-06-18

## 2024-06-18 RX ORDER — TIRZEPATIDE 2.5 MG/.5ML
2.5 INJECTION, SOLUTION SUBCUTANEOUS
Qty: 2 ML | Refills: 1 | Status: SHIPPED | OUTPATIENT
Start: 2024-06-18

## 2024-06-18 ASSESSMENT — FIBROSIS 4 INDEX: FIB4 SCORE: 0.96

## 2024-06-18 NOTE — PROGRESS NOTES
Anticoagulation Summary  As of 2024      INR goal:  2.0-3.0   TTR:  34.1% (2.9 y)   INR used for dosin.20 (2024)   Warfarin maintenance plan:  10 mg (5 mg x 2) every Mon, Fri; 7.5 mg (5 mg x 1.5) all other days   Weekly warfarin total:  57.5 mg   Plan last modified:  Evan VeraD (4/3/2024)   Next INR check:  2024   Target end date:  Indefinite    Indications    Thrombosis of superior vena cava (HCC) [I82.210]  Hemorrhage of both adrenal glands (HCC) [E27.49]  Vaginal bleeding [N93.9]  Antiphospholipid syndrome (HCC) [D68.61]                 Anticoagulation Episode Summary       INR check location:      Preferred lab:      Send INR reminders to:      Comments:  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)  Unable to tolerate Xarelto          Anticoagulation Care Providers       Provider Role Specialty Phone number    [facility] Responsible  [phone]          Refer to Patient Findings for HPI:      Vitals:    24 0806   BP: 101/68   Pulse: 81   Weight: 82.1 kg (181 lb)       Verified current warfarin dosing schedule.    Medications reconciled: Yes  Pt is not on antiplatelet therapy.      A/P   INR is therapeutic    Warfarin dosing recommendation: Continue regimen as listed above.    Pt educated to contact our clinic with any changes in medications or s/s of bleeding or thrombosis. Pt is aware to seek immediate medical attention for falls, head injury or deep cuts.    Request pt to return in 4 week(s). Pt agrees.    Andrew GordonD

## 2024-06-18 NOTE — PROGRESS NOTES
"27.5Patient Consult Note      Primary care physician: Patrizia James M.D.    Reason for consult: Obesity/Weight Management    HPI:  Jaclyn Olvera is a 49 y.o. old patient who comes in today for evaluation of above stated problem.    Initial Weight: 184 lbs  Initial BMI: 27.8 kg/m2    Current Weight: 181 lbs  Current BMI: 27.5 kg/m2    Most Recent HbA1c:   Lab Results   Component Value Date/Time    HBA1C 5.7 03/27/2024 12:00 AM      Lab Results   Component Value Date/Time    CREATININE 0.91 09/22/2023 06:36 AM        Most Recent TSH:   No results found for: \"TSH\"    Obesity Medication History and Current Regimen  GLP-1 Agent: Ozempic 1mg Q7d    Lifestyle  Current Exercise - walking 30-45 min 4 x week  Exercise Goal - increase as tolerable     Dietary:  Breakfast - usually skips  Lunch - sandwich, salads,   Dinner - skips or small snack like cereal   Snack - no snacks  Dessert - no sweet tooth   Beverage - coffee, water, no juice/soda    Past Medical History:  Patient Active Problem List    Diagnosis Date Noted    Long term current use of anticoagulant 06/12/2024    Vitamin D insufficiency 06/14/2023    Multiple skin nodules 04/05/2022    Chronic pain of both shoulders 04/05/2022    Long-term use of hydroxychloroquine 04/05/2022    Anemia 01/27/2022    Black stool 01/27/2022    Epigastric pain 01/27/2022    Antiphospholipid syndrome (HCC) 06/23/2021    Elevated liver function tests 06/22/2021    Elevated C-reactive protein (CRP) 06/21/2021    Sepsis (HCC) 06/21/2021    Microcytic anemia 06/19/2021    Thrombocytopenia (HCC) 06/19/2021    Hemorrhage of both adrenal glands (HCC) 06/18/2021    Retroperitoneal lymphadenopathy 06/18/2021    Vaginal bleeding 06/18/2021    Hyperbilirubinemia 06/17/2021    Fibroids 06/15/2021    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 pylori infection 06/14/2021    Hyponatremia 06/14/2021    Kidney lesion, native, right 06/14/2021    Lesion of cervix 06/14/2021    Leukocytosis " 06/14/2021       Past Surgical History:  Past Surgical History:   Procedure Laterality Date    [place] UPPER GI ENDOSCOPY,DIAGNOSIS N/A 6/17/2021    Procedure: GASTROSCOPY;  Surgeon: Elfego Lopez M.D.;  Location: SURGERY SAME DAY [facility];  Service: Gastroenterology    TX UPPER GI ENDOSCOPY,BIOPSY N/A 6/17/2021    Procedure: GASTROSCOPY, WITH BIOPSY;  Surgeon: Elfego Lopez M.D.;  Location: SURGERY SAME DAY [facility];  Service: Gastroenterology       Allergies:  Patient has no known allergies.    Social History:  Social History     Socioeconomic History    Marital status:      Spouse name: Not on file    Number of children: Not on file    Years of education: Not on file    Highest education level: Not on file   Occupational History    Not on file   Tobacco Use    Smoking status: Former     Types: Cigarettes    Smokeless tobacco: Former     Quit date: 10/10/2023    Tobacco comments:     1 pk per week   Vaping Use    Vaping status: Never Used   Substance and Sexual Activity    Alcohol use: Not Currently     Comment: occasionally    Drug use: Not Currently    Sexual activity: Not on file   Other Topics Concern    Not on file   Social History Narrative    Not on file     Social Determinants of Health     Financial Resource Strain: Not on file   Food Insecurity: Not on file   Transportation Needs: Not on file   Physical Activity: Not on file   Stress: Not on file   Social Connections: Not on file   Intimate Partner Violence: Not on file   Housing Stability: Not on file       Family History:  No family history on file.    Medications:    Current Outpatient Medications:     meclizine (ANTIVERT) 25 MG Tab, Take 1 Tablet by mouth 3 times a day as needed for Dizziness or Vertigo., Disp: 30 Tablet, Rfl: 0    hydroxychloroquine (PLAQUENIL) 200 MG Tab, Take 2 Tablets by mouth every day., Disp: 180 Tablet, Rfl: 3    Semaglutide, 1 MG/DOSE, (OZEMPIC, 1 MG/DOSE,) 4 MG/3ML Solution Pen-injector, Inject 1 mg under the  skin every 7 days., Disp: 9 mL, Rfl: 1    senna-docusate (PERICOLACE OR SENOKOT S) 8.6-50 MG Tab, Take 1 Tablet by mouth every day., Disp: 30 Tablet, Rfl: 0    warfarin (COUMADIN) 5 MG Tab, TAKE ONE TO TWO TABLETS DAILY AS DIRECTED BY [facility], Disp: 180 Tablet, Rfl: 1    warfarin (COUMADIN) 2.5 MG Tab, 1 tab by mouth daily or as directed by the [facility], Disp: 90 Tablet, Rfl: 1    Biotin w/ Vitamins C & E (HAIR/SKIN/NAILS PO), Take 1 Tablet by mouth every day., Disp: , Rfl:     Labs: Reviewed    Physical Examination:  Vital signs: /68   Pulse 81   Wt 82.1 kg (181 lb)   BMI 27.52 kg/m²  Body mass index is 27.52 kg/m².    Assessment and Plan:    1. Obesity/Weight Management  Patient is tolerating Ozempic just fine but denies it having any real impact on appetite or weight loss.  Patient is down about 3 lbs today but reports that she skipped dinner last night and complains that she still feels bloated and bigger and that this 3 lbs has been the only weight loss thus far.  Patient notes an appreciable difference between this medication and Trulicity (which she was previously on) and would like to try a different medication to see if this will help.   Discussed Mounjaro/Zepbound as an alternative, but unsure if the insurance will cover this medication vs increasing the dose of Ozempic up to 2mg q7d.   Patient would like to see if Mounjaro is covered and if not she will call the clinic and we can send in the increased dose of Ozempic.     - Medication changes:  IF Mounjaro is covered, begin Mounjaro 2.5mg Q7d     - Lifestyle changes:  Diet: Maximize lean proteins and veggies. Cut out/down on carbs. Avoid simple sugars. Continue to focus on optimized nutrition and caloric deficit,   Exercise: Encouraged consistent daily/weekly exercise totaling at least 150 min/week in active movement and increase as tolerated    Follow up in 4 weeks.     Andrew Shi  PharmD  06/18/24    CC:   [facility]

## 2024-06-21 ENCOUNTER — DOCUMENTATION (OUTPATIENT)
Dept: VASCULAR LAB | Facility: MEDICAL CENTER | Age: 49
End: 2024-06-21
Payer: COMMERCIAL

## 2024-06-21 NOTE — PROGRESS NOTES
[facility] Pharmacotherapy Clinic for [facility]      Patient was recently switched from Ozempic to Mounjaro at last Pharmacotherapy Clinic visit, but C states that she just filled rx for 90d supply of Ozempic and insurance will not cover a similar medication so soon.     Called and spoke to the patient and she is agreeable to stay on the Ozempic for now and will try to fill the Mounjaro at her pharmacy again in Aug.       Andrew Shi  PharmD

## 2024-06-25 ENCOUNTER — HOSPITAL ENCOUNTER (OUTPATIENT)
Dept: LAB | Facility: MEDICAL CENTER | Age: 49
End: 2024-06-25
Attending: FAMILY MEDICINE
Payer: COMMERCIAL

## 2024-06-25 LAB
25(OH)D3 SERPL-MCNC: 19 NG/ML (ref 30–100)
ALBUMIN SERPL BCP-MCNC: 3.8 G/DL (ref 3.2–4.9)
ALBUMIN/GLOB SERPL: 1.3 G/DL
ALP SERPL-CCNC: 44 U/L (ref 30–99)
ALT SERPL-CCNC: 16 U/L (ref 2–50)
ANION GAP SERPL CALC-SCNC: 9 MMOL/L (ref 7–16)
AST SERPL-CCNC: 18 U/L (ref 12–45)
BASOPHILS # BLD AUTO: 0.9 % (ref 0–1.8)
BASOPHILS # BLD: 0.05 K/UL (ref 0–0.12)
BILIRUB SERPL-MCNC: 0.4 MG/DL (ref 0.1–1.5)
BUN SERPL-MCNC: 9 MG/DL (ref 8–22)
CALCIUM ALBUM COR SERPL-MCNC: 9.5 MG/DL (ref 8.5–10.5)
CALCIUM SERPL-MCNC: 9.3 MG/DL (ref 8.5–10.5)
CHLORIDE SERPL-SCNC: 104 MMOL/L (ref 96–112)
CHOLEST SERPL-MCNC: 191 MG/DL (ref 100–199)
CO2 SERPL-SCNC: 23 MMOL/L (ref 20–33)
CREAT SERPL-MCNC: 0.83 MG/DL (ref 0.5–1.4)
EOSINOPHIL # BLD AUTO: 0.15 K/UL (ref 0–0.51)
EOSINOPHIL NFR BLD: 2.7 % (ref 0–6.9)
ERYTHROCYTE [DISTWIDTH] IN BLOOD BY AUTOMATED COUNT: 63.2 FL (ref 35.9–50)
EST. AVERAGE GLUCOSE BLD GHB EST-MCNC: 85 MG/DL
FERRITIN SERPL-MCNC: 18 NG/ML (ref 10–291)
FOLATE SERPL-MCNC: 12.6 NG/ML
GFR SERPLBLD CREATININE-BSD FMLA CKD-EPI: 87 ML/MIN/1.73 M 2
GLOBULIN SER CALC-MCNC: 2.9 G/DL (ref 1.9–3.5)
GLUCOSE SERPL-MCNC: 85 MG/DL (ref 65–99)
HBA1C MFR BLD: 4.6 % (ref 4–5.6)
HCT VFR BLD AUTO: 39.6 % (ref 37–47)
HDLC SERPL-MCNC: 44 MG/DL
HGB BLD-MCNC: 11.9 G/DL (ref 12–16)
IMM GRANULOCYTES # BLD AUTO: 0.02 K/UL (ref 0–0.11)
IMM GRANULOCYTES NFR BLD AUTO: 0.4 % (ref 0–0.9)
IRON SATN MFR SERPL: 10 % (ref 15–55)
IRON SERPL-MCNC: 26 UG/DL (ref 40–170)
LDLC SERPL CALC-MCNC: 120 MG/DL
LYMPHOCYTES # BLD AUTO: 1.78 K/UL (ref 1–4.8)
LYMPHOCYTES NFR BLD: 31.8 % (ref 22–41)
MCH RBC QN AUTO: 23.7 PG (ref 27–33)
MCHC RBC AUTO-ENTMCNC: 30.1 G/DL (ref 32.2–35.5)
MCV RBC AUTO: 78.7 FL (ref 81.4–97.8)
MONOCYTES # BLD AUTO: 0.37 K/UL (ref 0–0.85)
MONOCYTES NFR BLD AUTO: 6.6 % (ref 0–13.4)
NEUTROPHILS # BLD AUTO: 3.23 K/UL (ref 1.82–7.42)
NEUTROPHILS NFR BLD: 57.6 % (ref 44–72)
NRBC # BLD AUTO: 0 K/UL
NRBC BLD-RTO: 0 /100 WBC (ref 0–0.2)
PLATELET # BLD AUTO: 317 K/UL (ref 164–446)
PMV BLD AUTO: 9.3 FL (ref 9–12.9)
POTASSIUM SERPL-SCNC: 4.4 MMOL/L (ref 3.6–5.5)
PROT SERPL-MCNC: 6.7 G/DL (ref 6–8.2)
RBC # BLD AUTO: 5.03 M/UL (ref 4.2–5.4)
SODIUM SERPL-SCNC: 136 MMOL/L (ref 135–145)
TIBC SERPL-MCNC: 273 UG/DL (ref 250–450)
TRIGL SERPL-MCNC: 134 MG/DL (ref 0–149)
TSH SERPL DL<=0.005 MIU/L-ACNC: 3.52 UIU/ML (ref 0.38–5.33)
UIBC SERPL-MCNC: 247 UG/DL (ref 110–370)
VIT B12 SERPL-MCNC: 413 PG/ML (ref 211–911)
WBC # BLD AUTO: 5.6 K/UL (ref 4.8–10.8)

## 2024-06-25 PROCEDURE — 84443 ASSAY THYROID STIM HORMONE: CPT

## 2024-06-25 PROCEDURE — 82728 ASSAY OF FERRITIN: CPT

## 2024-06-25 PROCEDURE — 80053 COMPREHEN METABOLIC PANEL: CPT

## 2024-06-25 PROCEDURE — 83540 ASSAY OF IRON: CPT

## 2024-06-25 PROCEDURE — 83550 IRON BINDING TEST: CPT

## 2024-06-25 PROCEDURE — 36415 COLL VENOUS BLD VENIPUNCTURE: CPT

## 2024-06-25 PROCEDURE — 82746 ASSAY OF FOLIC ACID SERUM: CPT

## 2024-06-25 PROCEDURE — 83036 HEMOGLOBIN GLYCOSYLATED A1C: CPT

## 2024-06-25 PROCEDURE — 82607 VITAMIN B-12: CPT

## 2024-06-25 PROCEDURE — 82306 VITAMIN D 25 HYDROXY: CPT

## 2024-06-25 PROCEDURE — 80061 LIPID PANEL: CPT

## 2024-06-25 PROCEDURE — 85025 COMPLETE CBC W/AUTO DIFF WBC: CPT

## 2024-07-02 ENCOUNTER — APPOINTMENT (RX ONLY)
Dept: URBAN - METROPOLITAN AREA CLINIC 22 | Facility: CLINIC | Age: 49
Setting detail: DERMATOLOGY
End: 2024-07-02

## 2024-07-02 DIAGNOSIS — D17 BENIGN LIPOMATOUS NEOPLASM: ICD-10-CM | Status: WORSENING

## 2024-07-02 DIAGNOSIS — Z71.89 OTHER SPECIFIED COUNSELING: ICD-10-CM

## 2024-07-02 DIAGNOSIS — L91.8 OTHER HYPERTROPHIC DISORDERS OF THE SKIN: ICD-10-CM

## 2024-07-02 DIAGNOSIS — I82.210 THROMBOSIS OF SUPERIOR VENA CAVA (HCC): ICD-10-CM

## 2024-07-02 PROBLEM — D17.22 BENIGN LIPOMATOUS NEOPLASM OF SKIN AND SUBCUTANEOUS TISSUE OF LEFT ARM: Status: ACTIVE | Noted: 2024-07-02

## 2024-07-02 PROBLEM — D17.21 BENIGN LIPOMATOUS NEOPLASM OF SKIN AND SUBCUTANEOUS TISSUE OF RIGHT ARM: Status: ACTIVE | Noted: 2024-07-02

## 2024-07-02 PROCEDURE — 17110 DESTRUCTION B9 LES UP TO 14: CPT

## 2024-07-02 PROCEDURE — 99213 OFFICE O/P EST LOW 20 MIN: CPT | Mod: 25

## 2024-07-02 PROCEDURE — ? COUNSELING

## 2024-07-02 PROCEDURE — ? DEFER

## 2024-07-02 PROCEDURE — ? PHOTO-DOCUMENTATION

## 2024-07-02 PROCEDURE — ? LIQUID NITROGEN

## 2024-07-02 ASSESSMENT — LOCATION DETAILED DESCRIPTION DERM
LOCATION DETAILED: LEFT ANTERIOR PROXIMAL UPPER ARM
LOCATION DETAILED: RIGHT INFERIOR LATERAL NECK
LOCATION DETAILED: RIGHT VENTRAL DISTAL FOREARM
LOCATION DETAILED: LEFT AXILLARY VAULT
LOCATION DETAILED: RIGHT VENTRAL LATERAL DISTAL FOREARM
LOCATION DETAILED: LEFT RIB CAGE
LOCATION DETAILED: LEFT ANTERIOR DISTAL UPPER ARM
LOCATION DETAILED: RIGHT PROXIMAL DORSAL FOREARM

## 2024-07-02 ASSESSMENT — LOCATION SIMPLE DESCRIPTION DERM
LOCATION SIMPLE: ABDOMEN
LOCATION SIMPLE: LEFT AXILLARY VAULT
LOCATION SIMPLE: RIGHT ANTERIOR NECK
LOCATION SIMPLE: LEFT UPPER ARM
LOCATION SIMPLE: RIGHT FOREARM

## 2024-07-02 ASSESSMENT — LOCATION ZONE DERM
LOCATION ZONE: TRUNK
LOCATION ZONE: AXILLAE
LOCATION ZONE: ARM
LOCATION ZONE: NECK

## 2024-07-02 NOTE — PROCEDURE: LIQUID NITROGEN
Post-Care Instructions: I reviewed with the patient in detail post-care instructions. Patient is to wear sunprotection, and avoid picking at any of the treated lesions. Pt may apply Vaseline to crusted or scabbing areas.
Detail Level: Detailed
Show Spray Paint Technique Variable?: Yes
Duration Of Freeze Thaw-Cycle (Seconds): 3
Medical Necessity Information: It is in your best interest to select a reason for this procedure from the list below. All of these items fulfill various CMS LCD requirements except the new and changing color options.
Spray Paint Text: The liquid nitrogen was applied to the skin utilizing a spray paint frosting technique.
Add 52 Modifier (Optional): no
Consent: The patient's consent was obtained including but not limited to risks of crusting, scabbing, blistering, scarring, darker or lighter pigmentary change, recurrence, incomplete removal and infection.
Application Tool (Optional): Forceps
Number Of Freeze-Thaw Cycles: 3 freeze-thaw cycles
Medical Necessity Clause: This procedure was medically necessary because the lesions that were treated were:

## 2024-07-02 NOTE — PROCEDURE: DEFER
Instructions (Optional): Refer to Dr. Munoz for excision
Detail Level: Zone
X Size Of Lesion In Cm (Optional): 0
Introduction Text (Please End With A Colon): The following procedure was deferred:

## 2024-07-04 ENCOUNTER — HOSPITAL ENCOUNTER (OUTPATIENT)
Dept: RADIOLOGY | Facility: MEDICAL CENTER | Age: 49
End: 2024-07-04
Attending: ORTHOPAEDIC SURGERY
Payer: COMMERCIAL

## 2024-07-04 DIAGNOSIS — M25.512 ACUTE PAIN OF LEFT SHOULDER: ICD-10-CM

## 2024-07-04 DIAGNOSIS — M75.42 IMPINGEMENT SYNDROME OF LEFT SHOULDER: ICD-10-CM

## 2024-07-04 PROCEDURE — 73221 MRI JOINT UPR EXTREM W/O DYE: CPT

## 2024-07-17 ENCOUNTER — TELEPHONE (OUTPATIENT)
Dept: VASCULAR LAB | Facility: MEDICAL CENTER | Age: 49
End: 2024-07-17

## 2024-07-17 ENCOUNTER — NON-PROVIDER VISIT (OUTPATIENT)
Dept: VASCULAR LAB | Facility: MEDICAL CENTER | Age: 49
End: 2024-07-17
Attending: INTERNAL MEDICINE
Payer: COMMERCIAL

## 2024-07-17 VITALS
HEART RATE: 76 BPM | DIASTOLIC BLOOD PRESSURE: 74 MMHG | WEIGHT: 177.8 LBS | SYSTOLIC BLOOD PRESSURE: 105 MMHG | BODY MASS INDEX: 27.03 KG/M2

## 2024-07-17 DIAGNOSIS — E27.49 HEMORRHAGE OF BOTH ADRENAL GLANDS (HCC): ICD-10-CM

## 2024-07-17 DIAGNOSIS — D68.61 ANTIPHOSPHOLIPID SYNDROME (HCC): ICD-10-CM

## 2024-07-17 DIAGNOSIS — N93.9 VAGINAL BLEEDING: ICD-10-CM

## 2024-07-17 DIAGNOSIS — R73.03 PRE-DIABETES: ICD-10-CM

## 2024-07-17 DIAGNOSIS — I82.210 THROMBOSIS OF SUPERIOR VENA CAVA (HCC): ICD-10-CM

## 2024-07-17 DIAGNOSIS — E66.3 OVERWEIGHT (BMI 25.0-29.9): ICD-10-CM

## 2024-07-17 DIAGNOSIS — Z79.01 CHRONIC ANTICOAGULATION: ICD-10-CM

## 2024-07-17 LAB — INR PPP: 2 (ref 2–3.5)

## 2024-07-17 PROCEDURE — 85610 PROTHROMBIN TIME: CPT

## 2024-07-17 PROCEDURE — 99212 OFFICE O/P EST SF 10 MIN: CPT

## 2024-07-17 RX ORDER — TIRZEPATIDE 7.5 MG/.5ML
7.5 INJECTION, SOLUTION SUBCUTANEOUS
Qty: 2 ML | Refills: 3 | Status: SHIPPED | OUTPATIENT
Start: 2024-07-17 | End: 2024-07-24

## 2024-07-17 ASSESSMENT — FIBROSIS 4 INDEX: FIB4 SCORE: 0.68

## 2024-07-24 ENCOUNTER — TELEPHONE (OUTPATIENT)
Dept: MEDICAL GROUP | Facility: PHYSICIAN GROUP | Age: 49
End: 2024-07-24
Payer: COMMERCIAL

## 2024-07-24 DIAGNOSIS — E11.9 TYPE 2 DIABETES MELLITUS WITHOUT COMPLICATION, WITHOUT LONG-TERM CURRENT USE OF INSULIN (HCC): ICD-10-CM

## 2024-07-24 RX ORDER — SEMAGLUTIDE 2.68 MG/ML
2 INJECTION, SOLUTION SUBCUTANEOUS
Qty: 3 ML | Refills: 5 | Status: SHIPPED | OUTPATIENT
Start: 2024-07-24

## 2024-08-07 ENCOUNTER — DOCUMENTATION (OUTPATIENT)
Dept: VASCULAR LAB | Facility: MEDICAL CENTER | Age: 49
End: 2024-08-07
Payer: COMMERCIAL

## 2024-08-07 NOTE — PROGRESS NOTES
[facility]    Received anticoagulation clearance from [facility] regarding upcoming hysterectomy, salpingectomy, cystoscopy.    Procedure date TBD    Pt is on warfarin for APLS and hx of VTE .    Per current CHEST guidelines, pt is at high risk of VTE/stroke given hx of APLS.             IF PT ON WARFARIN  Given pt's hx, will hold warfarin x 5 days prior to procedure and will bridge w/ Lovenox.            Faxed clearance to [phone]; (will send to [place] to scan in media)  Request office to inform us of procedure date so we can coordinate bridging instructions w/ pt.    Rachelle Blair, EvanD

## 2024-08-14 ENCOUNTER — NON-PROVIDER VISIT (OUTPATIENT)
Dept: VASCULAR LAB | Facility: MEDICAL CENTER | Age: 49
End: 2024-08-14
Attending: INTERNAL MEDICINE
Payer: COMMERCIAL

## 2024-08-14 ENCOUNTER — HOSPITAL ENCOUNTER (OUTPATIENT)
Dept: LAB | Facility: MEDICAL CENTER | Age: 49
End: 2024-08-14
Attending: INTERNAL MEDICINE
Payer: COMMERCIAL

## 2024-08-14 VITALS — WEIGHT: 174.6 LBS | BODY MASS INDEX: 26.55 KG/M2

## 2024-08-14 DIAGNOSIS — E27.49 HEMORRHAGE OF BOTH ADRENAL GLANDS (HCC): ICD-10-CM

## 2024-08-14 DIAGNOSIS — E11.9 TYPE 2 DIABETES MELLITUS WITHOUT COMPLICATION, WITHOUT LONG-TERM CURRENT USE OF INSULIN (HCC): ICD-10-CM

## 2024-08-14 DIAGNOSIS — D68.61 ANTIPHOSPHOLIPID SYNDROME (HCC): ICD-10-CM

## 2024-08-14 DIAGNOSIS — I82.210 THROMBOSIS OF SUPERIOR VENA CAVA (HCC): ICD-10-CM

## 2024-08-14 DIAGNOSIS — Z79.01 CHRONIC ANTICOAGULATION: ICD-10-CM

## 2024-08-14 DIAGNOSIS — N93.9 VAGINAL BLEEDING: ICD-10-CM

## 2024-08-14 LAB
ALBUMIN SERPL BCP-MCNC: 4 G/DL (ref 3.2–4.9)
ALBUMIN/GLOB SERPL: 1.3 G/DL
ALP SERPL-CCNC: 46 U/L (ref 30–99)
ALT SERPL-CCNC: 17 U/L (ref 2–50)
ANION GAP SERPL CALC-SCNC: 10 MMOL/L (ref 7–16)
AST SERPL-CCNC: 18 U/L (ref 12–45)
BILIRUB SERPL-MCNC: 0.5 MG/DL (ref 0.1–1.5)
BUN SERPL-MCNC: 12 MG/DL (ref 8–22)
CALCIUM ALBUM COR SERPL-MCNC: 9.8 MG/DL (ref 8.5–10.5)
CALCIUM SERPL-MCNC: 9.8 MG/DL (ref 8.5–10.5)
CHLORIDE SERPL-SCNC: 103 MMOL/L (ref 96–112)
CO2 SERPL-SCNC: 24 MMOL/L (ref 20–33)
CREAT SERPL-MCNC: 0.77 MG/DL (ref 0.5–1.4)
FERRITIN SERPL-MCNC: 12.8 NG/ML (ref 10–291)
GFR SERPLBLD CREATININE-BSD FMLA CKD-EPI: 95 ML/MIN/1.73 M 2
GLOBULIN SER CALC-MCNC: 3.1 G/DL (ref 1.9–3.5)
GLUCOSE SERPL-MCNC: 83 MG/DL (ref 65–99)
INR PPP: 1.5 (ref 2–3.5)
IRON SATN MFR SERPL: 10 % (ref 15–55)
IRON SERPL-MCNC: 31 UG/DL (ref 40–170)
POTASSIUM SERPL-SCNC: 4.4 MMOL/L (ref 3.6–5.5)
PROT SERPL-MCNC: 7.1 G/DL (ref 6–8.2)
SODIUM SERPL-SCNC: 137 MMOL/L (ref 135–145)
TIBC SERPL-MCNC: 319 UG/DL (ref 250–450)
UIBC SERPL-MCNC: 288 UG/DL (ref 110–370)

## 2024-08-14 PROCEDURE — 83540 ASSAY OF IRON: CPT

## 2024-08-14 PROCEDURE — 36415 COLL VENOUS BLD VENIPUNCTURE: CPT

## 2024-08-14 PROCEDURE — 85610 PROTHROMBIN TIME: CPT

## 2024-08-14 PROCEDURE — 82728 ASSAY OF FERRITIN: CPT

## 2024-08-14 PROCEDURE — 80053 COMPREHEN METABOLIC PANEL: CPT

## 2024-08-14 PROCEDURE — 83550 IRON BINDING TEST: CPT

## 2024-08-14 PROCEDURE — 99212 OFFICE O/P EST SF 10 MIN: CPT

## 2024-08-14 RX ORDER — ENOXAPARIN SODIUM 100 MG/ML
80 INJECTION SUBCUTANEOUS EVERY 12 HOURS
Qty: 10 EACH | Refills: 1 | Status: SHIPPED | OUTPATIENT
Start: 2024-08-14 | End: 2024-08-16

## 2024-08-14 RX ORDER — WARFARIN SODIUM 2.5 MG/1
TABLET ORAL
Qty: 100 TABLET | Refills: 1 | Status: SHIPPED | OUTPATIENT
Start: 2024-08-14

## 2024-08-14 RX ORDER — SEMAGLUTIDE 2.68 MG/ML
2 INJECTION, SOLUTION SUBCUTANEOUS
Qty: 9 ML | Refills: 1 | Status: SHIPPED | OUTPATIENT
Start: 2024-08-14

## 2024-08-14 ASSESSMENT — FIBROSIS 4 INDEX: FIB4 SCORE: 0.68

## 2024-08-14 NOTE — PROGRESS NOTES
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)  Unable to tolerate Xarelto          Anticoagulation Care Providers       Provider Role Specialty Phone number    [facility] Responsible  [phone]                  Refer to Patient Findings for HPI:  Patient Findings       Positives:  Upcoming invasive procedure (Hysterectomy on 24), Missed doses, Change in diet/appetite (More fruit lately)    Negatives:  Signs/symptoms of thrombosis, Signs/symptoms of bleeding, Laboratory test error suspected, Change in health, Change in alcohol use, Change in activity, Emergency department visit, Upcoming dental procedure, Extra doses, Change in medications, Hospital admission, Bruising, Other complaints            Vitals:    24 0747   Weight: 79.2 kg (174 lb 9.6 oz)       Verified current warfarin dosing schedule.    Medications reconciled: Yes  Pt is not on antiplatelet therapy      A/P   INR is subtherapeutic    Warfarin dosing recommendation: Instructed pt to BOLUS x 1 dose w/ 12.5 mg today and then continue on w/ her current regimen.    Pt to bridge w/ Lovenox until INR > 2.    Pt educated to contact our clinic with any changes in medications or s/s of bleeding or thrombosis. Pt is aware to  seek immediate medical attention for falls, head injury or deep cuts.    Follow up appointment in 2 days.    Casey Daniels, EvanD, BCACP

## 2024-08-14 NOTE — PROGRESS NOTES
Patient Consult Note       Primary care physician: Patrizia James M.D.     Reason for consult: Obesity/Weight Management     HPI:  Jaclyn Olvera is a 49 y.o. old patient who comes in today for evaluation of above stated problem.     Initial Weight: 184 lbs  Initial BMI: 27.8 kg/m2     Current Weight: 174 lbs  Current BMI: 26.55 kg/m2     Most Recent HbA1c:         Lab Results   Component Value Date/Time     HBA1C 4.6 06/25/2024 06:41 AM            Lab Results   Component Value Date/Time     CREATININE 0.83 06/25/2024 06:41 AM         Most Recent TSH:     Latest Reference Range & Units 06/25/24 06:41   TSH 0.380 - 5.330 uIU/mL 3.520         Obesity Medication History and Current Regimen  GLP-1 Agent: Ozempic 2mg Q7d     Lifestyle  Current Exercise - walking 30-45 min 4-5 week (2.5 miles per walk), stretching for 30 minutes  Exercise Goal - increase as tolerable      Dietary:Eating less and cut back on sugar. Focusing on caloric restriction.  Breakfast - usually skips  Lunch - sandwich, salads,   Dinner - small piece of chicken  Snack - no snacks  Dessert - no sweet tooth   Beverage - coffee, water, no juice/soda     Past Medical History:       Patient Active Problem List     Diagnosis Date Noted    Long term current use of anticoagulant 06/12/2024    Vitamin D insufficiency 06/14/2023    Multiple skin nodules 04/05/2022    Chronic pain of both shoulders 04/05/2022    Long-term use of hydroxychloroquine 04/05/2022    Anemia 01/27/2022    Black stool 01/27/2022    Epigastric pain 01/27/2022    Antiphospholipid syndrome (HCC) 06/23/2021    Elevated liver function tests 06/22/2021    Elevated C-reactive protein (CRP) 06/21/2021    Sepsis (HCC) 06/21/2021    Microcytic anemia 06/19/2021    Thrombocytopenia (HCC) 06/19/2021    Hemorrhage of both adrenal glands (HCC) 06/18/2021    Retroperitoneal lymphadenopathy 06/18/2021    Vaginal bleeding 06/18/2021    Hyperbilirubinemia 06/17/2021    Fibroids 06/15/2021    Thrombosis  of superior vena cava (HCC) 06/14/2021    H. pylori infection 06/14/2021    Hyponatremia 06/14/2021    Kidney lesion, native, right 06/14/2021    Lesion of cervix 06/14/2021    Leukocytosis 06/14/2021         Past Surgical History:  Past Surgical History         Past Surgical History:   Procedure Laterality Date    HI UPPER GI ENDOSCOPY,DIAGNOSIS N/A 6/17/2021     Procedure: GASTROSCOPY;  Surgeon: Elfego Lopez M.D.;  Location: SURGERY SAME DAY [facility] [place];  Service: Gastroenterology    HI UPPER GI ENDOSCOPY,BIOPSY N/A 6/17/2021     Procedure: GASTROSCOPY, WITH BIOPSY;  Surgeon: Elfego Lopez M.D.;  Location: SURGERY SAME DAY [facility] [place];  Service: Gastroenterology            Allergies:  Patient has no known allergies.     Social History:  Social History               Socioeconomic History    Marital status:        Spouse name: Not on file    Number of children: Not on file    Years of education: Not on file    Highest education level: Not on file   Occupational History    Not on file   Tobacco Use    Smoking status: Former       Types: Cigarettes    Smokeless tobacco: Former       Quit date: 10/10/2023    Tobacco comments:       1 pk per week   Vaping Use    Vaping status: Never Used   Substance and Sexual Activity    Alcohol use: Not Currently       Comment: occasionally    Drug use: Not Currently    Sexual activity: Not on file   Other Topics Concern    Not on file   Social History Narrative    Not on file      Social Determinants of Health      Financial Resource Strain: Not on file   Food Insecurity: Not on file   Transportation Needs: Not on file   Physical Activity: Not on file   Stress: Not on file   Social Connections: Not on file   Intimate Partner Violence: Not on file   Housing Stability: Not on file            Family History:  Family History   No family history on file.        Medications:    Current Medications      Current Outpatient Medications:     Tirzepatide (MOUNJARO) 7.5 MG/0.5ML  Solution Pen-injector, Inject 7.5 mg under the skin every 7 days., Disp: 2 mL, Rfl: 3    hydroxychloroquine (PLAQUENIL) 200 MG Tab, Take 2 Tablets by mouth every day., Disp: 180 Tablet, Rfl: 3    senna-docusate (PERICOLACE OR SENOKOT S) 8.6-50 MG Tab, Take 1 Tablet by mouth every day., Disp: 30 Tablet, Rfl: 0    warfarin (COUMADIN) 5 MG Tab, TAKE ONE TO TWO TABLETS DAILY AS DIRECTED BY [facility], Disp: 180 Tablet, Rfl: 1    warfarin (COUMADIN) 2.5 MG Tab, 1 tab by mouth daily or as directed by the [facility], Disp: 90 Tablet, Rfl: 1    meclizine (ANTIVERT) 25 MG Tab, Take 1 Tablet by mouth 3 times a day as needed for Dizziness or Vertigo., Disp: 30 Tablet, Rfl: 0    Biotin w/ Vitamins C & E (HAIR/SKIN/NAILS PO), Take 1 Tablet by mouth every day., Disp: , Rfl:         Labs: Reviewed     Physical Examination:  Vital signs: /74   Pulse 76   Wt 80.6 kg (177 lb 12.8 oz)   BMI 27.03 kg/m²  Body mass index is 27.03 kg/m².     Assessment and Plan:     1. Obesity/Weight Management  Since last visit, pt was able to obtain and start Ozempic 2 mg w/ no issues. Noted appetite suppression.  Pt increasing her exercise and moderating her portion sizes.  Noted ~ 10 lb weight loss since establishing w/ clinic.  Pt would like to continue current GLP1 dose for now.      - Medication changes:  Continue Ozempic 2 mg subQ once weekly     - Lifestyle changes:  Diet: Maximize lean proteins and veggies. Cut out/down on carbs. Avoid simple sugars. Continue to focus on optimized nutrition and caloric deficit,   Exercise: Encouraged consistent daily/weekly exercise totaling at least 150 min/week in active movement and increase as tolerated     Follow up in 3 month for weight management

## 2024-08-15 ENCOUNTER — HOSPITAL ENCOUNTER (OUTPATIENT)
Dept: LAB | Facility: MEDICAL CENTER | Age: 49
End: 2024-08-15
Attending: INTERNAL MEDICINE
Payer: COMMERCIAL

## 2024-08-15 ENCOUNTER — APPOINTMENT (OUTPATIENT)
Dept: ADMISSIONS | Facility: MEDICAL CENTER | Age: 49
End: 2024-08-15
Attending: OBSTETRICS & GYNECOLOGY
Payer: COMMERCIAL

## 2024-08-15 LAB
ANISOCYTOSIS BLD QL SMEAR: ABNORMAL
BASOPHILS # BLD AUTO: 1.1 % (ref 0–1.8)
BASOPHILS # BLD: 0.06 K/UL (ref 0–0.12)
BURR CELLS BLD QL SMEAR: NORMAL
COMMENT 1642: NORMAL
EOSINOPHIL # BLD AUTO: 0.07 K/UL (ref 0–0.51)
EOSINOPHIL NFR BLD: 1.3 % (ref 0–6.9)
ERYTHROCYTE [DISTWIDTH] IN BLOOD BY AUTOMATED COUNT: 44.2 FL (ref 35.9–50)
HCT VFR BLD AUTO: 39.1 % (ref 37–47)
HGB BLD-MCNC: 11.6 G/DL (ref 12–16)
IMM GRANULOCYTES # BLD AUTO: 0.01 K/UL (ref 0–0.11)
IMM GRANULOCYTES NFR BLD AUTO: 0.2 % (ref 0–0.9)
LYMPHOCYTES # BLD AUTO: 1.59 K/UL (ref 1–4.8)
LYMPHOCYTES NFR BLD: 29.4 % (ref 22–41)
MCH RBC QN AUTO: 23 PG (ref 27–33)
MCHC RBC AUTO-ENTMCNC: 29.7 G/DL (ref 32.2–35.5)
MCV RBC AUTO: 77.4 FL (ref 81.4–97.8)
MICROCYTES BLD QL SMEAR: ABNORMAL
MONOCYTES # BLD AUTO: 0.49 K/UL (ref 0–0.85)
MONOCYTES NFR BLD AUTO: 9.1 % (ref 0–13.4)
MORPHOLOGY BLD-IMP: NORMAL
NEUTROPHILS # BLD AUTO: 3.18 K/UL (ref 1.82–7.42)
NEUTROPHILS NFR BLD: 58.9 % (ref 44–72)
NRBC # BLD AUTO: 0 K/UL
NRBC BLD-RTO: 0 /100 WBC (ref 0–0.2)
OVALOCYTES BLD QL SMEAR: NORMAL
PLATELET # BLD AUTO: 307 K/UL (ref 164–446)
PLATELET BLD QL SMEAR: NORMAL
PMV BLD AUTO: 9.4 FL (ref 9–12.9)
POIKILOCYTOSIS BLD QL SMEAR: NORMAL
RBC # BLD AUTO: 5.05 M/UL (ref 4.2–5.4)
RBC BLD AUTO: PRESENT
WBC # BLD AUTO: 5.4 K/UL (ref 4.8–10.8)

## 2024-08-15 PROCEDURE — 85025 COMPLETE CBC W/AUTO DIFF WBC: CPT

## 2024-08-16 ENCOUNTER — ANTICOAGULATION VISIT (OUTPATIENT)
Dept: VASCULAR LAB | Facility: MEDICAL CENTER | Age: 49
End: 2024-08-16
Attending: INTERNAL MEDICINE
Payer: COMMERCIAL

## 2024-08-16 DIAGNOSIS — D68.61 ANTIPHOSPHOLIPID SYNDROME (HCC): ICD-10-CM

## 2024-08-16 DIAGNOSIS — I82.210 THROMBOSIS OF SUPERIOR VENA CAVA (HCC): ICD-10-CM

## 2024-08-16 DIAGNOSIS — E27.49 HEMORRHAGE OF BOTH ADRENAL GLANDS (HCC): ICD-10-CM

## 2024-08-16 DIAGNOSIS — N93.9 VAGINAL BLEEDING: ICD-10-CM

## 2024-08-16 LAB — INR PPP: 2.2 (ref 2–3.5)

## 2024-08-16 PROCEDURE — 85610 PROTHROMBIN TIME: CPT

## 2024-08-16 PROCEDURE — 99212 OFFICE O/P EST SF 10 MIN: CPT

## 2024-08-16 NOTE — PROGRESS NOTES
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)  Unable to tolerate Xarelto          Anticoagulation Care Providers       Provider Role Specialty Phone number    [facility] Responsible  [phone]                  Refer to Patient Findings for HPI:  Patient Findings       Positives:  Upcoming invasive procedure (Hysterectomy on 24), Change in medications (Ketorolac injection yesterday)    Negatives:  Signs/symptoms of thrombosis, Signs/symptoms of bleeding, Laboratory test error suspected, Change in health, Change in alcohol use, Change in activity, Emergency department visit, Upcoming dental procedure, Missed doses, Extra doses, Change in diet/appetite, Hospital admission, Bruising, Other complaints            There were no vitals filed for this visit.    Verified current warfarin dosing schedule.    Medications reconciled: Yes  Pt is not on antiplatelet therapy      A/P   INR is therapeutic    Warfarin dosing recommendation: Instructed pt to begin newly increased regimen of 10 mg M/W/F and 7.5 mg ROW.    DC Lovenox.    Pt educated to contact our clinic with any changes in medications or s/s of bleeding or thrombosis. Pt is aware to seek immediate medical attention for falls, head  injury or deep cuts.    Follow up appointment in 2 week(s).    Casey Daniels, PharmD, BCACP

## 2024-08-20 ENCOUNTER — TELEPHONE (OUTPATIENT)
Dept: RHEUMATOLOGY | Facility: MEDICAL CENTER | Age: 49
End: 2024-08-20

## 2024-08-20 ENCOUNTER — PRE-ADMISSION TESTING (OUTPATIENT)
Dept: ADMISSIONS | Facility: MEDICAL CENTER | Age: 49
End: 2024-08-20
Attending: OBSTETRICS & GYNECOLOGY
Payer: COMMERCIAL

## 2024-08-20 NOTE — TELEPHONE ENCOUNTER
Patient to have surgery on 9/11/24 and was advised to contact Rheumatology office regarding instructions for her Hydroxychloroquine.       Please advise

## 2024-08-30 ENCOUNTER — ANTICOAGULATION VISIT (OUTPATIENT)
Dept: VASCULAR LAB | Facility: MEDICAL CENTER | Age: 49
End: 2024-08-30
Attending: INTERNAL MEDICINE
Payer: COMMERCIAL

## 2024-08-30 DIAGNOSIS — D68.61 ANTIPHOSPHOLIPID SYNDROME (HCC): ICD-10-CM

## 2024-08-30 DIAGNOSIS — N93.9 VAGINAL BLEEDING: ICD-10-CM

## 2024-08-30 DIAGNOSIS — I82.210 THROMBOSIS OF SUPERIOR VENA CAVA (HCC): ICD-10-CM

## 2024-08-30 DIAGNOSIS — E27.49 HEMORRHAGE OF BOTH ADRENAL GLANDS (HCC): ICD-10-CM

## 2024-08-30 LAB — INR PPP: 3.9 (ref 2–3.5)

## 2024-08-30 PROCEDURE — 99212 OFFICE O/P EST SF 10 MIN: CPT

## 2024-08-30 PROCEDURE — 85610 PROTHROMBIN TIME: CPT

## 2024-08-30 RX ORDER — ENOXAPARIN SODIUM 150 MG/ML
1 INJECTION SUBCUTANEOUS DAILY
Qty: 10 EACH | Refills: 1 | Status: SHIPPED | OUTPATIENT
Start: 2024-08-30

## 2024-08-30 NOTE — PROGRESS NOTES
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)  Unable to tolerate Xarelto          Anticoagulation Care Providers       Provider Role Specialty Phone number    [facility] Responsible  [phone]               Refer to Patient Findings for HPI:  Patient Findings       Negatives:  Signs/symptoms of thrombosis, Signs/symptoms of bleeding, Laboratory test error suspected, Change in health, Change in alcohol use, Change in activity, Upcoming invasive procedure, Emergency department visit, Upcoming dental procedure, Missed doses, Extra doses, Change in medications, Change in diet/appetite, Hospital admission, Bruising, Other complaints            There were no vitals filed for this visit.  Pt declined vitals    Verified current warfarin dosing schedule.    Medications reconciled.  Pt is not on antiplatelet therapy.      A/P   INR is supratherapeutic at 3.9  Reason(s) for out of range INR today: Determining dose requirements      Warfarin dosing recommendation: Decrease to 10 mg Mon/Fri 7.5 mg all other days    Pt to have procedure on 09/11. Due to pt history lovenox bridging is indicated.   Pt to follow instructions as below, after procedure to ask operating MD when safe to resume  anticoagulation, if no instructions given, pt will follow as below.     CHADSvasc N/A  Clot history Thrombosis of superior vena cava, APS    Current weight: 175 lbs (79.5kg) 1.5 mg/kg= 119 mg (120 mg Lovenox once daily). Patient preference for once daily Lovenox    CrCl = 112 mL/min Creatinine clearance, original Cockcroft-Gault  Lab Results   Component Value Date/Time    BUN 12 08/14/2024 08:45 AM    CREATININE 0.77 08/14/2024 08:45 AM          Used Lovenox before Yes     Date  Warfarin dosing PM Lovenox   5 Days before procedure 09/06 0mg NONE   4 Days before procedure 09/07 0mg Lovenox injection   3 Days before procedure 09/08 0mg Lovenox injection   2 Days before procedure 09/09 0mg Lovenox injection   1 Days before procedure 09/10 0mg NONE   PROCEDURE 09/11 10 MG NONE   1 Day after procedure 09/12 10 MG Restart Lovenox injections   2 Days after procedure 09/13 10 MG Lovenox injection   3 Days after procedure 09/14 7.5 MG Lovenox injection   4 Days after procedure 09/15 7.5 MG Lovenox injection   5 Days after procedure 09/16  GET INR checked     Edilberto Enciso PharmD     Pt educated to contact our clinic with any changes in medications or s/s of bleeding or thrombosis. Pt is aware to seek immediate medical attention for falls, head injury or deep cuts.    Request pt to return in 2 week(s). Pt agrees.

## 2024-08-30 NOTE — PATIENT INSTRUCTIONS
Date  Warfarin dosing PM Lovenox   5 Days before procedure 09/06 0mg NONE   4 Days before procedure 09/07 0mg Lovenox injection   3 Days before procedure 09/08 0mg Lovenox injection   2 Days before procedure 09/09 0mg Lovenox injection   1 Days before procedure 09/10 0mg NONE   PROCEDURE 09/11 10 MG NONE   1 Day after procedure 09/12 10 MG Restart Lovenox injections   2 Days after procedure 09/13 10 MG Lovenox injection   3 Days after procedure 09/14 7.5 MG Lovenox injection   4 Days after procedure 09/15 7.5 MG Lovenox injection   5 Days after procedure 09/16  GET INR checked

## 2024-09-10 ENCOUNTER — HOSPITAL ENCOUNTER (OUTPATIENT)
Dept: RADIOLOGY | Facility: MEDICAL CENTER | Age: 49
End: 2024-09-10
Attending: OBSTETRICS & GYNECOLOGY | Admitting: OBSTETRICS & GYNECOLOGY
Payer: COMMERCIAL

## 2024-09-10 ENCOUNTER — PRE-ADMISSION TESTING (OUTPATIENT)
Dept: ADMISSIONS | Facility: MEDICAL CENTER | Age: 49
End: 2024-09-10
Attending: OBSTETRICS & GYNECOLOGY
Payer: COMMERCIAL

## 2024-09-10 DIAGNOSIS — Z01.812 PRE-OPERATIVE LABORATORY EXAMINATION: ICD-10-CM

## 2024-09-10 DIAGNOSIS — Z01.810 PRE-OPERATIVE CARDIOVASCULAR EXAMINATION: ICD-10-CM

## 2024-09-10 DIAGNOSIS — Z01.811 PRE-OPERATIVE RESPIRATORY EXAMINATION: ICD-10-CM

## 2024-09-10 LAB
APPEARANCE UR: CLEAR
B-HCG SERPL-ACNC: <1 MIU/ML (ref 0–5)
BACTERIA #/AREA URNS HPF: NEGATIVE /HPF
BILIRUB UR QL STRIP.AUTO: NEGATIVE
COLOR UR: YELLOW
EKG IMPRESSION: NORMAL
EPI CELLS #/AREA URNS HPF: NEGATIVE /HPF
GLUCOSE UR STRIP.AUTO-MCNC: NEGATIVE MG/DL
HYALINE CASTS #/AREA URNS LPF: ABNORMAL /LPF
KETONES UR STRIP.AUTO-MCNC: NEGATIVE MG/DL
LEUKOCYTE ESTERASE UR QL STRIP.AUTO: NEGATIVE
MICRO URNS: ABNORMAL
NITRITE UR QL STRIP.AUTO: NEGATIVE
PH UR STRIP.AUTO: 6 [PH] (ref 5–8)
PROT UR QL STRIP: NEGATIVE MG/DL
RBC # URNS HPF: ABNORMAL /HPF
RBC UR QL AUTO: ABNORMAL
SP GR UR STRIP.AUTO: 1.01
UROBILINOGEN UR STRIP.AUTO-MCNC: 0.2 MG/DL
WBC #/AREA URNS HPF: ABNORMAL /HPF

## 2024-09-10 PROCEDURE — 71045 X-RAY EXAM CHEST 1 VIEW: CPT

## 2024-09-10 PROCEDURE — 93005 ELECTROCARDIOGRAM TRACING: CPT

## 2024-09-10 PROCEDURE — 84702 CHORIONIC GONADOTROPIN TEST: CPT

## 2024-09-10 PROCEDURE — 93010 ELECTROCARDIOGRAM REPORT: CPT | Performed by: INTERNAL MEDICINE

## 2024-09-10 PROCEDURE — 81001 URINALYSIS AUTO W/SCOPE: CPT

## 2024-09-10 PROCEDURE — 36415 COLL VENOUS BLD VENIPUNCTURE: CPT

## 2024-09-11 ENCOUNTER — HOSPITAL ENCOUNTER (OUTPATIENT)
Facility: MEDICAL CENTER | Age: 49
End: 2024-09-11
Attending: OBSTETRICS & GYNECOLOGY | Admitting: OBSTETRICS & GYNECOLOGY
Payer: COMMERCIAL

## 2024-09-11 ENCOUNTER — ANESTHESIA (OUTPATIENT)
Dept: SURGERY | Facility: MEDICAL CENTER | Age: 49
End: 2024-09-11
Payer: COMMERCIAL

## 2024-09-11 ENCOUNTER — ANESTHESIA EVENT (OUTPATIENT)
Dept: SURGERY | Facility: MEDICAL CENTER | Age: 49
End: 2024-09-11
Payer: COMMERCIAL

## 2024-09-11 VITALS
SYSTOLIC BLOOD PRESSURE: 110 MMHG | TEMPERATURE: 96.9 F | BODY MASS INDEX: 25.59 KG/M2 | DIASTOLIC BLOOD PRESSURE: 64 MMHG | HEART RATE: 87 BPM | HEIGHT: 68 IN | RESPIRATION RATE: 16 BRPM | OXYGEN SATURATION: 95 % | WEIGHT: 168.87 LBS

## 2024-09-11 LAB
INR PPP: 1.04 (ref 0.87–1.13)
PATHOLOGY CONSULT NOTE: NORMAL
PROTHROMBIN TIME: 13.7 SEC (ref 12–14.6)

## 2024-09-11 PROCEDURE — 160002 HCHG RECOVERY MINUTES (STAT): Performed by: OBSTETRICS & GYNECOLOGY

## 2024-09-11 PROCEDURE — 700101 HCHG RX REV CODE 250: Performed by: ANESTHESIOLOGY

## 2024-09-11 PROCEDURE — 160048 HCHG OR STATISTICAL LEVEL 1-5: Performed by: OBSTETRICS & GYNECOLOGY

## 2024-09-11 PROCEDURE — A9270 NON-COVERED ITEM OR SERVICE: HCPCS | Performed by: ANESTHESIOLOGY

## 2024-09-11 PROCEDURE — 700105 HCHG RX REV CODE 258: Performed by: OBSTETRICS & GYNECOLOGY

## 2024-09-11 PROCEDURE — 85610 PROTHROMBIN TIME: CPT

## 2024-09-11 PROCEDURE — 36415 COLL VENOUS BLD VENIPUNCTURE: CPT

## 2024-09-11 PROCEDURE — 700102 HCHG RX REV CODE 250 W/ 637 OVERRIDE(OP): Performed by: ANESTHESIOLOGY

## 2024-09-11 PROCEDURE — 160031 HCHG SURGERY MINUTES - 1ST 30 MINS LEVEL 5: Performed by: OBSTETRICS & GYNECOLOGY

## 2024-09-11 PROCEDURE — 160046 HCHG PACU - 1ST 60 MINS PHASE II: Performed by: OBSTETRICS & GYNECOLOGY

## 2024-09-11 PROCEDURE — 160036 HCHG PACU - EA ADDL 30 MINS PHASE I: Performed by: OBSTETRICS & GYNECOLOGY

## 2024-09-11 PROCEDURE — 700111 HCHG RX REV CODE 636 W/ 250 OVERRIDE (IP): Mod: JZ | Performed by: OBSTETRICS & GYNECOLOGY

## 2024-09-11 PROCEDURE — 88307 TISSUE EXAM BY PATHOLOGIST: CPT

## 2024-09-11 PROCEDURE — 700101 HCHG RX REV CODE 250: Performed by: OBSTETRICS & GYNECOLOGY

## 2024-09-11 PROCEDURE — 700111 HCHG RX REV CODE 636 W/ 250 OVERRIDE (IP): Performed by: ANESTHESIOLOGY

## 2024-09-11 PROCEDURE — 502714 HCHG ROBOTIC SURGERY SERVICES: Performed by: OBSTETRICS & GYNECOLOGY

## 2024-09-11 PROCEDURE — 160035 HCHG PACU - 1ST 60 MINS PHASE I: Performed by: OBSTETRICS & GYNECOLOGY

## 2024-09-11 PROCEDURE — 160009 HCHG ANES TIME/MIN: Performed by: OBSTETRICS & GYNECOLOGY

## 2024-09-11 PROCEDURE — 160042 HCHG SURGERY MINUTES - EA ADDL 1 MIN LEVEL 5: Performed by: OBSTETRICS & GYNECOLOGY

## 2024-09-11 PROCEDURE — 700105 HCHG RX REV CODE 258: Performed by: ANESTHESIOLOGY

## 2024-09-11 PROCEDURE — 160025 RECOVERY II MINUTES (STATS): Performed by: OBSTETRICS & GYNECOLOGY

## 2024-09-11 RX ORDER — ONDANSETRON 2 MG/ML
INJECTION INTRAMUSCULAR; INTRAVENOUS PRN
Status: DISCONTINUED | OUTPATIENT
Start: 2024-09-11 | End: 2024-09-11 | Stop reason: SURG

## 2024-09-11 RX ORDER — LIDOCAINE HYDROCHLORIDE 20 MG/ML
INJECTION, SOLUTION EPIDURAL; INFILTRATION; INTRACAUDAL; PERINEURAL PRN
Status: DISCONTINUED | OUTPATIENT
Start: 2024-09-11 | End: 2024-09-11 | Stop reason: SURG

## 2024-09-11 RX ORDER — ROCURONIUM BROMIDE 10 MG/ML
INJECTION, SOLUTION INTRAVENOUS PRN
Status: DISCONTINUED | OUTPATIENT
Start: 2024-09-11 | End: 2024-09-11 | Stop reason: HOSPADM

## 2024-09-11 RX ORDER — HALOPERIDOL 5 MG/ML
1 INJECTION INTRAMUSCULAR
Status: DISCONTINUED | OUTPATIENT
Start: 2024-09-11 | End: 2024-09-11 | Stop reason: HOSPADM

## 2024-09-11 RX ORDER — OXYCODONE HCL 5 MG/5 ML
5 SOLUTION, ORAL ORAL
Status: COMPLETED | OUTPATIENT
Start: 2024-09-11 | End: 2024-09-11

## 2024-09-11 RX ORDER — HYDROMORPHONE HYDROCHLORIDE 1 MG/ML
0.4 INJECTION, SOLUTION INTRAMUSCULAR; INTRAVENOUS; SUBCUTANEOUS
Status: DISCONTINUED | OUTPATIENT
Start: 2024-09-11 | End: 2024-09-11 | Stop reason: HOSPADM

## 2024-09-11 RX ORDER — KETOROLAC TROMETHAMINE 15 MG/ML
15 INJECTION, SOLUTION INTRAMUSCULAR; INTRAVENOUS
Status: DISCONTINUED | OUTPATIENT
Start: 2024-09-11 | End: 2024-09-11 | Stop reason: HOSPADM

## 2024-09-11 RX ORDER — OXYCODONE HCL 5 MG/5 ML
10 SOLUTION, ORAL ORAL
Status: COMPLETED | OUTPATIENT
Start: 2024-09-11 | End: 2024-09-11

## 2024-09-11 RX ORDER — BUPIVACAINE HYDROCHLORIDE AND EPINEPHRINE 2.5; 5 MG/ML; UG/ML
INJECTION, SOLUTION EPIDURAL; INFILTRATION; INTRACAUDAL; PERINEURAL
Status: DISCONTINUED | OUTPATIENT
Start: 2024-09-11 | End: 2024-09-11 | Stop reason: HOSPADM

## 2024-09-11 RX ORDER — CEFOTETAN DISODIUM 2 G/20ML
INJECTION, POWDER, FOR SOLUTION INTRAMUSCULAR; INTRAVENOUS PRN
Status: DISCONTINUED | OUTPATIENT
Start: 2024-09-11 | End: 2024-09-11 | Stop reason: SURG

## 2024-09-11 RX ORDER — SODIUM CHLORIDE, SODIUM LACTATE, POTASSIUM CHLORIDE, CALCIUM CHLORIDE 600; 310; 30; 20 MG/100ML; MG/100ML; MG/100ML; MG/100ML
INJECTION, SOLUTION INTRAVENOUS CONTINUOUS
Status: ACTIVE | OUTPATIENT
Start: 2024-09-11 | End: 2024-09-11

## 2024-09-11 RX ORDER — ONDANSETRON 2 MG/ML
4 INJECTION INTRAMUSCULAR; INTRAVENOUS
Status: DISCONTINUED | OUTPATIENT
Start: 2024-09-11 | End: 2024-09-11 | Stop reason: HOSPADM

## 2024-09-11 RX ORDER — DEXAMETHASONE SODIUM PHOSPHATE 4 MG/ML
INJECTION, SOLUTION INTRA-ARTICULAR; INTRALESIONAL; INTRAMUSCULAR; INTRAVENOUS; SOFT TISSUE PRN
Status: DISCONTINUED | OUTPATIENT
Start: 2024-09-11 | End: 2024-09-11 | Stop reason: HOSPADM

## 2024-09-11 RX ORDER — MIDAZOLAM HYDROCHLORIDE 1 MG/ML
INJECTION INTRAMUSCULAR; INTRAVENOUS PRN
Status: DISCONTINUED | OUTPATIENT
Start: 2024-09-11 | End: 2024-09-11 | Stop reason: SURG

## 2024-09-11 RX ORDER — KETOROLAC TROMETHAMINE 15 MG/ML
INJECTION, SOLUTION INTRAMUSCULAR; INTRAVENOUS
Status: DISCONTINUED
Start: 2024-09-11 | End: 2024-09-11 | Stop reason: HOSPADM

## 2024-09-11 RX ORDER — HYDROMORPHONE HYDROCHLORIDE 1 MG/ML
0.2 INJECTION, SOLUTION INTRAMUSCULAR; INTRAVENOUS; SUBCUTANEOUS
Status: DISCONTINUED | OUTPATIENT
Start: 2024-09-11 | End: 2024-09-11 | Stop reason: HOSPADM

## 2024-09-11 RX ORDER — DIPHENHYDRAMINE HYDROCHLORIDE 50 MG/ML
12.5 INJECTION INTRAMUSCULAR; INTRAVENOUS
Status: DISCONTINUED | OUTPATIENT
Start: 2024-09-11 | End: 2024-09-11 | Stop reason: HOSPADM

## 2024-09-11 RX ORDER — HYDROMORPHONE HYDROCHLORIDE 1 MG/ML
0.1 INJECTION, SOLUTION INTRAMUSCULAR; INTRAVENOUS; SUBCUTANEOUS
Status: DISCONTINUED | OUTPATIENT
Start: 2024-09-11 | End: 2024-09-11 | Stop reason: HOSPADM

## 2024-09-11 RX ORDER — MANNITOL 250 MG/ML
INJECTION, SOLUTION INTRAVENOUS
Status: DISCONTINUED | OUTPATIENT
Start: 2024-09-11 | End: 2024-09-11 | Stop reason: HOSPADM

## 2024-09-11 RX ADMIN — SODIUM CHLORIDE, POTASSIUM CHLORIDE, SODIUM LACTATE AND CALCIUM CHLORIDE: 600; 310; 30; 20 INJECTION, SOLUTION INTRAVENOUS at 08:59

## 2024-09-11 RX ADMIN — LIDOCAINE HYDROCHLORIDE 100 MG: 20 INJECTION, SOLUTION EPIDURAL; INFILTRATION; INTRACAUDAL at 07:46

## 2024-09-11 RX ADMIN — FENTANYL CITRATE 50 MCG: 50 INJECTION, SOLUTION INTRAMUSCULAR; INTRAVENOUS at 08:58

## 2024-09-11 RX ADMIN — PROPOFOL 150 MG: 10 INJECTION, EMULSION INTRAVENOUS at 07:46

## 2024-09-11 RX ADMIN — SODIUM CHLORIDE, POTASSIUM CHLORIDE, SODIUM LACTATE AND CALCIUM CHLORIDE: 600; 310; 30; 20 INJECTION, SOLUTION INTRAVENOUS at 06:29

## 2024-09-11 RX ADMIN — HYDROMORPHONE HYDROCHLORIDE 0.4 MG: 1 INJECTION, SOLUTION INTRAMUSCULAR; INTRAVENOUS; SUBCUTANEOUS at 09:56

## 2024-09-11 RX ADMIN — SUGAMMADEX 200 MG: 100 INJECTION, SOLUTION INTRAVENOUS at 09:02

## 2024-09-11 RX ADMIN — FENTANYL CITRATE 50 MCG: 50 INJECTION, SOLUTION INTRAMUSCULAR; INTRAVENOUS at 09:39

## 2024-09-11 RX ADMIN — HYDROMORPHONE HYDROCHLORIDE 0.2 MG: 1 INJECTION, SOLUTION INTRAMUSCULAR; INTRAVENOUS; SUBCUTANEOUS at 10:35

## 2024-09-11 RX ADMIN — ROCURONIUM BROMIDE 50 MG: 50 INJECTION, SOLUTION INTRAVENOUS at 07:46

## 2024-09-11 RX ADMIN — FENTANYL CITRATE 100 MCG: 50 INJECTION, SOLUTION INTRAMUSCULAR; INTRAVENOUS at 07:46

## 2024-09-11 RX ADMIN — HYDROMORPHONE HYDROCHLORIDE 0.4 MG: 1 INJECTION, SOLUTION INTRAMUSCULAR; INTRAVENOUS; SUBCUTANEOUS at 09:51

## 2024-09-11 RX ADMIN — FENTANYL CITRATE 50 MCG: 50 INJECTION, SOLUTION INTRAMUSCULAR; INTRAVENOUS at 09:22

## 2024-09-11 RX ADMIN — HYDROMORPHONE HYDROCHLORIDE 0.4 MG: 1 INJECTION, SOLUTION INTRAMUSCULAR; INTRAVENOUS; SUBCUTANEOUS at 10:22

## 2024-09-11 RX ADMIN — MIDAZOLAM HYDROCHLORIDE 2 MG: 2 INJECTION, SOLUTION INTRAMUSCULAR; INTRAVENOUS at 07:46

## 2024-09-11 RX ADMIN — DEXAMETHASONE SODIUM PHOSPHATE 4 MG: 4 INJECTION INTRA-ARTICULAR; INTRALESIONAL; INTRAMUSCULAR; INTRAVENOUS; SOFT TISSUE at 07:46

## 2024-09-11 RX ADMIN — OXYCODONE HYDROCHLORIDE 10 MG: 5 SOLUTION ORAL at 09:22

## 2024-09-11 RX ADMIN — HYDROMORPHONE HYDROCHLORIDE 0.4 MG: 1 INJECTION, SOLUTION INTRAMUSCULAR; INTRAVENOUS; SUBCUTANEOUS at 10:29

## 2024-09-11 RX ADMIN — ONDANSETRON 4 MG: 2 INJECTION INTRAMUSCULAR; INTRAVENOUS at 07:46

## 2024-09-11 RX ADMIN — HYDROMORPHONE HYDROCHLORIDE 0.2 MG: 1 INJECTION, SOLUTION INTRAMUSCULAR; INTRAVENOUS; SUBCUTANEOUS at 10:03

## 2024-09-11 RX ADMIN — METHOCARBAMOL 1000 MG: 100 INJECTION, SOLUTION INTRAMUSCULAR; INTRAVENOUS at 11:01

## 2024-09-11 RX ADMIN — CEFOTETAN DISODIUM 2 G: 2 INJECTION, POWDER, FOR SOLUTION INTRAMUSCULAR; INTRAVENOUS at 07:41

## 2024-09-11 ASSESSMENT — PAIN DESCRIPTION - PAIN TYPE
TYPE: ACUTE PAIN
TYPE: SURGICAL PAIN
TYPE: ACUTE PAIN
TYPE: SURGICAL PAIN
TYPE: SURGICAL PAIN
TYPE: ACUTE PAIN
TYPE: SURGICAL PAIN
TYPE: ACUTE PAIN

## 2024-09-11 ASSESSMENT — PAIN SCALES - GENERAL: PAIN_LEVEL: 3

## 2024-09-11 ASSESSMENT — FIBROSIS 4 INDEX
FIB4 SCORE: 0.68
FIB4 SCORE: 0.68

## 2024-09-11 NOTE — ANESTHESIA POSTPROCEDURE EVALUATION
Patient: Jaclyn Olvera    Procedure Summary       Date: 09/11/24 Room / Location: [place] [address] / SURGERY [facility]    Anesthesia Start: 0738 Anesthesia Stop: 0915    Procedures:       ROBOTICALLY ASSISTED TOTAL LAPAROSCOPIC HYSTERECTOMY (Abdomen)      SALPINGECTOMY, LAPAROSCOPIC (Bilateral: Abdomen)      CYSTOSCOPY (Urethra) Diagnosis: (ABNORMAL UTERINE BLEEDING, ADENOMYOSIS)    Surgeons: Kd Augustin M.D. Responsible Provider: Girish Moreland M.D.    Anesthesia Type: general ASA Status: 2            Final Anesthesia Type: general  Last vitals  BP   Blood Pressure: 119/62    Temp   36.5 °C (97.7 °F)    Pulse   82   Resp   17    SpO2   97 %      Anesthesia Post Evaluation    Patient location during evaluation: PACU  Patient participation: complete - patient participated  Level of consciousness: awake and alert  Pain score: 3    Airway patency: patent  Anesthetic complications: no  Cardiovascular status: hemodynamically stable  Respiratory status: acceptable  Hydration status: euvolemic    PONV: none          There were no known notable events for this encounter.     Nurse Pain Score: 2 (NPRS)

## 2024-09-11 NOTE — ANESTHESIA PREPROCEDURE EVALUATION
Case: [id number] Date/Time: 09/11/24 0715    Procedure: ROBOTICALLY ASSISTED TOTAL LAPAROSCOPIC HYSTERECTOMY WITH BILATERAL SALPINGECTOMY AND BLADDER CYSTOSCOPY    Pre-op diagnosis: ABNORMAL UTERINE BLEEDING, ADENOMYOSIS    Location: [place] [facility] / SURGERY [facility]    Surgeons: Kd Augustin M.D.            Relevant Problems   CARDIAC   (positive) Thrombosis of superior vena cava (HCC)         (positive) Kidney lesion, native, right      Other   (positive) Retroperitoneal lymphadenopathy       Physical Exam    Airway   Mallampati: II  TM distance: >3 FB  Neck ROM: full       Cardiovascular - normal exam  Rhythm: regular  Rate: normal  (-) murmur     Dental - normal exam           Pulmonary - normal exam  Breath sounds clear to auscultation     Abdominal    Neurological - normal exam                   Anesthesia Plan    ASA 2 (h/o PE)       Plan - general       Airway plan will be ETT          Induction: intravenous    Postoperative Plan: Postoperative administration of opioids is intended.    Pertinent diagnostic labs and testing reviewed    Informed Consent:    Anesthetic plan and risks discussed with patient.    Use of blood products discussed with: patient whom consented to blood products.

## 2024-09-11 NOTE — OP REPORT
PreOp Diagnosis: Abnormal uterine bleeding, adenomyosis      PostOp Diagnosis: Same, abdominal adhesions      Procedure(s):  ROBOTICALLY ASSISTED TOTAL LAPAROSCOPIC HYSTERECTOMY  SALPINGECTOMY, LAPAROSCOPIC - Wound Class: Clean  CYSTOSCOPY - Wound Class: Clean Contaminated  Lysis of adhesions    Surgeon(s):  Kd Augustin M.D.    Anesthesiologist/Type of Anesthesia:  Anesthesiologist: Girish Moreland M.D./General    Surgical Staff:  Circulator: Dulce Messina R.N.  Scrub Person: Vin Aguilar; Kassandra FLEMING [place]; Jeanie Guthrie  First Assist: MARILEE Cortez    Specimens removed if any:  ID Type Source Tests Collected by Time Destination   A : Uterus, Cervix, Bilateral Tubes Tissue Uterus PATHOLOGY SPECIMEN Kd Augustin M.D. 9/11/2024 0835        Estimated Blood Loss: 40 cc    Findings: Enlarged fibroid uterus.  Normal ovaries, normal tubes, no evidence of endometriosis.  Adhesions of bowel and omentum to anterior abdominal wall near the level of the umbilicus    Complications: None    Description of procedure:    Patient was taken to the operating room where general anesthesia was found to be adequate.  She was then prepared and draped in a normal sterile fashion in the dorsolithotomy position with legs in Terry stirrups.  A Villegas was placed.  A Mery 2 uterine manipulator with a colpotomy ring was placed into the vagina.    Attention was turned to the abdominal portion of the case.  Please note prior to any abdominal incision, about 2 to 3 cc of 0.25% Marcaine was injected subcuticularly.  An umbilical incision was then made about 8 mm in nature.  A Veress needle was placed into the peritoneal cavity while tenting the abdominal wall anteriorly and placing the needle in the midline, at a 45 degree angle pointing towards the patient's feet.  This was done until there were 2 gentle pops.  Saline then easily infused the needle and upon return there was no blood nor bowel nor saline product.  CO2  gas was attached to the needle and there was initial high flow and low pressure and the abdomen was insufflated to the level of 15 mmHg pressure with about 4 and half liters of CO2 gas.  The Veress needle was removed.  A scope was then placed into the abdomen and upon visualization of abdominal contents there was no evidence of any entrance trauma to the abdomen including no evidence of trauma to the vessel nor bowel.    3 more ports were placed.  The first about 8 to 10 cm to the left of the umbilicus.  The second about 18 cm to the left the umbilicus.  The last about 8 to 10 cm to the right of the umbilicus.  8 mm incisions were made and the ports were placed under direct visualization and there was no contact with any abdominal contents.    The patient was placed in Trendelenburg and the da Chel robot was docked and targeted according to protocol.    A survey of the pelvis found the findings as dictated above.    Patient had some adhesions around the port sites which were brought down sharply with scissors.  There was bowel also adhesed to the right of the umbilicus.  Lysis of adhesions took about 12 minutes added time to the case.    The left tube was then grasped by the fimbria and elevated anteriorly and to the midline thereby exposing the mesosalpinx.  The mesosalpinx was then sealed and cut with the vessel sealer up to the level of its entrance into the uterus.  The tube was then sealed and cut and removed.  The same procedure was then performed on the patient's right side tube.    The uterosacral ligaments were marked with cautery for later use in the closure of the cuff.  Both ureters could be seen transperitoneally and were felt to be well away from the operative area.    We then continued down the patient's left side staying close to the uterus, first sealing and cutting the uterine ovarian ligament.  We continued down past the round ligament.  The same procedure was then performed on the patient's  other side.    We then created a bladder flap.  The bladder was backfilled with fluid to demarcate its margin.  It was then deflated.  The peritoneum cephalad to the bladder was entered with cautery and a bladder flap created.  We then entered in the vagina over the colpotomy ring.  Please note when going over the colpotomy ring we would first use coagulation cautery followed by cut cautery when going through the vaginal epithelium.  This created an anterior entrance.    We then went to the patient's left side.  We skeletonized the uterine vessels on this side and sealed and cut them.  The same procedure was then performed on the patient's right side.  We then continued around the colpotomy ring with cautery until the organs were freed and these were delivered through the vagina.    Any areas of bleeding along the cuff were cauterized.  The vaginal cuff was then closed with 2 layers.  The first layer was 0 PDS V-Loc in a running fashion taking care to incorporate the uterosacral ligaments into the closure.  The second closure was 0 Vicryl taking care to incorporate the peritoneum and the closure.    The pelvis was irrigated and cleared of any clot and debris.  All areas of operation were found to be hemostatic.    Some hemoblast was placed over the suture line.    The abdomen was deflated and the ports removed.  Skin was closed with a subcuticular stay stitch and glue.    Attention was turned to the bladder cystoscopy.  The bladder was instilled with about 350 cc of 20% mannitol solution.  Both the right and left ureters could be seen freely flowing urine.  The bladder epithelium appeared normal without any lesions.  There was no signs of damage to the bladder.  The urethra appeared normal as well.  It was a normal bladder cystoscopy.    Patient tolerated the procedure well.  Circulating staff announced that the counts were correct.  Patient was taken to recovery room in stable condition.

## 2024-09-11 NOTE — OR SURGEON
Immediate Post OP Note    PreOp Diagnosis: Abnormal uterine bleeding, adenomyosis      PostOp Diagnosis: Same, abdominal adhesions      Procedure(s):  ROBOTICALLY ASSISTED TOTAL LAPAROSCOPIC HYSTERECTOMY  SALPINGECTOMY, LAPAROSCOPIC - Wound Class: Clean  CYSTOSCOPY - Wound Class: Clean Contaminated  Lysis of adhesions    Surgeon(s):  Kd Augustin M.D.    Anesthesiologist/Type of Anesthesia:  Anesthesiologist: Girish Moreland M.D./General    Surgical Staff:  Circulator: Dulce Messina R.N.  Scrub Person: Vin Aguilar; Kassandra Banda; Jeanie Guthrie  First Assist: MARILEE Cortez    Specimens removed if any:  ID Type Source Tests Collected by Time Destination   A : Uterus, Cervix, Bilateral Tubes Tissue Uterus PATHOLOGY SPECIMEN Kd Augustin M.D. 9/11/2024 0835        Estimated Blood Loss: 40 cc    Findings: Enlarged fibroid uterus.  Normal ovaries, normal tubes, no evidence of endometriosis.  Adhesions of bowel and omentum to anterior abdominal wall near the level of the umbilicus    Complications: None        9/11/2024 9:12 AM Kd Augustin M.D.

## 2024-09-11 NOTE — OR NURSING
Patient AAOx4, calm, denies pain in phase two. Abdomen soft, non distended, slightly tender. Lap sites x3 with dermabond ASA CDI. VSS, afebrile, room air 95% breathing even and unlabored. Patient tolerating water and juice. States she voided clear yellow urine in PACU.   Patient meets discharge criteria. All discharge instructions reviewed, questions and concerns addressed. All belongings with patient.

## 2024-09-11 NOTE — PROGRESS NOTES
Pharmacy Medication Reconciliation      ~Medication reconciliation updated and complete per patient   ~Allergies have been verified and updated   ~No oral ABX within the last 30 days  ~Patient home pharmacy :  [facility] [address] [phone]      ~Anticoagulants (rivaroxaban, apixaban, edoxaban, dabigatran, warfarin, enoxaparin) taken in the last 14 days? Yes  ~Anticoagulant: Warfarin 10mg , Last dose: 9/6/24 also Enoxaparin 120mg 9/9/24 PM

## 2024-09-11 NOTE — ANESTHESIA TIME REPORT
Anesthesia Start and Stop Event Times       Date Time Event    9/11/2024 0654 Ready for Procedure     0738 Anesthesia Start     0915 Anesthesia Stop          Responsible Staff  09/11/24      Name Role Begin End    Girish Moreland M.D. Anesth 0738 0915          Overtime Reason:  no overtime (within assigned shift)    Comments:

## 2024-09-11 NOTE — DISCHARGE INSTRUCTIONS
HOME CARE INSTRUCTIONS    ACTIVITY: Rest and take it easy for the first 24 hours.  A responsible adult is recommended to remain with you during that time.  It is normal to feel sleepy.  We encourage you to not do anything that requires balance, judgment or coordination.    FOR 24 HOURS DO NOT:  Drive, operate machinery or run household appliances.  Drink beer or alcoholic beverages.  Make important decisions or sign legal documents.    SPECIAL INSTRUCTIONS:   Follow up immediately in office or if closed emergency room with fever >100 degrees, severe pain, intractable nausea or vomiting, syncope or dizziness, vaginal bleeding greater than spotting, problem with wound, any concerns.    Pelvic rest for 6 weeks  no driving on narcotic pain medication  no lifting greater than 10 pounds.    Follow up appointment in 1-2 weeks.   Patient is to restart her anticoagulation regimen as per her other physicians plan     DIET: To avoid nausea, slowly advance diet as tolerated, avoiding spicy or greasy foods for the first day.  Add more substantial food to your diet according to your physician's instructions.  INCREASE FLUIDS AND FIBER TO AVOID CONSTIPATION.    MEDICATIONS: Resume taking daily medication.  Take prescribed pain medication with food.  If no medication is prescribed, you may take non-aspirin pain medication if needed.  PAIN MEDICATION CAN BE VERY CONSTIPATING.  Take a stool softener or laxative such as senokot, pericolace, or milk of magnesia if needed.    Last pain medication given at _Oxycodone at 9:30 am_.    A follow-up appointment should be arranged with your doctor in 1-2 weeks; call to schedule.    You should CALL YOUR PHYSICIAN if you develop:  Fever greater than 101 degrees F.  Pain not relieved by medication, or persistent nausea or vomiting.  Excessive bleeding (blood soaking through dressing) or unexpected drainage from the wound.  Extreme redness or swelling around the incision site, drainage of pus or  foul smelling drainage.  Inability to urinate or empty your bladder within 8 hours.  Problems with breathing or chest pain.    You should call 911 if you develop problems with breathing or chest pain.  If you are unable to contact your doctor or surgical center, you should go to the nearest emergency room or urgent care center.  Physician's telephone #: [phone]    MILD FLU-LIKE SYMPTOMS ARE NORMAL.  YOU MAY EXPERIENCE GENERALIZED MUSCLE ACHES, THROAT IRRITATION, HEADACHE AND/OR SOME NAUSEA.    If any questions arise, call your doctor.  If your doctor is not available, please feel free to call the Surgical Center at [phone].  The Center is open Monday through Friday from 7AM to 7PM.      A registered nurse may call you a few days after your surgery to see how you are doing after your procedure.    You may also receive a survey in the mail within the next two weeks and we ask that you take a few moments to complete the survey and return it to us.  Our goal is to provide you with very good care and we value your comments.     Depression / Suicide Risk    As you are discharged from this [facility] facility, it is important to learn how to keep safe from harming yourself.    Recognize the warning signs:  Abrupt changes in personality, positive or negative- including increase in energy   Giving away possessions  Change in eating patterns- significant weight changes-  positive or negative  Change in sleeping patterns- unable to sleep or sleeping all the time   Unwillingness or inability to communicate  Depression  Unusual sadness, discouragement and loneliness  Talk of wanting to die  Neglect of personal appearance   Rebelliousness- reckless behavior  Withdrawal from people/activities they love  Confusion- inability to concentrate     If you or a loved one observes any of these behaviors or has concerns about self-harm, here's what you can do:  Talk about it- your feelings and reasons for harming  yourself  Remove any means that you might use to hurt yourself (examples: pills, rope, extension cords, firearm)  Get professional help from the community (Mental Health, Substance Abuse, psychological counseling)  Do not be alone:Call your Safe Contact- someone whom you trust who will be there for you.  Call your local CRISIS HOTLINE 902-4198 or 367-173-3236  Call your local Children's Mobile Crisis Response Team [place] (877) 731-8821 or www.SmartBIM  Call the toll free National Suicide Prevention Hotlines   National Suicide Prevention Lifeline 509-833-UUOO (3423)  National Hope Line Network 800-SUICIDE (284-9326)    I acknowledge receipt and understanding of these Home Care instructions.

## 2024-09-11 NOTE — OR NURSING
0920: Pt reports severe lower abdominal pain. Medications administered per orders. Ice pack in place to incision sites and heat pack applied to lower abdomen. VSS on 3 L O2 via NC.     1015: Despite pain interventions, pt continues to report severe lower Abdominal cramping. Dr Moreland notified. Order received for IVPB robaxin.     1030: Pt reports need to defecate. Ambulated to the . RN at pt's side as she reports feeling dizzy.   No BM.   Pt returned to [facility].     1100: Robaxin arrived from pharmacy and started. Pt resting on her side with a heat pack to her lower abdomen.   Pt's  called and updated.     1200: Pt slept following robaxin infusion and then woke up report severe cramping again. ABD soft and fay pad is dry. Dr. Augustin notified. Order received for Toradol IVP. Dr. Augustin states he would like to continue with plan to d/c home.    1230: Pt reports pain tolerable following interventions. VSS on RA. Surgical incisions CDI. Fay pad dry. Report called to phase II and pt transferred via [facility]. Glasses on, no other belongings in PACU.

## 2024-09-11 NOTE — ANESTHESIA PROCEDURE NOTES
Airway    Date/Time: 9/11/2024 7:46 AM    Performed by: Girish Moreland M.D.  Authorized by: Girish Moreland M.D.    Location:  OR  Urgency:  Elective  Indications for Airway Management:  Anesthesia      Spontaneous Ventilation: absent    Sedation Level:  Deep  Preoxygenated: Yes    Patient Position:  Sniffing  Final Airway Type:  Endotracheal airway  Final Endotracheal Airway:  ETT  Cuffed: Yes    Technique Used for Successful ETT Placement:  Direct laryngoscopy    Insertion Site:  Oral  Blade Type:  Echeverria  Laryngoscope Blade/Videolaryngoscope Blade Size:  2  ETT Size (mm):  7.0  Measured from:  Teeth  ETT to Teeth (cm):  22  Placement Verified by: auscultation and capnometry    Cormack-Lehane Classification:  Grade I - full view of glottis  Number of Attempts at Approach:  1

## 2024-09-13 ENCOUNTER — APPOINTMENT (OUTPATIENT)
Dept: VASCULAR LAB | Facility: MEDICAL CENTER | Age: 49
End: 2024-09-13
Attending: INTERNAL MEDICINE
Payer: COMMERCIAL

## 2024-09-16 ENCOUNTER — ANTICOAGULATION VISIT (OUTPATIENT)
Dept: VASCULAR LAB | Facility: MEDICAL CENTER | Age: 49
End: 2024-09-16
Attending: INTERNAL MEDICINE
Payer: COMMERCIAL

## 2024-09-16 VITALS — WEIGHT: 173 LBS | BODY MASS INDEX: 26.3 KG/M2

## 2024-09-16 DIAGNOSIS — N93.9 VAGINAL BLEEDING: ICD-10-CM

## 2024-09-16 DIAGNOSIS — E27.49 HEMORRHAGE OF BOTH ADRENAL GLANDS (HCC): ICD-10-CM

## 2024-09-16 DIAGNOSIS — D68.61 ANTIPHOSPHOLIPID SYNDROME (HCC): ICD-10-CM

## 2024-09-16 DIAGNOSIS — I82.210 THROMBOSIS OF SUPERIOR VENA CAVA (HCC): ICD-10-CM

## 2024-09-16 LAB — INR PPP: 2.7 (ref 2–3.5)

## 2024-09-16 PROCEDURE — 85610 PROTHROMBIN TIME: CPT

## 2024-09-16 PROCEDURE — 99212 OFFICE O/P EST SF 10 MIN: CPT

## 2024-09-16 ASSESSMENT — FIBROSIS 4 INDEX: FIB4 SCORE: 0.68

## 2024-09-16 NOTE — PROGRESS NOTES
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)  Unable to tolerate Xarelto          Anticoagulation Care Providers       Provider Role Specialty Phone number    [facility] Responsible  [phone]                  Refer to Patient Findings for HPI:  Patient Findings       Positives:  Upcoming invasive procedure (plans for cyst removal on arms. will contact dermatologist to determine holding plan if necessary.), Hospital admission (hysterectomy . held warfarin appropriately before, has been on Lovenox injections since then)    Negatives:  Signs/symptoms of thrombosis, Signs/symptoms of bleeding, Laboratory test error suspected, Change in health, Change in alcohol use, Change in activity, Emergency department visit, Upcoming dental procedure, Missed doses, Extra doses, Change in medications, Change in diet/appetite, Bruising, Other complaints            Vitals:    24 1152   Weight: 78.5 kg (173 lb)     Pt declined vitals    Verified current warfarin dosing schedule.    Medications reconciled.  Pt is not on antiplatelet therapy.      A/P   INR is therapeutic  Reason(s) for out of range INR today: N/A      Warfarin dosing recommendation: Continue regimen as listed above. STOP Lovenox as  INR is therapeutic.     Pt educated to contact our clinic with any changes in medications or s/s of bleeding or thrombosis. Pt is aware to seek immediate medical attention for falls, head injury or deep cuts.    Pt has plans to have cysts (?) removed on arm. No dates planned at this time. Recommended contacting dermatologist to determine invasiveness/warfarin plan prior to scheduling appointment.     Request pt to return in 2 week(s). Pt agrees.    Ellen Jaramillo, EvanD

## 2024-09-30 ENCOUNTER — ANTICOAGULATION VISIT (OUTPATIENT)
Dept: VASCULAR LAB | Facility: MEDICAL CENTER | Age: 49
End: 2024-09-30
Attending: INTERNAL MEDICINE
Payer: COMMERCIAL

## 2024-09-30 VITALS — BODY MASS INDEX: 25.3 KG/M2 | WEIGHT: 166.4 LBS

## 2024-09-30 DIAGNOSIS — N93.9 VAGINAL BLEEDING: ICD-10-CM

## 2024-09-30 DIAGNOSIS — D68.61 ANTIPHOSPHOLIPID SYNDROME (HCC): ICD-10-CM

## 2024-09-30 DIAGNOSIS — I82.210 THROMBOSIS OF SUPERIOR VENA CAVA (HCC): ICD-10-CM

## 2024-09-30 DIAGNOSIS — E27.49 HEMORRHAGE OF BOTH ADRENAL GLANDS (HCC): ICD-10-CM

## 2024-09-30 LAB — INR PPP: 3.6 (ref 2–3.5)

## 2024-09-30 PROCEDURE — 85610 PROTHROMBIN TIME: CPT

## 2024-09-30 PROCEDURE — 99212 OFFICE O/P EST SF 10 MIN: CPT

## 2024-09-30 ASSESSMENT — FIBROSIS 4 INDEX: FIB4 SCORE: 0.68

## 2024-09-30 NOTE — PROGRESS NOTES
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)  Unable to tolerate Xarelto          Anticoagulation Care Providers       Provider Role Specialty Phone number    [facility] Responsible  [phone]                  Refer to Patient Findings for HPI:  Patient Findings       Positives:  Upcoming invasive procedure (cyst removal to be scheduled, waiting to hear back from derm)    Negatives:  Signs/symptoms of thrombosis, Signs/symptoms of bleeding, Laboratory test error suspected, Change in health, Change in alcohol use, Change in activity, Emergency department visit, Upcoming dental procedure, Missed doses, Extra doses, Change in medications, Change in diet/appetite, Hospital admission, Bruising, Other complaints            Vitals:    09/30/24 0750   Weight: 75.5 kg (166 lb 6.4 oz)     Pt declined vitals    Verified current warfarin dosing schedule.    Medications reconciled.  Pt is not on antiplatelet therapy.      A/P   INR is supratherapeutic  Reason(s) for out of range INR today: No obvious causes however patient has gradually lost weight over the last year and this may be affecting warfarin requirements      Warfarin dosing recommendation: Decrease to 5 mg Mon, 7.5 mg all other days. Pt to  consume additional dietary vitamin k serving today to help decrease INR.    Pt educated to contact our clinic with any changes in medications or s/s of bleeding or thrombosis. Pt is aware to seek immediate medical attention for falls, head injury or deep cuts.    Request pt to return in 2 week(s). Pt agrees.    Luis M Escobar, EvanD

## 2024-10-15 ENCOUNTER — ANTICOAGULATION VISIT (OUTPATIENT)
Dept: VASCULAR LAB | Facility: MEDICAL CENTER | Age: 49
End: 2024-10-15
Attending: INTERNAL MEDICINE
Payer: COMMERCIAL

## 2024-10-15 ENCOUNTER — HOSPITAL ENCOUNTER (OUTPATIENT)
Dept: LAB | Facility: MEDICAL CENTER | Age: 49
End: 2024-10-15
Attending: INTERNAL MEDICINE
Payer: COMMERCIAL

## 2024-10-15 DIAGNOSIS — N93.9 VAGINAL BLEEDING: ICD-10-CM

## 2024-10-15 DIAGNOSIS — I82.210 THROMBOSIS OF SUPERIOR VENA CAVA (HCC): ICD-10-CM

## 2024-10-15 DIAGNOSIS — E27.49 HEMORRHAGE OF BOTH ADRENAL GLANDS (HCC): ICD-10-CM

## 2024-10-15 DIAGNOSIS — D68.61 ANTIPHOSPHOLIPID SYNDROME (HCC): ICD-10-CM

## 2024-10-15 LAB
ANISOCYTOSIS BLD QL SMEAR: ABNORMAL
BASOPHILS # BLD AUTO: 0.5 % (ref 0–1.8)
BASOPHILS # BLD: 0.03 K/UL (ref 0–0.12)
COMMENT 1642: NORMAL
EOSINOPHIL # BLD AUTO: 0.14 K/UL (ref 0–0.51)
EOSINOPHIL NFR BLD: 2.2 % (ref 0–6.9)
ERYTHROCYTE [DISTWIDTH] IN BLOOD BY AUTOMATED COUNT: 42.5 FL (ref 35.9–50)
FERRITIN SERPL-MCNC: 10.9 NG/ML (ref 10–291)
HCT VFR BLD AUTO: 33.3 % (ref 37–47)
HGB BLD-MCNC: 9.5 G/DL (ref 12–16)
IMM GRANULOCYTES # BLD AUTO: 0.02 K/UL (ref 0–0.11)
IMM GRANULOCYTES NFR BLD AUTO: 0.3 % (ref 0–0.9)
INR PPP: 2.7 (ref 2–3.5)
IRON SATN MFR SERPL: 7 % (ref 15–55)
IRON SERPL-MCNC: 24 UG/DL (ref 40–170)
LYMPHOCYTES # BLD AUTO: 1.6 K/UL (ref 1–4.8)
LYMPHOCYTES NFR BLD: 25 % (ref 22–41)
MCH RBC QN AUTO: 20.9 PG (ref 27–33)
MCHC RBC AUTO-ENTMCNC: 28.5 G/DL (ref 32.2–35.5)
MCV RBC AUTO: 73.2 FL (ref 81.4–97.8)
MICROCYTES BLD QL SMEAR: ABNORMAL
MONOCYTES # BLD AUTO: 0.47 K/UL (ref 0–0.85)
MONOCYTES NFR BLD AUTO: 7.4 % (ref 0–13.4)
MORPHOLOGY BLD-IMP: NORMAL
NEUTROPHILS # BLD AUTO: 4.13 K/UL (ref 1.82–7.42)
NEUTROPHILS NFR BLD: 64.6 % (ref 44–72)
NRBC # BLD AUTO: 0 K/UL
NRBC BLD-RTO: 0 /100 WBC (ref 0–0.2)
OVALOCYTES BLD QL SMEAR: NORMAL
PLATELET # BLD AUTO: 329 K/UL (ref 164–446)
PLATELET BLD QL SMEAR: NORMAL
PMV BLD AUTO: 9.5 FL (ref 9–12.9)
POIKILOCYTOSIS BLD QL SMEAR: NORMAL
RBC # BLD AUTO: 4.55 M/UL (ref 4.2–5.4)
RBC BLD AUTO: PRESENT
SMUDGE CELLS BLD QL SMEAR: NORMAL
TIBC SERPL-MCNC: 324 UG/DL (ref 250–450)
UIBC SERPL-MCNC: 300 UG/DL (ref 110–370)
WBC # BLD AUTO: 6.4 K/UL (ref 4.8–10.8)

## 2024-10-15 PROCEDURE — 83540 ASSAY OF IRON: CPT

## 2024-10-15 PROCEDURE — 83550 IRON BINDING TEST: CPT

## 2024-10-15 PROCEDURE — 99211 OFF/OP EST MAY X REQ PHY/QHP: CPT

## 2024-10-15 PROCEDURE — 36415 COLL VENOUS BLD VENIPUNCTURE: CPT

## 2024-10-15 PROCEDURE — 85610 PROTHROMBIN TIME: CPT

## 2024-10-15 PROCEDURE — 82728 ASSAY OF FERRITIN: CPT

## 2024-10-15 PROCEDURE — 85025 COMPLETE CBC W/AUTO DIFF WBC: CPT

## 2024-10-17 ENCOUNTER — APPOINTMENT (RX ONLY)
Dept: URBAN - METROPOLITAN AREA CLINIC 22 | Facility: CLINIC | Age: 49
Setting detail: DERMATOLOGY
End: 2024-10-17

## 2024-10-17 DIAGNOSIS — D485 NEOPLASM OF UNCERTAIN BEHAVIOR OF SKIN: ICD-10-CM

## 2024-10-17 PROBLEM — D48.5 NEOPLASM OF UNCERTAIN BEHAVIOR OF SKIN: Status: ACTIVE | Noted: 2024-10-17

## 2024-10-17 PROCEDURE — 11406 EXC TR-EXT B9+MARG >4.0 CM: CPT

## 2024-10-17 PROCEDURE — ? EXCISION

## 2024-10-17 PROCEDURE — 12032 INTMD RPR S/A/T/EXT 2.6-7.5: CPT

## 2024-10-17 ASSESSMENT — LOCATION ZONE DERM: LOCATION ZONE: ARM

## 2024-10-17 ASSESSMENT — LOCATION SIMPLE DESCRIPTION DERM: LOCATION SIMPLE: RIGHT FOREARM

## 2024-10-17 ASSESSMENT — LOCATION DETAILED DESCRIPTION DERM: LOCATION DETAILED: RIGHT DISTAL LATERAL VENTRAL FOREARM

## 2024-10-17 NOTE — PROCEDURE: EXCISION
Medical Necessity Information: It is in your best interest to select a reason for this procedure from the list below. All of these items fulfill various CMS LCD requirements except lesion extends to a margin.
Include Z78.9 (Other Specified Conditions Influencing Health Status) As An Associated Diagnosis?: No
Medical Necessity Clause: This procedure was medically necessary because the lesion that was treated was:
Lab: [id number]
Lab Facility: 
Surgeon (Optional): Fabrice Munoz M.D.
Biopsy Photograph Reviewed: Yes
Size Of Lesion In Cm: 4
X Size Of Lesion In Cm (Optional): 2
Size Of Margin In Cm: 0.2
Anesthesia Volume In Cc: 12
Eye Clamp Note Details: An eye clamp was used during the procedure.
Excision Method: Excisional Biopsy
Saucerization Depth: dermis and superficial adipose tissue
Repair Type: Intermediate
Intermediate / Complex Repair - Final Wound Length In Cm: 4.8
Suturegard Retention Suture: 2-0 Nylon
Retention Suture Bite Size: 3 mm
Length To Time In Minutes Device Was In Place: 10
Number Of Hemigard Strips Per Side: 1
Undermining Type: Entire Wound
Debridement Text: The wound edges were debrided prior to proceeding with the closure to facilitate wound healing.
Helical Rim Text: The closure involved the helical rim.
Vermilion Border Text: The closure involved the vermilion border.
Nostril Rim Text: The closure involved the nostril rim.
Retention Suture Text: Retention sutures were placed to support the closure and prevent dehiscence.
Primary Defect Length (In Cm): 0
Suture Removal: 14 days
Epidermal Closure Graft Donor Site (Optional): simple interrupted
Graft Donor Site Bandage (Optional-Leave Blank If You Don't Want In Note): Steri-strips and a pressure bandage were applied to the donor site.
Detail Level: Detailed
Excision Depth: adipose tissue
Scalpel Size: 15 blade
Anesthesia Type: 1% lidocaine with 1:100,000 epinephrine and a 1:12 solution of 8.4% sodium bicarbonate
Additional Anesthesia Volume In Cc: 6
Hemostasis: Electrodesiccation
Estimated Blood Loss (Cc): minimal
Repair Depth: use same depth as excision depth
Undermining Location (Optional): in the deep fat
Deep Sutures: 4-0 Maxon
Dermal Closure: buried vertical mattress
Epidermal Sutures: 4-0 Prolene
Epidermal Closure: running
Wound Care: Petrolatum
Dressing: pressure dressing with telfa
Suturegard Intro: Intraoperative tissue expansion was performed, utilizing the SUTUREGARD device, in order to reduce wound tension.
Suturegard Body: The suture ends were repeatedly re-tightened and re-clamped to achieve the desired tissue expansion.
Hemigard Intro: Due to skin fragility and wound tension, it was decided to use HEMIGARD adhesive retention suture devices to permit a linear closure. The skin was cleaned and dried for a 6cm distance away from the wound. Excessive hair, if present, was removed to allow for adhesion.
Hemigard Postcare Instructions: The HEMIGARD strips are to remain completely dry for at least 5-7 days.
Positioning (Leave Blank If You Do Not Want): The patient was placed in a comfortable position exposing the surgical site.
Complex Repair Preamble Text (Leave Blank If You Do Not Want): Extensive wide undermining was performed.
Intermediate Repair Preamble Text (Leave Blank If You Do Not Want): Undermining was performed with blunt dissection.
Fusiform Excision Additional Text (Leave Blank If You Do Not Want): The margin was drawn around the clinically apparent lesion.  A fusiform shape was then drawn on the skin incorporating the lesion and margins.  Incisions were then made along these lines to the appropriate tissue plane and the lesion was extirpated.
Eliptical Excision Additional Text (Leave Blank If You Do Not Want): The margin was drawn around the clinically apparent lesion.  An elliptical shape was then drawn on the skin incorporating the lesion and margins.  Incisions were then made along these lines to the appropriate tissue plane and the lesion was extirpated.
Saucerization Excision Additional Text (Leave Blank If You Do Not Want): The margin was drawn around the clinically apparent lesion.  Incisions were then made along these lines, in a tangential fashion, to the appropriate tissue plane and the lesion was extirpated.
Slit Excision Additional Text (Leave Blank If You Do Not Want): A linear line was drawn on the skin overlying the lesion. An incision was made slowly until the lesion was visualized.  Once visualized, the lesion was removed with blunt dissection.
Excisional Biopsy Additional Text (Leave Blank If You Do Not Want): The margin was drawn around the clinically apparent lesion. An elliptical shape was then drawn on the skin incorporating the lesion and margins.  Incisions were then made along these lines to the appropriate tissue plane and the lesion was extirpated.
Perilesional Excision Additional Text (Leave Blank If You Do Not Want): The margin was drawn around the clinically apparent lesion. Incisions were then made along these lines to the appropriate tissue plane and the lesion was extirpated.
Repair Performed By Another Provider Text (Leave Blank If You Do Not Want): After the tissue was excised the defect was repaired by another provider.
No Repair - Repaired With Adjacent Surgical Defect Text (Leave Blank If You Do Not Want): After the excision the defect was repaired concurrently with another surgical defect which was in close approximation.
Adjacent Tissue Transfer Text: The defect edges were debeveled with a #15 scalpel blade.  Given the location of the defect and the proximity to free margins an adjacent tissue transfer was deemed most appropriate.  Using a sterile surgical marker, an appropriate flap was drawn incorporating the defect and placing the expected incisions within the relaxed skin tension lines where possible.    The area thus outlined was incised deep to adipose tissue with a #15 scalpel blade.  The skin margins were undermined to an appropriate distance in all directions utilizing iris scissors.
Advancement Flap (Single) Text: The defect edges were debeveled with a #15 scalpel blade.  Given the location of the defect and the proximity to free margins a single advancement flap was deemed most appropriate.  Using a sterile surgical marker, an appropriate advancement flap was drawn incorporating the defect and placing the expected incisions within the relaxed skin tension lines where possible.    The area thus outlined was incised deep to adipose tissue with a #15 scalpel blade.  The skin margins were undermined to an appropriate distance in all directions utilizing iris scissors.
Advancement Flap (Double) Text: The defect edges were debeveled with a #15 scalpel blade.  Given the location of the defect and the proximity to free margins a double advancement flap was deemed most appropriate.  Using a sterile surgical marker, the appropriate advancement flaps were drawn incorporating the defect and placing the expected incisions within the relaxed skin tension lines where possible.    The area thus outlined was incised deep to adipose tissue with a #15 scalpel blade.  The skin margins were undermined to an appropriate distance in all directions utilizing iris scissors.
Burow's Advancement Flap Text: The defect edges were debeveled with a #15 scalpel blade.  Given the location of the defect and the proximity to free margins a Burow's advancement flap was deemed most appropriate.  Using a sterile surgical marker, the appropriate advancement flap was drawn incorporating the defect and placing the expected incisions within the relaxed skin tension lines where possible.    The area thus outlined was incised deep to adipose tissue with a #15 scalpel blade.  The skin margins were undermined to an appropriate distance in all directions utilizing iris scissors.
Chonodrocutaneous Helical Advancement Flap Text: The defect edges were debeveled with a #15 scalpel blade.  Given the location of the defect and the proximity to free margins a chondrocutaneous helical advancement flap was deemed most appropriate.  Using a sterile surgical marker, the appropriate advancement flap was drawn incorporating the defect and placing the expected incisions within the relaxed skin tension lines where possible.    The area thus outlined was incised deep to adipose tissue with a #15 scalpel blade.  The skin margins were undermined to an appropriate distance in all directions utilizing iris scissors.
Crescentic Advancement Flap Text: The defect edges were debeveled with a #15 scalpel blade.  Given the location of the defect and the proximity to free margins a crescentic advancement flap was deemed most appropriate.  Using a sterile surgical marker, the appropriate advancement flap was drawn incorporating the defect and placing the expected incisions within the relaxed skin tension lines where possible.    The area thus outlined was incised deep to adipose tissue with a #15 scalpel blade.  The skin margins were undermined to an appropriate distance in all directions utilizing iris scissors.
A-T Advancement Flap Text: The defect edges were debeveled with a #15 scalpel blade.  Given the location of the defect, shape of the defect and the proximity to free margins an A-T advancement flap was deemed most appropriate.  Using a sterile surgical marker, an appropriate advancement flap was drawn incorporating the defect and placing the expected incisions within the relaxed skin tension lines where possible.    The area thus outlined was incised deep to adipose tissue with a #15 scalpel blade.  The skin margins were undermined to an appropriate distance in all directions utilizing iris scissors.
O-T Advancement Flap Text: The defect edges were debeveled with a #15 scalpel blade.  Given the location of the defect, shape of the defect and the proximity to free margins an O-T advancement flap was deemed most appropriate.  Using a sterile surgical marker, an appropriate advancement flap was drawn incorporating the defect and placing the expected incisions within the relaxed skin tension lines where possible.    The area thus outlined was incised deep to adipose tissue with a #15 scalpel blade.  The skin margins were undermined to an appropriate distance in all directions utilizing iris scissors.
O-L Flap Text: The defect edges were debeveled with a #15 scalpel blade.  Given the location of the defect, shape of the defect and the proximity to free margins an O-L flap was deemed most appropriate.  Using a sterile surgical marker, an appropriate advancement flap was drawn incorporating the defect and placing the expected incisions within the relaxed skin tension lines where possible.    The area thus outlined was incised deep to adipose tissue with a #15 scalpel blade.  The skin margins were undermined to an appropriate distance in all directions utilizing iris scissors.
O-Z Flap Text: The defect edges were debeveled with a #15 scalpel blade.  Given the location of the defect, shape of the defect and the proximity to free margins an O-Z flap was deemed most appropriate.  Using a sterile surgical marker, an appropriate transposition flap was drawn incorporating the defect and placing the expected incisions within the relaxed skin tension lines where possible. The area thus outlined was incised deep to adipose tissue with a #15 scalpel blade.  The skin margins were undermined to an appropriate distance in all directions utilizing iris scissors.
Double O-Z Flap Text: The defect edges were debeveled with a #15 scalpel blade.  Given the location of the defect, shape of the defect and the proximity to free margins a Double O-Z flap was deemed most appropriate.  Using a sterile surgical marker, an appropriate transposition flap was drawn incorporating the defect and placing the expected incisions within the relaxed skin tension lines where possible. The area thus outlined was incised deep to adipose tissue with a #15 scalpel blade.  The skin margins were undermined to an appropriate distance in all directions utilizing iris scissors.
V-Y Flap Text: The defect edges were debeveled with a #15 scalpel blade.  Given the location of the defect, shape of the defect and the proximity to free margins a V-Y flap was deemed most appropriate.  Using a sterile surgical marker, an appropriate advancement flap was drawn incorporating the defect and placing the expected incisions within the relaxed skin tension lines where possible.    The area thus outlined was incised deep to adipose tissue with a #15 scalpel blade.  The skin margins were undermined to an appropriate distance in all directions utilizing iris scissors.
Advancement-Rotation Flap Text: The defect edges were debeveled with a #15 scalpel blade.  Given the location of the defect, shape of the defect and the proximity to free margins an advancement-rotation flap was deemed most appropriate.  Using a sterile surgical marker, an appropriate flap was drawn incorporating the defect and placing the expected incisions within the relaxed skin tension lines where possible. The area thus outlined was incised deep to adipose tissue with a #15 scalpel blade.  The skin margins were undermined to an appropriate distance in all directions utilizing iris scissors.
Mercedes Flap Text: The defect edges were debeveled with a #15 scalpel blade.  Given the location of the defect, shape of the defect and the proximity to free margins a Mercedes flap was deemed most appropriate.  Using a sterile surgical marker, an appropriate advancement flap was drawn incorporating the defect and placing the expected incisions within the relaxed skin tension lines where possible. The area thus outlined was incised deep to adipose tissue with a #15 scalpel blade.  The skin margins were undermined to an appropriate distance in all directions utilizing iris scissors.
Modified Advancement Flap Text: The defect edges were debeveled with a #15 scalpel blade.  Given the location of the defect, shape of the defect and the proximity to free margins a modified advancement flap was deemed most appropriate.  Using a sterile surgical marker, an appropriate advancement flap was drawn incorporating the defect and placing the expected incisions within the relaxed skin tension lines where possible.    The area thus outlined was incised deep to adipose tissue with a #15 scalpel blade.  The skin margins were undermined to an appropriate distance in all directions utilizing iris scissors.
Mucosal Advancement Flap Text: Given the location of the defect, shape of the defect and the proximity to free margins a mucosal advancement flap was deemed most appropriate. Incisions were made with a 15 blade scalpel in the appropriate fashion along the cutaneous vermillion border and the mucosal lip. The remaining actinically damaged mucosal tissue was excised.  The mucosal advancement flap was then elevated to the gingival sulcus with care taken to preserve the neurovascular structures and advanced into the primary defect. Care was taken to ensure that precise realignment of the vermilion border was achieved.
Peng Advancement Flap Text: The defect edges were debeveled with a #15 scalpel blade.  Given the location of the defect, shape of the defect and the proximity to free margins a Peng advancement flap was deemed most appropriate.  Using a sterile surgical marker, an appropriate advancement flap was drawn incorporating the defect and placing the expected incisions within the relaxed skin tension lines where possible. The area thus outlined was incised deep to adipose tissue with a #15 scalpel blade.  The skin margins were undermined to an appropriate distance in all directions utilizing iris scissors.
Hatchet Flap Text: The defect edges were debeveled with a #15 scalpel blade.  Given the location of the defect, shape of the defect and the proximity to free margins a hatchet flap was deemed most appropriate.  Using a sterile surgical marker, an appropriate hatchet flap was drawn incorporating the defect and placing the expected incisions within the relaxed skin tension lines where possible.    The area thus outlined was incised deep to adipose tissue with a #15 scalpel blade.  The skin margins were undermined to an appropriate distance in all directions utilizing iris scissors.
Rotation Flap Text: The defect edges were debeveled with a #15 scalpel blade.  Given the location of the defect, shape of the defect and the proximity to free margins a rotation flap was deemed most appropriate.  Using a sterile surgical marker, an appropriate rotation flap was drawn incorporating the defect and placing the expected incisions within the relaxed skin tension lines where possible.    The area thus outlined was incised deep to adipose tissue with a #15 scalpel blade.  The skin margins were undermined to an appropriate distance in all directions utilizing iris scissors.
Bilateral Rotation Flap Text: The defect edges were debeveled with a #15 scalpel blade. Given the location of the defect, shape of the defect and the proximity to free margins a bilateral rotation flap was deemed most appropriate. Using a sterile surgical marker, an appropriate rotation flap was drawn incorporating the defect and placing the expected incisions within the relaxed skin tension lines where possible. The area thus outlined was incised deep to adipose tissue with a #15 scalpel blade. The skin margins were undermined to an appropriate distance in all directions utilizing iris scissors. Following this, the designed flap was carried over into the primary defect and sutured into place.
Spiral Flap Text: The defect edges were debeveled with a #15 scalpel blade.  Given the location of the defect, shape of the defect and the proximity to free margins a spiral flap was deemed most appropriate.  Using a sterile surgical marker, an appropriate rotation flap was drawn incorporating the defect and placing the expected incisions within the relaxed skin tension lines where possible. The area thus outlined was incised deep to adipose tissue with a #15 scalpel blade.  The skin margins were undermined to an appropriate distance in all directions utilizing iris scissors.
Staged Advancement Flap Text: The defect edges were debeveled with a #15 scalpel blade.  Given the location of the defect, shape of the defect and the proximity to free margins a staged advancement flap was deemed most appropriate.  Using a sterile surgical marker, an appropriate advancement flap was drawn incorporating the defect and placing the expected incisions within the relaxed skin tension lines where possible. The area thus outlined was incised deep to adipose tissue with a #15 scalpel blade.  The skin margins were undermined to an appropriate distance in all directions utilizing iris scissors.
Star Wedge Flap Text: The defect edges were debeveled with a #15 scalpel blade.  Given the location of the defect, shape of the defect and the proximity to free margins a star wedge flap was deemed most appropriate.  Using a sterile surgical marker, an appropriate rotation flap was drawn incorporating the defect and placing the expected incisions within the relaxed skin tension lines where possible. The area thus outlined was incised deep to adipose tissue with a #15 scalpel blade.  The skin margins were undermined to an appropriate distance in all directions utilizing iris scissors.
Transposition Flap Text: The defect edges were debeveled with a #15 scalpel blade.  Given the location of the defect and the proximity to free margins a transposition flap was deemed most appropriate.  Using a sterile surgical marker, an appropriate transposition flap was drawn incorporating the defect.    The area thus outlined was incised deep to adipose tissue with a #15 scalpel blade.  The skin margins were undermined to an appropriate distance in all directions utilizing iris scissors.
Muscle Hinge Flap Text: The defect edges were debeveled with a #15 scalpel blade.  Given the size, depth and location of the defect and the proximity to free margins a muscle hinge flap was deemed most appropriate.  Using a sterile surgical marker, an appropriate hinge flap was drawn incorporating the defect. The area thus outlined was incised with a #15 scalpel blade.  The skin margins were undermined to an appropriate distance in all directions utilizing iris scissors.
Mustarde Flap Text: The defect edges were debeveled with a #15 scalpel blade.  Given the size, depth and location of the defect and the proximity to free margins a Mustarde flap was deemed most appropriate.  Using a sterile surgical marker, an appropriate flap was drawn incorporating the defect. The area thus outlined was incised with a #15 scalpel blade.  The skin margins were undermined to an appropriate distance in all directions utilizing iris scissors.
Nasal Turnover Hinge Flap Text: The defect edges were debeveled with a #15 scalpel blade.  Given the size, depth, location of the defect and the defect being full thickness a nasal turnover hinge flap was deemed most appropriate.  Using a sterile surgical marker, an appropriate hinge flap was drawn incorporating the defect. The area thus outlined was incised with a #15 scalpel blade. The flap was designed to recreate the nasal mucosal lining and the alar rim. The skin margins were undermined to an appropriate distance in all directions utilizing iris scissors.
Nasalis-Muscle-Based Myocutaneous Island Pedicle Flap Text: Using a #15 blade, an incision was made around the donor flap to the level of the nasalis muscle. Wide lateral undermining was then performed in both the subcutaneous plane above the nasalis muscle, and in a submuscular plane just above periosteum. This allowed the formation of a free nasalis muscle axial pedicle (based on the angular artery) which was still attached to the actual cutaneous flap, increasing its mobility and vascular viability. Hemostasis was obtained with pinpoint electrocoagulation. The flap was mobilized into position and the pivotal anchor points positioned and stabilized with buried interrupted sutures. Subcutaneous and dermal tissues were closed in a multilayered fashion with sutures. Tissue redundancies were excised, and the epidermal edges were apposed without significant tension and sutured with sutures.
Nasalis Myocutaneous Flap Text: Using a #15 blade, an incision was made around the donor flap to the level of the nasalis muscle. Wide lateral undermining was then performed in both the subcutaneous plane above the nasalis muscle, and in a submuscular plane just above periosteum. This allowed the formation of a free nasalis muscle axial pedicle which was still attached to the actual cutaneous flap, increasing its mobility and vascular viability. Hemostasis was obtained with pinpoint electrocoagulation. The flap was mobilized into position and the pivotal anchor points positioned and stabilized with buried interrupted sutures. Subcutaneous and dermal tissues were closed in a multilayered fashion with sutures. Tissue redundancies were excised, and the epidermal edges were apposed without significant tension and sutured with sutures.
Nasolabial Transposition Flap Text: The defect edges were debeveled with a #15 scalpel blade.  Given the size, depth and location of the defect and the proximity to free margins a nasolabial transposition flap was deemed most appropriate. Using a sterile surgical marker, an appropriate flap was drawn incorporating the defect. The area thus outlined was incised with a #15 scalpel blade. The skin margins were undermined to an appropriate distance in all directions utilizing iris scissors. Following this, the designed flap was carried into the primary defect and sutured into place.
Orbicularis Oris Muscle Flap Text: The defect edges were debeveled with a #15 scalpel blade.  Given that the defect affected the competency of the oral sphincter an obicularis oris muscle flap was deemed most appropriate to restore this competency and normal muscle function.  Using a sterile surgical marker, an appropriate flap was drawn incorporating the defect. The area thus outlined was incised with a #15 scalpel blade.
Melolabial Transposition Flap Text: The defect edges were debeveled with a #15 scalpel blade.  Given the location of the defect and the proximity to free margins a melolabial flap was deemed most appropriate.  Using a sterile surgical marker, an appropriate melolabial transposition flap was drawn incorporating the defect.    The area thus outlined was incised deep to adipose tissue with a #15 scalpel blade.  The skin margins were undermined to an appropriate distance in all directions utilizing iris scissors.
Rectangular Flap Text: The defect edges were debeveled with a #15 scalpel blade. Given the location of the defect and the proximity to free margins a rectangular flap was deemed most appropriate. Using a sterile surgical marker, an appropriate rectangular flap was drawn incorporating the defect. The area thus outlined was incised deep to adipose tissue with a #15 scalpel blade. The skin margins were undermined to an appropriate distance in all directions utilizing iris scissors. Following this, the designed flap was carried over into the primary defect and sutured into place.
Rhombic Flap Text: The defect edges were debeveled with a #15 scalpel blade.  Given the location of the defect and the proximity to free margins a rhombic flap was deemed most appropriate.  Using a sterile surgical marker, an appropriate rhombic flap was drawn incorporating the defect.    The area thus outlined was incised deep to adipose tissue with a #15 scalpel blade.  The skin margins were undermined to an appropriate distance in all directions utilizing iris scissors.
Rhomboid Transposition Flap Text: The defect edges were debeveled with a #15 scalpel blade.  Given the location of the defect and the proximity to free margins a rhomboid transposition flap was deemed most appropriate.  Using a sterile surgical marker, an appropriate rhomboid flap was drawn incorporating the defect.    The area thus outlined was incised deep to adipose tissue with a #15 scalpel blade.  The skin margins were undermined to an appropriate distance in all directions utilizing iris scissors.
Bi-Rhombic Flap Text: The defect edges were debeveled with a #15 scalpel blade.  Given the location of the defect and the proximity to free margins a bi-rhombic flap was deemed most appropriate.  Using a sterile surgical marker, an appropriate rhombic flap was drawn incorporating the defect. The area thus outlined was incised deep to adipose tissue with a #15 scalpel blade.  The skin margins were undermined to an appropriate distance in all directions utilizing iris scissors.
Helical Rim Advancement Flap Text: The defect edges were debeveled with a #15 blade scalpel.  Given the location of the defect and the proximity to free margins (helical rim) a double helical rim advancement flap was deemed most appropriate.  Using a sterile surgical marker, the appropriate advancement flaps were drawn incorporating the defect and placing the expected incisions between the helical rim and antihelix where possible.  The area thus outlined was incised through and through with a #15 scalpel blade.  With a skin hook and iris scissors, the flaps were gently and sharply undermined and freed up.
Bilateral Helical Rim Advancement Flap Text: The defect edges were debeveled with a #15 blade scalpel.  Given the location of the defect and the proximity to free margins (helical rim) a bilateral helical rim advancement flap was deemed most appropriate.  Using a sterile surgical marker, the appropriate advancement flaps were drawn incorporating the defect and placing the expected incisions between the helical rim and antihelix where possible.  The area thus outlined was incised through and through with a #15 scalpel blade.  With a skin hook and iris scissors, the flaps were gently and sharply undermined and freed up.
Ear Star Wedge Flap Text: The defect edges were debeveled with a #15 blade scalpel.  Given the location of the defect and the proximity to free margins (helical rim) an ear star wedge flap was deemed most appropriate.  Using a sterile surgical marker, the appropriate flap was drawn incorporating the defect and placing the expected incisions between the helical rim and antihelix where possible.  The area thus outlined was incised through and through with a #15 scalpel blade.
Flip-Flop Flap Text: The defect edges were debeveled with a #15 blade scalpel.  Given the location of the defect and the proximity to free margins a flip-flop flap was deemed most appropriate. Using a sterile surgical marker, the appropriate flap was drawn incorporating the defect and placing the expected incisions between the helical rim and antihelix where possible.  The area thus outlined was incised through and through with a #15 scalpel blade. Following this, the designed flap was carried over into the primary defect and sutured into place.
Banner Transposition Flap Text: The defect edges were debeveled with a #15 scalpel blade.  Given the location of the defect and the proximity to free margins a Banner transposition flap was deemed most appropriate.  Using a sterile surgical marker, an appropriate flap drawn around the defect. The area thus outlined was incised deep to adipose tissue with a #15 scalpel blade.  The skin margins were undermined to an appropriate distance in all directions utilizing iris scissors.
Bilobed Flap Text: The defect edges were debeveled with a #15 scalpel blade.  Given the location of the defect and the proximity to free margins a bilobe flap was deemed most appropriate.  Using a sterile surgical marker, an appropriate bilobe flap drawn around the defect.    The area thus outlined was incised deep to adipose tissue with a #15 scalpel blade.  The skin margins were undermined to an appropriate distance in all directions utilizing iris scissors.
Bilobed Transposition Flap Text: The defect edges were debeveled with a #15 scalpel blade.  Given the location of the defect and the proximity to free margins a bilobed transposition flap was deemed most appropriate.  Using a sterile surgical marker, an appropriate bilobe flap drawn around the defect.    The area thus outlined was incised deep to adipose tissue with a #15 scalpel blade.  The skin margins were undermined to an appropriate distance in all directions utilizing iris scissors.
Trilobed Flap Text: The defect edges were debeveled with a #15 scalpel blade.  Given the location of the defect and the proximity to free margins a trilobed flap was deemed most appropriate.  Using a sterile surgical marker, an appropriate trilobed flap drawn around the defect.    The area thus outlined was incised deep to adipose tissue with a #15 scalpel blade.  The skin margins were undermined to an appropriate distance in all directions utilizing iris scissors.
Dorsal Nasal Flap Text: The defect edges were debeveled with a #15 scalpel blade.  Given the location of the defect and the proximity to free margins a dorsal nasal flap was deemed most appropriate.  Using a sterile surgical marker, an appropriate dorsal nasal flap was drawn around the defect.    The area thus outlined was incised deep to adipose tissue with a #15 scalpel blade.  The skin margins were undermined to an appropriate distance in all directions utilizing iris scissors.
Island Pedicle Flap Text: The defect edges were debeveled with a #15 scalpel blade.  Given the location of the defect, shape of the defect and the proximity to free margins an island pedicle advancement flap was deemed most appropriate.  Using a sterile surgical marker, an appropriate advancement flap was drawn incorporating the defect, outlining the appropriate donor tissue and placing the expected incisions within the relaxed skin tension lines where possible.    The area thus outlined was incised deep to adipose tissue with a #15 scalpel blade.  The skin margins were undermined to an appropriate distance in all directions around the primary defect and laterally outward around the island pedicle utilizing iris scissors.  There was minimal undermining beneath the pedicle flap.
Island Pedicle Flap With Canthal Suspension Text: The defect edges were debeveled with a #15 scalpel blade.  Given the location of the defect, shape of the defect and the proximity to free margins an island pedicle advancement flap was deemed most appropriate.  Using a sterile surgical marker, an appropriate advancement flap was drawn incorporating the defect, outlining the appropriate donor tissue and placing the expected incisions within the relaxed skin tension lines where possible. The area thus outlined was incised deep to adipose tissue with a #15 scalpel blade.  The skin margins were undermined to an appropriate distance in all directions around the primary defect and laterally outward around the island pedicle utilizing iris scissors.  There was minimal undermining beneath the pedicle flap. A suspension suture was placed in the canthal tendon to prevent tension and prevent ectropion.
Alar Island Pedicle Flap Text: The defect edges were debeveled with a #15 scalpel blade.  Given the location of the defect, shape of the defect and the proximity to the alar rim an island pedicle advancement flap was deemed most appropriate.  Using a sterile surgical marker, an appropriate advancement flap was drawn incorporating the defect, outlining the appropriate donor tissue and placing the expected incisions within the nasal ala running parallel to the alar rim. The area thus outlined was incised with a #15 scalpel blade.  The skin margins were undermined minimally to an appropriate distance in all directions around the primary defect and laterally outward around the island pedicle utilizing iris scissors.  There was minimal undermining beneath the pedicle flap.
Double Island Pedicle Flap Text: The defect edges were debeveled with a #15 scalpel blade.  Given the location of the defect, shape of the defect and the proximity to free margins a double island pedicle advancement flap was deemed most appropriate.  Using a sterile surgical marker, an appropriate advancement flap was drawn incorporating the defect, outlining the appropriate donor tissue and placing the expected incisions within the relaxed skin tension lines where possible.    The area thus outlined was incised deep to adipose tissue with a #15 scalpel blade.  The skin margins were undermined to an appropriate distance in all directions around the primary defect and laterally outward around the island pedicle utilizing iris scissors.  There was minimal undermining beneath the pedicle flap.
Island Pedicle Flap-Requiring Vessel Identification Text: The defect edges were debeveled with a #15 scalpel blade.  Given the location of the defect, shape of the defect and the proximity to free margins an island pedicle advancement flap was deemed most appropriate.  Using a sterile surgical marker, an appropriate advancement flap was drawn, based on the axial vessel mentioned above, incorporating the defect, outlining the appropriate donor tissue and placing the expected incisions within the relaxed skin tension lines where possible.    The area thus outlined was incised deep to adipose tissue with a #15 scalpel blade.  The skin margins were undermined to an appropriate distance in all directions around the primary defect and laterally outward around the island pedicle utilizing iris scissors.  There was minimal undermining beneath the pedicle flap.
Keystone Flap Text: The defect edges were debeveled with a #15 scalpel blade.  Given the location of the defect, shape of the defect a keystone flap was deemed most appropriate.  Using a sterile surgical marker, an appropriate keystone flap was drawn incorporating the defect, outlining the appropriate donor tissue and placing the expected incisions within the relaxed skin tension lines where possible. The area thus outlined was incised deep to adipose tissue with a #15 scalpel blade.  The skin margins were undermined to an appropriate distance in all directions around the primary defect and laterally outward around the flap utilizing iris scissors.
O-T Plasty Text: The defect edges were debeveled with a #15 scalpel blade.  Given the location of the defect, shape of the defect and the proximity to free margins an O-T plasty was deemed most appropriate.  Using a sterile surgical marker, an appropriate O-T plasty was drawn incorporating the defect and placing the expected incisions within the relaxed skin tension lines where possible.    The area thus outlined was incised deep to adipose tissue with a #15 scalpel blade.  The skin margins were undermined to an appropriate distance in all directions utilizing iris scissors.
O-Z Plasty Text: The defect edges were debeveled with a #15 scalpel blade.  Given the location of the defect, shape of the defect and the proximity to free margins an O-Z plasty (double transposition flap) was deemed most appropriate.  Using a sterile surgical marker, the appropriate transposition flaps were drawn incorporating the defect and placing the expected incisions within the relaxed skin tension lines where possible.    The area thus outlined was incised deep to adipose tissue with a #15 scalpel blade.  The skin margins were undermined to an appropriate distance in all directions utilizing iris scissors.  Hemostasis was achieved with electrocautery.  The flaps were then transposed into place, one clockwise and the other counterclockwise, and anchored with interrupted buried subcutaneous sutures.
Double O-Z Plasty Text: The defect edges were debeveled with a #15 scalpel blade.  Given the location of the defect, shape of the defect and the proximity to free margins a Double O-Z plasty (double transposition flap) was deemed most appropriate.  Using a sterile surgical marker, the appropriate transposition flaps were drawn incorporating the defect and placing the expected incisions within the relaxed skin tension lines where possible. The area thus outlined was incised deep to adipose tissue with a #15 scalpel blade.  The skin margins were undermined to an appropriate distance in all directions utilizing iris scissors.  Hemostasis was achieved with electrocautery.  The flaps were then transposed into place, one clockwise and the other counterclockwise, and anchored with interrupted buried subcutaneous sutures.
V-Y Plasty Text: The defect edges were debeveled with a #15 scalpel blade.  Given the location of the defect, shape of the defect and the proximity to free margins an V-Y advancement flap was deemed most appropriate.  Using a sterile surgical marker, an appropriate advancement flap was drawn incorporating the defect and placing the expected incisions within the relaxed skin tension lines where possible.    The area thus outlined was incised deep to adipose tissue with a #15 scalpel blade.  The skin margins were undermined to an appropriate distance in all directions utilizing iris scissors.
H Plasty Text: Given the location of the defect, shape of the defect and the proximity to free margins a H-plasty was deemed most appropriate for repair.  Using a sterile surgical marker, the appropriate advancement arms of the H-plasty were drawn incorporating the defect and placing the expected incisions within the relaxed skin tension lines where possible. The area thus outlined was incised deep to adipose tissue with a #15 scalpel blade. The skin margins were undermined to an appropriate distance in all directions utilizing iris scissors.  The opposing advancement arms were then advanced into place in opposite direction and anchored with interrupted buried subcutaneous sutures.
W Plasty Text: The lesion was extirpated to the level of the fat with a #15 scalpel blade.  Given the location of the defect, shape of the defect and the proximity to free margins a W-plasty was deemed most appropriate for repair.  Using a sterile surgical marker, the appropriate transposition arms of the W-plasty were drawn incorporating the defect and placing the expected incisions within the relaxed skin tension lines where possible.    The area thus outlined was incised deep to adipose tissue with a #15 scalpel blade.  The skin margins were undermined to an appropriate distance in all directions utilizing iris scissors.  The opposing transposition arms were then transposed into place in opposite direction and anchored with interrupted buried subcutaneous sutures.
Z Plasty Text: The lesion was extirpated to the level of the fat with a #15 scalpel blade.  Given the location of the defect, shape of the defect and the proximity to free margins a Z-plasty was deemed most appropriate for repair.  Using a sterile surgical marker, the appropriate transposition arms of the Z-plasty were drawn incorporating the defect and placing the expected incisions within the relaxed skin tension lines where possible.    The area thus outlined was incised deep to adipose tissue with a #15 scalpel blade.  The skin margins were undermined to an appropriate distance in all directions utilizing iris scissors.  The opposing transposition arms were then transposed into place in opposite direction and anchored with interrupted buried subcutaneous sutures.
Double Z Plasty Text: The lesion was extirpated to the level of the fat with a #15 scalpel blade. Given the location of the defect, shape of the defect and the proximity to free margins a double Z-plasty was deemed most appropriate for repair. Using a sterile surgical marker, the appropriate transposition arms of the double Z-plasty were drawn incorporating the defect and placing the expected incisions within the relaxed skin tension lines where possible. The area thus outlined was incised deep to adipose tissue with a #15 scalpel blade. The skin margins were undermined to an appropriate distance in all directions utilizing iris scissors. The opposing transposition arms were then transposed and carried over into place in opposite direction and anchored with interrupted buried subcutaneous sutures.
Zygomaticofacial Flap Text: Given the location of the defect, shape of the defect and the proximity to free margins a zygomaticofacial flap was deemed most appropriate for repair.  Using a sterile surgical marker, the appropriate flap was drawn incorporating the defect and placing the expected incisions within the relaxed skin tension lines where possible. The area thus outlined was incised deep to adipose tissue with a #15 scalpel blade with preservation of a vascular pedicle.  The skin margins were undermined to an appropriate distance in all directions utilizing iris scissors.  The flap was then placed into the defect and anchored with interrupted buried subcutaneous sutures.
Cheek Interpolation Flap Text: A decision was made to reconstruct the defect utilizing an interpolation axial flap and a staged reconstruction.  A telfa template was made of the defect.  This telfa template was then used to outline the Cheek Interpolation flap.  The donor area for the pedicle flap was then injected with anesthesia.  The flap was excised through the skin and subcutaneous tissue down to the layer of the underlying musculature.  The interpolation flap was carefully excised within this deep plane to maintain its blood supply.  The edges of the donor site were undermined.   The donor site was closed in a primary fashion.  The pedicle was then rotated into position and sutured.  Once the tube was sutured into place, adequate blood supply was confirmed with blanching and refill.  The pedicle was then wrapped with xeroform gauze and dressed appropriately with a telfa and gauze bandage to ensure continued blood supply and protect the attached pedicle.
Cheek-To-Nose Interpolation Flap Text: A decision was made to reconstruct the defect utilizing an interpolation axial flap and a staged reconstruction.  A telfa template was made of the defect.  This telfa template was then used to outline the Cheek-To-Nose Interpolation flap.  The donor area for the pedicle flap was then injected with anesthesia.  The flap was excised through the skin and subcutaneous tissue down to the layer of the underlying musculature.  The interpolation flap was carefully excised within this deep plane to maintain its blood supply.  The edges of the donor site were undermined.   The donor site was closed in a primary fashion.  The pedicle was then rotated into position and sutured.  Once the tube was sutured into place, adequate blood supply was confirmed with blanching and refill.  The pedicle was then wrapped with xeroform gauze and dressed appropriately with a telfa and gauze bandage to ensure continued blood supply and protect the attached pedicle.
Interpolation Flap Text: A decision was made to reconstruct the defect utilizing an interpolation axial flap and a staged reconstruction.  A telfa template was made of the defect.  This telfa template was then used to outline the interpolation flap.  The donor area for the pedicle flap was then injected with anesthesia.  The flap was excised through the skin and subcutaneous tissue down to the layer of the underlying musculature.  The interpolation flap was carefully excised within this deep plane to maintain its blood supply.  The edges of the donor site were undermined.   The donor site was closed in a primary fashion.  The pedicle was then rotated into position and sutured.  Once the tube was sutured into place, adequate blood supply was confirmed with blanching and refill.  The pedicle was then wrapped with xeroform gauze and dressed appropriately with a telfa and gauze bandage to ensure continued blood supply and protect the attached pedicle.
Melolabial Interpolation Flap Text: A decision was made to reconstruct the defect utilizing an interpolation axial flap and a staged reconstruction.  A telfa template was made of the defect.  This telfa template was then used to outline the melolabial interpolation flap.  The donor area for the pedicle flap was then injected with anesthesia.  The flap was excised through the skin and subcutaneous tissue down to the layer of the underlying musculature.  The pedicle flap was carefully excised within this deep plane to maintain its blood supply.  The edges of the donor site were undermined.   The donor site was closed in a primary fashion.  The pedicle was then rotated into position and sutured.  Once the tube was sutured into place, adequate blood supply was confirmed with blanching and refill.  The pedicle was then wrapped with xeroform gauze and dressed appropriately with a telfa and gauze bandage to ensure continued blood supply and protect the attached pedicle.
Mastoid Interpolation Flap Text: A decision was made to reconstruct the defect utilizing an interpolation axial flap and a staged reconstruction.  A telfa template was made of the defect.  This telfa template was then used to outline the mastoid interpolation flap.  The donor area for the pedicle flap was then injected with anesthesia.  The flap was excised through the skin and subcutaneous tissue down to the layer of the underlying musculature.  The pedicle flap was carefully excised within this deep plane to maintain its blood supply.  The edges of the donor site were undermined.   The donor site was closed in a primary fashion.  The pedicle was then rotated into position and sutured.  Once the tube was sutured into place, adequate blood supply was confirmed with blanching and refill.  The pedicle was then wrapped with xeroform gauze and dressed appropriately with a telfa and gauze bandage to ensure continued blood supply and protect the attached pedicle.
Posterior Auricular Interpolation Flap Text: A decision was made to reconstruct the defect utilizing an interpolation axial flap and a staged reconstruction.  A telfa template was made of the defect.  This telfa template was then used to outline the posterior auricular interpolation flap.  The donor area for the pedicle flap was then injected with anesthesia.  The flap was excised through the skin and subcutaneous tissue down to the layer of the underlying musculature.  The pedicle flap was carefully excised within this deep plane to maintain its blood supply.  The edges of the donor site were undermined.   The donor site was closed in a primary fashion.  The pedicle was then rotated into position and sutured.  Once the tube was sutured into place, adequate blood supply was confirmed with blanching and refill.  The pedicle was then wrapped with xeroform gauze and dressed appropriately with a telfa and gauze bandage to ensure continued blood supply and protect the attached pedicle.
Paramedian Forehead Flap Text: A decision was made to reconstruct the defect utilizing an interpolation axial flap and a staged reconstruction.  A telfa template was made of the defect.  This telfa template was then used to outline the paramedian forehead pedicle flap.  The donor area for the pedicle flap was then injected with anesthesia.  The flap was excised through the skin and subcutaneous tissue down to the layer of the underlying musculature.  The pedicle flap was carefully excised within this deep plane to maintain its blood supply.  The edges of the donor site were undermined.   The donor site was closed in a primary fashion.  The pedicle was then rotated into position and sutured.  Once the tube was sutured into place, adequate blood supply was confirmed with blanching and refill.  The pedicle was then wrapped with xeroform gauze and dressed appropriately with a telfa and gauze bandage to ensure continued blood supply and protect the attached pedicle.
Abbe Flap (Upper To Lower Lip) Text: The defect of the lower lip was assessed and measured.  Given the location and size of the defect, an Abbe flap was deemed most appropriate. Using a sterile surgical marker, an appropriate Abbe flap was measured and drawn on the upper lip. Local anesthesia was then infiltrated.  A scalpel was then used to incise the upper lip through and through the skin, vermilion, muscle and mucosa, leaving the flap pedicled on the opposite side.  The flap was then rotated and transferred to the lower lip defect.  The flap was then sutured into place with a three layer technique, closing the orbicularis oris muscle layer with subcutaneous buried sutures, followed by a mucosal layer and an epidermal layer.
Abbe Flap (Lower To Upper Lip) Text: The defect of the upper lip was assessed and measured.  Given the location and size of the defect, an Abbe flap was deemed most appropriate. Using a sterile surgical marker, an appropriate Abbe flap was measured and drawn on the lower lip. Local anesthesia was then infiltrated. A scalpel was then used to incise the upper lip through and through the skin, vermilion, muscle and mucosa, leaving the flap pedicled on the opposite side.  The flap was then rotated and transferred to the lower lip defect.  The flap was then sutured into place with a three layer technique, closing the orbicularis oris muscle layer with subcutaneous buried sutures, followed by a mucosal layer and an epidermal layer.
Estlander Flap (Upper To Lower Lip) Text: The defect of the lower lip was assessed and measured.  Given the location and size of the defect, an Estlander flap was deemed most appropriate. Using a sterile surgical marker, an appropriate Estlander flap was measured and drawn on the upper lip. Local anesthesia was then infiltrated. A scalpel was then used to incise the lateral aspect of the flap, through skin, muscle and mucosa, leaving the flap pedicled medially.  The flap was then rotated and positioned to fill the lower lip defect.  The flap was then sutured into place with a three layer technique, closing the orbicularis oris muscle layer with subcutaneous buried sutures, followed by a mucosal layer and an epidermal layer.
Lip Wedge Excision Repair Text: Given the location of the defect and the proximity to free margins a full thickness wedge repair was deemed most appropriate.  Using a sterile surgical marker, the appropriate repair was drawn incorporating the defect and placing the expected incisions perpendicular to the vermilion border.  The vermilion border was also meticulously outlined to ensure appropriate reapproximation during the repair.  The area thus outlined was incised through and through with a #15 scalpel blade.  The muscularis and dermis were reaproximated with deep sutures following hemostasis. Care was taken to realign the vermilion border before proceeding with the superficial closure.  Once the vermilion was realigned the superfical and mucosal closure was finished.
Ftsg Text: The defect edges were debeveled with a #15 scalpel blade.  Given the location of the defect, shape of the defect and the proximity to free margins a full thickness skin graft was deemed most appropriate.  Using a sterile surgical marker, the primary defect shape was transferred to the donor site. The area thus outlined was incised deep to adipose tissue with a #15 scalpel blade.  The harvested graft was then trimmed of adipose tissue until only dermis and epidermis was left.  The skin margins of the secondary defect were undermined to an appropriate distance in all directions utilizing iris scissors.  The secondary defect was closed with interrupted buried subcutaneous sutures.  The skin edges were then re-apposed with running  sutures.  The skin graft was then placed in the primary defect and oriented appropriately.
Split-Thickness Skin Graft Text: The defect edges were debeveled with a #15 scalpel blade.  Given the location of the defect, shape of the defect and the proximity to free margins a split thickness skin graft was deemed most appropriate.  Using a sterile surgical marker, the primary defect shape was transferred to the donor site. The split thickness graft was then harvested.  The skin graft was then placed in the primary defect and oriented appropriately.
Pinch Graft Text: The defect edges were debeveled with a #15 scalpel blade. Given the location of the defect, shape of the defect and the proximity to free margins a pinch graft was deemed most appropriate. Using a sterile surgical marker, the primary defect shape was transferred to the donor site. The area thus outlined was incised deep to adipose tissue with a #15 scalpel blade.  The harvested graft was then trimmed of adipose tissue until only dermis and epidermis was left. The skin graft was then placed in the primary defect and oriented appropriately.
Burow's Graft Text: The defect edges were debeveled with a #15 scalpel blade.  Given the location of the defect, shape of the defect, the proximity to free margins and the presence of a standing cone deformity a Burow's skin graft was deemed most appropriate. The standing cone was removed and this tissue was then trimmed to the shape of the primary defect. The adipose tissue was also removed until only dermis and epidermis were left.  The skin margins of the secondary defect were undermined to an appropriate distance in all directions utilizing iris scissors.  The secondary defect was closed with interrupted buried subcutaneous sutures.  The skin edges were then re-apposed with running  sutures.  The skin graft was then placed in the primary defect and oriented appropriately.
Cartilage Graft Text: The defect edges were debeveled with a #15 scalpel blade.  Given the location of the defect, shape of the defect, the fact the defect involved a full thickness cartilage defect a cartilage graft was deemed most appropriate.  An appropriate donor site was identified, cleansed, and anesthetized. The cartilage graft was then harvested and transferred to the recipient site, oriented appropriately and then sutured into place.  The secondary defect was then repaired using a primary closure.
Composite Graft Text: The defect edges were debeveled with a #15 scalpel blade.  Given the location of the defect, shape of the defect, the proximity to free margins and the fact the defect was full thickness a composite graft was deemed most appropriate.  The defect was outline and then transferred to the donor site.  A full thickness graft was then excised from the donor site. The graft was then placed in the primary defect, oriented appropriately and then sutured into place.  The secondary defect was then repaired using a primary closure.
Epidermal Autograft Text: The defect edges were debeveled with a #15 scalpel blade.  Given the location of the defect, shape of the defect and the proximity to free margins an epidermal autograft was deemed most appropriate.  Using a sterile surgical marker, the primary defect shape was transferred to the donor site. The epidermal graft was then harvested.  The skin graft was then placed in the primary defect and oriented appropriately.
Dermal Autograft Text: The defect edges were debeveled with a #15 scalpel blade.  Given the location of the defect, shape of the defect and the proximity to free margins a dermal autograft was deemed most appropriate.  Using a sterile surgical marker, the primary defect shape was transferred to the donor site. The area thus outlined was incised deep to adipose tissue with a #15 scalpel blade.  The harvested graft was then trimmed of adipose and epidermal tissue until only dermis was left.  The skin graft was then placed in the primary defect and oriented appropriately.
Skin Substitute Text: The defect edges were debeveled with a #15 scalpel blade.  Given the location of the defect, shape of the defect and the proximity to free margins a skin substitute graft was deemed most appropriate.  The graft material was trimmed to fit the size of the defect. The graft was then placed in the primary defect and oriented appropriately.
Tissue Cultured Epidermal Autograft Text: The defect edges were debeveled with a #15 scalpel blade.  Given the location of the defect, shape of the defect and the proximity to free margins a tissue cultured epidermal autograft was deemed most appropriate.  The graft was then trimmed to fit the size of the defect.  The graft was then placed in the primary defect and oriented appropriately.
Xenograft Text: The defect edges were debeveled with a #15 scalpel blade.  Given the location of the defect, shape of the defect and the proximity to free margins a xenograft was deemed most appropriate.  The graft was then trimmed to fit the size of the defect.  The graft was then placed in the primary defect and oriented appropriately.
Purse String (Intermediate) Text: Given the location of the defect and the characteristics of the surrounding skin a purse string intermediate closure was deemed most appropriate.  Undermining was performed circumferentially around the surgical defect.  A purse string suture was then placed and tightened.
Purse String (Simple) Text: Given the location of the defect and the characteristics of the surrounding skin a purse string simple closure was deemed most appropriate.  Undermining was performed circumferentially around the surgical defect.  A purse string suture was then placed and tightened.
Complex Repair And Single Advancement Flap Text: The defect edges were debeveled with a #15 scalpel blade.  The primary defect was closed partially with a complex linear closure.  Given the location of the remaining defect, shape of the defect and the proximity to free margins a single advancement flap was deemed most appropriate for complete closure of the defect.  Using a sterile surgical marker, an appropriate advancement flap was drawn incorporating the defect and placing the expected incisions within the relaxed skin tension lines where possible.    The area thus outlined was incised deep to adipose tissue with a #15 scalpel blade.  The skin margins were undermined to an appropriate distance in all directions utilizing iris scissors.
Complex Repair And Double Advancement Flap Text: The defect edges were debeveled with a #15 scalpel blade.  The primary defect was closed partially with a complex linear closure.  Given the location of the remaining defect, shape of the defect and the proximity to free margins a double advancement flap was deemed most appropriate for complete closure of the defect.  Using a sterile surgical marker, an appropriate advancement flap was drawn incorporating the defect and placing the expected incisions within the relaxed skin tension lines where possible.    The area thus outlined was incised deep to adipose tissue with a #15 scalpel blade.  The skin margins were undermined to an appropriate distance in all directions utilizing iris scissors.
Complex Repair And Modified Advancement Flap Text: The defect edges were debeveled with a #15 scalpel blade.  The primary defect was closed partially with a complex linear closure.  Given the location of the remaining defect, shape of the defect and the proximity to free margins a modified advancement flap was deemed most appropriate for complete closure of the defect.  Using a sterile surgical marker, an appropriate advancement flap was drawn incorporating the defect and placing the expected incisions within the relaxed skin tension lines where possible.    The area thus outlined was incised deep to adipose tissue with a #15 scalpel blade.  The skin margins were undermined to an appropriate distance in all directions utilizing iris scissors.
Complex Repair And A-T Advancement Flap Text: The defect edges were debeveled with a #15 scalpel blade.  The primary defect was closed partially with a complex linear closure.  Given the location of the remaining defect, shape of the defect and the proximity to free margins an A-T advancement flap was deemed most appropriate for complete closure of the defect.  Using a sterile surgical marker, an appropriate advancement flap was drawn incorporating the defect and placing the expected incisions within the relaxed skin tension lines where possible.    The area thus outlined was incised deep to adipose tissue with a #15 scalpel blade.  The skin margins were undermined to an appropriate distance in all directions utilizing iris scissors.
Complex Repair And O-T Advancement Flap Text: The defect edges were debeveled with a #15 scalpel blade.  The primary defect was closed partially with a complex linear closure.  Given the location of the remaining defect, shape of the defect and the proximity to free margins an O-T advancement flap was deemed most appropriate for complete closure of the defect.  Using a sterile surgical marker, an appropriate advancement flap was drawn incorporating the defect and placing the expected incisions within the relaxed skin tension lines where possible.    The area thus outlined was incised deep to adipose tissue with a #15 scalpel blade.  The skin margins were undermined to an appropriate distance in all directions utilizing iris scissors.
Complex Repair And O-L Flap Text: The defect edges were debeveled with a #15 scalpel blade.  The primary defect was closed partially with a complex linear closure.  Given the location of the remaining defect, shape of the defect and the proximity to free margins an O-L flap was deemed most appropriate for complete closure of the defect.  Using a sterile surgical marker, an appropriate flap was drawn incorporating the defect and placing the expected incisions within the relaxed skin tension lines where possible.    The area thus outlined was incised deep to adipose tissue with a #15 scalpel blade.  The skin margins were undermined to an appropriate distance in all directions utilizing iris scissors.
Complex Repair And Bilobe Flap Text: The defect edges were debeveled with a #15 scalpel blade.  The primary defect was closed partially with a complex linear closure.  Given the location of the remaining defect, shape of the defect and the proximity to free margins a bilobe flap was deemed most appropriate for complete closure of the defect.  Using a sterile surgical marker, an appropriate advancement flap was drawn incorporating the defect and placing the expected incisions within the relaxed skin tension lines where possible.    The area thus outlined was incised deep to adipose tissue with a #15 scalpel blade.  The skin margins were undermined to an appropriate distance in all directions utilizing iris scissors.
Complex Repair And Melolabial Flap Text: The defect edges were debeveled with a #15 scalpel blade.  The primary defect was closed partially with a complex linear closure.  Given the location of the remaining defect, shape of the defect and the proximity to free margins a melolabial flap was deemed most appropriate for complete closure of the defect.  Using a sterile surgical marker, an appropriate advancement flap was drawn incorporating the defect and placing the expected incisions within the relaxed skin tension lines where possible.    The area thus outlined was incised deep to adipose tissue with a #15 scalpel blade.  The skin margins were undermined to an appropriate distance in all directions utilizing iris scissors.
Complex Repair And Rotation Flap Text: The defect edges were debeveled with a #15 scalpel blade.  The primary defect was closed partially with a complex linear closure.  Given the location of the remaining defect, shape of the defect and the proximity to free margins a rotation flap was deemed most appropriate for complete closure of the defect.  Using a sterile surgical marker, an appropriate advancement flap was drawn incorporating the defect and placing the expected incisions within the relaxed skin tension lines where possible.    The area thus outlined was incised deep to adipose tissue with a #15 scalpel blade.  The skin margins were undermined to an appropriate distance in all directions utilizing iris scissors.
Complex Repair And Rhombic Flap Text: The defect edges were debeveled with a #15 scalpel blade.  The primary defect was closed partially with a complex linear closure.  Given the location of the remaining defect, shape of the defect and the proximity to free margins a rhombic flap was deemed most appropriate for complete closure of the defect.  Using a sterile surgical marker, an appropriate advancement flap was drawn incorporating the defect and placing the expected incisions within the relaxed skin tension lines where possible.    The area thus outlined was incised deep to adipose tissue with a #15 scalpel blade.  The skin margins were undermined to an appropriate distance in all directions utilizing iris scissors.
Complex Repair And Transposition Flap Text: The defect edges were debeveled with a #15 scalpel blade.  The primary defect was closed partially with a complex linear closure.  Given the location of the remaining defect, shape of the defect and the proximity to free margins a transposition flap was deemed most appropriate for complete closure of the defect.  Using a sterile surgical marker, an appropriate advancement flap was drawn incorporating the defect and placing the expected incisions within the relaxed skin tension lines where possible.    The area thus outlined was incised deep to adipose tissue with a #15 scalpel blade.  The skin margins were undermined to an appropriate distance in all directions utilizing iris scissors.
Complex Repair And V-Y Plasty Text: The defect edges were debeveled with a #15 scalpel blade.  The primary defect was closed partially with a complex linear closure.  Given the location of the remaining defect, shape of the defect and the proximity to free margins a V-Y plasty was deemed most appropriate for complete closure of the defect.  Using a sterile surgical marker, an appropriate advancement flap was drawn incorporating the defect and placing the expected incisions within the relaxed skin tension lines where possible.    The area thus outlined was incised deep to adipose tissue with a #15 scalpel blade.  The skin margins were undermined to an appropriate distance in all directions utilizing iris scissors.
Complex Repair And M Plasty Text: The defect edges were debeveled with a #15 scalpel blade.  The primary defect was closed partially with a complex linear closure.  Given the location of the remaining defect, shape of the defect and the proximity to free margins an M plasty was deemed most appropriate for complete closure of the defect.  Using a sterile surgical marker, an appropriate advancement flap was drawn incorporating the defect and placing the expected incisions within the relaxed skin tension lines where possible.    The area thus outlined was incised deep to adipose tissue with a #15 scalpel blade.  The skin margins were undermined to an appropriate distance in all directions utilizing iris scissors.
Complex Repair And Double M Plasty Text: The defect edges were debeveled with a #15 scalpel blade.  The primary defect was closed partially with a complex linear closure.  Given the location of the remaining defect, shape of the defect and the proximity to free margins a double M plasty was deemed most appropriate for complete closure of the defect.  Using a sterile surgical marker, an appropriate advancement flap was drawn incorporating the defect and placing the expected incisions within the relaxed skin tension lines where possible.    The area thus outlined was incised deep to adipose tissue with a #15 scalpel blade.  The skin margins were undermined to an appropriate distance in all directions utilizing iris scissors.
Complex Repair And W Plasty Text: The defect edges were debeveled with a #15 scalpel blade.  The primary defect was closed partially with a complex linear closure.  Given the location of the remaining defect, shape of the defect and the proximity to free margins a W plasty was deemed most appropriate for complete closure of the defect.  Using a sterile surgical marker, an appropriate advancement flap was drawn incorporating the defect and placing the expected incisions within the relaxed skin tension lines where possible.    The area thus outlined was incised deep to adipose tissue with a #15 scalpel blade.  The skin margins were undermined to an appropriate distance in all directions utilizing iris scissors.
Complex Repair And Z Plasty Text: The defect edges were debeveled with a #15 scalpel blade.  The primary defect was closed partially with a complex linear closure.  Given the location of the remaining defect, shape of the defect and the proximity to free margins a Z plasty was deemed most appropriate for complete closure of the defect.  Using a sterile surgical marker, an appropriate advancement flap was drawn incorporating the defect and placing the expected incisions within the relaxed skin tension lines where possible.    The area thus outlined was incised deep to adipose tissue with a #15 scalpel blade.  The skin margins were undermined to an appropriate distance in all directions utilizing iris scissors.
Complex Repair And Dorsal Nasal Flap Text: The defect edges were debeveled with a #15 scalpel blade.  The primary defect was closed partially with a complex linear closure.  Given the location of the remaining defect, shape of the defect and the proximity to free margins a dorsal nasal flap was deemed most appropriate for complete closure of the defect.  Using a sterile surgical marker, an appropriate flap was drawn incorporating the defect and placing the expected incisions within the relaxed skin tension lines where possible.    The area thus outlined was incised deep to adipose tissue with a #15 scalpel blade.  The skin margins were undermined to an appropriate distance in all directions utilizing iris scissors.
Complex Repair And Ftsg Text: The defect edges were debeveled with a #15 scalpel blade.  The primary defect was closed partially with a complex linear closure.  Given the location of the defect, shape of the defect and the proximity to free margins a full thickness skin graft was deemed most appropriate to repair the remaining defect.  The graft was trimmed to fit the size of the remaining defect.  The graft was then placed in the primary defect, oriented appropriately, and sutured into place.
Complex Repair And Burow's Graft Text: The defect edges were debeveled with a #15 scalpel blade.  The primary defect was closed partially with a complex linear closure.  Given the location of the defect, shape of the defect, the proximity to free margins and the presence of a standing cone deformity a Burow's graft was deemed most appropriate to repair the remaining defect.  The graft was trimmed to fit the size of the remaining defect.  The graft was then placed in the primary defect, oriented appropriately, and sutured into place.
Complex Repair And Split-Thickness Skin Graft Text: The defect edges were debeveled with a #15 scalpel blade.  The primary defect was closed partially with a complex linear closure.  Given the location of the defect, shape of the defect and the proximity to free margins a split thickness skin graft was deemed most appropriate to repair the remaining defect.  The graft was trimmed to fit the size of the remaining defect.  The graft was then placed in the primary defect, oriented appropriately, and sutured into place.
Complex Repair And Epidermal Autograft Text: The defect edges were debeveled with a #15 scalpel blade.  The primary defect was closed partially with a complex linear closure.  Given the location of the defect, shape of the defect and the proximity to free margins an epidermal autograft was deemed most appropriate to repair the remaining defect.  The graft was trimmed to fit the size of the remaining defect.  The graft was then placed in the primary defect, oriented appropriately, and sutured into place.
Complex Repair And Dermal Autograft Text: The defect edges were debeveled with a #15 scalpel blade.  The primary defect was closed partially with a complex linear closure.  Given the location of the defect, shape of the defect and the proximity to free margins an dermal autograft was deemed most appropriate to repair the remaining defect.  The graft was trimmed to fit the size of the remaining defect.  The graft was then placed in the primary defect, oriented appropriately, and sutured into place.
Complex Repair And Tissue Cultured Epidermal Autograft Text: The defect edges were debeveled with a #15 scalpel blade.  The primary defect was closed partially with a complex linear closure.  Given the location of the defect, shape of the defect and the proximity to free margins an tissue cultured epidermal autograft was deemed most appropriate to repair the remaining defect.  The graft was trimmed to fit the size of the remaining defect.  The graft was then placed in the primary defect, oriented appropriately, and sutured into place.
Complex Repair And Xenograft Text: The defect edges were debeveled with a #15 scalpel blade.  The primary defect was closed partially with a complex linear closure.  Given the location of the defect, shape of the defect and the proximity to free margins a xenograft was deemed most appropriate to repair the remaining defect.  The graft was trimmed to fit the size of the remaining defect.  The graft was then placed in the primary defect, oriented appropriately, and sutured into place.
Complex Repair And Skin Substitute Graft Text: The defect edges were debeveled with a #15 scalpel blade.  The primary defect was closed partially with a complex linear closure.  Given the location of the remaining defect, shape of the defect and the proximity to free margins a skin substitute graft was deemed most appropriate to repair the remaining defect.  The graft was trimmed to fit the size of the remaining defect.  The graft was then placed in the primary defect, oriented appropriately, and sutured into place.
Path Notes (To The Dermatopathologist): Please confirm diagnosis and check margins.
Consent was obtained from the patient. The risks and benefits to therapy were discussed in detail. Specifically, the risks of infection, scarring, bleeding, prolonged wound healing, incomplete removal, allergy to anesthesia, nerve injury and recurrence were addressed. Prior to the procedure, the treatment site was clearly identified and confirmed by the patient. All components of Universal Protocol/PAUSE Rule completed.
Post-Care Instructions: I reviewed with the patient in detail post-care instructions. Patient is not to engage in any heavy lifting, exercise, or swimming for the next 14 days. Should the patient develop any fevers, chills, bleeding, severe pain patient will contact the office immediately.
Home Suture Removal Text: Patient was provided a home suture removal kit and will remove their sutures at home.  If they have any questions or difficulties they will call the office.
Where Do You Want The Question To Include Opioid Counseling Located?: Case Summary Tab
Billing Type: Third-Party Bill
Information: Selecting Yes will display possible errors in your note based on the variables you have selected. This validation is only offered as a suggestion for you. PLEASE NOTE THAT THE VALIDATION TEXT WILL BE REMOVED WHEN YOU FINALIZE YOUR NOTE. IF YOU WANT TO FAX A PRELIMINARY NOTE YOU WILL NEED TO TOGGLE THIS TO 'NO' IF YOU DO NOT WANT IT IN YOUR FAXED NOTE.

## 2024-10-29 ENCOUNTER — TELEPHONE (OUTPATIENT)
Dept: RHEUMATOLOGY | Facility: MEDICAL CENTER | Age: 49
End: 2024-10-29

## 2024-10-29 NOTE — TELEPHONE ENCOUNTER
Patient called office requesting refill for Hydroxychloroquine, patient informed by pharmacy prescription was discontinued.  Called in verbal rx to patient pharmacy as this is still active prescription

## 2024-10-31 ENCOUNTER — APPOINTMENT (RX ONLY)
Dept: URBAN - METROPOLITAN AREA CLINIC 22 | Facility: CLINIC | Age: 49
Setting detail: DERMATOLOGY
End: 2024-10-31

## 2024-10-31 DIAGNOSIS — Z48.817 ENCOUNTER FOR SURGICAL AFTERCARE FOLLOWING SURGERY ON THE SKIN AND SUBCUTANEOUS TISSUE: ICD-10-CM

## 2024-10-31 PROCEDURE — 99024 POSTOP FOLLOW-UP VISIT: CPT

## 2024-10-31 PROCEDURE — ? POST-OP WOUND EVALUATION

## 2024-10-31 ASSESSMENT — LOCATION SIMPLE DESCRIPTION DERM: LOCATION SIMPLE: RIGHT FOREARM

## 2024-10-31 ASSESSMENT — LOCATION DETAILED DESCRIPTION DERM: LOCATION DETAILED: RIGHT VENTRAL LATERAL DISTAL FOREARM

## 2024-10-31 ASSESSMENT — LOCATION ZONE DERM: LOCATION ZONE: ARM

## 2024-10-31 NOTE — PROCEDURE: POST-OP WOUND EVALUATION
Detail Level: Detailed
Add 51012 Cpt? (Important Note: In 2017 The Use Of 12525 Is Being Tracked By Cms To Determine Future Global Period Reimbursement For Global Periods): yes
Quality 355: Unplanned Reoperation Within The 30 Day Postoperative Period: No return to the operating room for a surgical procedure, for complications of the principal operative procedure, within 30 days of the principal operative procedure
Wound Evaluated By (Optional): Fabrice Munoz MD
Wound Diameter In Cm(Optional): 0
Wound Crusting?: clean
Wound Edema?: severe
Patient To Follow-Up With?: their primary dermatologist

## 2024-11-12 ENCOUNTER — ANTICOAGULATION VISIT (OUTPATIENT)
Dept: VASCULAR LAB | Facility: MEDICAL CENTER | Age: 49
End: 2024-11-12
Attending: INTERNAL MEDICINE
Payer: COMMERCIAL

## 2024-11-12 VITALS — BODY MASS INDEX: 24.63 KG/M2 | WEIGHT: 162 LBS

## 2024-11-12 DIAGNOSIS — I82.210 THROMBOSIS OF SUPERIOR VENA CAVA (HCC): ICD-10-CM

## 2024-11-12 DIAGNOSIS — N93.9 VAGINAL BLEEDING: ICD-10-CM

## 2024-11-12 DIAGNOSIS — D68.61 ANTIPHOSPHOLIPID SYNDROME (HCC): ICD-10-CM

## 2024-11-12 DIAGNOSIS — E27.49 HEMORRHAGE OF BOTH ADRENAL GLANDS (HCC): ICD-10-CM

## 2024-11-12 LAB — INR PPP: 4.1 (ref 2–3.5)

## 2024-11-12 PROCEDURE — 85610 PROTHROMBIN TIME: CPT

## 2024-11-12 PROCEDURE — 99212 OFFICE O/P EST SF 10 MIN: CPT

## 2024-11-12 ASSESSMENT — FIBROSIS 4 INDEX: FIB4 SCORE: 0.64

## 2024-11-12 NOTE — PROGRESS NOTES
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 Pt reports she doesn't eat salads during colder months.)    Negatives:  Signs/symptoms of thrombosis, Signs/symptoms of bleeding, Laboratory test error suspected, Change in health, Change in alcohol use, Change in activity, Upcoming invasive procedure, Emergency department visit, Upcoming dental procedure, Missed doses, Extra doses, Change in medications, Hospital admission, Bruising, Other complaints            Vitals:    24 0754   Weight: 73.5 kg (162 lb)     Pt declined vitals    Verified current warfarin dosing schedule.    Medications reconciled.  Pt is not on antiplatelet therapy.      A/P   INR is supratherapeutic  Reason(s) for out of range INR today: Decreased vitamin K intake      Warfarin dosing recommendation: Hold x 1 dose ONCE today, then begin reduced weekly regimen of 5 mg Mon/Fri and 7.5 mg ROW to account for reduced dietary Vit K intake  during winter months (13.6% reduction in TWD).     Pt educated to contact our clinic with any changes in medications or s/s of bleeding or thrombosis. Pt is aware to seek immediate medical attention for falls, head injury or deep cuts.    Request pt to return in 1 week(s). Pt agrees.    Todd Inman, PharmD

## 2024-11-18 ENCOUNTER — DOCUMENTATION (OUTPATIENT)
Dept: VASCULAR LAB | Facility: MEDICAL CENTER | Age: 49
End: 2024-11-18
Payer: COMMERCIAL

## 2024-11-18 ENCOUNTER — TELEPHONE (OUTPATIENT)
Dept: VASCULAR LAB | Facility: MEDICAL CENTER | Age: 49
End: 2024-11-18
Payer: COMMERCIAL

## 2024-11-18 NOTE — PROGRESS NOTES
Pt missed her INR appt today in the anticoagulation clinic. Called pt and left a vm asking her to call us at [phone] as soon as she can to reschedule.    Gloria SERNA

## 2024-11-18 NOTE — TELEPHONE ENCOUNTER
Caller: Jaclyn Olvera    Topic/issue: Patient called to speak with Gloria and would like a callback.     Please advise.     Callback Number: [phone]    Thank you,     Rosa LIU.

## 2024-11-18 NOTE — TELEPHONE ENCOUNTER
Called and s/w pt regarding call listed below. Patient wanted to see if it would be possible to squeeze her in tomorrow morning around 9am. Scheduled patient as a triple, informed her that she may not be seen right at 9am as she will be the third patient on the list for limited space in 2 exam rooms. Patient voiced understanding. Patient also requested that her sister Mimi Olvera be scheduled with [facility] clinic as soon as possible. Scheduled patient's sister Mimi for 11/26/24 at 8:15am which is the earliest date with available openings.     Todd Inman PharmD

## 2024-11-19 ENCOUNTER — ANTICOAGULATION VISIT (OUTPATIENT)
Dept: VASCULAR LAB | Facility: MEDICAL CENTER | Age: 49
End: 2024-11-19
Attending: INTERNAL MEDICINE
Payer: COMMERCIAL

## 2024-11-19 VITALS — WEIGHT: 166.8 LBS | BODY MASS INDEX: 25.36 KG/M2

## 2024-11-19 DIAGNOSIS — N93.9 VAGINAL BLEEDING: ICD-10-CM

## 2024-11-19 DIAGNOSIS — E27.49 HEMORRHAGE OF BOTH ADRENAL GLANDS (HCC): ICD-10-CM

## 2024-11-19 DIAGNOSIS — I82.210 THROMBOSIS OF SUPERIOR VENA CAVA (HCC): ICD-10-CM

## 2024-11-19 DIAGNOSIS — D68.61 ANTIPHOSPHOLIPID SYNDROME (HCC): ICD-10-CM

## 2024-11-19 LAB — INR PPP: 1.9 (ref 2–3.5)

## 2024-11-19 PROCEDURE — 99211 OFF/OP EST MAY X REQ PHY/QHP: CPT

## 2024-11-19 PROCEDURE — 85610 PROTHROMBIN TIME: CPT

## 2024-11-19 RX ORDER — WARFARIN SODIUM 5 MG/1
TABLET ORAL
COMMUNITY
Start: 2024-11-02

## 2024-11-19 RX ORDER — HYDROXYCHLOROQUINE SULFATE 200 MG/1
400 TABLET, FILM COATED ORAL DAILY
COMMUNITY
Start: 2024-10-29

## 2024-11-19 RX ORDER — WARFARIN SODIUM 2.5 MG/1
TABLET ORAL
COMMUNITY
Start: 2024-11-02

## 2024-11-19 ASSESSMENT — FIBROSIS 4 INDEX: FIB4 SCORE: 0.64

## 2024-11-19 NOTE — PROGRESS NOTES
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)  Unable to tolerate Xarelto          Anticoagulation Care Providers       Provider Role Specialty Phone number    [facility] Responsible  [phone]                  [id number] Stroke Risk Points: N/A   Values used to calculate this score:    Points  Metrics       0        Has Congestive Heart Failure: No       1        Has Hypertension: Yes       0        Age: 49       1        Has Diabetes: Yes       0        Had Stroke: No                 Had TIA: No                 Had Thromboembolism: No       0        Has Vascular Disease: No       1        Clinically Relevant Sex: Female     No data recorded     Lab Results   Component Value Date/Time    CHOLSTRLTOT 191 2024 06:41 AM     (H) 2024 06:41 AM    HDL 44 2024 06:41 AM    TRIGLYCERIDE 134 2024 06:41 AM       Lab Results   Component Value Date/Time    SODIUM 137 2024 08:45 AM    POTASSIUM 4.4 2024 08:45 AM    CHLORIDE 103 2024 08:45 AM    CO2 24 2024 08:45 AM    GLUCOSE 83 2024 08:45 AM    BUN 12 2024 08:45 AM    CREATININE 0.77 2024 08:45 AM    BUNCREATRAT 11.3 2022 08:51 AM    BUNCREATRAT 14 2021 04:07 AM      Lab Results   Component Value Date/Time    ALKPHOSPHAT 46 08/14/2024 08:45 AM    ASTSGOT 18 08/14/2024 08:45 AM    ALTSGPT 17 08/14/2024 08:45 AM    TBILIRUBIN 0.5 08/14/2024 08:45 AM          Current Outpatient Medications:     warfarin, TAKE 1 TAB BY MOUTH DAILY OR AS DIRECTED BY THE [facility], Taking    warfarin, TAKE ONE TO TWO TABLETS DAILY AS DIRECTED BY [facility], Taking    hydroxychloroquine, 400 mg, Oral, DAILY, Taking    Cholecalciferol (VITAMIN D-3 PO), 50 mcg, Oral, DAILY    Ozempic (2 MG/DOSE), 2 mg, Subcutaneous, Q7 DAYS (Patient taking differently: 2 mg, Subcutaneous, EVERY 7 DAYS, Sundays    MEDICATION INSTRUCTIONS FOR SURGERY/PROCEDURE SCHEDULED FOR 9/11/24   DO NOT TAKE 7 DAYS PRIOR TO SURGERY, LAST DOSE 8/31/24)    senna-docusate, 1 Tablet, Oral, DAILY (Patient taking differently: 1 Tablet, Oral, DAILY,     MEDICATION INSTRUCTIONS FOR SURGERY/PROCEDURE SCHEDULED FOR 9/11/24   CONTINUE TAKING MED PRIOR TO SURGERY  BUT HOLD MORNING OF PROCEDURE)    meclizine, 25 mg, Oral, TID PRN (Patient taking differently: 25 mg, Oral, 3 TIMES DAILY PRN, Dizziness, Vertigo,     MEDICATION INSTRUCTIONS FOR SURGERY/PROCEDURE SCHEDULED FOR 9/11/24   OK TO CONTINUE TAKING PRIOR TO SURGERY AND DAY OF SURGERY IF NEEDED)    Biotin w/ Vitamins C & E (HAIR/SKIN/NAILS PO), 1 Tablet, Oral, DAILY    Refer to Patient Findings for HPI:  Patient Findings       Positives:  Change in diet/appetite (less greens)    Negatives:  Signs/symptoms of thrombosis, Signs/symptoms of bleeding, Laboratory test error suspected, Change in health, Change in alcohol use, Change in activity, Upcoming invasive procedure, Emergency department visit, Upcoming dental procedure, Missed doses, Extra doses, Change in medications, Hospital admission, Bruising, Other complaints            Vitals:    11/19/24 0910   Weight: 75.7 kg (166 lb 12.8 oz)     Verified current warfarin dosing schedule.    Medications reconciled: Yes  Pt is not on  antiplatelet therapy.  Is patient on NSAID?: No       A/P   INR is slightly subtherapeutic  Reason(s) for out of range INR today:  Lowered weekly dose last week due to supratherapeutic INR.         Warfarin dosing recommendation: Continue regimen as listed above. Will keep an eye on it, but as it is only slightly low after a pretty big change in dose, will leaver her dosing alone.      Pt educated to contact our clinic with any changes in medications or s/s of bleeding or thrombosis. Pt is aware to seek immediate medical attention for falls, head injury or deep cuts.    Request pt to return in 1 week(s). Pt agrees.    Myriam Kauffman, PharmD

## 2024-11-26 ENCOUNTER — ANTICOAGULATION VISIT (OUTPATIENT)
Dept: VASCULAR LAB | Facility: MEDICAL CENTER | Age: 49
End: 2024-11-26
Attending: INTERNAL MEDICINE
Payer: COMMERCIAL

## 2024-11-26 DIAGNOSIS — N93.9 VAGINAL BLEEDING: ICD-10-CM

## 2024-11-26 DIAGNOSIS — E27.49 HEMORRHAGE OF BOTH ADRENAL GLANDS (HCC): ICD-10-CM

## 2024-11-26 DIAGNOSIS — D68.61 ANTIPHOSPHOLIPID SYNDROME (HCC): ICD-10-CM

## 2024-11-26 DIAGNOSIS — I82.210 THROMBOSIS OF SUPERIOR VENA CAVA (HCC): ICD-10-CM

## 2024-11-26 LAB — INR PPP: 2.1 (ref 2–3.5)

## 2024-11-26 PROCEDURE — 99211 OFF/OP EST MAY X REQ PHY/QHP: CPT

## 2024-11-26 PROCEDURE — 85610 PROTHROMBIN TIME: CPT

## 2024-11-26 NOTE — PROGRESS NOTES
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    Medications reconciled.  Pt is not on antiplatelet therapy.      A/P   INR is therapeutic    Warfarin dosing recommendation: Continue regimen as listed above.    Pt educated to contact our clinic with any changes in medications or s/s of bleeding or thrombosis. Pt is aware to seek immediate medical attention for falls, head injury or deep cuts.    Request pt to return in 2 week(s). Pt agrees.    Landon Milton, PharmD

## 2024-12-10 ENCOUNTER — ANTICOAGULATION VISIT (OUTPATIENT)
Dept: VASCULAR LAB | Facility: MEDICAL CENTER | Age: 49
End: 2024-12-10
Attending: INTERNAL MEDICINE
Payer: COMMERCIAL

## 2024-12-10 VITALS — WEIGHT: 166 LBS | BODY MASS INDEX: 25.24 KG/M2

## 2024-12-10 DIAGNOSIS — D68.61 ANTIPHOSPHOLIPID SYNDROME (HCC): ICD-10-CM

## 2024-12-10 DIAGNOSIS — I82.210 THROMBOSIS OF SUPERIOR VENA CAVA (HCC): ICD-10-CM

## 2024-12-10 DIAGNOSIS — E27.49 HEMORRHAGE OF BOTH ADRENAL GLANDS (HCC): ICD-10-CM

## 2024-12-10 DIAGNOSIS — N93.9 VAGINAL BLEEDING: ICD-10-CM

## 2024-12-10 LAB — INR PPP: 2.1 (ref 2–3.5)

## 2024-12-10 PROCEDURE — 99211 OFF/OP EST MAY X REQ PHY/QHP: CPT

## 2024-12-10 PROCEDURE — 85610 PROTHROMBIN TIME: CPT

## 2024-12-10 ASSESSMENT — FIBROSIS 4 INDEX: FIB4 SCORE: 0.64

## 2024-12-10 NOTE — PROGRESS NOTES
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)  Unable to tolerate Xarelto          Anticoagulation Care Providers       Provider Role Specialty Phone number    [facility] Responsible  [phone]                  Refer to Patient Findings for HPI:  Patient Findings       Negatives:  Signs/symptoms of thrombosis, Signs/symptoms of bleeding, Laboratory test error suspected, Change in health, Change in alcohol use, Change in activity, Upcoming invasive procedure, Emergency department visit, Upcoming dental procedure, Missed doses, Extra doses, Change in medications, Change in diet/appetite, Hospital admission, Bruising, Other complaints            Vitals:    12/10/24 0742   Weight: 75.3 kg (166 lb)     Verified current warfarin dosing schedule.    Medications reconciled.  Pt is not on antiplatelet therapy.      A/P   INR is therapeutic  Reason(s) for out of range INR today: N/A      Warfarin dosing recommendation: Continue regimen as listed above.    Pt educated to contact our clinic with any changes in medications or s/s of bleeding or thrombosis. Pt is aware to seek immediate medical attention for falls, head injury or deep cuts.    Request pt to return in 3 week(s). Pt declines despite  warning of extending their follow up interval. Pt opts to RTC in 4 week(s).    Todd Inman, PharmD

## 2024-12-20 ENCOUNTER — APPOINTMENT (OUTPATIENT)
Dept: RADIOLOGY | Facility: MEDICAL CENTER | Age: 49
End: 2024-12-20
Attending: EMERGENCY MEDICINE
Payer: COMMERCIAL

## 2024-12-20 ENCOUNTER — HOSPITAL ENCOUNTER (EMERGENCY)
Facility: MEDICAL CENTER | Age: 49
End: 2024-12-20
Attending: EMERGENCY MEDICINE
Payer: COMMERCIAL

## 2024-12-20 ENCOUNTER — PHARMACY VISIT (OUTPATIENT)
Dept: PHARMACY | Facility: MEDICAL CENTER | Age: 49
End: 2024-12-20
Payer: COMMERCIAL

## 2024-12-20 VITALS
WEIGHT: 166.67 LBS | RESPIRATION RATE: 18 BRPM | HEIGHT: 69 IN | OXYGEN SATURATION: 95 % | DIASTOLIC BLOOD PRESSURE: 68 MMHG | BODY MASS INDEX: 24.69 KG/M2 | SYSTOLIC BLOOD PRESSURE: 107 MMHG | HEART RATE: 84 BPM | TEMPERATURE: 96.8 F
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
## 2024-12-20 DIAGNOSIS — S66.912A TENDON RUPTURE OF WRIST, LEFT, INITIAL ENCOUNTER: ICD-10-CM

## 2024-12-20 DIAGNOSIS — S41.112A LACERATION OF LEFT UPPER EXTREMITY, INITIAL ENCOUNTER: ICD-10-CM

## 2024-12-20 PROCEDURE — 90715 TDAP VACCINE 7 YRS/> IM: CPT | Performed by: EMERGENCY MEDICINE

## 2024-12-20 PROCEDURE — 90675 RABIES VACCINE IM: CPT | Mod: JZ | Performed by: EMERGENCY MEDICINE

## 2024-12-20 PROCEDURE — 90471 IMMUNIZATION ADMIN: CPT

## 2024-12-20 PROCEDURE — 99284 EMERGENCY DEPT VISIT MOD MDM: CPT

## 2024-12-20 PROCEDURE — 73110 X-RAY EXAM OF WRIST: CPT | Mod: LT

## 2024-12-20 PROCEDURE — 700111 HCHG RX REV CODE 636 W/ 250 OVERRIDE (IP): Mod: JZ | Performed by: EMERGENCY MEDICINE

## 2024-12-20 PROCEDURE — RXMED WILLOW AMBULATORY MEDICATION CHARGE: Performed by: EMERGENCY MEDICINE

## 2024-12-20 PROCEDURE — 303747 HCHG EXTRA SUTURE

## 2024-12-20 PROCEDURE — 304217 HCHG IRRIGATION SYSTEM

## 2024-12-20 PROCEDURE — 96372 THER/PROPH/DIAG INJ SC/IM: CPT

## 2024-12-20 PROCEDURE — A9270 NON-COVERED ITEM OR SERVICE: HCPCS | Performed by: EMERGENCY MEDICINE

## 2024-12-20 PROCEDURE — 304999 HCHG REPAIR-SIMPLE/INTERMED LEVEL 1

## 2024-12-20 PROCEDURE — 700101 HCHG RX REV CODE 250: Performed by: EMERGENCY MEDICINE

## 2024-12-20 PROCEDURE — 96365 THER/PROPH/DIAG IV INF INIT: CPT

## 2024-12-20 PROCEDURE — 73130 X-RAY EXAM OF HAND: CPT | Mod: LT

## 2024-12-20 PROCEDURE — 700105 HCHG RX REV CODE 258: Performed by: EMERGENCY MEDICINE

## 2024-12-20 PROCEDURE — 700102 HCHG RX REV CODE 250 W/ 637 OVERRIDE(OP): Performed by: EMERGENCY MEDICINE

## 2024-12-20 PROCEDURE — 90377 RABIES IG HT&SOL HUMAN IM/SC: CPT | Mod: JZ | Performed by: EMERGENCY MEDICINE

## 2024-12-20 RX ORDER — OXYCODONE HYDROCHLORIDE 5 MG/1
5 TABLET ORAL EVERY 4 HOURS PRN
Qty: 18 TABLET | Refills: 0 | Status: SHIPPED | OUTPATIENT
Start: 2024-12-20 | End: 2024-12-23

## 2024-12-20 RX ORDER — IBUPROFEN 600 MG/1
600 TABLET, FILM COATED ORAL ONCE
Status: COMPLETED | OUTPATIENT
Start: 2024-12-20 | End: 2024-12-20

## 2024-12-20 RX ORDER — LIDOCAINE HYDROCHLORIDE AND EPINEPHRINE BITARTRATE 20; .01 MG/ML; MG/ML
10 INJECTION, SOLUTION SUBCUTANEOUS ONCE
Status: COMPLETED | OUTPATIENT
Start: 2024-12-20 | End: 2024-12-20

## 2024-12-20 RX ORDER — OXYCODONE HYDROCHLORIDE 10 MG/1
10 TABLET ORAL ONCE
Status: COMPLETED | OUTPATIENT
Start: 2024-12-20 | End: 2024-12-20

## 2024-12-20 RX ORDER — OXYCODONE AND ACETAMINOPHEN 5; 325 MG/1; MG/1
2 TABLET ORAL ONCE
Status: COMPLETED | OUTPATIENT
Start: 2024-12-20 | End: 2024-12-20

## 2024-12-20 RX ORDER — ACETAMINOPHEN 325 MG/1
325 TABLET ORAL ONCE
Status: COMPLETED | OUTPATIENT
Start: 2024-12-20 | End: 2024-12-20

## 2024-12-20 RX ADMIN — IBUPROFEN 600 MG: 600 TABLET, FILM COATED ORAL at 20:18

## 2024-12-20 RX ADMIN — ACETAMINOPHEN 325 MG: 325 TABLET ORAL at 20:19

## 2024-12-20 RX ADMIN — Medication 3 ML: at 18:30

## 2024-12-20 RX ADMIN — HUMAN RABIES VIRUS IMMUNE GLOBULIN 1515 UNITS: 150 INJECTION, SOLUTION INTRAMUSCULAR at 20:51

## 2024-12-20 RX ADMIN — LIDOCAINE HYDROCHLORIDE AND EPINEPHRINE 10 ML: 10; 20 INJECTION, SOLUTION INFILTRATION; PERINEURAL at 19:14

## 2024-12-20 RX ADMIN — RABIES VIRUS STRAIN PM-1503-3M ANTIGEN (PROPIOLACTONE INACTIVATED) AND WATER 2.5 UNITS: KIT at 19:52

## 2024-12-20 RX ADMIN — OXYCODONE HYDROCHLORIDE AND ACETAMINOPHEN 2 TABLET: 5; 325 TABLET ORAL at 19:10

## 2024-12-20 RX ADMIN — CLOSTRIDIUM TETANI TOXOID ANTIGEN (FORMALDEHYDE INACTIVATED), CORYNEBACTERIUM DIPHTHERIAE TOXOID ANTIGEN (FORMALDEHYDE INACTIVATED), BORDETELLA PERTUSSIS TOXOID ANTIGEN (GLUTARALDEHYDE INACTIVATED), BORDETELLA PERTUSSIS FILAMENTOUS HEMAGGLUTININ ANTIGEN (FORMALDEHYDE INACTIVATED), BORDETELLA PERTUSSIS PERTACTIN ANTIGEN, AND BORDETELLA PERTUSSIS FIMBRIAE 2/3 ANTIGEN 0.5 ML: 5; 2; 2.5; 5; 3; 5 INJECTION, SUSPENSION INTRAMUSCULAR at 18:21

## 2024-12-20 RX ADMIN — LIDOCAINE HYDROCHLORIDE AND EPINEPHRINE 10 ML: 10; 20 INJECTION, SOLUTION INFILTRATION; PERINEURAL at 20:30

## 2024-12-20 RX ADMIN — AMPICILLIN AND SULBACTAM 3 G: 1; 2 INJECTION, POWDER, FOR SOLUTION INTRAMUSCULAR; INTRAVENOUS at 18:43

## 2024-12-20 RX ADMIN — OXYCODONE HYDROCHLORIDE 10 MG: 10 TABLET ORAL at 20:20

## 2024-12-20 ASSESSMENT — PAIN DESCRIPTION - PAIN TYPE
TYPE: ACUTE PAIN
TYPE: ACUTE PAIN

## 2024-12-20 ASSESSMENT — FIBROSIS 4 INDEX: FIB4 SCORE: 0.64
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
## 2024-12-21 NOTE — ED NOTES
Vital signs taken and recorded. IV removed. Discharge in stable condition ambulatory accompanied by sister. Health teachings given to patient and family with full understanding of the information given. No personal belongings left.

## 2024-12-21 NOTE — ED NOTES
Rabies Immune globulin given to left hand, patient tolerated the procedure. Wound dressing done using curad oil imulsion pad covered with guaze and co band. Hand splint applied

## 2024-12-21 NOTE — ED PROVIDER NOTES
ER Provider Note    Scribed for Anibal Milton M.d. by Mara Valentine. 12/20/2024  6:10 PM    Primary Care Provider: Daniela Marcelo D.O.    CHIEF COMPLAINT   Chief Complaint   Patient presents with    Dog Bite     Pt was bitten by random dog. Bleeding controlled with pressure wrap. +CMS +thinners     HPI/ROS  LIMITATION TO HISTORY   Select: : None  OUTSIDE HISTORIAN(S):  Family Older sister present at bedside.    Jaclyn Olvera is a 49 y.o. female who presents to the ED for evaluation of a dog bite from a random little breed dog. She states she went to pet the dog and might have startled it and the dog bit her on her left hand. The patient states she does not know when her last tetanus shot was. The patient was able to get control of the bleeding with pressure and gauze. The patient has a history of antiphospholipids.    PAST MEDICAL HISTORY  Past Medical History:   Diagnosis Date    Antiphospholipid antibody syndrome (HCC)     Bleeding disorder (HCC) 08/20/2024    on Warfarin, abnormal uterine bleeding    Bowel habit changes 08/20/2024    constipation, medicated daily    Gynecological disorder 08/20/2024    abnormal uterine bleeding    H/O thrombosis of vena cava     Hemorrhage of both adrenal glands (HCC)     Pain 08/20/2024    achy joints    PE (pulmonary embolism) 08/20/2024    on Warfarin       SURGICAL HISTORY  Past Surgical History:   Procedure Laterality Date    AL CYSTOURETHROSCOPY  9/11/2024    Procedure: CYSTOSCOPY;  Surgeon: Kd Augustin M.D.;  Location: [facility];  Service: Gyn Robotic    HYSTERECTOMY ROBOTIC XI  9/11/2024    Procedure: ROBOTICALLY ASSISTED TOTAL LAPAROSCOPIC HYSTERECTOMY;  Surgeon: Kd Augustin M.D.;  Location: [facility];  Service: Gyn Robotic    SALPINGECTOMY Bilateral 9/11/2024    Procedure: SALPINGECTOMY, LAPAROSCOPIC;  Surgeon: Kd Augustin M.D.;  Location: [facility];  Service: Gyn Robotic    AL UPPER GI ENDOSCOPY,DIAGNOSIS N/A  "6/17/2021    Procedure: GASTROSCOPY;  Surgeon: Elfego Lopez M.D.;  Location: SURGERY SAME DAY [facility];  Service: Gastroenterology    MN UPPER GI ENDOSCOPY,BIOPSY N/A 6/17/2021    Procedure: GASTROSCOPY, WITH BIOPSY;  Surgeon: Elfego Lopez M.D.;  Location: SURGERY SAME DAY [facility];  Service: Gastroenterology       FAMILY HISTORY  History reviewed. No pertinent family history.    SOCIAL HISTORY   reports that she quit smoking about 10 years ago. Her smoking use included cigarettes. She started smoking about 26 years ago. She has a 1.6 pack-year smoking history. She has never used smokeless tobacco. She reports that she does not currently use alcohol. She reports that she does not currently use drugs.    CURRENT MEDICATIONS  Previous Medications    BIOTIN W/ VITAMINS C & E (HAIR/SKIN/NAILS PO)    Take 1 Tablet by mouth every day.     MEDICATION INSTRUCTIONS FOR SURGERY/PROCEDURE SCHEDULED FOR 9/11/24   DO NOT TAKE 7 DAYS PRIOR TO SURGERY    HYDROXYCHLOROQUINE (PLAQUENIL) 200 MG TAB    Take 400 mg by mouth every day.    MECLIZINE (ANTIVERT) 25 MG TAB    Take 1 Tablet by mouth 3 times a day as needed for Dizziness or Vertigo.    SEMAGLUTIDE, 2 MG/DOSE, (OZEMPIC, 2 MG/DOSE,) 8 MG/3ML SOLUTION PEN-INJECTOR    Inject 2 mg under the skin every 7 days.    SENNA-DOCUSATE (PERICOLACE OR SENOKOT S) 8.6-50 MG TAB    Take 1 Tablet by mouth every day.    WARFARIN (COUMADIN) 2.5 MG TAB    TAKE 1 TAB BY MOUTH DAILY OR AS DIRECTED BY THE [facility]    WARFARIN (COUMADIN) 5 MG TAB    TAKE ONE TO TWO TABLETS DAILY AS DIRECTED BY [facility]       ALLERGIES  Patient has no known allergies.    PHYSICAL EXAM  /84   Pulse 83   Temp 36 °C (96.8 °F)   Resp 18   Ht 1.753 m (5' 9\")   Wt 75.6 kg (166 lb 10.7 oz)   SpO2 96%   BMI 24.61 kg/m²   Constitutional: Alert in no apparent distress.  HENT: Normocephalic, Atraumatic, Bilateral external ears normal. Nose normal.   Eyes: Pupils are equal and reactive. " Conjunctiva normal, non-icteric.   Heart: Regular rate and rythm, no murmurs.    Lungs: Clear to auscultation bilaterally.  Skin: Warm, Dry, No erythema, No rash.   Neurologic: Alert, Grossly non-focal.   Psychiatric: Affect normal, Judgment normal, Mood normal, Appears appropriate and not intoxicated.   Hand: 4 cm lac to dorsum of left wrist with exposed tendon. Weakened extension of 5th digit. Intact distal sensation. Normal radial, median, and ulnar nerve function bilaterally. Less than 3 second capillary refill and 2+ peripheral pulses bilaterally. Normal flexion. Impaired extension of 5 th digit. SILT distally.          DIAGNOSTIC STUDIES      RADIOLOGY/PROCEDURES   The attending emergency physician has independently interpreted the diagnostic imaging associated with this visit and am waiting the final reading from the radiologist.   My preliminary interpretation is a follows: no fx    Radiologist interpretation:  DX-WRIST-COMPLETE 3+ LEFT   Final Result         1.  No acute traumatic bony injury.      DX-HAND 3+ LEFT   Final Result         1.  No acute traumatic bony injury.        Laceration Repair Procedure Note    Indication: Laceration    Procedure: The patient was placed in the appropriate position and anesthesia around the laceration was obtained by infiltration using 2% Lidocaine with epinephrine. The area was then irrigated with normal saline 2L. The laceration was closed with 4-0 Ethilon using interrupted sutures. A second laceration was closed with 4-0 Ethilon using interrupted sutures. The wound area was then dressed with vaseline soaked gauze.      Total repaired wound length: 5.5 cm.     Other Items: Suture count: 5    The patient tolerated the procedure well.    Complications: None      COURSE & MEDICAL DECISION MAKING     ASSESSMENT, COURSE AND PLAN  Care Narrative:     6:12 PM - Patient presents with a dog bite to her left hand. The patient was seen and examined at bedside. Discussed plan of  care, including medication, imaging, rabies and tetanus shots and wound irrigation. I discussed with the patient how the rabies shot works and how it will be a series of 5 shots due to her autoimmune disorder. Patient agrees to the plan of care.     The differential diagnoses include but are not limited to:   #Hand wound with exposed tendon and bleeding.  Rabies and Tetanus shots  I discussed this with orthopedic surgery regarding my typical management of dog bites do not require sutures and are prone to infection wound sutured closed.  Despite this due to the tendon injury he requested several sutures and closure of the wound.  I agree with this especially in the setting of this person being on warfarin for her antiphospholipid syndrome.  Also the 3.5 cm laceration of the thenar eminence close due to continued bleeding.  3 sutures placed on the dorsum of the wrist after talking the lacerated tendon back in the pocket of skin.  2 sutures placed on the thenar eminence    Patient was counseled regarding the risk of infection    The patient will be treated with Augmentin.    6:22 PM - I discussed the patient's case and the above findings with Dr. Ryan (Orthopedic Surgeon) who recommends to irrigate with lots of fluid, numb it, and apply a few stitches and put tendon in and close it up.    7:07 PM - I performed the laceration repair to the patient left hand.    In prescribing controlled substances to this patient, I certify that I have obtained and reviewed the medical history of Jaclyn Olvera. I have also made a good amish effort to obtain applicable records from other providers who have treated the patient and records did not demonstrate any increased risk of substance abuse that would prevent me from prescribing controlled substances.     I have conducted a physical exam and documented it. I have reviewed Ms. Olvera’s prescription history as maintained by the [place] Prescription Monitoring Program.     I have  assessed the patient’s risk for abuse, dependency, and addiction using the validated Opioid Risk Tool available at https://www.mdcalc.com/yhxiic-wuiy-ehfk-ort-narcotic-abuse.     Given the above, I believe the benefits of controlled substance therapy outweigh the risks. The reasons for prescribing controlled substances include non-narcotic, oral analgesic alternatives have been inadequate for pain control. Accordingly, I have discussed the risk and benefits, treatment plan, and alternative therapies with the patient.      DISPOSITION AND DISCUSSIONS  I have discussed management of the patient with the following physicians and RANJEET's:  Dr. Ryan (Orthopedic Surgeon)    Discussion of management with other \[facility]\"" or appropriate source(s): None     FINAL DIAGNOSIS  1. Dog bite, initial encounter    2. Tendon rupture of wrist, left, initial encounter       IMara (Scribe), am scribing for, and in the presence of, Anibal Milton M.D..    Electronically signed by: Mara Valentine (Scribe), 12/20/2024    IAnibal M.D. personally performed the services described in this documentation, as scribed by Mara Valentine in my presence, and it is both accurate and complete.       The note accurately reflects work and decisions made by me.  Anibal Milton M.D.  12/20/2024  8:48 PM

## 2024-12-21 NOTE — ED TRIAGE NOTES
"Chief Complaint   Patient presents with    Dog Bite     Pt was bitten by random dog. Bleeding controlled with pressure wrap. +CMS +thinners     Pt ambulatory to triage for above complaint.     A+Ox4, GCS 15    Wound picture uploaded to media. Placed in lobby. Educated on triage process. Encouraged to alert staff to any change.    /84   Pulse 83   Temp 36 °C (96.8 °F)   Resp 18   Ht 1.753 m (5' 9\")   Wt 75.6 kg (166 lb 10.7 oz)   SpO2 96%   BMI 24.61 kg/m²     "

## 2024-12-21 NOTE — ED NOTES
Bedside report received from off going RN Gabino, assumed care of patient.  POC discussed with patient. Call light within reach, all needs addressed at this time.       Fall risk interventions in place: Keep floor surfaces clean and dry, Accompanied to restroom, and Bed Alarm in use (all applicable per [facility] Fall risk assessment)   Continuous monitoring: Cardiac Leads, Pulse Ox, or Blood Pressure  IVF/IV medications: Not Applicable   Oxygen: Room Air  Bedside sitter: Not Applicable   Isolation: Not Applicable    ERP at bedside for wound irrigation and wound suture

## 2024-12-23 ENCOUNTER — ANTICOAGULATION VISIT (OUTPATIENT)
Dept: VASCULAR LAB | Facility: MEDICAL CENTER | Age: 49
End: 2024-12-23
Attending: INTERNAL MEDICINE
Payer: COMMERCIAL

## 2024-12-23 ENCOUNTER — OUTPATIENT INFUSION SERVICES (OUTPATIENT)
Dept: ONCOLOGY | Facility: MEDICAL CENTER | Age: 49
End: 2024-12-23
Attending: EMERGENCY MEDICINE
Payer: COMMERCIAL

## 2024-12-23 VITALS
HEIGHT: 68 IN | TEMPERATURE: 97.5 F | WEIGHT: 163.14 LBS | SYSTOLIC BLOOD PRESSURE: 111 MMHG | BODY MASS INDEX: 24.73 KG/M2 | OXYGEN SATURATION: 98 % | DIASTOLIC BLOOD PRESSURE: 72 MMHG | RESPIRATION RATE: 18 BRPM | HEART RATE: 78 BPM

## 2024-12-23 DIAGNOSIS — N93.9 VAGINAL BLEEDING: ICD-10-CM

## 2024-12-23 DIAGNOSIS — D68.61 ANTIPHOSPHOLIPID SYNDROME (HCC): ICD-10-CM

## 2024-12-23 DIAGNOSIS — E27.49 HEMORRHAGE OF BOTH ADRENAL GLANDS (HCC): ICD-10-CM

## 2024-12-23 DIAGNOSIS — I82.210 THROMBOSIS OF SUPERIOR VENA CAVA (HCC): ICD-10-CM
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
## 2024-12-23 PROBLEM — S41.152A: Status: ACTIVE | Noted: 2024-12-23

## 2024-12-23 PROBLEM — S41.151A DOG BITE OF RIGHT ARM, INITIAL ENCOUNTER: Status: ACTIVE | Noted: 2024-12-23

## 2024-12-23 LAB — INR PPP: 1.1 (ref 2–3.5)

## 2024-12-23 PROCEDURE — 90471 IMMUNIZATION ADMIN: CPT

## 2024-12-23 PROCEDURE — 85610 PROTHROMBIN TIME: CPT

## 2024-12-23 PROCEDURE — 90675 RABIES VACCINE IM: CPT | Mod: JZ | Performed by: EMERGENCY MEDICINE

## 2024-12-23 PROCEDURE — 700111 HCHG RX REV CODE 636 W/ 250 OVERRIDE (IP): Mod: JZ | Performed by: EMERGENCY MEDICINE

## 2024-12-23 PROCEDURE — 99212 OFFICE O/P EST SF 10 MIN: CPT

## 2024-12-23 RX ORDER — ENOXAPARIN SODIUM 150 MG/ML
1 INJECTION SUBCUTANEOUS DAILY
COMMUNITY
End: 2024-12-23 | Stop reason: SDUPTHER

## 2024-12-23 RX ORDER — ENOXAPARIN SODIUM 150 MG/ML
1 INJECTION SUBCUTANEOUS DAILY
Qty: 8 ML | Refills: 1 | Status: SHIPPED | OUTPATIENT
Start: 2024-12-23

## 2024-12-23 RX ADMIN — RABIES VIRUS STRAIN PM-1503-3M ANTIGEN (PROPIOLACTONE INACTIVATED) AND WATER 2.5 UNITS: KIT at 17:04

## 2024-12-23 ASSESSMENT — FIBROSIS 4 INDEX: FIB4 SCORE: 0.64

## 2024-12-23 NOTE — PROGRESS NOTES
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)  Unable to tolerate Xarelto          Anticoagulation Care Providers       Provider Role Specialty Phone number    [facility] Responsible  [phone]                  Refer to Patient Findings for HPI:  Patient Findings       Positives:  Change in health (tendon rupture from dog bite), Upcoming invasive procedure (procedure for tendon rupture either  or ), Change in medications (has 2 days of Augmentin therapy left)    Negatives:  Signs/symptoms of thrombosis, Signs/symptoms of bleeding, Laboratory test error suspected, Change in alcohol use, Change in activity, Emergency department visit, Upcoming dental procedure, Missed doses, Extra doses, Change in diet/appetite, Hospital admission, Bruising, Other complaints            There were no vitals filed for this visit.  Pt declined vitals    Verified current warfarin dosing schedule.    Medications reconciled.  Pt is not on antiplatelet therapy.      A/P   INR is subtherapeutic  Reason(s) for out of range INR today: Missed dose(s)      Warfarin dosing recommendation:  Continue holding warfarin pending tendon surgery. Then, post-op: bolus with 10mg x 2 days then continue warfarin regimen  + bridge with Lovenox 120mg daily until INR>2.0    Pt educated to contact our clinic with any changes in medications or s/s of bleeding or thrombosis. Pt is aware to seek immediate medical attention for falls, head injury or deep cuts.    Request pt to return in 1 week(s). Pt agrees.    Rachelle Blair, PharmD

## 2024-12-24 NOTE — PROGRESS NOTES
Pt arrived ambulatory for Rabies vaccine, states she is recovering well from dog bite, brace to left wrist intact, pt states she is having tendon repair surgery tomorrow at [facility].  Rabies vaccine given in right deltoid, bandaid applied. Reviewed upcoming appts with pt, will f/u as scheduled.

## 2024-12-27 ENCOUNTER — OUTPATIENT INFUSION SERVICES (OUTPATIENT)
Dept: ONCOLOGY | Facility: MEDICAL CENTER | Age: 49
End: 2024-12-27
Attending: EMERGENCY MEDICINE
Payer: COMMERCIAL

## 2024-12-27 VITALS
RESPIRATION RATE: 18 BRPM | HEART RATE: 75 BPM | WEIGHT: 170.86 LBS | OXYGEN SATURATION: 98 % | BODY MASS INDEX: 25.89 KG/M2 | TEMPERATURE: 97.4 F | HEIGHT: 68 IN | SYSTOLIC BLOOD PRESSURE: 118 MMHG | DIASTOLIC BLOOD PRESSURE: 76 MMHG
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
## 2024-12-27 PROCEDURE — 700111 HCHG RX REV CODE 636 W/ 250 OVERRIDE (IP): Mod: JZ | Performed by: EMERGENCY MEDICINE

## 2024-12-27 PROCEDURE — 90675 RABIES VACCINE IM: CPT | Mod: JZ | Performed by: EMERGENCY MEDICINE

## 2024-12-27 PROCEDURE — 90471 IMMUNIZATION ADMIN: CPT

## 2024-12-27 RX ADMIN — RABIES VIRUS STRAIN PM-1503-3M ANTIGEN (PROPIOLACTONE INACTIVATED) AND WATER 2.5 UNITS: KIT at 16:43

## 2024-12-27 ASSESSMENT — FIBROSIS 4 INDEX: FIB4 SCORE: 0.64

## 2024-12-28 NOTE — PROGRESS NOTES
Patient came into infusion independently. Orders and vitals reviewed, assessment done. Rabies injection given in left deltoid as ordered with no adverse events. Patient left in stable condition with no adverse events.

## 2024-12-31 ENCOUNTER — APPOINTMENT (OUTPATIENT)
Dept: VASCULAR LAB | Facility: MEDICAL CENTER | Age: 49
End: 2024-12-31
Attending: INTERNAL MEDICINE
Payer: COMMERCIAL

## 2025-01-03 ENCOUNTER — OUTPATIENT INFUSION SERVICES (OUTPATIENT)
Dept: ONCOLOGY | Facility: MEDICAL CENTER | Age: 50
End: 2025-01-03
Attending: EMERGENCY MEDICINE
Payer: COMMERCIAL

## 2025-01-03 VITALS
WEIGHT: 168.87 LBS | SYSTOLIC BLOOD PRESSURE: 102 MMHG | HEIGHT: 68 IN | HEART RATE: 75 BPM | DIASTOLIC BLOOD PRESSURE: 69 MMHG | BODY MASS INDEX: 25.59 KG/M2 | RESPIRATION RATE: 18 BRPM | TEMPERATURE: 97.2 F | OXYGEN SATURATION: 98 %
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
## 2025-01-03 PROCEDURE — 90675 RABIES VACCINE IM: CPT | Mod: JZ | Performed by: EMERGENCY MEDICINE

## 2025-01-03 PROCEDURE — 700111 HCHG RX REV CODE 636 W/ 250 OVERRIDE (IP): Mod: JZ | Performed by: EMERGENCY MEDICINE

## 2025-01-03 PROCEDURE — 90471 IMMUNIZATION ADMIN: CPT

## 2025-01-03 RX ADMIN — RABIES VIRUS STRAIN PM-1503-3M ANTIGEN (PROPIOLACTONE INACTIVATED) AND WATER 2.5 UNITS: KIT at 16:55

## 2025-01-03 ASSESSMENT — FIBROSIS 4 INDEX: FIB4 SCORE: 0.64

## 2025-01-04 NOTE — PROGRESS NOTES
Jaclyn is here for rabies vaccine. Rabies vaccine given per MAR to left deltoid; adhesive bandage applied to site. Jaclyn to follow up with MD for future plan of care. No future appointments needed at this time. Discharged to self care; no apparent distress noted.

## 2025-01-07 ENCOUNTER — APPOINTMENT (OUTPATIENT)
Dept: VASCULAR LAB | Facility: MEDICAL CENTER | Age: 50
End: 2025-01-07
Payer: COMMERCIAL

## 2025-01-07 ENCOUNTER — ANTICOAGULATION VISIT (OUTPATIENT)
Dept: VASCULAR LAB | Facility: MEDICAL CENTER | Age: 50
End: 2025-01-07
Attending: INTERNAL MEDICINE
Payer: COMMERCIAL

## 2025-01-07 DIAGNOSIS — N93.9 VAGINAL BLEEDING: ICD-10-CM

## 2025-01-07 DIAGNOSIS — E27.49 HEMORRHAGE OF BOTH ADRENAL GLANDS (HCC): ICD-10-CM

## 2025-01-07 DIAGNOSIS — I82.210 THROMBOSIS OF SUPERIOR VENA CAVA (HCC): ICD-10-CM

## 2025-01-07 DIAGNOSIS — D68.61 ANTIPHOSPHOLIPID SYNDROME (HCC): ICD-10-CM

## 2025-01-07 LAB — INR PPP: 2.2 (ref 2–3.5)

## 2025-01-07 PROCEDURE — 85610 PROTHROMBIN TIME: CPT

## 2025-01-07 PROCEDURE — 99211 OFF/OP EST MAY X REQ PHY/QHP: CPT

## 2025-01-07 NOTE — PROGRESS NOTES
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)  Unable to tolerate Xarelto          Anticoagulation Care Providers       Provider Role Specialty Phone number    [facility] Responsible  [phone]          Refer to Patient Findings for HPI:  Patient Findings       Positives:  Change in health (had surgery for tendon repair), Extra doses (pt took dose higher than documented but therapeutic today)    Negatives:  Signs/symptoms of thrombosis, Signs/symptoms of bleeding, Laboratory test error suspected, Change in alcohol use, Change in activity, Upcoming invasive procedure, Emergency department visit, Upcoming dental procedure, Missed doses, Change in medications, Change in diet/appetite, Hospital admission, Bruising, Other complaints          There were no vitals filed for this visit.  The pt declined vitals today     Patient seen in clinic today for follow up on anticoagulation therapy with warfarin (a high risk medication) for hx of APLS   Verified current warfarin dosing schedule.  Patient denies any missed doses of warfarin.    Medications reconciled   Pt is not on antiplatelet therapy      A/P   INR is therapeutic today at 2.2.     Warfarin dosing recommendation: Patient will continue with dosing she reported (which deviated from  what we had documented) since she is therapeutic today.   Will follow up again in 1 week.    Pt educated to contact our clinic with any changes in medications or s/s of bleeding or thrombosis. Pt is aware to seek immediate medical attention for falls, head injury or deep cuts.    Follow up appointment in 1 week(s).    Next appt: Tues, Jan 14 @ 9am     Andrew Shi PharmD

## 2025-01-14 ENCOUNTER — APPOINTMENT (OUTPATIENT)
Dept: VASCULAR LAB | Facility: MEDICAL CENTER | Age: 50
End: 2025-01-14
Attending: INTERNAL MEDICINE
Payer: COMMERCIAL

## 2025-01-28 ENCOUNTER — APPOINTMENT (OUTPATIENT)
Dept: VASCULAR LAB | Facility: MEDICAL CENTER | Age: 50
End: 2025-01-28
Attending: INTERNAL MEDICINE
Payer: COMMERCIAL

## 2025-01-29 ENCOUNTER — TELEPHONE (OUTPATIENT)
Dept: VASCULAR LAB | Facility: MEDICAL CENTER | Age: 50
End: 2025-01-29
Payer: COMMERCIAL

## 2025-01-29 NOTE — TELEPHONE ENCOUNTER
Pt is overdue for INR    Rescheduled appt to 2/4    1st attempt    Discharge criteria:  3 calls (including at least once to emergency contacts)? No   1 letter (with the third call)? No   Over 3 months (last seen 01/07/25, if no response by 04/07/25 , will d/c from clinic)? No     Rachelle Blair, PharmD

## 2025-01-30 DIAGNOSIS — D68.61 ANTIPHOSPHOLIPID SYNDROME (HCC): ICD-10-CM

## 2025-01-30 DIAGNOSIS — I82.210 THROMBOSIS OF SUPERIOR VENA CAVA (HCC): ICD-10-CM

## 2025-01-30 RX ORDER — WARFARIN SODIUM 5 MG/1
TABLET ORAL
Qty: 180 TABLET | Refills: 1 | Status: SHIPPED | OUTPATIENT
Start: 2025-01-30

## 2025-01-30 RX ORDER — WARFARIN SODIUM 2.5 MG/1
2.5 TABLET ORAL DAILY
Qty: 135 TABLET | Refills: 1 | Status: SHIPPED | OUTPATIENT
Start: 2025-01-30

## 2025-02-04 ENCOUNTER — ANTICOAGULATION VISIT (OUTPATIENT)
Dept: VASCULAR LAB | Facility: MEDICAL CENTER | Age: 50
End: 2025-02-04
Attending: INTERNAL MEDICINE
Payer: COMMERCIAL

## 2025-02-04 DIAGNOSIS — E27.49 HEMORRHAGE OF BOTH ADRENAL GLANDS (HCC): ICD-10-CM

## 2025-02-04 DIAGNOSIS — I82.210 THROMBOSIS OF SUPERIOR VENA CAVA (HCC): ICD-10-CM

## 2025-02-04 DIAGNOSIS — N93.9 VAGINAL BLEEDING: ICD-10-CM

## 2025-02-04 DIAGNOSIS — D68.61 ANTIPHOSPHOLIPID SYNDROME (HCC): ICD-10-CM

## 2025-02-04 LAB — INR PPP: 2.7 (ref 2–3.5)

## 2025-02-04 PROCEDURE — 99211 OFF/OP EST MAY X REQ PHY/QHP: CPT | Performed by: PHARMACIST

## 2025-02-04 PROCEDURE — 85610 PROTHROMBIN TIME: CPT

## 2025-02-05 NOTE — PROGRESS NOTES
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)  Unable to tolerate Xarelto          Anticoagulation Care Providers       Provider Role Specialty Phone number    [facility] Responsible  [phone]                  Refer to Patient Findings for HPI:  Patient Findings       Negatives:  Signs/symptoms of thrombosis, Signs/symptoms of bleeding, Laboratory test error suspected, Change in health, Change in alcohol use, Change in activity, Upcoming invasive procedure, Emergency department visit, Upcoming dental procedure, Missed doses, Extra doses, Change in medications, Change in diet/appetite, Hospital admission, Bruising, Other complaints            Date Referral Placed: 24      There were no vitals filed for this visit.  (Taken if pt amenable)    Verified current warfarin dosing schedule.    Medications reconciled: Yes  Pt is not on antiplatelet therapy      A/P   INR is therapeutic    Warfarin dosing recommendation: Instructed pt to continue on with current regimen.    Pt educated to contact our clinic with any changes in medications or s/s of bleeding or thrombosis. Pt is aware to seek immediate medical attention for falls, head injury or deep cuts.    Request pt to return in 5 week(s). Pt agrees.    Casey Daniels, EvanD,  BCACP

## 2025-03-11 ENCOUNTER — APPOINTMENT (OUTPATIENT)
Dept: VASCULAR LAB | Facility: MEDICAL CENTER | Age: 50
End: 2025-03-11
Attending: INTERNAL MEDICINE
Payer: COMMERCIAL

## 2025-03-28 ENCOUNTER — ANTICOAGULATION VISIT (OUTPATIENT)
Dept: VASCULAR LAB | Facility: MEDICAL CENTER | Age: 50
End: 2025-03-28
Attending: INTERNAL MEDICINE
Payer: COMMERCIAL

## 2025-03-28 DIAGNOSIS — D68.61 ANTIPHOSPHOLIPID SYNDROME (HCC): ICD-10-CM

## 2025-03-28 DIAGNOSIS — N93.9 VAGINAL BLEEDING: ICD-10-CM

## 2025-03-28 DIAGNOSIS — E27.49 HEMORRHAGE OF BOTH ADRENAL GLANDS (HCC): ICD-10-CM

## 2025-03-28 DIAGNOSIS — I82.210 THROMBOSIS OF SUPERIOR VENA CAVA (HCC): ICD-10-CM

## 2025-03-28 LAB — INR PPP: 1.8 (ref 2–3.5)

## 2025-03-28 PROCEDURE — 85610 PROTHROMBIN TIME: CPT

## 2025-03-28 PROCEDURE — 99212 OFFICE O/P EST SF 10 MIN: CPT

## 2025-03-28 NOTE — PROGRESS NOTES
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)  Unable to tolerate Xarelto          Anticoagulation Care Providers       Provider Role Specialty Phone number    [facility] Responsible  [phone]                Refer to Patient Findings for HPI:  Patient Findings       Negatives:  Signs/symptoms of thrombosis, Signs/symptoms of bleeding, Laboratory test error suspected, Change in health, Change in alcohol use, Change in activity, Upcoming invasive procedure, Emergency department visit, Upcoming dental procedure, Missed doses, Extra doses, Change in medications, Change in diet/appetite (Had increased greens.), Hospital admission, Bruising, Other complaints            Date Referral Placed: 2024      Vitals:  There were no vitals filed for this visit.  Pt declined vitals    Verified current warfarin dosing schedule. Patient stated she has been taking 10 mg every  and 7.5 mg on all other days since her last appointment, although that was not on her dosing calendar.     Medications reconciled.  Pt is not on antiplatelet therapy.      A/P   INR is subtherapeutic  Reason(s) for out of range INR today: No obvious causes and Increased vitamin K intake      Warfarin dosing recommendation: Bolus 10 mg  tonight, then Continue regimen as listed above.    Pt educated to contact our clinic with any changes in medications or s/s of bleeding or thrombosis. Pt is aware to seek immediate medical attention for falls, head injury or deep cuts.    Request pt to return in 2 week(s). Pt declines despite warning of extending their follow up interval. Pt opts to RTC in 2 week(s).    Sindhu Wilde, PharmD

## 2025-04-04 DIAGNOSIS — E11.9 TYPE 2 DIABETES MELLITUS WITHOUT COMPLICATION, WITHOUT LONG-TERM CURRENT USE OF INSULIN (HCC): ICD-10-CM

## 2025-04-04 RX ORDER — SEMAGLUTIDE 2.68 MG/ML
2 INJECTION, SOLUTION SUBCUTANEOUS
Refills: 1 | OUTPATIENT
Start: 2025-04-04

## 2025-04-11 ENCOUNTER — APPOINTMENT (OUTPATIENT)
Dept: VASCULAR LAB | Facility: MEDICAL CENTER | Age: 50
End: 2025-04-11
Attending: INTERNAL MEDICINE
Payer: COMMERCIAL

## 2025-05-01 ENCOUNTER — ANTICOAGULATION VISIT (OUTPATIENT)
Dept: VASCULAR LAB | Facility: MEDICAL CENTER | Age: 50
End: 2025-05-01
Attending: INTERNAL MEDICINE
Payer: COMMERCIAL

## 2025-05-01 VITALS — HEIGHT: 69 IN | BODY MASS INDEX: 24.29 KG/M2 | WEIGHT: 164 LBS

## 2025-05-01 DIAGNOSIS — I82.210 THROMBOSIS OF SUPERIOR VENA CAVA (HCC): ICD-10-CM

## 2025-05-01 DIAGNOSIS — E11.9 TYPE 2 DIABETES MELLITUS WITHOUT COMPLICATION, WITHOUT LONG-TERM CURRENT USE OF INSULIN (HCC): ICD-10-CM

## 2025-05-01 DIAGNOSIS — D68.61 ANTIPHOSPHOLIPID SYNDROME (HCC): ICD-10-CM

## 2025-05-01 DIAGNOSIS — E27.49 HEMORRHAGE OF BOTH ADRENAL GLANDS (HCC): ICD-10-CM

## 2025-05-01 DIAGNOSIS — N93.9 VAGINAL BLEEDING: ICD-10-CM

## 2025-05-01 DIAGNOSIS — E66.3 OVERWEIGHT (BMI 25.0-29.9): ICD-10-CM

## 2025-05-01 LAB — INR PPP: 2.8 (ref 2–3.5)

## 2025-05-01 PROCEDURE — 85610 PROTHROMBIN TIME: CPT

## 2025-05-01 PROCEDURE — 99213 OFFICE O/P EST LOW 20 MIN: CPT

## 2025-05-01 RX ORDER — SEMAGLUTIDE 2.68 MG/ML
2 INJECTION, SOLUTION SUBCUTANEOUS
Qty: 9 ML | Refills: 1 | Status: SHIPPED | OUTPATIENT
Start: 2025-05-01 | End: 2025-05-09

## 2025-05-01 ASSESSMENT — FIBROSIS 4 INDEX: FIB4 SCORE: 0.65

## 2025-05-01 NOTE — Clinical Note
Hi there,   This patient was previously seen for weight loss and is now re-establishing with our clinic for continuing Ozempic 2 mg. I authored a prescription renewal that looks like it needs a PA. My note will be under the Anticoag note. She has enough Ozempic to last for a month, but figured we might want to start the process of pushing the PA through now. Let me know if you need anything else on my end. Thanks!!  Todd

## 2025-05-01 NOTE — PROGRESS NOTES
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)  Unable to tolerate Xarelto          Anticoagulation Care Providers       Provider Role Specialty Phone number    [facility] Responsible  [phone]                Refer to Patient Findings for HPI:  Patient Findings       Negatives:  Signs/symptoms of thrombosis, Signs/symptoms of bleeding, Laboratory test error suspected, Change in health, Change in alcohol use, Change in activity, Upcoming invasive procedure, Emergency department visit, Upcoming dental procedure, Missed doses, Extra doses, Change in medications, Change in diet/appetite, Hospital admission, Bruising, Other complaints          Clinical Outcomes       Negatives:  Major bleeding event, Thromboembolic event, Anticoagulation-related hospital admission, Anticoagulation-related ED visit, Anticoagulation-related fatality            Date Referral Placed: 24      Vitals:  Vitals:    25 1430   Weight: 74.4 kg (164 lb)   Height: 1.753 m (5' 9\")     Pt declined vitals    Verified current warfarin dosing schedule.    Medications reconciled.  Pt is not on antiplatelet therapy.      A/P   INR is therapeutic  Reason(s) for out of range INR today: N/A      Warfarin dosing recommendation: Continue " regimen as listed above.    Pt educated to contact our clinic with any changes in medications or s/s of bleeding or thrombosis. Pt is aware to seek immediate medical attention for falls, head injury or deep cuts.    Request pt to return in 6 week(s). Pt agrees.    Todd Inman, PharmD    _________________________________________________________________________    Patient Consult Note       Primary care physician: Patrizia James M.D.     Reason for consult: Obesity/Weight Management     HPI:  Jacyln Olvera is a 49 y.o. old patient who comes in today for evaluation of above stated problem.     Initial Weight: 184 lbs  Initial BMI: 27.8 kg/m2     Current Weight: 164 lbs  Current BMI: 24.22 kg/m2     Most Recent HbA1c:         Lab Results   Component Value Date/Time     HBA1C 4.6 06/25/2024 06:41 AM            Lab Results   Component Value Date/Time     CREATININE 0.83 06/25/2024 06:41 AM         Most Recent TSH:     Latest Reference Range & Units 06/25/24 06:41   TSH 0.380 - 5.330 uIU/mL 3.520         Obesity Medication History and Current Regimen  GLP-1 Agent: Ozempic 2mg Q7d     Lifestyle  Current Exercise - walking 30-45 min 4-5 week (2.5 miles per walk), stretching for 60 minutes on days she doesn't walk   Exercise Goal - increase as tolerable      Dietary:Portion control and cut back on sugar. Focusing on caloric restriction.  Breakfast - usually skips  Lunch - small sandwich OR burrito, occasional salad  Dinner - chicken sandwich OR [place] food (rice, chicken, ground beef, steak)  Snack - no snacks  Dessert - no sweet tooth   Beverage - coffee, water, no juice/soda     Past Medical History:       Patient Active Problem List     Diagnosis Date Noted    Long term current use of anticoagulant 06/12/2024    Vitamin D insufficiency 06/14/2023    Multiple skin nodules 04/05/2022    Chronic pain of both shoulders 04/05/2022    Long-term use of hydroxychloroquine 04/05/2022    Anemia 01/27/2022    Black stool  01/27/2022    Epigastric pain 01/27/2022    Antiphospholipid syndrome (HCC) 06/23/2021    Elevated liver function tests 06/22/2021    Elevated C-reactive protein (CRP) 06/21/2021    Sepsis (HCC) 06/21/2021    Microcytic anemia 06/19/2021    Thrombocytopenia (HCC) 06/19/2021    Hemorrhage of both adrenal glands (HCC) 06/18/2021    Retroperitoneal lymphadenopathy 06/18/2021    Vaginal bleeding 06/18/2021    Hyperbilirubinemia 06/17/2021    Fibroids 06/15/2021    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 pylori infection 06/14/2021    Hyponatremia 06/14/2021    Kidney lesion, native, right 06/14/2021    Lesion of cervix 06/14/2021    Leukocytosis 06/14/2021         Past Surgical History:  Past Surgical History         Past Surgical History:   Procedure Laterality Date    [facility] UPPER GI ENDOSCOPY,DIAGNOSIS N/A 6/17/2021     Procedure: GASTROSCOPY;  Surgeon: Elfego Lopez M.D.;  Location: SURGERY SAME DAY [facility];  Service: Gastroenterology    VA UPPER GI ENDOSCOPY,BIOPSY N/A 6/17/2021     Procedure: GASTROSCOPY, WITH BIOPSY;  Surgeon: Elfego Lopez M.D.;  Location: SURGERY SAME DAY [facility];  Service: Gastroenterology            Allergies:  Patient has no known allergies.     Social History:  Social History               Socioeconomic History    Marital status:        Spouse name: Not on file    Number of children: Not on file    Years of education: Not on file    Highest education level: Not on file   Occupational History    Not on file   Tobacco Use    Smoking status: Former       Types: Cigarettes    Smokeless tobacco: Former       Quit date: 10/10/2023    Tobacco comments:       1 pk per week   Vaping Use    Vaping status: Never Used   Substance and Sexual Activity    Alcohol use: Not Currently       Comment: occasionally    Drug use: Not Currently    Sexual activity: Not on file   Other Topics Concern    Not on file   Social History Narrative    Not on file      Social Determinants of  Health      Financial Resource Strain: Not on file   Food Insecurity: Not on file   Transportation Needs: Not on file   Physical Activity: Not on file   Stress: Not on file   Social Connections: Not on file   Intimate Partner Violence: Not on file   Housing Stability: Not on file            Family History:  Family History   No family history on file.        Medications:    Current Medications      Current Outpatient Medications:     Tirzepatide (MOUNJARO) 7.5 MG/0.5ML Solution Pen-injector, Inject 7.5 mg under the skin every 7 days., Disp: 2 mL, Rfl: 3    hydroxychloroquine (PLAQUENIL) 200 MG Tab, Take 2 Tablets by mouth every day., Disp: 180 Tablet, Rfl: 3    senna-docusate (PERICOLACE OR SENOKOT S) 8.6-50 MG Tab, Take 1 Tablet by mouth every day., Disp: 30 Tablet, Rfl: 0    warfarin (COUMADIN) 5 MG Tab, TAKE ONE TO TWO TABLETS DAILY AS DIRECTED BY [facility], Disp: 180 Tablet, Rfl: 1    warfarin (COUMADIN) 2.5 MG Tab, 1 tab by mouth daily or as directed by the [facility], Disp: 90 Tablet, Rfl: 1    meclizine (ANTIVERT) 25 MG Tab, Take 1 Tablet by mouth 3 times a day as needed for Dizziness or Vertigo., Disp: 30 Tablet, Rfl: 0    Biotin w/ Vitamins C & E (HAIR/SKIN/NAILS PO), Take 1 Tablet by mouth every day., Disp: , Rfl:         Labs: Reviewed     Physical Examination:  Vital signs: /74   Pulse 76   Wt 80.6 kg (177 lb 12.8 oz)   BMI 27.03 kg/m²  Body mass index is 27.03 kg/m².     Assessment and Plan:     1. Obesity/Weight Management  Since last visit pt reports that she has tolerated Ozempic 2 mg w/ no issues. Noted appetite suppression.  Pt increasing her exercise and moderating her portion sizes.  Noted ~ 10 lb weight loss since last visit w/ clinic.  Pt would like to continue current GLP1 dose for now.      - Medication changes:  Continue Ozempic 2 mg subQ once weekly     - Lifestyle changes:  Diet: Maximize lean proteins and veggies. Cut out/down on carbs. Avoid simple sugars. Continue  to focus on optimized nutrition and caloric deficit,   Exercise: Encouraged consistent daily/weekly exercise totaling at least 150 min/week in active movement and increase as tolerated     Follow up in 6 weeks for weight management as pt will be coming to clinic for INR at that point anyway.     CC:   Rx Coordinators

## 2025-05-02 ENCOUNTER — TELEPHONE (OUTPATIENT)
Dept: VASCULAR LAB | Facility: MEDICAL CENTER | Age: 50
End: 2025-05-02
Payer: COMMERCIAL

## 2025-05-07 NOTE — TELEPHONE ENCOUNTER
New PA submitted for Semaglutide, 2 MG/DOSE, (OZEMPIC, 2 MG/DOSE,) 8 MG/3ML Solution Pen-injector.  has been denied for a quantity of 3 mls , day supply 30    Prior authorization was denied per the following: Coverage is provided for T2DM when documentation has been provided to confirm the patient had, or the patient currently has one of the following: a hemoglobin A1c (HbA1c) greater than or equal to 6.5%; OR a fasting plasma glucose (FPG) greater than or equal to 126mg/dL ; OR a 2-hour plasma glucose (2-h PG) greater than or equal to 200mg/dL. Coverage cannot be authorized at this time.     PA denial reference number: [id number]  Insurance: [employer]       Next Steps: Complete PA denial letter will be uploaded under the media tab.     Re routing the steps to provider for the next step.     JUANPABLO Pérez, PhT  [facility] Pharm[facility] Liaison (Rx Coordinator)  P: [phone]  5/7/2025 2:33 PM

## 2025-05-07 NOTE — TELEPHONE ENCOUNTER
Prior Authorization for Semaglutide, 2 MG/DOSE, (OZEMPIC, 2 MG/DOSE,) 8 MG/3ML Solution Pen-injector.  (Quantity: 3 mls, Days: 30) has been submitted via Cover My Meds: Key ([id number])    Insurance: [employer]     Will follow up in 24-48 business hours.     JUANPABLO Pérez, PhT  [facility] Liaison (Rx Coordinator)  P: [phone]  5/7/2025 12:35 PM

## 2025-05-07 NOTE — TELEPHONE ENCOUNTER
Received New Start request via [employer]  for   Semaglutide, 2 MG/DOSE, (OZEMPIC, 2 MG/DOSE,) 8 MG/3ML Solution Pen-injector. (Quantity:3 mls, Day Supply:30)     Insurance: [employer]   Member ID:  [id number]  BIN: [id number]  PCN: [id number]  Group: [id number]     Ran Test claim via [place] & medication Rejects stating prior authorization is required.    JUANPABLO Pérez, PhT  [facility] Liaison (Rx Coordinator)  P: [phone]  5/7/2025 12:33 PM

## 2025-05-08 ENCOUNTER — TELEPHONE (OUTPATIENT)
Dept: VASCULAR LAB | Facility: MEDICAL CENTER | Age: 50
End: 2025-05-08
Payer: COMMERCIAL

## 2025-05-08 NOTE — TELEPHONE ENCOUNTER
"Per discussion with Rx Coordinator ILIANA Lindsay for Ozempic 2 mg was denied by pt's insurance as pt does not have DM2, upon further investigation it appears patient's insurance changed from [email or web address] to [employer] which would explain why it was previously covered and is now no longer covered.    Called and s/w pt regarding PA denial due to insurance change. Informed pt that her new insurance will require a diagnosis if DM2 in order to get it approved which she does not technically have. Discussed cash pricing options via [facility] which would be unaffordable to her. Discussed Phentermine which patient states she has been on before and is not interested in re-starting it at this time. Pt reports she was able to get Ozempic starter pen from [facility] in the past for ~$80 which she believes was cash pricing at the time. Informed pt that unfortunately current cash pricing is ~$400 for Ozempic. Patient requested that I look into Rybelsus to see if that is available for a cash price via [facility].     Will reach out to Rx Coordinators to see if Rybelsus is available and what cost would be.    Todd Inman PharmD  Authorized Nuclear Pharmacist (ANP)      ADDENDUM:    Rx Coordinator re-submitted PA as \"Continuation of Therapy\" which was returned as denied again. Per discussion with Rx Coordinator Rybelsus denson pricing would be ~$350 for a month supply.    Called and s/w pt to inform her that PA was denied again. Informed pt that Rybelsus cash pricing would be ~$350/month. Pt was very understanding and appreciative of our efforts. She will evaluate her budget and consider cash pricing for ozempic in the future for which she was advised that she can call and schedule f/u for weight loss at any time and/or discuss with us at her next INR check. Re-scheduled pt's f/u appointment on 6/13/25 to be ACR type only instead of ACR and Pharmacotherapy. Removed Ozempic from patient's medication list.     Todd Inman " PharmD  Authorized Nuclear Pharmacist (ANP)

## 2025-05-08 NOTE — TELEPHONE ENCOUNTER
Attempted to re-submit a new PA for OZEMPIC 2MG/DOSE for continuation of therapy  via [employer] (KEY: [id number]).     Will follow up within 24-48 business hours.     JUANPABLO Pérez, PhT  [facility] Pharmacy Liaison (Rx Coordinator)  P: [phone]  5/8/2025 2:13 PM

## 2025-05-09 NOTE — TELEPHONE ENCOUNTER
New PA submitted for Semaglutide, 2 MG/DOSE, (OZEMPIC, 2 MG/DOSE,) 8 MG/3ML Solution Pen-injector.  has been denied for a quantity of 3 mls , day supply 30    Prior authorization was denied per the following:   ? Coverage is provided for the diagnosis of type 2 diabetes mellitus. Coverage cannot be  authorized at this time.  We based this decision on the prior authorization criteria for: 612741 Diabetes - GlucagonLike Peptide-1 Agonists Prior Authorization Policy for [employer]  We are providing you with a copy of the Notice of Adverse Benefit Determination that we sent to your patient. The appeal rights and procedures are explained in the notice.    PA denial reference number: [id number]  Insurance: [employer]       Next Steps: received EMR response from provider that pt will hold off of filling the script for. Per Todd ALONZO, pt will check with her budget and may consider the [facility] pricing in the future.     Will defer with follow up for offering RXC services at this time.    JUANPABLO Pérez, PhT  [facility] Pharmacy Liaison (Rx Coordinator)  P: [phone]  5/9/2025 12:07 PM

## 2025-05-21 ENCOUNTER — TELEPHONE (OUTPATIENT)
Dept: VASCULAR LAB | Facility: MEDICAL CENTER | Age: 50
End: 2025-05-21
Payer: COMMERCIAL

## 2025-05-21 DIAGNOSIS — E66.3 OVERWEIGHT (BMI 25.0-29.9): Primary | ICD-10-CM

## 2025-05-21 DIAGNOSIS — R73.03 PRE-DIABETES: ICD-10-CM

## 2025-05-21 NOTE — TELEPHONE ENCOUNTER
[facility]    Caller: Jaclyn Olvera     Topic/issue: Patient is requesting a call back from Todd about the recent medication and an appeal for the insurance company.    Please advise.    Callback Number: [phone]     Thank you,  Addie ROMERO

## 2025-05-21 NOTE — TELEPHONE ENCOUNTER
Called and LVM for pt. Will attempt to call pt again tomorrow.     Todd Inman PharmD  Authorized Nuclear Pharmacist (ANP)

## 2025-05-22 RX ORDER — SEMAGLUTIDE 2.68 MG/ML
2 INJECTION, SOLUTION SUBCUTANEOUS
Qty: 3 ML | Refills: 2 | Status: SHIPPED | OUTPATIENT
Start: 2025-05-22

## 2025-05-22 NOTE — TELEPHONE ENCOUNTER
Pt reports that she contacted her Insurance and discussed an appeal of her denial for Ozempic PA considering she has been on ozempic for ~3 years not and that it has been keeping her A1c down since being on GLP1 therapy. She requested our fax number to provide to her insurance so that they could send the appeal paperwork to [facility].     Of note. Pt requests if Ozempic gets approved that the prescription be sent to [facility] pharmacy on [address]   Will author new prescription at this time to aid in appeal process as previous prescription was discontinued.     CC:  Rx Coordinator Angélica Inman PharmD  Authorized Nuclear Pharmacist (ANP)  [facility]  Phone: [phone], Fax [phone]

## 2025-05-22 NOTE — TELEPHONE ENCOUNTER
Caller: Jaclyn Olvera     Topic/issue:Returning call     Callback Number: [phone]     Thank you  Gloria RICKETTS

## 2025-05-22 NOTE — TELEPHONE ENCOUNTER
PHARM  Caller: Jaclyn Olvera    Topic/issue: Patient stated that the paperwork will be coming in under Gloria Monroe's name and to attach any and all supporting documents regarding her Ozempic medication.    Callback Number: [phone]    Thank you,  Milady MANJARREZ

## 2025-05-27 NOTE — TELEPHONE ENCOUNTER
Appeal was faxed and submitted since 05/23/2025 by [facility].     Will follow up within 24-48 business hours.     JUANPABLO Pérez, PhT  [facility] Pharm[facility] Liaison (Rx Coordinator)  P: [phone]  5/27/2025 9:34 AM

## 2025-05-27 NOTE — TELEPHONE ENCOUNTER
Appeal submitted for Semaglutide, 2 MG/DOSE, (OZEMPIC, 2 MG/DOSE,) 8 MG/3ML Solution Pen-injector  has been denied for a quantity of 3 mls , day supply 30    Prior authorization was denied per the following:     Coverage is provided for the diagnosis of Type 2 diabetes mellitus. Coverage cannot be authorized at this time.     PA denial reference number: [id number]  Insurance: [employer]       Next Steps: uploading the appeal denial letter under the Media tab. Updated and notified provider for the status.     JUANPABLO Pérez, PhT  [facility] Pharm[facility] Liaison (Rx Coordinator)  P: [phone]  5/27/2025 9:36 AM

## 2025-05-28 NOTE — TELEPHONE ENCOUNTER
Called and s/w pt to inform her that the PA appeal was denied. Patient is still interested in proceeding with cash-pricing Ozempic via [facility] as a means to continue on GLP-1 therapy at this time. Patient is scheduled for 6/13/25 appointment to discuss further.     Todd Inman PharmD  Authorized Nuclear Pharmacist (ANP)

## 2025-06-10 ENCOUNTER — APPOINTMENT (OUTPATIENT)
Dept: RHEUMATOLOGY | Facility: MEDICAL CENTER | Age: 50
End: 2025-06-10
Attending: STUDENT IN AN ORGANIZED HEALTH CARE EDUCATION/TRAINING PROGRAM
Payer: COMMERCIAL

## 2025-06-13 ENCOUNTER — ANTICOAGULATION MONITORING (OUTPATIENT)
Dept: VASCULAR LAB | Facility: MEDICAL CENTER | Age: 50
End: 2025-06-13
Payer: COMMERCIAL

## 2025-06-13 ENCOUNTER — NON-PROVIDER VISIT (OUTPATIENT)
Dept: VASCULAR LAB | Facility: MEDICAL CENTER | Age: 50
End: 2025-06-13
Attending: INTERNAL MEDICINE
Payer: COMMERCIAL

## 2025-06-13 VITALS — BODY MASS INDEX: 24.51 KG/M2 | WEIGHT: 166 LBS

## 2025-06-13 DIAGNOSIS — E27.49 HEMORRHAGE OF BOTH ADRENAL GLANDS (HCC): ICD-10-CM

## 2025-06-13 DIAGNOSIS — N93.9 VAGINAL BLEEDING: ICD-10-CM

## 2025-06-13 DIAGNOSIS — I82.210 THROMBOSIS OF SUPERIOR VENA CAVA (HCC): Primary | ICD-10-CM

## 2025-06-13 DIAGNOSIS — R73.03 PRE-DIABETES: ICD-10-CM

## 2025-06-13 DIAGNOSIS — D68.61 ANTIPHOSPHOLIPID SYNDROME (HCC): ICD-10-CM

## 2025-06-13 DIAGNOSIS — E66.3 OVERWEIGHT (BMI 25.0-29.9): Primary | ICD-10-CM

## 2025-06-13 LAB — INR PPP: 3.2 (ref 2–3.5)

## 2025-06-13 PROCEDURE — 85610 PROTHROMBIN TIME: CPT

## 2025-06-13 PROCEDURE — RXMED WILLOW AMBULATORY MEDICATION CHARGE: Performed by: INTERNAL MEDICINE

## 2025-06-13 PROCEDURE — 99213 OFFICE O/P EST LOW 20 MIN: CPT

## 2025-06-13 RX ORDER — PEN NEEDLE, DIABETIC 30 GX3/16"
1 NEEDLE, DISPOSABLE MISCELLANEOUS PRN
Qty: 100 EACH | Refills: 0 | Status: SHIPPED | OUTPATIENT
Start: 2025-06-13

## 2025-06-13 RX ORDER — SEMAGLUTIDE 2.68 MG/ML
2 INJECTION, SOLUTION SUBCUTANEOUS
Qty: 3 ML | Refills: 2 | Status: SHIPPED | OUTPATIENT
Start: 2025-06-13

## 2025-06-13 ASSESSMENT — FIBROSIS 4 INDEX: FIB4 SCORE: 0.65

## 2025-06-13 NOTE — Clinical Note
Hi there, we are doing [employer] pricing for her Ozempic, but she is counting clicks to only inject 1 mg/week instead of 2 mg. I also prescribed pen needles for her to be able to use. She would likely benefit from a call to coordinate how she would like to obtain it (delivery vs pick-up) as she wants the receipts hidden from her sister.

## 2025-06-13 NOTE — PROGRESS NOTES
"        Refer to Patient Findings for HPI:        Date Referral Placed: ***      Vitals:  There were no vitals filed for this visit.  ***Pt declined vitals    Verified current warfarin dosing schedule.    Medications reconciled.  {Antiplatelet Therapy:93025}      A/P   INR is {INR Result:30730::\"therapeutic\"}  Reason(s) for out of range INR today: {Reasons for OOR INR:50324}      {Lupus AC + APS:01079::\"Reminder: Lupus AC + APS patients need to draw intermittent \"chromogenic\" Factor X every 2 years. Last drawn ***\"}    Warfarin dosing recommendation: {Warfarin Plan:84132}    Pt educated to contact our clinic with any changes in medications or s/s of bleeding or thrombosis. Pt is aware to seek immediate medical attention for falls, head injury or deep cuts.    Request pt to return in {NUMBER :10} {Follow Up Date:28123::\"week\"}(s). {COAGFU:09700::\"Pt agrees.\"}    Sindhu Wilde, PharmD        Patient Consult Note    Primary care physician: Daniela Marcelo D.O.    Reason for consult: Obesity/Weight Management    Date Referral Placed: ***    HPI:  Jaclyn Olvera is a 50 y.o. old patient who comes in today for evaluation of above stated problem.    Initial Weight: *** lbs  Initial BMI: *** kg/m2    Current Weight: *** lbs   Current BMI: *** kg/m2      Most Recent HbA1c:   Lab Results   Component Value Date/Time    HBA1C 4.6 06/25/2024 06:41 AM        Most Recent TSH:   TSH   Date Value Ref Range Status   06/25/2024 3.520 0.380 - 5.330 uIU/mL Final     Comment:     The 2011 American Thyroid Association (CALOS) guidelines  recommended that the interpretation of thyroid function in  pregnancy be based on trimester specific reference ranges.    1st Trimester  0.100-2.500 mIU/L  2nd Trimester  0.200-3.000 mIU/L  3rd Trimester  0.300-3.500 mIU/L    These established reference ranges have not been validated  at [facility].         Most Recent SrCr and GFR:  Lab Results   Component Value Date/Time    CREATININE " 0.77 2024 08:45 AM      GFR (CKD-EPI)   Date Value Ref Range Status   2024 95 >60 mL/min/1.73 m 2 Final     Comment:     Estimated Glomerular Filtration Rate is calculated using  race neutral CKD-EPI  equation per NKF-ASN recommendations.         Current Obesity Medication Regimen  Metformin: ***   GIP and/or GLP-1 Analog: {GIP and/or GLP-1 analo}  For use as an adjunct to diet and exercise in patients with a BMI >=30 kg/m2, or in patients with a BMI >=27 kg/m2 and >=1 weight-associated comorbidity (eg, HTN, HLD, JE, T2DM, etc).  Pt denies personal/family hx of medullary thyroid carcinoma or multiple endocrine neoplasia syndrome type 2 (MEN 2).  Naltrexone/bupropion (Contrave): ***  Phentermine: ***     Previous Obesity Medication(s) and Reason for Discontinuation  ***    Lifestyle  Physical Activity:  Current Exercise - ***  Exercise Goal - At least 150 min/week of anything aerobic.    Dietary: Eats *** meals per day.  Breakfast - ***  Lunch - ***  Dinner - ***  Snack - ***  Dessert - ***  Beverage - ***      Past Medical History:  Patient Active Problem List    Diagnosis Date Noted    Dog bite of right arm, initial encounter 2024    Dog bite of left arm, initial encounter 2024    Cause of injury, dog bite, initial encounter 2024    Long term current use of anticoagulant 2024    Vitamin D insufficiency 2023    Multiple skin nodules 2022    Chronic pain of both shoulders 2022    Long-term use of hydroxychloroquine 2022    Anemia 2022    Black stool 2022    Epigastric pain 2022    Antiphospholipid syndrome (HCC) 2021    Elevated liver function tests 2021    Elevated C-reactive protein (CRP) 2021    Sepsis (HCC) 2021    Microcytic anemia 2021    Thrombocytopenia (HCC) 2021    Hemorrhage of both adrenal glands (HCC) 2021    Retroperitoneal lymphadenopathy 2021    Vaginal bleeding  2021    Hyperbilirubinemia 2021    Fibroids 06/15/2021    Thrombosis of superior vena cava (HCC) 2021    H. pylori infection 2021    Hyponatremia 2021    Kidney lesion, native, right 2021    Lesion of cervix 2021    Leukocytosis 2021       Past Surgical History:  Past Surgical History[1]    Allergies:  Patient has no known allergies.    Social History:  Social History     Socioeconomic History    Marital status:      Spouse name: Not on file    Number of children: Not on file    Years of education: Not on file    Highest education level: Not on file   Occupational History    Not on file   Tobacco Use    Smoking status: Former     Current packs/day: 0.00     Average packs/day: 0.1 packs/day for 16.0 years (1.6 ttl pk-yrs)     Types: Cigarettes     Start date:      Quit date:      Years since quittin.4    Smokeless tobacco: Never   Vaping Use    Vaping status: Never Used   Substance and Sexual Activity    Alcohol use: Not Currently    Drug use: Not Currently    Sexual activity: Not on file   Other Topics Concern    Not on file   Social History Narrative    Not on file     Social Drivers of Health     Financial Resource Strain: Not on file   Food Insecurity: Not on file   Transportation Needs: Not on file   Physical Activity: Not on file   Stress: Not on file   Social Connections: Not on file   Intimate Partner Violence: Not on file   Housing Stability: Not on file       Family History:  No family history on file.    Medications:  Current Medications[2]    Physical Examination:  Vital signs: There were no vitals taken for this visit.     Assessment and Plan:    1. Obesity/Weight Management  ***    - Medication changes:  ***   - Continue:  ***     - Lifestyle changes:  Diet: Monitor caloric intake - aim for deficit. Maximize lean proteins and veggies. Cut out/down on carbs. Avoid simple sugars.   Referral to Nutrition Services placed:  {YES/NO:20266}  Exercise: Increase as tolerated. Goal of at least 150 min/week of anything aerobic.    Follow Up:  No follow-ups on file.    Sindhu Wilde, PharmD    CC:   Daniela Marcelo D.O.                [1]   Past Surgical History:  Procedure Laterality Date    PB DEBRIDEMENT, SKIN, SUB-Q TISSUE,MUSCLE,MARCI* Left 12/24/2024    Procedure: LEFT WRIST IRRIGATION AND DEBRIDEMENT;  Surgeon: Eagle Hubbard M.D.;  Location: [facility];  Service: Orthopedics    IA CYSTOURETHROSCOPY  9/11/2024    Procedure: CYSTOSCOPY;  Surgeon: Kd Augustin M.D.;  Location: SURGERY [facility];  Service: Gyn Robotic    HYSTERECTOMY ROBOTIC XI  9/11/2024    Procedure: ROBOTICALLY ASSISTED TOTAL LAPAROSCOPIC HYSTERECTOMY;  Surgeon: Kd Augustin M.D.;  Location: SURGERY [facility];  Service: Gyn Robotic    SALPINGECTOMY Bilateral 9/11/2024    Procedure: SALPINGECTOMY, LAPAROSCOPIC;  Surgeon: Kd Augustin M.D.;  Location: SURGERY [facility];  Service: Gyn Robotic    IA UPPER GI ENDOSCOPY,DIAGNOSIS N/A 6/17/2021    Procedure: GASTROSCOPY;  Surgeon: Elfego Lopez M.D.;  Location: SURGERY SAME DAY [facility];  Service: Gastroenterology    IA UPPER GI ENDOSCOPY,BIOPSY N/A 6/17/2021    Procedure: GASTROSCOPY, WITH BIOPSY;  Surgeon: Elfego Lopez M.D.;  Location: SURGERY SAME DAY [facility];  Service: Gastroenterology   [2]   Current Outpatient Medications:     Semaglutide, 2 MG/DOSE, (OZEMPIC, 2 MG/DOSE,) 8 MG/3ML Solution Pen-injector, Inject 2 mg under the skin every 7 days., Disp: 3 mL, Rfl: 2    warfarin (COUMADIN) 2.5 MG Tab, Take 1 Tablet by mouth every day. Take as directed by anticoag clinic., Disp: 135 Tablet, Rfl: 1    warfarin (COUMADIN) 5 MG Tab, TAKE ONE TO TWO TABLETS DAILY AS DIRECTED BY [facility], Disp: 180 Tablet, Rfl: 1    ibuprofen (MOTRIN) 800 MG Tab, Take 1 Tablet by mouth every 8 hours as needed for Mild Pain., Disp: 30 Tablet, Rfl: 0    hydroxychloroquine (PLAQUENIL) 200 MG Tab,  Take 400 mg by mouth every day., Disp: , Rfl:     senna-docusate (PERICOLACE OR SENOKOT S) 8.6-50 MG Tab, Take 1 Tablet by mouth every day. (Patient taking differently: Take 1 Tablet by mouth every day.   MEDICATION INSTRUCTIONS FOR SURGERY/PROCEDURE SCHEDULED FOR 9/11/24  CONTINUE TAKING MED PRIOR TO SURGERY  BUT HOLD MORNING OF PROCEDURE), Disp: 30 Tablet, Rfl: 0    meclizine (ANTIVERT) 25 MG Tab, Take 1 Tablet by mouth 3 times a day as needed for Dizziness or Vertigo. (Patient taking differently: Take 25 mg by mouth 3 times a day as needed for Dizziness or Vertigo.   MEDICATION INSTRUCTIONS FOR SURGERY/PROCEDURE SCHEDULED FOR 9/11/24  OK TO CONTINUE TAKING PRIOR TO SURGERY AND DAY OF SURGERY IF NEEDED), Disp: 30 Tablet, Rfl: 0    Biotin w/ Vitamins C & E (HAIR/SKIN/NAILS PO), Take 1 Tablet by mouth every day.   MEDICATION INSTRUCTIONS FOR SURGERY/PROCEDURE SCHEDULED FOR 9/11/24  DO NOT TAKE 7 DAYS PRIOR TO SURGERY, Disp: , Rfl:

## 2025-06-13 NOTE — PROGRESS NOTES
Patient Consult Note       Primary care physician: Patrizia James M.D.     Reason for consult: Obesity/Weight Management     HPI:  Jaclyn Olvera is a 49 y.o. old patient who comes in today for evaluation of above stated problem.     Initial Weight: 184 lbs  Initial BMI: 27.8 kg/m2     Current Weight: 164 lbs  Current BMI: 24.22 kg/m2     Most Recent HbA1c:         Lab Results   Component Value Date/Time     HBA1C 4.6 06/25/2024 06:41 AM            Lab Results   Component Value Date/Time     CREATININE 0.83 06/25/2024 06:41 AM         Most Recent TSH:     Latest Reference Range & Units 06/25/24 06:41   TSH 0.380 - 5.330 uIU/mL 3.520         Obesity Medication History and Current Regimen  GLP-1 Agent: Ozempic 2mg Q7d     Lifestyle  Current Exercise - walking 30-45 min 4-5 week (2.5 miles per walk), stretching for 60 minutes on days she doesn't walk   Exercise Goal - increase as tolerable      Dietary:Portion control and cut back on sugar. Focusing on caloric restriction.  Breakfast - usually skips  Lunch - small sandwich OR burrito, occasional salad  Dinner - chicken sandwich OR [place] food (rice, chicken, ground beef, steak)  Snack - no snacks  Dessert - no sweet tooth   Beverage - coffee, water, no juice/soda     Past Medical History:       Patient Active Problem List     Diagnosis Date Noted    Long term current use of anticoagulant 06/12/2024    Vitamin D insufficiency 06/14/2023    Multiple skin nodules 04/05/2022    Chronic pain of both shoulders 04/05/2022    Long-term use of hydroxychloroquine 04/05/2022    Anemia 01/27/2022    Black stool 01/27/2022    Epigastric pain 01/27/2022    Antiphospholipid syndrome (HCC) 06/23/2021    Elevated liver function tests 06/22/2021    Elevated C-reactive protein (CRP) 06/21/2021    Sepsis (HCC) 06/21/2021    Microcytic anemia 06/19/2021    Thrombocytopenia (HCC) 06/19/2021    Hemorrhage of both adrenal glands (HCC) 06/18/2021    Retroperitoneal lymphadenopathy  06/18/2021    Vaginal bleeding 06/18/2021    Hyperbilirubinemia 06/17/2021    Fibroids 06/15/2021    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 pylori infection 06/14/2021    Hyponatremia 06/14/2021    Kidney lesion, native, right 06/14/2021    Lesion of cervix 06/14/2021    Leukocytosis 06/14/2021         Past Surgical History:  Past Surgical History         Past Surgical History:   Procedure Laterality Date    LA UPPER GI ENDOSCOPY,DIAGNOSIS N/A 6/17/2021     Procedure: GASTROSCOPY;  Surgeon: Elfego Lopez M.D.;  Location: SURGERY SAME DAY [facility];  Service: Gastroenterology    LA UPPER GI ENDOSCOPY,BIOPSY N/A 6/17/2021     Procedure: GASTROSCOPY, WITH BIOPSY;  Surgeon: Elfego Lopez M.D.;  Location: SURGERY SAME DAY [facility];  Service: Gastroenterology            Allergies:  Patient has no known allergies.     Social History:  Social History               Socioeconomic History    Marital status:        Spouse name: Not on file    Number of children: Not on file    Years of education: Not on file    Highest education level: Not on file   Occupational History    Not on file   Tobacco Use    Smoking status: Former       Types: Cigarettes    Smokeless tobacco: Former       Quit date: 10/10/2023    Tobacco comments:       1 pk per week   Vaping Use    Vaping status: Never Used   Substance and Sexual Activity    Alcohol use: Not Currently       Comment: occasionally    Drug use: Not Currently    Sexual activity: Not on file   Other Topics Concern    Not on file   Social History Narrative    Not on file      Social Determinants of Health      Financial Resource Strain: Not on file   Food Insecurity: Not on file   Transportation Needs: Not on file   Physical Activity: Not on file   Stress: Not on file   Social Connections: Not on file   Intimate Partner Violence: Not on file   Housing Stability: Not on file            Family History:  Family History   No family history on file.         Medications:    Current Medications      Current Outpatient Medications:     Tirzepatide (MOUNJARO) 7.5 MG/0.5ML Solution Pen-injector, Inject 7.5 mg under the skin every 7 days., Disp: 2 mL, Rfl: 3    hydroxychloroquine (PLAQUENIL) 200 MG Tab, Take 2 Tablets by mouth every day., Disp: 180 Tablet, Rfl: 3    senna-docusate (PERICOLACE OR SENOKOT S) 8.6-50 MG Tab, Take 1 Tablet by mouth every day., Disp: 30 Tablet, Rfl: 0    warfarin (COUMADIN) 5 MG Tab, TAKE ONE TO TWO TABLETS DAILY AS DIRECTED BY [facility], Disp: 180 Tablet, Rfl: 1    warfarin (COUMADIN) 2.5 MG Tab, 1 tab by mouth daily or as directed by the [facility], Disp: 90 Tablet, Rfl: 1    meclizine (ANTIVERT) 25 MG Tab, Take 1 Tablet by mouth 3 times a day as needed for Dizziness or Vertigo., Disp: 30 Tablet, Rfl: 0    Biotin w/ Vitamins C & E (HAIR/SKIN/NAILS PO), Take 1 Tablet by mouth every day., Disp: , Rfl:         Labs: Reviewed     Physical Examination:  Vital signs: /74   Pulse 76   Wt 80.6 kg (177 lb 12.8 oz)   BMI 27.03 kg/m²  Body mass index is 27.03 kg/m².     Assessment and Plan:     1. Obesity/Weight Management  Since last visit pt reports that she has tolerated Ozempic 2 mg w/ no issues. Noted appetite suppression.  Pt increasing her exercise and moderating her portion sizes.  Noted ~ 10 lb weight loss since last visit w/ clinic.  Pt would like to continue current GLP1 dose for now.      - Medication changes:  Continue Ozempic 1 mg subQ once weekly  Prescribing 2 mg pen but pt will utilize click-counting method to administer 1 mg weekly as pt is paying for Ozempic out of pocket.   Prescribed pen needles to avoid patient exhausting supply that comes with pen.      - Lifestyle changes:  Diet: Maximize lean proteins and veggies. Cut out/down on carbs. Avoid simple sugars. Continue to focus on optimized nutrition and caloric deficit,   Exercise: Encouraged consistent daily/weekly exercise totaling at least 150 min/week in  active movement and increase as tolerated     Follow up in 3 months for weight management (and possibly for INR)    CC:   Rx Coordinators

## 2025-06-13 NOTE — PROGRESS NOTES
Anticoagulation Summary  As of 6/13/2025      INR goal:  2.0-3.0   TTR:  39.5% (3.9 y)   INR used for dosing:  3.20 (6/13/2025)   Warfarin maintenance plan:  10 mg (5 mg x 2) every Sun; 7.5 mg (5 mg x 1.5) all other days   Weekly warfarin total:  55 mg   Plan last modified:  Sindhu Wilde, PharmD (3/28/2025)   Next INR check:  7/11/2025   Target end date:  Indefinite    Indications    Thrombosis of superior vena cava (HCC) [I82.210]  Hemorrhage of both adrenal glands (HCC) [E27.49]  Vaginal bleeding [N93.9]  Antiphospholipid syndrome (HCC) [D68.61]                 Anticoagulation Episode Summary       INR check location:  --    Preferred lab:  --    Send INR reminders to:  --    Comments:  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)  Unable to tolerate Xarelto          Anticoagulation Care Providers       Provider Role Specialty Phone number    [facility] Responsible  [phone]                Refer to Patient Findings for HPI:  Patient Findings       Positives:  Change in alcohol use (a few beers ~1 week ago.)    Negatives:  Signs/symptoms of thrombosis, Signs/symptoms of bleeding, Laboratory test error suspected, Change in health, Change in activity, Upcoming invasive procedure, Emergency department visit, Upcoming dental procedure, Missed doses, Extra doses, Change in medications, Change in diet/appetite, Hospital admission, Bruising, Other complaints          Clinical Outcomes       Negatives:  Major bleeding event, Thromboembolic event, Anticoagulation-related hospital admission, Anticoagulation-related ED visit, Anticoagulation-related fatality            Date Referral Placed: 7/2/24      Vitals:  There were no vitals filed for this visit.  Pt declined vitals    Verified current warfarin dosing schedule.    Medications reconciled.  Pt is not on antiplatelet therapy.      A/P   INR is supratherapeutic  Reason(s) for out of range INR today: Increased EtOH intake        Warfarin  Stocking/glove pattern intermittent neuropathy  · DM control   · Avoid barefoot walking  · HH PT/OT   dosing recommendation: Take REDUCED dose of 5 mg ONCE today, then resume current warfarin dosing regimen.     Pt educated to contact our clinic with any changes in medications or s/s of bleeding or thrombosis. Pt is aware to seek immediate medical attention for falls, head injury or deep cuts.    Request pt to return in 4 week(s). Pt agrees.    Todd Inman, PharmD

## 2025-06-16 PROCEDURE — RXMED WILLOW AMBULATORY MEDICATION CHARGE: Performed by: INTERNAL MEDICINE

## 2025-06-18 ENCOUNTER — PHARMACY VISIT (OUTPATIENT)
Dept: PHARMACY | Facility: MEDICAL CENTER | Age: 50
End: 2025-06-18
Payer: COMMERCIAL

## 2025-06-23 ENCOUNTER — TELEPHONE (OUTPATIENT)
Dept: RHEUMATOLOGY | Facility: MEDICAL CENTER | Age: 50
End: 2025-06-23
Payer: COMMERCIAL

## 2025-06-23 NOTE — TELEPHONE ENCOUNTER
Pt called stating she cancelled appointment with us due to not having labs done and wanted to get the lab orders, upon checking pt's chart the lab orders are now . Please reorder labs and I will let pt know to get them done. Labs you ordered on 24:   VITAMIN D,25 HYDROXY (DEFICIENCY),   FIBRINOGEN, Comp Metabolic Panel,CBC WITH DIFFERENTIAL, Sed Rate, CRP QUANTITIVE (NON-CARDIAC)     Thank you

## 2025-06-24 DIAGNOSIS — D68.61 ANTIPHOSPHOLIPID SYNDROME (HCC): Primary | ICD-10-CM

## 2025-07-11 ENCOUNTER — ANTICOAGULATION VISIT (OUTPATIENT)
Dept: VASCULAR LAB | Facility: MEDICAL CENTER | Age: 50
End: 2025-07-11
Attending: INTERNAL MEDICINE
Payer: COMMERCIAL

## 2025-07-11 DIAGNOSIS — E27.49 HEMORRHAGE OF BOTH ADRENAL GLANDS (HCC): ICD-10-CM

## 2025-07-11 DIAGNOSIS — D68.61 ANTIPHOSPHOLIPID SYNDROME (HCC): ICD-10-CM

## 2025-07-11 DIAGNOSIS — N93.9 VAGINAL BLEEDING: ICD-10-CM

## 2025-07-11 DIAGNOSIS — I82.210 THROMBOSIS OF SUPERIOR VENA CAVA (HCC): Primary | ICD-10-CM

## 2025-07-11 LAB — INR PPP: 1.8 (ref 2–3.5)

## 2025-07-11 PROCEDURE — 99212 OFFICE O/P EST SF 10 MIN: CPT

## 2025-07-11 PROCEDURE — 85610 PROTHROMBIN TIME: CPT

## 2025-07-11 NOTE — PROGRESS NOTES
Anticoagulation Summary  As of 2025      INR goal:  2.0-3.0   TTR:  40.1% (4 y)   INR used for dosin.80 (2025)   Warfarin maintenance plan:  10 mg (5 mg x 2) every Sun; 7.5 mg (5 mg x 1.5) all other days   Weekly warfarin total:  55 mg   Plan last modified:  Sindhu Wilde, PharmD (3/28/2025)   Next INR check:  2025   Target end date:  Indefinite    Indications    Thrombosis of superior vena cava (HCC) [I82.210]  Hemorrhage of both adrenal glands (HCC) [E27.49]  Vaginal bleeding [N93.9]  Antiphospholipid syndrome (HCC) [D68.61]                 Anticoagulation Episode Summary       INR check location:  --    Preferred lab:  --    Send INR reminders to:  --    Comments:  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)  Unable to tolerate Xarelto          Anticoagulation Care Providers       Provider Role Specialty Phone number    [facility] Responsible  [phone]                Refer to Patient Findings for HPI:  Patient Findings       Negatives:  Signs/symptoms of thrombosis, Signs/symptoms of bleeding, Laboratory test error suspected, Change in health, Change in alcohol use, Change in activity, Upcoming invasive procedure, Emergency department visit, Upcoming dental procedure, Missed doses, Extra doses, Change in medications, Change in diet/appetite, Hospital admission, Bruising, Other complaints          Clinical Outcomes       Negatives:  Major bleeding event, Thromboembolic event, Anticoagulation-related hospital admission, Anticoagulation-related ED visit, Anticoagulation-related fatality            Date Referral Placed: 25      Vitals:  There were no vitals filed for this visit.  Unable to perform vitals    Verified current warfarin dosing schedule.    Medications reconciled.  Pt is not on antiplatelet therapy.      A/P   INR is subtherapeutic  Reason(s) for out of range INR today: No obvious causes      Reminder: Lupus AC + APS patients need to draw  "intermittent \"chromogenic\" Factor X every 2 years. Last drawn - due once patient has stable therapeutic INR.    Warfarin dosing recommendation: Increase to Warfarin 10 mg and then resume previous warfarin dosing.     Pt educated to contact our clinic with any changes in medications or s/s of bleeding or thrombosis. Pt is aware to seek immediate medical attention for falls, head injury or deep cuts.    Request pt to return in 2 week(s). Pt declines despite warning of extending their follow up interval. Pt opts to RTC in 3 week(s).    Megan Diaz, PharmD        "

## 2025-07-19 ENCOUNTER — HOSPITAL ENCOUNTER (OUTPATIENT)
Dept: LAB | Facility: MEDICAL CENTER | Age: 50
End: 2025-07-19
Attending: NURSE PRACTITIONER
Payer: COMMERCIAL

## 2025-07-19 DIAGNOSIS — D68.61 ANTIPHOSPHOLIPID SYNDROME (HCC): ICD-10-CM

## 2025-07-19 LAB
25(OH)D3 SERPL-MCNC: 21 NG/ML (ref 30–100)
ALBUMIN SERPL BCP-MCNC: 4.1 G/DL (ref 3.2–4.9)
ALBUMIN/GLOB SERPL: 1.2 G/DL
ALP SERPL-CCNC: 46 U/L (ref 30–99)
ALT SERPL-CCNC: 19 U/L (ref 2–50)
ANION GAP SERPL CALC-SCNC: 10 MMOL/L (ref 7–16)
AST SERPL-CCNC: 20 U/L (ref 12–45)
BASOPHILS # BLD AUTO: 0.5 % (ref 0–1.8)
BASOPHILS # BLD: 0.03 K/UL (ref 0–0.12)
BILIRUB SERPL-MCNC: 0.7 MG/DL (ref 0.1–1.5)
BUN SERPL-MCNC: 16 MG/DL (ref 8–22)
CALCIUM ALBUM COR SERPL-MCNC: 9.5 MG/DL (ref 8.5–10.5)
CALCIUM SERPL-MCNC: 9.6 MG/DL (ref 8.5–10.5)
CHLORIDE SERPL-SCNC: 104 MMOL/L (ref 96–112)
CO2 SERPL-SCNC: 22 MMOL/L (ref 20–33)
CREAT SERPL-MCNC: 0.78 MG/DL (ref 0.5–1.4)
CRP SERPL HS-MCNC: 0.45 MG/DL (ref 0–0.75)
EOSINOPHIL # BLD AUTO: 0.16 K/UL (ref 0–0.51)
EOSINOPHIL NFR BLD: 2.9 % (ref 0–6.9)
ERYTHROCYTE [DISTWIDTH] IN BLOOD BY AUTOMATED COUNT: 50.4 FL (ref 35.9–50)
ERYTHROCYTE [SEDIMENTATION RATE] IN BLOOD BY WESTERGREN METHOD: 11 MM/HOUR (ref 0–25)
FIBRINOGEN PPP-MCNC: 489 MG/DL (ref 215–460)
GFR SERPLBLD CREATININE-BSD FMLA CKD-EPI: 93 ML/MIN/1.73 M 2
GLOBULIN SER CALC-MCNC: 3.5 G/DL (ref 1.9–3.5)
GLUCOSE SERPL-MCNC: 82 MG/DL (ref 65–99)
HCT VFR BLD AUTO: 44.7 % (ref 37–47)
HGB BLD-MCNC: 13.4 G/DL (ref 12–16)
IMM GRANULOCYTES # BLD AUTO: 0.01 K/UL (ref 0–0.11)
IMM GRANULOCYTES NFR BLD AUTO: 0.2 % (ref 0–0.9)
LYMPHOCYTES # BLD AUTO: 1.84 K/UL (ref 1–4.8)
LYMPHOCYTES NFR BLD: 33.3 % (ref 22–41)
MCH RBC QN AUTO: 24 PG (ref 27–33)
MCHC RBC AUTO-ENTMCNC: 30 G/DL (ref 32.2–35.5)
MCV RBC AUTO: 80.1 FL (ref 81.4–97.8)
MONOCYTES # BLD AUTO: 0.34 K/UL (ref 0–0.85)
MONOCYTES NFR BLD AUTO: 6.2 % (ref 0–13.4)
NEUTROPHILS # BLD AUTO: 3.14 K/UL (ref 1.82–7.42)
NEUTROPHILS NFR BLD: 56.9 % (ref 44–72)
NRBC # BLD AUTO: 0 K/UL
NRBC BLD-RTO: 0 /100 WBC (ref 0–0.2)
PLATELET # BLD AUTO: 280 K/UL (ref 164–446)
PMV BLD AUTO: 10.2 FL (ref 9–12.9)
POTASSIUM SERPL-SCNC: 4.4 MMOL/L (ref 3.6–5.5)
PROT SERPL-MCNC: 7.6 G/DL (ref 6–8.2)
RBC # BLD AUTO: 5.58 M/UL (ref 4.2–5.4)
SODIUM SERPL-SCNC: 136 MMOL/L (ref 135–145)
WBC # BLD AUTO: 5.5 K/UL (ref 4.8–10.8)

## 2025-07-19 PROCEDURE — 82306 VITAMIN D 25 HYDROXY: CPT

## 2025-07-19 PROCEDURE — 85652 RBC SED RATE AUTOMATED: CPT

## 2025-07-19 PROCEDURE — 85025 COMPLETE CBC W/AUTO DIFF WBC: CPT

## 2025-07-19 PROCEDURE — 85384 FIBRINOGEN ACTIVITY: CPT

## 2025-07-19 PROCEDURE — 36415 COLL VENOUS BLD VENIPUNCTURE: CPT

## 2025-07-19 PROCEDURE — 86140 C-REACTIVE PROTEIN: CPT

## 2025-07-19 PROCEDURE — 80053 COMPREHEN METABOLIC PANEL: CPT

## 2025-07-22 ENCOUNTER — OFFICE VISIT (OUTPATIENT)
Dept: RHEUMATOLOGY | Facility: MEDICAL CENTER | Age: 50
End: 2025-07-22
Attending: STUDENT IN AN ORGANIZED HEALTH CARE EDUCATION/TRAINING PROGRAM
Payer: COMMERCIAL

## 2025-07-22 VITALS
HEIGHT: 67 IN | RESPIRATION RATE: 16 BRPM | BODY MASS INDEX: 26.62 KG/M2 | HEART RATE: 89 BPM | WEIGHT: 169.6 LBS | SYSTOLIC BLOOD PRESSURE: 108 MMHG | OXYGEN SATURATION: 97 % | DIASTOLIC BLOOD PRESSURE: 50 MMHG | TEMPERATURE: 97.8 F

## 2025-07-22 DIAGNOSIS — Z79.899 LONG-TERM USE OF HYDROXYCHLOROQUINE: ICD-10-CM

## 2025-07-22 DIAGNOSIS — D68.61 ANTIPHOSPHOLIPID SYNDROME (HCC): Primary | ICD-10-CM

## 2025-07-22 DIAGNOSIS — Z79.01 LONG TERM CURRENT USE OF ANTICOAGULANT: ICD-10-CM

## 2025-07-22 DIAGNOSIS — E55.9 VITAMIN D INSUFFICIENCY: ICD-10-CM

## 2025-07-22 PROCEDURE — 99214 OFFICE O/P EST MOD 30 MIN: CPT | Performed by: STUDENT IN AN ORGANIZED HEALTH CARE EDUCATION/TRAINING PROGRAM

## 2025-07-22 PROCEDURE — 3078F DIAST BP <80 MM HG: CPT | Performed by: STUDENT IN AN ORGANIZED HEALTH CARE EDUCATION/TRAINING PROGRAM

## 2025-07-22 PROCEDURE — 3074F SYST BP LT 130 MM HG: CPT | Performed by: STUDENT IN AN ORGANIZED HEALTH CARE EDUCATION/TRAINING PROGRAM

## 2025-07-22 RX ORDER — CALCIUM CARBONATE 260MG(650)
TABLET,CHEWABLE ORAL
COMMUNITY

## 2025-07-22 RX ORDER — ERGOCALCIFEROL 1.25 MG/1
50000 CAPSULE ORAL
Qty: 12 CAPSULE | Refills: 3 | Status: SHIPPED | OUTPATIENT
Start: 2025-07-22

## 2025-07-22 RX ORDER — HYDROXYCHLOROQUINE SULFATE 200 MG/1
200 TABLET, FILM COATED ORAL DAILY
Qty: 90 TABLET | Refills: 3 | Status: SHIPPED | OUTPATIENT
Start: 2025-07-22

## 2025-07-22 ASSESSMENT — RHEUMATOLOGY FOLLOW-UP QUESTIONNAIRE: DESCRIBE OR LIST SYMPTOMS: FEELING GREAT

## 2025-07-22 ASSESSMENT — PATIENT HEALTH QUESTIONNAIRE - PHQ9: CLINICAL INTERPRETATION OF PHQ2 SCORE: 0

## 2025-07-22 ASSESSMENT — FIBROSIS 4 INDEX: FIB4 SCORE: 0.8

## 2025-07-22 NOTE — PROGRESS NOTES
[facility] RHEUMATOLOGY  [address], [address], [place] [address]  Phone: [phone] ? Fax: [phone]  [facility]/Health-Services/Rheumatology    FOLLOW-UP VISIT NOTE         Subjective     DATE OF SERVICE: 07/22/2025    PRIMARY CARE PROVIDER:  Daniela Marcelo D.O.  [address]  [place] [address]    PATIENT IDENTIFICATION:  Jaclyn Olvera  [id number]  [place] [address]    YOB: 1975    MEDICAL RECORD NUMBER: [id number]         CHIEF COMPLAINT:   Chief Complaint   Patient presents with    Follow-Up     Antiphospholipid syndrome (HCC)       RHEUMATOLOGIC HISTORY:  Jaclyn Olvera is a 50 y.o. 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 Since then she had been on long-term anticoagulation with warfarin, and in 2/2022 was started on hydroxychloroquine by hematology/oncology at [facility]. She initially presented on 4/5/22 to establish care for continued evaluation and management of her condition. Reported onset of musculoskeletal symptoms in 8/2021 with joint pain in her shoulders and hands, associated with joint stiffness lasting most of the day and improving with activity. She additionally reported a history of multiple skin bumps on her upper/lower extremities since around 2016 s/p biopsy with unrevealing results, as well as some hip pain with sciatica.     Pertinent treatments: Xarelto 20 mg daily (ordered 6/14/23-8/2023, self-discontinued due to GI side effects), warfarin 2.5-7.5 mg daily based on INR (6/2021-present, effective), hydroxychloroquine 400>200 mg daily (started 2/2022, dose decreased 7/22/25-present, effective).     Pertinent positive labs: Positive IgG anti-, IgA anti-CL 21, IgG anti-B2GP1 71, IgG anti-PS/.5, and LAC (in 2-3/2022); positive anti-TPO  154 and anti-TG 9.1 (in 2/2022); low vitamin D25 of 21<19 (in 7/2025<6/2024); elevated CRP 14<27.58 and ESR 67<140 (in 5/2022<6/2021); elevated fibrinogen 489<777 (in 7/2025<6/2021).     Pertinent negative labs: Negative ANCA, RF, anti-CCP, HBV and HCV (in 6/2021), CUONG EIA (in 6/2021 and 3/2022), IgG subclasses (in 5/2022), CRP, ESR, eGFR, creatinine, LFTs, and acceptable CBC (in 7/2025).      INTERVAL HISTORY:  Reports interval history as noted on the questionnaire below or scanned under media tab.  [facility] Established Patient History Form    7/22/2025  9:22 AM PDT - Filed by Patient   MAIN REASON FOR VISIT Yearly checking   INTERVAL HISTORY OF ILLNESS   Date of worsening onset:    Preceding incident/ailment:    Describe/list your symptoms: Feeling great   Exacerbating factors:    Alleviating factors:    Helpful medications:    Ineffective medications:    Severity of pain (scale of 1-10):    Personal/emotional stressors:    Juan Antonio All The Areas Of Pain    REVIEW OF SYMPTOMS    General   Fevers    Chills    Night sweats    Malaise    Fatigue    Unintentional weight loss    Musculoskeletal   Joint pain    Morning stiffness duration    Morning stiffness characteristic    Joint swelling    Joint instability    Tendon pain    Muscle pain    Body aches    Dermatologic   Hair loss with bald spots    Hair shedding    Skin thickening    Skin plaques    Sunlight-induced skin rash    Cold-induced color changes (white, purple, red on rewarming)    Neurologic/Psychiatric   Weakness    Spasms    Tingling    Burning    Numbness    Insomnia    Anxiety    Depression    Head/Eyes   Headaches    Temple pain    Dizziness    Dry eyes    Eye pain    Eye redness    Blurry vision    Vision loss    Ears/Nose   Ear pain    Ringing in ears    Vertigo    Hearing loss    Nasal ulcers    Nosebleeds    Sinus pain    Nasal congestion    Snoring    Mouth/Throat   Oral ulcers    Bleeding gums    Dry mouth    Cavities    Sore throat     Sticking in throat    Difficulty speaking    Neck/Lymphatics   Thyroid pain    Thyroid swelling    Lymph node swelling    Cardiac/Respiratory   Chest pain with breathing    Dry cough    Cough with bloody phlegm    Shortness of breath    Fast heartbeats    Irregular heartbeats    Gastrointestinal   Nausea    Vomiting    Difficulty swallowing    Heartburn    Abdominal pain    Bloody stool    Mucus stool    Genitourinary   Pelvic pain    Genital ulcers    Abnormal discharge    Burning urination    Frothy urine    Blood in urine        REVIEW OF SYSTEMS:  Except as noted in the history above, relevant review of systems with emphasis on autoimmune rheumatic diseases was otherwise negative.      CURRENT PROBLEM LIST:  Patient Active Problem List    Diagnosis Date Noted    Dog bite of right arm, initial encounter 12/23/2024    Dog bite of left arm, initial encounter 12/23/2024    Cause of injury, dog bite, initial encounter 12/21/2024    Long term current use of anticoagulant 06/12/2024    Vitamin D insufficiency 06/14/2023    Multiple skin nodules 04/05/2022    Chronic pain of both shoulders 04/05/2022    Long-term use of hydroxychloroquine 04/05/2022    Anemia 01/27/2022    Black stool 01/27/2022    Epigastric pain 01/27/2022    Antiphospholipid syndrome (HCC) 06/23/2021    Elevated liver function tests 06/22/2021    Elevated C-reactive protein (CRP) 06/21/2021    Sepsis (HCC) 06/21/2021    Microcytic anemia 06/19/2021    Thrombocytopenia (HCC) 06/19/2021    Hemorrhage of both adrenal glands (HCC) 06/18/2021    Retroperitoneal lymphadenopathy 06/18/2021    Vaginal bleeding 06/18/2021    Hyperbilirubinemia 06/17/2021    Fibroids 06/15/2021    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 pylori infection 06/14/2021    Hyponatremia 06/14/2021    Kidney lesion, native, right 06/14/2021    Lesion of cervix 06/14/2021    Leukocytosis 06/14/2021       PAST MEDICAL HISTORY:  Past Medical History[1]    PAST SURGICAL  HISTORY:  Past Surgical History[2]    SOCIAL HISTORY:  Social History     Socioeconomic History    Marital status:      Spouse name: Not on file    Number of children: Not on file    Years of education: Not on file    Highest education level: Not on file   Occupational History    Not on file   Tobacco Use    Smoking status: Former     Current packs/day: 0.00     Average packs/day: 0.1 packs/day for 16.0 years (1.6 ttl pk-yrs)     Types: Cigarettes     Start date:      Quit date:      Years since quittin.5    Smokeless tobacco: Never   Vaping Use    Vaping status: Never Used   Substance and Sexual Activity    Alcohol use: Not Currently    Drug use: Not Currently    Sexual activity: Not on file   Other Topics Concern    Not on file   Social History Narrative    Not on file     Social Drivers of Health     Financial Resource Strain: Not on file   Food Insecurity: Not on file   Transportation Needs: Not on file   Physical Activity: Not on file   Stress: Not on file   Social Connections: Not on file   Intimate Partner Violence: Not on file   Housing Stability: Not on file       FAMILY HISTORY:  History reviewed. No pertinent family history.    MEDICATIONS:  Current Outpatient Medications   Medication Sig    Magnesium Citrate 100 MG Tab     ergocalciferol (DRISDOL) 96176 UNIT capsule Take 1 Capsule by mouth every 7 days.    hydroxychloroquine (PLAQUENIL) 200 MG Tab Take 1 Tablet by mouth every day.    Semaglutide, 2 MG/DOSE, (OZEMPIC, 2 MG/DOSE,) 8 MG/3ML Solution Pen-injector Inject 2 mg under the skin every 7 days.    Insulin Pen Needle (PEN NEEDLES) 32G X 4 MM Misc Use 1 pen needle as needed (For use with Ozempic pen.).    warfarin (COUMADIN) 2.5 MG Tab Take 1 Tablet by mouth every day. Take as directed by anticoag clinic.    warfarin (COUMADIN) 5 MG Tab TAKE ONE TO TWO TABLETS DAILY AS DIRECTED BY [facility]    ibuprofen (MOTRIN) 800 MG Tab Take 1 Tablet by mouth every 8 hours as needed for Mild Pain.     "senna-docusate (PERICOLACE OR SENOKOT S) 8.6-50 MG Tab Take 1 Tablet by mouth every day. (Patient taking differently: Take 1 Tablet by mouth as needed.     MEDICATION INSTRUCTIONS FOR SURGERY/PROCEDURE SCHEDULED FOR 9/11/24   CONTINUE TAKING MED PRIOR TO SURGERY  BUT HOLD MORNING OF PROCEDURE)    meclizine (ANTIVERT) 25 MG Tab Take 1 Tablet by mouth 3 times a day as needed for Dizziness or Vertigo. (Patient taking differently: Take 25 mg by mouth 3 times a day as needed for Dizziness or Vertigo.     MEDICATION INSTRUCTIONS FOR SURGERY/PROCEDURE SCHEDULED FOR 9/11/24   OK TO CONTINUE TAKING PRIOR TO SURGERY AND DAY OF SURGERY IF NEEDED)    Biotin w/ Vitamins C & E (HAIR/SKIN/NAILS PO) Take 1 Tablet by mouth every day.     MEDICATION INSTRUCTIONS FOR SURGERY/PROCEDURE SCHEDULED FOR 9/11/24   DO NOT TAKE 7 DAYS PRIOR TO SURGERY       ALLERGIES:   Allergies[3]    IMMUNIZATIONS:  Immunization History   Administered Date(s) Administered    PFIZER PURPLE CAP SARS-COV-2 VACCINATION (12+) 03/30/2021, 04/20/2021    Rabies Virus Vaccine, HDC IM INJ 12/20/2024, 12/23/2024, 12/27/2024, 01/03/2025    Tdap Vaccine 12/20/2024            Objective     Vital Signs: /50 (BP Location: Left arm, Patient Position: Sitting, BP Cuff Size: Adult)   Pulse 89   Temp 36.6 °C (97.8 °F) (Temporal)   Resp 16   Ht 1.702 m (5' 7\")   Wt 76.9 kg (169 lb 9.6 oz)   SpO2 97% Body mass index is 26.56 kg/m².    General: Appears well and comfortable  Eyes: No scleral or conjunctival lesions  ENT: No apparent oral, nasal, or ear lesions  Head/Neck: No apparent scalp or neck lesions  Cardiovascular: Regular rate and rhythm  Respiratory: Breathing quiet and unlabored  Gastrointestinal: No apparent organomegaly or abdominal masses  Integumentary: No significant cutaneous lesions or dyspigmentation  Musculoskeletal: No significant joint tenderness, swelling, warmth, erythema, or overt synovitis; no significant restriction in range of motion of " joints examined  Neurologic: No focal sensory or motor deficits  Psychiatric: Mood and affect appropriate      LABORATORY RESULTS REVIEWED AND INTERPRETED:  Lab Results   Component Value Date/Time    CREACTPROT 0.45 07/19/2025 08:22 AM    SEDRATEWES 11 07/19/2025 08:22 AM    FIBRINOGEN 489 (H) 07/19/2025 08:22 AM     Lab Results   Component Value Date/Time    RHEUMFACTN <10 06/20/2021 06:34 AM    CCPANTIBODY 8 06/20/2021 06:34 AM     Lab Results   Component Value Date/Time    ANTINUCAB None Detected 06/20/2021 06:34 AM     Lab Results   Component Value Date/Time    SMITHAB 6 06/20/2021 06:34 AM    RNPAB See Note 06/20/2021 06:34 AM    SSA60 1 06/20/2021 06:34 AM    SSA52 3 06/20/2021 06:34 AM    ANTISSBSJ 0 06/20/2021 06:34 AM     Lab Results   Component Value Date/Time    ANCAIGG <1:20 06/21/2021 12:13 PM     Lab Results   Component Value Date/Time    IGACARDIOLI 21 (H) 06/20/2021 06:34 AM    IGMCARDIOLI 11 06/20/2021 06:34 AM    IGGCARDIOLI 146 (H) 06/20/2021 06:34 AM    RUSSELVIPER 107.2 (H) 06/20/2021 06:34 AM     Lab Results   Component Value Date/Time    TSHULTRASEN 3.520 06/25/2024 06:41 AM    FREET4 1.25 06/19/2021 05:57 AM     Lab Results   Component Value Date/Time    25HYDROXY 21 (L) 07/19/2025 08:22 AM     Lab Results   Component Value Date/Time    FERRITIN 10.9 10/15/2024 08:45 AM    IRON 24 (L) 10/15/2024 08:45 AM    TRANSFERRIN 307 01/27/2022 07:15 AM    FOLATE 12.6 06/25/2024 06:41 AM    HAPTOGLOBIN 284 (H) 06/24/2021 06:29 PM    PROTHROMBTM 13.7 09/11/2024 06:26 AM    INR 1.80 (A) 07/11/2025 02:57 PM     Lab Results   Component Value Date/Time    WBC 5.5 07/19/2025 08:22 AM    RBC 5.58 (H) 07/19/2025 08:22 AM    HEMOGLOBIN 13.4 07/19/2025 08:22 AM    HEMATOCRIT 44.7 07/19/2025 08:22 AM    MCV 80.1 (L) 07/19/2025 08:22 AM    MCH 24.0 (L) 07/19/2025 08:22 AM    MCHC 30.0 (L) 07/19/2025 08:22 AM    RDW 50.4 (H) 07/19/2025 08:22 AM    PLATELETCT 280 07/19/2025 08:22 AM    MPV 10.2 07/19/2025 08:22 AM     NEUTS 3.14 07/19/2025 08:22 AM    LYMPHOCYTES 33.30 07/19/2025 08:22 AM    MONOCYTES 6.20 07/19/2025 08:22 AM    EOSINOPHILS 2.90 07/19/2025 08:22 AM    BASOPHILS 0.50 07/19/2025 08:22 AM    HYPOCHROMIA 2+ (A) 01/24/2022 10:57 AM    ANISOCYTOSIS 1+ 10/15/2024 08:45 AM     Lab Results   Component Value Date/Time    ASTSGOT 20 07/19/2025 08:22 AM    ALTSGPT 19 07/19/2025 08:22 AM    ALKPHOSPHAT 46 07/19/2025 08:22 AM    TBILIRUBIN 0.7 07/19/2025 08:22 AM    TOTPROTEIN 7.6 07/19/2025 08:22 AM    ALBUMIN 4.1 07/19/2025 08:22 AM     Lab Results   Component Value Date/Time    SODIUM 136 07/19/2025 08:22 AM    POTASSIUM 4.4 07/19/2025 08:22 AM    CHLORIDE 104 07/19/2025 08:22 AM    CO2 22 07/19/2025 08:22 AM    GLUCOSE 82 07/19/2025 08:22 AM    BUN 16 07/19/2025 08:22 AM    CREATININE 0.78 07/19/2025 08:22 AM    BUNCREATRAT 11.3 04/27/2022 08:51 AM    BUNCREATRAT 14 11/05/2021 04:07 AM    CALCIUM 9.6 07/19/2025 08:22 AM    MAGNESIUM 1.7 06/22/2021 06:14 AM     Lab Results   Component Value Date/Time    TOTPROTUR 6.0 06/24/2021 05:08 PM    MICROALBUR 1.2 06/24/2021 05:08 PM    CREATININEU 31.76 06/24/2021 05:08 PM    MALBCRT 39 (H) 06/24/2021 05:08 PM     Lab Results   Component Value Date/Time    COLORURINE Yellow 09/10/2024 11:05 AM    SPECGRAVITY 1.007 09/10/2024 11:05 AM    PHURINE 6.0 09/10/2024 11:05 AM    GLUCOSEUR Negative 09/10/2024 11:05 AM    KETONES Negative 09/10/2024 11:05 AM    PROTEINURIN Negative 09/10/2024 11:05 AM     Lab Results   Component Value Date/Time    HEPBSAG Non-Reactive 06/21/2021 12:13 PM    HEPBCORIGM Non-Reactive 06/21/2021 12:13 PM    HEPCAB Non-Reactive 06/21/2021 12:13 PM     Lab Results   Component Value Date/Time    CHOLSTRLTOT 191 06/25/2024 06:41 AM     (H) 06/25/2024 06:41 AM    HDL 44 06/25/2024 06:41 AM    TRIGLYCERIDE 134 06/25/2024 06:41 AM    HBA1C 4.6 06/25/2024 06:41 AM       RADIOLOGY RESULTS REVIEWED AND INTERPRETED:    Results for orders placed during the hospital  encounter of 05/30/10    DX-KNEE COMPLETE 4+    Results for orders placed during the hospital encounter of 12/20/24    DX-WRIST-COMPLETE 3+ LEFT    Impression  1.  No acute traumatic bony injury.    Results for orders placed during the hospital encounter of 12/20/24    DX-HAND 3+ LEFT    Impression  1.  No acute traumatic bony injury.    Results for orders placed during the hospital encounter of 09/07/21    US-RENAL    Impression  1.  Resolution of previously seen left hydronephrosis.  2.  There is a hypoechoic 2.8 cm mass along the inferior right margin of the liver, this is likely evolving hematoma from patient's known adrenal hemorrhage.      All relevant laboratory and imaging results reported on this note were reviewed and interpreted by me.         Assessment & Plan     Jaclyn Olvera is a 50 y.o. female with history and physical as noted above whose presentation merits the following clinical impressions and recommendations:    1. Antiphospholipid syndrome (HCC)  Clinical picture based on her historical signs/symptomatology (catastrophic episode with SVC thrombosis and bilateral adrenal hemorrhage) and supporting immunologic profile (quadruple positive with anti-CL, anti-B2GP1, anti-PS/PT, and LAC). Clinically remains quiescent with no evidence to suggest evolving or impending vascular thrombosis on the current regimen of warfarin and hydroxychloroquine. In addition to her lifelong anticoagulation, continued use of hydroxychloroquine provides a protective immunomodulatory effect against antiphospholipid antibody-mediated hypercoagulability. Prior to her next visit, need to reassess markers of disease activity and risk stratification to monitor overall trajectory.  - CRP QUANTITIVE (NON-CARDIAC); Future  - Sed Rate; Future  - FIBRINOGEN; Future  - CBC WITH DIFFERENTIAL; Future  - Comp Metabolic Panel; Future  - hydroxychloroquine (PLAQUENIL) 200 MG Tab; Take 1 Tablet by mouth every day.  Dispense: 90 Tablet;  Refill: 3    2. Long term current use of anticoagulant  Confers higher risk of excessive bleeding for which she was previously counseled.  - Routine follow-up with vascular medicine    3. Long-term use of hydroxychloroquine  Risk of retinal toxicity considered minimal to none given the optimal dose of hydroxychloroquine prescribed (less than 5 mg/kg of body weight) per rheumatology and ophthalmology guidelines, but routine eye exams are still recommended, typically every 1-2 years.  - Routine ophthalmology evaluation as recommended    4. Vitamin D insufficiency  Hypovitaminosis D or deficiency can contribute to diffuse musculoskeletal aches, fatigue, malaise, and risk of osteopenia predisposing to osteoporosis, especially in the setting of a rheumatic disease, so need supplementation and follow-up retesting.  - VITAMIN D,25 HYDROXY (DEFICIENCY); Future  - ergocalciferol (DRISDOL) 46130 UNIT capsule; Take 1 Capsule by mouth every 7 days.  Dispense: 12 Capsule; Refill: 3      The above assessment and plan were discussed with the patient who acknowledged understanding of the plan.    FOLLOW-UP: Return in about 1 year (around 7/22/2026) for Short.         Thank you for the opportunity to participate in the care of Jaclyn Olvera.    Nicanor Orlando MD, MS, FACR  Rheumatologist, [facility], [facility]   of Clinical Medicine, Department of Internal Medicine  [facility]       [1]   Past Medical History:  Diagnosis Date    Antiphospholipid antibody syndrome (HCC)     Bleeding disorder (HCC) 08/20/2024    on Warfarin, abnormal uterine bleeding    Bowel habit changes 08/20/2024    constipation, medicated daily    Gynecological disorder 08/20/2024    abnormal uterine bleeding    H/O thrombosis of vena cava     Hemorrhage of both adrenal glands (HCC)     Pain 08/20/2024    achy joints    PE (pulmonary embolism) 08/20/2024    on  "Warfarin   [2]   Past Surgical History:  Procedure Laterality Date    PB DEBRIDEMENT, SKIN, SUB-Q TISSUE,MUSCLE,MARCI* Left 12/24/2024    Procedure: LEFT WRIST IRRIGATION AND DEBRIDEMENT;  Surgeon: Eagle Hubbard M.D.;  Location: [facility];  Service: Orthopedics    WA CYSTOURETHROSCOPY  9/11/2024    Procedure: CYSTOSCOPY;  Surgeon: Kd Augustin M.D.;  Location: SURGERY [facility];  Service: Gyn Robotic    HYSTERECTOMY ROBOTIC XI  9/11/2024    Procedure: ROBOTICALLY ASSISTED TOTAL LAPAROSCOPIC HYSTERECTOMY;  Surgeon: Kd Augustin M.D.;  Location: SURGERY [facility];  Service: Gyn Robotic    SALPINGECTOMY Bilateral 9/11/2024    Procedure: SALPINGECTOMY, LAPAROSCOPIC;  Surgeon: Kd Augustin M.D.;  Location: SURGERY [facility];  Service: Gyn Robotic    WA UPPER GI ENDOSCOPY,DIAGNOSIS N/A 6/17/2021    Procedure: GASTROSCOPY;  Surgeon: Elfego Lopez M.D.;  Location: SURGERY SAME DAY [facility];  Service: Gastroenterology    WA UPPER GI ENDOSCOPY,BIOPSY N/A 6/17/2021    Procedure: GASTROSCOPY, WITH BIOPSY;  Surgeon: Elfego Lopez M.D.;  Location: SURGERY SAME DAY [facility];  Service: Gastroenterology   [3]   Allergies  Allergen Reactions    Iron Polysaccharide Shortness of Breath     Stated \"hard to breath\" during iron infusion in September.     "

## 2025-07-22 NOTE — PATIENT INSTRUCTIONS
[facility]    Below are important guidelines to help you navigate your health care needs and assist us in caring for you safely and effectively. We encourage you to carefully read and understand this information and adhere to them accordingly.    [employer] Messaging and Phone Calls:  Diagnosis and Treatment - For a detailed explanation of your condition and treatment plan from today's visit, refer to the visit note on [employer] via the following steps:  Log in to [employer] and click on “Visits” at the top.  Scroll down to “Past Visits” under Appointments.  Click on “View Notes” under the appropriate visit date.  Questions or Concerns - MyChart messaging is for non-urgent matters that do not require immediate attention and should be brief with no more than two questions or concerns. If you have multiple questions or concerns, we ask that you schedule an appointment to have them properly addressed.  Response to Messages - [employer] messages are addressed throughout the week depending on clinical availability, so we ask that you allow up to one week for a response.  Phone Calls and Voicemails - Phone calls and voicemail messages are reserved for time-sensitive matters that cannot wait to be addressed via [employer]. We ask that you refrain from calling the office multiple times or leaving multiple voicemails regarding the same issue as doing so may lead to delays in response time.  Urgent Issues - For urgent medical matters or medical emergencies that cannot wait, you are advised to go to your nearest Urgent Care or Emergency Department for immediate attention.    Laboratory Tests and Imaging Studies:  Future Lab and Imaging Orders - We ask that you get your lab tests and imaging studies done no later than one week before your follow-up visit unless instructed otherwise.  Results Communication - You may see some test results marked as “abnormal” that are not necessarily significant or concerning. If  there are significant abnormalities on your test results that warrant further action, you will be notified via MyChart or phone call, otherwise they will be addressed at your follow-up visit.    Prescriptions and Refill Requests:  General Prescriptions (e.g. prednisone, hydroxychloroquine, leflunomide, methotrexate, etc.) - These are sent to Retail Pharmacies, so all refill requests of these medications should be directed to your local pharmacy.  Specialty Prescriptions (e.g. Enbrel, Humira, Cosentyx, Xeljanz, etc.) - These are sent to Specialty Pharmacies, so all refill requests of these medications should be directed to your designated specialty pharmacy.  Infusion Prescriptions (e.g. Remicade, Simponi Aria, Rituxan, Saphnelo, etc.) - These are sent to Outpatient Infusion Centers, so all scheduling requests of these medications should be directed to your local infusion center.    Medication Risks and Adverse Effects:  Immunosuppressed Status - Steroids and antirheumatic drugs are immunosuppressants, so they increase the risk of infections and can have side effects on various organ systems in your body, though most of them are uncommon.  Potential Side Effects - Be sure to read the drug package inserts to learn about the potential side effects of your medications before you start taking them and take them exactly as prescribed unless instructed otherwise.  In Case of Side Effects - If you experience any significant side effects, stop taking the medication immediately and promptly notify the prescriber. Depending on the severity of the side effects, consider going to an Urgent Care or Emergency Department for immediate attention.    Immunizations and Health Screening:  Vaccinations - If you are on immunosuppressive therapy, it is important that you are up to date on age-appropriate immunizations, particularly shingles and pneumonia vaccines, which you can request from your primary care provider or from us at your  next appointment.  Screening Tests - It is also important that you are up to date on age-appropriate screening tests, such as pap smear, mammography, and colonoscopy, which you can request from your primary care provider.    Educational and Supportive Resources:  [facility] ([email or web address]) - Visit our website to learn more about your condition and other rheumatic diseases, and gain access to many helpful resources for them.  Disposal of Old Medications (www.trista.gov/everyday-takeback-day) - Visit the Drug Enforcement Administration website to find a nearby location where you can properly dispose of old medications you no longer need.  Disposal of Used [place] (www.safeneedledisposal.org) - Visit the Safe Needle Disposal Organization website to find a nearby location where you can properly dispose of used needles from your injectable medications.

## 2025-08-01 ENCOUNTER — ANTICOAGULATION VISIT (OUTPATIENT)
Dept: VASCULAR LAB | Facility: MEDICAL CENTER | Age: 50
End: 2025-08-01
Attending: INTERNAL MEDICINE
Payer: COMMERCIAL

## 2025-08-01 DIAGNOSIS — I82.210 THROMBOSIS OF SUPERIOR VENA CAVA (HCC): Primary | ICD-10-CM

## 2025-08-01 DIAGNOSIS — E27.49 HEMORRHAGE OF BOTH ADRENAL GLANDS (HCC): ICD-10-CM

## 2025-08-01 DIAGNOSIS — N93.9 VAGINAL BLEEDING: ICD-10-CM

## 2025-08-01 DIAGNOSIS — D68.61 ANTIPHOSPHOLIPID SYNDROME (HCC): ICD-10-CM

## 2025-08-01 LAB — INR PPP: 4.7 (ref 2–3.5)

## 2025-08-01 PROCEDURE — 99213 OFFICE O/P EST LOW 20 MIN: CPT

## 2025-08-01 PROCEDURE — 85610 PROTHROMBIN TIME: CPT

## 2025-08-06 ENCOUNTER — SPECIALTY PHARMACY (OUTPATIENT)
Dept: VASCULAR LAB | Facility: MEDICAL CENTER | Age: 50
End: 2025-08-06
Payer: COMMERCIAL

## 2025-08-06 PROCEDURE — RXMED WILLOW AMBULATORY MEDICATION CHARGE: Performed by: INTERNAL MEDICINE

## 2025-08-11 ENCOUNTER — PHARMACY VISIT (OUTPATIENT)
Dept: PHARMACY | Facility: MEDICAL CENTER | Age: 50
End: 2025-08-11
Payer: COMMERCIAL

## (undated) DEVICE — GLOVE BIOGEL SZ 8 SURGICAL PF LTX - (50PR/BX 4BX/CA)

## (undated) DEVICE — NEEDLE INSUFFLATION FOR STEP - (12/BX)

## (undated) DEVICE — ELECTRODE DUAL RETURN W/ CORD - (50/PK)

## (undated) DEVICE — SEALER VESSEL EXTEND FROM DAVINCI ENERGY (6EA/BX)

## (undated) DEVICE — ELECTROSURGICAL KOH EFFICIENT 3.5CM

## (undated) DEVICE — KIT CUSTOM PROCEDURE SINGLE FOR ENDO  (15/CA)

## (undated) DEVICE — TRAY CATHETER FOLEY URINE METER W/STATLOCK 350ML (10EA/CA)

## (undated) DEVICE — GOWN SURGICAL XX-LARGE - (28EA/CA) SIRUS NON REINFORCED

## (undated) DEVICE — SYRINGE STERILE 10 ML LL (200/BX)

## (undated) DEVICE — GLOVE SZ 7 BIOGEL PI MICRO - PF LF (50PR/BX 4BX/CA)

## (undated) DEVICE — DRAPE ARM BOX OF 20

## (undated) DEVICE — GLOVE BIOGEL PI INDICATOR SZ 6.5 SURGICAL PF LF - (50/BX 4BX/CA)

## (undated) DEVICE — OBTURATOR BLADELESS STANDARD 8MM (6EA/BX)

## (undated) DEVICE — TUBING CLEARLINK DUO-VENT - C-FLO (48EA/CA)

## (undated) DEVICE — SET LEADWIRE 5 LEAD BEDSIDE DISPOSABLE ECG (1SET OF 5/EA)

## (undated) DEVICE — GLOVE SZ 6.5 BIOGEL PI MICRO - PF LF (50PR/BX)

## (undated) DEVICE — SET EXTENSION WITH 2 PORTS (48EA/CA) ***PART #2C8610 IS A SUBSTITUTE*****

## (undated) DEVICE — SUCTION INSTRUMENT YANKAUER BULBOUS TIP W/O VENT (50EA/CA)

## (undated) DEVICE — TOWEL STOP TIMEOUT SAFETY FLAG (40EA/CA)

## (undated) DEVICE — BOVIE BLADE COATED - (50/PK)

## (undated) DEVICE — SLEEVE VASO DVT COMPRESSION CALF MED - (10PR/CA)

## (undated) DEVICE — DERMABOND ADVANCED - (12EA/BX)

## (undated) DEVICE — SYSTEM CLEARIFY VISUALIZATION (10EA/PK)

## (undated) DEVICE — TUBE E-T HI-LO CUFF 7.0MM (10EA/PK)

## (undated) DEVICE — HOOK PERMANENT CAUTERY DA VINCI 10X'S REUSABLE

## (undated) DEVICE — PAD OR TABLE DA VINCI 2IN X 20IN X 72IN - (12EA/CA)

## (undated) DEVICE — GLOVE BIOGEL PI INDICATOR SZ 7.0 SURGICAL PF LF - (50/BX 4BX/CA)

## (undated) DEVICE — SENSOR OXIMETER ADULT SPO2 RD SET (20EA/BX)

## (undated) DEVICE — PACK TRENGUARD 450 PROCEDURE (12EA/CA)

## (undated) DEVICE — DRAPE COLUMN BOX OF 20

## (undated) DEVICE — CONTAINER, SPECIMEN, STERILE

## (undated) DEVICE — APPLICATOR ENDOSCOPIC SURGICEL (5EA/BX)

## (undated) DEVICE — GLOVE BIOGEL PI INDICATOR SZ 7.5 SURGICAL PF LF -(50/BX 4BX/CA)

## (undated) DEVICE — UTERINE MANIP RUMI 6.7X8 - (5/BX)

## (undated) DEVICE — DRIVER LARGE SUTURECUT NEEDLE (15UN/EA)

## (undated) DEVICE — ARMREST CRADLE FOAM - (2PR/PK 12PR/CA)

## (undated) DEVICE — FILM CASSETTE ENDO

## (undated) DEVICE — SUTURE 0 VICRYL PLUS CT-2 - 27 INCH (36/BX)

## (undated) DEVICE — LACTATED RINGERS INJ 1000 ML - (14EA/CA 60CA/PF)

## (undated) DEVICE — SCRUB SOLUTION EXIDINE 4% 40Z - 48/CS CHLORAHEXADINE GLUCONATE

## (undated) DEVICE — FORCEP RADIAL JAW 4 STANDARD CAPACITY W/NEEDLE 240CM (40EA/BX)

## (undated) DEVICE — SUTURE GENERAL

## (undated) DEVICE — PACK GYN DAVINCI (1EA/CA)

## (undated) DEVICE — BIPOLAR FORCE DA VINCI 12X'S REISABLE

## (undated) DEVICE — COVER LIGHT HANDLE ALC PLUS DISP (18EA/BX)

## (undated) DEVICE — CANISTER SUCTION 3000ML MECHANICAL FILTER AUTO SHUTOFF MEDI-VAC NONSTERILE LF DISP (40EA/CA)

## (undated) DEVICE — BITE BLOCK ADULT 60FR (100EA/CA)

## (undated) DEVICE — GOWN WARMING STANDARD FLEX - (30/CA)

## (undated) DEVICE — SODIUM CHL IRRIGATION 0.9% 1000ML (12EA/CA)

## (undated) DEVICE — SYRINGE 30 ML LL (56/BX)

## (undated) DEVICE — SUTURE 4-0 MONOCRYL PLUS PS-2 - 27 INCH (36/BX)

## (undated) DEVICE — HEMOSTAT ABSORBABLE POWDER SURGICEL 3G (5EA/BX)